# Patient Record
Sex: FEMALE | Race: BLACK OR AFRICAN AMERICAN | Employment: OTHER | URBAN - METROPOLITAN AREA
[De-identification: names, ages, dates, MRNs, and addresses within clinical notes are randomized per-mention and may not be internally consistent; named-entity substitution may affect disease eponyms.]

---

## 2017-03-01 ENCOUNTER — OFFICE VISIT (OUTPATIENT)
Dept: FAMILY MEDICINE CLINIC | Age: 77
End: 2017-03-01

## 2017-03-01 VITALS
RESPIRATION RATE: 16 BRPM | SYSTOLIC BLOOD PRESSURE: 200 MMHG | HEIGHT: 65 IN | WEIGHT: 249 LBS | HEART RATE: 64 BPM | BODY MASS INDEX: 41.48 KG/M2 | TEMPERATURE: 98 F | OXYGEN SATURATION: 98 % | DIASTOLIC BLOOD PRESSURE: 100 MMHG

## 2017-03-01 DIAGNOSIS — E11.9 DIABETES MELLITUS WITHOUT COMPLICATION (HCC): ICD-10-CM

## 2017-03-01 DIAGNOSIS — J30.2 SEASONAL ALLERGIC RHINITIS, UNSPECIFIED ALLERGIC RHINITIS TRIGGER: ICD-10-CM

## 2017-03-01 DIAGNOSIS — I10 ESSENTIAL HYPERTENSION: Primary | ICD-10-CM

## 2017-03-01 RX ORDER — METOPROLOL SUCCINATE 50 MG/1
50 TABLET, EXTENDED RELEASE ORAL DAILY
COMMUNITY
End: 2017-09-19 | Stop reason: SDUPTHER

## 2017-03-01 RX ORDER — EZETIMIBE 10 MG/1
10 TABLET ORAL DAILY
COMMUNITY
End: 2017-03-01 | Stop reason: SDUPTHER

## 2017-03-01 RX ORDER — EZETIMIBE 10 MG/1
10 TABLET ORAL DAILY
Qty: 90 TAB | Refills: 3 | Status: SHIPPED | OUTPATIENT
Start: 2017-03-01 | End: 2017-08-02

## 2017-03-01 RX ORDER — ROSUVASTATIN CALCIUM 20 MG/1
20 TABLET, COATED ORAL
COMMUNITY
End: 2017-05-25 | Stop reason: SDUPTHER

## 2017-03-01 RX ORDER — INSULIN GLARGINE 100 [IU]/ML
24 INJECTION, SOLUTION SUBCUTANEOUS
Status: ON HOLD | COMMUNITY
End: 2022-10-11 | Stop reason: SDUPTHER

## 2017-03-01 RX ORDER — FUROSEMIDE 20 MG/1
20 TABLET ORAL
COMMUNITY
End: 2022-10-11

## 2017-03-01 RX ORDER — INSULIN LISPRO 100 [IU]/ML
INJECTION, SOLUTION INTRAVENOUS; SUBCUTANEOUS
COMMUNITY
End: 2020-07-30 | Stop reason: ALTCHOICE

## 2017-03-01 RX ORDER — VALSARTAN 320 MG/1
320 TABLET ORAL DAILY
COMMUNITY
End: 2019-03-28

## 2017-03-01 NOTE — MR AVS SNAPSHOT
Visit Information Date & Time Provider Department Dept. Phone Encounter #  
 3/1/2017 10:00 AM Edson Becker, [address]  [id number] Follow-up Instructions Return in about 2 weeks (around 3/15/2017) for BP/back on lasix. Your Appointments 3/29/2017 10:30 AM  
New Patient with Shanel Anne MD  
[address] [facility], [address] and [facility] Appt Note: Knee pains/ pt aware to bring Id, ins card, list of meds [address] [facility], [address] [place] [address]  
[phone]  
  
   
 [address] [facility], [address] [place] [address] [address] [address] Upcoming Health Maintenance Date Due DTaP/Tdap/Td series (1 - Tdap) 2/6/1961 ZOSTER VACCINE AGE 60> 2/6/2000 GLAUCOMA SCREENING Q2Y 2/6/2005 OSTEOPOROSIS SCREENING (DEXA) 2/6/2005 Pneumococcal 65+ Low/Medium Risk (1 of 2 - PCV13) 2/6/2005 MEDICARE YEARLY EXAM 2/6/2005 INFLUENZA AGE 9 TO ADULT 8/1/2016 Allergies as of 3/1/2017  Review Complete On: 3/1/2017 By: Edson Becker MD  
  
 Severity Noted Reaction Type Reactions Aspirin  03/01/2017    Other (comments) Ringing in ears Keflex [Cephalexin]  03/01/2017    Rash Current Immunizations  Never Reviewed No immunizations on file. Not reviewed this visit You Were Diagnosed With   
  
 Codes Comments Essential hypertension    -  Primary ICD-10-CM: I10 
ICD-9-CM: 401.9 Diabetes mellitus without complication ([facility] 75.)     IWK-97-EU: E11.9 ICD-9-CM: 250.00 Seasonal allergic rhinitis, unspecified allergic rhinitis trigger     ICD-10-CM: J30.2 ICD-9-CM: 477.9 Vitals BP  
  
  
  
  
  
 (!) 200/100 Vitals History BMI and BSA Data Body Mass Index Body Surface Area  
 41.44 kg/m 2 2.28 m 2 Preferred Pharmacy Pharmacy Name Phone [facility] [address] [phone] Your Updated Medication List  
  
   
This list is accurate as of: 3/1/17 11:09 AM.  Always use your most recent med list.  
  
  
  
  
 CRESTOR 20 mg tablet Generic drug:  rosuvastatin Take 20 mg by mouth nightly. DIOVAN 320 mg tablet Generic drug:  valsartan Take 320 mg by mouth daily. ezetimibe 10 mg tablet Commonly known as:  Fabiana Emil Take 1 Tab by mouth daily. HumaLOG 100 unit/mL injection Generic drug:  insulin lispro  
by SubCUTAneous route. 8 units before breakfast 6 units before lunch 10 units before dinner LANTUS SOLOSTAR 100 unit/mL (3 mL) pen Generic drug:  insulin glargine  
by SubCUTAneous route. 44 units nightly LASIX 40 mg tablet Generic drug:  furosemide Take 20 mg by mouth daily. metoprolol succinate 50 mg XL tablet Commonly known as:  TOPROL-XL Take 100 mg by mouth daily. Prescriptions Sent to Pharmacy Refills  
 ezetimibe (ZETIA) 10 mg tablet 3 Sig: Take 1 Tab by mouth daily. Class: Normal  
 Pharmacy: [facility] [address] [place] [address] #: [phone] Route: Oral  
  
We Performed the Following REFERRAL TO ALLERGY [REF5 Custom] Comments:  
 Please evaluate patient for chronic allergic rhinitis; Dr. Tanja Peoples. REFERRAL TO OPHTHALMOLOGY [REF57 Custom] Comments:  
 Please evaluate patient for retinal disorder jerry diabetic. Khanh Dias REFERRAL TO PODIATRY [REF90 Custom] Comments:  
 Please evaluate patient for diabetic foot exam.  
  
Follow-up Instructions Return in about 2 weeks (around 3/15/2017) for BP/back on lasix. Referral Information Referral ID Referred By Referred To  
  
 [id number] Satish Ragsdale [facility] [address] [place] [address], [address] [place]  Phone: [phone] Fax: [phone] Visits Status Start Date End Date 1 New Request 3/1/17 3/1/18 If your referral has a status of pending review or denied, additional information will be sent to support the outcome of this decision. Referral ID Referred By Referred To  
 [id number] Jessica Gutierrez Not Available Visits Status Start Date End Date 1 New Request 3/1/17 3/1/18 If your referral has a status of pending review or denied, additional information will be sent to support the outcome of this decision. Referral ID Referred By Referred To  
 [id number] Jennifer Walton, [address] [id number] [address] Phone: [phone] Fax: [phone] Visits Status Start Date End Date 1 New Request 3/1/17 3/1/18 If your referral has a status of pending review or denied, additional information will be sent to support the outcome of this decision. Patient Instructions DASH Diet: Care Instructions Your Care Instructions The DASH diet is an eating plan that can help lower your blood pressure. DASH stands for Dietary Approaches to Stop Hypertension. Hypertension is high blood pressure. The DASH diet focuses on eating foods that are high in calcium, potassium, and magnesium. These nutrients can lower blood pressure. The foods that are highest in these nutrients are fruits, vegetables, low-fat dairy products, nuts, seeds, and legumes. But taking calcium, potassium, and magnesium supplements instead of eating foods that are high in those nutrients does not have the same effect. The DASH diet also includes whole grains, fish, and poultry. The DASH diet is one of several lifestyle changes your doctor may recommend to lower your high blood pressure. Your doctor may also want you to decrease the amount of sodium in your diet. Lowering sodium while following the DASH diet can lower blood pressure even further than just the DASH diet alone. Follow-up care is a key part of your treatment and safety. Be sure to make and go to all appointments, and call your doctor if you are having problems. It's also a good idea to know your test results and keep a list of the medicines you take. How can you care for yourself at home? Following the DASH diet · Eat 4 to 5 servings of fruit each day. A serving is 1 medium-sized piece of fruit, ½ cup chopped or canned fruit, 1/4 cup dried fruit, or 4 ounces (½ cup) of fruit juice. Choose fruit more often than fruit juice. · Eat 4 to 5 servings of vegetables each day. A serving is 1 cup of lettuce or raw leafy vegetables, ½ cup of chopped or cooked vegetables, or 4 ounces (½ cup) of vegetable juice. Choose vegetables more often than vegetable juice. · Get 2 to 3 servings of low-fat and fat-free dairy each day. A serving is 8 ounces of milk, 1 cup of yogurt, or 1 ½ ounces of cheese. · Eat 6 to 8 servings of grains each day. A serving is 1 slice of bread, 1 ounce of dry cereal, or ½ cup of cooked rice, pasta, or cooked cereal. Try to choose whole-grain products as much as possible. · Limit lean meat, poultry, and fish to 2 servings each day. A serving is 3 ounces, about the size of a deck of cards. · Eat 4 to 5 servings of nuts, seeds, and legumes (cooked dried beans, lentils, and split peas) each week. A serving is 1/3 cup of nuts, 2 tablespoons of seeds, or ½ cup of cooked beans or peas. · Limit fats and oils to 2 to 3 servings each day. A serving is 1 teaspoon of vegetable oil or 2 tablespoons of salad dressing. · Limit sweets and added sugars to 5 servings or less a week. A serving is 1 tablespoon jelly or jam, ½ cup sorbet, or 1 cup of lemonade. · Eat less than 2,300 milligrams (mg) of sodium a day. If you limit your sodium to 1,500 mg a day, you can lower your blood pressure even more. Tips for success · Start small.  Do not try to make dramatic changes to your diet all at once. You might feel that you are missing out on your favorite foods and then be more likely to not follow the plan. Make small changes, and stick with them. Once those changes become habit, add a few more changes. · Try some of the following: ¨ Make it a goal to eat a fruit or vegetable at every meal and at snacks. This will make it easy to get the recommended amount of fruits and vegetables each day. ¨ Try yogurt topped with fruit and nuts for a snack or healthy dessert. ¨ Add lettuce, tomato, cucumber, and onion to sandwiches. ¨ Combine a ready-made pizza crust with low-fat mozzarella cheese and lots of vegetable toppings. Try using tomatoes, squash, spinach, broccoli, carrots, cauliflower, and onions. ¨ Have a variety of cut-up vegetables with a low-fat dip as an appetizer instead of chips and dip. ¨ Sprinkle sunflower seeds or chopped almonds over salads. Or try adding chopped walnuts or almonds to cooked vegetables. ¨ Try some vegetarian meals using beans and peas. Add garbanzo or kidney beans to salads. Make burritos and tacos with mashed lake beans or black beans. Where can you learn more? Go to http://arlette-michel.info/. Enter E476 in the search box to learn more about \"DASH Diet: Care Instructions. \" Current as of: March 23, 2016 Content Version: 11.1 © 5038-8089 [employer]. Care instructions adapted under license by [employer] (which disclaims liability or warranty for this information). If you have questions about a medical condition or this instruction, always ask your healthcare professional. [place] [address] any warranty or liability for your use of this information. Introducing [facility]! Stephany Shaffer introduces [employer] patient portal. Now you can access parts of your medical record, email your doctor's office, and request medication refills online.    
 
1. In your internet browser, go to [email or web address] 2. Click on the First Time User? Click Here link in the Sign In box. You will see the New Member Sign Up page. 3. Enter your [employer] Access Code exactly as it appears below. You will not need to use this code after youve completed the sign-up process. If you do not sign up before the expiration date, you must request a new code. · [employer] Access Code: [email or web address] Expires: 5/30/2017 11:06 AM 
 
4. Enter the last four digits of your Social Security Number (xxxx) and Date of Birth (mm/dd/yyyy) as indicated and click Submit. You will be taken to the next sign-up page. 5. Create a Elevance Renewable Sciencest ID. This will be your [employer] login ID and cannot be changed, so think of one that is secure and easy to remember. 6. Create a [employer] password. You can change your password at any time. 7. Enter your Password Reset Question and Answer. This can be used at a later time if you forget your password. 8. Enter your e-mail address. You will receive e-mail notification when new information is available in [address] 9. Click Sign Up. You can now view and download portions of your medical record. 10. Click the Download Summary menu link to download a portable copy of your medical information. If you have questions, please visit the Frequently Asked Questions section of the [employer] website. Remember, [employer] is NOT to be used for urgent needs. For medical emergencies, dial 911. Now available from your iPhone and Android! Please provide this summary of care documentation to your next provider. If you have any questions after today's visit, please call [phone].

## 2017-03-01 NOTE — PATIENT INSTRUCTIONS

## 2017-03-01 NOTE — PROGRESS NOTES
HISTORY OF PRESENT ILLNESS  Payton Mendiola is a 68 y.o. female. HPI  Patient is here today for evaluation and treatment of: Diabetes/Hypertension    Diabetes: On lantus and humalog for diabetes. Blood sugars have been stable. Last Ac was 7.0%. Will see Dr. Aniya Daniels on 3/10 . She needs a podiatrist.     Hypertension: states that she has not taken her lasix since Saturday. She takes lasix for edema; she has has been taking diovan and metoprolol. Has had allergy sx. Recently; She needs a new allergist.           Current Outpatient Prescriptions:     metoprolol succinate (TOPROL-XL) 50 mg XL tablet, Take 100 mg by mouth daily. , Disp: , Rfl:     furosemide (LASIX) 40 mg tablet, Take 20 mg by mouth daily. , Disp: , Rfl:     ezetimibe (ZETIA) 10 mg tablet, Take 10 mg by mouth daily. , Disp: , Rfl:     valsartan (DIOVAN) 320 mg tablet, Take 320 mg by mouth daily. , Disp: , Rfl:     rosuvastatin (CRESTOR) 20 mg tablet, Take 20 mg by mouth nightly., Disp: , Rfl:     insulin lispro (HUMALOG) 100 unit/mL injection, by SubCUTAneous route. 8 units before breakfast 6 units before lunch 10 units before dinner, Disp: , Rfl:     insulin glargine (LANTUS SOLOSTAR) 100 unit/mL (3 mL) pen, by SubCUTAneous route. 44 units nightly, Disp: , Rfl:       PMH,  Meds, Allergies, Family History, Social history reviewed    Review of Systems   Constitutional: Positive for malaise/fatigue. Eyes: Negative for blurred vision and double vision. Respiratory: Negative for shortness of breath. Cardiovascular: Positive for leg swelling. Negative for chest pain. Physical Exam   Constitutional: She appears well-developed and well-nourished. No distress. Cardiovascular: Normal rate and regular rhythm. Exam reveals no gallop and no friction rub. No murmur heard. Pulmonary/Chest: Breath sounds normal. No respiratory distress. She has no wheezes. Nursing note and vitals reviewed.      Visit Vitals    BP (!) 230/110 (BP 1 Location: Right arm, BP Patient Position: Sitting)    Pulse 64    Temp 98 °F (36.7 °C) (Oral)    Resp 16    Ht 5' 5\" (1.651 m)    Wt 249 lb (112.9 kg)    SpO2 98%    BMI 41.44 kg/m2           ASSESSMENT and PLAN    ICD-10-CM ICD-9-CM    1. Essential hypertension I10 401.9    2. Diabetes mellitus without complication (HCC) A43.6 250.00 REFERRAL TO PODIATRY      REFERRAL TO OPHTHALMOLOGY   3. Seasonal allergic rhinitis, unspecified allergic rhinitis trigger J30.2 477.9 REFERRAL TO ALLERGY       As above, BP not controlled   treatment plan as listed below  Orders Placed This Encounter    REFERRAL TO PODIATRY    REFERRAL TO ALLERGY    REFERRAL TO OPHTHALMOLOGY    metoprolol succinate (TOPROL-XL) 50 mg XL tablet    furosemide (LASIX) 40 mg tablet    DISCONTD: ezetimibe (ZETIA) 10 mg tablet    valsartan (DIOVAN) 320 mg tablet    rosuvastatin (CRESTOR) 20 mg tablet    insulin lispro (HUMALOG) 100 unit/mL injection    insulin glargine (LANTUS SOLOSTAR) 100 unit/mL (3 mL) pen    ezetimibe (ZETIA) 10 mg tablet     Referrals placed  meds reconciled  Resume lasix as ordered; will see effect on BP  Follow-up Disposition:  Return in about 2 weeks (around 3/15/2017) for BP/back on lasix. An After Visit Summary was printed and given to the patient. This has been fully explained to the patient, who indicates understanding.

## 2017-03-22 ENCOUNTER — OFFICE VISIT (OUTPATIENT)
Dept: FAMILY MEDICINE CLINIC | Age: 77
End: 2017-03-22

## 2017-03-22 VITALS
BODY MASS INDEX: 44.23 KG/M2 | TEMPERATURE: 98.3 F | HEART RATE: 76 BPM | WEIGHT: 249.6 LBS | DIASTOLIC BLOOD PRESSURE: 90 MMHG | HEIGHT: 63 IN | SYSTOLIC BLOOD PRESSURE: 180 MMHG

## 2017-03-22 DIAGNOSIS — I10 HTN (HYPERTENSION), BENIGN: Primary | ICD-10-CM

## 2017-03-22 RX ORDER — AMLODIPINE BESYLATE 5 MG/1
5 TABLET ORAL DAILY
Qty: 30 TAB | Refills: 6 | Status: SHIPPED | OUTPATIENT
Start: 2017-03-22 | End: 2017-03-22 | Stop reason: SDUPTHER

## 2017-03-22 NOTE — MR AVS SNAPSHOT
Visit Information Date & Time Provider Department Dept. Phone Encounter #  
 3/22/2017  2:00 PM Rico Hutchison John [phone] [id number] Follow-up Instructions Return in about 6 weeks (around 5/3/2017) for bp. Your Appointments 3/22/2017  2:00 PM  
ROUTINE CARE with MD Rico Hutchison John [address] [facility]) Appt Note: 2 wk fu bp/ lasix; .  
 [address] [address] 2 [facility] [address] [address]  
  
    
 3/29/2017 10:30 AM  
New Patient with Rebecca Main MD  
914 [facility], [address] and Spine Specialists - [facility] [address] [facility]) Appt Note: Knee pains/ pt aware to bring Id, ins card, list of meds [address] [facility], [address] [place] [address]  
[phone]  
  
   
 [address] [facility], [address] [place] [address] Upcoming Health Maintenance Date Due HEMOGLOBIN A1C Q6M 1940 LIPID PANEL Q1 1940 FOOT EXAM Q1 2/6/1950 MICROALBUMIN Q1 2/6/1950 EYE EXAM RETINAL OR DILATED Q1 2/6/1950 DTaP/Tdap/Td series (1 - Tdap) 2/6/1961 ZOSTER VACCINE AGE 60> 2/6/2000 GLAUCOMA SCREENING Q2Y 2/6/2005 OSTEOPOROSIS SCREENING (DEXA) 2/6/2005 Pneumococcal 65+ Low/Medium Risk (1 of 2 - PCV13) 2/6/2005 MEDICARE YEARLY EXAM 2/6/2005 INFLUENZA AGE 9 TO ADULT 8/1/2016 Allergies as of 3/22/2017  Review Complete On: 3/22/2017 By: Steve Vargas MD  
  
 Severity Noted Reaction Type Reactions Aspirin  03/01/2017    Other (comments) Ringing in ears Keflex [Cephalexin]  03/01/2017    Rash Current Immunizations  Never Reviewed No immunizations on file. Not reviewed this visit You Were Diagnosed With   
  
 Codes Comments HTN (hypertension), benign    -  Primary ICD-10-CM: I10 
ICD-9-CM: 401.1 Vitals BP Pulse Temp Height(growth percentile) Weight(growth percentile) BMI 180/90 76 98.3 °F (36.8 °C) 5' 3\" (1.6 m) 249 lb 9.6 oz (113.2 kg) 44.21 kg/m2 OB Status Smoking Status Menopause Former Smoker Vitals History BMI and BSA Data Body Mass Index Body Surface Area  
 44.21 kg/m 2 2.24 m 2 Preferred Pharmacy Pharmacy Name Phone [facility] [address] [place] [address] [phone] Your Updated Medication List  
  
   
This list is accurate as of: 3/22/17 12:47 PM.  Always use your most recent med list. amLODIPine 5 mg tablet Commonly known as:  Poole Triston Take 1 Tab by mouth daily. CRESTOR 20 mg tablet Generic drug:  rosuvastatin Take 20 mg by mouth nightly. DIOVAN 320 mg tablet Generic drug:  valsartan Take 320 mg by mouth daily. ezetimibe 10 mg tablet Commonly known as:  Millie Paget Take 1 Tab by mouth daily. HumaLOG 100 unit/mL injection Generic drug:  insulin lispro  
by SubCUTAneous route. 8 units before breakfast 6 units before lunch 10 units before dinner LANTUS SOLOSTAR 100 unit/mL (3 mL) pen Generic drug:  insulin glargine  
by SubCUTAneous route. 44 units nightly LASIX 40 mg tablet Generic drug:  furosemide Take 20 mg by mouth daily. metoprolol succinate 50 mg XL tablet Commonly known as:  TOPROL-XL Take 100 mg by mouth daily. Prescriptions Sent to Pharmacy Refills  
 amLODIPine (NORVASC) 5 mg tablet 6 Sig: Take 1 Tab by mouth daily. Class: Normal  
 Pharmacy: [facility] [address] [place] [address] Ph #: [phone] Route: Oral  
  
Follow-up Instructions Return in about 6 weeks (around 5/3/2017) for bp. Introducing [facility]! Clinton Browning introduces [employer] patient portal. Now you can access parts of your medical record, email your doctor's office, and request medication refills online. 1. In your internet browser, go to [email or web address]. [email or web address] Video Intelligencet 2. Click on the First Time User? Click Here link in the Sign In box. You will see the New Member Sign Up page. 3. Enter your [employer] Access Code exactly as it appears below. You will not need to use this code after youve completed the sign-up process. If you do not sign up before the expiration date, you must request a new code. · [employer] Access Code: [email or web address] Expires: 5/30/2017 12:06 PM 
 
4. Enter the last four digits of your Social Security Number (xxxx) and Date of Birth (mm/dd/yyyy) as indicated and click Submit. You will be taken to the next sign-up page. 5. Create a Healthiest Yout ID. This will be your [employer] login ID and cannot be changed, so think of one that is secure and easy to remember. 6. Create a [employer] password. You can change your password at any time. 7. Enter your Password Reset Question and Answer. This can be used at a later time if you forget your password. 8. Enter your e-mail address. You will receive e-mail notification when new information is available in [address] 9. Click Sign Up. You can now view and download portions of your medical record. 10. Click the Download Summary menu link to download a portable copy of your medical information. If you have questions, please visit the Frequently Asked Questions section of the [employer] website. Remember, [employer] is NOT to be used for urgent needs. For medical emergencies, dial 911. Now available from your iPhone and Android! Please provide this summary of care documentation to your next provider. If you have any questions after today's visit, please call [phone].

## 2017-03-23 RX ORDER — AMLODIPINE BESYLATE 5 MG/1
TABLET ORAL
Qty: 90 TAB | Refills: 6 | Status: SHIPPED | OUTPATIENT
Start: 2017-03-23 | End: 2017-05-22 | Stop reason: ALTCHOICE

## 2017-03-26 NOTE — PROGRESS NOTES
1. Have you been to the ER, urgent care clinic since your last visit? Hospitalized since your last visit? No    2. Have you seen or consulted any other health care providers outside of the [address] [place] [address] since your last visit? Include any pap smears or colon screening.  Yes ophthalmology and podiatry

## 2017-03-28 ENCOUNTER — TELEPHONE (OUTPATIENT)
Dept: FAMILY MEDICINE CLINIC | Age: 77
End: 2017-03-28

## 2017-03-28 RX ORDER — ESOMEPRAZOLE MAGNESIUM 40 MG/1
40 CAPSULE, DELAYED RELEASE ORAL DAILY
Qty: 30 CAP | Refills: 3 | Status: SHIPPED | OUTPATIENT
Start: 2017-03-28 | End: 2017-03-28 | Stop reason: SDUPTHER

## 2017-03-28 NOTE — TELEPHONE ENCOUNTER
Spoke with patient to inform that Dr. Lafleur Senior sent medication order esomeprazole 40 mg to pharmacy. Patient verbalized understanding.

## 2017-03-28 NOTE — TELEPHONE ENCOUNTER
Pt called stating she needs a refill on esomprazole magnesium 40mg capsule please advise.  Pt stated she had that one written down for one of her medications from her list [phone]

## 2017-03-30 RX ORDER — ESOMEPRAZOLE MAGNESIUM 40 MG/1
CAPSULE, DELAYED RELEASE ORAL
Qty: 90 CAP | Refills: 1 | Status: SHIPPED | OUTPATIENT
Start: 2017-03-30 | End: 2017-08-02

## 2017-04-17 ENCOUNTER — HOSPITAL ENCOUNTER (OUTPATIENT)
Dept: GENERAL RADIOLOGY | Age: 77
Discharge: HOME | End: 2017-04-17
Payer: MEDICARE

## 2017-04-17 DIAGNOSIS — J45.30 MILD PERSISTENT ASTHMA: ICD-10-CM

## 2017-04-17 PROCEDURE — 70220 X-RAY EXAM OF SINUSES: CPT

## 2017-04-21 LAB
HBA1C MFR BLD HPLC: 6.5 %
MICROALBUMIN UR TEST STR-MCNC: 1696.3 MG/DL

## 2017-04-27 ENCOUNTER — OFFICE VISIT (OUTPATIENT)
Dept: ORTHOPEDIC SURGERY | Facility: CLINIC | Age: 77
End: 2017-04-27

## 2017-04-27 VITALS
DIASTOLIC BLOOD PRESSURE: 75 MMHG | WEIGHT: 246.6 LBS | TEMPERATURE: 98.2 F | HEART RATE: 74 BPM | HEIGHT: 63 IN | SYSTOLIC BLOOD PRESSURE: 182 MMHG | BODY MASS INDEX: 43.7 KG/M2

## 2017-04-27 DIAGNOSIS — M17.12 PRIMARY OSTEOARTHRITIS OF LEFT KNEE: Primary | ICD-10-CM

## 2017-04-27 DIAGNOSIS — G56.01 CARPAL TUNNEL SYNDROME OF RIGHT WRIST: ICD-10-CM

## 2017-04-27 RX ORDER — BETAMETHASONE SODIUM PHOSPHATE AND BETAMETHASONE ACETATE 3; 3 MG/ML; MG/ML
6 INJECTION, SUSPENSION INTRA-ARTICULAR; INTRALESIONAL; INTRAMUSCULAR; SOFT TISSUE ONCE
Qty: 1 VIAL | Refills: 0
Start: 2017-04-27 | End: 2017-04-27

## 2017-04-27 NOTE — PROGRESS NOTES
Patient: Akash Vital                MRN: [id number]       SSN: [id number]  YOB: 1940        AGE: 68 y.o. SEX: female  Body mass index is 43.68 kg/(m^2). PCP: Jigna Payne MD  04/27/17    HISTORY: Ms. Miguelito Arreola is a very nice lady who has moved down from [place] who has pretty severe arthritis involving her knees most symptomatic on the left side. She had a round of viscosupplementation. It lasted about three months. The pain is moderate, aching. She also noticed some leg swelling and she complains also of right hand pain at the base of her thumb, and also her index finger gets numb, especially at night when she wakes up in the morning. She denies fevers or chills and otherwise has been feeling well. She has had no heart attack. She is diabetic. PHYSICAL EXAMINATION:  On examination today, she is a very nice lady with a BMI of 44. She moves the head and neck adequately. There is no respiratory compromise or indrawing. There is no scleral icterus. There is no JVD. The hips rotate nicely. She does have some mild lymphedema, and she does have a couple of lipomas associated with the pre-tibial region, which are symmetric and normal in appearance. The knee itself has varus malalignment and a couple degrees fixed flexion deformity. There is joint line tenderness in all three compartments, but no evidence for infection or DVT. There is slight evidence of neuropathy with sharp testing distally and only minimal peripheral edema, about 0.5+. The calf is nontender. Jean Claude's sign is negative. Examination of the right hand reveals some mild thenar wasting. There is mild decrease of sensation and two-point discrimination involving the median nerve distribution in the right hand and mild grind with testing of the right thumb. RADIOGRAPHS:  Review of her x-rays, including AP, tunnel, lateral, and skyline, of the knees, confirms quite severe arthritis really of both knees. PROCEDURE:  Under aseptic conditions and after informed, written consent with a time out, the left knee was injected with 1 cc of the Celestone preparation, i.e. 6 mg, which was well tolerated. PLAN:  She is going to return to see us in about three weeks time. We will  the efficacy of the injection and get x-rays of the right hand and thumb as well. She may wish for an injection in the right knee and right thumb. It has been a pleasure to share in her care. She may be eventually heading towards joint replacement surgery, and I would like her to lose a little bit of weight prior this if necessary. REVIEW OF SYSTEMS:      CON: negative for weight loss, fever  EYE: negative for double vision  ENT: negative for hoarseness  RS:   negative for Tb  GI:    negative for blood in stool  :  negative for blood in urine  Other systems reviewed and noted below. Past Medical History:   Diagnosis Date    Allergic rhinitis     Diabetes ([facility] [address]     Hypertension        Family History   Problem Relation Age of Onset    Diabetes Maternal Aunt     Diabetes Maternal Uncle     Diabetes Maternal Grandfather     Diabetes Other        Current Outpatient Prescriptions   Medication Sig Dispense Refill    betamethasone (CELESTONE SOLUSPAN) 6 mg/mL injection 1 mL by Intra artICUlar route once for 1 dose. Left knee 1 Vial 0    amLODIPine (NORVASC) 5 mg tablet TAKE 1 TABLET BY MOUTH DAILY 90 Tab 6    metoprolol succinate (TOPROL-XL) 50 mg XL tablet Take 100 mg by mouth daily.  furosemide (LASIX) 40 mg tablet Take 20 mg by mouth daily.  valsartan (DIOVAN) 320 mg tablet Take 320 mg by mouth daily.  rosuvastatin (CRESTOR) 20 mg tablet Take 20 mg by mouth nightly.  insulin lispro (HUMALOG) 100 unit/mL injection by SubCUTAneous route. 8 units before breakfast  6 units before lunch  10 units before dinner      insulin glargine (LANTUS SOLOSTAR) 100 unit/mL (3 mL) pen by SubCUTAneous route. 44 units nightly      esomeprazole (NEXIUM) 40 mg capsule TAKE 1 CAPSULE BY MOUTH DAILY 90 Cap 1    ezetimibe (ZETIA) 10 mg tablet Take 1 Tab by mouth daily. 90 Tab 3       Allergies   Allergen Reactions    Aspirin Other (comments)     Ringing in ears    Keflex [Cephalexin] Rash       Past Surgical History:   Procedure Laterality Date    HX CATARACT REMOVAL  2011    HX LUMBAR FUSION  2004    HX WISDOM TEETH EXTRACTION         Social History     Social History    Marital status: UNKNOWN     Spouse name: N/A    Number of children: N/A    Years of education: N/A     Occupational History    CNA - RET      Social History Main Topics    Smoking status: Former Smoker    Smokeless tobacco: Never Used    Alcohol use No    Drug use: No    Sexual activity: Yes     Partners: Male     Birth control/ protection: None     Other Topics Concern    Not on file     Social History Narrative       Visit Vitals    /75    Pulse 74    Temp 98.2 °F (36.8 °C) (Oral)    Ht 5' 3\" (1.6 m)    Wt 246 lb 9.6 oz (111.9 kg)    BMI 43.68 kg/m2         PHYSICAL EXAMINATION:  GENERAL: Alert and oriented x3, in no acute distress, well-developed, well-nourished, afebrile. HEART: No JVD. EYES: No scleral icterus   NECK: No significant lymphadenopathy   LUNGS: No respiratory compromise or indrawing  ABDOMEN: Soft, non-tender, non-distended. Electronically signed by:  Adrian Alvarez MD

## 2017-05-22 ENCOUNTER — OFFICE VISIT (OUTPATIENT)
Dept: FAMILY MEDICINE CLINIC | Age: 77
End: 2017-05-22

## 2017-05-22 VITALS
DIASTOLIC BLOOD PRESSURE: 84 MMHG | OXYGEN SATURATION: 98 % | HEART RATE: 77 BPM | RESPIRATION RATE: 16 BRPM | HEIGHT: 63 IN | BODY MASS INDEX: 43.23 KG/M2 | SYSTOLIC BLOOD PRESSURE: 190 MMHG | WEIGHT: 244 LBS | TEMPERATURE: 98 F

## 2017-05-22 DIAGNOSIS — I10 HTN (HYPERTENSION), BENIGN: Primary | ICD-10-CM

## 2017-05-22 RX ORDER — AMLODIPINE BESYLATE 10 MG/1
10 TABLET ORAL DAILY
Qty: 30 TAB | Refills: 6 | Status: SHIPPED | OUTPATIENT
Start: 2017-05-22 | End: 2019-05-08 | Stop reason: SDUPTHER

## 2017-05-22 NOTE — MR AVS SNAPSHOT
Visit Information Date & Time Provider Department Dept. Phone Encounter #  
 5/22/2017 11:40 AM Sydney Nava, [address] [id number] Follow-up Instructions Return in about 2 months (around 7/22/2017) for HTN. Your Appointments 6/1/2017 10:00 AM  
Follow Up with Renetta Avalos MD  
[facility] [address]) Appt Note: 4 WK FU LT KNEE  
 [address] [facility], [address] [facility] [address] [phone]  
  
   
 [address] [facility], [address] [place] [id number] Upcoming Health Maintenance Date Due HEMOGLOBIN A1C Q6M 1940 LIPID PANEL Q1 1940 DTaP/Tdap/Td series (1 - Tdap) 2/6/1961 ZOSTER VACCINE AGE 60> 2/6/2000 OSTEOPOROSIS SCREENING (DEXA) 2/6/2005 Pneumococcal 65+ Low/Medium Risk (1 of 2 - PCV13) 2/6/2005 MEDICARE YEARLY EXAM 2/6/2005 INFLUENZA AGE 9 TO ADULT 8/1/2017 MICROALBUMIN Q1 3/10/2018 EYE EXAM RETINAL OR DILATED Q1 3/14/2018 FOOT EXAM Q1 3/15/2018 GLAUCOMA SCREENING Q2Y 3/14/2019 Allergies as of 5/22/2017  Review Complete On: 5/22/2017 By: Marilynn Kohli LPN Severity Noted Reaction Type Reactions Aspirin  03/01/2017    Other (comments) Ringing in ears Keflex [Cephalexin]  03/01/2017    Rash Current Immunizations  Never Reviewed No immunizations on file. Not reviewed this visit You Were Diagnosed With   
  
 Codes Comments HTN (hypertension), benign    -  Primary ICD-10-CM: I10 
ICD-9-CM: 401.1 Vitals BP Pulse Temp Resp Height(growth percentile) Weight(growth percentile) 190/84 77 98 °F (36.7 °C) (Oral) 16 5' 3\" (1.6 m) 244 lb (110.7 kg) SpO2 BMI OB Status Smoking Status 98% 43.22 kg/m2 Menopause Former Smoker Vitals History BMI and BSA Data Body Mass Index Body Surface Area  
 43.22 kg/m 2 2.22 m 2 Preferred Pharmacy Pharmacy Name Phone [facility] Karmen Jordan [address] [place] [address] [phone] Your Updated Medication List  
  
   
This list is accurate as of: 5/22/17 12:41 PM.  Always use your most recent med list. amLODIPine 10 mg tablet Commonly known as:  Claudell Hesselbach Take 1 Tab by mouth daily. CRESTOR 20 mg tablet Generic drug:  rosuvastatin Take 20 mg by mouth nightly. DIOVAN 320 mg tablet Generic drug:  valsartan Take 320 mg by mouth daily. esomeprazole 40 mg capsule Commonly known as:  NEXIUM  
TAKE 1 CAPSULE BY MOUTH DAILY  
  
 ezetimibe 10 mg tablet Commonly known as:  Yuvonne Gallop Take 1 Tab by mouth daily. HumaLOG 100 unit/mL injection Generic drug:  insulin lispro  
by SubCUTAneous route. 8 units before breakfast 6 units before lunch 10 units before dinner LANTUS SOLOSTAR 100 unit/mL (3 mL) pen Generic drug:  insulin glargine  
by SubCUTAneous route. 44 units nightly LASIX 40 mg tablet Generic drug:  furosemide Take 20 mg by mouth daily. metoprolol succinate 50 mg XL tablet Commonly known as:  TOPROL-XL Take 50 mg by mouth daily. Prescriptions Printed Refills  
 amLODIPine (NORVASC) 10 mg tablet 6 Sig: Take 1 Tab by mouth daily. Class: Print Route: Oral  
  
We Performed the Following REFERRAL TO CARDIOLOGY [NT91 Custom] Comments:  
 Please evaluate patient for uncontrolled BP. Follow-up Instructions Return in about 2 months (around 7/22/2017) for HTN. Referral Information Referral ID Referred By Referred To  
  
 [id number] Martha Phillips, [address] [place], [facility] Phone: [phone] Fax: [phone] Visits Status Start Date End Date 1 New Request 5/22/17 5/22/18  If your referral has a status of pending review or denied, additional information will be sent to support the outcome of this decision. Patient Instructions Low Sodium Diet (2,000 Milligram): Care Instructions Your Care Instructions Too much sodium causes your body to hold on to extra water. This can raise your blood pressure and force your heart and kidneys to work harder. In very serious cases, this could cause you to be put in the hospital. It might even be life-threatening. By limiting sodium, you will feel better and lower your risk of serious problems. The most common source of sodium is salt. People get most of the salt in their diet from canned, prepared, and packaged foods. Fast food and restaurant meals also are very high in sodium. Your doctor will probably limit your sodium to less than 2,000 milligrams (mg) a day. This limit counts all the sodium in prepared and packaged foods and any salt you add to your food. Follow-up care is a key part of your treatment and safety. Be sure to make and go to all appointments, and call your doctor if you are having problems. It's also a good idea to know your test results and keep a list of the medicines you take. How can you care for yourself at home? Read food labels · Read labels on cans and food packages. The labels tell you how much sodium is in each serving. Make sure that you look at the serving size. If you eat more than the serving size, you have eaten more sodium. · Food labels also tell you the Percent Daily Value for sodium. Choose products with low Percent Daily Values for sodium. · Be aware that sodium can come in forms other than salt, including monosodium glutamate (MSG), sodium citrate, and sodium bicarbonate (baking soda). MSG is often added to Asian food. When you eat out, you can sometimes ask for food without MSG or added salt. Buy low-sodium foods · Buy foods that are labeled \"unsalted\" (no salt added), \"sodium-free\" (less than 5 mg of sodium per serving), or \"low-sodium\" (less than 140 mg of sodium per serving). Foods labeled \"reduced-sodium\" and \"light sodium\" may still have too much sodium. Be sure to read the label to see how much sodium you are getting. · Buy fresh vegetables, or frozen vegetables without added sauces. Buy low-sodium versions of canned vegetables, soups, and other canned goods. Prepare low-sodium meals · Cut back on the amount of salt you use in cooking. This will help you adjust to the taste. Do not add salt after cooking. One teaspoon of salt has about 2,300 mg of sodium. · Take the salt shaker off the table. · Flavor your food with garlic, lemon juice, onion, vinegar, herbs, and spices. Do not use soy sauce, lite soy sauce, steak sauce, onion salt, garlic salt, celery salt, mustard, or ketchup on your food. · Use low-sodium salad dressings, sauces, and ketchup. Or make your own salad dressings and sauces without adding salt. · Use less salt (or none) when recipes call for it. You can often use half the salt a recipe calls for without losing flavor. Other foods such as rice, pasta, and grains do not need added salt. · Rinse canned vegetables, and cook them in fresh water. This removes somebut not allof the salt. · Avoid water that is naturally high in sodium or that has been treated with water softeners, which add sodium. Call your local water company to find out the sodium content of your water supply. If you buy bottled water, read the label and choose a sodium-free brand. Avoid high-sodium foods · Avoid eating: ¨ Smoked, cured, salted, and canned meat, fish, and poultry. ¨ Ham, sanchez, hot dogs, and luncheon meats. ¨ Regular, hard, and processed cheese and regular peanut butter. ¨ Crackers with salted tops, and other salted snack foods such as pretzels, chips, and salted popcorn. ¨ Frozen prepared meals, unless labeled low-sodium. ¨ Canned and dried soups, broths, and bouillon, unless labeled sodium-free or low-sodium. ¨ Canned vegetables, unless labeled sodium-free or low-sodium. ¨ Western [place]ita fries, pizza, tacos, and other fast foods. ¨ Pickles, olives, ketchup, and other condiments, especially soy sauce, unless labeled sodium-free or low-sodium. Where can you learn more? Go to http://arlette-michel.info/. Enter Y927 in the search box to learn more about \"Low Sodium Diet (2,000 Milligram): Care Instructions. \" Current as of: July 26, 2016 Content Version: 11.2 © 2694-6669 [employer]. Care instructions adapted under license by [employer] (which disclaims liability or warranty for this information). If you have questions about a medical condition or this instruction, always ask your healthcare professional. [address] any warranty or liability for your use of this information. Introducing [facility]! Robyn Ruth introduces Rebelle patient portal. Now you can access parts of your medical record, email your doctor's office, and request medication refills online. 1. In your internet browser, go to [email or web address] 2. Click on the First Time User? Click Here link in the Sign In box. You will see the New Member Sign Up page. 3. Enter your Rebelle Access Code exactly as it appears below. You will not need to use this code after youve completed the sign-up process. If you do not sign up before the expiration date, you must request a new code. · Rebelle Access Code: [email or web address] Expires: 5/30/2017 12:06 PM 
 
4. Enter the last four digits of your Social Security Number (xxxx) and Date of Birth (mm/dd/yyyy) as indicated and click Submit. You will be taken to the next sign-up page. 5. Create a [employer]endor Checkt ID. This will be your Rebelle login ID and cannot be changed, so think of one that is secure and easy to remember. 6. Create a [employer] Checkt password. You can change your password at any time. 7. Enter your Password Reset Question and Answer. This can be used at a later time if you forget your password. 8. Enter your e-mail address. You will receive e-mail notification when new information is available in [address] 9. Click Sign Up. You can now view and download portions of your medical record. 10. Click the Download Summary menu link to download a portable copy of your medical information. If you have questions, please visit the Frequently Asked Questions section of the [employer] website. Remember, [employer] is NOT to be used for urgent needs. For medical emergencies, dial 911. Now available from your iPhone and Android! Please provide this summary of care documentation to your next provider. Your primary care clinician is listed as [address]. If you have any questions after today's visit, please call [phone].

## 2017-05-22 NOTE — PROGRESS NOTES
HISTORY OF PRESENT ILLNESS  Raine Elam is a 68 y.o. female. HPI  Patient is here today for follow up on: Hypertension    Hypertension:  She states her BP \" stays \" up. She does have hyperparathyroidism. She has been taking her diovan and lasix daily. Pt feels sluggish at times. States she is unable to tolerate metoprolol at 100 daily as this has been prescribed before for her. She has also been on coreg and couldn't tolerate that med    BP Readings from Last 3 Encounters:   05/22/17 190/84   04/27/17 182/75   03/22/17 180/90         Current Outpatient Prescriptions:     amLODIPine (NORVASC) 10 mg tablet, Take 1 Tab by mouth daily. , Disp: 30 Tab, Rfl: 6    esomeprazole (NEXIUM) 40 mg capsule, TAKE 1 CAPSULE BY MOUTH DAILY, Disp: 90 Cap, Rfl: 1    metoprolol succinate (TOPROL-XL) 50 mg XL tablet, Take 50 mg by mouth daily. , Disp: , Rfl:     furosemide (LASIX) 40 mg tablet, Take 20 mg by mouth daily. , Disp: , Rfl:     valsartan (DIOVAN) 320 mg tablet, Take 320 mg by mouth daily. , Disp: , Rfl:     rosuvastatin (CRESTOR) 20 mg tablet, Take 20 mg by mouth nightly., Disp: , Rfl:     insulin lispro (HUMALOG) 100 unit/mL injection, by SubCUTAneous route. 8 units before breakfast 6 units before lunch 10 units before dinner, Disp: , Rfl:     insulin glargine (LANTUS SOLOSTAR) 100 unit/mL (3 mL) pen, by SubCUTAneous route. 44 units nightly, Disp: , Rfl:     ezetimibe (ZETIA) 10 mg tablet, Take 1 Tab by mouth daily. , Disp: 80 Tab, Rfl: 3      PMH,  Meds, Allergies, Family History, Social history reviewed    Review of Systems   Constitutional: Negative for chills and fever. Cardiovascular: Negative for chest pain and palpitations. Neurological: Positive for dizziness.        Physical Exam   Visit Vitals    /84    Pulse 77    Temp 98 °F (36.7 °C) (Oral)    Resp 16    Ht 5' 3\" (1.6 m)    Wt 244 lb (110.7 kg)    SpO2 98%    BMI 43.22 kg/m2     General appearance: alert, cooperative, no distress, appears stated age  Neck: supple, symmetrical, trachea midline, no adenopathy, thyroid: not enlarged, symmetric, no tenderness/mass/nodules, no carotid bruit and no JVD  Lungs: clear to auscultation bilaterally  Heart: regular rate and rhythm, S1, S2 normal, no murmur, click, rub or gallop  Extremities: extremities normal, atraumatic, no cyanosis or edema      ASSESSMENT and PLAN    ICD-10-CM ICD-9-CM    1. HTN (hypertension), benign I10 401.1 amLODIPine (NORVASC) 10 mg tablet      REFERRAL TO CARDIOLOGY       As above, not controlled  Increase norvasc to 10 mg daily  Cardiology referral as ordered  Low sodium diet advised  Follow-up Disposition:  Return in about 2 months (around 7/22/2017) for HTN. An After Visit Summary was printed and given to the patient. This has been fully explained to the patient, who indicates understanding.

## 2017-05-22 NOTE — PATIENT INSTRUCTIONS

## 2017-05-22 NOTE — PROGRESS NOTES
1. Have you been to the ER, urgent care clinic since your last visit? Hospitalized since your last visit? No    2. Have you seen or consulted any other health care providers outside of the [address] [place] [address] since your last visit? Include any pap smears or colon screening.  Yes Where: Gregor Clayton MD - allergy & asthma provider

## 2017-05-25 RX ORDER — ROSUVASTATIN CALCIUM 20 MG/1
TABLET, COATED ORAL
Qty: 90 TAB | Refills: 3 | Status: SHIPPED | OUTPATIENT
Start: 2017-05-25 | End: 2018-05-25 | Stop reason: SDUPTHER

## 2017-05-25 RX ORDER — ROSUVASTATIN CALCIUM 20 MG/1
20 TABLET, COATED ORAL
Qty: 30 TAB | Refills: 3 | Status: SHIPPED | OUTPATIENT
Start: 2017-05-25 | End: 2017-05-25 | Stop reason: SDUPTHER

## 2017-06-01 ENCOUNTER — OFFICE VISIT (OUTPATIENT)
Dept: ORTHOPEDIC SURGERY | Facility: CLINIC | Age: 77
End: 2017-06-01

## 2017-06-01 VITALS
WEIGHT: 245 LBS | DIASTOLIC BLOOD PRESSURE: 80 MMHG | SYSTOLIC BLOOD PRESSURE: 175 MMHG | BODY MASS INDEX: 43.4 KG/M2 | HEART RATE: 70 BPM | TEMPERATURE: 98 F

## 2017-06-01 DIAGNOSIS — R20.0 NUMBNESS AND TINGLING IN BOTH HANDS: ICD-10-CM

## 2017-06-01 DIAGNOSIS — M79.641 CHRONIC HAND PAIN, RIGHT: Primary | ICD-10-CM

## 2017-06-01 DIAGNOSIS — G89.29 CHRONIC HAND PAIN, RIGHT: Primary | ICD-10-CM

## 2017-06-01 DIAGNOSIS — R20.2 NUMBNESS AND TINGLING IN BOTH HANDS: ICD-10-CM

## 2017-06-01 RX ORDER — DICLOFENAC SODIUM 10 MG/G
4 GEL TOPICAL 4 TIMES DAILY
Qty: 500 G | Refills: 5 | Status: SHIPPED | OUTPATIENT
Start: 2017-06-01 | End: 2018-09-12 | Stop reason: SDUPTHER

## 2017-06-01 RX ORDER — DICLOFENAC SODIUM 10 MG/G
4 GEL TOPICAL 4 TIMES DAILY
Qty: 500 G | Refills: 5 | Status: SHIPPED | OUTPATIENT
Start: 2017-06-01 | End: 2017-06-01

## 2017-06-01 NOTE — PROGRESS NOTES
Patient: Rachelle Pennington                MRN: [id number]       SSN: [id number]  YOB: 1940        AGE: 68 y.o. SEX: female  Body mass index is 43.4 kg/(m^2). PCP: Lionel Peralta MD  06/01/17    HISTORY: Ms. Calos Soto returns in followup with regards to complaints of knee pain, hand pain, thumb pain, and also numbness and tingling involving the right hand. She has had a known pinched nerve in the neck but this feels a little different to her. She wakes up with the fingers numb in the morning. I did give her a brace at the last visit. Also, she has some pain, moderate, when she opens a jam jar or a door. We injected the knee at the last visit. It has taken part of the pain away. It is still stiff and slows her down from walking, but the acute pain is improved to moderate at this point in time. It can be aching. The pain in the hand can be somewhat radicular as well. She denies fevers, chills, night sweats, or weight loss. She is otherwise feeling well. PHYSICAL EXAMINATION:  On examination today, her previus portal of entry of the knee is clean and dry. She has a mild angular deformity. She bends to about 105°. The calf is nontender. Jean Claude's sign is negative. The hips rotate nicely. With regards to the thumb, she has a positive grind and tenderness at the base of the thumb and also decreased two-point discrimination and decreased sensation at the median aspect of the median nerve distribution. The compartments are soft. The wrist and elbow are noncontributory. RADIOGRAPHS:  Hand x-rays, three views, show arthritis at the base of the thumb. No acute fractures. IMPRESSION:  My overall impression is:    1. Severe arthritis of the knee partially responding to cortisone eventually likely to have knee replacement. 2. Carpal tunnel syndrome not relieved with bracing. PLAN:  We are going to try with EMG nerve conduction study.   Additionally, arthritis of the base of the thumb. She tolerates Aleve just fine. We will try her with some topical arthritis cream for the thumb. She is not too keen on an injection today. 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 We will get her tested. REVIEW OF SYSTEMS:      CON: negative for weight loss, fever  EYE: negative for double vision  ENT: negative for hoarseness  RS:   negative for Tb  GI:    negative for blood in stool  :  negative for blood in urine  Other systems reviewed and noted below. Past Medical History:   Diagnosis Date    Allergic rhinitis     Diabetes ([facility] [address]     Hypertension        Family History   Problem Relation Age of Onset    Diabetes Maternal Aunt     Diabetes Maternal Uncle     Diabetes Maternal Grandfather     Diabetes Other        Current Outpatient Prescriptions   Medication Sig Dispense Refill    diclofenac (VOLTAREN) 1 % gel Apply 4 g to affected area four (4) times daily. 500 g 5    rosuvastatin (CRESTOR) 20 mg tablet TAKE 1 TABLET BY MOUTH EVERY NIGHT 90 Tab 3    amLODIPine (NORVASC) 10 mg tablet Take 1 Tab by mouth daily. 30 Tab 6    esomeprazole (NEXIUM) 40 mg capsule TAKE 1 CAPSULE BY MOUTH DAILY 90 Cap 1    metoprolol succinate (TOPROL-XL) 50 mg XL tablet Take 50 mg by mouth daily.  furosemide (LASIX) 40 mg tablet Take 20 mg by mouth daily.  valsartan (DIOVAN) 320 mg tablet Take 320 mg by mouth daily.  insulin lispro (HUMALOG) 100 unit/mL injection by SubCUTAneous route. 8 units before breakfast  6 units before lunch  10 units before dinner      insulin glargine (LANTUS SOLOSTAR) 100 unit/mL (3 mL) pen by SubCUTAneous route. 44 units nightly      ezetimibe (ZETIA) 10 mg tablet Take 1 Tab by mouth daily.  90 Tab 3       Allergies   Allergen Reactions    Aspirin Other (comments)     Ringing in ears    Keflex [Cephalexin] Rash       Past Surgical History:   Procedure Laterality Date    HX CATARACT REMOVAL  2011    HX LUMBAR FUSION  2004    HX WISDOM TEETH EXTRACTION         Social History     Social History    Marital status:      Spouse name: N/A    Number of children: N/A    Years of education: N/A     Occupational History    CNA - RET      Social History Main Topics    Smoking status: Former Smoker    Smokeless tobacco: Never Used    Alcohol use No    Drug use: No    Sexual activity: Yes     Partners: Male     Birth control/ protection: None     Other Topics Concern    Not on file     Social History Narrative       Visit Vitals    /80 (BP 1 Location: Left arm, BP Patient Position: Sitting)    Pulse 70    Temp 98 °F (36.7 °C) (Oral)    Wt 245 lb (111.1 kg)    BMI 43.4 kg/m2         PHYSICAL EXAMINATION:  GENERAL: Alert and oriented x3, in no acute distress, well-developed, well-nourished, afebrile. HEART: No JVD. EYES: No scleral icterus   NECK: No significant lymphadenopathy   LUNGS: No respiratory compromise or indrawing  ABDOMEN: Soft, non-tender, non-distended. Electronically signed by:  Perez Bustillos MD

## 2017-06-01 NOTE — MR AVS SNAPSHOT
Visit Information Date & Time Provider Department Dept. Phone Encounter #  
 6/1/2017 10:00 AM Ivelisse Lawrence MD [facility] [place] [id number] [phone] [id number] Your Appointments 6/23/2017 11:00 AM  
New Patient with Juli Daniels MD  
[facility] ([facility]) Appt Note: Please evaluate patient for uncontrolled BP.  
 [address] [place] [address]  
[phone] [place] Nunez  
  
    
 7/24/2017 11:40 AM  
ROUTINE CARE with Chalo Menendez MD  
[facility] Appt Note: fu on htn  
 [address] [address] 2 Bert [place] [address] [address] Upcoming Health Maintenance Date Due HEMOGLOBIN A1C Q6M 1940 LIPID PANEL Q1 1940 DTaP/Tdap/Td series (1 - Tdap) 2/6/1961 ZOSTER VACCINE AGE 60> 2/6/2000 OSTEOPOROSIS SCREENING (DEXA) 2/6/2005 Pneumococcal 65+ Low/Medium Risk (1 of 2 - PCV13) 2/6/2005 MEDICARE YEARLY EXAM 2/6/2005 INFLUENZA AGE 9 TO ADULT 8/1/2017 MICROALBUMIN Q1 3/10/2018 EYE EXAM RETINAL OR DILATED Q1 3/14/2018 FOOT EXAM Q1 3/15/2018 GLAUCOMA SCREENING Q2Y 3/14/2019 Allergies as of 6/1/2017  Review Complete On: 6/1/2017 By: Ivelisse Lawrence MD  
  
 Severity Noted Reaction Type Reactions Aspirin  03/01/2017    Other (comments) Ringing in ears Keflex [Cephalexin]  03/01/2017    Rash Current Immunizations  Never Reviewed No immunizations on file. Not reviewed this visit You Were Diagnosed With   
  
 Codes Comments Chronic hand pain, right    -  Primary ICD-10-CM: M79.641, G89.29 ICD-9-CM: 729.5, 338.29 Numbness and tingling in both hands     ICD-10-CM: R20.0, R20.2 ICD-9-CM: 422. 0 Vitals BP Pulse Temp Weight(growth percentile) BMI OB Status 175/80 (BP 1 Location: Left arm, BP Patient Position: Sitting) 70 98 °F (36.7 °C) (Oral) 245 lb (111.1 kg) 43.4 kg/m2 Menopause Smoking Status Former Smoker Vitals History BMI and BSA Data Body Mass Index Body Surface Area  
 43.4 kg/m 2 2.22 m 2 Preferred Pharmacy Pharmacy Name Phone [facility] Karmen Jordan [address] [place] [address] [phone] Your Updated Medication List  
  
   
This list is accurate as of: 6/1/17 10:45 AM.  Always use your most recent med list. amLODIPine 10 mg tablet Commonly known as:  Naatlie Lenny Take 1 Tab by mouth daily. DIOVAN 320 mg tablet Generic drug:  valsartan Take 320 mg by mouth daily. esomeprazole 40 mg capsule Commonly known as:  NEXIUM  
TAKE 1 CAPSULE BY MOUTH DAILY  
  
 ezetimibe 10 mg tablet Commonly known as:  Cleta Pique Take 1 Tab by mouth daily. HumaLOG 100 unit/mL injection Generic drug:  insulin lispro  
by SubCUTAneous route. 8 units before breakfast 6 units before lunch 10 units before dinner LANTUS SOLOSTAR 100 unit/mL (3 mL) Inpn Generic drug:  insulin glargine  
by SubCUTAneous route. 44 units nightly LASIX 40 mg tablet Generic drug:  furosemide Take 20 mg by mouth daily. metoprolol succinate 50 mg XL tablet Commonly known as:  TOPROL-XL Take 50 mg by mouth daily. rosuvastatin 20 mg tablet Commonly known as:  CRESTOR  
TAKE 1 TABLET BY MOUTH EVERY NIGHT We Performed the Following AMB POC XRAY, HAND; 3+ VIEWS [14369 CPT(R)] Patient Instructions You should follow up after you have your EMG and Nerve Conduction Studies. If your condition worsens, contact our office. Patient's blood pressure was elevated at today's office visit. Patient instructed to contact  primary care physician for treatment. Carpal Tunnel Syndrome: Care Instructions Your Care Instructions Carpal tunnel syndrome is a nerve problem. It can cause tingling, numbness, weakness, or pain in the fingers, thumb, and hand. The median nerve and several tough tissues called tendons run through a space in the wrist called the carpal tunnel. The repeated hand motions used in work and some hobbies and sports can put pressure on the nerve. Pregnancy and several conditions, including diabetes, arthritis, and an underactive thyroid, also can cause carpal tunnel syndrome. You may be able to limit an activity or do it differently to reduce your symptoms. You also can take other steps to feel better. If your symptoms are mild, 1 to 2 weeks of home treatment are likely to ease your pain. Surgery is needed only if other treatments do not work. Follow-up care is a key part of your treatment and safety. Be sure to make and go to all appointments, and call your doctor if you are having problems. It's also a good idea to know your test results and keep a list of the medicines you take. How can you care for yourself at home? · If possible, stop or reduce the activity that causes your symptoms. If you cannot stop the activity, take frequent breaks to rest and stretch or change hand positions to do a task. Try switching hands, such as when using a computer mouse. · Try to avoid bending or twisting your wrists. · Ask your doctor if you can take an over-the-counter pain medicine, such as acetaminophen (Tylenol), ibuprofen (Advil, Motrin), or naproxen (Aleve). Be safe with medicines. Read and follow all instructions on the label. · If your doctor prescribes corticosteroid medicine to help reduce pain and swelling, take it exactly as prescribed. Call your doctor if you think you are having a problem with your medicine. · Put ice or a cold pack on your wrist for 10 to 20 minutes at a time to ease pain. Put a thin cloth between the ice and your skin. · If your doctor or your physical or occupational therapist tells you to wear a wrist splint, wear it as directed to keep your wrist in a neutral position. This also eases pressure on your median nerve. · Ask your doctor whether you should have physical or occupational therapy to learn how to do tasks differently. · Try a yoga class to stretch your muscles and build strength in your hands and wrists. Yoga has been shown to ease carpal tunnel symptoms. To prevent carpal tunnel · When working at a computer, keep your hands and wrists in line with your forearms. Hold your elbows close to your sides. Take a break every 10 to 15 minutes. · Try these exercises: ¨ Warm up: Rotate your wrist up, down, and from side to side. Repeat this 4 times. Stretch your fingers far apart, relax them, then stretch them again. Repeat 4 times. Stretch your thumb by pulling it back gently, holding it, and then releasing it. Repeat 4 times. ¨ Prayer stretch: Start with your palms together in front of your chest just below your chin. Slowly lower your hands toward your waistline while keeping your hands close to your stomach and your palms together until you feel a mild to moderate stretch under your forearms. Hold for 10 to 20 seconds. Repeat 4 times. ¨ Wrist flexor stretch: Hold your arm in front of you with your palm up. Bend your wrist, pointing your hand toward the floor. With your other hand, gently bend your wrist further until you feel a mild to moderate stretch in your forearm. Hold for 10 to 20 seconds. Repeat 4 times. ¨ Wrist extensor stretch: Repeat the steps for the wrist flexor stretch, but begin with your extended hand palm down. · Squeeze a rubber exercise ball several times a day to keep your hands and fingers strong. · Avoid holding objects (such as a book) in one position for a long time. When possible, use your whole hand to grasp an object.  Using just the thumb and index finger can put stress on the wrist. 
 · Do not smoke. It can make this condition worse by reducing blood flow to the median nerve. If you need help quitting, talk to your doctor about stop-smoking programs and medicines. These can increase your chances of quitting for good. When should you call for help? Watch closely for changes in your health, and be sure to contact your doctor if: 
· Your pain or other problems do not get better with home care. · You want more information about physical or occupational therapy. · You have side effects of your corticosteroid medicine, such as: 
¨ Weight gain. ¨ Mood changes. ¨ Trouble sleeping. ¨ Bruising easily. · You have any other problems with your medicine. Where can you learn more? Go to http://arlette-michel.info/. Enter R432 in the search box to learn more about \"Carpal Tunnel Syndrome: Care Instructions. \" Current as of: May 23, 2016 Content Version: 11.2 © 5173-3704 [employer]. Care instructions adapted under license by [employer] (which disclaims liability or warranty for this information). If you have questions about a medical condition or this instruction, always ask your healthcare professional. [address] any warranty or liability for your use of this information. Introducing [facility]! [employer] introduces Basha patient portal. Now you can access parts of your medical record, email your doctor's office, and request medication refills online. 1. In your internet browser, go to [email or web address] 2. Click on the First Time User? Click Here link in the Sign In box. You will see the New Member Sign Up page. 3. Enter your Basha Access Code exactly as it appears below. You will not need to use this code after youve completed the sign-up process. If you do not sign up before the expiration date, you must request a new code. · Basha Access Code: [email or web address] Expires: 8/30/2017 10:45 AM 
 
4. Enter the last four digits of your Social Security Number (xxxx) and Date of Birth (mm/dd/yyyy) as indicated and click Submit. You will be taken to the next sign-up page. 5. Create a Promoco ID. This will be your Promoco login ID and cannot be changed, so think of one that is secure and easy to remember. 6. Create a Promoco password. You can change your password at any time. 7. Enter your Password Reset Question and Answer. This can be used at a later time if you forget your password. 8. Enter your e-mail address. You will receive e-mail notification when new information is available in [address] 9. Click Sign Up. You can now view and download portions of your medical record. 10. Click the Download Summary menu link to download a portable copy of your medical information. If you have questions, please visit the Frequently Asked Questions section of the Promoco website. Remember, Promoco is NOT to be used for urgent needs. For medical emergencies, dial 911. Now available from your iPhone and Android! Please provide this summary of care documentation to your next provider. Your primary care clinician is listed as [address]. If you have any questions after today's visit, please call [phone].

## 2017-06-01 NOTE — PATIENT INSTRUCTIONS
You should follow up after you have your EMG and Nerve Conduction Studies. If your condition worsens, contact our office. Patient's blood pressure was elevated at today's office visit. Patient instructed to contact  primary care physician for treatment. Carpal Tunnel Syndrome: Care Instructions  Your Care Instructions    Carpal tunnel syndrome is a nerve problem. It can cause tingling, numbness, weakness, or pain in the fingers, thumb, and hand. The median nerve and several tough tissues called tendons run through a space in the wrist called the carpal tunnel. The repeated hand motions used in work and some hobbies and sports can put pressure on the nerve. Pregnancy and several conditions, including diabetes, arthritis, and an underactive thyroid, also can cause carpal tunnel syndrome. You may be able to limit an activity or do it differently to reduce your symptoms. You also can take other steps to feel better. If your symptoms are mild, 1 to 2 weeks of home treatment are likely to ease your pain. Surgery is needed only if other treatments do not work. Follow-up care is a key part of your treatment and safety. Be sure to make and go to all appointments, and call your doctor if you are having problems. It's also a good idea to know your test results and keep a list of the medicines you take. How can you care for yourself at home? · If possible, stop or reduce the activity that causes your symptoms. If you cannot stop the activity, take frequent breaks to rest and stretch or change hand positions to do a task. Try switching hands, such as when using a computer mouse. · Try to avoid bending or twisting your wrists. · Ask your doctor if you can take an over-the-counter pain medicine, such as acetaminophen (Tylenol), ibuprofen (Advil, Motrin), or naproxen (Aleve). Be safe with medicines. Read and follow all instructions on the label.   · If your doctor prescribes corticosteroid medicine to help reduce pain and swelling, take it exactly as prescribed. Call your doctor if you think you are having a problem with your medicine. · Put ice or a cold pack on your wrist for 10 to 20 minutes at a time to ease pain. Put a thin cloth between the ice and your skin. · If your doctor or your physical or occupational therapist tells you to wear a wrist splint, wear it as directed to keep your wrist in a neutral position. This also eases pressure on your median nerve. · Ask your doctor whether you should have physical or occupational therapy to learn how to do tasks differently. · Try a yoga class to stretch your muscles and build strength in your hands and wrists. Yoga has been shown to ease carpal tunnel symptoms. To prevent carpal tunnel  · When working at a computer, keep your hands and wrists in line with your forearms. Hold your elbows close to your sides. Take a break every 10 to 15 minutes. · Try these exercises:  ¨ Warm up: Rotate your wrist up, down, and from side to side. Repeat this 4 times. Stretch your fingers far apart, relax them, then stretch them again. Repeat 4 times. Stretch your thumb by pulling it back gently, holding it, and then releasing it. Repeat 4 times. ¨ Prayer stretch: Start with your palms together in front of your chest just below your chin. Slowly lower your hands toward your waistline while keeping your hands close to your stomach and your palms together until you feel a mild to moderate stretch under your forearms. Hold for 10 to 20 seconds. Repeat 4 times. ¨ Wrist flexor stretch: Hold your arm in front of you with your palm up. Bend your wrist, pointing your hand toward the floor. With your other hand, gently bend your wrist further until you feel a mild to moderate stretch in your forearm. Hold for 10 to 20 seconds. Repeat 4 times. ¨ Wrist extensor stretch: Repeat the steps for the wrist flexor stretch, but begin with your extended hand palm down.   · Squeeze a rubber exercise ball several times a day to keep your hands and fingers strong. · Avoid holding objects (such as a book) in one position for a long time. When possible, use your whole hand to grasp an object. Using just the thumb and index finger can put stress on the wrist.  · Do not smoke. It can make this condition worse by reducing blood flow to the median nerve. If you need help quitting, talk to your doctor about stop-smoking programs and medicines. These can increase your chances of quitting for good. When should you call for help? Watch closely for changes in your health, and be sure to contact your doctor if:  · Your pain or other problems do not get better with home care. · You want more information about physical or occupational therapy. · You have side effects of your corticosteroid medicine, such as:  ¨ Weight gain. ¨ Mood changes. ¨ Trouble sleeping. ¨ Bruising easily. · You have any other problems with your medicine. Where can you learn more? Go to http://arlette-michel.info/. Enter R432 in the search box to learn more about \"Carpal Tunnel Syndrome: Care Instructions. \"  Current as of: May 23, 2016  Content Version: 11.2  © 9042-5839 [employer]. Care instructions adapted under license by [employer] (which disclaims liability or warranty for this information). If you have questions about a medical condition or this instruction, always ask your healthcare professional. [address] any warranty or liability for your use of this information.

## 2017-06-29 ENCOUNTER — OFFICE VISIT (OUTPATIENT)
Dept: ORTHOPEDIC SURGERY | Facility: CLINIC | Age: 77
End: 2017-06-29

## 2017-06-29 VITALS
TEMPERATURE: 98.5 F | HEIGHT: 60 IN | SYSTOLIC BLOOD PRESSURE: 176 MMHG | DIASTOLIC BLOOD PRESSURE: 76 MMHG | HEART RATE: 68 BPM | WEIGHT: 245 LBS | BODY MASS INDEX: 48.1 KG/M2

## 2017-06-29 DIAGNOSIS — Z87.898 HISTORY OF PERIPHERAL EDEMA: ICD-10-CM

## 2017-06-29 DIAGNOSIS — M17.11 PRIMARY OSTEOARTHRITIS OF RIGHT KNEE: Primary | ICD-10-CM

## 2017-06-29 RX ORDER — ACETAMINOPHEN AND CODEINE PHOSPHATE 300; 30 MG/1; MG/1
1 TABLET ORAL
Qty: 21 TAB | Refills: 0 | Status: SHIPPED | OUTPATIENT
Start: 2017-06-29 | End: 2018-01-22

## 2017-06-29 RX ORDER — BETAMETHASONE SODIUM PHOSPHATE AND BETAMETHASONE ACETATE 3; 3 MG/ML; MG/ML
6 INJECTION, SUSPENSION INTRA-ARTICULAR; INTRALESIONAL; INTRAMUSCULAR; SOFT TISSUE ONCE
Qty: 1 ML | Refills: 0
Start: 2017-06-29 | End: 2017-06-29

## 2017-06-29 NOTE — PATIENT INSTRUCTIONS
You were given a cortisone injection today. You should follow up with us in 3 months to  the effectiveness of the injection. If you have any questions feel free to contact our office. Joint Injections: Care Instructions  Your Care Instructions  Joint injections are shots into a joint, such as the knee. They may be used to put in medicines, such as pain relievers. Or they can be used to take out fluid. Sometimes the fluid is tested in a lab. This can help find the cause of a joint problem. A corticosteroid, or steroid, shot is used to reduce inflammation in tendons or joints. It is often used to treat problems such as arthritis, tendinitis, and bursitis. Steroids can be injected directly into a painful, inflamed joint. They can also help reduce inflammation of a bursa. A bursa is a sac of fluid. It cushions and lubricates areas where tendons, ligaments, skin, muscles, or bones rub against each other. A steroid shot can sometimes help with short-term pain relief when other treatments haven't worked. If steroid shots help, pain may improve for weeks or months. Follow-up care is a key part of your treatment and safety. Be sure to make and go to all appointments, and call your doctor if you are having problems. It's also a good idea to know your test results and keep a list of the medicines you take. How can you care for yourself at home? · Put ice or a cold pack on the area for 10 to 20 minutes at a time. Put a thin cloth between the ice and your skin. · Take anti-inflammatory medicines to reduce pain, swelling, or inflammation. These include ibuprofen (Advil, Motrin) and naproxen (Aleve). Read and follow all instructions on the label. · Avoid strenuous activities for several days, especially those that put stress on the area where you got the shot. · If you have dressings over the area, keep them clean and dry. You may remove them when your doctor tells you to. When should you call for help?   Call your doctor now or seek immediate medical care if:  · You have signs of infection, such as:  ¨ Increased pain, swelling, warmth, or redness. ¨ Red streaks leading from the site. ¨ Pus draining from the site. ¨ A fever. Watch closely for changes in your health, and be sure to contact your doctor if you have any problems. Where can you learn more? Go to http://arlette-michel.info/. Enter N616 in the search box to learn more about \"Joint Injections: Care Instructions. \"  Current as of: March 21, 2017  Content Version: 11.3  © 8147-7259 [employer]. Care instructions adapted under license by [employer] (which disclaims liability or warranty for this information). If you have questions about a medical condition or this instruction, always ask your healthcare professional. [address] any warranty or liability for your use of this information.

## 2017-06-29 NOTE — PROGRESS NOTES
Patient: Keanu Veronica                MRN: [id number]       SSN: [id number]  YOB: 1940        AGE: 68 y.o. SEX: female  Body mass index is 47.85 kg/(m^2). PCP: Zofia Linda MD  06/29/17    HISTORY: Stephanie [place] is here today for right knee pain, known advanced to severe arthritis bilaterally. She also complains of some peripheral edema. The pain is moderate, aching. The injections do help in her left knee. She is requesting one for the right knee today. She is also supposed to have an EMG nerve conduction study. We will get this reordered for her. PHYSICAL EXAMINATION:  On examination today, she is a very nice lady. She is about 50 tall. Her BMI is 48. She moves the head and neck adequately. There is no respiratory compromise or indrawing. There is no scleral icterus. There is no JVD. The hips rotate adequately. Both knees are varus or bowed. She has a couple degree fixed flexion deformity as well. She does have a little bit of lymphadenopathy distally. The peripheral edema in terms of pitting edema, it is mild, but she does have some, and it apparently gets worse near the end of the day. The calf is nontender. Both feet are warm and well-perfused. The right knee itself has a few degree fixed flexion deformity and bends to about 98° limited by body habitus. RADIOGRAPHS:  Review of her x-rays, including AP, tunnel, lateral, and skyline, confirms actually quite advanced arthritis of both knees. PROCEDURE:  Under aseptic conditions and after informed, written consent with a time out, the right knee was injected with 1 cc of the Celestone preparation, i.e. 6 mg, which was well tolerated. PLAN:  We will get the EMG ordered for her. I will get a vascular consult, stockings as well, and some Tylenol No. 3 for some pain at night. It has been a pleasure to share in her care.            REVIEW OF SYSTEMS:      CON: negative for weight loss, fever  EYE: negative for double vision  ENT: negative for hoarseness  RS:   negative for Tb  GI:    negative for blood in stool  :  negative for blood in urine  Other systems reviewed and noted below. Past Medical History:   Diagnosis Date    Allergic rhinitis     Diabetes ([address]     Hypertension        Family History   Problem Relation Age of Onset    Diabetes Maternal Aunt     Diabetes Maternal Uncle     Diabetes Maternal Grandfather     Diabetes Other        Current Outpatient Prescriptions   Medication Sig Dispense Refill    betamethasone (CELESTONE SOLUSPAN) 6 mg/mL injection 1 mL by Intra artICUlar route once for 1 dose. 1 mL 0    polyethylene glycol (MIRALAX) 17 gram/dose powder Take 17 g by mouth daily. 595 g 3    diclofenac (VOLTAREN) 1 % gel Apply 4 g to affected area four (4) times daily. 500 g 5    rosuvastatin (CRESTOR) 20 mg tablet TAKE 1 TABLET BY MOUTH EVERY NIGHT 90 Tab 3    amLODIPine (NORVASC) 10 mg tablet Take 1 Tab by mouth daily. 30 Tab 6    metoprolol succinate (TOPROL-XL) 50 mg XL tablet Take 50 mg by mouth daily.  furosemide (LASIX) 40 mg tablet Take 20 mg by mouth daily.  valsartan (DIOVAN) 320 mg tablet Take 320 mg by mouth daily.  insulin lispro (HUMALOG) 100 unit/mL injection by SubCUTAneous route. 8 units before breakfast  6 units before lunch  10 units before dinner      esomeprazole (NEXIUM) 40 mg capsule TAKE 1 CAPSULE BY MOUTH DAILY 90 Cap 1    insulin glargine (LANTUS SOLOSTAR) 100 unit/mL (3 mL) pen by SubCUTAneous route. 44 units nightly      ezetimibe (ZETIA) 10 mg tablet Take 1 Tab by mouth daily.  90 Tab 3       Allergies   Allergen Reactions    Aspirin Other (comments)     Ringing in ears    Keflex [Cephalexin] Rash       Past Surgical History:   Procedure Laterality Date    HX CATARACT REMOVAL  2011    HX LUMBAR FUSION  2004    HX WISDOM TEETH EXTRACTION         Social History     Social History    Marital status:      Spouse name: N/A    Number of children: N/A    Years of education: N/A     Occupational History    CNA - RET      Social History Main Topics    Smoking status: Former Smoker    Smokeless tobacco: Never Used    Alcohol use No    Drug use: No    Sexual activity: Yes     Partners: Male     Birth control/ protection: None     Other Topics Concern    Not on file     Social History Narrative       Visit Vitals    /76    Pulse 68    Temp 98.5 °F (36.9 °C) (Oral)    Ht 5' (1.524 m)    Wt 245 lb (111.1 kg)    BMI 47.85 kg/m2         PHYSICAL EXAMINATION:  GENERAL: Alert and oriented x3, in no acute distress, well-developed, well-nourished, afebrile. HEART: No JVD. EYES: No scleral icterus   NECK: No significant lymphadenopathy   LUNGS: No respiratory compromise or indrawing  ABDOMEN: Soft, non-tender, non-distended. Electronically signed by:  Alba Jeffers MD

## 2017-07-24 ENCOUNTER — APPOINTMENT (OUTPATIENT)
Dept: GENERAL RADIOLOGY | Age: 77
End: 2017-07-24
Attending: EMERGENCY MEDICINE
Payer: MEDICARE

## 2017-07-24 ENCOUNTER — HOSPITAL ENCOUNTER (EMERGENCY)
Age: 77
Discharge: HOME | End: 2017-07-24
Attending: EMERGENCY MEDICINE
Payer: MEDICARE

## 2017-07-24 VITALS
WEIGHT: 244 LBS | DIASTOLIC BLOOD PRESSURE: 80 MMHG | RESPIRATION RATE: 20 BRPM | OXYGEN SATURATION: 100 % | SYSTOLIC BLOOD PRESSURE: 180 MMHG | HEART RATE: 79 BPM | BODY MASS INDEX: 47.65 KG/M2 | TEMPERATURE: 97.2 F

## 2017-07-24 DIAGNOSIS — M79.89 LEG SWELLING: Primary | ICD-10-CM

## 2017-07-24 LAB
ALBUMIN SERPL BCP-MCNC: 3.3 G/DL (ref 3.4–5)
ALBUMIN/GLOB SERPL: 0.9 {RATIO} (ref 0.8–1.7)
ALP SERPL-CCNC: 127 U/L (ref 45–117)
ALT SERPL-CCNC: 38 U/L (ref 13–56)
ANION GAP BLD CALC-SCNC: 6 MMOL/L (ref 3–18)
AST SERPL W P-5'-P-CCNC: 39 U/L (ref 15–37)
ATRIAL RATE: 76 BPM
BASOPHILS # BLD AUTO: 0 K/UL (ref 0–0.06)
BASOPHILS # BLD: 1 % (ref 0–2)
BILIRUB SERPL-MCNC: 0.3 MG/DL (ref 0.2–1)
BUN SERPL-MCNC: 33 MG/DL (ref 7–18)
BUN/CREAT SERPL: 17 (ref 12–20)
CALCIUM SERPL-MCNC: 9.9 MG/DL (ref 8.5–10.1)
CALCULATED P AXIS, ECG09: 40 DEGREES
CALCULATED R AXIS, ECG10: -40 DEGREES
CALCULATED T AXIS, ECG11: -7 DEGREES
CHLORIDE SERPL-SCNC: 108 MMOL/L (ref 100–108)
CK MB CFR SERPL CALC: 0.6 % (ref 0–4)
CK MB CFR SERPL CALC: 0.7 % (ref 0–4)
CK MB SERPL-MCNC: 5.4 NG/ML (ref 5–25)
CK MB SERPL-MCNC: 5.5 NG/ML (ref 5–25)
CK SERPL-CCNC: 803 U/L (ref 26–192)
CK SERPL-CCNC: 848 U/L (ref 26–192)
CO2 SERPL-SCNC: 28 MMOL/L (ref 21–32)
CREAT SERPL-MCNC: 1.99 MG/DL (ref 0.6–1.3)
DIAGNOSIS, 93000: NORMAL
DIFFERENTIAL METHOD BLD: NORMAL
EOSINOPHIL # BLD: 0.1 K/UL (ref 0–0.4)
EOSINOPHIL NFR BLD: 3 % (ref 0–5)
ERYTHROCYTE [DISTWIDTH] IN BLOOD BY AUTOMATED COUNT: 14 % (ref 11.6–14.5)
GLOBULIN SER CALC-MCNC: 3.8 G/DL (ref 2–4)
GLUCOSE BLD STRIP.AUTO-MCNC: 55 MG/DL (ref 70–110)
GLUCOSE BLD STRIP.AUTO-MCNC: 70 MG/DL (ref 70–110)
GLUCOSE BLD STRIP.AUTO-MCNC: 86 MG/DL (ref 70–110)
GLUCOSE SERPL-MCNC: 64 MG/DL (ref 74–99)
HCT VFR BLD AUTO: 37.1 % (ref 35–45)
HGB BLD-MCNC: 12.2 G/DL (ref 12–16)
LYMPHOCYTES # BLD AUTO: 38 % (ref 21–52)
LYMPHOCYTES # BLD: 1.7 K/UL (ref 0.9–3.6)
MCH RBC QN AUTO: 28.6 PG (ref 24–34)
MCHC RBC AUTO-ENTMCNC: 32.9 G/DL (ref 31–37)
MCV RBC AUTO: 87.1 FL (ref 74–97)
MONOCYTES # BLD: 0.5 K/UL (ref 0.05–1.2)
MONOCYTES NFR BLD AUTO: 10 % (ref 3–10)
NEUTS SEG # BLD: 2.3 K/UL (ref 1.8–8)
NEUTS SEG NFR BLD AUTO: 48 % (ref 40–73)
P-R INTERVAL, ECG05: 188 MS
PLATELET # BLD AUTO: 152 K/UL (ref 135–420)
PMV BLD AUTO: 11 FL (ref 9.2–11.8)
POTASSIUM SERPL-SCNC: 3.8 MMOL/L (ref 3.5–5.5)
PROT SERPL-MCNC: 7.1 G/DL (ref 6.4–8.2)
Q-T INTERVAL, ECG07: 408 MS
QRS DURATION, ECG06: 156 MS
QTC CALCULATION (BEZET), ECG08: 459 MS
RBC # BLD AUTO: 4.26 M/UL (ref 4.2–5.3)
SODIUM SERPL-SCNC: 142 MMOL/L (ref 136–145)
TROPONIN I SERPL-MCNC: <0.02 NG/ML (ref 0–0.04)
TROPONIN I SERPL-MCNC: <0.02 NG/ML (ref 0–0.04)
VENTRICULAR RATE, ECG03: 76 BPM
WBC # BLD AUTO: 4.6 K/UL (ref 4.6–13.2)

## 2017-07-24 PROCEDURE — 71010 XR CHEST PORT: CPT

## 2017-07-24 PROCEDURE — 80053 COMPREHEN METABOLIC PANEL: CPT | Performed by: EMERGENCY MEDICINE

## 2017-07-24 PROCEDURE — 93005 ELECTROCARDIOGRAM TRACING: CPT

## 2017-07-24 PROCEDURE — 85025 COMPLETE CBC W/AUTO DIFF WBC: CPT | Performed by: EMERGENCY MEDICINE

## 2017-07-24 PROCEDURE — 82550 ASSAY OF CK (CPK): CPT | Performed by: EMERGENCY MEDICINE

## 2017-07-24 PROCEDURE — 82962 GLUCOSE BLOOD TEST: CPT

## 2017-07-24 PROCEDURE — 99284 EMERGENCY DEPT VISIT MOD MDM: CPT

## 2017-07-24 NOTE — ED PROVIDER NOTES
HPI Comments: 1:27 PM  Cuca Carmichael is a 68 y.o. female with PMHx DM and HTN presenting to the ED C/O gradually worsening bilateral LE edema onset 3 days ago. Pt reports no associated sxs. States she was a passenger during a long distance car ride (5.5-6hrs). Notes she is not followed by a cardiologist. Denies hx CHF and pulmonary complications. Denies smoking and etoh use. Pt also denies SOB, CP, n/v/d, and any other symptoms or complaints. Past Medical History:   Diagnosis Date    Allergic rhinitis     Diabetes ([facility] [address]     Hypertension        Past Surgical History:   Procedure Laterality Date    HX CATARACT REMOVAL  2011    HX LUMBAR FUSION  2004    HX WISDOM TEETH EXTRACTION           Family History:   Problem Relation Age of Onset    Diabetes Maternal Aunt     Diabetes Maternal Uncle     Diabetes Maternal Grandfather     Diabetes Other        Social History     Social History    Marital status:      Spouse name: N/A    Number of children: N/A    Years of education: N/A     Occupational History    CNA - RET      Social History Main Topics    Smoking status: Former Smoker    Smokeless tobacco: Never Used    Alcohol use No    Drug use: No    Sexual activity: Yes     Partners: Male     Birth control/ protection: None     Other Topics Concern    Not on file     Social History Narrative         ALLERGIES: Aspirin and Keflex [cephalexin]    Review of Systems   HENT: Negative for congestion. Respiratory: Negative for shortness of breath. Cardiovascular: Positive for leg swelling (bilateral). Negative for chest pain. Gastrointestinal: Negative for diarrhea, nausea and vomiting. Genitourinary: Negative for dysuria. Musculoskeletal: Negative. Neurological: Negative for speech difficulty. All other systems reviewed and are negative.       Vitals:    07/24/17 1201 07/24/17 1333 07/24/17 1400 07/24/17 1415   BP: (!) 170/92 (!) 177/96 180/80    Pulse: 79      Resp: 20      Temp: 97.2 °F (36.2 °C)      SpO2: 98%  99% 100%   Weight: 110.7 kg (244 lb)               Physical Exam   Constitutional: She appears well-developed and well-nourished. Non-toxic appearance. She does not have a sickly appearance. She does not appear ill. No distress. HENT:   Head: Normocephalic and atraumatic. Mouth/Throat: Oropharynx is clear and moist. No oropharyngeal exudate. Eyes: Conjunctivae and EOM are normal. Pupils are equal, round, and reactive to light. No scleral icterus. Neck: Trachea normal and normal range of motion. Neck supple. No hepatojugular reflux and no JVD present. No tracheal deviation present. No thyromegaly present. Cardiovascular: Normal rate, regular rhythm, S1 normal, S2 normal, normal heart sounds, intact distal pulses and normal pulses. Exam reveals no gallop, no S3 and no S4. No murmur heard. Pulses:       Radial pulses are 2+ on the right side, and 2+ on the left side. Dorsalis pedis pulses are 2+ on the right side, and 2+ on the left side. Pulmonary/Chest: Effort normal and breath sounds normal. No accessory muscle usage. No respiratory distress. She has no decreased breath sounds. She has no wheezes. She has no rhonchi. She has no rales. Abdominal: Soft. Normal appearance and bowel sounds are normal. She exhibits no distension and no mass. There is no hepatosplenomegaly. There is no tenderness. There is no rigidity, no rebound, no guarding, no CVA tenderness, no tenderness at McBurney's point and negative Diggs's sign. Musculoskeletal: Normal range of motion. Strength 5/5 throughout    Lymphadenopathy:        Head (right side): No submental, no submandibular, no preauricular and no occipital adenopathy present. Head (left side): No submental, no submandibular, no preauricular and no occipital adenopathy present. She has no cervical adenopathy. Right: No supraclavicular adenopathy present.         Left: No supraclavicular adenopathy present. Neurological: She is alert. She has normal strength and normal reflexes. She is not disoriented. No cranial nerve deficit or sensory deficit. Coordination and gait normal. GCS eye subscore is 4. GCS verbal subscore is 5. GCS motor subscore is 6. Grossly intact    Skin: Skin is warm, dry and intact. No rash noted. She is not diaphoretic. Psychiatric: She has a normal mood and affect. Her speech is normal and behavior is normal. Judgment and thought content normal. Cognition and memory are normal.   Nursing note and vitals reviewed. MDM  Number of Diagnoses or Management Options  Leg swelling:   Diagnosis management comments: Subjective bilateral leg swelling   No edema  ACS  Electrolyte imbalance      ED Course       Procedures      Labs essentially normal with the exception of glucose 64, creatinine 1.99, cardiac enzyme negative. Repeat Troponin negative. Chest X-Ray IMPRESSION: No confluent consolidation. No vascular congestion, as read by radiologist. EKG showed SR with rate of 76 bpm. RBBB with no ST elevations or depression and non specific T wave changes. 1:39 PM 7/24/2017    I have reassessed the patient. Patient is feeling better. Prescribed no medications. Patient was discharged in stable condition. Patient is to return to emergency department if any new or worsening condition. Scribe Attestation  Marito Belcher for and in the presence of Aysha Kaufman DO (07/24/17)      Physician Attestation  I personally performed the services described in this documentation, reviewed and edited the documentation which was dictated to the scribe in my presence, and it accurately records my words and actions.     Aysha Kaufman DO (07/24/17)      Signed by: Marlo Halsted, Scribe, July 24, 2017 at 1:39 PM

## 2017-07-24 NOTE — ED TRIAGE NOTES
Leg swelling this weekend while out of town, had \"high blood sugars reading of 150, feeling nauseous and dizzy\"

## 2017-07-24 NOTE — ED NOTES
Pt discharged per MD order. Pt verbalized understanding of discharge instruction and follow up care. Pt was wheeled out of ED by ED staff.

## 2017-08-02 ENCOUNTER — OFFICE VISIT (OUTPATIENT)
Dept: VASCULAR SURGERY | Age: 77
End: 2017-08-02

## 2017-08-02 VITALS
HEIGHT: 60 IN | HEART RATE: 74 BPM | SYSTOLIC BLOOD PRESSURE: 158 MMHG | DIASTOLIC BLOOD PRESSURE: 82 MMHG | BODY MASS INDEX: 47.91 KG/M2 | RESPIRATION RATE: 20 BRPM | WEIGHT: 244 LBS

## 2017-08-02 DIAGNOSIS — I10 ESSENTIAL HYPERTENSION: ICD-10-CM

## 2017-08-02 DIAGNOSIS — E66.01 MORBID OBESITY DUE TO EXCESS CALORIES (HCC): ICD-10-CM

## 2017-08-02 DIAGNOSIS — R60.0 BILATERAL EDEMA OF LOWER EXTREMITY: Primary | ICD-10-CM

## 2017-08-02 DIAGNOSIS — M19.90 ARTHRITIS: ICD-10-CM

## 2017-08-02 DIAGNOSIS — E11.9 TYPE 2 DIABETES MELLITUS WITHOUT COMPLICATION, UNSPECIFIED LONG TERM INSULIN USE STATUS: ICD-10-CM

## 2017-08-02 NOTE — PROGRESS NOTES
Mykel Delgado    Chief Complaint   Patient presents with    New Patient    Swelling       HPI    Mykel Delgado is a 68 y.o. female who presents to the office today at the request of Dr. Alex Nielsen for bilateral leg edema. She states that several weeks ago she noticed that she was having increasing swelling in her feet and ankles which she states is a new issue. 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 Alex Nielsen with injections for this. She does not feel that her leg swelling seems to have improved after her knee injection. She states that her left leg was more severe than the right. She denies any history of DVT. She denies any pain. She states that even when her legs were the most swollen she did not have any pain or discomfort. She denies any fevers or chills. No ulcers. She does state that she had weight gain of about 60 pounds over the past 2 years after starting on insulin but states that she has lost about 20 pounds in this. She does have a history of hypertension which she states is well controlled with medication. She also has history of renal insufficiency and states that she is scheduled to be seen by a kidney doctor this week. She denies any history of heart failure. No symptoms of claudication or rest pain. She does state that her daughter has significant varicose veins and recently underwent a vein surgery. She does not know of any other family history of vein problems.     Past Medical History:   Diagnosis Date    Allergic rhinitis     Diabetes ([facility] [address]     Hypertension      Patient Active Problem List   Diagnosis Code    Allergic rhinitis J30.9    Hypertension I10    Diabetes ([facility] [address] E11.9     Past Surgical History:   Procedure Laterality Date    HX CATARACT REMOVAL  2011    HX LUMBAR FUSION  2004    HX WISDOM TEETH EXTRACTION       Current Outpatient Prescriptions   Medication Sig Dispense Refill    acetaminophen-codeine (TYLENOL-CODEINE #3) 300-30 mg per tablet Take 1 Tab by mouth every six (6) hours as needed for Pain. Max Daily Amount: 4 Tabs. 21 Tab 0    polyethylene glycol (MIRALAX) 17 gram/dose powder Take 17 g by mouth daily. 595 g 3    diclofenac (VOLTAREN) 1 % gel Apply 4 g to affected area four (4) times daily. 500 g 5    rosuvastatin (CRESTOR) 20 mg tablet TAKE 1 TABLET BY MOUTH EVERY NIGHT 90 Tab 3    amLODIPine (NORVASC) 10 mg tablet Take 1 Tab by mouth daily. 30 Tab 6    metoprolol succinate (TOPROL-XL) 50 mg XL tablet Take 50 mg by mouth daily.  furosemide (LASIX) 40 mg tablet Take 20 mg by mouth daily.  valsartan (DIOVAN) 320 mg tablet Take 320 mg by mouth daily.  insulin lispro (HUMALOG) 100 unit/mL injection by SubCUTAneous route. 8 units before breakfast  6 units before lunch  10 units before dinner      insulin glargine (LANTUS SOLOSTAR) 100 unit/mL (3 mL) pen by SubCUTAneous route.  44 units nightly       Allergies   Allergen Reactions    Aspirin Other (comments)     Ringing in ears    Keflex [Cephalexin] Rash     Social History     Social History    Marital status:      Spouse name: N/A    Number of children: N/A    Years of education: N/A     Occupational History    CNA - RET      Social History Main Topics    Smoking status: Former Smoker    Smokeless tobacco: Never Used    Alcohol use No    Drug use: No    Sexual activity: Yes     Partners: Male     Birth control/ protection: None     Other Topics Concern    Not on file     Social History Narrative      Family History   Problem Relation Age of Onset    Diabetes Maternal Aunt     Diabetes Maternal Uncle     Diabetes Maternal Grandfather     Diabetes Other        Review of Systems    Constitutional: negative   HEENT: negative   Respiratory: negative   Cardiovascular: negative   Gastrointestinal: negative   Genitourinary:negative   Hematologic/lymphatic: negative   Musculoskeletal: Positive for bilateral knee pain with history of arthritis and lower extremity edema  Neurological: negative   Behavioral/Psych: negative   Endocrine: negative   Allergic/Immunologic: negative      Physical Exam:    Visit Vitals    /82 (BP 1 Location: Left arm, BP Patient Position: Sitting)    Pulse 74    Resp 20    Ht 5' (1.524 m)    Wt 244 lb (110.7 kg)    BMI 47.65 kg/m2      General: Well-appearing female in no acute distress   HEENT: EOMI, no scleral icterus is noted. No carotid bruits are heard bilaterally   Cardiovascular: Regular rhythm normal S1-S2 no rubs murmurs or gallops   Pulmonary: No increased work or breathing is noted. Clear to auscultation bilaterally. No wheeze, rales or rhonchi. Abdomen: Morbidly obese, nondistended. Extremities: Warm and well perfused bilaterally. Pt has no significant bilateral lower extremity edema on exam today. No skin changes or ulcers  Neuro: Cranial nerves II through XII are grossly intact   Integument: No ulcerations are identified visibly      Impression and Plan:  Willis Rondon is a 68 y.o. female who presents today with complaint of bilateral lower extremity edema, left greater than right. I did discuss the role of compression therapy and leg elevation for her leg swelling. I also encouraged her to remain compliant with her medical treatment plan in order to maintain good control of her underlying medical problems, ie arthritis, hypertension, morbid obesity, diabetes. She is also scheduled to see a kidney doctor this week for renal insufficiency. Discussed that we will obtain venous reflux studies to assess for any underlying vascular etiology which may be contributing to her edema. Discussed that I can call her with these results and further surgical planning pending imaging. Plan was discussed. Patient expresses understanding and agrees. [address] [place] [address]        PLEASE NOTE:  This document has been produced using voice recognition software. Unrecognized errors in transcription may be present.

## 2017-08-09 ENCOUNTER — OFFICE VISIT (OUTPATIENT)
Dept: VASCULAR SURGERY | Age: 77
End: 2017-08-09

## 2017-08-09 DIAGNOSIS — R60.0 BILATERAL EDEMA OF LOWER EXTREMITY: ICD-10-CM

## 2017-08-09 NOTE — PROCEDURES
[facility]   *** FINAL REPORT ***    Name: Shamir Fletcher  MRN: [id number]       Outpatient  : 1940  HIS Order #: [id number]  [address] [facility] Visit #: [id number]  Date: 09 Aug 2017    TYPE OF TEST: Peripheral Venous Testing    REASON FOR TEST  Venous Insufficiency    Right Leg:-  Deep venous thrombosis:           No  Superficial venous thrombosis:    No  Deep venous insufficiency:        Yes  Superficial venous insufficiency: Yes    Left Leg:-  Deep venous thrombosis:           No  Superficial venous thrombosis:    No  Deep venous insufficiency:        Yes  Superficial venous insufficiency: Yes    Abnormal Valve Closure Times (seconds):    Right Common Femoral: 2.8    Right SFJ:            3.2    Left Common Femoral:  1.0    Left SFJ:             0.7    Vein Mapping:    Diam.   Depth  (mm)    Right Great Saphenous Vein:    High Thigh:      6.3    Mid Thigh:       3.7    Low Thigh:       3.6    33.5    Knee:            3.8    High Calf:       3.6    Low Calf: Ankle:    Right Small Saphenous Vein:    SPJ:    Mid Calf: Ankle:    Giacomini:  Accessory saph.:  Andrade :  Kacey Grey :    Left Great Saphenous Vein:    High Thigh:      5.9    Mid Thigh:       2.6    Low Thigh:       2.7    28.5    Knee:            3.3    High Calf:    Low Calf: Ankle:    Left Small Saphenous Vein:    SPJ:    Mid Calf: Ankle:    Giacomini:  Accessory saph.:  Andrade :  Nolasco :      INTERPRETATION/FINDINGS  Duplex images were obtained using 2-D gray scale, color flow and  spectral doppler analysis. The reflux exam was performed in the reverse   trendelenburg position. Bilateral reflux:  1. No evidence of deep vein thrombosis in the common femoral, proximal   deep femoral, femoral, popliteal, posterior tibial and peroneal veins   bilaterally. 2. Deep venous reflux in the common femoral veins bilaterally. 3. Great saphenous vein reflux bilaterally at the sapheno-femoral  junctions.   4. No evidence of small saphenous vein reflux bilaterally from the  sapheno-popliteal junction to distal calf. 5. Biphasic posterior tibial artery flow bilaterally. ADDITIONAL COMMENTS    I have personally reviewed the data relevant to the interpretation of  this  study. TECHNOLOGIST: Tereza Santoyo RVT, RDMS  Signed: 08/09/2017 02:46 PM    PHYSICIAN: Richmond Archer D.O.   Signed: 08/10/2017 07:04 PM

## 2017-08-18 ENCOUNTER — OFFICE VISIT (OUTPATIENT)
Dept: NEUROLOGY | Age: 77
End: 2017-08-18

## 2017-08-18 DIAGNOSIS — G56.03 BILATERAL CARPAL TUNNEL SYNDROME: Primary | ICD-10-CM

## 2017-08-18 NOTE — LETTER
8/18/2017 4:05 PM 
 
Patient:  Josh Cardoso YOB: 1940 Date of Visit: 8/18/2017 Dear Aneesh Chow MD 
[address] [facility] [address] [facility] [address] [address] [place] [address] VIA In Basket Kat Nowak MD 
[address] [facility] [address] [facility] [address] VIA In Basket 
 : Thank you for referring Ms. Josh Cardoso to me for evaluation/treatment. Below are the relevant portions of my assessment and plan of care. [facility] [address], [address] 
[phone]      [place] [address] Neurophysiology Report Patient: Patricia Rincon    
ID: [id number] Physician: Sven Vela. Rashad Alegre MD  
Gender: Female Ref Phys: Melisa Loyd MD  
Handedness:     
Study Date: August 18, 2017 Patient History: This 60-year-old woman has had several months of worsening right arm numbness and weakness. On brief exam she has intact strength and sensation of both upper extremities. Positive Tinel sign on the right side. Nerve Conduction Studies Anti Sensory Summary Table Stim Site NR Peak (ms) Norm Peak (ms) O-P Amp (µV) Norm O-P Amp Dist (cm) Raman (m/s) Left Median 2nd Digit Anti Sensory (2nd Digit) Wrist    5.4 <3.5 9.6 >20 13.0 33.3 Site 2    4.8  14.2 Site 3    4.6  18.4 Right Median 2nd Digit Anti Sensory (2nd Digit) Wrist    9.0 <3.5 2.6 >20 13.0 16.3 Site 2    7.8  2.0 Site 3    5.7  2.1 Left Ulnar Anti Sensory (5th Digit ) Wrist    3.2 <3.1 15.9 >17.0 11.0 47.8 Site 2    3.2  4.7 Site 3    3.2  3.6 Site 4    3.3  14.0 Right Ulnar Anti Sensory (5th Digit ) Wrist    3.2 <3.1 17.2 >17.0 11.0 50.0 Site 2    3.2  9.8 Motor Summary Table Stim Site NR Onset (ms) Norm Onset (ms) O-P Amp (mV) Norm O-P Amp Dist (cm) Raman (m/s) Norm Raman (m/s) Left Median Motor (Abd Poll Brev) Wrist    5.0 <4.4 6.9 >4.0 19.0 50.0 >49 Elbow    8.8  5.1 Right Median Motor (Abd Poll Brev) Wrist    7.0 <4.4 6.4 >4.0 19.0 45.2 >49 Elbow    11.2  6.4 Left Ulnar Motor (Abd Dig Minimi ) Wrist    3.0 <3.3 8.1 >6.0 21.0 53.8 >49 B Elbow    6.9  8.1  10.0 50.0 >50 A Elbow    8.9  7.6 Right Ulnar Motor (Abd Dig Minimi ) Wrist    2.9 <3.3 5.7 >6.0 20.0 51.3 >49 B Elbow    6.8  5.4  10.0 50.0 >50 A Elbow    8.8  5.1 EMG Side Muscle Nerve Root Ins Act Fibs Psw Fasc Amp Dur Poly Recrt Int Gerldine Dust Comment Right Deltoid Axillary C5-6 Nml Nml Nml None Nml Nml 0 Nml Nml Right Biceps Musculocut C5-6 Nml Nml Nml None Nml Nml 0 Nml Nml Right Triceps Radial C6-7-8 Nml Nml Nml None Nml Nml 0 Nml Nml Right FlexCarRad Median C6-7 Nml Nml Nml None Nml Nml 0 Nml Nml Right 1stDorInt Ulnar C8-T1 Nml Nml Nml None Nml Nml 0 Nml Nml Right Abd Poll Brev Median C8-T1 Nml Nml Nml None Nml Nml 0 Nml Nml Right Cervical Parasp Up Rami C1-3 Nml Nml Nml Right Cervical Parasp Mid Rami C4-6 Nml Nml Nml Right Cervical Parasp Low Rami C7-8 Nml Nml Nml NCS/EMG FINDINGS: 
 
? Evaluation of the Left median motor nerve showed prolonged distal onset latency (5.0 ms). ? The Right median motor nerve showed prolonged distal onset latency (7.0 ms) and decreased conduction velocity (Elbow-Wrist, 45.2 m/s). ? The Left ulnar motor, the Right ulnar motor, and the Right ulnar sensory nerves were unremarkable. ? The Left Median 2nd Digit sensory and the Right Median 2nd Digit sensory nerves showed prolonged distal peak latency (L5.4, R9.0 ms), reduced amplitude (L9.6, R2.6 µV), and decreased conduction velocity (Wrist-2nd Digit, L33.3, R16.3 m/s). ? The Left ulnar sensory nerve showed prolonged distal peak latency (3.2 ms), reduced amplitude (15.9 µV), and decreased conduction velocity (Wrist-5th Digit, 47.8 m/s). INTERPRETATION: This was an abnormal nerve conduction and EMG study showing signs of: (1) severe prolongation of the right worse than the left distal latencies of the median nerve consistent with severe right worse than left carpal tunnel syndrome. (2) some mild prolongation of the distal latencies of the ulnar nerve consistent with a mild degree of diabetic neuropathy. ___________________________ Laura Ruano MD 
 
 
 
 
Waveforms: If you have questions, please do not hesitate to call me. I look forward to following Ms. Vinson along with you.  
 
 
 
Sincerely, 
 
 
Brent Wooten MD

## 2017-08-18 NOTE — COMMUNICATION BODY
[facility]  [address] [facility] [address], [address]  [phone]      [facility] [address]    Neurophysiology Report      Patient: Carissa Ward     ID: [id number] Physician: Tamela Ramos. Angel Linares MD   Gender: Female Ref Phys: Kishore Ca MD   Handedness:      Study Date: August 18, 2017         Patient History: This 59-year-old woman has had several months of worsening right arm numbness and weakness. On brief exam she has intact strength and sensation of both upper extremities. Positive Tinel sign on the right side.       Nerve Conduction Studies  Anti Sensory Summary Table     Stim Site NR Peak (ms) Norm Peak (ms) O-P Amp (µV) Norm O-P Amp Dist (cm) Raman (m/s)   Left Median 2nd Digit Anti Sensory (2nd Digit)   Wrist    5.4 <3.5 9.6 >20 13.0 33.3   Site 2    4.8  14.2      Site 3    4.6  18.4      Right Median 2nd Digit Anti Sensory (2nd Digit)   Wrist    9.0 <3.5 2.6 >20 13.0 16.3   Site 2    7.8  2.0      Site 3    5.7  2.1      Left Ulnar Anti Sensory (5th Digit )   Wrist    3.2 <3.1 15.9 >17.0 11.0 47.8   Site 2    3.2  4.7      Site 3    3.2  3.6      Site 4    3.3  14.0      Right Ulnar Anti Sensory (5th Digit )   Wrist    3.2 <3.1 17.2 >17.0 11.0 50.0   Site 2    3.2  9.8        Motor Summary Table     Stim Site NR Onset (ms) Norm Onset (ms) O-P Amp (mV) Norm O-P Amp Dist (cm) Raman (m/s) Norm Raman (m/s)   Left Median Motor (Abd Poll Brev)   Wrist    5.0 <4.4 6.9 >4.0 19.0 50.0 >49   Elbow    8.8  5.1       Right Median Motor (Abd Poll Brev)   Wrist    7.0 <4.4 6.4 >4.0 19.0 45.2 >49   Elbow    11.2  6.4       Left Ulnar Motor (Abd Dig Minimi )   Wrist    3.0 <3.3 8.1 >6.0 21.0 53.8 >49   B Elbow    6.9  8.1  10.0 50.0 >50   A Elbow    8.9  7.6       Right Ulnar Motor (Abd Dig Minimi )   Wrist    2.9 <3.3 5.7 >6.0 20.0 51.3 >49   B Elbow    6.8  5.4  10.0 50.0 >50   A Elbow    8.8  5.1           EMG     Side Muscle Nerve Root Ins Act Fibs Psw Fasc Amp Dur Poly Recrt Int Wayna Fair Comment   Right Deltoid Axillary C5-6 Nml Nml Nml None Nml Nml 0 Nml Nml    Right Biceps Musculocut C5-6 Nml Nml Nml None Nml Nml 0 Nml Nml    Right Triceps Radial C6-7-8 Nml Nml Nml None Nml Nml 0 Nml Nml    Right FlexCarRad Median C6-7 Nml Nml Nml None Nml Nml 0 Nml Nml    Right 1stDorInt Ulnar C8-T1 Nml Nml Nml None Nml Nml 0 Nml Nml    Right Abd Poll Brev Median C8-T1 Nml Nml Nml None Nml Nml 0 Nml Nml    Right Cervical Parasp Up Rami C1-3 Nml Nml Nml          Right Cervical Parasp Mid Rami C4-6 Nml Nml Nml          Right Cervical Parasp Low Rami C7-8 Nml Nml Nml            NCS/EMG FINDINGS:     Evaluation of the Left median motor nerve showed prolonged distal onset latency (5.0 ms).  The Right median motor nerve showed prolonged distal onset latency (7.0 ms) and decreased conduction velocity (Elbow-Wrist, 45.2 m/s).  The Left ulnar motor, the Right ulnar motor, and the Right ulnar sensory nerves were unremarkable.  The Left Median 2nd Digit sensory and the Right Median 2nd Digit sensory nerves showed prolonged distal peak latency (L5.4, R9.0 ms), reduced amplitude (L9.6, R2.6 µV), and decreased conduction velocity (Wrist-2nd Digit, L33.3, R16.3 m/s).  The Left ulnar sensory nerve showed prolonged distal peak latency (3.2 ms), reduced amplitude (15.9 µV), and decreased conduction velocity (Wrist-5th Digit, 47.8 m/s). INTERPRETATION: This was an abnormal nerve conduction and EMG study showing signs of:     (1) severe prolongation of the right worse than the left distal latencies of the median nerve consistent with severe right worse than left carpal tunnel syndrome. (2) some mild prolongation of the distal latencies of the ulnar nerve consistent with a mild degree of diabetic neuropathy. ___________________________  Martina Tsai MD          Waveforms:

## 2017-08-18 NOTE — PROGRESS NOTES
[facility]science  [address] [facility] [place] [place], [address] [phone]      [place] [address]    Neurophysiology Report      Patient: Nakita Fails     ID: [id number] Physician: Blanka Ohara. Angelica Nettles MD   Gender: Female Ref Phys: Kristine Robbins MD   Handedness:      Study Date: August 18, 2017         Patient History: This 51-year-old woman has had several months of worsening right arm numbness and weakness. On brief exam she has intact strength and sensation of both upper extremities. Positive Tinel sign on the right side.       Nerve Conduction Studies  Anti Sensory Summary Table     Stim Site NR Peak (ms) Norm Peak (ms) O-P Amp (µV) Norm O-P Amp Dist (cm) Raman (m/s)   Left Median 2nd Digit Anti Sensory (2nd Digit)   Wrist    5.4 <3.5 9.6 >20 13.0 33.3   Site 2    4.8  14.2      Site 3    4.6  18.4      Right Median 2nd Digit Anti Sensory (2nd Digit)   Wrist    9.0 <3.5 2.6 >20 13.0 16.3   Site 2    7.8  2.0      Site 3    5.7  2.1      Left Ulnar Anti Sensory (5th Digit )   Wrist    3.2 <3.1 15.9 >17.0 11.0 47.8   Site 2    3.2  4.7      Site 3    3.2  3.6      Site 4    3.3  14.0      Right Ulnar Anti Sensory (5th Digit )   Wrist    3.2 <3.1 17.2 >17.0 11.0 50.0   Site 2    3.2  9.8        Motor Summary Table     Stim Site NR Onset (ms) Norm Onset (ms) O-P Amp (mV) Norm O-P Amp Dist (cm) Raman (m/s) Norm Raman (m/s)   Left Median Motor (Abd Poll Brev)   Wrist    5.0 <4.4 6.9 >4.0 19.0 50.0 >49   Elbow    8.8  5.1       Right Median Motor (Abd Poll Brev)   Wrist    7.0 <4.4 6.4 >4.0 19.0 45.2 >49   Elbow    11.2  6.4       Left Ulnar Motor (Abd Dig Minimi )   Wrist    3.0 <3.3 8.1 >6.0 21.0 53.8 >49   B Elbow    6.9  8.1  10.0 50.0 >50   A Elbow    8.9  7.6       Right Ulnar Motor (Abd Dig Minimi )   Wrist    2.9 <3.3 5.7 >6.0 20.0 51.3 >49   B Elbow    6.8  5.4  10.0 50.0 >50   A Elbow    8.8  5.1           EMG     Side Muscle Nerve Root Ins Act Fibs Psw Fasc Amp Dur Poly Recrt Int Kody Lundberg Comment   Right Deltoid Axillary C5-6 Nml Nml Nml None Nml Nml 0 Nml Nml    Right Biceps Musculocut C5-6 Nml Nml Nml None Nml Nml 0 Nml Nml    Right Triceps Radial C6-7-8 Nml Nml Nml None Nml Nml 0 Nml Nml    Right FlexCarRad Median C6-7 Nml Nml Nml None Nml Nml 0 Nml Nml    Right 1stDorInt Ulnar C8-T1 Nml Nml Nml None Nml Nml 0 Nml Nml    Right Abd Poll Brev Median C8-T1 Nml Nml Nml None Nml Nml 0 Nml Nml    Right Cervical Parasp Up Rami C1-3 Nml Nml Nml          Right Cervical Parasp Mid Rami C4-6 Nml Nml Nml          Right Cervical Parasp Low Rami C7-8 Nml Nml Nml            NCS/EMG FINDINGS:     Evaluation of the Left median motor nerve showed prolonged distal onset latency (5.0 ms).  The Right median motor nerve showed prolonged distal onset latency (7.0 ms) and decreased conduction velocity (Elbow-Wrist, 45.2 m/s).  The Left ulnar motor, the Right ulnar motor, and the Right ulnar sensory nerves were unremarkable.  The Left Median 2nd Digit sensory and the Right Median 2nd Digit sensory nerves showed prolonged distal peak latency (L5.4, R9.0 ms), reduced amplitude (L9.6, R2.6 µV), and decreased conduction velocity (Wrist-2nd Digit, L33.3, R16.3 m/s).  The Left ulnar sensory nerve showed prolonged distal peak latency (3.2 ms), reduced amplitude (15.9 µV), and decreased conduction velocity (Wrist-5th Digit, 47.8 m/s). INTERPRETATION: This was an abnormal nerve conduction and EMG study showing signs of:     (1) severe prolongation of the right worse than the left distal latencies of the median nerve consistent with severe right worse than left carpal tunnel syndrome. (2) some mild prolongation of the distal latencies of the ulnar nerve consistent with a mild degree of diabetic neuropathy. ___________________________  Andrew Oseguera MD          Waveforms:                      [facility]  OFFICE PROCEDURE PROGRESS NOTE        Chart reviewed for the following:   Esther CARDOZA Terry Narvaez MD, have reviewed the History, Physical and updated the Allergic reactions for [facility]     TIME OUT performed immediately prior to start of procedure:   Kendy Wick MD, have performed the following reviews on [facility] prior to the start of the procedure:            * Patient was identified by name and date of birth   * Agreement on procedure being performed was verified  * Risks and Benefits explained to the patient  * Procedure site verified and marked as necessary  * Patient was positioned for comfort  * Consent was signed and verified     Time: 4:05 PM    Date of procedure: 8/18/2017    Procedure performed by:  Evon Maloney MD    Provider assisted by: Tasneem Barnes MA    Patient assisted by: self    How tolerated by patient: tolerated the procedure well with no complications    Post Procedural Pain Scale: 0 - No Hurt    Comments: none

## 2017-08-29 ENCOUNTER — HOSPITAL ENCOUNTER (OUTPATIENT)
Dept: VASCULAR SURGERY | Age: 77
Discharge: HOME | End: 2017-08-29
Attending: INTERNAL MEDICINE
Payer: MEDICARE

## 2017-08-29 DIAGNOSIS — I12.9: ICD-10-CM

## 2017-08-29 DIAGNOSIS — N18.30 CHRONIC RENAL DISEASE, STAGE III (HCC): ICD-10-CM

## 2017-08-29 PROCEDURE — 93975 VASCULAR STUDY: CPT

## 2017-08-29 NOTE — PROCEDURES
[facility]  *** FINAL REPORT ***    Name: Sylvester Macdonald  MRN: [id number]    Outpatient  : 1940  HIS Order #: [id number]  [id number] [facility] Visit #: [id number]  Date: 29 Aug 2017    TYPE OF TEST: Visceral Arterial Duplex    REASON FOR TEST  Chronic Kidney Disease, Hyperlipidemia, NOS, Hypertension, unspecified    Aortic PSV:  79.0 cm/s  Diameter AP: 2.0 cm   TV: 2.0 cm                   Right          Left  Renal Artery:- -------------  -------------  Proximal  PSV:  85.0           53.0  Mid       PSV:  76.0           64.0  Distal    PSV:  48.0           67.0  Aortic ratio :   1.1            0.8    Medullary PSV:  41.0           38.0            EDV:   6.0            7.0            EDR:   0.1            0.2            SDR:   6.8            5.4    Cortical  PSV:  18.0           20.0            EDV:   4.0            5.0            EDR:   0.2            0.3            SDR:   4.5            4.0  Stenosis:      Normal         Normal  Kidney size:   11.2 cm        10.1 cm               x  4.5 cm      x  5.0 cm    Hilar:-        Right          Left  Acc. Time  AT:     secs           secs  Acc. Index AI:             RI: 0.85           0.83    INTERPRETATION/FINDINGS  Duplex images were obtained using 2-D gray scale, color flow, and  spPectral Doppler analysis. RENAL:  1. No significant renal artery stenosis identified, both renal  arteries visualized. 2. The right kidney measures 11.2 cm. 3. The left kidney measures 10.1 cm.  4. Bilateral intrinsic/medical renal disease identified. 5. Patent renal veins noted bilaterally. 6. No focal stenosis or aneurysm noted in the abdominal aorta. ADDITIONAL COMMENTS    I have personally reviewed the data relevant to the interpretation of  this  study. TECHNOLOGIST: JUSTINO Medley, RVS  Signed: 2017 11:23 AM    PHYSICIAN: Remedios Frances MD  Signed: 2017 02:20 PM

## 2017-09-19 NOTE — TELEPHONE ENCOUNTER
Requested Prescriptions     Pending Prescriptions Disp Refills    metoprolol succinate (TOPROL-XL) 50 mg XL tablet       Sig: Take 1 Tab by mouth daily.

## 2017-09-20 RX ORDER — METOPROLOL SUCCINATE 50 MG/1
TABLET, EXTENDED RELEASE ORAL
Qty: 90 TAB | Refills: 6 | Status: SHIPPED | OUTPATIENT
Start: 2017-09-20 | End: 2018-09-12

## 2017-09-20 RX ORDER — METOPROLOL SUCCINATE 50 MG/1
50 TABLET, EXTENDED RELEASE ORAL DAILY
Qty: 30 TAB | Refills: 6 | Status: SHIPPED | OUTPATIENT
Start: 2017-09-20 | End: 2017-09-20 | Stop reason: SDUPTHER

## 2017-10-24 ENCOUNTER — OFFICE VISIT (OUTPATIENT)
Dept: ORTHOPEDIC SURGERY | Age: 77
End: 2017-10-24

## 2017-10-24 DIAGNOSIS — M17.11 PRIMARY OSTEOARTHRITIS OF RIGHT KNEE: ICD-10-CM

## 2017-10-24 DIAGNOSIS — M54.42 CHRONIC BILATERAL LOW BACK PAIN WITH BILATERAL SCIATICA: ICD-10-CM

## 2017-10-24 DIAGNOSIS — G56.03 BILATERAL CARPAL TUNNEL SYNDROME: Primary | ICD-10-CM

## 2017-10-24 DIAGNOSIS — M18.11 DEGENERATIVE ARTHRITIS OF THUMB, RIGHT: ICD-10-CM

## 2017-10-24 DIAGNOSIS — M54.41 CHRONIC BILATERAL LOW BACK PAIN WITH BILATERAL SCIATICA: ICD-10-CM

## 2017-10-24 DIAGNOSIS — G89.29 CHRONIC BILATERAL LOW BACK PAIN WITH BILATERAL SCIATICA: ICD-10-CM

## 2017-10-24 RX ORDER — BETAMETHASONE SODIUM PHOSPHATE AND BETAMETHASONE ACETATE 3; 3 MG/ML; MG/ML
6 INJECTION, SUSPENSION INTRA-ARTICULAR; INTRALESIONAL; INTRAMUSCULAR; SOFT TISSUE ONCE
Qty: 1 ML | Refills: 0
Start: 2017-10-24 | End: 2017-10-24

## 2017-10-24 RX ORDER — CYCLOBENZAPRINE HCL 10 MG
10 TABLET ORAL
Qty: 21 TAB | Refills: 0 | Status: SHIPPED | OUTPATIENT
Start: 2017-10-24 | End: 2018-09-12

## 2017-10-24 NOTE — PROGRESS NOTES
Patient: Mariah Dean                MRN: [id number]       SSN: [id number]  YOB: 1940        AGE: 68 y.o. SEX: female  There is no height or weight on file to calculate BMI. PCP: Kobi Alvarado MD  10/24/17    HISTORY: Nichole Artis returns in followup. She had a Doppler ultrasound, which was negative of the lower extremities for DVT. We sent her for carpal tunnel testing with Dr. Reece Guillermo. She has significant entrapment of the median nerve, and I am going to get Dr. Queenie Warner to see her with this in mind. She has mild ulnar neuropathy as well secondary to diabetes and also basilar arthritis of the thumb. It hurts to open a jam jar or a door, and she also wakes up with the index finger being numb on the right side. The pain can be mild to moderate, aching and some night pain as well. She denies any falls. She denies any respiratory compromise or indrawing. PHYSICAL EXAMINATION:  On examination today, there is some tenderness at the base of the thumb. She has a mildly positive grind test.  She has decreased two-point discrimination involving the median nerve distribution. She has good radial pulse. PROCEDURE:  Under aseptic conditions and after informed, written consent with a time out, the base of the thumb was injected with a half and half Celestone preparation, which was well tolerated. PLAN:  I am going to get Dr. Queenie Warner to see her with carpal tunnel on the right side in mind. REVIEW OF SYSTEMS:      CON: negative for weight loss, fever  EYE: negative for double vision  ENT: negative for hoarseness  RS:   negative for Tb  GI:    negative for blood in stool  :  negative for blood in urine  Other systems reviewed and noted below.           Past Medical History:   Diagnosis Date    Allergic rhinitis     Diabetes ([address]     Hypertension        Family History   Problem Relation Age of Onset    Diabetes Maternal Aunt     Diabetes Maternal Uncle     Diabetes Maternal Grandfather     Diabetes Other        Current Outpatient Prescriptions   Medication Sig Dispense Refill    metoprolol succinate (TOPROL-XL) 50 mg XL tablet TAKE 1 TABLET BY MOUTH DAILY 90 Tab 6    acetaminophen-codeine (TYLENOL-CODEINE #3) 300-30 mg per tablet Take 1 Tab by mouth every six (6) hours as needed for Pain. Max Daily Amount: 4 Tabs. 21 Tab 0    polyethylene glycol (MIRALAX) 17 gram/dose powder Take 17 g by mouth daily. 595 g 3    diclofenac (VOLTAREN) 1 % gel Apply 4 g to affected area four (4) times daily. 500 g 5    rosuvastatin (CRESTOR) 20 mg tablet TAKE 1 TABLET BY MOUTH EVERY NIGHT 90 Tab 3    amLODIPine (NORVASC) 10 mg tablet Take 1 Tab by mouth daily. 30 Tab 6    furosemide (LASIX) 40 mg tablet Take 20 mg by mouth daily.  valsartan (DIOVAN) 320 mg tablet Take 320 mg by mouth daily.  insulin lispro (HUMALOG) 100 unit/mL injection by SubCUTAneous route. 8 units before breakfast  6 units before lunch  10 units before dinner      insulin glargine (LANTUS SOLOSTAR) 100 unit/mL (3 mL) pen by SubCUTAneous route. 44 units nightly         Allergies   Allergen Reactions    Aspirin Other (comments)     Ringing in ears    Keflex [Cephalexin] Rash       Past Surgical History:   Procedure Laterality Date    HX CATARACT REMOVAL  2011    HX LUMBAR FUSION  2004    HX WISDOM TEETH EXTRACTION         Social History     Social History    Marital status:      Spouse name: N/A    Number of children: N/A    Years of education: N/A     Occupational History    CNA - RET      Social History Main Topics    Smoking status: Former Smoker    Smokeless tobacco: Never Used    Alcohol use No    Drug use: No    Sexual activity: Yes     Partners: Male     Birth control/ protection: None     Other Topics Concern    Not on file     Social History Narrative       There were no vitals taken for this visit.       PHYSICAL EXAMINATION:  GENERAL: Alert and oriented x3, in no acute distress, well-developed, well-nourished, afebrile. HEART: No JVD. EYES: No scleral icterus   NECK: No significant lymphadenopathy   LUNGS: No respiratory compromise or indrawing  ABDOMEN: Soft, non-tender, non-distended. Electronically signed by:  Anjum Padgett MD

## 2017-11-09 ENCOUNTER — OFFICE VISIT (OUTPATIENT)
Dept: ORTHOPEDIC SURGERY | Age: 77
End: 2017-11-09

## 2017-11-09 VITALS
SYSTOLIC BLOOD PRESSURE: 175 MMHG | HEIGHT: 65 IN | WEIGHT: 232 LBS | BODY MASS INDEX: 38.65 KG/M2 | TEMPERATURE: 98.6 F | DIASTOLIC BLOOD PRESSURE: 80 MMHG | HEART RATE: 75 BPM | OXYGEN SATURATION: 96 %

## 2017-11-09 DIAGNOSIS — M25.551 RIGHT HIP PAIN: Primary | ICD-10-CM

## 2017-11-09 DIAGNOSIS — M70.61 TROCHANTERIC BURSITIS OF RIGHT HIP: ICD-10-CM

## 2017-11-09 RX ORDER — MOXIFLOXACIN HYDROCHLORIDE 400 MG/1
400 TABLET ORAL DAILY
Qty: 7 TAB | Refills: 0 | Status: SHIPPED | OUTPATIENT
Start: 2017-11-09 | End: 2018-01-22

## 2017-11-09 RX ORDER — BETAMETHASONE SODIUM PHOSPHATE AND BETAMETHASONE ACETATE 3; 3 MG/ML; MG/ML
6 INJECTION, SUSPENSION INTRA-ARTICULAR; INTRALESIONAL; INTRAMUSCULAR; SOFT TISSUE ONCE
Qty: 1 ML | Refills: 0 | Status: CANCELLED
Start: 2017-11-09 | End: 2017-11-09

## 2017-11-09 RX ORDER — CLONIDINE HYDROCHLORIDE 0.2 MG/1
TABLET ORAL
Refills: 4 | COMMUNITY
Start: 2017-10-30 | End: 2022-08-31 | Stop reason: ALTCHOICE

## 2017-11-09 NOTE — PROGRESS NOTES
Patient: Clara Flores                MRN: [id number]       SSN: [id number]  YOB: 1940        AGE: 68 y.o. SEX: female  Body mass index is 38.61 kg/(m^2). PCP: Erin Harris MD  11/09/17    HISTORY: Nu Galeano is here today in followup with regards to right hip pain. It hurts her at night when she rolls over on it. There is not too much in the way of groin pain. She does have some mild, chronic, low back pain, which is separate. She denies fevers or chills, and is otherwise feeling well. PHYSICAL EXAMINATION:  On examination today, she is alert and oriented. Affect is normal.  She has a little bit of neuropathy distally. The hips rotate well. She is tender over the greater trochanter. The examination of the skin in that area shows she has a blister that has been popped approximately 2.5-3 cm in diameter. It is red. It is a little bit weepy. There is no pus or purulence, but it is certainly an open wound right in the area of the lateral aspect of the trochanter. PLAN:  The significant other still wants an injection, and I declined because it is an open wound and it could get infected. Therefore, I am going to place her on some oral antibiotics and a sterile dressing. We will see her back next week. We will have to get the wound to heal up. It looks like a very superficial skin blister, and she is not sure how it got there. Nevertheless, there is no indication for surgery currently. We will see her next week just for wound check and an oral antibiotic for now. REVIEW OF SYSTEMS:      CON: negative for weight loss, fever  EYE: negative for double vision  ENT: negative for hoarseness  RS:   negative for Tb  GI:    negative for blood in stool  :  negative for blood in urine  Other systems reviewed and noted below.           Past Medical History:   Diagnosis Date    Allergic rhinitis     Diabetes ([address]     Hypertension        Family History   Problem Relation Age of Onset    Diabetes Maternal Aunt     Diabetes Maternal Uncle     Diabetes Maternal Grandfather     Diabetes Other        Current Outpatient Prescriptions   Medication Sig Dispense Refill    metoprolol succinate (TOPROL-XL) 50 mg XL tablet TAKE 1 TABLET BY MOUTH DAILY 90 Tab 6    polyethylene glycol (MIRALAX) 17 gram/dose powder Take 17 g by mouth daily. 595 g 3    rosuvastatin (CRESTOR) 20 mg tablet TAKE 1 TABLET BY MOUTH EVERY NIGHT 90 Tab 3    amLODIPine (NORVASC) 10 mg tablet Take 1 Tab by mouth daily. 30 Tab 6    furosemide (LASIX) 40 mg tablet Take 20 mg by mouth daily.  valsartan (DIOVAN) 320 mg tablet Take 320 mg by mouth daily.  insulin glargine (LANTUS SOLOSTAR) 100 unit/mL (3 mL) pen by SubCUTAneous route. 44 units nightly      cloNIDine HCl (CATAPRES) 0.2 mg tablet TK 1 T PO QHS FOR BLOOD PRESSURE  4    cyclobenzaprine (FLEXERIL) 10 mg tablet Take 1 Tab by mouth three (3) times daily as needed for Muscle Spasm(s). 21 Tab 0    acetaminophen-codeine (TYLENOL-CODEINE #3) 300-30 mg per tablet Take 1 Tab by mouth every six (6) hours as needed for Pain. Max Daily Amount: 4 Tabs. 21 Tab 0    diclofenac (VOLTAREN) 1 % gel Apply 4 g to affected area four (4) times daily. 500 g 5    insulin lispro (HUMALOG) 100 unit/mL injection by SubCUTAneous route.  8 units before breakfast  6 units before lunch  10 units before dinner         Allergies   Allergen Reactions    Aspirin Other (comments)     Ringing in ears    Keflex [Cephalexin] Rash       Past Surgical History:   Procedure Laterality Date    HX CATARACT REMOVAL  2011    HX LUMBAR FUSION  2004    HX WISDOM TEETH EXTRACTION         Social History     Social History    Marital status:      Spouse name: N/A    Number of children: N/A    Years of education: N/A     Occupational History    CNA - RET      Social History Main Topics    Smoking status: Former Smoker    Smokeless tobacco: Never Used    Alcohol use No  Drug use: No    Sexual activity: Yes     Partners: Male     Birth control/ protection: None     Other Topics Concern    Not on file     Social History Narrative       Visit Vitals    /80    Pulse 75    Temp 98.6 °F (37 °C) (Oral)    Ht 5' 5\" (1.651 m)    Wt 232 lb (105.2 kg)    SpO2 96%    BMI 38.61 kg/m2         PHYSICAL EXAMINATION:  GENERAL: Alert and oriented x3, in no acute distress, well-developed, well-nourished, afebrile. HEART: No JVD. EYES: No scleral icterus   NECK: No significant lymphadenopathy   LUNGS: No respiratory compromise or indrawing  ABDOMEN: Soft, non-tender, non-distended. Electronically signed by:  Jduit Castano MD

## 2017-11-16 ENCOUNTER — OFFICE VISIT (OUTPATIENT)
Dept: ORTHOPEDIC SURGERY | Age: 77
End: 2017-11-16

## 2017-11-16 VITALS
OXYGEN SATURATION: 97 % | DIASTOLIC BLOOD PRESSURE: 71 MMHG | WEIGHT: 232 LBS | RESPIRATION RATE: 16 BRPM | BODY MASS INDEX: 38.65 KG/M2 | HEIGHT: 65 IN | HEART RATE: 74 BPM | TEMPERATURE: 96.8 F | SYSTOLIC BLOOD PRESSURE: 167 MMHG

## 2017-11-16 DIAGNOSIS — S70.251A: Primary | ICD-10-CM

## 2017-11-16 NOTE — PROGRESS NOTES
Patient: Hipolito Mota                MRN: [id number]       SSN: [id number]  YOB: 1940        AGE: 68 y.o. SEX: female  Body mass index is 38.61 kg/(m^2). PCP: Aditya Avitia MD  11/16/17    HISTORY: I saw Marvie Osler in followup. When we went to give her a cortisone injection for her right hip last week, we noticed about a one-inch blister that had just popped and was draining. We had her on some Avelox for the week and some dressing changes. She is here for followup assessment. She still has, of course, trochanteric bursitis. PHYSICAL EXAMINATION:  We examined the wound with a female assistant present. It does not appear to be infected. It has declared itself, and it is at least 1 inch in diameter, partial thickness only. The calf is nontender. Jean Claude's sign is negative. Both feet are warm and well-perfused. PLAN:  At this point, I would recommend no injections. I do not think she needs an antibiotic currently. I would recommend saline wash, no Betadine for now, and I am going to get her plugged into the wound center clinic as well, as I think she will need formalized dressing changes a few times a week for this to heal, and I think it will take at least four to six weeks or so to get it to heal up. It has been a pleasure to share in her care. We will have another look at her in about 10 days or so just to check on the wound. REVIEW OF SYSTEMS:      CON: negative for weight loss, fever  EYE: negative for double vision  ENT: negative for hoarseness  RS:   negative for Tb  GI:    negative for blood in stool  :  negative for blood in urine  Other systems reviewed and noted below.           Past Medical History:   Diagnosis Date    Allergic rhinitis     [address] [address]     Hypertension        Family History   Problem Relation Age of Onset    Diabetes Maternal Aunt     Diabetes Maternal Uncle     Diabetes Maternal Grandfather     Diabetes Other Current Outpatient Prescriptions   Medication Sig Dispense Refill    cloNIDine HCl (CATAPRES) 0.2 mg tablet TK 1 T PO QHS FOR BLOOD PRESSURE  4    moxifloxacin (AVELOX) 400 mg tablet Take 1 Tab by mouth daily. 7 Tab 0    cyclobenzaprine (FLEXERIL) 10 mg tablet Take 1 Tab by mouth three (3) times daily as needed for Muscle Spasm(s). 21 Tab 0    metoprolol succinate (TOPROL-XL) 50 mg XL tablet TAKE 1 TABLET BY MOUTH DAILY 90 Tab 6    acetaminophen-codeine (TYLENOL-CODEINE #3) 300-30 mg per tablet Take 1 Tab by mouth every six (6) hours as needed for Pain. Max Daily Amount: 4 Tabs. 21 Tab 0    polyethylene glycol (MIRALAX) 17 gram/dose powder Take 17 g by mouth daily. 595 g 3    diclofenac (VOLTAREN) 1 % gel Apply 4 g to affected area four (4) times daily. 500 g 5    rosuvastatin (CRESTOR) 20 mg tablet TAKE 1 TABLET BY MOUTH EVERY NIGHT 90 Tab 3    amLODIPine (NORVASC) 10 mg tablet Take 1 Tab by mouth daily. 30 Tab 6    furosemide (LASIX) 40 mg tablet Take 20 mg by mouth daily.  valsartan (DIOVAN) 320 mg tablet Take 320 mg by mouth daily.  insulin lispro (HUMALOG) 100 unit/mL injection by SubCUTAneous route. 8 units before breakfast  6 units before lunch  10 units before dinner      insulin glargine (LANTUS SOLOSTAR) 100 unit/mL (3 mL) pen by SubCUTAneous route.  44 units nightly         Allergies   Allergen Reactions    Aspirin Other (comments)     Ringing in ears    Keflex [Cephalexin] Rash       Past Surgical History:   Procedure Laterality Date    HX CATARACT REMOVAL  2011    HX LUMBAR FUSION  2004    HX WISDOM TEETH EXTRACTION         Social History     Social History    Marital status:      Spouse name: N/A    Number of children: N/A    Years of education: N/A     Occupational History    CNA - RET      Social History Main Topics    Smoking status: Former Smoker    Smokeless tobacco: Never Used    Alcohol use No    Drug use: No    Sexual activity: Yes     Partners: Male     Birth control/ protection: None     Other Topics Concern    Not on file     Social History Narrative       Visit Vitals    /71    Pulse 74    Temp 96.8 °F (36 °C)    Resp 16    Ht 5' 5\" (1.651 m)    Wt 232 lb (105.2 kg)    SpO2 97%    BMI 38.61 kg/m2         PHYSICAL EXAMINATION:  GENERAL: Alert and oriented x3, in no acute distress, well-developed, well-nourished, afebrile. HEART: No JVD. EYES: No scleral icterus   NECK: No significant lymphadenopathy   LUNGS: No respiratory compromise or indrawing  ABDOMEN: Soft, non-tender, non-distended. Electronically signed by:  Jessica Cole MD

## 2017-12-07 ENCOUNTER — OFFICE VISIT (OUTPATIENT)
Dept: ORTHOPEDIC SURGERY | Age: 77
End: 2017-12-07

## 2017-12-07 ENCOUNTER — TELEPHONE (OUTPATIENT)
Dept: ORTHOPEDIC SURGERY | Age: 77
End: 2017-12-07

## 2017-12-07 VITALS
WEIGHT: 232 LBS | RESPIRATION RATE: 16 BRPM | BODY MASS INDEX: 38.65 KG/M2 | TEMPERATURE: 97.6 F | HEIGHT: 65 IN | HEART RATE: 77 BPM | DIASTOLIC BLOOD PRESSURE: 76 MMHG | SYSTOLIC BLOOD PRESSURE: 165 MMHG | OXYGEN SATURATION: 98 %

## 2017-12-07 DIAGNOSIS — S70.251A: Primary | ICD-10-CM

## 2017-12-07 RX ORDER — SULFAMETHOXAZOLE AND TRIMETHOPRIM 800; 160 MG/1; MG/1
1 TABLET ORAL 2 TIMES DAILY
Qty: 14 TAB | Refills: 0 | Status: SHIPPED | OUTPATIENT
Start: 2017-12-07 | End: 2018-01-22

## 2017-12-07 RX ORDER — DRESSING,ODOR ABSORBENT,H-POLY 4" X 4"
1 BANDAGE MISCELLANEOUS
Qty: 10 EACH | Refills: 1 | Status: SHIPPED | OUTPATIENT
Start: 2017-12-09 | End: 2018-09-12

## 2017-12-07 RX ORDER — LEVOFLOXACIN 500 MG/1
TABLET, FILM COATED ORAL
Qty: 7 TAB | Refills: 0 | Status: SHIPPED | OUTPATIENT
Start: 2017-12-07 | End: 2018-01-22

## 2017-12-07 NOTE — PROGRESS NOTES
[address] [facility], [address] [facility]  [phone]           Patient: Robby Jorgensen                MRN: [id number]       SSN: [id number]  YOB: 1940        AGE: 68 y.o. SEX: female  Body mass index is 38.61 kg/(m^2). PCP: Vielka Pelayo MD  12/07/17      This office note has been dictated. REVIEW OF SYSTEMS:  Constitutional: Negative for fever, chills, weight loss and malaise/fatigue. HENT: Negative. Eyes: Negative. Respiratory: Negative. Cardiovascular: Negative. Gastrointestinal: No bowel incontinence or constipation. Genitourinary: No bladder incontinence or saddle anesthesia. Skin: Negative. Neurological: Negative. Endo/Heme/Allergies: Negative. Psychiatric/Behavioral: Negative. Musculoskeletal: As per HPI above. Past Medical History:   Diagnosis Date    Allergic rhinitis     Diabetes ([facility] [address]     H/O seasonal allergies     Hypertension          Current Outpatient Prescriptions:     trimethoprim-sulfamethoxazole (BACTRIM DS) 160-800 mg per tablet, Take 1 Tab by mouth two (2) times a day., Disp: 14 Tab, Rfl: 0    levoFLOXacin (LEVAQUIN) 500 mg tablet, 1 po q day x 5 days, Disp: 7 Tab, Rfl: 0    [START ON 12/9/2017] Hydrocolloid Dressing (DUODERM CGF DRESSING) 4 X 4 \" bndg, 1 Patch by Apply Externally route Every Mon, Wed and Sat., Disp: 10 Each, Rfl: 1    cloNIDine HCl (CATAPRES) 0.2 mg tablet, TK 1 T PO QHS FOR BLOOD PRESSURE, Disp: , Rfl: 4    moxifloxacin (AVELOX) 400 mg tablet, Take 1 Tab by mouth daily. , Disp: 7 Tab, Rfl: 0    cyclobenzaprine (FLEXERIL) 10 mg tablet, Take 1 Tab by mouth three (3) times daily as needed for Muscle Spasm(s). , Disp: 21 Tab, Rfl: 0    metoprolol succinate (TOPROL-XL) 50 mg XL tablet, TAKE 1 TABLET BY MOUTH DAILY, Disp: 90 Tab, Rfl: 6    acetaminophen-codeine (TYLENOL-CODEINE #3) 300-30 mg per tablet, Take 1 Tab by mouth every six (6) hours as needed for Pain. Max Daily Amount: 4 Tabs., Disp: 21 Tab, Rfl: 0    polyethylene glycol (MIRALAX) 17 gram/dose powder, Take 17 g by mouth daily. , Disp: 595 g, Rfl: 3    diclofenac (VOLTAREN) 1 % gel, Apply 4 g to affected area four (4) times daily. , Disp: 500 g, Rfl: 5    rosuvastatin (CRESTOR) 20 mg tablet, TAKE 1 TABLET BY MOUTH EVERY NIGHT, Disp: 90 Tab, Rfl: 3    amLODIPine (NORVASC) 10 mg tablet, Take 1 Tab by mouth daily. , Disp: 30 Tab, Rfl: 6    furosemide (LASIX) 40 mg tablet, Take 20 mg by mouth daily. , Disp: , Rfl:     valsartan (DIOVAN) 320 mg tablet, Take 320 mg by mouth daily. , Disp: , Rfl:     insulin lispro (HUMALOG) 100 unit/mL injection, by SubCUTAneous route. 8 units before breakfast 6 units before lunch 10 units before dinner, Disp: , Rfl:     insulin glargine (LANTUS SOLOSTAR) 100 unit/mL (3 mL) pen, by SubCUTAneous route. 44 units nightly, Disp: , Rfl:     Allergies   Allergen Reactions    Aspirin Other (comments)     Ringing in ears    Keflex [Cephalexin] Rash       Social History     Social History    Marital status:      Spouse name: N/A    Number of children: N/A    Years of education: N/A     Occupational History    CNA - RET      Social History Main Topics    Smoking status: Former Smoker    Smokeless tobacco: Never Used    Alcohol use No    Drug use: No    Sexual activity: Yes     Partners: Male     Birth control/ protection: None     Other Topics Concern    Not on file     Social History Narrative       Past Surgical History:   Procedure Laterality Date    HX BACK SURGERY      HX CATARACT REMOVAL  2011    HX LUMBAR FUSION  2004    HX WISDOM TEETH EXTRACTION                 We did see Ms. Vinson for followup with regards to a wound on her right hip and buttocks. The patient has been on antibiotics prophylactically. A wound center consult was ordered. Unfortunately, it was never done. She has finished her antibiotics. She denies any fevers or chills.   She denies any increasing discomfort to the wound. She does have trochanteric bursitis. She is requesting a cortisone injection for hip today. PHYSICAL EXAMINATION:  In general, the patient is alert and oriented x 3 in no acute distress. The patient is well-developed, well-nourished, with a normal affect. The patient is afebrile. HEENT:  Head is normocephalic and atraumatic. Pupils are equally round and reactive to light and accommodation. Extraocular eye movements are intact. Neck is supple. Trachea is midline. No JVD is present. Breathing is nonlabored. Examination of the lower extremities reveals pain-free range of motion of the hips. She does have tenderness to palpation of the greater trochanteric bursa on the right side. There is approximately a 1 cm, circumferential, superficial wound to the right hip and buttocks region. There is no surrounding erythema and no underlying fluctuance. There are no active signs for infection. There is a small bit of serous dressing, which is on the dressing itself. ASSESSMENT:  Right hip wound. PLAN:  At this point, I think, prophylactically she should still be on antibiotics, as she does have a little bit of serous drainage and has not healed over. We will put her back on Bactrim and Levaquin. I am also going to order Duoderm patches to apply to the area three times a week. A wound center consult was ordered again. We will see her back in one weeks time for evaluation or sooner if necessary. She was instructed on the signs for infection.                JR Juan Luis LEE, BETH, ATC

## 2017-12-07 NOTE — TELEPHONE ENCOUNTER
[facility] CALLED FOR [address]. ARMEN WOULD LIKE VERIFICATION ON THE LEVAQUIN THAT WAS PRESCRIBED TO THE PATIENT TODAY. Beth Jurado TEL. [phone].

## 2017-12-21 ENCOUNTER — OFFICE VISIT (OUTPATIENT)
Dept: ORTHOPEDIC SURGERY | Age: 77
End: 2017-12-21

## 2017-12-21 VITALS
WEIGHT: 240 LBS | HEIGHT: 65 IN | BODY MASS INDEX: 39.99 KG/M2 | DIASTOLIC BLOOD PRESSURE: 74 MMHG | SYSTOLIC BLOOD PRESSURE: 188 MMHG | OXYGEN SATURATION: 98 % | HEART RATE: 82 BPM

## 2017-12-21 DIAGNOSIS — M17.11 PRIMARY OSTEOARTHRITIS OF RIGHT KNEE: Primary | ICD-10-CM

## 2017-12-21 DIAGNOSIS — L89.319 DECUBITUS ULCER OF RIGHT BUTTOCK, UNSPECIFIED ULCER STAGE: ICD-10-CM

## 2017-12-21 PROBLEM — E11.21 TYPE 2 DIABETES MELLITUS WITH NEPHROPATHY (HCC): Status: ACTIVE | Noted: 2017-12-21

## 2017-12-21 RX ORDER — BETAMETHASONE SODIUM PHOSPHATE AND BETAMETHASONE ACETATE 3; 3 MG/ML; MG/ML
6 INJECTION, SUSPENSION INTRA-ARTICULAR; INTRALESIONAL; INTRAMUSCULAR; SOFT TISSUE ONCE
Qty: 1 ML | Refills: 0
Start: 2017-12-21 | End: 2017-12-21

## 2018-01-11 ENCOUNTER — OFFICE VISIT (OUTPATIENT)
Dept: ORTHOPEDIC SURGERY | Age: 78
End: 2018-01-11

## 2018-01-11 VITALS
SYSTOLIC BLOOD PRESSURE: 138 MMHG | OXYGEN SATURATION: 98 % | WEIGHT: 238 LBS | HEART RATE: 72 BPM | RESPIRATION RATE: 16 BRPM | BODY MASS INDEX: 39.65 KG/M2 | HEIGHT: 65 IN | DIASTOLIC BLOOD PRESSURE: 64 MMHG | TEMPERATURE: 97.9 F

## 2018-01-11 DIAGNOSIS — S31.819A WOUND OF RIGHT BUTTOCK, INITIAL ENCOUNTER: Primary | ICD-10-CM

## 2018-01-11 NOTE — PROGRESS NOTES
[address] [facility], [address] [facility]  [phone]           Patient: Lissette Whyte                MRN: [id number]       SSN: [id number]  YOB: 1940        AGE: 68 y.o. SEX: female  Body mass index is 39.61 kg/(m^2). PCP: Eusebia Huynh MD  01/11/18      This office note has been dictated. REVIEW OF SYSTEMS:  Constitutional: Negative for fever, chills, weight loss and malaise/fatigue. HENT: Negative. Eyes: Negative. Respiratory: Negative. Cardiovascular: Negative. Gastrointestinal: No bowel incontinence or constipation. Genitourinary: No bladder incontinence or saddle anesthesia. Skin: Negative. Neurological: Negative. Endo/Heme/Allergies: Negative. Psychiatric/Behavioral: Negative. Musculoskeletal: As per HPI above. Past Medical History:   Diagnosis Date    Allergic rhinitis     Diabetes ([facility] [address]     H/O seasonal allergies     Hypertension          Current Outpatient Prescriptions:     trimethoprim-sulfamethoxazole (BACTRIM DS) 160-800 mg per tablet, Take 1 Tab by mouth two (2) times a day., Disp: 14 Tab, Rfl: 0    levoFLOXacin (LEVAQUIN) 500 mg tablet, 1 po q day x 5 days, Disp: 7 Tab, Rfl: 0    Hydrocolloid Dressing (DUODERM CGF DRESSING) 4 X 4 \" bndg, 1 Patch by Apply Externally route Every Mon, Wed and Sat., Disp: 10 Each, Rfl: 1    cloNIDine HCl (CATAPRES) 0.2 mg tablet, TK 1 T PO QHS FOR BLOOD PRESSURE, Disp: , Rfl: 4    moxifloxacin (AVELOX) 400 mg tablet, Take 1 Tab by mouth daily. , Disp: 7 Tab, Rfl: 0    cyclobenzaprine (FLEXERIL) 10 mg tablet, Take 1 Tab by mouth three (3) times daily as needed for Muscle Spasm(s). , Disp: 21 Tab, Rfl: 0    metoprolol succinate (TOPROL-XL) 50 mg XL tablet, TAKE 1 TABLET BY MOUTH DAILY, Disp: 90 Tab, Rfl: 6    acetaminophen-codeine (TYLENOL-CODEINE #3) 300-30 mg per tablet, Take 1 Tab by mouth every six (6) hours as needed for Pain.  Max Daily Amount: 4 Tabs., Disp: 21 Tab, Rfl: 0    polyethylene glycol (MIRALAX) 17 gram/dose powder, Take 17 g by mouth daily. , Disp: 595 g, Rfl: 3    diclofenac (VOLTAREN) 1 % gel, Apply 4 g to affected area four (4) times daily. , Disp: 500 g, Rfl: 5    rosuvastatin (CRESTOR) 20 mg tablet, TAKE 1 TABLET BY MOUTH EVERY NIGHT, Disp: 90 Tab, Rfl: 3    amLODIPine (NORVASC) 10 mg tablet, Take 1 Tab by mouth daily. , Disp: 30 Tab, Rfl: 6    furosemide (LASIX) 40 mg tablet, Take 20 mg by mouth daily. , Disp: , Rfl:     valsartan (DIOVAN) 320 mg tablet, Take 320 mg by mouth daily. , Disp: , Rfl:     insulin lispro (HUMALOG) 100 unit/mL injection, by SubCUTAneous route. 8 units before breakfast 6 units before lunch 10 units before dinner, Disp: , Rfl:     insulin glargine (LANTUS SOLOSTAR) 100 unit/mL (3 mL) pen, by SubCUTAneous route. 44 units nightly, Disp: , Rfl:     Allergies   Allergen Reactions    Aspirin Other (comments)     Ringing in ears    Keflex [Cephalexin] Rash       Social History     Social History    Marital status:      Spouse name: N/A    Number of children: N/A    Years of education: N/A     Occupational History    CNA - RET      Social History Main Topics    Smoking status: Former Smoker    Smokeless tobacco: Never Used    Alcohol use No    Drug use: No    Sexual activity: Yes     Partners: Male     Birth control/ protection: None     Other Topics Concern    Not on file     Social History Narrative       Past Surgical History:   Procedure Laterality Date    HX BACK SURGERY      HX CATARACT REMOVAL  2011    HX LUMBAR FUSION  2004    HX WISDOM TEETH EXTRACTION                 We did see Ms. De Dios Cousins for followup with regards to her right buttocks wound, as well as her right knee. The patient was seen at her last visit and given a cortisone injection of the right knee, which helped her considerably. The right knee is feeling much better. The buttocks wound is unchanged.   It does have, what she states is, a hard scab on it. She has had no fevers or chills or drainage. She has had no night sweats. PHYSICAL EXAMINATION:  In general, the patient is alert and oriented x 3 in no acute distress. The patient is well-developed, well-nourished, with a normal affect. The patient is afebrile. Examination of the right buttocks reveals the skin is intact. The wound is healed over with an eschar. There is no surrounding erythema, no underlying fluctuance, and no signs for infection or cellulitis present. There is no pain with rotation. Neurovascular status is intact to the lower extremity. There is no calf tenderness. There is a negative Jean Claude's sign. There are no signs for DVT present. Examination of the right knee reveals the skin is intact. There is no ecchymosis, no warmth, and no signs for infection or cellulitis present. Findings are consistent with advanced arthritis of the knee. ASSESSMENT:      1. Right hip wound, eschar. 2. Right knee osteoarthritis. PLAN:  At this point, we are going to refer her to Dr. Lisa Link for evaluation of this wound and possible revision of the wound. Certainly, there are no signs for infection. We will see her back in the office in about three months time for evaluation and repeat injection of the right knee. She will call with any questions or concerns that shall arise.              JR Juan Luis LEE, BETH, ATC

## 2018-01-22 ENCOUNTER — HOSPITAL ENCOUNTER (EMERGENCY)
Age: 78
Discharge: HOME | End: 2018-01-22
Attending: EMERGENCY MEDICINE
Payer: MEDICARE

## 2018-01-22 VITALS
WEIGHT: 238 LBS | DIASTOLIC BLOOD PRESSURE: 80 MMHG | RESPIRATION RATE: 16 BRPM | BODY MASS INDEX: 39.61 KG/M2 | HEART RATE: 72 BPM | TEMPERATURE: 98.1 F | OXYGEN SATURATION: 100 % | SYSTOLIC BLOOD PRESSURE: 167 MMHG

## 2018-01-22 DIAGNOSIS — E86.0 DEHYDRATION: ICD-10-CM

## 2018-01-22 DIAGNOSIS — A09 DIARRHEA OF INFECTIOUS ORIGIN: ICD-10-CM

## 2018-01-22 DIAGNOSIS — A04.72 C. DIFFICILE COLITIS: Primary | ICD-10-CM

## 2018-01-22 LAB
ALBUMIN SERPL-MCNC: 3.3 G/DL (ref 3.4–5)
ALBUMIN/GLOB SERPL: 0.8 {RATIO} (ref 0.8–1.7)
ALP SERPL-CCNC: 117 U/L (ref 45–117)
ALT SERPL-CCNC: 23 U/L (ref 13–56)
ANION GAP SERPL CALC-SCNC: 3 MMOL/L (ref 3–18)
APPEARANCE UR: CLEAR
AST SERPL-CCNC: 24 U/L (ref 15–37)
ATRIAL RATE: 64 BPM
BACTERIA SPEC CULT: ABNORMAL
BACTERIA SPEC CULT: ABNORMAL
BACTERIA URNS QL MICRO: ABNORMAL /HPF
BASOPHILS # BLD: 0 K/UL (ref 0–0.1)
BASOPHILS NFR BLD: 1 % (ref 0–2)
BILIRUB SERPL-MCNC: 0.3 MG/DL (ref 0.2–1)
BILIRUB UR QL: NEGATIVE
BUN SERPL-MCNC: 22 MG/DL (ref 7–18)
BUN/CREAT SERPL: 10 (ref 12–20)
CALCIUM SERPL-MCNC: 9.8 MG/DL (ref 8.5–10.1)
CALCULATED P AXIS, ECG09: 49 DEGREES
CALCULATED R AXIS, ECG10: -39 DEGREES
CALCULATED T AXIS, ECG11: -28 DEGREES
CHLORIDE SERPL-SCNC: 108 MMOL/L (ref 100–108)
CO2 SERPL-SCNC: 31 MMOL/L (ref 21–32)
COLOR UR: YELLOW
CREAT SERPL-MCNC: 2.18 MG/DL (ref 0.6–1.3)
DIAGNOSIS, 93000: NORMAL
DIFFERENTIAL METHOD BLD: ABNORMAL
EOSINOPHIL # BLD: 0.1 K/UL (ref 0–0.4)
EOSINOPHIL NFR BLD: 2 % (ref 0–5)
EPITH CASTS URNS QL MICRO: ABNORMAL /LPF (ref 0–5)
ERYTHROCYTE [DISTWIDTH] IN BLOOD BY AUTOMATED COUNT: 14 % (ref 11.6–14.5)
GLOBULIN SER CALC-MCNC: 3.9 G/DL (ref 2–4)
GLUCOSE SERPL-MCNC: 75 MG/DL (ref 74–99)
GLUCOSE UR STRIP.AUTO-MCNC: NEGATIVE MG/DL
HCT VFR BLD AUTO: 37.4 % (ref 35–45)
HGB BLD-MCNC: 12.3 G/DL (ref 12–16)
HGB UR QL STRIP: ABNORMAL
KETONES UR QL STRIP.AUTO: NEGATIVE MG/DL
LEUKOCYTE ESTERASE UR QL STRIP.AUTO: NEGATIVE
LIPASE SERPL-CCNC: 253 U/L (ref 73–393)
LYMPHOCYTES # BLD: 1.2 K/UL (ref 0.9–3.6)
LYMPHOCYTES NFR BLD: 32 % (ref 21–52)
MCH RBC QN AUTO: 28.7 PG (ref 24–34)
MCHC RBC AUTO-ENTMCNC: 32.9 G/DL (ref 31–37)
MCV RBC AUTO: 87.4 FL (ref 74–97)
MONOCYTES # BLD: 0.4 K/UL (ref 0.05–1.2)
MONOCYTES NFR BLD: 10 % (ref 3–10)
NEUTS SEG # BLD: 2 K/UL (ref 1.8–8)
NEUTS SEG NFR BLD: 55 % (ref 40–73)
NITRITE UR QL STRIP.AUTO: NEGATIVE
P-R INTERVAL, ECG05: 186 MS
PH UR STRIP: 8.5 [PH] (ref 5–8)
PLATELET # BLD AUTO: 143 K/UL (ref 135–420)
PMV BLD AUTO: 10.9 FL (ref 9.2–11.8)
POTASSIUM SERPL-SCNC: 3.8 MMOL/L (ref 3.5–5.5)
PROT SERPL-MCNC: 7.2 G/DL (ref 6.4–8.2)
PROT UR STRIP-MCNC: 300 MG/DL
Q-T INTERVAL, ECG07: 442 MS
QRS DURATION, ECG06: 164 MS
QTC CALCULATION (BEZET), ECG08: 455 MS
RBC # BLD AUTO: 4.28 M/UL (ref 4.2–5.3)
RBC #/AREA URNS HPF: ABNORMAL /HPF (ref 0–5)
SERVICE CMNT-IMP: ABNORMAL
SODIUM SERPL-SCNC: 142 MMOL/L (ref 136–145)
SP GR UR REFRACTOMETRY: 1.02 (ref 1–1.03)
UROBILINOGEN UR QL STRIP.AUTO: 0.2 EU/DL (ref 0.2–1)
VENTRICULAR RATE, ECG03: 64 BPM
WBC # BLD AUTO: 3.7 K/UL (ref 4.6–13.2)
WBC URNS QL MICRO: ABNORMAL /HPF (ref 0–4)

## 2018-01-22 PROCEDURE — 87493 C DIFF AMPLIFIED PROBE: CPT | Performed by: EMERGENCY MEDICINE

## 2018-01-22 PROCEDURE — 99284 EMERGENCY DEPT VISIT MOD MDM: CPT

## 2018-01-22 PROCEDURE — 96375 TX/PRO/DX INJ NEW DRUG ADDON: CPT

## 2018-01-22 PROCEDURE — 74011250636 HC RX REV CODE- 250/636: Performed by: EMERGENCY MEDICINE

## 2018-01-22 PROCEDURE — 74011000250 HC RX REV CODE- 250: Performed by: EMERGENCY MEDICINE

## 2018-01-22 PROCEDURE — 74011250637 HC RX REV CODE- 250/637: Performed by: EMERGENCY MEDICINE

## 2018-01-22 PROCEDURE — 85025 COMPLETE CBC W/AUTO DIFF WBC: CPT | Performed by: EMERGENCY MEDICINE

## 2018-01-22 PROCEDURE — 81001 URINALYSIS AUTO W/SCOPE: CPT | Performed by: EMERGENCY MEDICINE

## 2018-01-22 PROCEDURE — 96374 THER/PROPH/DIAG INJ IV PUSH: CPT

## 2018-01-22 PROCEDURE — 83690 ASSAY OF LIPASE: CPT | Performed by: EMERGENCY MEDICINE

## 2018-01-22 PROCEDURE — 93005 ELECTROCARDIOGRAM TRACING: CPT

## 2018-01-22 PROCEDURE — 96361 HYDRATE IV INFUSION ADD-ON: CPT

## 2018-01-22 PROCEDURE — 80053 COMPREHEN METABOLIC PANEL: CPT | Performed by: EMERGENCY MEDICINE

## 2018-01-22 RX ORDER — DIPHENOXYLATE HYDROCHLORIDE AND ATROPINE SULFATE 2.5; .025 MG/1; MG/1
1 TABLET ORAL
Status: COMPLETED | OUTPATIENT
Start: 2018-01-22 | End: 2018-01-22

## 2018-01-22 RX ORDER — DIPHENOXYLATE HYDROCHLORIDE AND ATROPINE SULFATE 2.5; .025 MG/1; MG/1
1 TABLET ORAL
Qty: 20 TAB | Refills: 0 | Status: SHIPPED | OUTPATIENT
Start: 2018-01-22 | End: 2018-09-12

## 2018-01-22 RX ORDER — METRONIDAZOLE 500 MG/1
500 TABLET ORAL
Status: COMPLETED | OUTPATIENT
Start: 2018-01-22 | End: 2018-01-22

## 2018-01-22 RX ORDER — ONDANSETRON 2 MG/ML
4 INJECTION INTRAMUSCULAR; INTRAVENOUS
Status: COMPLETED | OUTPATIENT
Start: 2018-01-22 | End: 2018-01-22

## 2018-01-22 RX ORDER — FAMOTIDINE 10 MG/ML
20 INJECTION INTRAVENOUS
Status: COMPLETED | OUTPATIENT
Start: 2018-01-22 | End: 2018-01-22

## 2018-01-22 RX ORDER — METRONIDAZOLE 500 MG/1
500 TABLET ORAL 3 TIMES DAILY
Qty: 30 TAB | Refills: 0 | Status: SHIPPED | OUTPATIENT
Start: 2018-01-22 | End: 2018-02-01

## 2018-01-22 RX ADMIN — ONDANSETRON 4 MG: 2 INJECTION INTRAMUSCULAR; INTRAVENOUS at 08:59

## 2018-01-22 RX ADMIN — METRONIDAZOLE 500 MG: 500 TABLET ORAL at 10:05

## 2018-01-22 RX ADMIN — FAMOTIDINE 20 MG: 10 INJECTION, SOLUTION INTRAVENOUS at 08:59

## 2018-01-22 RX ADMIN — DIPHENOXYLATE HYDROCHLORIDE AND ATROPINE SULFATE 1 TABLET: 2.5; .025 TABLET ORAL at 11:14

## 2018-01-22 RX ADMIN — SODIUM CHLORIDE 1000 ML: 900 INJECTION, SOLUTION INTRAVENOUS at 09:00

## 2018-01-22 NOTE — ED TRIAGE NOTES
\"I have reflux, diarrhea, and I'm nauseated. All this started last week but it got worst on Friday. \"

## 2018-01-22 NOTE — ED PROVIDER NOTES
EMERGENCY DEPARTMENT HISTORY AND PHYSICAL EXAM    Date: 1/22/2018  Patient Name: Yary Ozuna    History of Presenting Illness     Chief Complaint   Patient presents with    Nausea    Diarrhea    Vomiting         History Provided By: Patient    Chief Complaint: nausea, diarrhea and abdominal pain   Duration: 3 Days  Timing:  Constant  Location: abdominal  Quality: Aching  Severity: Moderate  Modifying Factors: no relief from Mylanta  Associated Symptoms: reduced appetite      Additional History (Context): Yary Ozuna is a 68 y.o. female with diabetes and hypertension who presents with 3 days of constant nausea, diarrhea and moderate aching abdominal pain with no relief from Mylanta along with a reduced appetite. Pt has history of GERD and states that her symptoms are consistent with past GERD flare ups. Pt states she is not on any daily medication for GERD and that she just comes to the ED when she has a flare up. Pt completed a course of ABX about three weeks ago. Pt denies vomiting, fevers, chest pain, sick close contacts, use of blood thinners. No other concerns or symptoms at this time. PCP: Lian Molina MD    Current Outpatient Prescriptions   Medication Sig Dispense Refill    metroNIDAZOLE (FLAGYL) 500 mg tablet Take 1 Tab by mouth three (3) times daily for 10 days. 30 Tab 0    diphenoxylate-atropine (LOMOTIL) 2.5-0.025 mg per tablet Take 1 Tab by mouth four (4) times daily as needed for Diarrhea (1 tab after each stool for max 8 per day). Max Daily Amount: 4 Tabs. Take after each stool for a maximum of 8 tablets daily 20 Tab 0    Hydrocolloid Dressing (DUODERM CGF DRESSING) 4 X 4 \" bndg 1 Patch by Apply Externally route Every Mon, Wed and Sat. 10 Each 1    cloNIDine HCl (CATAPRES) 0.2 mg tablet TK 1 T PO QHS FOR BLOOD PRESSURE  4    cyclobenzaprine (FLEXERIL) 10 mg tablet Take 1 Tab by mouth three (3) times daily as needed for Muscle Spasm(s).  21 Tab 0    metoprolol succinate (TOPROL-XL) 50 mg XL tablet TAKE 1 TABLET BY MOUTH DAILY 90 Tab 6    polyethylene glycol (MIRALAX) 17 gram/dose powder Take 17 g by mouth daily. 595 g 3    diclofenac (VOLTAREN) 1 % gel Apply 4 g to affected area four (4) times daily. 500 g 5    rosuvastatin (CRESTOR) 20 mg tablet TAKE 1 TABLET BY MOUTH EVERY NIGHT 90 Tab 3    amLODIPine (NORVASC) 10 mg tablet Take 1 Tab by mouth daily. 30 Tab 6    furosemide (LASIX) 40 mg tablet Take 20 mg by mouth daily.  valsartan (DIOVAN) 320 mg tablet Take 320 mg by mouth daily.  insulin lispro (HUMALOG) 100 unit/mL injection by SubCUTAneous route. 8 units before breakfast  6 units before lunch  10 units before dinner      insulin glargine (LANTUS SOLOSTAR) 100 unit/mL (3 mL) pen by SubCUTAneous route. 44 units nightly         Past History     Past Medical History:  Past Medical History:   Diagnosis Date    Allergic rhinitis     Diabetes ([address]     H/O seasonal allergies     Hypertension        Past Surgical History:  Past Surgical History:   Procedure Laterality Date    HX BACK SURGERY      HX CATARACT REMOVAL  2011    HX LUMBAR FUSION  2004    HX WISDOM TEETH EXTRACTION         Family History:  Family History   Problem Relation Age of Onset    Diabetes Maternal Aunt     Diabetes Maternal Uncle     Diabetes Maternal Grandfather     Diabetes Other        Social History:  Social History   Substance Use Topics    Smoking status: Former Smoker    Smokeless tobacco: Never Used    Alcohol use No       Allergies: Allergies   Allergen Reactions    Aspirin Other (comments)     Ringing in ears    Keflex [Cephalexin] Rash         Review of Systems   Review of Systems   Constitutional: Positive for appetite change (reduced). Negative for fever. Cardiovascular: Negative for chest pain. Gastrointestinal: Positive for abdominal pain, diarrhea and nausea. Negative for vomiting. All other systems reviewed and are negative.       Physical Exam     Vitals:    01/22/18 0815 01/22/18 0830 01/22/18 0845 01/22/18 1100   BP:   165/66 167/80   Pulse: 75 63  72   Resp: 16 17  16   Temp:       SpO2: 99% 98%  100%   Weight:         Physical Exam   Constitutional: She is oriented to person, place, and time. She appears well-developed and well-nourished. No distress. HENT:   Head: Normocephalic and atraumatic. Right Ear: External ear normal.   Left Ear: External ear normal.   Nose: Nose normal.   Dry oral mucosa    Eyes: Conjunctivae and EOM are normal. Pupils are equal, round, and reactive to light. No scleral icterus. Neck: Normal range of motion. Neck supple. No JVD present. No tracheal deviation present. No thyromegaly present. Cardiovascular: Normal rate, regular rhythm, normal heart sounds and intact distal pulses. Exam reveals no gallop and no friction rub. No murmur heard. Pulmonary/Chest: Effort normal and breath sounds normal. She exhibits no tenderness. Abdominal: Soft. Bowel sounds are normal. She exhibits no distension. There is tenderness. There is no rebound and no guarding. Periumbilical pain noted without guarding or mass    Musculoskeletal: Normal range of motion. She exhibits no edema or tenderness. Lymphadenopathy:     She has no cervical adenopathy. Neurological: She is alert and oriented to person, place, and time. No cranial nerve deficit. Coordination normal.   No sensory loss, Gait normal, Motor 5/5   Skin: Skin is warm and dry. Psychiatric: She has a normal mood and affect. Her behavior is normal. Judgment and thought content normal.   Nursing note and vitals reviewed. Diagnostic Study Results     Labs -   No results found for this or any previous visit (from the past 12 hour(s)). Radiologic Studies -   No orders to display     CT Results  (Last 48 hours)    None        CXR Results  (Last 48 hours)    None            Medical Decision Making   I am the first provider for this patient.     I reviewed the vital signs, available nursing notes, past medical history, past surgical history, family history and social history. Vital Signs-Reviewed the patient's vital signs. Pulse Oximetry Analysis - 99% on room air      EKG: Interpreted by the EP. Time Interpreted: 8:42 AM   Rate: 64   Rhythm: NSR   Interpretation: LVH, RBBB, T-wave inversion inferior/lateral leads, No STEMI    Comparison: unchanged from [id number]    Records Reviewed: Nursing Notes and Old Medical Records        Disposition:  DC    Follow-up Information     Follow up With Details Comments Contact Info    Manuel Garcia MD Go in 2 days For follow up [facility] [address] [place] [address] [address] [facility]      [facility] EMERGENCY DEPT Go to As needed, If symptoms worsen [address] [place] [id number]  [phone]          Discharge Medication List as of 1/22/2018 10:44 AM      START taking these medications    Details   metroNIDAZOLE (FLAGYL) 500 mg tablet Take 1 Tab by mouth three (3) times daily for 10 days. , Normal, Disp-30 Tab, R-0      diphenoxylate-atropine (LOMOTIL) 2.5-0.025 mg per tablet Take 1 Tab by mouth four (4) times daily as needed for Diarrhea (1 tab after each stool for max 8 per day). Max Daily Amount: 4 Tabs. Take after each stool for a maximum of 8 tablets daily, Print, Disp-20 Tab, R-0         CONTINUE these medications which have NOT CHANGED    Details   Hydrocolloid Dressing (DUODERM CGF DRESSING) 4 X 4 \" bndg 1 Patch by Apply Externally route Every Mon, Wed and Sat., Normal, Disp-10 Each, R-1      cloNIDine HCl (CATAPRES) 0.2 mg tablet TK 1 T PO QHS FOR BLOOD PRESSURE, Historical Med, R-4      cyclobenzaprine (FLEXERIL) 10 mg tablet Take 1 Tab by mouth three (3) times daily as needed for Muscle Spasm(s). , Normal, Disp-21 Tab, R-0      metoprolol succinate (TOPROL-XL) 50 mg XL tablet TAKE 1 TABLET BY MOUTH DAILY, Normal**Patient requests 90 days supply**Disp-90 Tab, R-6      polyethylene glycol (MIRALAX) 17 gram/dose powder Take 17 g by mouth daily. , Normal, Disp-595 g, R-3      diclofenac (VOLTAREN) 1 % gel Apply 4 g to affected area four (4) times daily. , Print, Disp-500 g, R-5      rosuvastatin (CRESTOR) 20 mg tablet TAKE 1 TABLET BY MOUTH EVERY NIGHT, Normal**Patient requests 90 days supply**Disp-90 Tab, R-3      amLODIPine (NORVASC) 10 mg tablet Take 1 Tab by mouth daily. , Print, Disp-30 Tab, R-6      furosemide (LASIX) 40 mg tablet Take 20 mg by mouth daily. , Historical Med      valsartan (DIOVAN) 320 mg tablet Take 320 mg by mouth daily. , Historical Med      insulin lispro (HUMALOG) 100 unit/mL injection by SubCUTAneous route. 8 units before breakfast  6 units before lunch  10 units before dinner, Historical Med      insulin glargine (LANTUS SOLOSTAR) 100 unit/mL (3 mL) pen by SubCUTAneous route. 44 units nightly, Historical Med         STOP taking these medications       trimethoprim-sulfamethoxazole (BACTRIM DS) 160-800 mg per tablet Comments:   Reason for Stopping:         levoFLOXacin (LEVAQUIN) 500 mg tablet Comments:   Reason for Stopping:         moxifloxacin (AVELOX) 400 mg tablet Comments:   Reason for Stopping:         acetaminophen-codeine (TYLENOL-CODEINE #3) 300-30 mg per tablet Comments:   Reason for Stopping:               Provider Notes (Medical Decision Making): Pt is a 68yo female with a hx of DM, HTN, chronic back pain, recent buttock infection with levaquin/bactrim given with completion approximately 2 weeks ago presents to the ED with complaint of diarrhea, nuasea, and abdominal pain without fever. She feels as if this is her gastritis/GERD but given her recent abx, hx of DM, will follow renal function, glucose, supportive care then reevaluate. Ramana Vallejo,  8:02 AM    10:12 AM  Pt labs positive for C.  Diff and labs suggest dehydration, will start pt on Flagil and Fomotal, pt will follow up with PCP and will be given instructions on handwashing and transmission prevention    Procedures:  Procedures      Diagnosis     Clinical Impression:   1. C. difficile colitis    2. Dehydration    3. Diarrhea of infectious origin        _______________________________    Attestations:  Scribe Attestation     Hector Kenyon acting as a scribe for and in the presence of Dionicio Lee MD      January 22, 2018 at 8:04 AM       Provider Attestation:      I personally performed the services described in the documentation, reviewed the documentation, as recorded by the scribe in my presence, and it accurately and completely records my words and actions.  January 22, 2018 at 8:04 AM - Dionicio Lee MD        _______________________________

## 2018-01-22 NOTE — DISCHARGE INSTRUCTIONS
Clostridium Difficile Colitis: Care Instructions  Your Care Instructions    Clostridium difficile (also called C. difficile) are bacteria that can cause swelling and irritation of the large intestine, or colon. This inflammation is also called colitis. It can cause diarrhea, fever, and belly cramps. You may get C. difficile colitis if you take antibiotics. The infection is most common in people who are taking antibiotics while in the hospital. It is also common in older people in hospitals and nursing homes. Severe disease could cause the colon to swell to many times its normal size (toxic megacolon). This can cause death and needs emergency treatment. You may have a swollen belly that is painful or tender, a rapid heartbeat, and a fever. Follow-up care is a key part of your treatment and safety. Be sure to make and go to all appointments, and call your doctor if you are having problems. It's also a good idea to know your test results and keep a list of the medicines you take. How can you care for yourself at home? · Your doctor may give you antibiotics to treat C. difficile colitis. If your doctor prescribes an antibiotic, he or she will give you a different antibiotic than the one that caused your infection. Take your antibiotics as directed. Do not stop taking them just because you feel better. You need to take the full course of antibiotics. · To prevent dehydration, drink plenty of fluids, enough so that your urine is light yellow or clear like water. Choose water and other caffeine-free clear liquids until you feel better. If you have kidney, heart, or liver disease and have to limit fluids, talk with your doctor before you increase the amount of fluids you drink. · Begin eating small amounts of mild foods, if you feel like it. Try yogurt that has live cultures of lactobacillus (check the label). ¨ Avoid spicy foods, fruits, alcohol, and caffeine until 48 hours after all symptoms go away.   ¨ Avoid chewing gum that contains sorbitol. ¨ Avoid dairy products (except for yogurt with lactobacillus) while you have diarrhea and for 3 days after symptoms go away. · To prevent the spread of C. difficile, practice good hygiene. Keep your hands clean by washing them well and often with soap and clean, running water. Alcohol-based hand sanitizers do not kill C. difficile. When should you call for help? Call 911 if:  ? · You passed out (lost consciousness). ?Call your doctor now or seek immediate medical care if:  ? · You have a fever over 101°F or shaking chills. ? · You feel lightheaded or have a fast heart rate. ? · You pass stools that are almost always bloody. ? · You have signs of needing more fluids. You have sunken eyes and a dry mouth, and you pass only a little dark urine. ? · You have severe belly pain with or without bloating. ? · You have severe vomiting and cannot keep down liquids. ? · You are not passing any stools or gas. ? Watch closely for changes in your health, and be sure to contact your doctor if:  ? · You do not get better as expected. Where can you learn more? Go to http://arlette-michel.info/. Enter [phone] in the search box to learn more about \"Clostridium Difficile Colitis: Care Instructions. \"  Current as of: March 3, 2017  Content Version: 11.4  © 3544-7767 [employer]. Care instructions adapted under license by [employer] (which disclaims liability or warranty for this information). If you have questions about a medical condition or this instruction, always ask your healthcare professional. [address] any warranty or liability for your use of this information.

## 2018-01-23 ENCOUNTER — PATIENT OUTREACH (OUTPATIENT)
Dept: FAMILY MEDICINE CLINIC | Age: 78
End: 2018-01-23

## 2018-01-23 NOTE — PROGRESS NOTES
Goals        Post ED     Establish PCP relationships and regularly scheduled appointments. Patient admitted to [facility] ED, 1/22/18 for nausea, diarrhea, and vomiting     Presenting symptoms:   \"I have reflux, diarrhea, and I'm nauseated. All this started last week but it got worst on Friday. \"  New medications/changes to current medications:  Flagyl for c-diff  Lomotil  ED utilization in past 6 months: 1    Contacted patient for ED follow up. Verified 2 patient identifiers. Introduced self, role and reason for call. Patient reports:  I'm feeling ok. I am a little weak because I'm just eating a bland diet as instructed until I see my doctor  Patient denies:  Abdominal pain, nausea, vomiting, or diarrhea  ADL's:  Feeds self: independently  Ambulates: independently    Self grooming: independently  Toileting: independently    DME:   None noted  Support:   Family and friends    Educated patient to monitor and report the following Red flags: severe abdominal pain or any new or concerning symptoms. Patient verbalized understanding of information discussed and is aware of  when to seek medical attention from PCP, urgent care or ED. Reviewed new medications or changes to previous medications and allergies reviewed. Instructed to bring all medications or list of medications with her to next appointment. Opportunity to ask questions was provided. Contact information was provided for future reference or further questions. Appointment(s):  Dr. Carissa Martell on 1/25/18 at [phone]  Patient aware. Friend will provide transportation.   Will continue to follow

## 2018-02-02 ENCOUNTER — OFFICE VISIT (OUTPATIENT)
Dept: FAMILY MEDICINE CLINIC | Age: 78
End: 2018-02-02

## 2018-02-02 VITALS
BODY MASS INDEX: 40.19 KG/M2 | DIASTOLIC BLOOD PRESSURE: 74 MMHG | OXYGEN SATURATION: 97 % | WEIGHT: 241.2 LBS | RESPIRATION RATE: 18 BRPM | SYSTOLIC BLOOD PRESSURE: 156 MMHG | TEMPERATURE: 98.9 F | HEART RATE: 78 BPM | HEIGHT: 65 IN

## 2018-02-02 DIAGNOSIS — R10.13 DYSPEPSIA: Primary | ICD-10-CM

## 2018-02-02 DIAGNOSIS — K59.00 CONSTIPATION, UNSPECIFIED CONSTIPATION TYPE: ICD-10-CM

## 2018-02-02 DIAGNOSIS — R11.0 NAUSEA: ICD-10-CM

## 2018-02-02 RX ORDER — ONDANSETRON 4 MG/1
4 TABLET, ORALLY DISINTEGRATING ORAL
Qty: 30 TAB | Refills: 0 | Status: SHIPPED | OUTPATIENT
Start: 2018-02-02 | End: 2018-09-12

## 2018-02-02 RX ORDER — DOCUSATE SODIUM 100 MG/1
100 CAPSULE, LIQUID FILLED ORAL 2 TIMES DAILY
Qty: 60 CAP | Refills: 2 | Status: SHIPPED | OUTPATIENT
Start: 2018-02-02 | End: 2018-05-03

## 2018-02-02 RX ORDER — FAMOTIDINE 40 MG/1
40 TABLET, FILM COATED ORAL DAILY
Qty: 30 TAB | Refills: 0 | Status: SHIPPED | OUTPATIENT
Start: 2018-02-02 | End: 2018-03-15 | Stop reason: SDUPTHER

## 2018-02-02 NOTE — PROGRESS NOTES
Chief Complaint   Patient presents with    Other     post ed visit       HPI:  Patient is a 66year old female presents today for follow up with GI related complaints. She was seen at [facility] ED on 1/22/18 with complaints of nausea, diarrhea, and abdominal pain and was diagnosed with C diff and was given Flagyl which she has finished taking. Diarrhea is resolved but she continues to have intermittent nausea, mild intermittent abdominal cramps, and heartburn, but denies vomiting, fever, or chills. She now has constipation as having BM Q 2 days. Past Medical History:   Diagnosis Date    Allergic rhinitis     Diabetes ([address]     H/O seasonal allergies     Hypertension      Allergies   Allergen Reactions    Aspirin Other (comments)     Ringing in ears    Keflex [Cephalexin] Rash     Current Outpatient Prescriptions   Medication Sig Dispense Refill    famotidine (PEPCID) 40 mg tablet Take 1 Tab by mouth daily. 30 Tab 0    ondansetron (ZOFRAN ODT) 4 mg disintegrating tablet Take 1 Tab by mouth every eight (8) hours as needed for Nausea. 30 Tab 0    docusate sodium (COLACE) 100 mg capsule Take 1 Cap by mouth two (2) times a day for 90 days. 60 Cap 2    cloNIDine HCl (CATAPRES) 0.2 mg tablet TK 1 T PO QHS FOR BLOOD PRESSURE  4    metoprolol succinate (TOPROL-XL) 50 mg XL tablet TAKE 1 TABLET BY MOUTH DAILY 90 Tab 6    rosuvastatin (CRESTOR) 20 mg tablet TAKE 1 TABLET BY MOUTH EVERY NIGHT 90 Tab 3    amLODIPine (NORVASC) 10 mg tablet Take 1 Tab by mouth daily. 30 Tab 6    furosemide (LASIX) 40 mg tablet Take 20 mg by mouth daily.  valsartan (DIOVAN) 320 mg tablet Take 320 mg by mouth daily.  insulin lispro (HUMALOG) 100 unit/mL injection by SubCUTAneous route. 8 units before breakfast  6 units before lunch  10 units before dinner      insulin glargine (LANTUS SOLOSTAR) 100 unit/mL (3 mL) pen by SubCUTAneous route.  44 units nightly      diphenoxylate-atropine (LOMOTIL) 2.5-0.025 mg per tablet Take 1 Tab by mouth four (4) times daily as needed for Diarrhea (1 tab after each stool for max 8 per day). Max Daily Amount: 4 Tabs. Take after each stool for a maximum of 8 tablets daily 20 Tab 0    Hydrocolloid Dressing (DUODERM CGF DRESSING) 4 X 4 \" bndg 1 Patch by Apply Externally route Every Mon, Wed and Sat. 10 Each 1    cyclobenzaprine (FLEXERIL) 10 mg tablet Take 1 Tab by mouth three (3) times daily as needed for Muscle Spasm(s). 21 Tab 0    polyethylene glycol (MIRALAX) 17 gram/dose powder Take 17 g by mouth daily. 595 g 3    diclofenac (VOLTAREN) 1 % gel Apply 4 g to affected area four (4) times daily. 500 g 5        ROS:  Pertinent as in HPI      Physical Exam:  Visit Vitals    /74 (BP 1 Location: Left arm, BP Patient Position: Sitting)    Pulse 78    Temp 98.9 °F (37.2 °C) (Oral)    Resp 18    Ht 5' 5\" (1.651 m)    Wt 241 lb 3.2 oz (109.4 kg)    SpO2 97%    BMI 40.14 kg/m2     General: a & o x 3, afebrile, well-nourished, interacting appropriately, in no acute distress  Respiratory: symmetrical chest expansion, lung sounds clear bilaterally  Cardiovascular: normal S1S2, regular rate and rhythm  Abdomen: non-distended, normoactive bowel sounds x 4 quadrants, soft, non-tender to palpation, no guarding or rigidity      Assessment/Plan:    ICD-10-CM ICD-9-CM    1. Dyspepsia R10.13 536.8 famotidine (PEPCID) 40 mg tablet   2. Nausea R11.0 787.02 ondansetron (ZOFRAN ODT) 4 mg disintegrating tablet   3. Constipation, unspecified constipation type K59.00 564.00 docusate sodium (COLACE) 100 mg capsule         Orders Placed This Encounter    famotidine (PEPCID) 40 mg tablet     Sig: Take 1 Tab by mouth daily. Dispense:  30 Tab     Refill:  0    ondansetron (ZOFRAN ODT) 4 mg disintegrating tablet     Sig: Take 1 Tab by mouth every eight (8) hours as needed for Nausea.      Dispense:  30 Tab     Refill:  0    docusate sodium (COLACE) 100 mg capsule     Sig: Take 1 Cap by mouth two (2) times a day for 90 days. Dispense:  60 Cap     Refill:  2         Review of records of recent ED visit was done by me      Additional Notes: Discussed today's diagnosis, treatment plans. Discussed medication indications and side effects. After Visit Summary: Discussed provided printed patient instructions. Answered questions accordingly.   Follow-up Disposition: As previously scheduled with PCP        Adeel Lopez DO, MPH  Internal Medicine

## 2018-02-02 NOTE — MR AVS SNAPSHOT
[address] [place] [address] 
 
 
 [address] [address] 
[phone] Patient: Toni Anderson MRN: [id number] Visit Information Date & Time Provider Department Dept. Phone Encounter #  
 2/2/2018  1:30  [address] [facility] [phone] [id number] Upcoming Health Maintenance Date Due  
 LIPID PANEL Q1 1940 DTaP/Tdap/Td series (1 - Tdap) 2/6/1961 ZOSTER VACCINE AGE 60> 12/6/1999 OSTEOPOROSIS SCREENING (DEXA) 2/6/2005 Pneumococcal 65+ Low/Medium Risk (1 of 2 - PCV13) 2/6/2005 MEDICARE YEARLY EXAM 2/6/2005 Influenza Age 5 to Adult 8/1/2017 HEMOGLOBIN A1C Q6M 10/21/2017 FOOT EXAM Q1 3/15/2018 MICROALBUMIN Q1 4/21/2018 EYE EXAM RETINAL OR DILATED Q1 9/12/2018 GLAUCOMA SCREENING Q2Y 9/12/2019 Allergies as of 2/2/2018  Review Complete On: 2/2/2018 By: Jerilee Gottron Severity Noted Reaction Type Reactions Aspirin  03/01/2017    Other (comments) Ringing in ears Keflex [Cephalexin]  03/01/2017    Rash Current Immunizations  Never Reviewed No immunizations on file. Not reviewed this visit You Were Diagnosed With   
  
 Codes Comments Dyspepsia    -  Primary ICD-10-CM: R10.13 ICD-9-CM: 536.8 Nausea     ICD-10-CM: R11.0 ICD-9-CM: 787.02 Constipation, unspecified constipation type     ICD-10-CM: K59.00 ICD-9-CM: 564.00 Vitals BP Pulse Temp Resp Height(growth percentile) Weight(growth percentile) 156/74 (BP 1 Location: Left arm, BP Patient Position: Sitting) 78 98.9 °F (37.2 °C) (Oral) 18 5' 5\" (1.651 m) 241 lb 3.2 oz (109.4 kg) SpO2 BMI OB Status Smoking Status 97% 40.14 kg/m2 Menopause Former Smoker BMI and BSA Data Body Mass Index Body Surface Area  
 40.14 kg/m 2 2.24 m 2 Preferred Pharmacy Pharmacy Name Phone  [phone] [facility] [address] [place] [address] [facility] [phone] Your Updated Medication List  
  
   
This list is accurate as of: 2/2/18  2:58 PM.  Always use your most recent med list. amLODIPine 10 mg tablet Commonly known as:  Arva Brazen Take 1 Tab by mouth daily. cloNIDine HCl 0.2 mg tablet Commonly known as:  CATAPRES TK 1 T PO QHS FOR BLOOD PRESSURE  
  
 cyclobenzaprine 10 mg tablet Commonly known as:  FLEXERIL Take 1 Tab by mouth three (3) times daily as needed for Muscle Spasm(s). diclofenac 1 % Gel Commonly known as:  VOLTAREN Apply 4 g to affected area four (4) times daily. DIOVAN 320 mg tablet Generic drug:  valsartan Take 320 mg by mouth daily. diphenoxylate-atropine 2.5-0.025 mg per tablet Commonly known as:  LOMOTIL Take 1 Tab by mouth four (4) times daily as needed for Diarrhea (1 tab after each stool for max 8 per day). Max Daily Amount: 4 Tabs. Take after each stool for a maximum of 8 tablets daily HumaLOG 100 unit/mL injection Generic drug:  insulin lispro  
by SubCUTAneous route. 8 units before breakfast 6 units before lunch 10 units before dinner Hydrocolloid Dressing 4 X 4 \" Bndg Commonly known as:  DUODERM CGF DRESSING  
1 Patch by Apply Externally route Every Mon, Wed and Sat. LANTUS SOLOSTAR 100 unit/mL (3 mL) Inpn Generic drug:  insulin glargine  
by SubCUTAneous route. 44 units nightly LASIX 40 mg tablet Generic drug:  furosemide Take 20 mg by mouth daily. metoprolol succinate 50 mg XL tablet Commonly known as:  TOPROL-XL  
TAKE 1 TABLET BY MOUTH DAILY polyethylene glycol 17 gram/dose powder Commonly known as:  Rohan Maynor Take 17 g by mouth daily. rosuvastatin 20 mg tablet Commonly known as:  CRESTOR  
TAKE 1 TABLET BY MOUTH EVERY NIGHT Patient Instructions Indigestion (Dyspepsia or Heartburn): Care Instructions Your Care Instructions Sometimes it can be hard to pinpoint the cause of indigestion. (It is also called dyspepsia or heartburn.) Most cases of an upset stomach with bloating, burning, burping, and nausea are minor and go away within several hours. Home treatment and over-the-counter medicine often are able to control symptoms. But if you take medicine to relieve your indigestion without making diet and lifestyle changes, your symptoms are likely to return again and again. If you get indigestion often, it may be a sign of a more serious medical problem. Be sure to follow up with your doctor, who may want to do tests to be sure of the cause of your indigestion. Follow-up care is a key part of your treatment and safety. Be sure to make and go to all appointments, and call your doctor if you are having problems. It's also a good idea to know your test results and keep a list of the medicines you take. How can you care for yourself at home? · Your doctor may recommend over-the-counter medicine. For mild or occasional indigestion, antacids such as Gaviscon, Mylanta, Maalox, or Tums, may help. Be safe with medicines. Be careful when you take over-the-counter antacid medicines. Many of these medicines have aspirin in them. Read the label to make sure that you are not taking more than the recommended dose. Too much aspirin can be harmful. · Your doctor also may recommend over-the-counter acid reducers, such as Pepcid AC, Tagamet HB, Zantac 75, or Prilosec. Read and follow all instructions on the label. If you use these medicines often, talk with your doctor. · Change your eating habits. ¨ It's best to eat several small meals instead of two or three large meals. ¨ After you eat, wait 2 to 3 hours before you lie down. ¨ Chocolate, mint, and alcohol can make GERD worse. ¨ Spicy foods, foods that have a lot of acid (like tomatoes and oranges), and coffee can make GERD symptoms worse in some people.  If your symptoms are worse after you eat a certain food, you may want to stop eating that food to see if your symptoms get better. · Do not smoke or chew tobacco. Smoking can make GERD worse. If you need help quitting, talk to your doctor about stop-smoking programs and medicines. These can increase your chances of quitting for good. · If you have GERD symptoms at night, raise the head of your bed 6 to 8 inches. You can do this by putting the frame on blocks or placing a foam wedge under the head of your mattress. (Adding extra pillows does not work.) · Do not wear tight clothing around your middle. · Lose weight if you need to. Losing just 5 to 10 pounds can help. · Do not take anti-inflammatory medicines, such as aspirin, ibuprofen (Advil, Motrin), or naproxen (Aleve). These can irritate the stomach. If you need a pain medicine, try acetaminophen (Tylenol), which does not cause stomach upset. When should you call for help? Call your doctor now or seek immediate medical care if: 
? · You have new or worse belly pain. ? · You are vomiting. ? Watch closely for changes in your health, and be sure to contact your doctor if: 
? · You have new or worse symptoms of indigestion. ? · You have trouble or pain swallowing. ? · You are losing weight. ? · You do not get better as expected. Where can you learn more? Go to http://arlette-michel.info/. Enter K796 in the search box to learn more about \"Indigestion (Dyspepsia or Heartburn): Care Instructions. \" Current as of: May 12, 2017 Content Version: 11.4 © 6626-0387 [employer]. Care instructions adapted under license by [employer] (which disclaims liability or warranty for this information). If you have questions about a medical condition or this instruction, always ask your healthcare professional. [address] any warranty or liability for your use of this information. Introducing [facility]! Ramesh Caro introduces Aud[employer] patient portal. Now you can access parts of your medical record, email your doctor's office, and request medication refills online. 1. In your internet browser, go to [email or web address] 2. Click on the First Time User? Click Here link in the Sign In box. You will see the New Member Sign Up page. 3. Enter your Aud[employer] Access Code exactly as it appears below. You will not need to use this code after youve completed the sign-up process. If you do not sign up before the expiration date, you must request a new code. · [employer] Access Code: [email or web address] Expires: 3/7/2018  3:15 PM 
 
4. Enter the last four digits of your Social Security Number (xxxx) and Date of Birth (mm/dd/yyyy) as indicated and click Submit. You will be taken to the next sign-up page. 5. Create a Audionamix ID. This will be your Aud[employer]ix login ID and cannot be changed, so think of one that is secure and easy to remember. 6. Create a [employer] password. You can change your password at any time. 7. Enter your Password Reset Question and Answer. This can be used at a later time if you forget your password. 8. Enter your e-mail address. You will receive e-mail notification when new information is available in [address] 9. Click Sign Up. You can now view and download portions of your medical record. 10. Click the Download Summary menu link to download a portable copy of your medical information. If you have questions, please visit the Frequently Asked Questions section of the [employer] website. Remember, Audion[employer]ix is NOT to be used for urgent needs. For medical emergencies, dial 911. Now available from your iPhone and Android! Please provide this summary of care documentation to your next provider. Your primary care clinician is listed as [address]. If you have any questions after today's visit, please call [phone].

## 2018-02-02 NOTE — PROGRESS NOTES
aYra Skinner is a  68 y.o. female presents today for office visit for routine follow up. 1. Have you been to the ER, urgent care clinic or hospitalized since your last visit? YES, MME 1-     2. Have you seen or consulted any other health care providers outside of the [address] [place] [address] since your last visit (Include any pap smears or colon screening)?  NO

## 2018-02-02 NOTE — PATIENT INSTRUCTIONS
Indigestion (Dyspepsia or Heartburn): Care Instructions  Your Care Instructions  Sometimes it can be hard to pinpoint the cause of indigestion. (It is also called dyspepsia or heartburn.) Most cases of an upset stomach with bloating, burning, burping, and nausea are minor and go away within several hours. Home treatment and over-the-counter medicine often are able to control symptoms. But if you take medicine to relieve your indigestion without making diet and lifestyle changes, your symptoms are likely to return again and again. If you get indigestion often, it may be a sign of a more serious medical problem. Be sure to follow up with your doctor, who may want to do tests to be sure of the cause of your indigestion. Follow-up care is a key part of your treatment and safety. Be sure to make and go to all appointments, and call your doctor if you are having problems. It's also a good idea to know your test results and keep a list of the medicines you take. How can you care for yourself at home? · Your doctor may recommend over-the-counter medicine. For mild or occasional indigestion, antacids such as Gaviscon, Mylanta, Maalox, or Tums, may help. Be safe with medicines. Be careful when you take over-the-counter antacid medicines. Many of these medicines have aspirin in them. Read the label to make sure that you are not taking more than the recommended dose. Too much aspirin can be harmful. · Your doctor also may recommend over-the-counter acid reducers, such as Pepcid AC, Tagamet HB, Zantac 75, or Prilosec. Read and follow all instructions on the label. If you use these medicines often, talk with your doctor. · Change your eating habits. ¨ It's best to eat several small meals instead of two or three large meals. ¨ After you eat, wait 2 to 3 hours before you lie down. ¨ Chocolate, mint, and alcohol can make GERD worse.   ¨ Spicy foods, foods that have a lot of acid (like tomatoes and oranges), and coffee can make GERD symptoms worse in some people. If your symptoms are worse after you eat a certain food, you may want to stop eating that food to see if your symptoms get better. · Do not smoke or chew tobacco. Smoking can make GERD worse. If you need help quitting, talk to your doctor about stop-smoking programs and medicines. These can increase your chances of quitting for good. · If you have GERD symptoms at night, raise the head of your bed 6 to 8 inches. You can do this by putting the frame on blocks or placing a foam wedge under the head of your mattress. (Adding extra pillows does not work.)  · Do not wear tight clothing around your middle. · Lose weight if you need to. Losing just 5 to 10 pounds can help. · Do not take anti-inflammatory medicines, such as aspirin, ibuprofen (Advil, Motrin), or naproxen (Aleve). These can irritate the stomach. If you need a pain medicine, try acetaminophen (Tylenol), which does not cause stomach upset. When should you call for help? Call your doctor now or seek immediate medical care if:  ? · You have new or worse belly pain. ? · You are vomiting. ? Watch closely for changes in your health, and be sure to contact your doctor if:  ? · You have new or worse symptoms of indigestion. ? · You have trouble or pain swallowing. ? · You are losing weight. ? · You do not get better as expected. Where can you learn more? Go to http://arlette-michel.info/. Enter N678 in the search box to learn more about \"Indigestion (Dyspepsia or Heartburn): Care Instructions. \"  Current as of: May 12, 2017  Content Version: 11.4  © 1390-2921 [employer]. Care instructions adapted under license by [employer] (which disclaims liability or warranty for this information).  If you have questions about a medical condition or this instruction, always ask your healthcare professional. [address] any warranty or liability for your use of this information.

## 2018-02-16 ENCOUNTER — OFFICE VISIT (OUTPATIENT)
Dept: ORTHOPEDIC SURGERY | Age: 78
End: 2018-02-16

## 2018-02-16 VITALS
WEIGHT: 237.6 LBS | TEMPERATURE: 98.4 F | HEART RATE: 71 BPM | HEIGHT: 65 IN | OXYGEN SATURATION: 97 % | DIASTOLIC BLOOD PRESSURE: 82 MMHG | BODY MASS INDEX: 39.58 KG/M2 | SYSTOLIC BLOOD PRESSURE: 181 MMHG

## 2018-02-16 DIAGNOSIS — M70.62 TROCHANTERIC BURSITIS OF LEFT HIP: ICD-10-CM

## 2018-02-16 DIAGNOSIS — M48.07 SPINAL STENOSIS OF LUMBOSACRAL REGION: Primary | ICD-10-CM

## 2018-02-16 RX ORDER — BETAMETHASONE SODIUM PHOSPHATE AND BETAMETHASONE ACETATE 3; 3 MG/ML; MG/ML
6 INJECTION, SUSPENSION INTRA-ARTICULAR; INTRALESIONAL; INTRAMUSCULAR; SOFT TISSUE ONCE
Qty: 1 ML | Refills: 0
Start: 2018-02-16 | End: 2018-02-16

## 2018-02-16 NOTE — PROGRESS NOTES
[address] [facility], [address] [facility]  [phone]           Patient: Eleazar Chandra                MRN: [id number]       SSN: [id number]  YOB: 1940        AGE: 66 y.o. SEX: female  Body mass index is 39.54 kg/(m^2). PCP: Quique Stevens MD  02/16/18      This office note has been dictated. REVIEW OF SYSTEMS:  Constitutional: Negative for fever, chills, weight loss and malaise/fatigue. HENT: Negative. Eyes: Negative. Respiratory: Negative. Cardiovascular: Negative. Gastrointestinal: No bowel incontinence or constipation. Genitourinary: No bladder incontinence or saddle anesthesia. Skin: Negative. Neurological: Negative. Endo/Heme/Allergies: Negative. Psychiatric/Behavioral: Negative. Musculoskeletal: As per HPI above. Past Medical History:   Diagnosis Date    Allergic rhinitis     Diabetes ([facility] [address]     H/O seasonal allergies     Hypertension          Current Outpatient Prescriptions:     famotidine (PEPCID) 40 mg tablet, Take 1 Tab by mouth daily. , Disp: 30 Tab, Rfl: 0    ondansetron (ZOFRAN ODT) 4 mg disintegrating tablet, Take 1 Tab by mouth every eight (8) hours as needed for Nausea., Disp: 30 Tab, Rfl: 0    docusate sodium (COLACE) 100 mg capsule, Take 1 Cap by mouth two (2) times a day for 90 days. , Disp: 60 Cap, Rfl: 2    diphenoxylate-atropine (LOMOTIL) 2.5-0.025 mg per tablet, Take 1 Tab by mouth four (4) times daily as needed for Diarrhea (1 tab after each stool for max 8 per day). Max Daily Amount: 4 Tabs.  Take after each stool for a maximum of 8 tablets daily, Disp: 20 Tab, Rfl: 0    Hydrocolloid Dressing (DUODERM CGF DRESSING) 4 X 4 \" bndg, 1 Patch by Apply Externally route Every Mon, Wed and Sat., Disp: 10 Each, Rfl: 1    cloNIDine HCl (CATAPRES) 0.2 mg tablet, TK 1 T PO QHS FOR BLOOD PRESSURE, Disp: , Rfl: 4    metoprolol succinate (TOPROL-XL) 50 mg XL tablet, TAKE 1 TABLET BY MOUTH DAILY, Disp: 90 Tab, Rfl: 6    polyethylene glycol (MIRALAX) 17 gram/dose powder, Take 17 g by mouth daily. , Disp: 595 g, Rfl: 3    rosuvastatin (CRESTOR) 20 mg tablet, TAKE 1 TABLET BY MOUTH EVERY NIGHT, Disp: 90 Tab, Rfl: 3    amLODIPine (NORVASC) 10 mg tablet, Take 1 Tab by mouth daily. , Disp: 30 Tab, Rfl: 6    furosemide (LASIX) 40 mg tablet, Take 20 mg by mouth daily. , Disp: , Rfl:     valsartan (DIOVAN) 320 mg tablet, Take 320 mg by mouth daily. , Disp: , Rfl:     insulin lispro (HUMALOG) 100 unit/mL injection, by SubCUTAneous route. 8 units before breakfast 6 units before lunch 10 units before dinner, Disp: , Rfl:     insulin glargine (LANTUS SOLOSTAR) 100 unit/mL (3 mL) pen, by SubCUTAneous route. 44 units nightly, Disp: , Rfl:     cyclobenzaprine (FLEXERIL) 10 mg tablet, Take 1 Tab by mouth three (3) times daily as needed for Muscle Spasm(s). , Disp: 21 Tab, Rfl: 0    diclofenac (VOLTAREN) 1 % gel, Apply 4 g to affected area four (4) times daily. , Disp: 500 g, Rfl: 5    Allergies   Allergen Reactions    Aspirin Other (comments)     Ringing in ears    Keflex [Cephalexin] Rash       Social History     Social History    Marital status:      Spouse name: N/A    Number of children: N/A    Years of education: N/A     Occupational History    CNA - RET      Social History Main Topics    Smoking status: Former Smoker    Smokeless tobacco: Never Used    Alcohol use No    Drug use: No    Sexual activity: Yes     Partners: Male     Birth control/ protection: None     Other Topics Concern    Not on file     Social History Narrative       Past Surgical History:   Procedure Laterality Date    HX BACK SURGERY      HX CATARACT REMOVAL  2011    HX LUMBAR FUSION  2004    HX WISDOM TEETH EXTRACTION             * Patient was identified by name and date of birth   * Agreement on procedure being performed was verified  * Risks and Benefits explained to the patient  * Procedure site verified and marked as necessary  * Patient was positioned for comfort  * Consent was signed and verified  2:15 PM    The patient was instructed on post injection care. We did see Ms. Vinson for followup with regards to complaints of left hip pain, as well as some thigh pain and low back pain. The patient does have a history of trochanteric bursitis. She has had injections in the past, which worked well for her. She is requesting an injection for the left hip today. She also reports having some low back troubles. She does have a history of low back surgery. She has some discomfort into her thigh when she is up and ambulating or standing at the kitchen sink for a long period of time. She has had no change in her bowel or bladder habits. She has no pain beyond the knees. I sent her to Dr. Yung Zaman of Plastic Surgery to evaluate the eschar on her right buttocks. She did not recommend surgical intervention, continuation with DuoDerm patches, which she does. She has had no trouble with the wound itself. PHYSICAL EXAMINATION:  In general, the patient is alert and oriented x 3 in no acute distress. The patient is well-developed, well-nourished, with a normal affect. The patient is afebrile. HEENT:  Head is normocephalic and atraumatic. Pupils are equally round and reactive to light and accommodation. Extraocular eye movements are intact. Neck is supple. Trachea is midline. No JVD is present. Breathing is nonlabored. Examination of the lower extremities reveals pain-free range of motion of the hips. On the right side, the eschar is a bit smaller. There is no surrounding erythema, no underlying fluctuance, and no There are no signs for infection or cellulitis present. There is no pain with rotation of the hip. Down the left side, she has good range of motion of the hip. There is no pain to palpation of the greater trochanteric bursa.   She does have some pain to the lower lumbar spine to the left sciatic notch. There is negative straight leg raise. There is negative calf tenderness. There is negative Jean Claudes. There is no evidence of DVT present. ASSESSMENT:      1. Lumbar spinal stenosis. 2. Left hip trochanteric bursitis. 3. Right hip wound improved. PLAN:  At this point, she will continue with her recommendations per Dr. Ng Elmo with regards to her right hip. With regards to the left hip, we are going to move forward with a cortisone injection for the left hip today. Under aseptic conditions, after informed and written consent, and appropriate time out performed, with ultrasound-guided assistance, the left hip was prepped with Betadine and 6 mg of Celestone was injected the trochanteric bursal region without complications. The patient tolerated the injection well. The patient was instructed on post injection care. We are going to get her set up for a low back program for the lumbar spine to see if we can improve her symptomatology. If not, a referral will be done to The [address] [place] [address] for further evaluation and treatment. She will call with any questions or concerns that shall arise.                         JR Juan Luis LEE, PA-C, ATC

## 2018-02-21 ENCOUNTER — APPOINTMENT (OUTPATIENT)
Dept: PHYSICAL THERAPY | Age: 78
End: 2018-02-21

## 2018-02-22 ENCOUNTER — HOSPITAL ENCOUNTER (OUTPATIENT)
Dept: PHYSICAL THERAPY | Age: 78
Discharge: HOME | End: 2018-02-22
Payer: MEDICARE

## 2018-02-22 PROCEDURE — G8978 MOBILITY CURRENT STATUS: HCPCS

## 2018-02-22 PROCEDURE — 97162 PT EVAL MOD COMPLEX 30 MIN: CPT

## 2018-02-22 PROCEDURE — G8979 MOBILITY GOAL STATUS: HCPCS

## 2018-02-22 PROCEDURE — 97110 THERAPEUTIC EXERCISES: CPT

## 2018-02-22 NOTE — PROGRESS NOTES
PT DAILY TREATMENT NOTE/LUMBAR EVAL 3-16    Patient Name: Suleman Larson  Date:2018  : 1940  [x]  Patient  Verified  Payor: Khloe Springws / Plan: [place] MEDICARE PART A & B / Product Type: Medicare /    In time:1110  Out time:1200  Total Treatment Time (min): 50  Total Timed Codes (min): 25  1:1 Treatment Time ( [address] only): 50   Visit #: 1 of 10-12    Treatment Area: Pain in left hip [M25.552]  Trochanteric bursitis, left hip [M70.62]  Spinal stenosis, lumbosacral region [M48.07]  SUBJECTIVE  Pain Level (0-10 scale): 5-6/10  []constant []intermittent []improving []worsening []no change since onset    Any medication changes, allergies to medications, adverse drug reactions, diagnosis change, or new procedure performed?: [x] No    [] Yes (see summary sheet for update)  Subjective functional status/changes:     PLOF: Active in community  Limitations to PLOF: ambulation, stairs, standing  Mechanism of Injury: dx right hip in dec now everything . Now left hip hurts increases with walking, Get tired fast.   Current symptoms/Complaints: Pain is constant but when in recliner it decreases. No falls. Left hip radiating sx up to knee. No numbness or tingling only pain. LS Fusion . Cortisone shot on the left decreases pain.     Previous Treatment/Compliance: na  PMHx/Surgical Hx: na  Work Hx: retired  Living Situation: Independent  Pt Goals: decr pain  Barriers: []pain []financial []time []transportation []other  Motivation: yes  Substance use: []Alcohol []Tobacco []other:   FABQ Score: []low []elevate  Cognition: A & O x 4    Other:    OBJECTIVE/EXAMINATION  Domestic Life: independent  Activity/Recreational Limitations: pain  Mobility: limited   Self Care: indep        Modality rationale: decrease pain to improve the patients ability to ease with ADLs   Min Type Additional Details    [] Estim:  []Unatt       []IFC  []Premod                        []Other:  []w/ice   []w/heat  Position:  Location:    [] Estim: []Att []TENS instruct  []NMES                    []Other:  []w/US   []w/ice   []w/heat  Position:  Location:    []  Traction: [] Cervical       []Lumbar                       [] Prone          []Supine                       []Intermittent   []Continuous Lbs:  [] before manual  [] after manual    []  Ultrasound: []Continuous   [] Pulsed                           []1MHz   []3MHz Location:  W/cm2:    []  Iontophoresis with dexamethasone         Location: [] Take home patch   [] In clinic   10 []  Ice     [x]  heat  []  Ice massage  []  Laser   []  Anodyne Position:supine  Location: left hip. ITband    []  Laser with stim  []  Other: Position:  Location:    []  Vasopneumatic Device Pressure:       [] lo [] med [] hi   Temperature: [] lo [] med [] hi   [] Skin assessment post-treatment:  []intact []redness- no adverse reaction    []redness - adverse reaction:     25 min []Eval                  []Re-Eval       25 min Therapeutic Exercise:  [] See flow sheet :   Rationale: increase ROM to improve the patients ability to ease with ambulation          With   [] TE   [] TA   [] neuro   [] other: Patient Education: [x] Review HEP    [] Progressed/Changed HEP based on:   [] positioning   [] body mechanics   [] transfers   [] heat/ice application    [] other:      Other Objective/Functional Measures: negative scour test, positive VIRGILIO test on left, tight hamstrings L>R    Physical Therapy Evaluation - Lumbar Spine ([facility])    SUBJECTIVE  Chief Complaint:left hip pain radiates to lrft thigh, decreased sensation at L4-L5    Mechanism of injury:none    Symptoms:  Aggravated by:   [] Bending [x] Sitting [x] Standing [x] Walking   [x] Moving [] Cough [] Sneeze [] Valsalva   [] AM  [] PM  Lying:  [] sup   [] pro   [] sidelying   [] Other:     Eased by:    [] Bending [] Sitting [] Standing [] Walking   [] Moving [] AM  [] PM  Lying: [x] sup  [] pro  [] sidelying   [x] Other:laying reclined     General Health:  Red Flags Indicated?  [] Yes    [] No  [] Yes [x] No Recent weight change (If yes, due to dieting?  [] Yes  [] No)   [x] Yes [] No Weakness in legs during walking  [] Yes [x] No Unremitting pain at night  [] Yes [x] No Abdominal pain or problems  [] Yes [x] No Rectal bleeding  [] Yes [x] No Feet more cold or painful in cold weather  [] Yes [x] No Menstrual irregularities  [] Yes [x] No Blood or pain with urination  [] Yes [x] No Dysfunction of bowel or bladder  [] Yes [x] No Recent illness within past 3 weeks (i.e, cold, flu)  [] Yes [x] No Numbness/tingling in buttock/genitalia region    Past History/Treatments:     Diagnostic Tests: [] Lab work [] X-rays    [] CT [] MRI     [] Other:  Results:    Functional Status  Prior level of function:Daily community ambulator  Present functional limitations:limi  What position do you sleep in?:    OBJECTIVE  Posture:  Lateral Shift: [] R    [] L     [] +  [] -  Kyphosis: [] Increased [] Decreased   []  WNL  Lordosis:  [] Increased [] Decreased   [] WNL  Pelvic symmetry: [] WNL    [] Other:    Gait:  [] Normal     [] Abnormal:    Active Movements: [] N/A   [] Too acute   [] Other:  ROM % AROM % PROM Comments:pain, area   Forward flexion 40-60 50%     Extension 20-30 50%     SB right 20-30      SB left 20-30      Rotation right 5-10      Rotation left 5-10        Repeated Movements   Effects on present pain: produces (NC), abolishes (A), increases (incr), decreases (decr), centralizes (C), peripheral (PH), no effect (NE)   Pre-Test Sx Flexion Repeated Flexion Extension Repeated Extension Repeated SBL Repeated SBR   Sitting          Standing          Lying  dec dec   N/A N/A   Comments:  Side Glide:  Sustained passive positioning test:    Neuro Screen [] WNL  Myotome/Dermatome/Reflexes:  Comments:    Dural Mobility:  SLR Sitting: [] R    [] L    [] +    [] -  @ (degrees):           Supine: [] R    [] L    [] +    [] -  @ (degrees):   Slump Test: [] R    [] L    [] +    [] -  @ (degrees):   Prone Knee Bend: [] R    [] L    [] +    [] -     Palpation  [] Min  [] Mod  [] Severe    Location:  [] Min  [] Mod  [] Severe    Location:  [] Min  [] Mod  [] Severe    Location:    Strength   L(0-5) R (0-5) N/T   Hip Flexion (L1,2)   []   Knee Extension (L3,4)   []   Ankle Dorsiflexion (L4)   []   Great Toe Extension (L5)   []   Ankle Plantarflexion (S1)   []   Knee Flexion (S1,2)   []   Upper Abdominals   []   Lower Abdominals   []   Paraspinals   []   Back Rotators   []   Gluteus Wolfgang   []   Other   []     Special Tests  Sacroilliac:  Gaenslen's: [] R    [] L    [] +    [] -     Compression: [] +    [] -     Gapping:  [] +    [] -     Thigh Thrust: [] R    [] L    [] +    [] -     Leg Length: [] +    [] -   Position:    Crests:    ASIS:    PSIS:    Sacral Sulcus:    Mobility: Standing flex:     Sitting flex:     Supine to sit:     Prone knee bend:         Hip: Felisa Maid:  [] R    [x] L    [x] +    [] -     Scour:  [] R    [x] L    [] +    [x] -     Piriformis: [] R    [x] L    [x] +    [] -          Deficits: Amaya's: [] R    [] L    [] +    [] -     Sulaiman: [] R    [] L    [] +    [] -     Hamstrings 90/90: dec    Gastrocsoleus (to neutral): Right: Left:       Global Muscular Weakness:  Abdominals:global weakness    Other tests/comments:       Pain Level (0-10 scale) post treatment: 4/10    ASSESSMENT/Changes in Function: see poc    Patient will continue to benefit from skilled PT services to modify and progress therapeutic interventions, address functional mobility deficits, address ROM deficits, address strength deficits, analyze and address soft tissue restrictions, analyze and modify body mechanics/ergonomics and assess and modify postural abnormalities to attain remaining goals.      [x]  See Plan of Care  []  See progress note/recertification  []  See Discharge Summary         Progress towards goals / Updated goals:  See POC    PLAN  []  Upgrade activities as tolerated     [x]  Continue plan of care  []  Update interventions per flow sheet       []  Discharge due to:_  []  Other:_      Raleigh Barriga, PT 2/22/2018  11:09 AM

## 2018-02-22 NOTE — PROGRESS NOTES
[facility]  [address] [place] [address]  [phone] fax    Plan of Care/ Statement of Necessity for Physical Therapy Services    Patient name: Bernarda Crigler Start of Care: 2018   Referral source: Shahram Thurman : 1940    Medical Diagnosis: Pain in left hip [M25.552]  Trochanteric bursitis, left hip [M70.62]  Spinal stenosis, lumbosacral region [M48.07]   Onset Date:2 months    Treatment Diagnosis: Left Hip bursitis and LS Pain   Prior Hospitalization: see medical history Provider#: [id number]   Medications: Verified on Patient summary List    Comorbidities: diabetes, HBP, Arthritis. Prior Level of Function: Independent. LS fusion 2004      The Plan of Care and following information is based on the information from the initial evaluation. Assessment/ key information: Pt is a 66 yr old female with insidious onset of Left Hip pain and LS Pain. Pt had a Cortisone injection to her left hip recently that decreased the pain tremendously. Today, she reports 5-6/10 pain and palpable tenderness along the left GT, Left ITband (distal and proximal). Left HS and quad tightness as well. She denies calf pain. The pain also radiates into her left thigh, anteriorly. Scour test is negative, SLR test negative. There is tightness to passive IR. Pt has multiple trigger points along the left distal to proximal IT band. She reports the pain decreases when she is in a reclined position. The pain increases with ambulation, sit to stand transfers, Stairs etc. She ambulates with an antalgic gait with  decreased weight bearing tolerance on the LLE mostly. Pt will benefit from skilled therapy to improve hip pain, improve strength, and improve with ambulation tolerance to return to ADL's and improve functional limitations.     Evaluation Complexity History MEDIUM  Complexity : 1-2 comorbidities / personal factors will impact the outcome/ POC ; Examination MEDIUM Complexity : 3 Standardized tests and measures addressing body structure, function, activity limitation and / or participation in recreation  ;Presentation MEDIUM Complexity : Evolving with changing characteristics  ; Clinical Decision Making MEDIUM Complexity : FOTO score of 26-74  Overall Complexity Rating: MEDIUM  Problem List: pain affecting function, decrease ROM, decrease strength, impaired gait/ balance, decrease ADL/ functional abilitiies, decrease activity tolerance, decrease flexibility/ joint mobility and decrease transfer abilities   Treatment Plan may include any combination of the following: Therapeutic exercise, Therapeutic activities, Neuromuscular re-education, Physical agent/modality, Gait/balance training, Manual therapy, Patient education, Self Care training, Functional mobility training, Home safety training and Stair training  Patient / Family readiness to learn indicated by: asking questions, trying to perform skills and interest  Persons(s) to be included in education: patient (P)  Barriers to Learning/Limitations: None  Patient Goal (s): To feel Better.   Patient Self Reported Health Status: good  Rehabilitation Potential: good    Short Term Goals: To be accomplished in 1 weeks:   1. Pt will be compliant with a HEP to improve left hip and LS function. Long Term Goals: To be accomplished in 6 weeks:   1. Pt will increase FOTO score by 18 pts to improve hip function. 2. Pt will decrease pain to <3/10 to ease with ADL's.   3. Pt will increase ambulation tolerance to 10 mins for house chores and grocery shopping. Frequency / Duration: Patient to be seen 2 times per week for 6 weeks.     Patient/ Caregiver education and instruction: Diagnosis, prognosis, activity modification and exercises   [x]  Plan of care has been reviewed with PTA    G-Codes (GP)  Mobility   Current  CL= 60-79%   Goal  CK= 40-59%    The severity rating is based on clinical judgment and the [facility] score.    Certification Period: 2/22/18-5/21/18  Miguel Rowe, PT 2/22/2018 11:53 AM    ________________________________________________________________________    I certify that the above Therapy Services are being furnished while the patient is under my care. I agree with the treatment plan and certify that this therapy is necessary.     Physician's Signature:____________________  Date:____________Time: _________    Please sign and return to [facility]  [address] [place] [address]  [phone] fax

## 2018-02-27 ENCOUNTER — HOSPITAL ENCOUNTER (OUTPATIENT)
Dept: PHYSICAL THERAPY | Age: 78
Discharge: HOME | End: 2018-02-27
Payer: MEDICARE

## 2018-02-27 PROCEDURE — 97140 MANUAL THERAPY 1/> REGIONS: CPT

## 2018-02-27 PROCEDURE — 97110 THERAPEUTIC EXERCISES: CPT

## 2018-02-27 NOTE — PROGRESS NOTES
PT DAILY TREATMENT NOTE - [facility]     Patient Name: Florencia Quest  Date:2018  : 1940  [x]  Patient  Verified  Payor: [place] MEDICARE / Plan: [place] MEDICARE PART A & B / Product Type: Medicare /    In time:1100  Out time:1141  Total Treatment Time (min): 41  Total Timed Codes (min): 31  1:1 Treatment Time ( only): 31   Visit #: 2 of 12    Treatment Area: Pain in left hip [M25.552]  Trochanteric bursitis, left hip [M70.62]  Spinal stenosis, lumbosacral region [M48.07]    SUBJECTIVE  Pain Level (0-10 scale): 8/10  Any medication changes, allergies to medications, adverse drug reactions, diagnosis change, or new procedure performed?: [x] No    [] Yes (see summary sheet for update)  Subjective functional status/changes:   [] No changes reported  Pt stated that she is having a bad day with her hip today    OBJECTIVE    Modality rationale: decrease pain and increase tissue extensibility to improve the patients ability to increase ease with aDLs   Min Type Additional Details    [] Estim:  []Unatt       []IFC  []Premod                        []Other:  []w/ice   []w/heat  Position:  Location:    [] Estim: []Att    []TENS instruct  []NMES                    []Other:  []w/US   []w/ice   []w/heat  Position:  Location:    []  Traction: [] Cervical       []Lumbar                       [] Prone          []Supine                       []Intermittent   []Continuous Lbs:  [] before manual  [] after manual    []  Ultrasound: []Continuous   [] Pulsed                           []1MHz   []3MHz W/cm2:  Location:    []  Iontophoresis with dexamethasone         Location: [] Take home patch   [] In clinic   10 []  Ice     [x]  heat  []  Ice massage  []  Laser   []  Anodyne Position:sidelying  Location:L hip    []  Laser with stim  []  Other:  Position:  Location:    []  Vasopneumatic Device Pressure:       [] lo [] med [] hi   Temperature: [] lo [] med [] hi   [x] Skin assessment post-treatment:  [x]intact []redness- no adverse reaction    []redness - adverse reaction:     23 min Therapeutic Exercise:  [x] See flow sheet :   Rationale: increase ROM and increase strength to improve the patients ability to increase ease with ADLs    8 min Manual Therapy:  Tennis ball massage to L ITB and glutes   Rationale: decrease pain, increase ROM and increase tissue extensibility to increase ease with walking and performing ADLs    With   [x] TE   [] TA   [] neuro   [] other: Patient Education: [x] Review HEP    [] Progressed/Changed HEP based on:   [] positioning   [] body mechanics   [] transfers   [] heat/ice application    [] other:      Other Objective/Functional Measures:   Pt had increased pain with LTR to the R and SKTC on the L  Bridges with ball squeeze were challenging     Pain Level (0-10 scale) post treatment: 5/10    ASSESSMENT/Changes in Function:   Initiated therex today per flow sheet. Pt reported partial compliance with HEP. Importance of performing HEP was stressed. Pt put forth good effort with all exercises    Patient will continue to benefit from skilled PT services to address functional mobility deficits, address ROM deficits and address strength deficits to attain remaining goals. [x]  See Plan of Care  []  See progress note/recertification  []  See Discharge Summary         Progress towards goals / Updated goals:  Short Term Goals: To be accomplished in 1 weeks:                        1. Pt will be compliant with a HEP to improve left hip and LS function. Progressing. Pt reported that she has been doing a little bit of them. 2/27/18  Long Term Goals: To be accomplished in 6 weeks:                        1. Pt will increase FOTO score by 18 pts to improve hip function. 2. Pt will decrease pain to <3/10 to ease with ADL's.                        3. Pt will increase ambulation tolerance to 10 mins for house chores and grocery shopping.     PLAN  []  Upgrade activities as tolerated     [x]  Continue plan of care  []  Update interventions per flow sheet       []  Discharge due to:_  []  Other:_      Aster Aspen, PTA 2/27/2018  10:58 AM    Future Appointments  Date Time Provider Slick Porter   2/27/2018 11:00 AM Asteryahaira Castroen, PTA [facility]CP[facility]   3/2/2018 10:30 AM Lyudmila Roque, PT [id number] [facility]   3/6/2018 11:30 AM Dinorah Virk, PT RSSDSBS [facility]   3/8/2018 11:00 AM Aster Washington, PTA [facility]   3/13/2018 11:00 AM Aster Washington, PTA [facility]   3/15/2018 11:00 AM Aster Washington, PTA [facility]   3/20/2018 11:00 AM Aster Washington, PTA [facility]   3/22/2018 11:00 AM Aster Washington, PTA [facility]   3/26/2018 11:30 AM Lyudmila Roque, PT [email or web address] [facility]   3/29/2018 11:00 AM Aster Mcgrath, PTA [facility]

## 2018-03-02 ENCOUNTER — HOSPITAL ENCOUNTER (OUTPATIENT)
Dept: PHYSICAL THERAPY | Age: 78
Discharge: HOME | End: 2018-03-02
Payer: MEDICARE

## 2018-03-02 PROCEDURE — 97110 THERAPEUTIC EXERCISES: CPT

## 2018-03-02 PROCEDURE — 97140 MANUAL THERAPY 1/> REGIONS: CPT

## 2018-03-02 NOTE — PROGRESS NOTES
PT DAILY TREATMENT NOTE - [facility]     Patient Name: Severa Lesser  Date:3/2/2018  : 1940  [x]  Patient  Verified  Payor: Emmett Wu / Plan: [place] MEDICARE PART A & B / Product Type: Medicare /    In time:10:30  Out time:11:16  Total Treatment Time (min): 46  Total Timed Codes (min): 36  1:1 Treatment Time ( only): 30   Visit #: 3 of 12    Treatment Area: Pain in left hip [M25.552]  Trochanteric bursitis, left hip [M70.62]  Spinal stenosis, lumbosacral region [M48.07]    SUBJECTIVE  Pain Level (0-10 scale): 5/10  Any medication changes, allergies to medications, adverse drug reactions, diagnosis change, or new procedure performed?: [x] No    [] Yes (see summary sheet for update)  Subjective functional status/changes:   [] No changes reported  Pt reports that she tried a few of her exercises last night and she felt better. She really likes the heat and the tennis ball massage. She has a tennis ball at home and is planning on trying the massage herself.      OBJECTIVE    Modality rationale: decrease pain and increase tissue extensibility to improve the patients ability to tolerate daily tasks   Min Type Additional Details    [] Estim:  []Unatt       []IFC  []Premod                        []Other:  []w/ice   []w/heat  Position:  Location:    [] Estim: []Att    []TENS instruct  []NMES                    []Other:  []w/US   []w/ice   []w/heat  Position:  Location:    []  Traction: [] Cervical       []Lumbar                       [] Prone          []Supine                       []Intermittent   []Continuous Lbs:  [] before manual  [] after manual    []  Ultrasound: []Continuous   [] Pulsed                           []1MHz   []3MHz W/cm2:  Location:    []  Iontophoresis with dexamethasone         Location: [] Take home patch   [] In clinic   10 []  Ice     [x]  heat  []  Ice massage  []  Laser   []  Anodyne Position: Left hip  Location: Right Sidelying    []  Laser with stim  []  Other:  Position:  Location: []  Vasopneumatic Device Pressure:       [] lo [] med [] hi   Temperature: [] lo [] med [] hi   [x] Skin assessment post-treatment:  [x]intact []redness- no adverse reaction    []redness - adverse reaction:     27 min Therapeutic Exercise:  [x] See flow sheet :   Rationale: increase ROM and increase strength to improve the patients ability to improve ease of ambulation and daily activities     9 min Manual Therapy:  Tennis ball massage left ITB, lateral quads, glutes   Rationale: decrease pain, increase ROM, increase tissue extensibility and decrease trigger points to improve ease of ambulation and ADLs            With   [] TE   [] TA   [] neuro   [] other: Patient Education: [x] Review HEP    [] Progressed/Changed HEP based on:   [] positioning   [] body mechanics   [] transfers   [] heat/ice application    [] other:      Other Objective/Functional Measures:     Attempted hamstring stretch with stretch out strap but pt reported pain even when instructed to perform in decreased range, ceased d/t pain    R upslip corrected with leg lengthening exercise  Minor left AI, attempted MET but pt reported pain with attempting. Pain was described as muscle working in left hamstrings, but pt declined continuing  Reduced pain with manual, greatest tenderness located over left piriformis        Pain Level (0-10 scale) post treatment: 1/10    ASSESSMENT/Changes in Function:     Pt is making slow, steady progress towards initial goals in therapy. She reports reducing pain levels, with greatest pain continuing to be present over left piriformis. Will continue to address core/hip stability deficits with emphasis on glute strength to reduce piriformis compensation to decrease pain with daily activities.       Patient will continue to benefit from skilled PT services to modify and progress therapeutic interventions, address functional mobility deficits, address ROM deficits, address strength deficits, analyze and address soft tissue restrictions, analyze and cue movement patterns, assess and modify postural abnormalities and instruct in home and community integration to attain remaining goals. Progress towards goals / Updated goals:  Short Term Goals: To be accomplished in 1 weeks:                        1. Pt will be compliant with a HEP to improve left hip and LS function. Progressing. Pt reported that she has been doing a little bit of them. 3/2/18  Long Term Goals: To be accomplished in 6 weeks:                        0. Pt will increase FOTO score by 18 pts to improve hip function.                        2. Pt will decrease pain to <3/10 to ease with ADL's.                        4. Pt will increase ambulation tolerance to 10 mins for house chores and grocery shopping.     PLAN  []  Upgrade activities as tolerated     [x]  Continue plan of care  []  Update interventions per flow sheet       []  Discharge due to:_  []  Other:_      Yan Tan, PT 3/2/2018  10:33 AM    Future Appointments  Date Time Provider Slick Porter   3/6/2018 11:30 AM Elena Donohue PT [facility]   3/8/2018 11:00 AM Lisette Hernández, PTA [facility]   3/13/2018 11:00 AM Lisette Hernández, PTA [facility]   3/15/2018 11:00 AM Lisette Hernández, PTA [facility]   3/20/2018 11:00 AM Lisette Hernández, PTA [facility]   3/22/2018 11:00 AM Lisette Hernández, PTA [facility]   3/26/2018 11:30 AM Elena Donohue, PT [id number] [facility]   3/29/2018 11:00 AM Lisette Hernández, BRUNO [facility]CPTP[facility]

## 2018-03-06 ENCOUNTER — HOSPITAL ENCOUNTER (OUTPATIENT)
Dept: PHYSICAL THERAPY | Age: 78
Discharge: HOME | End: 2018-03-06
Payer: MEDICARE

## 2018-03-06 PROCEDURE — 97110 THERAPEUTIC EXERCISES: CPT

## 2018-03-06 PROCEDURE — 97140 MANUAL THERAPY 1/> REGIONS: CPT

## 2018-03-06 NOTE — PROGRESS NOTES
PT DAILY TREATMENT NOTE - [facility]     Patient Name: Jayesh Rebollar  Date:3/6/2018  : 1940  [x]  Patient  Verified  Payor: [place] MEDICARE / Plan: [place] MEDICARE PART A & B / Product Type: Medicare /    In time:1130  Out time:1213  Total Treatment Time (min): 43  Total Timed Codes (min): 33  1:1 Treatment Time ( only): 33   Visit #: 4 of 10-12    Treatment Area: Pain in left hip [M25.552]  Trochanteric bursitis, left hip [M70.62]  Spinal stenosis, lumbosacral region [M48.07]    SUBJECTIVE  Pain Level (0-10 scale): 3/10 hip, 1/10 LS  Any medication changes, allergies to medications, adverse drug reactions, diagnosis change, or new procedure performed?: [x] No    [] Yes (see summary sheet for update)  Subjective functional status/changes:   [] No changes reported  Reports she is doing better since eval. She will pace herself during house chores.     OBJECTIVE    Modality rationale: decrease pain to improve the patients ability to ease with ADL's   Min Type Additional Details    [] Estim:  []Unatt       []IFC  []Premod                        []Other:  []w/ice   []w/heat  Position:  Location:    [] Estim: []Att    []TENS instruct  []NMES                    []Other:  []w/US   []w/ice   []w/heat  Position:  Location:    []  Traction: [] Cervical       []Lumbar                       [] Prone          []Supine                       []Intermittent   []Continuous Lbs:  [] before manual  [] after manual    []  Ultrasound: []Continuous   [] Pulsed                           []1MHz   []3MHz W/cm2:  Location:    []  Iontophoresis with dexamethasone         Location: [] Take home patch   [] In clinic   10 []  Ice     [x]  heat  []  Ice massage  []  Laser   []  Anodyne Position: SL  Location:left Lip    []  Laser with stim  []  Other:  Position:  Location:    []  Vasopneumatic Device Pressure:       [] lo [] med [] hi   Temperature: [] lo [] med [] hi   [] Skin assessment post-treatment:  []intact []redness- no adverse reaction []redness - adverse reaction:     25 min Therapeutic Exercise:  [] See flow sheet :   Rationale: increase ROM and increase strength to improve the patients ability to ease with ADL's    8 min Manual Therapy:  Stick massage to left ITBand and gluteus medius   Rationale: decrease pain, increase ROM and decrease trigger points to ease with ADL's          With   [] TE   [] TA   [] neuro   [] other: Patient Education: [x] Review HEP    [] Progressed/Changed HEP based on:   [] positioning   [] body mechanics   [] transfers   [] heat/ice application    [] other:      Other Objective/Functional Measures: decreased palpable tenderness to left hip and glutes. Pt LTR to the right increases left hip discomfort. Improved tolerance to Left Hamstring stretch. Pt    Improving core activation with swiss ball ex's  Pain Level (0-10 scale) post treatment: 2/10    ASSESSMENT/Changes in Function: Pt reports good and bad days. She is getting better and soreness is decreasing. She does what she can for house work and take breaks as needed. Patient will continue to benefit from skilled PT services to modify and progress therapeutic interventions, address functional mobility deficits, address ROM deficits, address strength deficits, analyze and address soft tissue restrictions, analyze and cue movement patterns, analyze and modify body mechanics/ergonomics and assess and modify postural abnormalities to attain remaining goals. [x]  See Plan of Care  []  See progress note/recertification  []  See Discharge Summary         Progress towards goals / Updated goals:  Short Term Goals: To be accomplished in 1 weeks:                        1. Pt will be compliant with a HEP to improve left hip and LS function. Progressing. Pt reported that she has been doing a little bit of them. 3/2/18  Long Term Goals: To be accomplished in 6 weeks:                        7.  Pt will increase FOTO score by 18 pts to improve hip function.                        3. Pt will decrease pain to <3/10 to ease with ADL's.                        9. Pt will increase ambulation tolerance to 10 mins for house chores and grocery shopping.   Progressing well.  3/6/18    PLAN  [x]  Upgrade activities as tolerated     [x]  Continue plan of care  []  Update interventions per flow sheet       []  Discharge due to:_  []  Other:_      Rafal Patel, PT 3/6/2018  11:52 AM    Future Appointments  Date Time Provider Slick Porter   3/8/2018 11:00 AM Rowan Gamez PTA [facility]   3/13/2018 11:00 AM Rowan Gamez PTA [facility]   3/15/2018 11:00 AM Rowan Gamez PTA [facility]   3/20/2018 11:00 AM [facility]   3/22/2018 11:00 AM Rowan Gamez PTA [facility]   3/26/2018 11:30 AM Rafal Patel, PT JZIERCZ [facility]   3/29/2018 11:00 AM Rowan Gamez PTA [facility]

## 2018-03-08 ENCOUNTER — HOSPITAL ENCOUNTER (OUTPATIENT)
Dept: PHYSICAL THERAPY | Age: 78
Discharge: HOME | End: 2018-03-08
Payer: MEDICARE

## 2018-03-08 PROCEDURE — 97110 THERAPEUTIC EXERCISES: CPT

## 2018-03-08 PROCEDURE — 97140 MANUAL THERAPY 1/> REGIONS: CPT

## 2018-03-08 NOTE — PROGRESS NOTES
PT DAILY TREATMENT NOTE - [facility]     Patient Name: Colette King  Date:3/8/2018  : 1940  [x]  Patient  Verified  Payor: [place] MEDICARE / Plan: [place] MEDICARE PART A & B / Product Type: Medicare /    In time:1100  Out time:1142  Total Treatment Time (min): 42  Total Timed Codes (min): 32  1:1 Treatment Time ( only): 32   Visit #: 4 of 10-12    Treatment Area: Pain in left hip [M25.552]  Trochanteric bursitis, left hip [M70.62]  Spinal stenosis, lumbosacral region [M48.07]    SUBJECTIVE  Pain Level (0-10 scale): 0/10  Any medication changes, allergies to medications, adverse drug reactions, diagnosis change, or new procedure performed?: [x] No    [] Yes (see summary sheet for update)  Subjective functional status/changes:   [] No changes reported  Pt stated that she is doing fairly well today    OBJECTIVE    Modality rationale: increase tissue extensibility to improve the patients ability to increase ease with ADLs   Min Type Additional Details    [] Estim:  []Unatt       []IFC  []Premod                        []Other:  []w/ice   []w/heat  Position:  Location:    [] Estim: []Att    []TENS instruct  []NMES                    []Other:  []w/US   []w/ice   []w/heat  Position:  Location:    []  Traction: [] Cervical       []Lumbar                       [] Prone          []Supine                       []Intermittent   []Continuous Lbs:  [] before manual  [] after manual    []  Ultrasound: []Continuous   [] Pulsed                           []1MHz   []3MHz W/cm2:  Location:    []  Iontophoresis with dexamethasone         Location: [] Take home patch   [] In clinic   10 []  Ice     [x]  heat  []  Ice massage  []  Laser   []  Anodyne Position:sidelying  Location:left hip    []  Laser with stim  []  Other:  Position:  Location:    []  Vasopneumatic Device Pressure:       [] lo [] med [] hi   Temperature: [] lo [] med [] hi   [x] Skin assessment post-treatment:  [x]intact []redness- no adverse reaction    []redness - adverse reaction:     24 min Therapeutic Exercise:  [x] See flow sheet :   Rationale: increase ROM and increase strength to improve the patients ability to increase ease with ADLs    8 min Manual Therapy:  Tennis ball roll out to left ITB   Rationale: increase ROM and increase tissue extensibility to increase ease with ADLs    With   [x] TE   [] TA   [] neuro   [] other: Patient Education: [x] Review HEP    [] Progressed/Changed HEP based on:   [] positioning   [] body mechanics   [] transfers   [] heat/ice application    [] other:      Other Objective/Functional Measures:   Pt was challenged with new exercises  Had minimal tightness in left ITB     Pain Level (0-10 scale) post treatment: 0/10    ASSESSMENT/Changes in Function:   Pt is progressing well toward goals. Pt had no pain today. Pt cont with decreased standing and walking tolerance, but reported that it is improving. Patient will continue to benefit from skilled PT services to modify and progress therapeutic interventions, address functional mobility deficits, address ROM deficits and address strength deficits to attain remaining goals. [x]  See Plan of Care  []  See progress note/recertification  []  See Discharge Summary         Progress towards goals / Updated goals:  Short Term Goals: To be accomplished in 1 weeks:                        1. Pt will be compliant with a HEP to improve left hip and LS function. Progressing. Pt reported that she has been doing a little bit of them. 3/2/18  Long Term Goals: To be accomplished in 6 weeks:                        6. Pt will increase FOTO score by 18 pts to improve hip function.                        2. Pt will decrease pain to <3/10 to ease with ADL's.                        1. Pt will increase ambulation tolerance to 10 mins for house chores and grocery shopping.   Progressing well.  3/6/18    PLAN  []  Upgrade activities as tolerated     [x]  Continue plan of care  []  Update interventions per flow sheet       []  Discharge due to:_  []  Other:_      Virginia Rondon, BRUNO 3/8/2018  11:05 AM    Future Appointments  Date Time Provider Slick Porter   3/15/2018 11:00 AM Virginia Rondon PTA [facility]   3/20/2018 11:00 AM [facility] PT [address] [facility]   3/22/2018 11:00 AM Virginia Rondon PTA [facility]   3/26/2018 11:30 AM Ted Solis, PT [id number] [facility]   3/29/2018 11:00 AM Virginia Rondon, PTA [facility]

## 2018-03-12 ENCOUNTER — APPOINTMENT (OUTPATIENT)
Dept: PHYSICAL THERAPY | Age: 78
End: 2018-03-12
Payer: MEDICARE

## 2018-03-13 ENCOUNTER — APPOINTMENT (OUTPATIENT)
Dept: PHYSICAL THERAPY | Age: 78
End: 2018-03-13
Payer: MEDICARE

## 2018-03-15 ENCOUNTER — HOSPITAL ENCOUNTER (OUTPATIENT)
Dept: PHYSICAL THERAPY | Age: 78
Discharge: HOME | End: 2018-03-15
Payer: MEDICARE

## 2018-03-15 PROCEDURE — 97110 THERAPEUTIC EXERCISES: CPT

## 2018-03-15 PROCEDURE — 97140 MANUAL THERAPY 1/> REGIONS: CPT

## 2018-03-15 NOTE — PROGRESS NOTES
PT DAILY TREATMENT NOTE - [facility]     Patient Name: Valentino Guzman  Date:3/15/2018  : 1940  [x]  Patient  Verified  Payor: [place] MEDICARE / Plan: [place] MEDICARE PART A & B / Product Type: Medicare /    In time:1100  Out time:1144  Total Treatment Time (min): 44  Total Timed Codes (min): 34  1:1 Treatment Time ( only): 34   Visit #: 6 of 10-12    Treatment Area: Pain in left hip [M25.552]  Trochanteric bursitis, left hip [M70.62]  Spinal stenosis, lumbosacral region [M48.07]    SUBJECTIVE  Pain Level (0-10 scale): 0/10  Any medication changes, allergies to medications, adverse drug reactions, diagnosis change, or new procedure performed?: [x] No    [] Yes (see summary sheet for update)  Subjective functional status/changes:   [] No changes reported  Pt stated that she has no pain, she is just tired today    OBJECTIVE    Modality rationale: increase tissue extensibility to improve the patients ability to increase ease with aDLs   Min Type Additional Details    [] Estim:  []Unatt       []IFC  []Premod                        []Other:  []w/ice   []w/heat  Position:  Location:    [] Estim: []Att    []TENS instruct  []NMES                    []Other:  []w/US   []w/ice   []w/heat  Position:  Location:    []  Traction: [] Cervical       []Lumbar                       [] Prone          []Supine                       []Intermittent   []Continuous Lbs:  [] before manual  [] after manual    []  Ultrasound: []Continuous   [] Pulsed                           []1MHz   []3MHz W/cm2:  Location:    []  Iontophoresis with dexamethasone         Location: [] Take home patch   [] In clinic   10 []  Ice     [x]  heat  []  Ice massage  []  Laser   []  Anodyne Position:sidelying  Location:left hip    []  Laser with stim  []  Other:  Position:  Location:    []  Vasopneumatic Device Pressure:       [] lo [] med [] hi   Temperature: [] lo [] med [] hi   [x] Skin assessment post-treatment:  [x]intact []redness- no adverse reaction []redness - adverse reaction:     26 min Therapeutic Exercise:  [x] See flow sheet :   Rationale: increase ROM and increase strength to improve the patients ability to increase ease with aDLs    8 min Manual Therapy:  Tennis ball massage to left ITB and glutes   Rationale: increase ROM and increase tissue extensibility to increase ease with aDLs    With   [x] TE   [] TA   [] neuro   [] other: Patient Education: [x] Review HEP    [] Progressed/Changed HEP based on:   [] positioning   [] body mechanics   [] transfers   [] heat/ice application    [] other:      Other Objective/Functional Measures:   Pt had some difficulty with performing right hip abd in standing  No complaint of increased pain with exercises  Was fatigued after standing exercises  Requested to have a towel under her right hip when in sidelying for manual and MH    Pain Level (0-10 scale) post treatment: 0/10    ASSESSMENT/Changes in Function:   Pt cont to progress well toward goals. Pain level was 0/10 again today. Cont with decreased strength in ambulation tolerance. Pt is interested in starting a gym membership when she is done with therapy    Patient will continue to benefit from skilled PT services to modify and progress therapeutic interventions, address functional mobility deficits, address ROM deficits and address strength deficits to attain remaining goals. [x]  See Plan of Care  []  See progress note/recertification  []  See Discharge Summary         Progress towards goals / Updated goals:  Short Term Goals: To be accomplished in 1 weeks:                        1. Pt will be compliant with a HEP to improve left hip and LS function. Progressing. Pt reported that she has been doing a little bit of them. 3/2/18  Long Term Goals: To be accomplished in 6 weeks:                        2.  Pt will increase FOTO score by 18 pts to improve hip function.                        2. Pt will decrease pain to <3/10 to ease with ADL's.                        6. Pt will increase ambulation tolerance to 10 mins for house chores and grocery shopping.   Progressing well.  3/6/18    PLAN  []  Upgrade activities as tolerated     [x]  Continue plan of care  []  Update interventions per flow sheet       []  Discharge due to:_  []  Other:_      Henna Sandoval PTA 3/15/2018  10:58 AM    Future Appointments  Date Time Provider Slick Porter   3/15/2018 11:00 AM Henna Sandoval PTA [facility]   3/20/2018 11:00 AM [facility] [address] [address] [facility]   3/22/2018 11:00 AM Henna Sandoval PTA [facility]   3/26/2018 11:30 AM Elena Peck, PT [id number]   3/29/2018 11:00 AM Henna Sandoval PTA [facility]

## 2018-03-20 ENCOUNTER — HOSPITAL ENCOUNTER (OUTPATIENT)
Dept: PHYSICAL THERAPY | Age: 78
Discharge: HOME | End: 2018-03-20
Payer: MEDICARE

## 2018-03-20 PROCEDURE — 97140 MANUAL THERAPY 1/> REGIONS: CPT

## 2018-03-20 PROCEDURE — 97110 THERAPEUTIC EXERCISES: CPT

## 2018-03-20 NOTE — PROGRESS NOTES
PT DAILY TREATMENT NOTE - [facility]     Patient Name: Kale Ozuna  Date:3/20/2018  : 1940  [x]  Patient  Verified  Payor: [place] MEDICARE / Plan: [place] MEDICARE PART A & B / Product Type: Medicare /    In time: 11:00  Out time: 12:02  Total Treatment Time (min): 62  Total Timed Codes (min): 47  1:1 Treatment Time ([address] only): 37  Visit #: 7 of 10-12    Treatment Area: Pain in left hip [M25.552]  Trochanteric bursitis, left hip [M70.62]  Spinal stenosis, lumbosacral region [M48.07]    SUBJECTIVE  Pain Level (0-10 scale): 5/10  Any medication changes, allergies to medications, adverse drug reactions, diagnosis change, or new procedure performed?: [x] No    [] Yes (see summary sheet for update)  Subjective functional status/changes:   [] No changes reported  Pr reports 5/10 pain, stating \"I think the weather might have something to do with it. I wasn't hurting like this before. \"      OBJECTIVE    Modality rationale: increase tissue extensibility to improve the patients ability to increase ease with aDLs   Min Type Additional Details    [] Estim:  []Unatt       []IFC  []Premod                        []Other:  []w/ice   []w/heat  Position:  Location:    [] Estim: []Att    []TENS instruct  []NMES                    []Other:  []w/US   []w/ice   []w/heat  Position:  Location:    []  Traction: [] Cervical       []Lumbar                       [] Prone          []Supine                       []Intermittent   []Continuous Lbs:  [] before manual  [] after manual    []  Ultrasound: []Continuous   [] Pulsed                           []1MHz   []3MHz W/cm2:  Location:    []  Iontophoresis with dexamethasone         Location: [] Take home patch   [] In clinic   10 []  Ice     [x]  heat  []  Ice massage  []  Laser   []  Anodyne Position:sidelying  Location:left hip    []  Laser with stim  []  Other:  Position:  Location:    []  Vasopneumatic Device Pressure:       [] lo [] med [] hi   Temperature: [] lo [] med [] hi   [x] Skin assessment post-treatment:  [x]intact []redness- no adverse reaction    []redness - adverse reaction:     40/35 min Therapeutic Exercise:  [x] See flow sheet : Bike warm up x 5' NC   Rationale: increase ROM and increase strength to improve the patients ability to increase ease with aDLs    8 min Manual Therapy:  Tennis ball massage to left ITB and glutes   Rationale: increase ROM and increase tissue extensibility to increase ease with aDLs    With   [x] TE   [] TA   [] neuro   [] other: Patient Education: [x] Review HEP    [] Progressed/Changed HEP based on:   [] positioning   [] body mechanics   [] transfers   [] heat/ice application    [] other:      Other Objective/Functional Measures:   Pt had some difficulty with proper technique/form on mini squats and with performing right hip abd in standing. No complaint of increased pain with exercises, decreased pain immediately after performing stretches. Continues to demonstrate fatigue after standing exercises. Requested to have a towel under her right hip when in sidelying for manual and MH. Pain Level (0-10 scale) post treatment: 2/10    ASSESSMENT/Changes in Function:   Pt cont to progress well toward goals. Pain level was 5/10 today, which she attributed to the weather. She required verbal cuing to facilitate TrA draw and to decrease valsalva during bridging. She continues to demonstrate decreased strength and tolerance with ambulation. Patient will continue to benefit from skilled PT services to modify and progress therapeutic interventions, address functional mobility deficits, address ROM deficits and address strength deficits to attain remaining goals. [x]  See Plan of Care  []  See progress note/recertification  []  See Discharge Summary         Progress towards goals / Updated goals:   Short Term Goals: To be accomplished in 1 weeks:                        1. Pt will be compliant with a HEP to improve left hip and LS function. Progressing.  Pt reported that she has been trying to do them once a day, sometimes twice. 3/20/18  Long Term Goals: To be accomplished in 6 weeks:                        1. Pt will increase FOTO score by 18 pts to improve hip function.                        2. Pt will decrease pain to <3/10 to ease with ADL's.                        3. Pt will increase ambulation tolerance to 10 mins for house chores and grocery shopping.   Progressing well.  3/6/18    PLAN  []  Upgrade activities as tolerated     [x]  Continue plan of care  []  Update interventions per flow sheet       []  Discharge due to:_  []  Other:_      GEOVANY Feliciano 3/20/2018  10:58 AM    Future Appointments  Date Time Provider Slick Porter   3/20/2018 11:00 AM [facility] [address] [facility]   3/22/2018 11:00 AM Guanako Hart PTA [facility]   3/26/2018 11:30 AM Dinorah Shelton, PT [facility]   3/29/2018 11:00 AM Guanako Hart PTA [facility]

## 2018-03-22 ENCOUNTER — HOSPITAL ENCOUNTER (OUTPATIENT)
Dept: PHYSICAL THERAPY | Age: 78
Discharge: HOME | End: 2018-03-22
Payer: MEDICARE

## 2018-03-22 PROCEDURE — 97110 THERAPEUTIC EXERCISES: CPT

## 2018-03-22 NOTE — PROGRESS NOTES
PT DAILY TREATMENT NOTE - [facility]     Patient Name: Eleazar Chandra  Date:3/22/2018  : 1940  [x]  Patient  Verified  Payor: [place] MEDICARE / Plan: [place] MEDICARE PART A & B / Product Type: Medicare /    In time:11:00  Out time:11:57  Total Treatment Time (min): 57  Total Timed Codes (min): 47  1:1 Treatment Time ( [address] only): 30   Visit #: 8 of 10-12    Treatment Area: Pain in left hip [M25.552]  Trochanteric bursitis, left hip [M70.62]  Spinal stenosis, lumbosacral region [M48.07]    SUBJECTIVE  Pain Level (0-10 scale): 0/10  Any medication changes, allergies to medications, adverse drug reactions, diagnosis change, or new procedure performed?: [x] No    [] Yes (see summary sheet for update)  Subjective functional status/changes:   [] No changes reported  Pt reports no current pain just a nagging pull in the center of her lower back. She states the pain gets worse with more walking and standing and sitting too long. She has been rolling the side of her leg with a tennis ball which seems to help.      OBJECTIVE    Modality rationale: decrease pain and increase tissue extensibility to improve the patients ability to improve ease of ambulation   Min Type Additional Details    [] Estim:  []Unatt       []IFC  []Premod                        []Other:  []w/ice   []w/heat  Position:  Location:    [] Estim: []Att    []TENS instruct  []NMES                    []Other:  []w/US   []w/ice   []w/heat  Position:  Location:    []  Traction: [] Cervical       []Lumbar                       [] Prone          []Supine                       []Intermittent   []Continuous Lbs:  [] before manual  [] after manual    []  Ultrasound: []Continuous   [] Pulsed                           []1MHz   []3MHz W/cm2:  Location:    []  Iontophoresis with dexamethasone         Location: [] Take home patch   [] In clinic   10 []  Ice     [x]  heat  []  Ice massage  []  Laser   []  Anodyne Position:seated  Location: l/s and L hip    []  Laser with stim  [] Other:  Position:  Location:    []  Vasopneumatic Device Pressure:       [] lo [] med [] hi   Temperature: [] lo [] med [] hi   [x] Skin assessment post-treatment:  [x]intact []redness- no adverse reaction    []redness - adverse reaction:       47 min Therapeutic Exercise:  [x] See flow sheet :   Rationale: increase ROM, increase strength, improve coordination, improve balance and increase proprioception to improve the patients ability to sit prolonged periods and walk without increased hip or LBP          With   [] TE   [] TA   [] neuro   [] other: Patient Education: [x] Review HEP    [] Progressed/Changed HEP based on:   [] positioning   [] body mechanics   [] transfers   [] heat/ice application    [] other:      Other Objective/Functional Measures:   Educated on PPT to perform to help with core strength and decreased compression on l/s  Reviewed log rolling for sit<>supine transfers  No increased pain during session  Reviewed LTR and SKTC to perform in the morning before getting out of bed     Pain Level (0-10 scale) post treatment: 0/10    ASSESSMENT/Changes in Function: Pt making steady progress towards goals with decreasing hip and back pain. She notices most stiffness after prolonged sitting or standing which she is able to relieve with movement or bending. Will continue working on core/hip strength for improved stability of the l/s and less compensations during ambulation to lessen hip pain. Patient will continue to benefit from skilled PT services to modify and progress therapeutic interventions, address functional mobility deficits, address ROM deficits, address strength deficits, analyze and address soft tissue restrictions, analyze and cue movement patterns, analyze and modify body mechanics/ergonomics, assess and modify postural abnormalities, address imbalance/dizziness and instruct in home and community integration to attain remaining goals.           Progress towards goals / Updated goals:  Short Term Goals: To be accomplished in 1 weeks:  1. Pt will be compliant with a HEP to improve left hip and LS function. Progressing. Pt reported that she has been trying to do them once a day, sometimes twice. 3/20/18  Long Term Goals: To be accomplished in 6 weeks:  1. Pt will increase FOTO score by 18 pts to improve hip function. 2. Pt will decrease pain to <3/10 to ease with ADL's. Progressing 0/10 today (3/22/18)  3. Pt will increase ambulation tolerance to 10 mins for house chores and grocery shopping.   Progressing well.  3/6/18    PLAN  [x]  Upgrade activities as tolerated     [x]  Continue plan of care  []  Update interventions per flow sheet       []  Discharge due to:_  []  Other:_      Maddy Brewer PTA 3/22/2018  12:03 PM    Future Appointments  Date Time Provider Slick Porter   3/26/2018 11:30 AM Maxx Kendrick, PT [facility]   3/29/2018 11:00 AM Shireen Sánchez PTA [facility]

## 2018-03-26 ENCOUNTER — HOSPITAL ENCOUNTER (OUTPATIENT)
Dept: PHYSICAL THERAPY | Age: 78
Discharge: HOME | End: 2018-03-26
Payer: MEDICARE

## 2018-03-26 PROCEDURE — 97110 THERAPEUTIC EXERCISES: CPT

## 2018-03-27 RX ORDER — POLYETHYLENE GLYCOL 3350 17 G/17G
POWDER, FOR SOLUTION ORAL
Qty: 527 G | Refills: 0 | Status: SHIPPED | OUTPATIENT
Start: 2018-03-27 | End: 2018-07-02 | Stop reason: SDUPTHER

## 2018-03-29 ENCOUNTER — HOSPITAL ENCOUNTER (OUTPATIENT)
Dept: PHYSICAL THERAPY | Age: 78
Discharge: HOME | End: 2018-03-29
Payer: MEDICARE

## 2018-03-29 PROCEDURE — G8979 MOBILITY GOAL STATUS: HCPCS

## 2018-03-29 PROCEDURE — 97110 THERAPEUTIC EXERCISES: CPT

## 2018-03-29 PROCEDURE — G8980 MOBILITY D/C STATUS: HCPCS

## 2018-03-29 NOTE — PROGRESS NOTES
PT DAILY TREATMENT NOTE - [facility]     Patient Name: Rubin Garcia  Date:3/29/2018  : 1940 [x]  Patient  Verified  Payor: [place] MEDICARE / Plan: [place] MEDICARE PART A & B / Product Type: Medicare /    In time:1103  Out time:1128  Total Treatment Time (min): 25  Total Timed Codes (min): 25  1:1 Treatment Time ( [address] only): 25   Visit #: 10 of 10-12    Treatment Area: Pain in left hip [M25.552]  Trochanteric bursitis, left hip [M70.62]  Spinal stenosis, lumbosacral region [M48.07]    SUBJECTIVE  Pain Level (0-10 scale): 0/10  Any medication changes, allergies to medications, adverse drug reactions, diagnosis change, or new procedure performed?: [x] No    [] Yes (see summary sheet for update)  Subjective functional status/changes:   [] No changes reported  Pt reports she has no pain and is ready to DC to HEP and start the gym. OBJECTIVE        25 min Therapeutic Exercise:  [] See flow sheet :   Rationale: increase ROM and increase strength to improve the patients ability to ease with ADL's          With   [] TE   [] TA   [] neuro   [] other: Patient Education: [x] Review HEP    [] Progressed/Changed HEP based on:   [] positioning   [] body mechanics   [] transfers   [] heat/ice application    [] other:      Other Objective/Functional Measures: Pain at worst=4/10  Right HIP IR=4- ER=4, Abd=3, flexion=4-   Left Hip IR=4- ER=4- abd=-3 flexio=4-  Pain Level (0-10 scale) post treatment: 0/10    ASSESSMENT/Changes in Function: Progressed well. Pt is ready for continuing at the gym She was reminded to call MD first to get permission. Patient will continue to benefit from skilled PT services to instruct in home and community integration to attain remaining goals. []  See Plan of Care  []  See progress note/recertification  [x]  See Discharge Summary         Progress towards goals / Updated goals:  Short Term Goals: To be accomplished in 1 weeks:  1.  Pt will be compliant with a HEP to improve left hip and LS function. Progressing. Pt reported that she has been trying to do them once a day, sometimes twice. 3/20/18  Long Term Goals: To be accomplished in 6 weeks:  1. Pt will increase FOTO score by 18 pts to improve hip function. Progressing. 3/29/18  2. Pt will decrease pain to <3/10 to ease with ADL's. Progressing 0/10 today (3/22/18)  3. Pt will increase ambulation tolerance to 10 mins for house chores and grocery shopping.  MET 3/26/18    PLAN  []  Upgrade activities as tolerated     [x]  Continue plan of care  []  Update interventions per flow sheet       [x]  Discharge due to:_  []  Other:_      Conchita White, PT 3/29/2018  11:37 AM    No future appointments.

## 2018-04-04 ENCOUNTER — HOSPITAL ENCOUNTER (OUTPATIENT)
Dept: LAB | Age: 78
Discharge: HOME | End: 2018-04-04
Payer: MEDICARE

## 2018-04-04 DIAGNOSIS — N18.30 CHRONIC KIDNEY DISEASE, STAGE III (MODERATE) (HCC): ICD-10-CM

## 2018-04-04 DIAGNOSIS — E21.0 PRIMARY HYPERPARATHYROIDISM (HCC): ICD-10-CM

## 2018-04-04 DIAGNOSIS — E11.22 TYPE 2 DIABETES MELLITUS WITH DIABETIC CHRONIC KIDNEY DISEASE (HCC): ICD-10-CM

## 2018-04-04 DIAGNOSIS — R80.1 PERSISTENT PROTEINURIA, UNSPECIFIED: ICD-10-CM

## 2018-04-04 DIAGNOSIS — I12.9 MALIGNANT HYPERTENSIVE KIDNEY DISEASE WITH CHRONIC KIDNEY DISEASE STAGE I THROUGH STAGE IV, OR UNSPECIFIED(403.00): ICD-10-CM

## 2018-04-04 DIAGNOSIS — Z79.4 ENCOUNTER FOR LONG-TERM (CURRENT) USE OF INSULIN (HCC): ICD-10-CM

## 2018-04-04 LAB
25(OH)D3 SERPL-MCNC: 19.4 NG/ML (ref 30–100)
ALBUMIN SERPL-MCNC: 3.2 G/DL (ref 3.4–5)
ANION GAP SERPL CALC-SCNC: 9 MMOL/L (ref 3–18)
BASOPHILS # BLD: 0 K/UL (ref 0–0.06)
BASOPHILS NFR BLD: 1 % (ref 0–2)
BUN SERPL-MCNC: 25 MG/DL (ref 7–18)
BUN/CREAT SERPL: 9 (ref 12–20)
CALCIUM SERPL-MCNC: 9.6 MG/DL (ref 8.5–10.1)
CALCIUM SERPL-MCNC: 9.6 MG/DL (ref 8.5–10.1)
CHLORIDE SERPL-SCNC: 106 MMOL/L (ref 100–108)
CO2 SERPL-SCNC: 26 MMOL/L (ref 21–32)
CREAT SERPL-MCNC: 2.7 MG/DL (ref 0.6–1.3)
CREAT UR-MCNC: 104 MG/DL (ref 30–125)
DIFFERENTIAL METHOD BLD: ABNORMAL
EOSINOPHIL # BLD: 0.1 K/UL (ref 0–0.4)
EOSINOPHIL NFR BLD: 2 % (ref 0–5)
ERYTHROCYTE [DISTWIDTH] IN BLOOD BY AUTOMATED COUNT: 13.5 % (ref 11.6–14.5)
GLUCOSE SERPL-MCNC: 118 MG/DL (ref 74–99)
HCT VFR BLD AUTO: 34.6 % (ref 35–45)
HGB BLD-MCNC: 11.9 G/DL (ref 12–16)
LYMPHOCYTES # BLD: 2 K/UL (ref 0.9–3.6)
LYMPHOCYTES NFR BLD: 40 % (ref 21–52)
MCH RBC QN AUTO: 29.3 PG (ref 24–34)
MCHC RBC AUTO-ENTMCNC: 34.4 G/DL (ref 31–37)
MCV RBC AUTO: 85.2 FL (ref 74–97)
MONOCYTES # BLD: 0.4 K/UL (ref 0.05–1.2)
MONOCYTES NFR BLD: 8 % (ref 3–10)
NEUTS SEG # BLD: 2.5 K/UL (ref 1.8–8)
NEUTS SEG NFR BLD: 49 % (ref 40–73)
PHOSPHATE SERPL-MCNC: 3.2 MG/DL (ref 2.5–4.9)
PLATELET # BLD AUTO: 137 K/UL (ref 135–420)
PMV BLD AUTO: 11.3 FL (ref 9.2–11.8)
POTASSIUM SERPL-SCNC: 4.1 MMOL/L (ref 3.5–5.5)
PROT UR-MCNC: 600 MG/DL
PROT/CREAT UR-RTO: 5.8
PTH-INTACT SERPL-MCNC: 310.6 PG/ML (ref 18.4–88)
RBC # BLD AUTO: 4.06 M/UL (ref 4.2–5.3)
SODIUM SERPL-SCNC: 141 MMOL/L (ref 136–145)
WBC # BLD AUTO: 5.1 K/UL (ref 4.6–13.2)

## 2018-04-04 PROCEDURE — 84156 ASSAY OF PROTEIN URINE: CPT | Performed by: INTERNAL MEDICINE

## 2018-04-04 PROCEDURE — 80069 RENAL FUNCTION PANEL: CPT | Performed by: INTERNAL MEDICINE

## 2018-04-04 PROCEDURE — 36415 COLL VENOUS BLD VENIPUNCTURE: CPT | Performed by: INTERNAL MEDICINE

## 2018-04-04 PROCEDURE — 85025 COMPLETE CBC W/AUTO DIFF WBC: CPT | Performed by: INTERNAL MEDICINE

## 2018-04-04 PROCEDURE — 82306 VITAMIN D 25 HYDROXY: CPT | Performed by: INTERNAL MEDICINE

## 2018-04-04 PROCEDURE — 83970 ASSAY OF PARATHORMONE: CPT | Performed by: INTERNAL MEDICINE

## 2018-04-19 ENCOUNTER — OFFICE VISIT (OUTPATIENT)
Dept: FAMILY MEDICINE CLINIC | Age: 78
End: 2018-04-19

## 2018-04-19 VITALS
DIASTOLIC BLOOD PRESSURE: 88 MMHG | HEART RATE: 90 BPM | HEIGHT: 65 IN | SYSTOLIC BLOOD PRESSURE: 186 MMHG | BODY MASS INDEX: 39.32 KG/M2 | TEMPERATURE: 98.1 F | RESPIRATION RATE: 18 BRPM | OXYGEN SATURATION: 96 % | WEIGHT: 236 LBS

## 2018-04-19 DIAGNOSIS — R10.13 DYSPEPSIA: Primary | ICD-10-CM

## 2018-04-19 PROBLEM — E66.01 SEVERE OBESITY (BMI 35.0-39.9) WITH COMORBIDITY (HCC): Status: ACTIVE | Noted: 2018-04-19

## 2018-04-19 RX ORDER — OMEPRAZOLE 20 MG/1
20 CAPSULE, DELAYED RELEASE ORAL DAILY
Qty: 30 CAP | Refills: 1 | Status: SHIPPED | OUTPATIENT
Start: 2018-04-19 | End: 2018-06-21 | Stop reason: SDUPTHER

## 2018-04-19 NOTE — PROGRESS NOTES
Patient is in the office today for Gerd x 2 weeks. Do you have an Advance Directive yes  Do you want more information no    1. Have you been to the ER, urgent care clinic since your last visit? Hospitalized since your last visit? No    2. Have you seen or consulted any other health care providers outside of the [address] [place] [address] since your last visit? Include any pap smears or colon screening.  No

## 2018-04-19 NOTE — MR AVS SNAPSHOT
[address] [facility] 
 
 
 [address] [address] [address] [address] 
[phone] Patient: Adonis Vallejo MRN: [id number] JMB:[id number] Visit Information Date & Time Provider Department Dept. Phone Encounter #  
 4/19/2018 10:00 AM Antonieta Caban NP [address] [phone] [id number] Follow-up Instructions Return in about 2 weeks (around 5/3/2018) for reflux. Upcoming Health Maintenance Date Due  
 LIPID PANEL Q1 1940 DTaP/Tdap/Td series (1 - Tdap) 2/6/1961 ZOSTER VACCINE AGE 60> 12/6/1999 Bone Densitometry (Dexa) Screening 2/6/2005 Pneumococcal 65+ Low/Medium Risk (1 of 2 - PCV13) 2/6/2005 Influenza Age 5 to Adult 8/1/2017 HEMOGLOBIN A1C Q6M 10/21/2017 MEDICARE YEARLY EXAM 3/15/2018 FOOT EXAM Q1 3/15/2018 MICROALBUMIN Q1 4/21/2018 EYE EXAM RETINAL OR DILATED Q1 3/12/2019 GLAUCOMA SCREENING Q2Y 3/12/2020 Allergies as of 4/19/2018  Review Complete On: 4/19/2018 By: Obey Guillermo  
  
 Severity Noted Reaction Type Reactions Aspirin  03/01/2017    Other (comments) Ringing in ears Keflex [Cephalexin]  03/01/2017    Rash Current Immunizations  Never Reviewed No immunizations on file. Not reviewed this visit You Were Diagnosed With   
  
 Codes Comments Dyspepsia    -  Primary ICD-10-CM: R10.13 ICD-9-CM: 536.8 Vitals BP Pulse Temp Resp Height(growth percentile) Weight(growth percentile) 195/81 (BP 1 Location: Left arm, BP Patient Position: Sitting) 90 98.1 °F (36.7 °C) (Oral) 18 5' 5\" (1.651 m) 236 lb (107 kg) SpO2 BMI OB Status Smoking Status 96% 39.27 kg/m2 Menopause Former Smoker BMI and BSA Data Body Mass Index Body Surface Area  
 39.27 kg/m 2 2.22 m 2 Preferred Pharmacy Pharmacy Name Phone [address], [place] [facility] [phone] Your Updated Medication List  
  
   
This list is accurate as of 4/19/18 10:33 AM.  Always use your most recent med list. amLODIPine 10 mg tablet Commonly known as:  Shakila Rutledge Take 1 Tab by mouth daily. cloNIDine HCl 0.2 mg tablet Commonly known as:  CATAPRES TK 1 T PO QHS FOR BLOOD PRESSURE  
  
 cyclobenzaprine 10 mg tablet Commonly known as:  FLEXERIL Take 1 Tab by mouth three (3) times daily as needed for Muscle Spasm(s). diclofenac 1 % Gel Commonly known as:  VOLTAREN Apply 4 g to affected area four (4) times daily. DIOVAN 320 mg tablet Generic drug:  valsartan Take 320 mg by mouth daily. diphenoxylate-atropine 2.5-0.025 mg per tablet Commonly known as:  LOMOTIL Take 1 Tab by mouth four (4) times daily as needed for Diarrhea (1 tab after each stool for max 8 per day). Max Daily Amount: 4 Tabs. Take after each stool for a maximum of 8 tablets daily  
  
 docusate sodium 100 mg capsule Commonly known as:  Dorene Felt Take 1 Cap by mouth two (2) times a day for 90 days. HumaLOG U-100 Insulin 100 unit/mL injection Generic drug:  insulin lispro  
by SubCUTAneous route. 8 units before breakfast 6 units before lunch 10 units before dinner Hydrocolloid Dressing 4 X 4 \" Bndg Commonly known as:  DUODERM CGF DRESSING  
1 Patch by Apply Externally route Every Mon, Wed and Sat. LANTUS SOLOSTAR U-100 INSULIN 100 unit/mL (3 mL) Inpn Generic drug:  insulin glargine  
by SubCUTAneous route. 44 units nightly LASIX 40 mg tablet Generic drug:  furosemide Take 20 mg by mouth daily. metoprolol succinate 50 mg XL tablet Commonly known as:  TOPROL-XL  
TAKE 1 TABLET BY MOUTH DAILY  
  
 omeprazole 20 mg capsule Commonly known as:  PRILOSEC Take 1 Cap by mouth daily. ondansetron 4 mg disintegrating tablet Commonly known as:  ZOFRAN ODT Take 1 Tab by mouth every eight (8) hours as needed for Nausea. polyethylene glycol 17 gram/dose powder Commonly known as:  Marcellina Dull MIX 1 CAPFUL WITH 8 OZ OF WATER AND DRINK BY MOUTH DAILY. rosuvastatin 20 mg tablet Commonly known as:  CRESTOR  
TAKE 1 TABLET BY MOUTH EVERY NIGHT Prescriptions Sent to Pharmacy Refills  
 omeprazole (PRILOSEC) 20 mg capsule 1 Sig: Take 1 Cap by mouth daily. Class: Normal  
 Pharmacy: [address] [place] [address] #: [phone] Route: Oral  
  
Follow-up Instructions Return in about 2 weeks (around 5/3/2018) for reflux. Patient Instructions Indigestion (Dyspepsia or Heartburn): Care Instructions Your Care Instructions Sometimes it can be hard to pinpoint the cause of indigestion. (It is also called dyspepsia or heartburn.) Most cases of an upset stomach with bloating, burning, burping, and nausea are minor and go away within several hours. Home treatment and over-the-counter medicine often are able to control symptoms. But if you take medicine to relieve your indigestion without making diet and lifestyle changes, your symptoms are likely to return again and again. If you get indigestion often, it may be a sign of a more serious medical problem. Be sure to follow up with your doctor, who may want to do tests to be sure of the cause of your indigestion. Follow-up care is a key part of your treatment and safety. Be sure to make and go to all appointments, and call your doctor if you are having problems. It's also a good idea to know your test results and keep a list of the medicines you take. How can you care for yourself at home? · Your doctor may recommend over-the-counter medicine. For mild or occasional indigestion, antacids such as Gaviscon, Mylanta, Maalox, or Tums, may help. Be safe with medicines. Be careful when you take over-the-counter antacid medicines.  Many of these medicines have aspirin in them. Read the label to make sure that you are not taking more than the recommended dose. Too much aspirin can be harmful. · Your doctor also may recommend over-the-counter acid reducers, such as Pepcid AC, Tagamet HB, Zantac 75, or Prilosec. Read and follow all instructions on the label. If you use these medicines often, talk with your doctor. · Change your eating habits. ¨ It's best to eat several small meals instead of two or three large meals. ¨ After you eat, wait 2 to 3 hours before you lie down. ¨ Chocolate, mint, and alcohol can make GERD worse. ¨ Spicy foods, foods that have a lot of acid (like tomatoes and oranges), and coffee can make GERD symptoms worse in some people. If your symptoms are worse after you eat a certain food, you may want to stop eating that food to see if your symptoms get better. · Do not smoke or chew tobacco. Smoking can make GERD worse. If you need help quitting, talk to your doctor about stop-smoking programs and medicines. These can increase your chances of quitting for good. · If you have GERD symptoms at night, raise the head of your bed 6 to 8 inches. You can do this by putting the frame on blocks or placing a foam wedge under the head of your mattress. (Adding extra pillows does not work.) · Do not wear tight clothing around your middle. · Lose weight if you need to. Losing just 5 to 10 pounds can help. · Do not take anti-inflammatory medicines, such as aspirin, ibuprofen (Advil, Motrin), or naproxen (Aleve). These can irritate the stomach. If you need a pain medicine, try acetaminophen (Tylenol), which does not cause stomach upset. When should you call for help? Call your doctor now or seek immediate medical care if: 
? · You have new or worse belly pain. ? · You are vomiting. ? Watch closely for changes in your health, and be sure to contact your doctor if: 
? · You have new or worse symptoms of indigestion. ? · You have trouble or pain swallowing. ? · You are losing weight. ? · You do not get better as expected. Where can you learn more? Go to http://arlette-michel.info/. Enter X155 in the search box to learn more about \"Indigestion (Dyspepsia or Heartburn): Care Instructions. \" Current as of: May 12, 2017 Content Version: 11.4 © 0163-3433 [employer]. Care instructions adapted under license by [employer] (which disclaims liability or warranty for this information). If you have questions about a medical condition or this instruction, always ask your healthcare professional. [address] [address] any warranty or liability for your use of this information. Introducing [facility]! [employer] introduces [employer] patient portal. Now you can access parts of your medical record, email your doctor's office, and request medication refills online. 1. In your internet browser, go to [email or web address] 2. Click on the First Time User? Click Here link in the Sign In box. You will see the New Member Sign Up page. 3. Enter your [employer] Access Code exactly as it appears below. You will not need to use this code after youve completed the sign-up process. If you do not sign up before the expiration date, you must request a new code. · [employer] Access Code: [email or web address] Expires: 6/6/2018  1:11 PM 
 
4. Enter the last four digits of your Social Security Number (xxxx) and Date of Birth (mm/dd/yyyy) as indicated and click Submit. You will be taken to the next sign-up page. 5. Create a [employer] ID. This will be your [employer] login ID and cannot be changed, so think of one that is secure and easy to remember. 6. Create a [employer] password. You can change your password at any time. 7. Enter your Password Reset Question and Answer. This can be used at a later time if you forget your password. 8. Enter your e-mail address.  You will receive e-mail notification when new information is available in [employer]. 9. Click Sign Up. You can now view and download portions of your medical record. 10. Click the Download Summary menu link to download a portable copy of your medical information. If you have questions, please visit the Frequently Asked Questions section of the [employer] website. Remember, [employer] is NOT to be used for urgent needs. For medical emergencies, dial 911. Now available from your iPhone and Android! Please provide this summary of care documentation to your next provider. Your primary care clinician is listed as [address]. If you have any questions after today's visit, please call [phone].

## 2018-04-19 NOTE — PROGRESS NOTES
HISTORY OF PRESENT ILLNESS  Nicol Merchant is a 66 y.o. female. HPI Comments: States she has been having epigastric burning for the last 2 weeks that occurs approximately 2 hours after eating. Denies nausea. Reports she has not been eating much due to reflux symptoms. Taking Pepcid without improvement. States symptoms worsened over the weekend. She took Mylanta which has helped the reflux but has caused diarrhea. Sitting up does help with symptoms. Denies water brash. Allergies   Allergen Reactions    Aspirin Other (comments)     Ringing in ears    Keflex [Cephalexin] Rash     Current Outpatient Prescriptions   Medication Sig Dispense Refill    polyethylene glycol (MIRALAX) 17 gram/dose powder MIX 1 CAPFUL WITH 8 OZ OF WATER AND DRINK BY MOUTH DAILY. 527 g 0    ondansetron (ZOFRAN ODT) 4 mg disintegrating tablet Take 1 Tab by mouth every eight (8) hours as needed for Nausea. 30 Tab 0    docusate sodium (COLACE) 100 mg capsule Take 1 Cap by mouth two (2) times a day for 90 days. 60 Cap 2    Hydrocolloid Dressing (DUODERM CGF DRESSING) 4 X 4 \" bndg 1 Patch by Apply Externally route Every Mon, Wed and Sat. 10 Each 1    cloNIDine HCl (CATAPRES) 0.2 mg tablet TK 1 T PO QHS FOR BLOOD PRESSURE  4    metoprolol succinate (TOPROL-XL) 50 mg XL tablet TAKE 1 TABLET BY MOUTH DAILY 90 Tab 6    rosuvastatin (CRESTOR) 20 mg tablet TAKE 1 TABLET BY MOUTH EVERY NIGHT 90 Tab 3    amLODIPine (NORVASC) 10 mg tablet Take 1 Tab by mouth daily. 30 Tab 6    furosemide (LASIX) 40 mg tablet Take 20 mg by mouth daily.  valsartan (DIOVAN) 320 mg tablet Take 320 mg by mouth daily.  insulin lispro (HUMALOG) 100 unit/mL injection by SubCUTAneous route. 8 units before breakfast  6 units before lunch  10 units before dinner      insulin glargine (LANTUS SOLOSTAR) 100 unit/mL (3 mL) pen by SubCUTAneous route.  44 units nightly      diphenoxylate-atropine (LOMOTIL) 2.5-0.025 mg per tablet Take 1 Tab by mouth four (4) times daily as needed for Diarrhea (1 tab after each stool for max 8 per day). Max Daily Amount: 4 Tabs. Take after each stool for a maximum of 8 tablets daily 20 Tab 0    cyclobenzaprine (FLEXERIL) 10 mg tablet Take 1 Tab by mouth three (3) times daily as needed for Muscle Spasm(s). 21 Tab 0    diclofenac (VOLTAREN) 1 % gel Apply 4 g to affected area four (4) times daily. 500 g 5     Past Medical History:   Diagnosis Date    Allergic rhinitis     Diabetes ([address]     H/O seasonal allergies     Hypertension      Review of Systems   Constitutional: Negative for chills and fever. Respiratory: Negative for shortness of breath. Cardiovascular: Negative for chest pain. Gastrointestinal: Positive for abdominal pain (epigastric) and heartburn. Negative for nausea and vomiting. Visit Vitals    /88 (BP 1 Location: Left arm, BP Patient Position: Sitting)    Pulse 90    Temp 98.1 °F (36.7 °C) (Oral)    Resp 18    Ht 5' 5\" (1.651 m)    Wt 236 lb (107 kg)    SpO2 96%    BMI 39.27 kg/m2     Physical Exam   Constitutional: She is oriented to person, place, and time. She appears well-developed and well-nourished. HENT:   Head: Normocephalic and atraumatic. Neck: Normal range of motion. Neck supple. Carotid bruit is not present. Cardiovascular: Normal rate, regular rhythm and normal heart sounds. Exam reveals no gallop and no friction rub. No murmur heard. Pulmonary/Chest: Effort normal and breath sounds normal. She has no wheezes. She has no rhonchi. She has no rales. Abdominal: Soft. Bowel sounds are normal. There is tenderness in the epigastric area. Neurological: She is alert and oriented to person, place, and time. Skin: Skin is warm and dry. ASSESSMENT and PLAN    ICD-10-CM ICD-9-CM    1.  Dyspepsia R10.13 536.8 omeprazole (PRILOSEC) 20 mg capsule   alarm signs when to seek emergent care provided and reviewed   I have discussed the diagnosis with the patient and the intended plan as seen in the above orders. The patient has received an after-visit summary and questions were answered concerning future plans. I have discussed medication side effects and warnings with the patient as well. Patient agreeable with above plan and verbalizes understanding. Follow-up Disposition:  Return in about 2 weeks (around 5/3/2018) for reflux.

## 2018-04-25 ENCOUNTER — HOSPITAL ENCOUNTER (OUTPATIENT)
Dept: LAB | Age: 78
Discharge: HOME | End: 2018-04-25
Payer: MEDICARE

## 2018-04-25 DIAGNOSIS — R80.1 PERSISTENT PROTEINURIA: ICD-10-CM

## 2018-04-25 DIAGNOSIS — N18.30 CHRONIC KIDNEY DISEASE, STAGE III (MODERATE) (HCC): ICD-10-CM

## 2018-04-25 DIAGNOSIS — I12.9 MALIGNANT HYPERTENSIVE KIDNEY DISEASE WITH CHRONIC KIDNEY DISEASE STAGE I THROUGH STAGE IV, OR UNSPECIFIED(403.00): ICD-10-CM

## 2018-04-25 DIAGNOSIS — E11.22 TYPE 2 DIABETES MELLITUS WITH ESRD (END-STAGE RENAL DISEASE) (HCC): ICD-10-CM

## 2018-04-25 DIAGNOSIS — E21.0 PRIMARY HYPERPARATHYROIDISM (HCC): ICD-10-CM

## 2018-04-25 DIAGNOSIS — Z79.4 ENCOUNTER FOR LONG-TERM (CURRENT) USE OF INSULIN (HCC): ICD-10-CM

## 2018-04-25 DIAGNOSIS — N18.6 TYPE 2 DIABETES MELLITUS WITH ESRD (END-STAGE RENAL DISEASE) (HCC): ICD-10-CM

## 2018-04-25 LAB
ALBUMIN SERPL-MCNC: 3.4 G/DL (ref 3.4–5)
ANION GAP SERPL CALC-SCNC: 7 MMOL/L (ref 3–18)
BUN SERPL-MCNC: 33 MG/DL (ref 7–18)
BUN/CREAT SERPL: 11 (ref 12–20)
CALCIUM SERPL-MCNC: 9.6 MG/DL (ref 8.5–10.1)
CHLORIDE SERPL-SCNC: 109 MMOL/L (ref 100–108)
CO2 SERPL-SCNC: 25 MMOL/L (ref 21–32)
CREAT SERPL-MCNC: 2.89 MG/DL (ref 0.6–1.3)
GLUCOSE SERPL-MCNC: 209 MG/DL (ref 74–99)
PHOSPHATE SERPL-MCNC: 3.1 MG/DL (ref 2.5–4.9)
POTASSIUM SERPL-SCNC: 4.1 MMOL/L (ref 3.5–5.5)
SODIUM SERPL-SCNC: 141 MMOL/L (ref 136–145)

## 2018-04-25 PROCEDURE — 80069 RENAL FUNCTION PANEL: CPT | Performed by: INTERNAL MEDICINE

## 2018-04-25 PROCEDURE — 36415 COLL VENOUS BLD VENIPUNCTURE: CPT | Performed by: INTERNAL MEDICINE

## 2018-05-04 ENCOUNTER — OFFICE VISIT (OUTPATIENT)
Dept: ORTHOPEDIC SURGERY | Age: 78
End: 2018-05-04

## 2018-05-04 VITALS
TEMPERATURE: 97.9 F | OXYGEN SATURATION: 97 % | DIASTOLIC BLOOD PRESSURE: 75 MMHG | WEIGHT: 233 LBS | HEIGHT: 65 IN | SYSTOLIC BLOOD PRESSURE: 166 MMHG | HEART RATE: 73 BPM | BODY MASS INDEX: 38.82 KG/M2

## 2018-05-04 DIAGNOSIS — M25.551 RIGHT HIP PAIN: Primary | ICD-10-CM

## 2018-05-04 DIAGNOSIS — M70.61 TROCHANTERIC BURSITIS OF RIGHT HIP: ICD-10-CM

## 2018-05-04 RX ORDER — BETAMETHASONE SODIUM PHOSPHATE AND BETAMETHASONE ACETATE 3; 3 MG/ML; MG/ML
6 INJECTION, SUSPENSION INTRA-ARTICULAR; INTRALESIONAL; INTRAMUSCULAR; SOFT TISSUE ONCE
Qty: 1 ML | Refills: 0
Start: 2018-05-04 | End: 2018-05-04

## 2018-05-04 NOTE — PROGRESS NOTES
[address] [facility] ([place], [address]  [phone]           Patient: Elan Germain                MRN: [id number]       SSN: [id number]  YOB: 1940        AGE: 66 y.o. SEX: female  Body mass index is 38.77 kg/(m^2). PCP: Daily Pollard MD  05/04/18      This office note has been dictated. REVIEW OF SYSTEMS:  Constitutional: Negative for fever, chills, weight loss and malaise/fatigue. HENT: Negative. Eyes: Negative. Respiratory: Negative. Cardiovascular: Negative. Gastrointestinal: No bowel incontinence or constipation. Genitourinary: No bladder incontinence or saddle anesthesia. Skin: Negative. Neurological: Negative. Endo/Heme/Allergies: Negative. Psychiatric/Behavioral: Negative. Musculoskeletal: As per HPI above. Past Medical History:   Diagnosis Date    Allergic rhinitis     Diabetes ([facility] [address]     H/O seasonal allergies     Hypertension          Current Outpatient Prescriptions:     omeprazole (PRILOSEC) 20 mg capsule, Take 1 Cap by mouth daily. , Disp: 30 Cap, Rfl: 1    polyethylene glycol (MIRALAX) 17 gram/dose powder, MIX 1 CAPFUL WITH 8 OZ OF WATER AND DRINK BY MOUTH DAILY. , Disp: 527 g, Rfl: 0    ondansetron (ZOFRAN ODT) 4 mg disintegrating tablet, Take 1 Tab by mouth every eight (8) hours as needed for Nausea., Disp: 30 Tab, Rfl: 0    diphenoxylate-atropine (LOMOTIL) 2.5-0.025 mg per tablet, Take 1 Tab by mouth four (4) times daily as needed for Diarrhea (1 tab after each stool for max 8 per day). Max Daily Amount: 4 Tabs.  Take after each stool for a maximum of 8 tablets daily, Disp: 20 Tab, Rfl: 0    Hydrocolloid Dressing (DUODERM CGF DRESSING) 4 X 4 \" bndg, 1 Patch by Apply Externally route Every Mon, Wed and Sat., Disp: 10 Each, Rfl: 1    cloNIDine HCl (CATAPRES) 0.2 mg tablet, TK 1 T PO QHS FOR BLOOD PRESSURE, Disp: , Rfl: 4    cyclobenzaprine (FLEXERIL) 10 mg tablet, Take 1 Tab by mouth three (3) times daily as needed for Muscle Spasm(s). , Disp: 21 Tab, Rfl: 0    metoprolol succinate (TOPROL-XL) 50 mg XL tablet, TAKE 1 TABLET BY MOUTH DAILY, Disp: 90 Tab, Rfl: 6    diclofenac (VOLTAREN) 1 % gel, Apply 4 g to affected area four (4) times daily. , Disp: 500 g, Rfl: 5    rosuvastatin (CRESTOR) 20 mg tablet, TAKE 1 TABLET BY MOUTH EVERY NIGHT, Disp: 90 Tab, Rfl: 3    amLODIPine (NORVASC) 10 mg tablet, Take 1 Tab by mouth daily. , Disp: 30 Tab, Rfl: 6    furosemide (LASIX) 40 mg tablet, Take 20 mg by mouth daily. , Disp: , Rfl:     valsartan (DIOVAN) 320 mg tablet, Take 320 mg by mouth daily. , Disp: , Rfl:     insulin lispro (HUMALOG) 100 unit/mL injection, by SubCUTAneous route. 8 units before breakfast 6 units before lunch 10 units before dinner, Disp: , Rfl:     insulin glargine (LANTUS SOLOSTAR) 100 unit/mL (3 mL) pen, by SubCUTAneous route.  44 units nightly, Disp: , Rfl:     Allergies   Allergen Reactions    Aspirin Other (comments)     Ringing in ears    Keflex [Cephalexin] Rash       Social History     Social History    Marital status:      Spouse name: N/A    Number of children: N/A    Years of education: N/A     Occupational History    CNA - RET      Social History Main Topics    Smoking status: Former Smoker    Smokeless tobacco: Never Used    Alcohol use No    Drug use: No    Sexual activity: Yes     Partners: Male     Birth control/ protection: None     Other Topics Concern    Not on file     Social History Narrative       Past Surgical History:   Procedure Laterality Date    HX BACK SURGERY      HX CATARACT REMOVAL  2011    HX LUMBAR FUSION  2004    HX WISDOM TEETH EXTRACTION             * Patient was identified by name and date of birth   * Agreement on procedure being performed was verified  * Risks and Benefits explained to the patient  * Procedure site verified and marked as necessary  * Patient was positioned for comfort  * Consent was signed and verified  10:11 AM    The patient was instructed on post injection care. We did see Ms. Vinson today in the office for followup with regards to complaints of laterally-based, right hip pain. The patient has had some bursitis in the past on the left side. She had an injection, which helped her. Over the past couple of weeks, the right hip has been bothering her worse when she has been ambulating, as well as lying on the right side at night. She denies any groin pain or thigh pain. She does report a little bit of a buttocks pain at times. She has had no change in her bowel or bladder habits and no fevers, chills, systemic changes, or injuries to report. PHYSICAL EXAMINATION:  In general, the patient is alert and oriented x 3 in no acute distress. The patient is well-developed, well-nourished, with a normal affect. The patient is afebrile. HEENT:  Head is normocephalic and atraumatic. Pupils are equally round and reactive to light and accommodation. Extraocular eye movements are intact. Neck is supple. Trachea is midline. No JVD is present. Breathing is nonlabored. Examination of the lower extremities reveals pain-free range of motion of the hips. She does have discomfort to palpation of the greater trochanteric bursa on the right side. There is negative straight leg raise. There is negative calf tenderness. There is negative Jean Claudes. There is no evidence of DVT present. RADIOGRAPHS:  Radiographs in the office today, including AP of the pelvis and AP and cross table of the right hip, show moderate arthritis, medial wear, without acute abnormalities noted. ASSESSMENT:      1. 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 2. Trochanteric bursitis, right hip. PLAN:  At this point, we are going to move forward with a cortisone injection for the right hip today.   Under aseptic conditions, after informed and written consent, and appropriate time out performed, the right hip was prepped with Betadine and 6 mg of Celestone was injected without complications. The patient tolerated the injection well. The patient was instructed on post injection care. We will see her back in one months time for evaluation and to  the efficacy of the cortisone injection. We will plan on doing x-rays of her lumbar spine at that point.                          JR Juan Luis LEE, BETH, ATC

## 2018-05-22 ENCOUNTER — HOSPITAL ENCOUNTER (OUTPATIENT)
Dept: LAB | Age: 78
Discharge: HOME | End: 2018-05-22
Payer: MEDICARE

## 2018-05-22 DIAGNOSIS — N25.81 HYPERPARATHYROIDISM DUE TO RENAL INSUFFICIENCY (HCC): ICD-10-CM

## 2018-05-22 DIAGNOSIS — I12.9 MALIGNANT HYPERTENSIVE KIDNEY DISEASE WITH CHRONIC KIDNEY DISEASE STAGE I THROUGH STAGE IV, OR UNSPECIFIED(403.00): ICD-10-CM

## 2018-05-22 DIAGNOSIS — R80.1 PERSISTENT PROTEINURIA, UNSPECIFIED: ICD-10-CM

## 2018-05-22 DIAGNOSIS — Z79.4 ENCOUNTER FOR LONG-TERM (CURRENT) INSULIN USE (HCC): ICD-10-CM

## 2018-05-22 DIAGNOSIS — N18.4 CHRONIC KIDNEY DISEASE, STAGE IV (SEVERE) (HCC): ICD-10-CM

## 2018-05-22 DIAGNOSIS — E11.22 TYPE 2 DIABETES MELLITUS WITH DIABETIC CHRONIC KIDNEY DISEASE (HCC): ICD-10-CM

## 2018-05-22 LAB
ALBUMIN SERPL-MCNC: 3.2 G/DL (ref 3.4–5)
ANION GAP SERPL CALC-SCNC: 8 MMOL/L (ref 3–18)
BUN SERPL-MCNC: 40 MG/DL (ref 7–18)
BUN/CREAT SERPL: 13 (ref 12–20)
CALCIUM SERPL-MCNC: 9.3 MG/DL (ref 8.5–10.1)
CHLORIDE SERPL-SCNC: 107 MMOL/L (ref 100–108)
CO2 SERPL-SCNC: 26 MMOL/L (ref 21–32)
CREAT SERPL-MCNC: 3 MG/DL (ref 0.6–1.3)
GLUCOSE SERPL-MCNC: 153 MG/DL (ref 74–99)
PHOSPHATE SERPL-MCNC: 3.2 MG/DL (ref 2.5–4.9)
POTASSIUM SERPL-SCNC: 3.8 MMOL/L (ref 3.5–5.5)
SODIUM SERPL-SCNC: 141 MMOL/L (ref 136–145)

## 2018-05-22 PROCEDURE — 80069 RENAL FUNCTION PANEL: CPT | Performed by: INTERNAL MEDICINE

## 2018-05-22 PROCEDURE — 36415 COLL VENOUS BLD VENIPUNCTURE: CPT | Performed by: INTERNAL MEDICINE

## 2018-05-25 NOTE — TELEPHONE ENCOUNTER
Requested Prescriptions     Pending Prescriptions Disp Refills    rosuvastatin (CRESTOR) 20 mg tablet 90 Tab 3

## 2018-05-26 RX ORDER — ROSUVASTATIN CALCIUM 20 MG/1
TABLET, COATED ORAL
Qty: 90 TAB | Refills: 3 | Status: SHIPPED | OUTPATIENT
Start: 2018-05-26 | End: 2019-05-14 | Stop reason: SDUPTHER

## 2018-06-01 ENCOUNTER — OFFICE VISIT (OUTPATIENT)
Dept: ORTHOPEDIC SURGERY | Age: 78
End: 2018-06-01

## 2018-06-01 VITALS
BODY MASS INDEX: 38.65 KG/M2 | SYSTOLIC BLOOD PRESSURE: 184 MMHG | WEIGHT: 232 LBS | TEMPERATURE: 97.1 F | DIASTOLIC BLOOD PRESSURE: 79 MMHG | HEIGHT: 65 IN | OXYGEN SATURATION: 96 % | HEART RATE: 63 BPM | RESPIRATION RATE: 16 BRPM

## 2018-06-01 DIAGNOSIS — M51.37 DEGENERATIVE DISC DISEASE AT L5-S1 LEVEL: ICD-10-CM

## 2018-06-01 DIAGNOSIS — M70.61 TROCHANTERIC BURSITIS OF RIGHT HIP: ICD-10-CM

## 2018-06-01 DIAGNOSIS — M54.5 BILATERAL LOW BACK PAIN, UNSPECIFIED CHRONICITY, WITH SCIATICA PRESENCE UNSPECIFIED: Primary | ICD-10-CM

## 2018-06-01 RX ORDER — BETAMETHASONE SODIUM PHOSPHATE AND BETAMETHASONE ACETATE 3; 3 MG/ML; MG/ML
6 INJECTION, SUSPENSION INTRA-ARTICULAR; INTRALESIONAL; INTRAMUSCULAR; SOFT TISSUE ONCE
Qty: 1 ML | Refills: 0
Start: 2018-06-01 | End: 2018-06-01

## 2018-06-01 NOTE — PROGRESS NOTES
[address] [facility], [address] [facility]  [phone]           Patient: Ayleen Stanton                MRN: [id number]       SSN: [id number]  YOB: 1940        AGE: 66 y.o. SEX: female  Body mass index is 38.61 kg/(m^2). PCP: Abel Luong MD  06/01/18      This office note has been dictated. REVIEW OF SYSTEMS:  Constitutional: Negative for fever, chills, weight loss and malaise/fatigue. HENT: Negative. Eyes: Negative. Respiratory: Negative. Cardiovascular: Negative. Gastrointestinal: No bowel incontinence or constipation. Genitourinary: No bladder incontinence or saddle anesthesia. Skin: Negative. Neurological: Negative. Endo/Heme/Allergies: Negative. Psychiatric/Behavioral: Negative. Musculoskeletal: As per HPI above. Past Medical History:   Diagnosis Date    Allergic rhinitis     Diabetes ([facility] [address]     H/O seasonal allergies     Hypertension          Current Outpatient Prescriptions:     betamethasone (CELESTONE SOLUSPAN) 6 mg/mL injection, 1 mL by IntraBURSal route once for 1 dose., Disp: 1 mL, Rfl: 0    rosuvastatin (CRESTOR) 20 mg tablet, TAKE 1 TABLET BY MOUTH EVERY NIGHT, Disp: 90 Tab, Rfl: 3    polyethylene glycol (MIRALAX) 17 gram/dose powder, MIX 1 CAPFUL WITH 8 OZ OF WATER AND DRINK BY MOUTH DAILY. , Disp: 527 g, Rfl: 0    cloNIDine HCl (CATAPRES) 0.2 mg tablet, TK 1 T PO QHS FOR BLOOD PRESSURE, Disp: , Rfl: 4    metoprolol succinate (TOPROL-XL) 50 mg XL tablet, TAKE 1 TABLET BY MOUTH DAILY, Disp: 90 Tab, Rfl: 6    amLODIPine (NORVASC) 10 mg tablet, Take 1 Tab by mouth daily. , Disp: 30 Tab, Rfl: 6    furosemide (LASIX) 40 mg tablet, Take 20 mg by mouth daily. , Disp: , Rfl:     valsartan (DIOVAN) 320 mg tablet, Take 320 mg by mouth daily. , Disp: , Rfl:     insulin lispro (HUMALOG) 100 unit/mL injection, by SubCUTAneous route.  8 units before breakfast 6 units before lunch 10 units before dinner, Disp: , Rfl:     insulin glargine (LANTUS SOLOSTAR) 100 unit/mL (3 mL) pen, by SubCUTAneous route. 44 units nightly, Disp: , Rfl:     omeprazole (PRILOSEC) 20 mg capsule, Take 1 Cap by mouth daily. , Disp: 30 Cap, Rfl: 1    ondansetron (ZOFRAN ODT) 4 mg disintegrating tablet, Take 1 Tab by mouth every eight (8) hours as needed for Nausea., Disp: 30 Tab, Rfl: 0    diphenoxylate-atropine (LOMOTIL) 2.5-0.025 mg per tablet, Take 1 Tab by mouth four (4) times daily as needed for Diarrhea (1 tab after each stool for max 8 per day). Max Daily Amount: 4 Tabs. Take after each stool for a maximum of 8 tablets daily, Disp: 20 Tab, Rfl: 0    Hydrocolloid Dressing (DUODERM CGF DRESSING) 4 X 4 \" bndg, 1 Patch by Apply Externally route Every Mon, Wed and Sat., Disp: 10 Each, Rfl: 1    cyclobenzaprine (FLEXERIL) 10 mg tablet, Take 1 Tab by mouth three (3) times daily as needed for Muscle Spasm(s). , Disp: 21 Tab, Rfl: 0    diclofenac (VOLTAREN) 1 % gel, Apply 4 g to affected area four (4) times daily. , Disp: 500 g, Rfl: 5    Allergies   Allergen Reactions    Aspirin Other (comments)     Ringing in ears    Keflex [Cephalexin] Rash       Social History     Social History    Marital status:      Spouse name: N/A    Number of children: N/A    Years of education: N/A     Occupational History    CNA - [employer]      Social History Main Topics    Smoking status: Former Smoker    Smokeless tobacco: Never Used    Alcohol use No    Drug use: No    Sexual activity: Yes     Partners: Male     Birth control/ protection: None     Other Topics Concern    Not on file     Social History Narrative       Past Surgical History:   Procedure Laterality Date    HX BACK SURGERY      HX CATARACT REMOVAL  2011    HX LUMBAR FUSION  2004    HX WISDOM TEETH EXTRACTION             * Patient was identified by name and date of birth   * Agreement on procedure being performed was verified  * Risks and Benefits explained to the patient  * Procedure site verified and marked as necessary  * Patient was positioned for comfort  * Consent was signed and verified  11:48 AM    The patient was instructed on post injection care. We did see Ms. Radha Vides for followup in regard to her low back and right hip. Patient has a long-standing history of back troubles. She has had multiple surgeries done of her spine done up in [place]. She reports a little bit of low back discomfort, little buttock discomfort at times. She is having right laterally based hip discomfort with a history of bursitis. She denies any radiating pain of her lower extremities. No fevers or chills, systemic changes, or injuries to report  No change in bowel or bladder. PHYSICAL EXAMINATION:  On examination, alert and oriented x3, in no acute distress, alert, afebrile. Breathing is unlabored. Examination of back, skin is intact. There is no ecchymosis, no warmth, and no signs of infection or cellulitis. There is no pain with rotation of the either hip. She does have discomfort to palpation of the greater trochanteric bursa on the right side. Negative straight leg raise. Negative calf tenderness. There is no evidence of DVT present. RADIOGRAPHS:  AP and lateral of the lumbar spine shows multilevel fusion, L1-2, L2-3, L3-4, L4-5, and L5-S1. Hardware in place with the stimulator noted. No acute abnormalities noted. She also has degenerative changes above the fusion noted. ASSESSMENT:    1. Lumbar degenerative disk disease, status post fusion. 2. Right hip trochanteric bursitis. PLAN:  At this point, we are going to refer to the spine center with regard to her back. With regard to the hip, we are going to move forward with a cortisone injection for the right hip today.   The last injection worked well however it did not last.  After informed and written consent, under aseptic conditions, and appropriate time out performed, the right hip was prepped with Betadine and 6 mg of Celestone was injected without complications. The patient tolerated the injection well. The patient was instructed on post injection care. We will see her back in three months' time for evaluation and consider repeat injection at that point. She will call with any questions or should concerns arise.                     JR Juan Luis LEE, PAJjC, ATC

## 2018-06-21 ENCOUNTER — OFFICE VISIT (OUTPATIENT)
Dept: FAMILY MEDICINE CLINIC | Age: 78
End: 2018-06-21

## 2018-06-21 ENCOUNTER — HOSPITAL ENCOUNTER (OUTPATIENT)
Dept: LAB | Age: 78
Discharge: HOME | End: 2018-06-21
Payer: MEDICARE

## 2018-06-21 VITALS
BODY MASS INDEX: 38.59 KG/M2 | TEMPERATURE: 98.4 F | OXYGEN SATURATION: 98 % | DIASTOLIC BLOOD PRESSURE: 80 MMHG | SYSTOLIC BLOOD PRESSURE: 140 MMHG | HEART RATE: 83 BPM | WEIGHT: 231.6 LBS | RESPIRATION RATE: 16 BRPM | HEIGHT: 65 IN

## 2018-06-21 DIAGNOSIS — B37.31 VAGINAL CANDIDIASIS: Primary | ICD-10-CM

## 2018-06-21 DIAGNOSIS — R35.0 INCREASED URINARY FREQUENCY: ICD-10-CM

## 2018-06-21 DIAGNOSIS — R10.13 DYSPEPSIA: ICD-10-CM

## 2018-06-21 LAB
BILIRUB UR QL STRIP: NEGATIVE
GLUCOSE UR-MCNC: NEGATIVE MG/DL
KETONES P FAST UR STRIP-MCNC: NEGATIVE MG/DL
PH UR STRIP: 5.5 [PH] (ref 4.6–8)
PROT UR QL STRIP: NORMAL
SP GR UR STRIP: 1.02 (ref 1–1.03)
UA UROBILINOGEN AMB POC: NORMAL (ref 0.2–1)
URINALYSIS CLARITY POC: NORMAL
URINALYSIS COLOR POC: NORMAL
URINE BLOOD POC: NORMAL
URINE LEUKOCYTES POC: NEGATIVE
URINE NITRITES POC: NEGATIVE

## 2018-06-21 PROCEDURE — 87086 URINE CULTURE/COLONY COUNT: CPT | Performed by: NURSE PRACTITIONER

## 2018-06-21 RX ORDER — FLUCONAZOLE 150 MG/1
150 TABLET ORAL
Qty: 2 TAB | Refills: 0 | Status: SHIPPED | OUTPATIENT
Start: 2018-06-21 | End: 2018-06-29

## 2018-06-21 RX ORDER — OMEPRAZOLE 20 MG/1
20 CAPSULE, DELAYED RELEASE ORAL DAILY
Qty: 30 CAP | Refills: 3 | Status: SHIPPED | OUTPATIENT
Start: 2018-06-21 | End: 2018-06-26 | Stop reason: SDUPTHER

## 2018-06-21 NOTE — MR AVS SNAPSHOT
[facility] 
 
 
 [address] [address] [address] [address] 
[phone] Patient: Bess Thrasher MRN: [id number] CER:[birth year] Visit Information Date & Time Provider Department Dept. Phone Encounter #  
 6/21/2018 12:00 PM Tobi Ayala, [address] [phone] [id number] Follow-up Instructions Return in about 4 weeks (around 7/19/2018), or if symptoms worsen or fail to improve, for episodes of urinary incontinence. Your Appointments 6/29/2018 11:20 AM  
Follow Up with Judith Silva PA-C  
[facility] [place] ([facility]) Appt Note: rt hip f/u  
 [address], [address] [place] [address]  
[phone] [address] [place] [address]  
  
    
 7/12/2018  1:00 PM  
New Patient with Magui Teague MD  
[facility] [address]) Appt Note: Degenerative disc disease at L5-S1 level/ ref by Judith Silva PA-C/ *Advised the patient to come 30 minutes prior to their appointment with their picture I.D, Insurance cards and a list of their medications & dosage to the [facility] location [facility] [address] [facility] [address]  
[phone]  
  
   
 . [facility] [address] [facility] [address] Upcoming Health Maintenance Date Due  
 LIPID PANEL Q1 1940 DTaP/Tdap/Td series (1 - Tdap) 2/6/1961 ZOSTER VACCINE AGE 60> 12/6/1999 Bone Densitometry (Dexa) Screening 2/6/2005 Pneumococcal 65+ High/Highest Risk (1 of 2 - PCV13) 2/6/2005 HEMOGLOBIN A1C Q6M 10/21/2017 MEDICARE YEARLY EXAM 3/15/2018 FOOT EXAM Q1 3/15/2018 MICROALBUMIN Q1 4/21/2018 Influenza Age 5 to Adult 8/1/2018 EYE EXAM RETINAL OR DILATED Q1 3/12/2019 GLAUCOMA SCREENING Q2Y 3/12/2020 Allergies as of 6/21/2018  Review Complete On: 6/21/2018 By: Kaleigh Agee LPN  
  
 Severity Noted Reaction Type Reactions Aspirin  03/01/2017    Other (comments) Ringing in ears Keflex [Cephalexin]  03/01/2017    Rash Current Immunizations  Never Reviewed No immunizations on file. Not reviewed this visit You Were Diagnosed With   
  
 Codes Comments Vaginal candidiasis    -  Primary ICD-10-CM: B37.3 ICD-9-CM: 112.1 Dyspepsia     ICD-10-CM: R10.13 ICD-9-CM: 536.8 Vitals BP Pulse Temp Resp Height(growth percentile) Weight(growth percentile) 150/90 (BP 1 Location: Left arm, BP Patient Position: Sitting) 83 98.4 °F (36.9 °C) (Oral) 16 5' 5\" (1.651 m) 231 lb 9.6 oz (105.1 kg) SpO2 BMI OB Status Smoking Status 98% 38.54 kg/m2 Menopause Former Smoker BMI and BSA Data Body Mass Index Body Surface Area 38.54 kg/m 2 2.2 m 2 Preferred Pharmacy Pharmacy Name Phone [address], [place]unier Sarabia [address] [place] [address] [phone] [facility] [phone] Your Updated Medication List  
  
   
This list is accurate as of 6/21/18 12:35 PM.  Always use your most recent med list. amLODIPine 10 mg tablet Commonly known as:  Mcknight Tanya Take 1 Tab by mouth daily. cloNIDine HCl 0.2 mg tablet Commonly known as:  CATAPRES TK 1 T PO QHS FOR BLOOD PRESSURE  
  
 cyclobenzaprine 10 mg tablet Commonly known as:  FLEXERIL Take 1 Tab by mouth three (3) times daily as needed for Muscle Spasm(s). diclofenac 1 % Gel Commonly known as:  VOLTAREN Apply 4 g to affected area four (4) times daily. DIOVAN 320 mg tablet Generic drug:  valsartan Take 320 mg by mouth daily. diphenoxylate-atropine 2.5-0.025 mg per tablet Commonly known as:  LOMOTIL Take 1 Tab by mouth four (4) times daily as needed for Diarrhea (1 tab after each stool for max 8 per day). Max Daily Amount: 4 Tabs. Take after each stool for a maximum of 8 tablets daily  
  
 fluconazole 150 mg tablet Commonly known as:  DIFLUCAN Take 1 Tab by mouth every seven (7) days for 2 doses. HumaLOG U-100 Insulin 100 unit/mL injection Generic drug:  insulin lispro  
by SubCUTAneous route. 6 units before breakfast 6 units before lunch 6 units before dinner Hydrocolloid Dressing 4 X 4 \" Bndg Commonly known as:  DUODERM CGF DRESSING  
1 Patch by Apply Externally route Every Mon, Wed and Sat. LANTUS SOLOSTAR U-100 INSULIN 100 unit/mL (3 mL) Inpn Generic drug:  insulin glargine  
by SubCUTAneous route. 35 units nightly LASIX 40 mg tablet Generic drug:  furosemide Take 20 mg by mouth daily. metoprolol succinate 50 mg XL tablet Commonly known as:  TOPROL-XL  
TAKE 1 TABLET BY MOUTH DAILY  
  
 omeprazole 20 mg capsule Commonly known as:  PRILOSEC Take 1 Cap by mouth daily. ondansetron 4 mg disintegrating tablet Commonly known as:  ZOFRAN ODT Take 1 Tab by mouth every eight (8) hours as needed for Nausea. polyethylene glycol 17 gram/dose powder Commonly known as:  Holderness Gram MIX 1 CAPFUL WITH 8 OZ OF WATER AND DRINK BY MOUTH DAILY. rosuvastatin 20 mg tablet Commonly known as:  CRESTOR  
TAKE 1 TABLET BY MOUTH EVERY NIGHT Prescriptions Sent to Pharmacy Refills  
 omeprazole (PRILOSEC) 20 mg capsule 3 Sig: Take 1 Cap by mouth daily. Class: Normal  
 Pharmacy: [address] [place] [address] Ph #: [phone] Route: Oral  
 fluconazole (DIFLUCAN) 150 mg tablet 0 Sig: Take 1 Tab by mouth every seven (7) days for 2 doses. Class: Normal  
 Pharmacy: [address] [place] [address] Ph #: [phone] Route: Oral  
  
Follow-up Instructions Return in about 4 weeks (around 7/19/2018), or if symptoms worsen or fail to improve, for episodes of urinary incontinence. Patient Instructions Candidiasis: Care Instructions Your Care Instructions Candidiasis (say \"sta-foy-OJ-uh-bruce\") is a yeast infection. Yeast normally lives in your body. But it can cause problems if your body's defenses don't work as they should. Some medicines can increase your chance of getting a yeast infection. These include antibiotics, steroids, and cancer drugs. And some diseases like AIDS and diabetes can make you more likely to get yeast infections. There are different types of yeast infections. Jose Carballocci is a yeast infection in the mouth. It usually occurs in people with weak immune systems. It causes white patches inside the mouth and throat. Yeast infections of the skin usually occur in skin folds where the skin stays moist. They cause red, oozing patches on your skin. Babies can get these infections under the diaper. People who often wear gloves can get them on their hands. Many women get vaginal yeast infections. They are most common when women take antibiotics. These infections can cause the vagina to itch and burn. They also cause white discharge that looks like cottage cheese. In rare cases, yeast infects the blood. This can cause serious disease. This kind of infection is treated with medicine given through a needle into a vein (IV). After you start treatment, a yeast infection usually goes away quickly. But if your immune system is weak, the infection may come back. Tell your doctor if you get yeast infections often. Follow-up care is a key part of your treatment and safety. Be sure to make and go to all appointments, and call your doctor if you are having problems. It's also a good idea to know your test results and keep a list of the medicines you take. How can you care for yourself at home? · Take your medicines exactly as prescribed. Call your doctor if you think you are having a problem with your medicine. · Use antibiotics only as directed by your doctor. · Eat yogurt with live cultures. It has bacteria called lactobacillus. It may help prevent some types of yeast infections. · Keep your skin clean and dry. Put powder on moist places. · If you are using a cream or suppository to treat a vaginal yeast infection, don't use condoms or a diaphragm. Use a different type of birth control. · Eat a healthy diet and get regular exercise. This will help keep your immune system strong. When should you call for help? Watch closely for changes in your health, and be sure to contact your doctor if: 
? · You do not get better as expected. Where can you learn more? Go to http://arlette-michel.info/. Enter K102 in the search box to learn more about \"Candidiasis: Care Instructions. \" Current as of: October 13, 2016 Content Version: 11.4 © 7501-7861 [employer]. Care instructions adapted under license by [employer] (which disclaims liability or warranty for this information). If you have questions about a medical condition or this instruction, always ask your healthcare professional. [address] [address] any warranty or liability for your use of this information. Kegel Exercises: Care Instructions Your Care Instructions Kegel exercises strengthen muscles around the bladder. These muscles control the flow of urine. Kegel exercises are sometime called \"pelvic floor\" exercises. They can help prevent urine leakage and keep the pelvic organs in place. A woman who just had a baby might want to try Kegel exercises. They can strengthen pelvic muscles that have been weakened by pregnancy and childbirth. A man or woman may use Kegel exercises to treat urine leakage. You do Kegel exercises by tightening the muscles you use when you urinate. You will likely need to do these exercises for several weeks to get better. Follow-up care is a key part of your treatment and safety.  Be sure to make and go to all appointments, and call your doctor if you are having problems. It's also a good idea to know your test results and keep a list of the medicines you take. How can you care for yourself at home? · Do Kegel exercises. ¨ Find the muscles you need to strengthen. To do this, tighten the muscles that stop your urine while you are going to the bathroom. These are the same muscles you squeeze during Kegel exercises. ¨ Squeeze the muscles as hard as you can. Your belly and thighs should not move. ¨ Hold the squeeze for 3 seconds. Then relax for 3 seconds. ¨ Start with 3 seconds, and then add 1 second each week until you are able to squeeze for 10 seconds. ¨ Repeat the exercise 10 to 15 times for each session. Do three or more sessions each day. · You can check to see if you are using the right muscles. Place a finger in your vagina and squeeze around it. You are doing them right when you feel pressure around your finger. Your doctor may also suggest that you put special weights in your vagina while you do the exercises. · Do not smoke. It can irritate the bladder. If you need help quitting, talk to your doctor about stop-smoking programs and medicines. These can increase your chances of quitting for good. Where can you learn more? Go to http://arlette-michel.info/. Enter K455 in the search box to learn more about \"Kegel Exercises: Care Instructions. \" Current as of: May 12, 2017 Content Version: 11.4 © 0542-9984 [employer]. Care instructions adapted under license by [employer] (which disclaims liability or warranty for this information). If you have questions about a medical condition or this instruction, always ask your healthcare professional. [address] any warranty or liability for your use of this information. Stress Incontinence in Women: Care Instructions Your Care Instructions Stress incontinence is the accidental release of urine caused by activities that put pressure on your bladder. It may happen most often when you sneeze, cough, laugh, jog, or lift something heavy. This condition does not cause major health problems, but it can be embarrassing and interfere with your life. Treatment can cure or improve your symptoms. Follow-up care is a key part of your treatment and safety. Be sure to make and go to all appointments, and call your doctor if you are having problems. It's also a good idea to know your test results and keep a list of the medicines you take. How can you care for yourself at home? · Take your medicines exactly as prescribed. Call your doctor if you think you are having a problem with your medicine. · Limit caffeine and alcohol. They make you urinate more. · Do pelvic floor (Kegel) exercises, which tighten and strengthen pelvic muscles. To do Kegel exercises: 
¨ Squeeze the same muscles you would use to stop your urine. Your belly and thighs should not move. ¨ Hold the squeeze for 3 seconds, and then relax for 3 seconds. ¨ Start with 3 seconds. Then add 1 second each week until you are able to squeeze for 10 seconds. ¨ Repeat the exercise 10 to 15 times a session. Do three or more sessions a day. · Try wearing pads that absorb leaks. Or you may want to try to prevent leaks with a product like Poise Impressa, which you insert like a tampon. · Keep skin in the genital area dry. Petroleum jelly (like Vaseline) spread on the area may help protect your skin. When should you call for help? Call your doctor now or seek immediate medical care if: 
? · You have new urinary symptoms. These may include leaking urine, having pain when urinating, or feeling like you need to urinate often. ? Watch closely for changes in your health, and be sure to contact your doctor if: 
? · You do not get better as expected. Where can you learn more? Go to http://arlette-michel.info/. Enter I763 in the search box to learn more about \"Stress Incontinence in Women: Care Instructions. \" Current as of: October 13, 2016 Content Version: 11.4 © 8243-5997 [employer]. Care instructions adapted under license by [employer] (which disclaims liability or warranty for this information). If you have questions about a medical condition or this instruction, always ask your healthcare professional. [address] any warranty or liability for your use of this information. Urge Incontinence in Women: Care Instructions Your Care Instructions Urge incontinence occurs when the need to urinate is so strong that you cannot reach the toilet in time, even when your bladder contains only a small amount of urine. This is also called overactive bladder or unstable bladder. Some women may have no warning before they leak urine. This condition does not cause major health problems. But it can be embarrassing and can affect a woman's self-esteem and confidence. Treatment can cure or improve your symptoms. Follow-up care is a key part of your treatment and safety. Be sure to make and go to all appointments, and call your doctor if you are having problems. It's also a good idea to know your test results and keep a list of the medicines you take. How can you care for yourself at home? · Be safe with medicines. Take your medicines exactly as prescribed. Call your doctor if you think you are having a problem with your medicine. You will get more details on the specific medicines your doctor prescribes. · Limit caffeine and alcohol. They stimulate urine production. · Urinate every 2 to 4 hours during waking hours, even if you feel that you do not have to go. · Do pelvic floor (Kegel) exercises, which tighten and strengthen pelvic muscles. To do Kegel exercises: ¨ Squeeze the same muscles you would use to stop your urine. Your belly and thighs should not move. ¨ Hold the squeeze for 3 seconds, then relax for 3 seconds. ¨ Start with 3 seconds. Then add 1 second each week until you are able to squeeze for 10 seconds. ¨ Repeat the exercise 10 to 15 times for each session. Do three or more sessions each day. · Try wearing pads that absorb the leaks. · Keep skin in the genital area dry. When should you call for help? Call your doctor now or seek immediate medical care if: 
? · You have new urinary symptoms. These may include leaking urine, having pain when urinating, or feeling like you need to urinate often. ? Watch closely for changes in your health, and be sure to contact your doctor if: 
? · You do not get better as expected. Where can you learn more? Go to http://arlette-michel.info/. Enter J841 in the search box to learn more about \"Urge Incontinence in Women: Care Instructions. \" Current as of: October 13, 2016 Content Version: 11.4 © 6783-7318 [employer]. Care instructions adapted under license by [employer] (which disclaims liability or warranty for this information). If you have questions about a medical condition or this instruction, always ask your healthcare professional. [address] any warranty or liability for your use of this information. Introducing [facility]! Michelle Tamayo introduces [employer] patient portal. Now you can access parts of your medical record, email your doctor's office, and request medication refills online. 1. In your internet browser, go to [email or web address] 2. Click on the First Time User? Click Here link in the Sign In box. You will see the New Member Sign Up page. 3. Enter your [employer] Access Code exactly as it appears below. You will not need to use this code after youve completed the sign-up process.  If you do not sign up before the expiration date, you must request a new code. · R[employer] Access Code: [email or web address] Expires: 9/19/2018 12:35 PM 
 
4. Enter the last four digits of your Social Security Number (xxxx) and Date of Birth (mm/dd/yyyy) as indicated and click Submit. You will be taken to the next sign-up page. 5. Create a Rallyware ID. This will be your [employer] login ID and cannot be changed, so think of one that is secure and easy to remember. 6. Create a [employer] password. You can change your password at any time. 7. Enter your Password Reset Question and Answer. This can be used at a later time if you forget your password. 8. Enter your e-mail address. You will receive e-mail notification when new information is available in [address]. 9. Click Sign Up. You can now view and download portions of your medical record. 10. Click the Download Summary menu link to download a portable copy of your medical information. If you have questions, please visit the Frequently Asked Questions section of the [employer] website. Remember, [employer] is NOT to be used for urgent needs. For medical emergencies, dial 911. Now available from your iPhone and Android! Please provide this summary of care documentation to your next provider. Your primary care clinician is listed as [address]. If you have any questions after today's visit, please call [phone].

## 2018-06-21 NOTE — PROGRESS NOTES
Chief Complaint   Patient presents with   Arch Lady Exam     Concerns     Patient is here today for GYN concerns. Pt is having some vaginal irritation x 5 days. Pt sts that she is also having urinary frequency and left side low abdominal pain off and on x 5 days    1. Have you been to the ER, urgent care clinic since your last visit? Hospitalized since your last visit? [facility] due to Bacterial infection 2/18. 2. Have you seen or consulted any other health care providers outside of the [address] [place] [address] since your last visit? Include any pap smears or colon screening.  No

## 2018-06-21 NOTE — PATIENT INSTRUCTIONS
Candidiasis: Care Instructions  Your Care Instructions  Candidiasis (say \"bse-bgn-BN-uh-bruce\") is a yeast infection. Yeast normally lives in your body. But it can cause problems if your body's defenses don't work as they should. Some medicines can increase your chance of getting a yeast infection. These include antibiotics, steroids, and cancer drugs. And some diseases like AIDS and diabetes can make you more likely to get yeast infections. There are different types of yeast infections. Presley Pearson is a yeast infection in the mouth. It usually occurs in people with weak immune systems. It causes white patches inside the mouth and throat. Yeast infections of the skin usually occur in skin folds where the skin stays moist. They cause red, oozing patches on your skin. Babies can get these infections under the diaper. People who often wear gloves can get them on their hands. Many women get vaginal yeast infections. They are most common when women take antibiotics. These infections can cause the vagina to itch and burn. They also cause white discharge that looks like cottage cheese. In rare cases, yeast infects the blood. This can cause serious disease. This kind of infection is treated with medicine given through a needle into a vein (IV). After you start treatment, a yeast infection usually goes away quickly. But if your immune system is weak, the infection may come back. Tell your doctor if you get yeast infections often. Follow-up care is a key part of your treatment and safety. Be sure to make and go to all appointments, and call your doctor if you are having problems. It's also a good idea to know your test results and keep a list of the medicines you take. How can you care for yourself at home? · Take your medicines exactly as prescribed. Call your doctor if you think you are having a problem with your medicine. · Use antibiotics only as directed by your doctor. · Eat yogurt with live cultures.  It has bacteria called lactobacillus. It may help prevent some types of yeast infections. · Keep your skin clean and dry. Put powder on moist places. · If you are using a cream or suppository to treat a vaginal yeast infection, don't use condoms or a diaphragm. Use a different type of birth control. · Eat a healthy diet and get regular exercise. This will help keep your immune system strong. When should you call for help? Watch closely for changes in your health, and be sure to contact your doctor if:  ? · You do not get better as expected. Where can you learn more? Go to http://arlette-michel.info/. Enter P993 in the search box to learn more about \"Candidiasis: Care Instructions. \"  Current as of: October 13, 2016  Content Version: 11.4  © 6692-0972 [employer]. Care instructions adapted under license by [email or web address] (which disclaims liability or warranty for this information). If you have questions about a medical condition or this instruction, always ask your healthcare professional. [address] any warranty or liability for your use of this information. Kegel Exercises: Care Instructions  Your Care Instructions    Kegel exercises strengthen muscles around the bladder. These muscles control the flow of urine. Kegel exercises are sometime called \"pelvic floor\" exercises. They can help prevent urine leakage and keep the pelvic organs in place. A woman who just had a baby might want to try Kegel exercises. They can strengthen pelvic muscles that have been weakened by pregnancy and childbirth. A man or woman may use Kegel exercises to treat urine leakage. You do Kegel exercises by tightening the muscles you use when you urinate. You will likely need to do these exercises for several weeks to get better. Follow-up care is a key part of your treatment and safety.  Be sure to make and go to all appointments, and call your doctor if you are having problems. It's also a good idea to know your test results and keep a list of the medicines you take. How can you care for yourself at home? · Do Kegel exercises. ¨ Find the muscles you need to strengthen. To do this, tighten the muscles that stop your urine while you are going to the bathroom. These are the same muscles you squeeze during Kegel exercises. ¨ Squeeze the muscles as hard as you can. Your belly and thighs should not move. ¨ Hold the squeeze for 3 seconds. Then relax for 3 seconds. ¨ Start with 3 seconds, and then add 1 second each week until you are able to squeeze for 10 seconds. ¨ Repeat the exercise 10 to 15 times for each session. Do three or more sessions each day. · You can check to see if you are using the right muscles. Place a finger in your vagina and squeeze around it. You are doing them right when you feel pressure around your finger. Your doctor may also suggest that you put special weights in your vagina while you do the exercises. · Do not smoke. It can irritate the bladder. If you need help quitting, talk to your doctor about stop-smoking programs and medicines. These can increase your chances of quitting for good. Where can you learn more? Go to http://arlette-michel.info/. Enter P095 in the search box to learn more about \"Kegel Exercises: Care Instructions. \"  Current as of: May 12, 2017  Content Version: 11.4  © 5430-2339 [employer]. Care instructions adapted under license by [employer] (which disclaims liability or warranty for this information). If you have questions about a medical condition or this instruction, always ask your healthcare professional. [place] [address] any warranty or liability for your use of this information.        Stress Incontinence in Women: Care Instructions  Your Care Instructions  Stress incontinence is the accidental release of urine caused by activities that put pressure on your bladder. It may happen most often when you sneeze, cough, laugh, jog, or lift something heavy. This condition does not cause major health problems, but it can be embarrassing and interfere with your life. Treatment can cure or improve your symptoms. Follow-up care is a key part of your treatment and safety. Be sure to make and go to all appointments, and call your doctor if you are having problems. It's also a good idea to know your test results and keep a list of the medicines you take. How can you care for yourself at home? · Take your medicines exactly as prescribed. Call your doctor if you think you are having a problem with your medicine. · Limit caffeine and alcohol. They make you urinate more. · Do pelvic floor (Kegel) exercises, which tighten and strengthen pelvic muscles. To do Kegel exercises:  ¨ Squeeze the same muscles you would use to stop your urine. Your belly and thighs should not move. ¨ Hold the squeeze for 3 seconds, and then relax for 3 seconds. ¨ Start with 3 seconds. Then add 1 second each week until you are able to squeeze for 10 seconds. ¨ Repeat the exercise 10 to 15 times a session. Do three or more sessions a day. · Try wearing pads that absorb leaks. Or you may want to try to prevent leaks with a product like [employer], which you insert like a tampon. · Keep skin in the genital area dry. Petroleum jelly (like Vaseline) spread on the area may help protect your skin. When should you call for help? Call your doctor now or seek immediate medical care if:  ? · You have new urinary symptoms. These may include leaking urine, having pain when urinating, or feeling like you need to urinate often. ? Watch closely for changes in your health, and be sure to contact your doctor if:  ? · You do not get better as expected. Where can you learn more? Go to http://arlette-michel.info/.   Enter T955 in the search box to learn more about \"Stress Incontinence in Women: Care Instructions. \"  Current as of: October 13, 2016  Content Version: 11.4  © 2806-8474 [employer]. Care instructions adapted under license by [employer] (which disclaims liability or warranty for this information). If you have questions about a medical condition or this instruction, always ask your healthcare professional. [address] any warranty or liability for your use of this information. Urge Incontinence in Women: Care Instructions  Your Care Instructions    Urge incontinence occurs when the need to urinate is so strong that you cannot reach the toilet in time, even when your bladder contains only a small amount of urine. This is also called overactive bladder or unstable bladder. Some women may have no warning before they leak urine. This condition does not cause major health problems. But it can be embarrassing and can affect a woman's self-esteem and confidence. Treatment can cure or improve your symptoms. Follow-up care is a key part of your treatment and safety. Be sure to make and go to all appointments, and call your doctor if you are having problems. It's also a good idea to know your test results and keep a list of the medicines you take. How can you care for yourself at home? · Be safe with medicines. Take your medicines exactly as prescribed. Call your doctor if you think you are having a problem with your medicine. You will get more details on the specific medicines your doctor prescribes. · Limit caffeine and alcohol. They stimulate urine production. · Urinate every 2 to 4 hours during waking hours, even if you feel that you do not have to go. · Do pelvic floor (Kegel) exercises, which tighten and strengthen pelvic muscles. To do Kegel exercises:  ¨ Squeeze the same muscles you would use to stop your urine. Your belly and thighs should not move. ¨ Hold the squeeze for 3 seconds, then relax for 3 seconds. ¨ Start with 3 seconds.  Then add 1 second each week until you are able to squeeze for 10 seconds. ¨ Repeat the exercise 10 to 15 times for each session. Do three or more sessions each day. · Try wearing pads that absorb the leaks. · Keep skin in the genital area dry. When should you call for help? Call your doctor now or seek immediate medical care if:  ? · You have new urinary symptoms. These may include leaking urine, having pain when urinating, or feeling like you need to urinate often. ? Watch closely for changes in your health, and be sure to contact your doctor if:  ? · You do not get better as expected. Where can you learn more? Go to http://arlette-michel.info/. Enter E090 in the search box to learn more about \"Urge Incontinence in Women: Care Instructions. \"  Current as of: October 13, 2016  Content Version: 11.4  © 3893-7668 [employer]. Care instructions adapted under license by [employer] (which disclaims liability or warranty for this information). If you have questions about a medical condition or this instruction, always ask your healthcare professional. [address] any warranty or liability for your use of this information.

## 2018-06-21 NOTE — PROGRESS NOTES
HISTORY OF PRESENT ILLNESS    Anneliese Lemus is a 66y.o. year old female comes in today to be evaluated and treated for: Vaginal irritation    HPI:     Patients symptoms have been present for 2 weeks. Patient states she has been having urinary incontinence for the last couple of months. States she will have the urge to urinate at times she is able to make it to the restroom. States she thought she was going to the restroom a lot because she was drinking a lot. Patient also takes lasix for lower extremity swelling prn but has been taking tabs more regularly as of late. Current Outpatient Prescriptions   Medication Sig Dispense Refill    rosuvastatin (CRESTOR) 20 mg tablet TAKE 1 TABLET BY MOUTH EVERY NIGHT 90 Tab 3    omeprazole (PRILOSEC) 20 mg capsule Take 1 Cap by mouth daily. 30 Cap 1    polyethylene glycol (MIRALAX) 17 gram/dose powder MIX 1 CAPFUL WITH 8 OZ OF WATER AND DRINK BY MOUTH DAILY. 527 g 0    ondansetron (ZOFRAN ODT) 4 mg disintegrating tablet Take 1 Tab by mouth every eight (8) hours as needed for Nausea. 30 Tab 0    cloNIDine HCl (CATAPRES) 0.2 mg tablet TK 1 T PO QHS FOR BLOOD PRESSURE  4    metoprolol succinate (TOPROL-XL) 50 mg XL tablet TAKE 1 TABLET BY MOUTH DAILY 90 Tab 6    amLODIPine (NORVASC) 10 mg tablet Take 1 Tab by mouth daily. 30 Tab 6    furosemide (LASIX) 40 mg tablet Take 20 mg by mouth daily.  valsartan (DIOVAN) 320 mg tablet Take 320 mg by mouth daily.  insulin lispro (HUMALOG) 100 unit/mL injection by SubCUTAneous route. 8 units before breakfast  6 units before lunch  10 units before dinner      insulin glargine (LANTUS SOLOSTAR) 100 unit/mL (3 mL) pen by SubCUTAneous route. 44 units nightly      diphenoxylate-atropine (LOMOTIL) 2.5-0.025 mg per tablet Take 1 Tab by mouth four (4) times daily as needed for Diarrhea (1 tab after each stool for max 8 per day). Max Daily Amount: 4 Tabs.  Take after each stool for a maximum of 8 tablets daily 20 Tab 0    Hydrocolloid Dressing (DUODERM CGF DRESSING) 4 X 4 \" bndg 1 Patch by Apply Externally route Every Mon, Wed and Sat. 10 Each 1    cyclobenzaprine (FLEXERIL) 10 mg tablet Take 1 Tab by mouth three (3) times daily as needed for Muscle Spasm(s). 21 Tab 0    diclofenac (VOLTAREN) 1 % gel Apply 4 g to affected area four (4) times daily. 500 g 5     Past Medical History:   Diagnosis Date    Allergic rhinitis     Diabetes ([address]     H/O seasonal allergies     Hypertension        ROS:  Constitutional:; Negative fever and chills  GI: no abdominal pain, change in bowel habits, or black or bloody stools  : positive for frequency, vaginal irritation and urgency    Objective:  Visit Vitals    /90 (BP 1 Location: Left arm, BP Patient Position: Sitting)    Pulse 83    Temp 98.4 °F (36.9 °C) (Oral)    Resp 16    Ht 5' 5\" (1.651 m)    Wt 231 lb 9.6 oz (105.1 kg)    SpO2 98%    BMI 38.54 kg/m2     General appearance: alert, cooperative, no distress, appears stated age  Lungs: clear to auscultation bilaterally  Heart: regular rate and rhythm, S1, S2 normal, no murmur, click, rub or gallop  Abdomen: soft, non-tender. Bowel sounds normal. No masses,  no organomegaly  Pelvic: External genitalia normal, perianal skin: no external genital warts noted, positive findings: vaginal discharge:  scant and white or KOH positive for fungal organisms    Assessment/Plan:     ICD-10-CM ICD-9-CM    1. Vaginal candidiasis B37.3 112.1 fluconazole (DIFLUCAN) 150 mg tablet   2. Dyspepsia R10.13 536.8 omeprazole (PRILOSEC) 20 mg capsule   3. Increased urinary frequency R35.0 788.41 AMB POC URINALYSIS DIP STICK AUTO W/O MICRO      CULTURE, URINE   hold lasix and decrease fluid intake and sodium intake  I have discussed the diagnosis with the patient and the intended plan as seen in the above orders. The patient has received an after-visit summary and questions were answered concerning future plans.   I have discussed medication side effects and warnings with the patient as well. Patient agreeable with above plan and verbalizes understanding. Follow-up Disposition:  Return in about 4 weeks (around 7/19/2018), or if symptoms worsen or fail to improve, for episodes of urinary incontinence.

## 2018-06-23 LAB
BACTERIA SPEC CULT: NORMAL
SERVICE CMNT-IMP: NORMAL

## 2018-06-26 DIAGNOSIS — R10.13 DYSPEPSIA: ICD-10-CM

## 2018-06-26 RX ORDER — OMEPRAZOLE 20 MG/1
20 CAPSULE, DELAYED RELEASE ORAL DAILY
Qty: 90 CAP | Refills: 0 | Status: SHIPPED | OUTPATIENT
Start: 2018-06-26 | End: 2018-09-12

## 2018-07-02 RX ORDER — POLYETHYLENE GLYCOL 3350 17 G/17G
POWDER, FOR SOLUTION ORAL
Qty: 527 G | Refills: 0 | Status: SHIPPED | OUTPATIENT
Start: 2018-07-02 | End: 2019-07-25 | Stop reason: SDUPTHER

## 2018-07-11 ENCOUNTER — OFFICE VISIT (OUTPATIENT)
Dept: FAMILY MEDICINE CLINIC | Age: 78
End: 2018-07-11

## 2018-07-11 VITALS
HEIGHT: 65 IN | TEMPERATURE: 97.9 F | SYSTOLIC BLOOD PRESSURE: 140 MMHG | BODY MASS INDEX: 38.62 KG/M2 | RESPIRATION RATE: 18 BRPM | HEART RATE: 72 BPM | DIASTOLIC BLOOD PRESSURE: 80 MMHG | OXYGEN SATURATION: 98 % | WEIGHT: 231.8 LBS

## 2018-07-11 DIAGNOSIS — E11.21 TYPE 2 DIABETES MELLITUS WITH NEPHROPATHY (HCC): ICD-10-CM

## 2018-07-11 DIAGNOSIS — Z78.0 POST-MENOPAUSAL: ICD-10-CM

## 2018-07-11 DIAGNOSIS — R32 INTERMITTENT URINARY INCONTINENCE: Primary | ICD-10-CM

## 2018-07-11 DIAGNOSIS — Z12.39 SCREENING FOR BREAST CANCER: ICD-10-CM

## 2018-07-11 LAB
GLUCOSE DOSE-GTT, POCT, GLDSPOCT: 212
HBA1C MFR BLD HPLC: 6.9 %

## 2018-07-11 NOTE — PROGRESS NOTES
Chief Complaint   Patient presents with    Hypertension    Diabetes     Patient is here today for F/U. Pt will need an Rx for zooster vaccine. 1. Have you been to the ER, urgent care clinic since your last visit? Hospitalized since your last visit? No    2. Have you seen or consulted any other health care providers outside of the [address] [place] [address] since your last visit? Include any pap smears or colon screening.  No

## 2018-07-11 NOTE — PROGRESS NOTES
HISTORY OF PRESENT ILLNESS    Lorelei Fabry is a 66y.o. year old female here today to follow up for:  Urinary incontinence    Patients symptoms have improved. Has not had any further episodes of incontience, she does have urgency. Has been taking lasix 20mg for swelling which has helped and has not causes urinary incontinence to return. Allergies   Allergen Reactions    Aspirin Other (comments)     Ringing in ears    Keflex [Cephalexin] Rash     Current Outpatient Prescriptions   Medication Sig Dispense Refill    polyethylene glycol (MIRALAX) 17 gram/dose powder MIX 1 CAPFUL WITH 8 OZ OF WATER AND DRINK BY MOUTH DAILY. 527 g 0    omeprazole (PRILOSEC) 20 mg capsule Take 1 Cap by mouth daily. 90 Cap 0    rosuvastatin (CRESTOR) 20 mg tablet TAKE 1 TABLET BY MOUTH EVERY NIGHT 90 Tab 3    cloNIDine HCl (CATAPRES) 0.2 mg tablet TK 1 T PO QHS FOR BLOOD PRESSURE  4    metoprolol succinate (TOPROL-XL) 50 mg XL tablet TAKE 1 TABLET BY MOUTH DAILY 90 Tab 6    amLODIPine (NORVASC) 10 mg tablet Take 1 Tab by mouth daily. 30 Tab 6    furosemide (LASIX) 40 mg tablet Take 20 mg by mouth daily.  valsartan (DIOVAN) 320 mg tablet Take 320 mg by mouth daily.  insulin lispro (HUMALOG) 100 unit/mL injection by SubCUTAneous route. 6 units before breakfast  6 units before lunch  6 units before dinner      insulin glargine (LANTUS SOLOSTAR) 100 unit/mL (3 mL) pen by SubCUTAneous route. 35 units nightly      ondansetron (ZOFRAN ODT) 4 mg disintegrating tablet Take 1 Tab by mouth every eight (8) hours as needed for Nausea. 30 Tab 0    diphenoxylate-atropine (LOMOTIL) 2.5-0.025 mg per tablet Take 1 Tab by mouth four (4) times daily as needed for Diarrhea (1 tab after each stool for max 8 per day). Max Daily Amount: 4 Tabs. Take after each stool for a maximum of 8 tablets daily 20 Tab 0    Hydrocolloid Dressing (DUODERM CGF DRESSING) 4 X 4 \" bndg 1 Patch by Apply Externally route Every Mon, Wed and Sat.  10 Each 1    cyclobenzaprine (FLEXERIL) 10 mg tablet Take 1 Tab by mouth three (3) times daily as needed for Muscle Spasm(s). 21 Tab 0    diclofenac (VOLTAREN) 1 % gel Apply 4 g to affected area four (4) times daily. 500 g 5     Past Medical History:   Diagnosis Date    Allergic rhinitis     Diabetes ([address]     H/O seasonal allergies     Hypertension        ROS:  Review of Systems - General ROS: negative for - chills, fatigue or fever  Respiratory ROS: no cough, shortness of breath, or wheezing  Cardiovascular ROS: no chest pain or dyspnea on exertion  Gastrointestinal ROS: no abdominal pain, change in bowel habits, or black or bloody stools  Genito-Urinary ROS: no dysuria, trouble voiding, or hematuria      Objective:  Visit Vitals    /80 (BP 1 Location: Left arm, BP Patient Position: Sitting)    Pulse 72    Temp 97.9 °F (36.6 °C) (Oral)    Resp 18    Ht 5' 5\" (1.651 m)    Wt 231 lb 12.8 oz (105.1 kg)    SpO2 98%    BMI 38.57 kg/m2     General appearance - alert, well appearing, and in no distress  Neck - supple, no significant adenopathy  Chest - clear to auscultation, no wheezes, rales or rhonchi, symmetric air entry  Heart - normal rate, regular rhythm, normal S1, S2, no murmurs, rubs, clicks or gallops  Abdomen: soft, non-tender. Bowel sounds normal. No masses,  no organomegaly  Extremities: extremities normal, atraumatic, or no cyanosis, edema trace bilateral lower extremities        Assessment/Plan:     ICD-10-CM ICD-9-CM    1. Intermittent urinary incontinence R32 788.30    2. Type 2 diabetes mellitus with nephropathy (HCC) E11.21 250.40 AMB POC HEMOGLOBIN A1C     583.81 AMB POC GLUCOSE TEST   3. Screening for breast cancer Z12.31 V76.10 GENO MAMMO BI SCREENING INCL CAD   4. Post-menopausal Z78.0 V49.81 DEXA BONE DENSITY STUDY AXIAL     I have discussed the diagnosis with the patient and the intended plan as seen in the above orders.   The patient has received an after-visit summary and questions were answered concerning future plans. I have discussed medication side effects and warnings with the patient as well. Pt verbalizes understanding. Follow-up Disposition:  Return for medicare wellness visit .

## 2018-07-11 NOTE — MR AVS SNAPSHOT
[facility] 
 
 
 [address] [id number] [address] [address] 
[phone] Patient: Coleen Sousa MRN: [id number] ROSE MARY:[birth year] Visit Information Date & Time Provider Department Dept. Phone Encounter #  
 7/11/2018  2:15 PM Jarod Donovan, [address] [id number] Follow-up Instructions Return for medicare wellness visit . Your Appointments 7/12/2018  1:00 PM  
New Patient with Nikki Scott MD  
[facility] [address]) Appt Note: Degenerative disc disease at L5-S1 level/ ref by Donavan Cormier PA-C/ *Advised the patient to come 30 minutes prior to their appointment with their picture I.D, Insurance cards and a list of their medications & dosage to the [facility] location . [facility] [address] [facility] [id number] [phone]  
  
   
 [facility] [address] [facility] [address] Upcoming Health Maintenance Date Due  
 LIPID PANEL Q1 1940 DTaP/Tdap/Td series (1 - Tdap) 2/6/1961 ZOSTER VACCINE AGE 60> 12/6/1999 Bone Densitometry (Dexa) Screening 2/6/2005 MEDICARE YEARLY EXAM 3/15/2018 FOOT EXAM Q1 3/15/2018 Pneumococcal 65+ High/Highest Risk (1 of 2 - PCV13) 10/4/2018* Influenza Age 5 to Adult 8/1/2018 HEMOGLOBIN A1C Q6M 1/11/2019 EYE EXAM RETINAL OR DILATED Q1 3/12/2019 MICROALBUMIN Q1 7/11/2019 GLAUCOMA SCREENING Q2Y 3/12/2020 *Topic was postponed. The date shown is not the original due date. Allergies as of 7/11/2018  Review Complete On: 7/11/2018 By: Jarod Donovan NP Severity Noted Reaction Type Reactions Aspirin  03/01/2017    Other (comments) Ringing in ears Keflex [Cephalexin]  03/01/2017    Rash Current Immunizations  Never Reviewed No immunizations on file. Not reviewed this visit You Were Diagnosed With   
  
 Codes Comments Intermittent urinary incontinence    -  Primary ICD-10-CM: R32 
ICD-9-CM: 788.30 Type 2 diabetes mellitus with nephropathy (HCC)     ICD-10-CM: E11.21 
ICD-9-CM: 250.40, 583.81 Vitals BP Pulse Temp Resp Height(growth percentile) Weight(growth percentile) 140/80 (BP 1 Location: Left arm, BP Patient Position: Sitting) 72 97.9 °F (36.6 °C) (Oral) 18 5' 5\" (1.651 m) 231 lb 12.8 oz (105.1 kg) SpO2 BMI OB Status Smoking Status 98% 38.57 kg/m2 Menopause Former Smoker Vitals History BMI and BSA Data Body Mass Index Body Surface Area 38.57 kg/m 2 2.2 m 2 Preferred Pharmacy Pharmacy Name Phone [address], [place] [address] [facility] 22 [address] [facility] [phone] Your Updated Medication List  
  
   
This list is accurate as of 7/11/18  2:25 PM.  Always use your most recent med list. amLODIPine 10 mg tablet Commonly known as:  [place] Take 1 Tab by mouth daily. cloNIDine HCl 0.2 mg tablet Commonly known as:  CATAPRES TK 1 T PO QHS FOR BLOOD PRESSURE  
  
 cyclobenzaprine 10 mg tablet Commonly known as:  FLEXERIL Take 1 Tab by mouth three (3) times daily as needed for Muscle Spasm(s). diclofenac 1 % Gel Commonly known as:  VOLTAREN Apply 4 g to affected area four (4) times daily. DIOVAN 320 mg tablet Generic drug:  valsartan Take 320 mg by mouth daily. diphenoxylate-atropine 2.5-0.025 mg per tablet Commonly known as:  LOMOTIL Take 1 Tab by mouth four (4) times daily as needed for Diarrhea (1 tab after each stool for max 8 per day). Max Daily Amount: 4 Tabs. Take after each stool for a maximum of 8 tablets daily HumaLOG U-100 Insulin 100 unit/mL injection Generic drug:  insulin lispro  
by SubCUTAneous route. 6 units before breakfast 6 units before lunch 6 units before dinner  Hydrocolloid Dressing 4 X 4 \" Bndg  
 Commonly known as:  DUODERM CGF DRESSING  
1 Patch by Apply Externally route Every Mon, Wed and Sat. LANTUS SOLOSTAR U-100 INSULIN 100 unit/mL (3 mL) Inpn Generic drug:  insulin glargine  
by SubCUTAneous route. 35 units nightly LASIX 40 mg tablet Generic drug:  furosemide Take 20 mg by mouth daily. metoprolol succinate 50 mg XL tablet Commonly known as:  TOPROL-XL  
TAKE 1 TABLET BY MOUTH DAILY  
  
 omeprazole 20 mg capsule Commonly known as:  PRILOSEC Take 1 Cap by mouth daily. ondansetron 4 mg disintegrating tablet Commonly known as:  ZOFRAN ODT Take 1 Tab by mouth every eight (8) hours as needed for Nausea. polyethylene glycol 17 gram/dose powder Commonly known as:  [place] Armor MIX 1 CAPFUL WITH 8 OZ OF WATER AND DRINK BY MOUTH DAILY. rosuvastatin 20 mg tablet Commonly known as:  CRESTOR  
TAKE 1 TABLET BY MOUTH EVERY NIGHT We Performed the Following AMB POC GLUCOSE TEST [30377 CPT(R)] AMB POC HEMOGLOBIN A1C [74612 CPT(R)] Follow-up Instructions Return for medicare wellness visit . Introducing [facility]! Kavya Perdomo introduces [employer] patient portal. Now you can access parts of your medical record, email your doctor's office, and request medication refills online. 1. In your internet browser, go to [email or web address] 2. Click on the First Time User? Click Here link in the Sign In box. You will see the New Member Sign Up page. 3. Enter your [employer] Access Code exactly as it appears below. You will not need to use this code after youve completed the sign-up process. If you do not sign up before the expiration date, you must request a new code. · [employer] Access Code: [email or web address] Expires: 9/19/2018 12:35 PM 
 
4. Enter the last four digits of your Social Security Number (xxxx) and Date of Birth (mm/dd/yyyy) as indicated and click Submit.  You will be taken to the next sign-up page. 5. Create a [employer] ID. This will be your [employer] login ID and cannot be changed, so think of one that is secure and easy to remember. 6. Create a [employer] password. You can change your password at any time. 7. Enter your Password Reset Question and Answer. This can be used at a later time if you forget your password. 8. Enter your e-mail address. You will receive e-mail notification when new information is available in [address]. 9. Click Sign Up. You can now view and download portions of your medical record. 10. Click the Download Summary menu link to download a portable copy of your medical information. If you have questions, please visit the Frequently Asked Questions section of the [employer] website. Remember, [employer] is NOT to be used for urgent needs. For medical emergencies, dial 911. Now available from your iPhone and Android! Please provide this summary of care documentation to your next provider. Your primary care clinician is listed as [address]. If you have any questions after today's visit, please call [phone].

## 2018-07-12 ENCOUNTER — HOSPITAL ENCOUNTER (OUTPATIENT)
Dept: LAB | Age: 78
Discharge: HOME | End: 2018-07-12
Payer: MEDICARE

## 2018-07-12 DIAGNOSIS — N18.4 CHRONIC KIDNEY DISEASE, STAGE IV (SEVERE) (HCC): ICD-10-CM

## 2018-07-12 DIAGNOSIS — R80.1 PERSISTENT PROTEINURIA: ICD-10-CM

## 2018-07-12 DIAGNOSIS — E11.22 TYPE 2 DIABETES MELLITUS WITH ESRD (END-STAGE RENAL DISEASE) (HCC): ICD-10-CM

## 2018-07-12 DIAGNOSIS — N25.81 SECONDARY HYPERPARATHYROIDISM OF RENAL ORIGIN (HCC): ICD-10-CM

## 2018-07-12 DIAGNOSIS — Z79.4 ENCOUNTER FOR LONG-TERM (CURRENT) USE OF INSULIN (HCC): ICD-10-CM

## 2018-07-12 DIAGNOSIS — N18.6 TYPE 2 DIABETES MELLITUS WITH ESRD (END-STAGE RENAL DISEASE) (HCC): ICD-10-CM

## 2018-07-12 LAB
25(OH)D3 SERPL-MCNC: 44.2 NG/ML (ref 30–100)
ALBUMIN SERPL-MCNC: 3.5 G/DL (ref 3.4–5)
ANION GAP SERPL CALC-SCNC: 6 MMOL/L (ref 3–18)
BASOPHILS # BLD: 0 K/UL (ref 0–0.1)
BASOPHILS NFR BLD: 1 % (ref 0–2)
BUN SERPL-MCNC: 34 MG/DL (ref 7–18)
BUN/CREAT SERPL: 12 (ref 12–20)
CALCIUM SERPL-MCNC: 10.1 MG/DL (ref 8.5–10.1)
CALCIUM SERPL-MCNC: 9.8 MG/DL (ref 8.5–10.1)
CHLORIDE SERPL-SCNC: 108 MMOL/L (ref 100–108)
CO2 SERPL-SCNC: 28 MMOL/L (ref 21–32)
CREAT SERPL-MCNC: 2.9 MG/DL (ref 0.6–1.3)
CREAT UR-MCNC: 83.4 MG/DL (ref 30–125)
DIFFERENTIAL METHOD BLD: ABNORMAL
EOSINOPHIL # BLD: 0.1 K/UL (ref 0–0.4)
EOSINOPHIL NFR BLD: 1 % (ref 0–5)
ERYTHROCYTE [DISTWIDTH] IN BLOOD BY AUTOMATED COUNT: 13.4 % (ref 11.6–14.5)
GLUCOSE SERPL-MCNC: 106 MG/DL (ref 74–99)
HCT VFR BLD AUTO: 35.4 % (ref 35–45)
HGB BLD-MCNC: 11.7 G/DL (ref 12–16)
LYMPHOCYTES # BLD: 1.7 K/UL (ref 0.9–3.6)
LYMPHOCYTES NFR BLD: 34 % (ref 21–52)
MCH RBC QN AUTO: 29 PG (ref 24–34)
MCHC RBC AUTO-ENTMCNC: 33.1 G/DL (ref 31–37)
MCV RBC AUTO: 87.8 FL (ref 74–97)
MICROALBUMIN UR-MCNC: 217 MG/DL (ref 0–3)
MICROALBUMIN/CREAT UR-RTO: 2602 MG/G (ref 0–30)
MONOCYTES # BLD: 0.6 K/UL (ref 0.05–1.2)
MONOCYTES NFR BLD: 11 % (ref 3–10)
NEUTS SEG # BLD: 2.7 K/UL (ref 1.8–8)
NEUTS SEG NFR BLD: 53 % (ref 40–73)
PHOSPHATE SERPL-MCNC: 3.1 MG/DL (ref 2.5–4.9)
PLATELET # BLD AUTO: 151 K/UL (ref 135–420)
PMV BLD AUTO: 11.3 FL (ref 9.2–11.8)
POTASSIUM SERPL-SCNC: 4.3 MMOL/L (ref 3.5–5.5)
PTH-INTACT SERPL-MCNC: 331.7 PG/ML (ref 18.4–88)
RBC # BLD AUTO: 4.03 M/UL (ref 4.2–5.3)
SODIUM SERPL-SCNC: 142 MMOL/L (ref 136–145)
WBC # BLD AUTO: 5.1 K/UL (ref 4.6–13.2)

## 2018-07-12 PROCEDURE — 80069 RENAL FUNCTION PANEL: CPT | Performed by: INTERNAL MEDICINE

## 2018-07-12 PROCEDURE — 83970 ASSAY OF PARATHORMONE: CPT | Performed by: INTERNAL MEDICINE

## 2018-07-12 PROCEDURE — 36415 COLL VENOUS BLD VENIPUNCTURE: CPT | Performed by: INTERNAL MEDICINE

## 2018-07-12 PROCEDURE — 82306 VITAMIN D 25 HYDROXY: CPT | Performed by: INTERNAL MEDICINE

## 2018-07-12 PROCEDURE — 82043 UR ALBUMIN QUANTITATIVE: CPT | Performed by: INTERNAL MEDICINE

## 2018-07-12 PROCEDURE — 85025 COMPLETE CBC W/AUTO DIFF WBC: CPT | Performed by: INTERNAL MEDICINE

## 2018-07-22 ENCOUNTER — HOSPITAL ENCOUNTER (EMERGENCY)
Age: 78
Discharge: HOME | End: 2018-07-22
Attending: EMERGENCY MEDICINE
Payer: MEDICARE

## 2018-07-22 ENCOUNTER — APPOINTMENT (OUTPATIENT)
Dept: CT IMAGING | Age: 78
End: 2018-07-22
Attending: EMERGENCY MEDICINE
Payer: MEDICARE

## 2018-07-22 VITALS
RESPIRATION RATE: 20 BRPM | SYSTOLIC BLOOD PRESSURE: 137 MMHG | DIASTOLIC BLOOD PRESSURE: 64 MMHG | TEMPERATURE: 97.7 F | HEART RATE: 74 BPM | OXYGEN SATURATION: 96 %

## 2018-07-22 DIAGNOSIS — R42 VERTIGO: Primary | ICD-10-CM

## 2018-07-22 DIAGNOSIS — H61.23 BILATERAL IMPACTED CERUMEN: ICD-10-CM

## 2018-07-22 LAB
ALBUMIN SERPL-MCNC: 3.6 G/DL (ref 3.4–5)
ALBUMIN/GLOB SERPL: 0.9 {RATIO} (ref 0.8–1.7)
ALP SERPL-CCNC: 128 U/L (ref 45–117)
ALT SERPL-CCNC: 24 U/L (ref 13–56)
ANION GAP SERPL CALC-SCNC: 9 MMOL/L (ref 3–18)
AST SERPL-CCNC: 20 U/L (ref 15–37)
ATRIAL RATE: 92 BPM
BASOPHILS # BLD: 0 K/UL (ref 0–0.1)
BASOPHILS NFR BLD: 0 % (ref 0–2)
BILIRUB SERPL-MCNC: 0.4 MG/DL (ref 0.2–1)
BUN SERPL-MCNC: 41 MG/DL (ref 7–18)
BUN/CREAT SERPL: 14 (ref 12–20)
CALCIUM SERPL-MCNC: 10 MG/DL (ref 8.5–10.1)
CALCULATED P AXIS, ECG09: 19 DEGREES
CALCULATED R AXIS, ECG10: -43 DEGREES
CALCULATED T AXIS, ECG11: -3 DEGREES
CHLORIDE SERPL-SCNC: 108 MMOL/L (ref 100–108)
CK MB CFR SERPL CALC: 1 % (ref 0–4)
CK MB SERPL-MCNC: 2.6 NG/ML (ref 5–25)
CK SERPL-CCNC: 266 U/L (ref 26–192)
CO2 SERPL-SCNC: 25 MMOL/L (ref 21–32)
CREAT SERPL-MCNC: 2.99 MG/DL (ref 0.6–1.3)
DIAGNOSIS, 93000: NORMAL
DIFFERENTIAL METHOD BLD: NORMAL
EOSINOPHIL # BLD: 0.1 K/UL (ref 0–0.4)
EOSINOPHIL NFR BLD: 2 % (ref 0–5)
ERYTHROCYTE [DISTWIDTH] IN BLOOD BY AUTOMATED COUNT: 13.5 % (ref 11.6–14.5)
GLOBULIN SER CALC-MCNC: 4.1 G/DL (ref 2–4)
GLUCOSE SERPL-MCNC: 135 MG/DL (ref 74–99)
HCT VFR BLD AUTO: 37.3 % (ref 35–45)
HGB BLD-MCNC: 12.5 G/DL (ref 12–16)
LYMPHOCYTES # BLD: 1.5 K/UL (ref 0.9–3.6)
LYMPHOCYTES NFR BLD: 28 % (ref 21–52)
MCH RBC QN AUTO: 28.9 PG (ref 24–34)
MCHC RBC AUTO-ENTMCNC: 33.5 G/DL (ref 31–37)
MCV RBC AUTO: 86.1 FL (ref 74–97)
MONOCYTES # BLD: 0.3 K/UL (ref 0.05–1.2)
MONOCYTES NFR BLD: 6 % (ref 3–10)
NEUTS SEG # BLD: 3.6 K/UL (ref 1.8–8)
NEUTS SEG NFR BLD: 64 % (ref 40–73)
P-R INTERVAL, ECG05: 150 MS
PLATELET # BLD AUTO: 159 K/UL (ref 135–420)
PMV BLD AUTO: 11 FL (ref 9.2–11.8)
POTASSIUM SERPL-SCNC: 4 MMOL/L (ref 3.5–5.5)
PROT SERPL-MCNC: 7.7 G/DL (ref 6.4–8.2)
Q-T INTERVAL, ECG07: 420 MS
QRS DURATION, ECG06: 158 MS
QTC CALCULATION (BEZET), ECG08: 519 MS
RBC # BLD AUTO: 4.33 M/UL (ref 4.2–5.3)
SODIUM SERPL-SCNC: 142 MMOL/L (ref 136–145)
TROPONIN I SERPL-MCNC: <0.02 NG/ML (ref 0–0.04)
VENTRICULAR RATE, ECG03: 92 BPM
WBC # BLD AUTO: 5.6 K/UL (ref 4.6–13.2)

## 2018-07-22 PROCEDURE — 93005 ELECTROCARDIOGRAM TRACING: CPT

## 2018-07-22 PROCEDURE — 70450 CT HEAD/BRAIN W/O DYE: CPT

## 2018-07-22 PROCEDURE — 76010010392 HC REMOVAL IMPACTED WAX IRRIGATION/LVG UNI

## 2018-07-22 PROCEDURE — 80053 COMPREHEN METABOLIC PANEL: CPT | Performed by: EMERGENCY MEDICINE

## 2018-07-22 PROCEDURE — 99285 EMERGENCY DEPT VISIT HI MDM: CPT

## 2018-07-22 PROCEDURE — 85025 COMPLETE CBC W/AUTO DIFF WBC: CPT | Performed by: EMERGENCY MEDICINE

## 2018-07-22 PROCEDURE — 74011250636 HC RX REV CODE- 250/636: Performed by: EMERGENCY MEDICINE

## 2018-07-22 PROCEDURE — 96374 THER/PROPH/DIAG INJ IV PUSH: CPT

## 2018-07-22 PROCEDURE — 84484 ASSAY OF TROPONIN QUANT: CPT | Performed by: EMERGENCY MEDICINE

## 2018-07-22 PROCEDURE — 74011250637 HC RX REV CODE- 250/637: Performed by: EMERGENCY MEDICINE

## 2018-07-22 PROCEDURE — 96375 TX/PRO/DX INJ NEW DRUG ADDON: CPT

## 2018-07-22 RX ORDER — DIPHENHYDRAMINE HYDROCHLORIDE 50 MG/ML
25 INJECTION, SOLUTION INTRAMUSCULAR; INTRAVENOUS
Status: COMPLETED | OUTPATIENT
Start: 2018-07-22 | End: 2018-07-22

## 2018-07-22 RX ORDER — METOCLOPRAMIDE HYDROCHLORIDE 5 MG/ML
5 INJECTION INTRAMUSCULAR; INTRAVENOUS
Status: COMPLETED | OUTPATIENT
Start: 2018-07-22 | End: 2018-07-22

## 2018-07-22 RX ORDER — AMLODIPINE BESYLATE 10 MG/1
10 TABLET ORAL
Status: COMPLETED | OUTPATIENT
Start: 2018-07-22 | End: 2018-07-22

## 2018-07-22 RX ORDER — MECLIZINE HYDROCHLORIDE 25 MG/1
TABLET ORAL
Qty: 20 TAB | Refills: 0 | Status: SHIPPED | OUTPATIENT
Start: 2018-07-22 | End: 2018-09-12

## 2018-07-22 RX ORDER — MECLIZINE HCL 12.5 MG 12.5 MG/1
25 TABLET ORAL
Status: COMPLETED | OUTPATIENT
Start: 2018-07-22 | End: 2018-07-22

## 2018-07-22 RX ORDER — METOPROLOL SUCCINATE 50 MG/1
50 TABLET, EXTENDED RELEASE ORAL
Status: COMPLETED | OUTPATIENT
Start: 2018-07-22 | End: 2018-07-22

## 2018-07-22 RX ORDER — METOCLOPRAMIDE HYDROCHLORIDE 5 MG/ML
5 INJECTION INTRAMUSCULAR; INTRAVENOUS EVERY 6 HOURS
Status: DISCONTINUED | OUTPATIENT
Start: 2018-07-22 | End: 2018-07-22

## 2018-07-22 RX ORDER — CLONIDINE HYDROCHLORIDE 0.1 MG/1
0.2 TABLET ORAL
Status: COMPLETED | OUTPATIENT
Start: 2018-07-22 | End: 2018-07-22

## 2018-07-22 RX ADMIN — AMLODIPINE BESYLATE 10 MG: 10 TABLET ORAL at 12:20

## 2018-07-22 RX ADMIN — METOPROLOL SUCCINATE 50 MG: 50 TABLET, FILM COATED, EXTENDED RELEASE ORAL at 12:20

## 2018-07-22 RX ADMIN — MECLIZINE 25 MG: 12.5 TABLET ORAL at 12:36

## 2018-07-22 RX ADMIN — CLONIDINE HYDROCHLORIDE 0.2 MG: 0.1 TABLET ORAL at 12:20

## 2018-07-22 RX ADMIN — CARBAMIDE PEROXIDE 6.5% 5 DROP: 6.5 LIQUID AURICULAR (OTIC) at 12:38

## 2018-07-22 RX ADMIN — DIPHENHYDRAMINE HYDROCHLORIDE 25 MG: 50 INJECTION, SOLUTION INTRAMUSCULAR; INTRAVENOUS at 12:36

## 2018-07-22 RX ADMIN — METOCLOPRAMIDE 5 MG: 5 INJECTION, SOLUTION INTRAMUSCULAR; INTRAVENOUS at 12:36

## 2018-07-22 NOTE — ED PROVIDER NOTES
EMERGENCY DEPARTMENT HISTORY AND PHYSICAL EXAM    1:04 PM      Date: 7/22/2018  Patient Name: Tiffanie Giles    History of Presenting Illness     Chief Complaint   Patient presents with    Dizziness    Nausea         History Provided By: Patient and Patient's     Chief Complaint: Dizziness   Duration:  7:45AM Today   Timing:  Acute  Location: reports right frontal headache   Quality: Described as a fullness   Severity: Patient did not dictate   Modifying Factors: Reports dizziness upon standing. Associated Symptoms: Associated symptoms include abdominal pain, nausea, generalized weakness, right frontal headache (described as a fullness), and chills. Denies other associated symptoms, such as numbness in extremities. Additional History (Context): Tiffanie Giles is a 66 y.o. female with PMHx of HTN, DM, and Allergic Rhinitis who presents with dizziness onset Today at 7:45AM. Patient states that she woke up with dizziness upon standing. Patient's  notes that she is unable to stand upright secondary to the dizziness. Associated symptoms include abdominal pain, nausea, generalized weakness, right frontal headache (described as a fullness), and chills. Denies other associated symptoms, such as numbness in extremities. Reports room-spinning. Denies any recent falls, or head injuries. Notes hx of Kidney Disease, but not on Dialysis. No other concerns were expressed at this time. PCP: Ronda Hart MD    Current Outpatient Prescriptions   Medication Sig Dispense Refill    meclizine (ANTIVERT) 25 mg tablet Take 1 tablet by mouth every 6 hours for the next 3 days. Then stop taking the meclizine. Restart the meclizine for 3 day intervals if vertigo/ dizziness returns. 20 Tab 0    polyethylene glycol (MIRALAX) 17 gram/dose powder MIX 1 CAPFUL WITH 8 OZ OF WATER AND DRINK BY MOUTH DAILY. 527 g 0    omeprazole (PRILOSEC) 20 mg capsule Take 1 Cap by mouth daily.  90 Cap 0    rosuvastatin (CRESTOR) 20 mg tablet TAKE 1 TABLET BY MOUTH EVERY NIGHT 90 Tab 3    ondansetron (ZOFRAN ODT) 4 mg disintegrating tablet Take 1 Tab by mouth every eight (8) hours as needed for Nausea. 30 Tab 0    diphenoxylate-atropine (LOMOTIL) 2.5-0.025 mg per tablet Take 1 Tab by mouth four (4) times daily as needed for Diarrhea (1 tab after each stool for max 8 per day). Max Daily Amount: 4 Tabs. Take after each stool for a maximum of 8 tablets daily 20 Tab 0    Hydrocolloid Dressing (DUODERM CGF DRESSING) 4 X 4 \" bndg 1 Patch by Apply Externally route Every Mon, Wed and Sat. 10 Each 1    cloNIDine HCl (CATAPRES) 0.2 mg tablet TK 1 T PO QHS FOR BLOOD PRESSURE  4    cyclobenzaprine (FLEXERIL) 10 mg tablet Take 1 Tab by mouth three (3) times daily as needed for Muscle Spasm(s). 21 Tab 0    metoprolol succinate (TOPROL-XL) 50 mg XL tablet TAKE 1 TABLET BY MOUTH DAILY 90 Tab 6    diclofenac (VOLTAREN) 1 % gel Apply 4 g to affected area four (4) times daily. 500 g 5    amLODIPine (NORVASC) 10 mg tablet Take 1 Tab by mouth daily. 30 Tab 6    furosemide (LASIX) 40 mg tablet Take 20 mg by mouth daily.  valsartan (DIOVAN) 320 mg tablet Take 320 mg by mouth daily.  insulin lispro (HUMALOG) 100 unit/mL injection by SubCUTAneous route. 6 units before breakfast  6 units before lunch  6 units before dinner      insulin glargine (LANTUS SOLOSTAR) 100 unit/mL (3 mL) pen by SubCUTAneous route.  35 units nightly         Past History     Past Medical History:  Past Medical History:   Diagnosis Date    Allergic rhinitis     Diabetes ([address]     H/O seasonal allergies     Hypertension        Past Surgical History:  Past Surgical History:   Procedure Laterality Date    HX BACK SURGERY      HX CATARACT REMOVAL  2011    HX LUMBAR FUSION  2004    HX WISDOM TEETH EXTRACTION         Family History:  Family History   Problem Relation Age of Onset    Diabetes Maternal Aunt     Diabetes Maternal Uncle     Diabetes Maternal Grandfather     Diabetes Other        Social History:  Social History   Substance Use Topics    Smoking status: Former Smoker    Smokeless tobacco: Never Used    Alcohol use No       Allergies: Allergies   Allergen Reactions    Aspirin Other (comments)     Ringing in ears    Keflex [Cephalexin] Rash         Review of Systems     Review of Systems   Constitutional: Positive for chills. Negative for fever. Respiratory: Negative for shortness of breath. Cardiovascular: Negative for chest pain. Gastrointestinal: Positive for abdominal pain and nausea. Negative for vomiting. Neurological: Positive for dizziness, weakness (Generalized) and headaches. Negative for numbness. All other systems reviewed and are negative. Physical Exam     Visit Vitals    /64    Pulse 74    Temp 97.7 °F (36.5 °C)    Resp 20    SpO2 96%     Physical Exam   Constitutional: She is oriented to person, place, and time. She appears well-developed and well-nourished. No distress. HENT:   Head: Normocephalic and atraumatic. Cerumen impaction to bilateral ears    Eyes: Conjunctivae and EOM are normal. Right eye exhibits no discharge. Left eye exhibits no discharge. No scleral icterus. Neck: Normal range of motion. Neck supple. No tracheal deviation present. Cardiovascular: Normal rate, regular rhythm and normal heart sounds. No murmur heard. Pulmonary/Chest: Effort normal and breath sounds normal. No respiratory distress. She has no wheezes. She has no rales. Abdominal: Soft. She exhibits no distension. There is no tenderness. There is no rebound and no guarding. Musculoskeletal: Normal range of motion. She exhibits no edema or deformity. Neurological: She is alert and oriented to person, place, and time. She has normal strength. No cranial nerve deficit or sensory deficit. Skin: Skin is warm and dry. She is not diaphoretic. Psychiatric: She has a normal mood and affect.  Her behavior is normal. Judgment and thought content normal.         Diagnostic Study Results     Labs -  Recent Results (from the past 12 hour(s))   EKG, 12 LEAD, INITIAL    Collection Time: 07/22/18 11:09 AM   Result Value Ref Range    Ventricular Rate 92 BPM    Atrial Rate 92 BPM    P-R Interval 150 ms    QRS Duration 158 ms    Q-T Interval 420 ms    QTC Calculation (Bezet) 519 ms    Calculated P Axis 19 degrees    Calculated R Axis -43 degrees    Calculated T Axis -3 degrees    Diagnosis       Normal sinus rhythm  Left axis deviation  Right bundle branch block  Voltage criteria for left ventricular hypertrophy  Abnormal ECG  When compared with ECG of 22-JAN-2018 08:37,  T wave inversion no longer evident in Lateral leads  QT has lengthened  Confirmed by Navya Gregg ([id number]) on 7/22/2018 12:46:38 PM     CBC WITH AUTOMATED DIFF    Collection Time: 07/22/18 11:15 AM   Result Value Ref Range    WBC 5.6 4.6 - 13.2 K/uL    RBC 4.33 4.20 - 5.30 M/uL    HGB 12.5 12.0 - 16.0 g/dL    HCT 37.3 35.0 - 45.0 %    MCV 86.1 74.0 - 97.0 FL    MCH 28.9 24.0 - 34.0 PG    MCHC 33.5 31.0 - 37.0 g/dL    RDW 13.5 11.6 - 14.5 %    PLATELET 200 086 - 351 K/uL    MPV 11.0 9.2 - 11.8 FL    NEUTROPHILS 64 40 - 73 %    LYMPHOCYTES 28 21 - 52 %    MONOCYTES 6 3 - 10 %    EOSINOPHILS 2 0 - 5 %    BASOPHILS 0 0 - 2 %    ABS. NEUTROPHILS 3.6 1.8 - 8.0 K/UL    ABS. LYMPHOCYTES 1.5 0.9 - 3.6 K/UL    ABS. MONOCYTES 0.3 0.05 - 1.2 K/UL    ABS. EOSINOPHILS 0.1 0.0 - 0.4 K/UL    ABS.  BASOPHILS 0.0 0.0 - 0.1 K/UL    DF AUTOMATED     METABOLIC PANEL, COMPREHENSIVE    Collection Time: 07/22/18 11:15 AM   Result Value Ref Range    Sodium 142 136 - 145 mmol/L    Potassium 4.0 3.5 - 5.5 mmol/L    Chloride 108 100 - 108 mmol/L    CO2 25 21 - 32 mmol/L    Anion gap 9 3.0 - 18 mmol/L    Glucose 135 (H) 74 - 99 mg/dL    BUN 41 (H) 7.0 - 18 MG/DL    Creatinine 2.99 (H) 0.6 - 1.3 MG/DL    BUN/Creatinine ratio 14 12 - 20      GFR est AA 18 (L) >60 ml/min/1.73m2    GFR est non-AA 15 (L) >60 ml/min/1.73m2    Calcium 10.0 8.5 - 10.1 MG/DL    Bilirubin, total 0.4 0.2 - 1.0 MG/DL    ALT (SGPT) 24 13 - 56 U/L    AST (SGOT) 20 15 - 37 U/L    Alk. phosphatase 128 (H) 45 - 117 U/L    Protein, total 7.7 6.4 - 8.2 g/dL    Albumin 3.6 3.4 - 5.0 g/dL    Globulin 4.1 (H) 2.0 - 4.0 g/dL    A-G Ratio 0.9 0.8 - 1.7     CARDIAC PANEL,(CK, CKMB & TROPONIN)    Collection Time: 07/22/18 11:15 AM   Result Value Ref Range     (H) 26 - 192 U/L    CK - MB 2.6 <3.6 ng/ml    CK-MB Index 1.0 0.0 - 4.0 %    Troponin-I, Qt. <0.02 0.0 - 0.045 NG/ML       Radiologic Studies -   CT HEAD WO CONT   Final Result   Impression:     No CT evidence of acute intracranial pathology. Medical Decision Making   I am the first provider for this patient. I reviewed the vital signs, available nursing notes, past medical history, past surgical history, family history and social history. Vital Signs-Reviewed the patient's vital signs. Pulse Oximetry Analysis -  96% on room air (Interpretation)    Cardiac Monitor:  Rate: 84bpm  Rhythm:  Normal Sinus Rhythm     EKG: Interpreted by the EP. Time Interpreted: 11:09AM   Rate: 92bpm   Rhythm: Normal Sinus Rhythm    Interpretation: RBBB; Wavy Baseline        Records Reviewed: Nursing Notes and Triage notes  (Time of Review: 1:04 PM)    ED Course: Progress Notes, Reevaluation, and Consults:   Pt was frequently re-evaluated by me during his ED stay. Provider Notes (Medical Decision Making): Patient with vertigo in the setting of bilateral cerumen impaction. Patient improved after irrigation and meclizine. There were no features suggesting of central pathology and the CT was negative. Patient will be discharged with ENT follow-up. Diagnosis     Clinical Impression:   1. Vertigo    2.  Bilateral impacted cerumen        Disposition: Discharged     Follow-up Information     Follow up With Details Comments Contact Info    Jimi Chowdary MD Schedule an appointment as soon as possible for a visit  [address]  [address]      [facility] EMERGENCY DEPT Go to If symptoms worsen [address] [address]  [address], [address]  [address]  [phone]             Discharge Medication List as of 7/22/2018  2:14 PM      START taking these medications    Details   meclizine (ANTIVERT) 25 mg tablet Take 1 tablet by mouth every 6 hours for the next 3 days. Then stop taking the meclizine. Restart the meclizine for 3 day intervals if vertigo/ dizziness returns. , Print, Disp-20 Tab, R-0         CONTINUE these medications which have NOT CHANGED    Details   polyethylene glycol (MIRALAX) 17 gram/dose powder MIX 1 CAPFUL WITH 8 OZ OF WATER AND DRINK BY MOUTH DAILY., Normal, Disp-527 g, R-0      omeprazole (PRILOSEC) 20 mg capsule Take 1 Cap by mouth daily. , Normal, Disp-90 Cap, R-0      rosuvastatin (CRESTOR) 20 mg tablet TAKE 1 TABLET BY MOUTH EVERY NIGHT, Normal**Patient requests 90 days supply**Disp-90 Tab, R-3      ondansetron (ZOFRAN ODT) 4 mg disintegrating tablet Take 1 Tab by mouth every eight (8) hours as needed for Nausea., Normal, Disp-30 Tab, R-0      diphenoxylate-atropine (LOMOTIL) 2.5-0.025 mg per tablet Take 1 Tab by mouth four (4) times daily as needed for Diarrhea (1 tab after each stool for max 8 per day). Max Daily Amount: 4 Tabs. Take after each stool for a maximum of 8 tablets daily, Print, Disp-20 Tab, R-0      Hydrocolloid Dressing (DUODERM CGF DRESSING) 4 X 4 \" bndg 1 Patch by Apply Externally route Every Mon, Wed and Sat., Normal, Disp-10 Each, R-1      cloNIDine HCl (CATAPRES) 0.2 mg tablet TK 1 T PO QHS FOR BLOOD PRESSURE, Historical Med, R-4      cyclobenzaprine (FLEXERIL) 10 mg tablet Take 1 Tab by mouth three (3) times daily as needed for Muscle Spasm(s). , Normal, Disp-21 Tab, R-0      metoprolol succinate (TOPROL-XL) 50 mg XL tablet TAKE 1 TABLET BY MOUTH DAILY, Normal**Patient requests 90 days supply**Disp-90 Tab, R-6      diclofenac (VOLTAREN) 1 % gel Apply 4 g to affected area four (4) times daily. , Print, Disp-500 g, R-5      amLODIPine (NORVASC) 10 mg tablet Take 1 Tab by mouth daily. , Print, Disp-30 Tab, R-6      furosemide (LASIX) 40 mg tablet Take 20 mg by mouth daily. , Historical Med      valsartan (DIOVAN) 320 mg tablet Take 320 mg by mouth daily. , Historical Med      insulin lispro (HUMALOG) 100 unit/mL injection by SubCUTAneous route. 6 units before breakfast  6 units before lunch  6 units before dinner, Historical Med      insulin glargine (LANTUS SOLOSTAR) 100 unit/mL (3 mL) pen by SubCUTAneous route. 35 units nightly, Historical Med           _______________________________    Attestations:  PFUFLB ROIOOSTTUAY     ZRYDUG S PVBETOTVW acting as a scribe for and in the presence of Pedro Silva MD      July 22, 2018 at 1:04 PM       Provider Attestation:      I personally performed the services described in the documentation, reviewed the documentation, as recorded by the scribe in my presence, and it accurately and completely records my words and actions.  July 22, 2018 at 1:04 PM - Pedro Silva MD    ___________________________  ____

## 2018-07-22 NOTE — DISCHARGE INSTRUCTIONS
Earwax Blockage: Care Instructions  Your Care Instructions    Earwax is a natural substance that protects the ear canal. Normally, earwax drains from the ears and does not cause problems. Sometimes earwax builds up and hardens. Earwax blockage (also called cerumen impaction) can cause some loss of hearing and pain. When wax is tightly packed, you will need to have your doctor remove it. Follow-up care is a key part of your treatment and safety. Be sure to make and go to all appointments, and call your doctor if you are having problems. It's also a good idea to know your test results and keep a list of the medicines you take. How can you care for yourself at home? · Do not try to remove earwax with cotton swabs, fingers, or other objects. This can make the blockage worse and damage the eardrum. · If your doctor recommends that you try to remove earwax at home:  ¨ Soften and loosen the earwax with warm mineral oil. You also can try hydrogen peroxide mixed with an equal amount of room temperature water. Place 2 drops of the fluid, warmed to body temperature, in the ear two times a day for up to 5 days. ¨ Once the wax is loose and soft, all that is usually needed to remove it from the ear canal is a gentle, warm shower. Direct the water into the ear, then tip your head to let the earwax drain out. Dry your ear thoroughly with a hair dryer set on low. Hold the dryer several inches from your ear. ¨ If the warm mineral oil and shower do not work, use an over-the-counter wax softener. Read and follow all instructions on the label. After using the wax softener, use an ear syringe to gently flush the ear. Make sure the flushing solution is body temperature. Cool or hot fluids in the ear can cause dizziness. When should you call for help? Call your doctor now or seek immediate medical care if:    · Pus or blood drains from your ear.     · Your ears are ringing or feel full.     · You have a loss of hearing.  Watch closely for changes in your health, and be sure to contact your doctor if:    · You have pain or reduced hearing after 1 week of home treatment.     · You have any new symptoms, such as nausea or balance problems. Where can you learn more? Go to http://arlette-michel.info/. Enter L397 in the search box to learn more about \"Earwax Blockage: Care Instructions. \"  Current as of: November 20, 2017  Content Version: 11.7  © 8210-1481 [employer]. Care instructions adapted under license by [employer] (which disclaims liability or warranty for this information). If you have questions about a medical condition or this instruction, always ask your healthcare professional. [address] any warranty or liability for your use of this information. Dizziness: Care Instructions  Your Care Instructions  Dizziness is the feeling of unsteadiness or fuzziness in your head. It is different than having vertigo, which is a feeling that the room is spinning or that you are moving or falling. It is also different from lightheadedness, which is the feeling that you are about to faint. It can be hard to know what causes dizziness. Some people feel dizzy when they have migraine headaches. Sometimes bouts of flu can make you feel dizzy. Some medical conditions, such as heart problems or high blood pressure, can make you feel dizzy. Many medicines can cause dizziness, including medicines for high blood pressure, pain, or anxiety. If a medicine causes your symptoms, your doctor may recommend that you stop or change the medicine. If it is a problem with your heart, you may need medicine to help your heart work better. If there is no clear reason for your symptoms, your doctor may suggest watching and waiting for a while to see if the dizziness goes away on its own. Follow-up care is a key part of your treatment and safety.  Be sure to make and go to all appointments, and call your doctor if you are having problems. It's also a good idea to know your test results and keep a list of the medicines you take. How can you care for yourself at home? · If your doctor recommends or prescribes medicine, take it exactly as directed. Call your doctor if you think you are having a problem with your medicine. · Do not drive while you feel dizzy. · Try to prevent falls. Steps you can take include:  ¨ Using nonskid mats, adding grab bars near the tub, and using night-lights. ¨ Clearing your home so that walkways are free of anything you might trip on. ¨ Letting family and friends know that you have been feeling dizzy. This will help them know how to help you. When should you call for help? Call 911 anytime you think you may need emergency care. For example, call if:    · You passed out (lost consciousness).     · You have dizziness along with symptoms of a heart attack. These may include:  ¨ Chest pain or pressure, or a strange feeling in the chest.  ¨ Sweating. ¨ Shortness of breath. ¨ Nausea or vomiting. ¨ Pain, pressure, or a strange feeling in the back, neck, jaw, or upper belly or in one or both shoulders or arms. ¨ Lightheadedness or sudden weakness. ¨ A fast or irregular heartbeat.     · You have symptoms of a stroke. These may include:  ¨ Sudden numbness, tingling, weakness, or loss of movement in your face, arm, or leg, especially on only one side of your body. ¨ Sudden vision changes. ¨ Sudden trouble speaking. ¨ Sudden confusion or trouble understanding simple statements. ¨ Sudden problems with walking or balance.   ¨ A sudden, severe headache that is different from past headaches.    Call your doctor now or seek immediate medical care if:    · You feel dizzy and have a fever, headache, or ringing in your ears.     · You have new or increased nausea and vomiting.     · Your dizziness does not go away or comes back.    Watch closely for changes in your health, and be sure to contact your doctor if:    · You do not get better as expected. Where can you learn more? Go to http://arlette-michel.info/. Enter R349 in the search box to learn more about \"Dizziness: Care Instructions. \"  Current as of: November 20, 2017  Content Version: 11.7  © 0301-6967 [employer]. Care instructions adapted under license by [employer] (which disclaims liability or warranty for this information). If you have questions about a medical condition or this instruction, always ask your healthcare professional. [address] any warranty or liability for your use of this information.

## 2018-08-01 ENCOUNTER — OFFICE VISIT (OUTPATIENT)
Dept: ORTHOPEDIC SURGERY | Facility: CLINIC | Age: 78
End: 2018-08-01

## 2018-08-01 VITALS
WEIGHT: 231.6 LBS | RESPIRATION RATE: 18 BRPM | OXYGEN SATURATION: 98 % | DIASTOLIC BLOOD PRESSURE: 66 MMHG | TEMPERATURE: 97 F | HEART RATE: 73 BPM | HEIGHT: 65 IN | BODY MASS INDEX: 38.59 KG/M2 | SYSTOLIC BLOOD PRESSURE: 170 MMHG

## 2018-08-01 DIAGNOSIS — M54.5 LOW BACK PAIN, UNSPECIFIED BACK PAIN LATERALITY, UNSPECIFIED CHRONICITY, WITH SCIATICA PRESENCE UNSPECIFIED: Primary | ICD-10-CM

## 2018-08-01 DIAGNOSIS — M70.61 TROCHANTERIC BURSITIS OF RIGHT HIP: ICD-10-CM

## 2018-08-01 RX ORDER — CYCLOBENZAPRINE HCL 10 MG
10 TABLET ORAL
Qty: 30 TAB | Refills: 1 | Status: SHIPPED | OUTPATIENT
Start: 2018-08-01 | End: 2018-09-12

## 2018-08-01 RX ORDER — METHYLPREDNISOLONE 4 MG/1
TABLET ORAL
Qty: 1 DOSE PACK | Refills: 0 | Status: SHIPPED | OUTPATIENT
Start: 2018-08-01 | End: 2018-09-12

## 2018-08-01 RX ORDER — BETAMETHASONE SODIUM PHOSPHATE AND BETAMETHASONE ACETATE 3; 3 MG/ML; MG/ML
6 INJECTION, SUSPENSION INTRA-ARTICULAR; INTRALESIONAL; INTRAMUSCULAR; SOFT TISSUE ONCE
Qty: 1 ML | Refills: 0
Start: 2018-08-01 | End: 2018-08-01

## 2018-08-01 NOTE — PROGRESS NOTES
[address] [facility], [address] [facility]  [phone]           Patient: Mckayla Ocasio                MRN: [id number]       SSN: [id number]  YOB: 1940        AGE: 66 y.o. SEX: female  Body mass index is 38.54 kg/(m^2). PCP: Daniel Lundberg MD  08/01/18      This office note has been dictated. REVIEW OF SYSTEMS:  Constitutional: Negative for fever, chills, weight loss and malaise/fatigue. HENT: Negative. Eyes: Negative. Respiratory: Negative. Cardiovascular: Negative. Gastrointestinal: No bowel incontinence or constipation. Genitourinary: No bladder incontinence or saddle anesthesia. Skin: Negative. Neurological: Negative. Endo/Heme/Allergies: Negative. Psychiatric/Behavioral: Negative. Musculoskeletal: As per HPI above. Past Medical History:   Diagnosis Date    Allergic rhinitis     Diabetes ([facility] [address]     H/O seasonal allergies     Hypertension          Current Outpatient Prescriptions:     polyethylene glycol (MIRALAX) 17 gram/dose powder, MIX 1 CAPFUL WITH 8 OZ OF WATER AND DRINK BY MOUTH DAILY. , Disp: 527 g, Rfl: 0    rosuvastatin (CRESTOR) 20 mg tablet, TAKE 1 TABLET BY MOUTH EVERY NIGHT, Disp: 90 Tab, Rfl: 3    cloNIDine HCl (CATAPRES) 0.2 mg tablet, TK 1 T PO QHS FOR BLOOD PRESSURE, Disp: , Rfl: 4    metoprolol succinate (TOPROL-XL) 50 mg XL tablet, TAKE 1 TABLET BY MOUTH DAILY, Disp: 90 Tab, Rfl: 6    amLODIPine (NORVASC) 10 mg tablet, Take 1 Tab by mouth daily. , Disp: 30 Tab, Rfl: 6    furosemide (LASIX) 40 mg tablet, Take 20 mg by mouth daily. , Disp: , Rfl:     insulin lispro (HUMALOG) 100 unit/mL injection, by SubCUTAneous route. 6 units before breakfast 6 units before lunch 6 units before dinner, Disp: , Rfl:     insulin glargine (LANTUS SOLOSTAR) 100 unit/mL (3 mL) pen, by SubCUTAneous route.  35 units nightly, Disp: , Rfl:     meclizine (ANTIVERT) 25 mg tablet, Take 1 tablet by mouth every 6 hours for the next 3 days. Then stop taking the meclizine. Restart the meclizine for 3 day intervals if vertigo/ dizziness returns. , Disp: 20 Tab, Rfl: 0    omeprazole (PRILOSEC) 20 mg capsule, Take 1 Cap by mouth daily. , Disp: 90 Cap, Rfl: 0    ondansetron (ZOFRAN ODT) 4 mg disintegrating tablet, Take 1 Tab by mouth every eight (8) hours as needed for Nausea., Disp: 30 Tab, Rfl: 0    diphenoxylate-atropine (LOMOTIL) 2.5-0.025 mg per tablet, Take 1 Tab by mouth four (4) times daily as needed for Diarrhea (1 tab after each stool for max 8 per day). Max Daily Amount: 4 Tabs. Take after each stool for a maximum of 8 tablets daily, Disp: 20 Tab, Rfl: 0    Hydrocolloid Dressing (DUODERM CGF DRESSING) 4 X 4 \" bndg, 1 Patch by Apply Externally route Every Mon, Wed and Sat., Disp: 10 Each, Rfl: 1    cyclobenzaprine (FLEXERIL) 10 mg tablet, Take 1 Tab by mouth three (3) times daily as needed for Muscle Spasm(s). , Disp: 21 Tab, Rfl: 0    diclofenac (VOLTAREN) 1 % gel, Apply 4 g to affected area four (4) times daily. , Disp: 500 g, Rfl: 5    valsartan (DIOVAN) 320 mg tablet, Take 320 mg by mouth daily. , Disp: , Rfl:     Allergies   Allergen Reactions    Aspirin Other (comments)     Ringing in ears    Keflex [Cephalexin] Rash       Social History     Social History    Marital status:      Spouse name: N/A    Number of children: N/A    Years of education: N/A     Occupational History    CNA - RET      Social History Main Topics    Smoking status: Former Smoker    Smokeless tobacco: Never Used    Alcohol use No    Drug use: No    Sexual activity: Yes     Partners: Male     Birth control/ protection: None     Other Topics Concern    Not on file     Social History Narrative       Past Surgical History:   Procedure Laterality Date    HX BACK SURGERY      HX CATARACT REMOVAL  2011    HX LUMBAR FUSION  2004    HX WISDOM TEETH EXTRACTION             * Patient was identified by name and date of birth   * Agreement on procedure being performed was verified  * Risks and Benefits explained to the patient  * Procedure site verified and marked as necessary  * Patient was positioned for comfort  * Consent was signed and verified  9:43 AM    The patient was instructed on post injection care. SUBJECTIVE:   We did see Ms. Vinson for followup in regard to complaints of right-sided low back pain and right hip pain. Patient has history of trochanteric bursitis also some back troubles. Has an appointment scheduled with Spine which is coming. Denies any radiating pain. No change in bowel or bladder habits. No fevers or chills, systemic changes, or injuries. No saddle paresthesias noted. PHYSICAL EXAMINATION: In general, the patient is alert and oriented x3 and is in no acute distress. The patient is well-developed and well-nourished. The patient is afebrile. HEENT:  Head is normocephalic and atraumatic. Extraocular eye movements are intact. No JVD is present. Breathing is nonlabored. Examination of lower extremities reveals good range of motion of the hips. She does have discomfort with palpation over the trochanteric bursa on the right side, also pain with palpation of the SI joint on the right side. Pelvis is stable. Negative straight leg raise. No calf tenderness. No Homans. No evidence of DVT present. RADIOGRAPHS:  Review of previous radiographs of the hip shows no acute bony abnormalities with mild-to-moderate arthritis. ASSESSMENT:   1. Low back pain. 2. Sacroiliac (SI) joint arthropathy, right. 3. Right hip trochanteric bursitis. PLAN:  At this point, we discussed treatment options. We are going to move forward with a repeat cortisone injection for the bursitis today.        After informed consent, under aseptic conditions, as well as ultrasound-guided assistance, the right hip was prepped with Betadine, and 6 mg of Celestone was injected into the right hip without complication. The patient tolerated the injection well. She is instructed in postinjection care. Will see her back in the office in about 3 months' time for evaluation. In the meantime, we are going to start her on a Medrol Dosepak as well as some Flexeril for her back. She will call with any questions or if concerns arise. CC:  . MD JR Juan Luis Paige, PA-C, ATC

## 2018-08-14 ENCOUNTER — HOSPITAL ENCOUNTER (OUTPATIENT)
Dept: MAMMOGRAPHY | Age: 78
Discharge: HOME | End: 2018-08-14
Attending: NURSE PRACTITIONER
Payer: MEDICARE

## 2018-08-14 ENCOUNTER — HOSPITAL ENCOUNTER (OUTPATIENT)
Dept: BONE DENSITY | Age: 78
Discharge: HOME | End: 2018-08-14
Attending: NURSE PRACTITIONER
Payer: MEDICARE

## 2018-08-14 DIAGNOSIS — Z12.39 SCREENING FOR BREAST CANCER: ICD-10-CM

## 2018-08-14 DIAGNOSIS — Z78.0 POST-MENOPAUSAL: ICD-10-CM

## 2018-08-14 PROCEDURE — 77067 SCR MAMMO BI INCL CAD: CPT

## 2018-08-14 PROCEDURE — 77080 DXA BONE DENSITY AXIAL: CPT

## 2018-08-21 ENCOUNTER — OFFICE VISIT (OUTPATIENT)
Dept: ORTHOPEDIC SURGERY | Age: 78
End: 2018-08-21

## 2018-08-21 VITALS
SYSTOLIC BLOOD PRESSURE: 155 MMHG | WEIGHT: 229 LBS | BODY MASS INDEX: 38.15 KG/M2 | HEART RATE: 64 BPM | DIASTOLIC BLOOD PRESSURE: 78 MMHG | TEMPERATURE: 97.2 F | OXYGEN SATURATION: 97 % | HEIGHT: 65 IN

## 2018-08-21 DIAGNOSIS — M19.041 PRIMARY OSTEOARTHRITIS OF RIGHT HAND: ICD-10-CM

## 2018-08-21 DIAGNOSIS — M79.641 BILATERAL HAND PAIN: ICD-10-CM

## 2018-08-21 DIAGNOSIS — M19.042 PRIMARY OSTEOARTHRITIS OF LEFT HAND: Primary | ICD-10-CM

## 2018-08-21 DIAGNOSIS — M79.642 BILATERAL HAND PAIN: ICD-10-CM

## 2018-08-21 RX ORDER — MELOXICAM 7.5 MG/1
7.5 TABLET ORAL 2 TIMES DAILY WITH MEALS
Qty: 60 TAB | Refills: 0 | Status: SHIPPED | OUTPATIENT
Start: 2018-08-21 | End: 2018-09-12

## 2018-08-21 NOTE — PROGRESS NOTES
Morgan Hall  1940   Chief Complaint   Patient presents with    Hand Pain     Bilateral        HISTORY OF PRESENT ILLNESS  Morgan Hall is a 66 y.o. female who presents today for evaluation of b/l hand pain. she rates her pain 5/10 today. Pain has been present for awhile. Patient states her hands ache after 5 pm everyday. Pain not improved with heat or massaging. Patient describes the pain as aching that is Intermittent in nature. Symptoms are worse with certain movements of the hands, Activity and is better with  nothing . Associated symptoms include stiffness. Since problem started, it: has worsened. Pain does wake patient up at night. Has taken no meds for the problem. Has tried following treatments: Injections:NO; Brace:NO;  Therapy:NO; Cane/Crutch:NO       Allergies   Allergen Reactions    Aspirin Other (comments)     Ringing in ears    Keflex [Cephalexin] Rash        Past Medical History:   Diagnosis Date    Allergic rhinitis     Diabetes ([facility] [address]     H/O seasonal allergies     Hypertension       Social History     Social History    Marital status:      Spouse name: N/A    Number of children: N/A    Years of education: N/A     Occupational History    CNA - RET      Social History Main Topics    Smoking status: Former Smoker    Smokeless tobacco: Never Used    Alcohol use No    Drug use: No    Sexual activity: Yes     Partners: Male     Birth control/ protection: None     Other Topics Concern    Not on file     Social History Narrative      Past Surgical History:   Procedure Laterality Date    HAND/FINGER SURGERY UNLISTED      HX BACK SURGERY      HX CATARACT REMOVAL  2011    HX LUMBAR FUSION  2004    HX WISDOM TEETH EXTRACTION        Family History   Problem Relation Age of Onset    Diabetes Maternal Aunt     Diabetes Maternal Uncle     Diabetes Maternal Grandfather     Diabetes Other       Current Outpatient Prescriptions   Medication Sig    methylPREDNISolone (MEDROL, OSCAR,) 4 mg tablet Per dose pack instructions    cyclobenzaprine (FLEXERIL) 10 mg tablet Take 1 Tab by mouth three (3) times daily as needed for Muscle Spasm(s).  meclizine (ANTIVERT) 25 mg tablet Take 1 tablet by mouth every 6 hours for the next 3 days. Then stop taking the meclizine. Restart the meclizine for 3 day intervals if vertigo/ dizziness returns.  polyethylene glycol (MIRALAX) 17 gram/dose powder MIX 1 CAPFUL WITH 8 OZ OF WATER AND DRINK BY MOUTH DAILY.  omeprazole (PRILOSEC) 20 mg capsule Take 1 Cap by mouth daily.  rosuvastatin (CRESTOR) 20 mg tablet TAKE 1 TABLET BY MOUTH EVERY NIGHT    ondansetron (ZOFRAN ODT) 4 mg disintegrating tablet Take 1 Tab by mouth every eight (8) hours as needed for Nausea.  diphenoxylate-atropine (LOMOTIL) 2.5-0.025 mg per tablet Take 1 Tab by mouth four (4) times daily as needed for Diarrhea (1 tab after each stool for max 8 per day). Max Daily Amount: 4 Tabs. Take after each stool for a maximum of 8 tablets daily    Hydrocolloid Dressing (DUODERM CGF DRESSING) 4 X 4 \" bndg 1 Patch by Apply Externally route Every Mon, Wed and Sat.  cloNIDine HCl (CATAPRES) 0.2 mg tablet TK 1 T PO QHS FOR BLOOD PRESSURE    cyclobenzaprine (FLEXERIL) 10 mg tablet Take 1 Tab by mouth three (3) times daily as needed for Muscle Spasm(s).  metoprolol succinate (TOPROL-XL) 50 mg XL tablet TAKE 1 TABLET BY MOUTH DAILY    diclofenac (VOLTAREN) 1 % gel Apply 4 g to affected area four (4) times daily.  amLODIPine (NORVASC) 10 mg tablet Take 1 Tab by mouth daily.  furosemide (LASIX) 40 mg tablet Take 20 mg by mouth daily.  valsartan (DIOVAN) 320 mg tablet Take 320 mg by mouth daily.  insulin lispro (HUMALOG) 100 unit/mL injection by SubCUTAneous route. 6 units before breakfast  6 units before lunch  6 units before dinner    insulin glargine (LANTUS SOLOSTAR) 100 unit/mL (3 mL) pen by SubCUTAneous route.  35 units nightly     No current facility-administered medications for this visit. REVIEW OF SYSTEM   Patient denies: Weight loss, Fever/Chills, HA, Visual changes, Fatigue, Chest pain, SOB, Abdominal pain, N/V/D/C, Blood in stool or urine, Edema. Pertinent positive as above in HPI. All others were negative    PHYSICAL EXAM:   Visit Vitals    /78    Pulse 64    Temp 97.2 °F (36.2 °C)    Ht 5' 5\" (1.651 m)    Wt 229 lb (103.9 kg)    SpO2 97%    BMI 38.11 kg/m2     The patient is a well-developed, well-nourished female   in no acute distress. The patient is alert and oriented times three. The patient is alert and oriented times three. Mood and affect are normal.  LYMPHATIC: lymph nodes are not enlarged and are within normal limits  SKIN: normal in color and non tender to palpation. There are no bruises or abrasions noted. NEUROLOGICAL: Motor sensory exam is within normal limits. Reflexes are equal bilaterally. There is normal sensation to pinprick and light touch  MUSCULOSKELETAL:  Examination Left Hand Right Hand   Skin Intact Intact   Deformity + +   Swelling + +   Tenderness - -   Tenderness A1 Pulley - -   Finger flexion Full Full   Finger extension Full Full   Thenar Eminence Atrophy - -   Sensation Normal Normal   Capillary refill - -   Heberden's nodes - -   Dupuytren's - -     Examination Left Wrist Right Wrist   Skin Intact Intact   Tenderness - -   Flexion 60 60   Extension 60 60   Deformity - -   Effusion - -   Tinnel's sign - -   Phalen's test - -   Finklestein maneuver - -   Pain with thumb abduction - -        IMAGING: XR of b/l hands dated 8/21/18 was reviewed and read: diffuse moderate degenerative changes in the IP joints of all fingers. IMPRESSION:      ICD-10-CM ICD-9-CM    1. Primary osteoarthritis of left hand M19.042 715.14 meloxicam (MOBIC) 7.5 mg tablet   2. Bilateral hand pain M79.641 729.5 AMB POC XRAY, HAND; 3+ VIEWS    M79.642  AMB POC XRAY, HAND; 3+ VIEWS   3.  Primary osteoarthritis of right hand M19.041 715.14 meloxicam (MOBIC) 7.5 mg tablet        PLAN:  1. The patient presents today with b/l hand pain due to XR documented osteoarthritis. I would like her to follow up with Dr. Destini Cano. Risk factors include: dm, htn  2. No ultrasound exam indicated today  3. No cortisone injection indicated today   4. No Physical/Occupational Therapy indicated today  5. No diagnostic test indicated today:   6. No durable medical equipment indicated today  7. Yes referral indicated today DR. Matheus Harrington  8. Yes medications indicated today: MOBIC  9. No Narcotic indicated today     RTC prn  Follow-up Disposition: Not on File    Scribed by Lavern Rothman [facility]) as dictated by Ramakrishna Mena MD    I, Dr. Ramakrishna Mena, confirm that all documentation is accurate.     Ramakrishna Mena M.D.   [facility]

## 2018-08-28 NOTE — ANCILLARY DISCHARGE INSTRUCTIONS
[facility]  [address]  [phone] fax    Physical Therapy Discharge Summary      Patient name: Jaja Castro Start of Care: 2018   Referral source: Zbigniew Tracy : 1940                         Medical Diagnosis: Pain in left hip [M25.552]  Trochanteric bursitis, left hip [M70.62]  Spinal stenosis, lumbosacral region [M48.07] Onset Date:2 months                         Treatment Diagnosis: Left Hip bursitis and LS Pain   Prior Hospitalization: see medical history Provider#: [id number]   Medications: Verified on Patient summary List    Comorbidities: diabetes, HBP, Arthritis. Prior Level of Function: Independent. LS fusion     Visits from Start of Care: 10    Missed Visits: 0    Reporting Period : 18 to 3/29/18    Summary of Care:  1. Pt will be compliant with a HEP to improve left hip and LS function. Progressing. Pt reported that she has been trying to do them once a day, sometimes twice. 3/20/18  Long Term Goals: To be accomplished in 6 weeks:  1. Pt will increase FOTO score by 18 pts to improve hip function. Progressing. 3/29/18  2. Pt will decrease pain to <3/10 to ease with ADL's. MET. 3. Pt will increase ambulation tolerance to 10 mins for house chores and grocery shopping.   MET 3/26/18    Pt Dc'd. She is ready to return to PLOF with 0/10 pain . G-Codes (GP)  Mobility   [id number] Goal  CK= 40-59%  D/C  CK= 40-59%    The severity rating is based on clinical judgment and the FOTO score.     Left Hip IR=4- ER=4- abd=-3 flexio=4-    ASSESSMENT/RECOMMENDATIONS:  [x]Discontinue therapy: []Patient has reached or is progressing toward set goals      []Patient is non-compliant or has abdicated      []Due to lack of appreciable progress towards set goals    Jammie Bell, PT 2018 3:36 PM

## 2018-09-12 ENCOUNTER — OFFICE VISIT (OUTPATIENT)
Dept: ORTHOPEDIC SURGERY | Age: 78
End: 2018-09-12

## 2018-09-12 VITALS
TEMPERATURE: 98.9 F | HEART RATE: 86 BPM | RESPIRATION RATE: 18 BRPM | WEIGHT: 228.8 LBS | BODY MASS INDEX: 38.12 KG/M2 | DIASTOLIC BLOOD PRESSURE: 83 MMHG | SYSTOLIC BLOOD PRESSURE: 174 MMHG | HEIGHT: 65 IN | OXYGEN SATURATION: 97 %

## 2018-09-12 DIAGNOSIS — M47.816 LUMBAR SPONDYLOSIS: Primary | ICD-10-CM

## 2018-09-12 DIAGNOSIS — Z98.1 HISTORY OF LUMBAR FUSION: ICD-10-CM

## 2018-09-12 DIAGNOSIS — M21.372 LEFT FOOT DROP: ICD-10-CM

## 2018-09-12 DIAGNOSIS — M53.3 SACROILIAC JOINT DYSFUNCTION OF RIGHT SIDE: ICD-10-CM

## 2018-09-12 RX ORDER — DICLOFENAC SODIUM 10 MG/G
4 GEL TOPICAL 4 TIMES DAILY
Qty: 500 G | Refills: 5 | Status: SHIPPED | OUTPATIENT
Start: 2018-09-12 | End: 2019-03-28

## 2018-09-12 RX ORDER — MONTELUKAST SODIUM 10 MG/1
TABLET ORAL
Refills: 2 | COMMUNITY
Start: 2018-07-06 | End: 2020-07-30 | Stop reason: ALTCHOICE

## 2018-09-12 NOTE — PROGRESS NOTES
Verbal order entered per Dr. Angelica Andrews as documented on blue sheet:  -L-Spine CT increasing lower back pain for 6 months. Difficulty standing and walking. Failed physical therapy. History of surgery.

## 2018-09-12 NOTE — MR AVS SNAPSHOT
[address] [facility] [address] [place] [address] 
[phone] Patient: Farzana Stanton MRN: [id number] Visit Information Date & Time Provider Department Dept. Phone Encounter #  
 9/12/2018  2:30  [address], MD 4 [facility], [address] and [facility] [phone] [id number] Follow-up Instructions Return for MRI/CT f/u. Your Appointments 10/11/2018  1:20 PM  
Office Visit with Tammy Mcgee NP [facility] (LIZBETH Stallworth) Appt Note: Medicare Wellness Visit [id number] [facility] [address]  
[phone]  
  
   
 [address],  [address] Upcoming Health Maintenance Date Due  
 LIPID PANEL Q1 1940 DTaP/Tdap/Td series (1 - Tdap) 2/6/1961 ZOSTER VACCINE AGE 60> 12/6/1999 MEDICARE YEARLY EXAM 3/15/2018 FOOT EXAM Q1 3/15/2018 Influenza Age 5 to Adult 8/1/2018 Pneumococcal 65+ High/Highest Risk (1 of 2 - PCV13) 10/4/2018* HEMOGLOBIN A1C Q6M 1/11/2019 EYE EXAM RETINAL OR DILATED Q1 3/12/2019 MICROALBUMIN Q1 7/12/2019 GLAUCOMA SCREENING Q2Y 3/12/2020 *Topic was postponed. The date shown is not the original due date. Allergies as of 9/12/2018  Review Complete On: 9/12/2018 By: Yuri Suresh MD  
  
 Severity Noted Reaction Type Reactions Aspirin  03/01/2017    Other (comments) Ringing in ears Keflex [Cephalexin]  03/01/2017    Rash Current Immunizations  Never Reviewed No immunizations on file. Not reviewed this visit You Were Diagnosed With   
  
 Codes Comments Lumbar spondylosis    -  Primary ICD-10-CM: W65.814 ICD-9-CM: 721.3 Sacroiliac joint dysfunction of right side     ICD-10-CM: M53.3 ICD-9-CM: 724.6 Left foot drop     ICD-10-CM: B93.113 ICD-9-CM: 736.79 Vitals BP Pulse Temp Resp Height(growth percentile) Weight(growth percentile) 174/83 86 98.9 °F (37.2 °C) 18 5' 5\" (1.651 m) 228 lb 12.8 oz (103.8 kg) SpO2 BMI OB Status Smoking Status 97% 38.07 kg/m2 Menopause Former Smoker BMI and BSA Data Body Mass Index Body Surface Area 38.07 kg/m 2 2.18 m 2 Preferred Pharmacy Pharmacy Name Phone [address], [address] [place] [address] [phone] [facility] [phone] Your Updated Medication List  
  
   
This list is accurate as of 9/12/18  4:09 PM.  Always use your most recent med list. amLODIPine 10 mg tablet Commonly known as:  Marycarlee Strattoner Take 1 Tab by mouth daily. cloNIDine HCl 0.2 mg tablet Commonly known as:  CATAPRES TK 1 T PO QHS FOR BLOOD PRESSURE  
  
 diclofenac 1 % Gel Commonly known as:  VOLTAREN Apply 4 g to affected area four (4) times daily. DIOVAN 320 mg tablet Generic drug:  valsartan Take 320 mg by mouth daily. HumaLOG U-100 Insulin 100 unit/mL injection Generic drug:  insulin lispro  
by SubCUTAneous route. 6 units before breakfast 6 units before lunch 6 units before dinner LANTUS SOLOSTAR U-100 INSULIN 100 unit/mL (3 mL) Inpn Generic drug:  insulin glargine  
by SubCUTAneous route. 35 units nightly LASIX 40 mg tablet Generic drug:  furosemide Take 20 mg by mouth daily. montelukast 10 mg tablet Commonly known as:  SINGULAIR TK 1 T PO Q EVENING  
  
 polyethylene glycol 17 gram/dose powder Commonly known as:  Ronne Huber MIX 1 CAPFUL WITH 8 OZ OF WATER AND DRINK BY MOUTH DAILY. rosuvastatin 20 mg tablet Commonly known as:  CRESTOR  
TAKE 1 TABLET BY MOUTH EVERY NIGHT Prescriptions Sent to Pharmacy Refills  
 diclofenac (VOLTAREN) 1 % gel 5 Sig: Apply 4 g to affected area four (4) times daily. Class: Normal  
 Pharmacy: [address] [place] [address] #: [phone]  Route: Topical  
  
 Follow-up Instructions Return for MRI/CT f/u. To-Do List   
 09/12/2018 Imaging:  CT SPINE LUMB WO CONT   
  
 09/19/2018 2:30 PM  
  Appointment with [facility] RM 2 at [facility] RAD CT ([phone]) GENERAL INSTRUCTIONS  This study does not require you to drink contrast prior to your study. Drink plenty of fluids prior to your exam.   RELATED STUDY INFORMATION  Bring any films, CD's, and reports related with you on the day of your exam.  This only includes studies done outside of [address] [place] [address], [facility], [employer], and [facility]. QUESTIONS  Notify the CT Department if you have any questions concerning your study. Leni Randall - [phone] [facility] - [phone] Patient Instructions Sacroiliac Joint Pain: Care Instructions Your Care Instructions The sacroiliac joints connect the spine and each side of the pelvis. These joints bear the weight and stress of your torso. This makes them easy to injure. Injury or overuse of these joints may cause low back pain. Stress on these joints can cause joint pain. Sacroiliac joint pain is more common in pregnant women. Certain kinds of arthritis also may cause this type of joint pain. Home treatment may help you feel better. So can avoiding activities that stress your back. Your doctor also may recommend physical therapy. This may include doing exercises and stretches to help with pain. You may also learn to use good posture. Follow-up care is a key part of your treatment and safety. Be sure to make and go to all appointments, and call your doctor if you are having problems. It's also a good idea to know your test results and keep a list of the medicines you take. How can you care for yourself at home? · Ask your doctor about light exercises that may help your back pain. Try to do light activity throughout the day.  But make sure to take rests as needed. Find a comfortable position for rest, but don't stay in one position for too long. Avoid activities that cause pain. · To apply heat, put a warm water bottle, a heating pad set on low, or a warm cloth on your back. Do not go to sleep with a heating pad on your skin. · Put ice or a cold pack on your back for 10 to 20 minutes at a time. Put a thin cloth between the ice and your skin. · If the doctor gave you a prescription medicine for pain, take it as prescribed. · If you are not taking a prescription pain medicine, ask your doctor if you can take an over-the-counter pain medicine, such as acetaminophen (Tylenol), ibuprofen (Advil, Motrin), or naproxen (Aleve). Read and follow all instructions on the label. Take pain medicines exactly as directed. · Do not take two or more pain medicines at the same time unless the doctor told you to. Many pain medicines have acetaminophen, which is Tylenol. Too much acetaminophen (Tylenol) can be harmful. · To prevent future back pain, do exercises to stretch and strengthen your back and stomach. Learn how to use good posture, safe lifting techniques, and proper body mechanics. When should you call for help? Call 911 anytime you think you may need emergency care. For example, call if: 
  · You are unable to move a leg at all.  
[facility] your doctor now or seek immediate medical care if: 
  · You have new or worse symptoms in your legs or buttocks. Symptoms may include: ¨ Numbness or tingling. ¨ Weakness. ¨ Pain.  
  · You lose bladder or bowel control.  
 Watch closely for changes in your health, and be sure to contact your doctor if: 
  · You are not getting better as expected. Where can you learn more? Go to http://arlette-michel.info/. Enter Y294 in the search box to learn more about \"Sacroiliac Joint Pain: Care Instructions. \" Current as of: November 29, 2017 Content Version: 11.7 © 2209-1461 [employer]. Care instructions adapted under license by [employer] (which disclaims liability or warranty for this information). If you have questions about a medical condition or this instruction, always ask your healthcare professional. [address] any warranty or liability for your use of this information. Introducing [facility]! Abena Montiel introduces Helpjuice.com patient portal. Now you can access parts of your medical record, email your doctor's office, and request medication refills online. 1. In your internet browser, go to [email or web address] 2. Click on the First Time User? Click Here link in the Sign In box. You will see the New Member Sign Up page. 3. Enter your Helpjuice.com Access Code exactly as it appears below. You will not need to use this code after youve completed the sign-up process. If you do not sign up before the expiration date, you must request a new code. · [email or web address].com Access Code: [email or web address] Expires: 9/19/2018 12:35 PM 
 
4. Enter the last four digits of your Social Security Number (xxxx) and Date of Birth (mm/dd/yyyy) as indicated and click Submit. You will be taken to the next sign-up page. 5. Create a Helpjuice.com ID. This will be your Helpjuice.com login ID and cannot be changed, so think of one that is secure and easy to remember. 6. Create a Helpjuice.com password. You can change your password at any time. 7. Enter your Password Reset Question and Answer. This can be used at a later time if you forget your password. 8. Enter your e-mail address. You will receive e-mail notification when new information is available in [address] 9. Click Sign Up. You can now view and download portions of your medical record. 10. Click the Download Summary menu link to download a portable copy of your medical information.  
 
If you have questions, please visit the Frequently Asked Questions section of the [email or web address]. Remember, [employer] is NOT to be used for urgent needs. For medical emergencies, dial 911. Now available from your iPhone and Android! Please provide this summary of care documentation to your next provider. Your primary care clinician is listed as [address]. If you have any questions after today's visit, please call [phone].

## 2018-09-12 NOTE — PROGRESS NOTES
[place] [address], [address]  [place], [address] [place] [address] Street  Phone: [phone]  Fax: [phone]        Marita Hammond  : 1940  PCP: Melida Larson MD      NEW PATIENT      ASSESSMENT AND PLAN     Diagnoses and all orders for this visit:    1. Lumbar spondylosis  -     diclofenac (VOLTAREN) 1 % gel; Apply 4 g to affected area four (4) times daily. -     CT SPINE LUMB WO CONT; Future    2. Sacroiliac joint dysfunction of right side  -     diclofenac (VOLTAREN) 1 % gel; Apply 4 g to affected area four (4) times daily. -     CT SPINE LUMB WO CONT; Future    3. Left foot drop  -     CT SPINE LUMB WO CONT; Future    4. History of lumbar fusion       1. Advised to stay active as tolerated. 2. Discussed injections and medications as treatment options  3. Pt may call to schedule R SI joint block  4. CT lumbar spine - back pain x 6 months, difficulty walking, failed PT  5. Continue Voltaren gel  6. Given information on SI joint pain and exercises    Follow-up Disposition:  Return for MRI/CT f/u. CHIEF COMPLAINT  Alonzo Patton is seen today in consultation at the request of Mortimer Cheese for complaints of back and hip pain. HISTORY OF PRESENT ILLNESS  Alonzo Patton is a 66 y.o. female. Today pt c/o back and hip pain of 6 month duration. Pt denies any specific incident or injury that caused their pain. Pt had repeat R trochanteric bursa injection last month. Pt reports she sat in a chair that collapsed and this initiated her low back and hip pain 1 year ago. She affirms it has been worsening the past 6 months. Pt states she has good and bad days. Pt states her pain worsens when laying on her R side at night. Pt states she feels unstable while walking. Her  states she has trouble with coordination while walking. Location of pain: low back  Does pain radiate into extremities: R hip  Does patient have weakness: L drop foot after surgery.  She notes it flops if she is tired. Pt denies saddle paresthesias. Medications pt is on: Voltaren gel. Ice and heat. Pt denies recent ED visits or hospitalizations. Denies persistent fevers, chills, weight changes, neurogenic bowel or bladder symptoms. Treatments patient has tried:  Physical therapy:Yes  Non-opioid medications: Yes  Spinal injections: Yes  Spinal surgery- Yes. Lumbar fusion 2004 [address] in [place] with benefit        reviewed. Pt moved from [place] after retiring to escape the cold. Pain Assessment  9/12/2018   Location of Pain Back; Hip   Location Modifiers Right   Severity of Pain 6   Quality of Pain Dull; Lisa Espinal; Aching; Throbbing;Burning   Quality of Pain Comment stabbing, tingling   Duration of Pain -   Frequency of Pain Constant   Frequency of Pain Comment -   Aggravating Factors Standing   Aggravating Factors Comment lifting   Limiting Behavior -   Relieving Factors (No Data)   Relieving Factors Comment laying, changing positions   Result of Injury -   Work-Related Injury -   Type of Injury -     XRAY lumbar spine 6/1/18  1) intact hardware L2- sacrum,   2) degenerative changes L1-2      PAST MEDICAL HISTORY   Past Medical History:   Diagnosis Date    Allergic rhinitis     Diabetes ([facility] [address]     H/O seasonal allergies     Hypertension        Past Surgical History:   Procedure Laterality Date    HAND/FINGER SURGERY UNLISTED      HX CATARACT REMOVAL  2011    HX LUMBAR FUSION  2004    HX WISDOM TEETH EXTRACTION         MEDICATIONS      Current Outpatient Prescriptions   Medication Sig Dispense Refill    montelukast (SINGULAIR) 10 mg tablet TK 1 T PO Q EVENING  2    diclofenac (VOLTAREN) 1 % gel Apply 4 g to affected area four (4) times daily. 500 g 5    polyethylene glycol (MIRALAX) 17 gram/dose powder MIX 1 CAPFUL WITH 8 OZ OF WATER AND DRINK BY MOUTH DAILY.  527 g 0    rosuvastatin (CRESTOR) 20 mg tablet TAKE 1 TABLET BY MOUTH EVERY NIGHT 90 Tab 3    cloNIDine HCl (CATAPRES) 0.2 mg tablet TK 1 T PO QHS FOR BLOOD PRESSURE  4    amLODIPine (NORVASC) 10 mg tablet Take 1 Tab by mouth daily. 30 Tab 6    furosemide (LASIX) 40 mg tablet Take 20 mg by mouth daily.  valsartan (DIOVAN) 320 mg tablet Take 320 mg by mouth daily.  insulin lispro (HUMALOG) 100 unit/mL injection by SubCUTAneous route. 6 units before breakfast  6 units before lunch  6 units before dinner      insulin glargine (LANTUS SOLOSTAR) 100 unit/mL (3 mL) pen by SubCUTAneous route. 35 units nightly         ALLERGIES    Allergies   Allergen Reactions    Aspirin Other (comments)     Ringing in ears    Keflex [Cephalexin] Rash          SOCIAL HISTORY    Social History     Social History    Marital status:      Spouse name: N/A    Number of children: N/A    Years of education: N/A     Occupational History    CNA - RET      Social History Main Topics    Smoking status: Former Smoker    Smokeless tobacco: Never Used    Alcohol use No    Drug use: No    Sexual activity: Yes     Partners: Male     Birth control/ protection: None     Other Topics Concern    Not on file     Social History Narrative       FAMILY HISTORY    Family History   Problem Relation Age of Onset    Diabetes Maternal Aunt     Diabetes Maternal Uncle     Diabetes Maternal Grandfather     Diabetes Other     Hypertension Cousin          REVIEW OF SYSTEMS  Review of Systems   Constitutional: Negative for chills, fever and weight loss. Respiratory: Negative for shortness of breath. Cardiovascular: Negative for chest pain. Gastrointestinal: Negative for constipation. Negative for fecal incontinence   Genitourinary: Negative for dysuria. Negative for urinary incontinence   Musculoskeletal:        Per HPI   Skin: Negative for rash. Neurological: Negative for dizziness, tingling, tremors, focal weakness and headaches. Endo/Heme/Allergies: Does not bruise/bleed easily.    Psychiatric/Behavioral: The patient does not have insomnia. PHYSICAL EXAMINATION  Visit Vitals    /83    Pulse 86    Temp 98.9 °F (37.2 °C)    Resp 18    Ht 5' 5\" (1.651 m)    Wt 228 lb 12.8 oz (103.8 kg)    SpO2 97%    BMI 38.07 kg/m2          Accompanied by spouse. Constitutional:  Well developed, well nourished, in no acute distress. Psychiatric: Affect and mood are appropriate. Integumentary: No rashes or abrasions noted on exposed areas. Cardiovascular/Peripheral Vascular: Intact l pulses. No peripheral edema is noted. Lymphatic:  No evidence of lymphedema. No cervical lymphadenopathy. SPINE/MUSCULOSKELETAL EXAM    Lumbar spine:  No rash, ecchymosis, or gross obliquity. No fasciculations. No focal atrophy is noted. Tenderness to palpation R L4-5, R SI joint. No tenderness to palpation at the sciatic notch. Trochanters non tender. Sensation grossly intact to light touch. MOTOR:       Hip Flex  Quads Hamstrings Ankle DF EHL Ankle PF   Right +4/5 +4/5 +4/5 +4/5 +4/5 +4/5   Left +4/5 +4/5 +4/5 +4/5 4/5 +4/5       Straight Leg raise negative. ANNE maneuver. Ambulation without assistive device. FWB. Steppage gait. FF. Written by Hemalatha Springer, as dictated by Hannah Vazquez MD.    I, Dr. Hannah Vazquez MD, confirm that all documentation is accurate. Ms. Rosco Homans may have a reminder for a \"due or due soon\" health maintenance. I have asked that she contact her primary care provider for follow-up on this health maintenance.

## 2018-09-12 NOTE — PATIENT INSTRUCTIONS
Sacroiliac Joint Pain: Care Instructions  Your Care Instructions    The sacroiliac joints connect the spine and each side of the pelvis. These joints bear the weight and stress of your torso. This makes them easy to injure. Injury or overuse of these joints may cause low back pain. Stress on these joints can cause joint pain. Sacroiliac joint pain is more common in pregnant women. Certain kinds of arthritis also may cause this type of joint pain. Home treatment may help you feel better. So can avoiding activities that stress your back. Your doctor also may recommend physical therapy. This may include doing exercises and stretches to help with pain. You may also learn to use good posture. Follow-up care is a key part of your treatment and safety. Be sure to make and go to all appointments, and call your doctor if you are having problems. It's also a good idea to know your test results and keep a list of the medicines you take. How can you care for yourself at home? · Ask your doctor about light exercises that may help your back pain. Try to do light activity throughout the day. But make sure to take rests as needed. Find a comfortable position for rest, but don't stay in one position for too long. Avoid activities that cause pain. · To apply heat, put a warm water bottle, a heating pad set on low, or a warm cloth on your back. Do not go to sleep with a heating pad on your skin. · Put ice or a cold pack on your back for 10 to 20 minutes at a time. Put a thin cloth between the ice and your skin. · If the doctor gave you a prescription medicine for pain, take it as prescribed. · If you are not taking a prescription pain medicine, ask your doctor if you can take an over-the-counter pain medicine, such as acetaminophen (Tylenol), ibuprofen (Advil, Motrin), or naproxen (Aleve). Read and follow all instructions on the label. Take pain medicines exactly as directed.   · Do not take two or more pain medicines at the same time unless the doctor told you to. Many pain medicines have acetaminophen, which is Tylenol. Too much acetaminophen (Tylenol) can be harmful. · To prevent future back pain, do exercises to stretch and strengthen your back and stomach. Learn how to use good posture, safe lifting techniques, and proper body mechanics. When should you call for help? Call 911 anytime you think you may need emergency care. For example, call if:    · You are unable to move a leg at all.   [facility] your doctor now or seek immediate medical care if:    · You have new or worse symptoms in your legs or buttocks. Symptoms may include:  ¨ Numbness or tingling. ¨ Weakness. ¨ Pain.     · You lose bladder or bowel control.    Watch closely for changes in your health, and be sure to contact your doctor if:    · You are not getting better as expected. Where can you learn more? Go to http://arlette-michel.info/. Enter Z050 in the search box to learn more about \"Sacroiliac Joint Pain: Care Instructions. \"  Current as of: November 29, 2017  Content Version: 11.7  © 4251-7578 [employer]. Care instructions adapted under license by [employer] (which disclaims liability or warranty for this information). If you have questions about a medical condition or this instruction, always ask your healthcare professional. [address] any warranty or liability for your use of this information.

## 2018-09-20 DIAGNOSIS — M19.041 PRIMARY OSTEOARTHRITIS OF RIGHT HAND: ICD-10-CM

## 2018-09-20 DIAGNOSIS — M19.042 PRIMARY OSTEOARTHRITIS OF LEFT HAND: ICD-10-CM

## 2018-09-25 RX ORDER — MELOXICAM 7.5 MG/1
TABLET ORAL
Qty: 60 TAB | Refills: 0 | Status: SHIPPED | OUTPATIENT
Start: 2018-09-25 | End: 2019-03-28

## 2018-09-26 ENCOUNTER — HOSPITAL ENCOUNTER (OUTPATIENT)
Dept: CT IMAGING | Age: 78
Discharge: HOME | End: 2018-09-26
Attending: PHYSICAL MEDICINE & REHABILITATION
Payer: MEDICARE

## 2018-09-26 DIAGNOSIS — M53.3 SACROILIAC JOINT DYSFUNCTION OF RIGHT SIDE: ICD-10-CM

## 2018-09-26 DIAGNOSIS — M47.816 LUMBAR SPONDYLOSIS: ICD-10-CM

## 2018-09-26 DIAGNOSIS — M21.372 LEFT FOOT DROP: ICD-10-CM

## 2018-09-26 PROCEDURE — 72131 CT LUMBAR SPINE W/O DYE: CPT

## 2018-10-10 ENCOUNTER — OFFICE VISIT (OUTPATIENT)
Dept: ORTHOPEDIC SURGERY | Facility: CLINIC | Age: 78
End: 2018-10-10

## 2018-10-10 VITALS
OXYGEN SATURATION: 95 % | HEART RATE: 75 BPM | BODY MASS INDEX: 37.99 KG/M2 | TEMPERATURE: 97.5 F | DIASTOLIC BLOOD PRESSURE: 82 MMHG | WEIGHT: 228 LBS | SYSTOLIC BLOOD PRESSURE: 160 MMHG | HEIGHT: 65 IN | RESPIRATION RATE: 16 BRPM

## 2018-10-10 DIAGNOSIS — R22.32 MASS OF HAND, LEFT: Primary | ICD-10-CM

## 2018-10-10 DIAGNOSIS — M18.0 OSTEOARTHRITIS OF CARPOMETACARPAL (CMC) JOINT OF BOTH THUMBS: ICD-10-CM

## 2018-10-10 DIAGNOSIS — M79.642 LEFT HAND PAIN: ICD-10-CM

## 2018-10-10 DIAGNOSIS — M79.641 RIGHT HAND PAIN: ICD-10-CM
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
## 2018-10-10 NOTE — PATIENT INSTRUCTIONS
Learning About Arthritis at the [facility] of the Thumb  What is it? Arthritis at the base of the thumb joint is wear and tear on the cartilage. Cartilage is a firm, thick, slippery tissue. It covers and protects the ends of bones where they meet to form a joint. When you have arthritis, there are changes in the cartilage that cause it to break down. The bones rub together and cause joint damage and pain. What causes it? Experts don't know what causes arthritis at the base of the thumb. But aging, a lot of use, an injury, or family history may play a part. What are the symptoms? Symptoms of arthritis at the base of the thumb include aching in your joint. Or the pain may feel burning or sharp. You may feel clicking, creaking, or catching in the joint. It may get stiff. You may have more pain and less strength when you pinch or  things. Symptoms may come and go, stay the same, or get worse over time. How is it diagnosed? Your doctor can often diagnose arthritis by asking you questions about your joint pain and other symptoms and examining you. You may also have X-rays and blood tests. Blood tests can help make sure another disease isn't causing your symptoms. How is it treated? Arthritis at the base of your thumb may be treated with rest, pain relievers, steroid medicines, using a brace or splint, and--in some cases--surgery. To help relieve pain in the joint, rest your sore hand. Switch hands for some activities. You can try heat and cold therapy, such as hot compresses, paraffin wax, cold packs, or ice massage. Your doctor may give you a splint to wear during some activities or when pain flares up. You can often manage mild or moderate arthritis pain with over-the-counter pain relievers. These include medicines that reduce swelling, such as ibuprofen or naproxen. You can also use acetaminophen. Sometimes these medicines are in creams that you can rub on your thumb and hand.  Your doctor may also prescribe other medicine for your pain. For some people, steroid shots may be an option. If none of the treatments work, your doctor may discuss surgery with you. Follow-up care is a key part of your treatment and safety. Be sure to make and go to all appointments, and call your doctor if you are having problems. It's also a good idea to know your test results and keep a list of the medicines you take. Where can you learn more? Go to http://arlette-michel.info/. Enter T110 in the search box to learn more about \"Learning About Arthritis at the [facility] of the Thumb. \"  Current as of: June 11, 2018  Content Version: 11.8  © 1134-9298 [employer]. Care instructions adapted under license by [employer] (which disclaims liability or warranty for this information). If you have questions about a medical condition or this instruction, always ask your healthcare professional. [address] any warranty or liability for your use of this information.

## 2018-10-11 ENCOUNTER — TELEPHONE (OUTPATIENT)
Dept: ORTHOPEDIC SURGERY | Facility: CLINIC | Age: 78
End: 2018-10-11

## 2018-10-11 ENCOUNTER — OFFICE VISIT (OUTPATIENT)
Dept: FAMILY MEDICINE CLINIC | Age: 78
End: 2018-10-11

## 2018-10-11 VITALS
HEIGHT: 65 IN | DIASTOLIC BLOOD PRESSURE: 84 MMHG | TEMPERATURE: 99 F | BODY MASS INDEX: 38.29 KG/M2 | WEIGHT: 229.8 LBS | RESPIRATION RATE: 18 BRPM | HEART RATE: 105 BPM | OXYGEN SATURATION: 98 % | SYSTOLIC BLOOD PRESSURE: 178 MMHG

## 2018-10-11 DIAGNOSIS — M18.0 OSTEOARTHRITIS OF CARPOMETACARPAL (CMC) JOINT OF BOTH THUMBS: Primary | ICD-10-CM

## 2018-10-11 DIAGNOSIS — I10 ESSENTIAL HYPERTENSION: ICD-10-CM

## 2018-10-11 DIAGNOSIS — Z23 ENCOUNTER FOR IMMUNIZATION: ICD-10-CM

## 2018-10-11 DIAGNOSIS — E11.21 TYPE 2 DIABETES MELLITUS WITH NEPHROPATHY (HCC): ICD-10-CM

## 2018-10-11 DIAGNOSIS — Z00.00 MEDICARE ANNUAL WELLNESS VISIT, SUBSEQUENT: Primary | ICD-10-CM

## 2018-10-11 DIAGNOSIS — E66.01 SEVERE OBESITY (BMI 35.0-39.9) WITH COMORBIDITY (HCC): ICD-10-CM

## 2018-10-11 NOTE — PATIENT INSTRUCTIONS
Continue prevention measures: calcium 1200 mg daily with vitamin d 800 international units daily. Also weight bearing exercises daily (walking, treadmill, small weights for strength training 2-4 pounds). Medicare Wellness Visit, Female     The best way to live healthy is to have a lifestyle where you eat a well-balanced diet, exercise regularly, limit alcohol use, and quit all forms of tobacco/nicotine, if applicable. Regular preventive services are another way to keep healthy. Preventive services (vaccines, screening tests, monitoring & exams) can help personalize your care plan, which helps you manage your own care. Screening tests can find health problems at the earliest stages, when they are easiest to treat. [facility] follows the current, evidence-based guidelines published by the [facility] States Joshua Shaylee ([facility]) when recommending preventive services for our patients. Because we follow these guidelines, sometimes recommendations change over time as research supports it. (For example, mammograms used to be recommended annually. Even though Medicare will still pay for an annual mammogram, the newer guidelines recommend a mammogram every two years for women of average risk.)  Of course, you and your doctor may decide to screen more often for some diseases, based on your risk and your health status. Preventive services for you include:  - Medicare offers their members a free annual wellness visit, which is time for you and your primary care provider to discuss and plan for your preventive service needs. Take advantage of this benefit every year!  -All adults over the age of 72 should receive the recommended pneumonia vaccines. Current USPSTF guidelines recommend a series of two vaccines for the best pneumonia protection.   -All adults should have a flu vaccine yearly and a tetanus vaccine every 10 years.  All adults age 61 and older should receive a shingles vaccine once in their lifetime.    -A bone mass density test is recommended when a woman turns 65 to screen for osteoporosis. This test is only recommended one time, as a screening. Some providers will use this same test as a disease monitoring tool if you already have osteoporosis. -All adults age 38-68 who are overweight should have a diabetes screening test once every three years.   -Other screening tests and preventive services for persons with diabetes include: an eye exam to screen for diabetic retinopathy, a kidney function test, a foot exam, and stricter control over your cholesterol.   -Cardiovascular screening for adults with routine risk involves an electrocardiogram (ECG) at intervals determined by your doctor.   -Colorectal cancer screenings should be done for adults age 54-65 with no increased risk factors for colorectal cancer. There are a number of acceptable methods of screening for this type of cancer. Each test has its own benefits and drawbacks. Discuss with your doctor what is most appropriate for you during your annual wellness visit. The different tests include: colonoscopy (considered the best screening method), a fecal occult blood test, a fecal DNA test, and sigmoidoscopy. -Breast cancer screenings are recommended every other year for women of normal risk, age 54-69.  -Cervical cancer screenings for women over age 72 are only recommended with certain risk factors.   -All adults born between [facility] should be screened once for Hepatitis C.      Here is a list of your current Health Maintenance items (your personalized list of preventive services) with a due date:  Health Maintenance Due   Topic Date Due    Cholesterol Test   1940    DTaP/Tdap/Td  (1 - Tdap) 02/06/1961    Shingles Vaccine (1 of 2) 02/06/1990    Pneumococcal Vaccine (1 of 2 - PCV13) 02/06/2005    Annual Well Visit  03/15/2018    Diabetic Foot Care  03/15/2018    Flu Vaccine  08/01/2018            Advance Directives: Care Instructions  Your Care Instructions  An advance directive is a legal way to state your wishes at the end of your life. It tells your family and your doctor what to do if you can no longer say what you want. There are two main types of advance directives. You can change them any time that your wishes change. · A living will tells your family and your doctor your wishes about life support and other treatment. · A durable power of  for health care lets you name a person to make treatment decisions for you when you can't speak for yourself. This person is called a health care agent. If you do not have an advance directive, decisions about your medical care may be made by a doctor or a  who doesn't know you. It may help to think of an advance directive as a gift to the people who care for you. If you have one, they won't have to make tough decisions by themselves. Follow-up care is a key part of your treatment and safety. Be sure to make and go to all appointments, and call your doctor if you are having problems. It's also a good idea to know your test results and keep a list of the medicines you take. How can you care for yourself at home? · Discuss your wishes with your loved ones and your doctor. This way, there are no surprises. · Many states have a unique form. Or you might use a universal form that has been approved by many states. This kind of form can sometimes be completed and stored online. Your electronic copy will then be available wherever you have a connection to the Internet. In most cases, doctors will respect your wishes even if you have a form from a different state. · You don't need a  to do an advance directive. But you may want to get legal advice. · Think about these questions when you prepare an advance directive:  ¨ Who do you want to make decisions about your medical care if you are not able to? Many people choose a family member or close friend.   ¨ Do you know enough about life support methods that might be used? If not, talk to your doctor so you understand. ¨ What are you most afraid of that might happen? You might be afraid of having pain, losing your independence, or being kept alive by machines. ¨ Where would you prefer to die? Choices include your home, a hospital, or a nursing home. ¨ Would you like to have information about hospice care to support you and your family? ¨ Do you want to donate organs when you die? ¨ Do you want certain Sabianist practices performed before you die? If so, put your wishes in the advance directive. · Read your advance directive every year, and make changes as needed. When should you call for help? Be sure to contact your doctor if you have any questions. Where can you learn more? Go to http://arlette-michel.info/. Enter R264 in the search box to learn more about \"Advance Directives: Care Instructions. \"  Current as of: April 19, 2018  Content Version: 11.8  © 9036-5211 [employer]. Care instructions adapted under license by [employer] (which disclaims liability or warranty for this information). If you have questions about a medical condition or this instruction, always ask your healthcare professional. [place] [address] any warranty or liability for your use of this information. Medicare Wellness Visit, Female     The best way to live healthy is to have a lifestyle where you eat a well-balanced diet, exercise regularly, limit alcohol use, and quit all forms of tobacco/nicotine, if applicable. Regular preventive services are another way to keep healthy. Preventive services (vaccines, screening tests, monitoring & exams) can help personalize your care plan, which helps you manage your own care. Screening tests can find health problems at the earliest stages, when they are easiest to treat.    [facility] follows the current, evidence-based guidelines published by the [employer] (USPSTF) when recommending preventive services for our patients. Because we follow these guidelines, sometimes recommendations change over time as research supports it. (For example, mammograms used to be recommended annually. Even though Medicare will still pay for an annual mammogram, the newer guidelines recommend a mammogram every two years for women of average risk.)  Of course, you and your doctor may decide to screen more often for some diseases, based on your risk and your health status. Preventive services for you include:  - Medicare offers their members a free annual wellness visit, which is time for you and your primary care provider to discuss and plan for your preventive service needs. Take advantage of this benefit every year!  -All adults over the age of 72 should receive the recommended pneumonia vaccines. Current USPSTF guidelines recommend a series of two vaccines for the best pneumonia protection.   -All adults should have a flu vaccine yearly and a tetanus vaccine every 10 years. All adults age 61 and older should receive a shingles vaccine once in their lifetime.    -A bone mass density test is recommended when a woman turns 65 to screen for osteoporosis. This test is only recommended one time, as a screening. Some providers will use this same test as a disease monitoring tool if you already have osteoporosis.   -All adults age 38-68 who are overweight should have a diabetes screening test once every three years.   -Other screening tests and preventive services for persons with diabetes include: an eye exam to screen for diabetic retinopathy, a kidney function test, a foot exam, and stricter control over your cholesterol.   -Cardiovascular screening for adults with routine risk involves an electrocardiogram (ECG) at intervals determined by your doctor.   -Colorectal cancer screenings should be done for adults age 54-65 with no increased risk factors for colorectal cancer. There are a number of acceptable methods of screening for this type of cancer. Each test has its own benefits and drawbacks. Discuss with your doctor what is most appropriate for you during your annual wellness visit. The different tests include: colonoscopy (considered the best screening method), a fecal occult blood test, a fecal DNA test, and sigmoidoscopy. -Breast cancer screenings are recommended every other year for women of normal risk, age 54-69.  -Cervical cancer screenings for women over age 72 are only recommended with certain risk factors.   -All adults born between [facility] should be screened once for Hepatitis C.      Here is a list of your current Health Maintenance items (your personalized list of preventive services) with a due date:  Health Maintenance Due   Topic Date Due    Cholesterol Test   1940    DTaP/Tdap/Td  (1 - Tdap) 02/06/1961    Shingles Vaccine (1 of 2) 02/06/1990    Pneumococcal Vaccine (1 of 2 - PCV13) 02/06/2005    Annual Well Visit  03/15/2018    Diabetic Foot Care  03/15/2018    Flu Vaccine  08/01/2018

## 2018-10-11 NOTE — PROGRESS NOTES
Pt given Flu vaccine in L deltoid per verbral read back order Melania Masters, NP  Pt tolerated procedure w/o reaction after wait time.

## 2018-10-11 NOTE — TELEPHONE ENCOUNTER
Halina River from [facility] called in requesting another diagnosis code as this patients order currently states for pain and her insurance will not cover it with just that diagnosis code. Please contact Halina River at [phone] or fax to [phone].

## 2018-10-11 NOTE — PROGRESS NOTES
This is the Subsequent Medicare Annual Wellness Exam, performed 12 months or more after the Initial AWV or the last Subsequent AWV    I have reviewed the patient's medical history in detail and updated the computerized patient record. History   Patient has been doing well. Comments she has had a tickle in the throat for the last 2-3 weeks. Comments seems to begin in the afternoon. Reports she has been taking singulair as prescribed without any adverse effects. Past Medical History:   Diagnosis Date    Allergic rhinitis     Diabetes ([address]     H/O seasonal allergies     Hypertension       Past Surgical History:   Procedure Laterality Date    HAND/FINGER SURGERY UNLISTED      HX CATARACT REMOVAL  2011    HX LUMBAR FUSION  2004-last sx    multiple sx, fused L2-S1, in [address] HX WISDOM TEETH EXTRACTION       Current Outpatient Prescriptions   Medication Sig Dispense Refill    meloxicam (MOBIC) 7.5 mg tablet TAKE 1 TABLET BY MOUTH TWICE DAILY WITH MEALS 60 Tab 0    montelukast (SINGULAIR) 10 mg tablet TK 1 T PO Q EVENING  2    diclofenac (VOLTAREN) 1 % gel Apply 4 g to affected area four (4) times daily. 500 g 5    polyethylene glycol (MIRALAX) 17 gram/dose powder MIX 1 CAPFUL WITH 8 OZ OF WATER AND DRINK BY MOUTH DAILY. 527 g 0    rosuvastatin (CRESTOR) 20 mg tablet TAKE 1 TABLET BY MOUTH EVERY NIGHT 90 Tab 3    cloNIDine HCl (CATAPRES) 0.2 mg tablet TK 1 T PO QHS FOR BLOOD PRESSURE  4    amLODIPine (NORVASC) 10 mg tablet Take 1 Tab by mouth daily. 30 Tab 6    furosemide (LASIX) 40 mg tablet Take 20 mg by mouth daily.  insulin lispro (HUMALOG) 100 unit/mL injection by SubCUTAneous route. 6 units before breakfast  6 units before lunch  6 units before dinner      insulin glargine (LANTUS SOLOSTAR) 100 unit/mL (3 mL) pen by SubCUTAneous route. 35 units nightly      valsartan (DIOVAN) 320 mg tablet Take 320 mg by mouth daily.        Allergies   Allergen Reactions    Aspirin Other (comments) Ringing in ears    Keflex [Cephalexin] Rash     Family History   Problem Relation Age of Onset    Diabetes Maternal Aunt     Diabetes Maternal Uncle     Diabetes Maternal Grandfather     Diabetes Other     Hypertension Cousin      Social History   Substance Use Topics    Smoking status: Former Smoker    Smokeless tobacco: Never Used    Alcohol use No     Patient Active Problem List   Diagnosis Code    Allergic rhinitis J30.9    Hypertension I10    Diabetes ([facility] E11.9    Type 2 diabetes mellitus with nephropathy ([facility] E11.21    Dyspepsia R10.13    Nausea R11.0    Constipation K59.00    Severe obesity (BMI 35.0-39. 9) with comorbidity ([facility]) E66.01    SI (sacroiliac) joint dysfunction M53.3    Left foot drop M21.372    Lumbar spondylosis M47.816    Sacroiliac joint dysfunction of right side M53.3     Review of Systems   Constitutional: Negative for chills and fever. HENT: Negative for congestion, ear pain, sinus pain and sore throat. Respiratory: Positive for cough. Negative for sputum production and shortness of breath. Cardiovascular: Negative for chest pain. /84 (BP 1 Location: Left arm, BP Patient Position: Sitting)  Pulse (!) 105  Temp 99 °F (37.2 °C) (Oral)   Resp 18  Ht 5' 5\" (1.651 m)  Wt 229 lb 12.8 oz (104.2 kg)  SpO2 98%  BMI 38.24 kg/m2    Physical Exam   Constitutional: She appears well-developed and well-nourished. No distress. HENT:   Head: Normocephalic and atraumatic. Neck: Normal range of motion. Neck supple. Cardiovascular: Normal rate, regular rhythm and normal heart sounds. Exam reveals no gallop and no friction rub. No murmur heard. Pulmonary/Chest: Effort normal and breath sounds normal. No respiratory distress. She has no wheezes. She has no rales. Abdominal: Soft. Bowel sounds are normal. She exhibits no distension. There is no guarding. Musculoskeletal: She exhibits no edema. Skin: Skin is warm and dry.    Vitals reviewed. Depression Risk Factor Screening:     PHQ over the last two weeks 10/11/2018   PHQ Not Done -   Little interest or pleasure in doing things Not at all   Feeling down, depressed, irritable, or hopeless Not at all   Total Score PHQ 2 0     Alcohol Risk Factor Screening: You do not drink alcohol or very rarely. Functional Ability and Level of Safety:   Hearing Loss  Hearing is good. Activities of Daily Living  The home contains: no safety equipment. Patient does total self care    Fall Risk  Fall Risk Assessment, last 12 mths 10/10/2018   Able to walk? Yes   Fall in past 12 months? No   Fall with injury? -   Number of falls in past 12 months -   Fall Risk Score -       Abuse Screen  Patient is not abused    Cognitive Screening   Evaluation of Cognitive Function:  Has your family/caregiver stated any concerns about your memory: no  Normal    Patient Care Team   Patient Care Team:  Tray Fowler MD as PCP - [facility])  Followed by Dr. Sabine Angulo for diabetes, follow up next month, previous visit April/May 2017  Dr. Maryuri Holley-ophthalmology  Assessment/Plan   Education and counseling provided:  Are appropriate based on today's review and evaluation    Diagnoses and all orders for this visit:    1. Medicare annual wellness visit, subsequent    2. Encounter for immunization  -     Influenza Admin ([facility])  -     Influenza Vaccine Inactivated (IIV)(FLUAD), Subunit, Adjuvanted, IM (67866)    3. Essential hypertension    4. Type 2 diabetes mellitus with nephropathy ([facility]    5. Severe obesity (BMI 35.0-39. 9) with comorbidity [facility])      Health Maintenance Due   Topic Date Due    LIPID PANEL Q1  1940    DTaP/Tdap/Td series (1 - Tdap) 02/06/1961    Shingrix Vaccine Age 50> (1 of 2) 02/06/1990    Pneumococcal 65+ High/Highest Risk (1 of 2 - PCV13) 02/06/2005    MEDICARE YEARLY EXAM  03/15/2018    FOOT EXAM Q1  03/15/2018    Influenza Age 5 to Adult 08/01/2018   will call nephrologist regarding uncontrolled HTN. States she was prescribed hydralazine 50 mg bid but caused her to have side effects. Will call and ask if it is okay for her to take 25 mg bid  Advised to take otc antihistamine as directed on the bottle to help with allergy symptoms. I have discussed the diagnosis with the patient and the intended plan as seen in the above orders. The patient has received an after-visit summary and questions were answered concerning future plans. I have discussed medication side effects and warnings with the patient as well. Patient agreeable with above plan and verbalizes understanding. Follow-up Disposition:  Return in about 1 year (around 10/11/2019) for [facility].

## 2018-10-11 NOTE — PROGRESS NOTES
Pt is here for Medicare Wellness Exam     1. Have you been to the ER, urgent care clinic since your last visit? Hospitalized since your last visit? No    2. Have you seen or consulted any other health care providers outside of the [address] [place] [address] since your last visit? Include any pap smears or colon screening.  No

## 2018-10-11 NOTE — MR AVS SNAPSHOT
[facility] 
 
 
 [address] [place] [address] [address] [address] 
[phone] Patient: Mainor Quiñones MRN: [id number] ABD:[id number] Visit Information Date & Time Provider Department Dept. Phone Encounter #  
 10/11/2018  1:20 PM Jenifer López, [address] [facility] [id number] Follow-up Instructions Return in about 1 year (around 10/11/2019) for [address] Your Appointments 10/18/2018 10:50 AM  
DIAG TEST F/U with Thomas Mcgraw MD  
[facility] Appt Note: CT F/U [facility] DISK  
 [address] [facility] [id number] [phone]  
  
   
 [address] [facility] [address] Upcoming Health Maintenance Date Due  
 LIPID PANEL Q1 1940 DTaP/Tdap/Td series (1 - Tdap) 2/6/1961 Shingrix Vaccine Age 50> (1 of 2) 2/6/1990 Pneumococcal 65+ High/Highest Risk (1 of 2 - PCV13) 2/6/2005 MEDICARE YEARLY EXAM 3/15/2018 FOOT EXAM Q1 3/15/2018 Influenza Age 5 to Adult 8/1/2018 HEMOGLOBIN A1C Q6M 1/11/2019 EYE EXAM RETINAL OR DILATED Q1 3/12/2019 MICROALBUMIN Q1 7/12/2019 GLAUCOMA SCREENING Q2Y 3/12/2020 Allergies as of 10/11/2018  Review Complete On: 10/11/2018 By: Jenifer López NP Severity Noted Reaction Type Reactions Aspirin  03/01/2017    Other (comments) Ringing in ears Keflex [Cephalexin]  03/01/2017    Rash Current Immunizations  Never Reviewed No immunizations on file. Not reviewed this visit You Were Diagnosed With   
  
 Codes Comments Medicare annual wellness visit, subsequent    -  Primary ICD-10-CM: Z00.00 ICD-9-CM: V70.0 Vitals BP Pulse Temp Resp Height(growth percentile) Weight(growth percentile) 178/84 (BP 1 Location: Left arm, BP Patient Position: Sitting) (!) 105 99 °F (37.2 °C) (Oral) 18 5' 5\" (1.651 m) 229 lb 12.8 oz (104.2 kg) SpO2 BMI OB Status Smoking Status 98% 38.24 kg/m2 Menopause Former Smoker Vitals History BMI and BSA Data Body Mass Index Body Surface Area  
 38.24 kg/m 2 2.19 m 2 Preferred Pharmacy Pharmacy Name Phone [address] [facility]  [facility] [phone] Your Updated Medication List  
  
   
This list is accurate as of 10/11/18  2:05 PM.  Always use your most recent med list. amLODIPine 10 mg tablet Commonly known as:  Juliette Samples Take 1 Tab by mouth daily. cloNIDine HCl 0.2 mg tablet Commonly known as:  CATAPRES TK 1 T PO QHS FOR BLOOD PRESSURE  
  
 diclofenac 1 % Gel Commonly known as:  VOLTAREN Apply 4 g to affected area four (4) times daily. DIOVAN 320 mg tablet Generic drug:  valsartan Take 320 mg by mouth daily. HumaLOG U-100 Insulin 100 unit/mL injection Generic drug:  insulin lispro  
by SubCUTAneous route. 6 units before breakfast 6 units before lunch 6 units before dinner LANTUS SOLOSTAR U-100 INSULIN 100 unit/mL (3 mL) Inpn Generic drug:  insulin glargine  
by SubCUTAneous route. 35 units nightly LASIX 40 mg tablet Generic drug:  furosemide Take 20 mg by mouth daily. meloxicam 7.5 mg tablet Commonly known as:  MOBIC  
TAKE 1 TABLET BY MOUTH TWICE DAILY WITH MEALS  
  
 montelukast 10 mg tablet Commonly known as:  SINGULAIR TK 1 T PO Q EVENING  
  
 polyethylene glycol 17 gram/dose powder Commonly known as:  Abraham Nones MIX 1 CAPFUL WITH 8 OZ OF WATER AND DRINK BY MOUTH DAILY. rosuvastatin 20 mg tablet Commonly known as:  CRESTOR  
TAKE 1 TABLET BY MOUTH EVERY NIGHT Follow-up Instructions Return in about 1 year (around 10/11/2019) for Yumi Gibson . Patient Instructions Continue prevention measures: calcium 1200 mg daily with vitamin d 800 international units daily. Also weight bearing exercises daily (walking, treadmill, small weights for strength training 2-4 pounds). Medicare Wellness Visit, Female The best way to live healthy is to have a lifestyle where you eat a well-balanced diet, exercise regularly, limit alcohol use, and quit all forms of tobacco/nicotine, if applicable. Regular preventive services are another way to keep healthy. Preventive services (vaccines, screening tests, monitoring & exams) can help personalize your care plan, which helps you manage your own care. Screening tests can find health problems at the earliest stages, when they are easiest to treat. Gregor Lisa follows the current, evidence-based guidelines published by the [facility] Joshua June ([facility]) when recommending preventive services for our patients. Because we follow these guidelines, sometimes recommendations change over time as research supports it. (For example, mammograms used to be recommended annually. Even though Medicare will still pay for an annual mammogram, the newer guidelines recommend a mammogram every two years for women of average risk.) Of course, you and your doctor may decide to screen more often for some diseases, based on your risk and your health status. Preventive services for you include: - Medicare offers their members a free annual wellness visit, which is time for you and your primary care provider to discuss and plan for your preventive service needs. Take advantage of this benefit every year! 
-All adults over the age of 72 should receive the recommended pneumonia vaccines. Current USPSTF guidelines recommend a series of two vaccines for the best pneumonia protection.  
-All adults should have a flu vaccine yearly and a tetanus vaccine every 10 years. All adults age 61 and older should receive a shingles vaccine once in their lifetime. -A bone mass density test is recommended when a woman turns 65 to screen for osteoporosis. This test is only recommended one time, as a screening. Some providers will use this same test as a disease monitoring tool if you already have osteoporosis. -All adults age 38-68 who are overweight should have a diabetes screening test once every three years.  
-Other screening tests and preventive services for persons with diabetes include: an eye exam to screen for diabetic retinopathy, a kidney function test, a foot exam, and stricter control over your cholesterol.  
-Cardiovascular screening for adults with routine risk involves an electrocardiogram (ECG) at intervals determined by your doctor.  
-Colorectal cancer screenings should be done for adults age 54-65 with no increased risk factors for colorectal cancer. There are a number of acceptable methods of screening for this type of cancer. Each test has its own benefits and drawbacks. Discuss with your doctor what is most appropriate for you during your annual wellness visit. The different tests include: colonoscopy (considered the best screening method), a fecal occult blood test, a fecal DNA test, and sigmoidoscopy. -Breast cancer screenings are recommended every other year for women of normal risk, age 54-69. 
-Cervical cancer screenings for women over age 72 are only recommended with certain risk factors.  
-All adults born between [facility] should be screened once for Hepatitis C. Here is a list of your current Health Maintenance items (your personalized list of preventive services) with a due date: 
Health Maintenance Due Topic Date Due  Cholesterol Test   1940  
 DTaP/Tdap/Td  (1 - Tdap) 02/06/1961  Shingles Vaccine (1 of 2) 02/06/1990  Pneumococcal Vaccine (1 of 2 - PCV13) 02/06/2005 [address] [place] [address] Annual Well Visit  03/15/2018 [address] [place] [address] Diabetic Foot Care  03/15/2018  Flu Vaccine  08/01/2018 Advance Directives: Care Instructions Your Care Instructions An advance directive is a legal way to state your wishes at the end of your life. It tells your family and your doctor what to do if you can no longer say what you want. There are two main types of advance directives. You can change them any time that your wishes change. · A living will tells your family and your doctor your wishes about life support and other treatment. · A durable power of  for health care lets you name a person to make treatment decisions for you when you can't speak for yourself. This person is called a health care agent. If you do not have an advance directive, decisions about your medical care may be made by a doctor or a  who doesn't know you. It may help to think of an advance directive as a gift to the people who care for you. If you have one, they won't have to make tough decisions by themselves. Follow-up care is a key part of your treatment and safety. Be sure to make and go to all appointments, and call your doctor if you are having problems. It's also a good idea to know your test results and keep a list of the medicines you take. How can you care for yourself at home? · Discuss your wishes with your loved ones and your doctor. This way, there are no surprises. · Many states have a unique form. Or you might use a universal form that has been approved by many states. This kind of form can sometimes be completed and stored online. Your electronic copy will then be available wherever you have a connection to the Internet. In most cases, doctors will respect your wishes even if you have a form from a different state. · You don't need a  to do an advance directive. But you may want to get legal advice. · Think about these questions when you prepare an advance directive: ¨ Who do you want to make decisions about your medical care if you are not able to? Many people choose a family member or close friend. ¨ Do you know enough about life support methods that might be used? If not, talk to your doctor so you understand. ¨ What are you most afraid of that might happen? You might be afraid of having pain, losing your independence, or being kept alive by machines. ¨ Where would you prefer to die? Choices include your home, a hospital, or a nursing home. ¨ Would you like to have information about hospice care to support you and your family? ¨ Do you want to donate organs when you die? ¨ Do you want certain Congregational practices performed before you die? If so, put your wishes in the advance directive. · Read your advance directive every year, and make changes as needed. When should you call for help? Be sure to contact your doctor if you have any questions. Where can you learn more? Go to http://arlette-michel.info/. Enter R264 in the search box to learn more about \"Advance Directives: Care Instructions. \" Current as of: April 19, 2018 Content Version: 11.8 © 7424-0684 [employer]. Care instructions adapted under license by [employer] (which disclaims liability or warranty for this information). If you have questions about a medical condition or this instruction, always ask your healthcare professional. [address] any warranty or liability for your use of this information. Introducing [facility]! Herrera Colbert introduces [employer] patient portal. Now you can access parts of your medical record, email your doctor's office, and request medication refills online. 1. In your internet browser, go to [email or web address] 2. Click on the First Time User? Click Here link in the Sign In box. You will see the New Member Sign Up page. 3. Enter your [employer] Access Code exactly as it appears below. You will not need to use this code after youve completed the sign-up process.  If you do not sign up before the expiration date, you must request a new code. · "[employer] Access Code: [email or web address] Expires: 12/24/2018 10:09 AM 
 
4. Enter the last four digits of your Social Security Number (xxxx) and Date of Birth (mm/dd/yyyy) as indicated and click Submit. You will be taken to the next sign-up page. 5. Create a "[employer] ID. This will be your "[employer] login ID and cannot be changed, so think of one that is secure and easy to remember. 6. Create a "[employer] password. You can change your password at any time. 7. Enter your Password Reset Question and Answer. This can be used at a later time if you forget your password. 8. Enter your e-mail address. You will receive e-mail notification when new information is available in [address] 9. Click Sign Up. You can now view and download portions of your medical record. 10. Click the Download Summary menu link to download a portable copy of your medical information. If you have questions, please visit the Frequently Asked Questions section of the "[employer] website. Remember, "[employer] is NOT to be used for urgent needs. For medical emergencies, dial 911. Now available from your iPhone and Android! Please provide this summary of care documentation to your next provider. Your primary care clinician is listed as [address]. If you have any questions after today's visit, please call [phone].

## 2018-10-13 ENCOUNTER — HOSPITAL ENCOUNTER (OUTPATIENT)
Dept: MRI IMAGING | Age: 78
Discharge: HOME | End: 2018-10-13
Attending: ORTHOPAEDIC SURGERY

## 2018-10-13 DIAGNOSIS — M79.642 LEFT HAND PAIN: ICD-10-CM

## 2018-10-17 NOTE — TELEPHONE ENCOUNTER
Mario Pearce from [facility] called again and said she needs to know the Diagnostic Code. That just saying pain on the order is not enough. Geovanna tel. [phone]. Fax [phone].

## 2018-10-18 ENCOUNTER — OFFICE VISIT (OUTPATIENT)
Dept: ORTHOPEDIC SURGERY | Age: 78
End: 2018-10-18

## 2018-10-18 VITALS
HEART RATE: 92 BPM | RESPIRATION RATE: 18 BRPM | SYSTOLIC BLOOD PRESSURE: 179 MMHG | OXYGEN SATURATION: 99 % | BODY MASS INDEX: 38.65 KG/M2 | TEMPERATURE: 98.3 F | HEIGHT: 65 IN | DIASTOLIC BLOOD PRESSURE: 76 MMHG | WEIGHT: 232 LBS

## 2018-10-18 DIAGNOSIS — M96.1 POST LAMINECTOMY SYNDROME: ICD-10-CM

## 2018-10-18 DIAGNOSIS — M53.3 SI (SACROILIAC) JOINT DYSFUNCTION: Primary | ICD-10-CM

## 2018-10-18 RX ORDER — LIDOCAINE 50 MG/G
PATCH TOPICAL
Qty: 60 EACH | Refills: 2 | Status: SHIPPED | OUTPATIENT
Start: 2018-10-18 | End: 2019-03-28

## 2018-10-18 NOTE — PATIENT INSTRUCTIONS
Sacroiliac Joint Pain: Care Instructions  Your Care Instructions    The sacroiliac joints connect the spine and each side of the pelvis. These joints bear the weight and stress of your torso. This makes them easy to injure. Injury or overuse of these joints may cause low back pain. Stress on these joints can cause joint pain. Sacroiliac joint pain is more common in pregnant women. Certain kinds of arthritis also may cause this type of joint pain. Home treatment may help you feel better. So can avoiding activities that stress your back. Your doctor also may recommend physical therapy. This may include doing exercises and stretches to help with pain. You may also learn to use good posture. Follow-up care is a key part of your treatment and safety. Be sure to make and go to all appointments, and call your doctor if you are having problems. It's also a good idea to know your test results and keep a list of the medicines you take. How can you care for yourself at home? · Ask your doctor about light exercises that may help your back pain. Try to do light activity throughout the day. But make sure to take rests as needed. Find a comfortable position for rest, but don't stay in one position for too long. Avoid activities that cause pain. · To apply heat, put a warm water bottle, a heating pad set on low, or a warm cloth on your back. Do not go to sleep with a heating pad on your skin. · Put ice or a cold pack on your back for 10 to 20 minutes at a time. Put a thin cloth between the ice and your skin. · If the doctor gave you a prescription medicine for pain, take it as prescribed. · If you are not taking a prescription pain medicine, ask your doctor if you can take an over-the-counter pain medicine, such as acetaminophen (Tylenol), ibuprofen (Advil, Motrin), or naproxen (Aleve). Read and follow all instructions on the label. Take pain medicines exactly as directed.   · Do not take two or more pain medicines at the same time unless the doctor told you to. Many pain medicines have acetaminophen, which is Tylenol. Too much acetaminophen (Tylenol) can be harmful. · To prevent future back pain, do exercises to stretch and strengthen your back and stomach. Learn how to use good posture, safe lifting techniques, and proper body mechanics. When should you call for help? Call 911 anytime you think you may need emergency care. For example, call if:    · You are unable to move a leg at all.   [facility] your doctor now or seek immediate medical care if:    · You have new or worse symptoms in your legs or buttocks. Symptoms may include:  ? Numbness or tingling. ? Weakness. ? Pain.     · You lose bladder or bowel control.    Watch closely for changes in your health, and be sure to contact your doctor if:    · You are not getting better as expected. Where can you learn more? Go to http://arlette-michel.info/. Enter G992 in the search box to learn more about \"Sacroiliac Joint Pain: Care Instructions. \"  Current as of: November 29, 2017  Content Version: 11.8  © 0061-3380 [employer]. Care instructions adapted under license by [employer] (which disclaims liability or warranty for this information). If you have questions about a medical condition or this instruction, always ask your healthcare professional. [place] [address] any warranty or liability for your use of this information. Sacroiliac Pain: Exercises  Your Care Instructions  Here are some examples of typical rehabilitation exercises for your condition. Start each exercise slowly. Ease off the exercise if you start to have pain. Your doctor or physical therapist will tell you when you can start these exercises and which ones will work best for you. How to do the exercises  Knee-to-chest stretch    1. Do not do the knee-to-chest exercise if it causes or increases back or leg pain.   2. Lie on your back with your knees bent and your feet flat on the floor. You can put a small pillow under your head and neck if it is more comfortable. 3. Grasp your hands under one knee and bring the knee to your chest, keeping the other foot flat on the floor. 4. Keep your lower back pressed to the floor. Hold for at least 15 to 30 seconds. 5. Relax and lower the knee to the starting position. Repeat with the other leg. 6. Repeat 2 to 4 times with each leg. 7. To get more stretch, keep your other leg flat on the floor while pulling your knee to your chest.    Bridging    1. Lie on your back with both knees bent. Your knees should be bent about 90 degrees. 2. Tighten your belly muscles by pulling in your belly button toward your spine. Then push your feet into the floor, squeeze your buttocks, and lift your hips off the floor until your shoulders, hips, and knees are all in a straight line. 3. Hold for about 6 seconds as you continue to breathe normally, and then slowly lower your hips back down to the floor and rest for up to 10 seconds. 4. Repeat 8 to 12 times. Hip extension    1. Get down on your hands and knees on the floor. 2. Keeping your back and neck straight, lift one leg straight out behind you. When you lift your leg, keep your hips level. Don't let your back twist, and don't let your hip drop toward the floor. 3. Hold for 6 seconds. Repeat 8 to 12 times with each leg. 4. If you feel steady and strong when you do this exercise, you can make it more difficult. To do this, when you lift your leg, also lift the opposite arm straight out in front of you. For example, lift the left leg and the right arm at the same time. (This is sometimes called the \"bird dog exercise. \") Hold for 6 seconds, and repeat 8 to 12 times on each side. Clamshell    1. Lie on your side with a pillow under your head. Keep your feet and knees together and your knees bent. 2. Raise your top knee, but keep your feet together.  Do not let your hips roll back. Your legs should open up like a clamshell. 3. Hold for 6 seconds. 4. Slowly lower your knee back down. Rest for 10 seconds. 5. Repeat 8 to 12 times. 6. Switch to your other side and repeat steps 1 through 5. Hamstring wall stretch    1. Lie on your back in a doorway, with one leg through the open door. 2. Slide your affected leg up the wall to straighten your knee. You should feel a gentle stretch down the back of your leg. 1. Do not arch your back. 2. Do not bend either knee. 3. Keep one heel touching the floor and the other heel touching the wall. Do not point your toes. 3. Hold the stretch for at least 1 minute to begin. Then try to lengthen the time you hold the stretch to as long as 6 minutes. 4. Switch legs, and repeat steps 1 through 3.  5. Repeat 2 to 4 times. 6. If you do not have a place to do this exercise in a doorway, there is another way to do it:  7. Lie on your back, and bend one knee. 8. Loop a towel under the ball and toes of that foot, and hold the ends of the towel in your hands. 9. Straighten your knee, and slowly pull back on the towel. You should feel a gentle stretch down the back of your leg. 10. Switch legs, and repeat steps 1 through 3.  11. Repeat 2 to 4 times. Lower abdominal strengthening    1. Lie on your back with your knees bent and your feet flat on the floor. 2. Tighten your belly muscles by pulling your belly button in toward your spine. 3. Lift one foot off the floor and bring your knee toward your chest, so that your knee is straight above your hip and your leg is bent like the letter \"L. \"  4. Lift the other knee up to the same position. 5. Lower one leg at a time to the starting position. 6. Keep alternating legs until you have lifted each leg 8 to 12 times. 7. Be sure to keep your belly muscles tight and your back still as you are moving your legs. Be sure to breathe normally. Piriformis stretch    1.  Lie on your back with your legs straight. 2. Lift your affected leg, and bend your knee. With your opposite hand, reach across your body, and then gently pull your knee toward your opposite shoulder. 3. Hold the stretch for 15 to 30 seconds. 4. Switch legs and repeat steps 1 through 3.  5. Repeat 2 to 4 times. Follow-up care is a key part of your treatment and safety. Be sure to make and go to all appointments, and call your doctor if you are having problems. It's also a good idea to know your test results and keep a list of the medicines you take. Where can you learn more? Go to http://arlette-michel.info/. Enter G349 in the search box to learn more about \"Sacroiliac Pain: Exercises. \"  Current as of: November 29, 2017  Content Version: 11.8  © 7873-2038 [employer]. Care instructions adapted under license by [employer] (which disclaims liability or warranty for this information). If you have questions about a medical condition or this instruction, always ask your healthcare professional. [address] any warranty or liability for your use of this information.

## 2018-10-18 NOTE — PROGRESS NOTES
[facility] [address].  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 Advised to continue HEP. Recommend aqua. 2. Trial of lidoderm patch  3. Continue Voltaren gel  4. Recommend ortho f/u for knee pain  5. Discussed injections and medications as treatment options. Sx for recurrent claudication. Currently waxing/waning symptoms. 6. May continue Tylenol Arthritis  7. Released from specialty care to PCP. May have future fills through PCP if agreeable. Given information on SI joint pain and exercises    Follow-up Disposition:  Return if symptoms worsen or fail to improve. HISTORY OF PRESENT ILLNESS  Goran Gorman is a 66 y.o. female. Last visit pt was sent to have a lumbar spine CT. Junctional stenosis, advanced. Images reviewed with the pt. Pt states this week has been a bad week. She c/o good and bad days, coinciding with weather. Pt states her L knee is tight and causes her to walk off. Pt denies numbness in knee. She states soreness extends down her L shin. She c/o some difficulty sleeping and stiffness in the morning. Location of pain: low back. L knee  Does pain radiate into extremities: LLE posterior  Does patient have weakness: L foot drop after surgery. She notes it flops if she is tired. Pt denies saddle paresthesias. Medications pt is on: Lidoderm patch PRN with relief. Tylenol Arthritis PRN. Mobic 7.5mg qD. Voltaren gel some relief. Ice and heat. Pt denies recent ED visits or hospitalizations.  Denies persistent fevers, chills, weight changes, neurogenic bowel or bladder symptoms. Treatments patient has tried:  Physical therapy:Yes 2018  Doing HEP: Unknown  Non-opioid medications: Yes  Spinal injections: Yes  Spinal surgery- Yes. Lumbar fusion 2004 [address] in [place] with benefit       reviewed. Pt moved from [place] after retiring to escape the cold. Pt denies hx of shingles. Pain Assessment  10/18/2018   Location of Pain Leg   Location Modifiers Left   Severity of Pain 6   Quality of Pain Dull; Throbbing;Aching   Quality of Pain Comment weakness   Duration of Pain -   Frequency of Pain Constant   Frequency of Pain Comment -   Aggravating Factors Standing;Walking   Aggravating Factors Comment -   Limiting Behavior -   Relieving Factors Nothing   Relieving Factors Comment -   Result of Injury -   Work-Related Injury -   Type of Injury -       CT Results (most recent):  Results from Hospital Encounter encounter on 09/26/18   CT SPINE LUMB WO CONT    Narrative EXAM: Lumbar CT without contrast    CLINICAL INDICATION: Low back pain. TECHNIQUE: CT of the lumbar spine without contrast obtained. Sagittal and  coronal reformations obtained. All CT scans at this facility are performed using dose optimization technique as  appropriate to a performed exam, to include automated exposure control,  adjustment of the mA and/or kV according to patient size (including appropriate  matching for site specific examination) or use of iterative reconstruction  technique. IV Contrast: None    COMPARISON: None    FINDINGS: There are  5 lumbar type vertebra. The alignment is normal.  No  fracture identified. No paraspinal mass or fluid collections appreciated. Posterior spinal fusion with pedicle screws are noted from L2 through S1. The  left S1 pedicle screw mildly breaches the lateral edge of the pedicle with  portion of the screw entering the left S1 neural foramen. This does not abut the  exiting left S1 nerve root.  Laminectomies of L2-L5 are noted. In the posterior  subcutaneous tissues, there is a subcutaneous device which has its tips adjacent  the transverse processes of L2. Adequate osseous fusion of the facets are noted. The abdominal aorta is without evidence of aneurysm. No paraaortic adenopathy  appreciated. The visualized kidneys are unremarkable. The lower thoracic spine demonstrates significant neural foraminal narrowing at  T10-T11, T11-T12. The T12-L1 level has mild degenerative changes. L1-L2 level: Disc osteophyte formation is noted with potential is severe canal  narrowing with severe bilateral neural foraminal narrowing. L2-L3: Postoperative changes are present. No significant canal narrowing or  neural foraminal narrowing appreciated. L3-L4: Postoperative changes are present. No significant canal stenosis. Disc  osteophyte formation and facet arthropathy causes moderate left neural foraminal  narrowing. Mild right neural foraminal narrowing is noted. L4-L5: Postoperative changes are present. Disc osteophyte formation are noted. Moderate to severe bilateral neural foraminal narrowing as result of disc  osteophyte formation and facet arthropathy. L5-S1: Facet arthropathy and disc osteophyte formation are noted. Postoperative  changes are present. Mild canal narrowing is noted. Moderate bilateral neural  foraminal narrowing is noted. Impression Impression:  1. Posterior spinal fusion of the lumbar spine. The left S1 pedicle screw  partially bridges the left S1 neural foramen without abutting the nerve root. 2.  Severe canal narrowing of L1-L2 with severe bilateral neural foraminal  narrowing. 3.  Moderate to severe bilateral neural foraminal narrowing as a result of facet  arthropathy and disc osteophyte formation. 4.  Degenerative changes of the lower thoracic spine with significant neural  foraminal narrowing.             PAST MEDICAL HISTORY   Past Medical History:   Diagnosis Date    Allergic rhinitis     Diabetes ([facility] [address]     H/O seasonal allergies     Hypertension        Past Surgical History:   Procedure Laterality Date    HAND/FINGER SURGERY UNLISTED      HX CATARACT REMOVAL  2011    HX LUMBAR FUSION  2004-last sx    multiple sx, fused L2-S1, in [place]    HX WISDOM TEETH EXTRACTION     . MEDICATIONS      Current Outpatient Medications   Medication Sig Dispense Refill    lidocaine (LIDODERM) 5 % Apply 2 patches every 24 hours 60 Each 2    meloxicam (MOBIC) 7.5 mg tablet TAKE 1 TABLET BY MOUTH TWICE DAILY WITH MEALS 60 Tab 0    montelukast (SINGULAIR) 10 mg tablet TK 1 T PO Q EVENING  2    diclofenac (VOLTAREN) 1 % gel Apply 4 g to affected area four (4) times daily. 500 g 5    polyethylene glycol (MIRALAX) 17 gram/dose powder MIX 1 CAPFUL WITH 8 OZ OF WATER AND DRINK BY MOUTH DAILY. 527 g 0    rosuvastatin (CRESTOR) 20 mg tablet TAKE 1 TABLET BY MOUTH EVERY NIGHT 90 Tab 3    cloNIDine HCl (CATAPRES) 0.2 mg tablet TK 1 T PO QHS FOR BLOOD PRESSURE  4    amLODIPine (NORVASC) 10 mg tablet Take 1 Tab by mouth daily. 30 Tab 6    furosemide (LASIX) 40 mg tablet Take 20 mg by mouth daily.  valsartan (DIOVAN) 320 mg tablet Take 320 mg by mouth daily.  insulin lispro (HUMALOG) 100 unit/mL injection by SubCUTAneous route. 6 units before breakfast  6 units before lunch  6 units before dinner      insulin glargine (LANTUS SOLOSTAR) 100 unit/mL (3 mL) pen by SubCUTAneous route.  35 units nightly          ALLERGIES    Allergies   Allergen Reactions    Aspirin Other (comments)     Ringing in ears    Keflex [Cephalexin] Rash          SOCIAL HISTORY    Social History     Socioeconomic History    Marital status:      Spouse name: Not on file    Number of children: Not on file    Years of education: Not on file    Highest education level: Not on file   Social Needs    Financial resource strain: Not on file    Food insecurity - worry: Not on file    Food insecurity - inability: Not on file    Transportation needs - medical: Not on file   [employer] needs - non-medical: Not on file   Occupational History    Occupation: CNA - RET   Tobacco Use    Smoking status: Former Smoker    Smokeless tobacco: Never Used   Substance and Sexual Activity    Alcohol use: No    Drug use: No    Sexual activity: Yes     Partners: Male     Birth control/protection: None   Other Topics Concern    Not on file   Social History Narrative    Not on file       FAMILY HISTORY    Family History   Problem Relation Age of Onset    Diabetes Maternal Aunt     Diabetes Maternal Uncle     Diabetes Maternal Grandfather     Diabetes Other     Hypertension Cousin        REVIEW OF SYSTEMS  Review of Systems   Constitutional: Negative for chills, fever and weight loss. Respiratory: Negative for shortness of breath. Cardiovascular: Negative for chest pain. Gastrointestinal: Negative for constipation. Negative for fecal incontinence   Genitourinary: Negative for dysuria. Negative for urinary incontinence   Musculoskeletal:        Per HPI   Skin: Negative for rash. Neurological: Positive for tingling, sensory change and focal weakness. Negative for dizziness, tremors and headaches. Endo/Heme/Allergies: Does not bruise/bleed easily. Psychiatric/Behavioral: The patient does not have insomnia. PHYSICAL EXAMINATION  Visit Vitals  /76   Pulse 92   Temp 98.3 °F (36.8 °C)   Resp 18   Ht 5' 5\" (1.651 m)   Wt 232 lb (105.2 kg)   SpO2 99%   BMI 38.61 kg/m²         Accompanied by spouse. Constitutional:  Well developed, well nourished, in no acute distress. Psychiatric: Affect and mood are appropriate. Integumentary: No rashes or abrasions noted on exposed areas. Cardiovascular/Peripheral Vascular: Intact l pulses. No peripheral edema is noted. Lymphatic:  No evidence of lymphedema. No cervical lymphadenopathy.      SPINE/MUSCULOSKELETAL EXAM      Lumbar spine:  No rash, ecchymosis, or gross obliquity. No fasciculations. No focal atrophy is noted. No upper lumbar TTP. Tenderness to palpation L SI joint. No tenderness to palpation at the sciatic notch. Trochanters non tender. MOTOR:       Hip Flex  Quads Hamstrings Ankle DF EHL Ankle PF   Right +4/5 +4/5 +4/5 +4/5 +4/5 +4/5   Left +4/5 +4/5 +4/5 4/5 +4/5 +4/5       Tender medial and lateral L knee. Straight Leg raise negative. Ambulation without assistive device. FWB. Written by Fran Dominguez, as dictated by Suzy Adams MD.    I, Dr. Suzy Adams MD, confirm that all documentation is accurate. Ms. Josie Brittle may have a reminder for a \"due or due soon\" health maintenance. I have asked that she contact her primary care provider for follow-up on this health maintenance.

## 2018-10-23 ENCOUNTER — TELEPHONE (OUTPATIENT)
Dept: ORTHOPEDIC SURGERY | Age: 78
End: 2018-10-23

## 2018-10-23 NOTE — TELEPHONE ENCOUNTER
-called patient and voicemail did not identify patient. A message was left for patient to call [phone] at [address] [place] [address] in regards to the patient's message.

## 2018-10-23 NOTE — TELEPHONE ENCOUNTER
Please use Key: [id number] to initiate a prior authorization for Lidoderm Patches through Cover My Meds or contact the pharmacy to change the medication.

## 2018-10-23 NOTE — TELEPHONE ENCOUNTER
Attempted to call other number and message states \"your call cannot be completed as dialed please check the number and dial again\".

## 2018-10-23 NOTE — TELEPHONE ENCOUNTER
-PA denied for lidoderm patches. Medicare only approves the patch for these diagnosis per medicare representative:    -Pain associated with postherpetic neuralgia [PHN]  (pain after shingles)   -Diabetic Neuropathic pain     Please advise.

## 2018-10-25 NOTE — TELEPHONE ENCOUNTER
Called patient and verified . Informed patient per Dr. Solorio Show can try OTC salonpas\". Patient verbalized understanding and no further questions or concerns at this time.

## 2018-10-27 ENCOUNTER — HOSPITAL ENCOUNTER (OUTPATIENT)
Dept: LAB | Age: 78
Discharge: HOME | End: 2018-10-27
Payer: MEDICARE

## 2018-10-27 DIAGNOSIS — N25.81 SECONDARY HYPERPARATHYROIDISM OF RENAL ORIGIN (HCC): ICD-10-CM

## 2018-10-27 LAB
25(OH)D3 SERPL-MCNC: 41.8 NG/ML (ref 30–100)
ALBUMIN SERPL-MCNC: 3.5 G/DL (ref 3.4–5)
ANION GAP SERPL CALC-SCNC: 8 MMOL/L (ref 3–18)
BASOPHILS # BLD: 0 K/UL (ref 0–0.1)
BASOPHILS NFR BLD: 0 % (ref 0–2)
BUN SERPL-MCNC: 35 MG/DL (ref 7–18)
BUN/CREAT SERPL: 12 (ref 12–20)
CALCIUM SERPL-MCNC: 9.2 MG/DL (ref 8.5–10.1)
CALCIUM SERPL-MCNC: 9.4 MG/DL (ref 8.5–10.1)
CHLORIDE SERPL-SCNC: 108 MMOL/L (ref 100–108)
CO2 SERPL-SCNC: 25 MMOL/L (ref 21–32)
CREAT SERPL-MCNC: 2.93 MG/DL (ref 0.6–1.3)
CREAT UR-MCNC: 91.5 MG/DL (ref 30–125)
DIFFERENTIAL METHOD BLD: ABNORMAL
EOSINOPHIL # BLD: 0.1 K/UL (ref 0–0.4)
EOSINOPHIL NFR BLD: 2 % (ref 0–5)
ERYTHROCYTE [DISTWIDTH] IN BLOOD BY AUTOMATED COUNT: 13.9 % (ref 11.6–14.5)
GLUCOSE SERPL-MCNC: 148 MG/DL (ref 74–99)
HCT VFR BLD AUTO: 34.9 % (ref 35–45)
HGB BLD-MCNC: 11.5 G/DL (ref 12–16)
LYMPHOCYTES # BLD: 1.6 K/UL (ref 0.9–3.6)
LYMPHOCYTES NFR BLD: 30 % (ref 21–52)
MCH RBC QN AUTO: 28.7 PG (ref 24–34)
MCHC RBC AUTO-ENTMCNC: 33 G/DL (ref 31–37)
MCV RBC AUTO: 87 FL (ref 74–97)
MONOCYTES # BLD: 0.3 K/UL (ref 0.05–1.2)
MONOCYTES NFR BLD: 6 % (ref 3–10)
NEUTS SEG # BLD: 3.2 K/UL (ref 1.8–8)
NEUTS SEG NFR BLD: 62 % (ref 40–73)
PHOSPHATE SERPL-MCNC: 3 MG/DL (ref 2.5–4.9)
PLATELET # BLD AUTO: 158 K/UL (ref 135–420)
PMV BLD AUTO: 10.6 FL (ref 9.2–11.8)
POTASSIUM SERPL-SCNC: 4.2 MMOL/L (ref 3.5–5.5)
PROT UR-MCNC: 294 MG/DL
PROT/CREAT UR-RTO: 3.2
PTH-INTACT SERPL-MCNC: 345.7 PG/ML (ref 18.4–88)
RBC # BLD AUTO: 4.01 M/UL (ref 4.2–5.3)
SODIUM SERPL-SCNC: 141 MMOL/L (ref 136–145)
WBC # BLD AUTO: 5.2 K/UL (ref 4.6–13.2)

## 2018-10-27 PROCEDURE — 85025 COMPLETE CBC W/AUTO DIFF WBC: CPT | Performed by: INTERNAL MEDICINE

## 2018-10-27 PROCEDURE — 80069 RENAL FUNCTION PANEL: CPT | Performed by: INTERNAL MEDICINE

## 2018-10-27 PROCEDURE — 82306 VITAMIN D 25 HYDROXY: CPT | Performed by: INTERNAL MEDICINE

## 2018-10-27 PROCEDURE — 36415 COLL VENOUS BLD VENIPUNCTURE: CPT | Performed by: INTERNAL MEDICINE

## 2018-10-27 PROCEDURE — 83970 ASSAY OF PARATHORMONE: CPT | Performed by: INTERNAL MEDICINE

## 2018-10-27 PROCEDURE — 84156 ASSAY OF PROTEIN URINE: CPT | Performed by: INTERNAL MEDICINE

## 2018-11-06 ENCOUNTER — OFFICE VISIT (OUTPATIENT)
Dept: ORTHOPEDIC SURGERY | Age: 78
End: 2018-11-06

## 2018-11-06 VITALS
HEART RATE: 79 BPM | TEMPERATURE: 98.1 F | SYSTOLIC BLOOD PRESSURE: 171 MMHG | DIASTOLIC BLOOD PRESSURE: 81 MMHG | RESPIRATION RATE: 16 BRPM

## 2018-11-06 DIAGNOSIS — M47.816 LUMBAR SPONDYLOSIS: Primary | ICD-10-CM

## 2018-11-06 DIAGNOSIS — M54.16 LUMBAR RADICULOPATHY: ICD-10-CM

## 2018-11-06 DIAGNOSIS — M48.061 SPINAL STENOSIS OF LUMBAR REGION WITHOUT NEUROGENIC CLAUDICATION: ICD-10-CM

## 2018-11-06 DIAGNOSIS — M54.50 LUMBAR PAIN: ICD-10-CM

## 2018-11-06 RX ORDER — TRAMADOL HYDROCHLORIDE 50 MG/1
50 TABLET ORAL
Qty: 21 TAB | Refills: 0 | Status: SHIPPED | OUTPATIENT
Start: 2018-11-06 | End: 2019-02-04 | Stop reason: DRUGHIGH

## 2018-11-06 NOTE — PROGRESS NOTES
[facility].  [address], [address]  [place], [address] [place] [address] Street  Phone: [phone]  Fax: [phone]    Mya Portillo  : 1940  PCP: Daily Millard MD    PROGRESS NOTE    HISTORY OF PRESENT ILLNESS:  Chief Complaint   Patient presents with    Back Pain     fu     Mata Mcfarland is a 66 y.o.  female with history of lumbar pain. She was last seen In October with Dr. Ronal Lee. She was to try OTC Salon pas patches, continue Voltaren gel, see ortho for R knee pain, discussed SI, use OTC tylenol for pain and FU PRN. Today, she states \"my back is killing me. \" She is having low back pain and bilateral buttocks pain. She admits to posterior left thigh pain. She is having generalized low back pain from L1-down. She is also having left lumbar pain around L5-S1. She is interested in a SI today. Denies bladder/bowel dysfunction, saddle paresthesia, weakness, gait disturbance, or other neurological deficit. Pt at this time desires to  continue with current care/proceed with medication evaluation/proceed with SI. Audrey Copper Opioid Assessment  Least pain over the last week has been 4/10. Worst pain over the last week has been 9/10. Opioid Risk Tool Reviewed: YES, Score 0  Aberrant behaviors: None. Urine Drug Screen: acute one week supply  Controlled substance agreement on file: NO: acute one week supply.  reviewed:yes  Pill count is consistent with her prescription: yes  Concomitant use of a benzodiazepine: no    Naloxone prescription is warranted. It has been provided or is already on file. Also,  abstinence syndrome was reviewed and discussed with her today N/A    Risks and benefits of  opiate therapy have been reviewed with the patient. No pain behaviors. Denies thoughts of harming self or others.  Pt has a good risk to benefit ratio which allows the pt to function in a home environment without side effects      This is a acute on chronic problem that is worsened. Per review of available records and patients , there are not sign of overuse, misuse, diversion, or concerning side effects. Today we reviewed: the risk of overdose, addiction, and dependency proper storage and disposal of medications the goals of treatment (improve functionality, quality of life, and pain)  The following changes were made to the patients current treatment plan: medications adjusted, see orders. Location of pain: low back. L knee  Does pain radiate into extremities: LLE posterior  Does patient have weakness: L foot drop after surgery. She notes it flops if she is tired. Pt denies saddle paresthesias. Medications pt is on: Lidoderm patch PRN with relief. Tylenol Arthritis PRN. Mobic 7.5mg qD. Voltaren gel some relief. Ice and heat. Pt denies recent ED visits or hospitalizations. Denies persistent fevers, chills, weight changes, neurogenic bowel or bladder symptoms.      Treatments patient has tried:  Physical therapy:Yes 2018  Doing HEP: Unknown  Non-opioid medications: Yes  Spinal injections: Yes  Spinal surgery- Yes. Lumbar fusion 2004 [address]  in [place] with benefit       CT Lumbar 9/26/18  Impression:  1. Posterior spinal fusion of the lumbar spine. The left S1 pedicle screw  partially bridges the left S1 neural foramen without abutting the nerve root.     2. Severe canal narrowing of L1-L2 with severe bilateral neural foraminal  narrowing.     3. Moderate to severe bilateral neural foraminal narrowing as a result of facet  arthropathy and disc osteophyte formation.     4. Degenerative changes of the lower thoracic spine with significant neural  foraminal narrowing. ASSESSMENT  66 y.o. female with lumbar pain. Diagnoses and all orders for this visit:    1. Lumbar spondylosis  -     SCHEDULE SURGERY  -     traMADol (ULTRAM) 50 mg tablet; Take 1 Tab by mouth every eight (8) hours as needed for Pain.  Max Daily Amount: 150 mg.    2. Lumbar pain  - SCHEDULE SURGERY  -     traMADol (ULTRAM) 50 mg tablet; Take 1 Tab by mouth every eight (8) hours as needed for Pain. Max Daily Amount: 150 mg.    3. Lumbar radiculopathy  -     SCHEDULE SURGERY  -     traMADol (ULTRAM) 50 mg tablet; Take 1 Tab by mouth every eight (8) hours as needed for Pain. Max Daily Amount: 150 mg.    4. Spinal stenosis of lumbar region without neurogenic claudication  -     SCHEDULE SURGERY  -     traMADol (ULTRAM) 50 mg tablet; Take 1 Tab by mouth every eight (8) hours as needed for Pain. Max Daily Amount: 150 mg.         IMPRESSION/PLAN    1) Pt was given information on Tramadol. 2) Patient would like to proceed with SI. 3) Will order a L1-2 FELICIA and L5-S1 Left SNRB  4) Ms. Vinson has a reminder for a \"due or due soon\" health maintenance. I have asked that she contact her primary care provider, Mercy Tobar MD, for follow-up on this health maintenance. 5) We have informed patient to notify us for immediate appointment if he has any worsening neurogical symptoms or if an emergency situation presents, then call 911  6) Pt will follow-up in 8 weeks for block fu.  7) acute one week supply of tramadol for severe pain. She understands this is not long term, can refer to PM if needed. Risks and benefits of ongoing therapy have been reviewed with the patient.  is appropriate. PAST MEDICAL HISTORY  Past Medical History:   Diagnosis Date    Allergic rhinitis     Diabetes ([address]     H/O seasonal allergies     Hypertension         MEDICATIONS  Current Outpatient Medications   Medication Sig Dispense Refill    traMADol (ULTRAM) 50 mg tablet Take 1 Tab by mouth every eight (8) hours as needed for Pain. Max Daily Amount: 150 mg. 21 Tab 0    lidocaine (LIDODERM) 5 % Apply 2 patches every 24 hours 60 Each 2    montelukast (SINGULAIR) 10 mg tablet TK 1 T PO Q EVENING  2    diclofenac (VOLTAREN) 1 % gel Apply 4 g to affected area four (4) times daily.  500 g 5    polyethylene glycol (MIRALAX) 17 gram/dose powder MIX 1 CAPFUL WITH 8 OZ OF WATER AND DRINK BY MOUTH DAILY. 527 g 0    rosuvastatin (CRESTOR) 20 mg tablet TAKE 1 TABLET BY MOUTH EVERY NIGHT 90 Tab 3    cloNIDine HCl (CATAPRES) 0.2 mg tablet TK 1 T PO QHS FOR BLOOD PRESSURE  4    amLODIPine (NORVASC) 10 mg tablet Take 1 Tab by mouth daily. 30 Tab 6    furosemide (LASIX) 40 mg tablet Take 20 mg by mouth daily.  valsartan (DIOVAN) 320 mg tablet Take 320 mg by mouth daily.  insulin lispro (HUMALOG) 100 unit/mL injection by SubCUTAneous route. 6 units before breakfast  6 units before lunch  6 units before dinner      insulin glargine (LANTUS SOLOSTAR) 100 unit/mL (3 mL) pen by SubCUTAneous route.  35 units nightly      meloxicam (MOBIC) 7.5 mg tablet TAKE 1 TABLET BY MOUTH TWICE DAILY WITH MEALS 60 Tab 0       ALLERGIES  Allergies   Allergen Reactions    Aspirin Other (comments)     Ringing in ears    Keflex [Cephalexin] Rash       SOCIAL HISTORY    Social History     Socioeconomic History    Marital status:      Spouse name: Not on file    Number of children: Not on file    Years of education: Not on file    Highest education level: Not on file   Social Needs    Financial resource strain: Not on file    Food insecurity - worry: Not on file    Food insecurity - inability: Not on file   [employer] needs - medical: Not on file   [employer] needs - non-medical: Not on file   Occupational History    Occupation: CNA - RET   Tobacco Use    Smoking status: Former Smoker    Smokeless tobacco: Never Used   Substance and Sexual Activity    Alcohol use: No    Drug use: No    Sexual activity: Yes     Partners: Male     Birth control/protection: None   Other Topics Concern    Not on file   Social History Narrative    Not on file       SUBJECTIVE      Pain Scale: 8/10    Pain Assessment  11/6/2018   Location of Pain Back;Buttocks   Location Modifiers -   Severity of Pain 8   Quality of Pain Aching; Throbbing   Quality of Pain Comment -   Duration of Pain -   Frequency of Pain Constant   Frequency of Pain Comment -   Aggravating Factors Standing;Walking   Aggravating Factors Comment -   Limiting Behavior -   Relieving Factors (No Data)   Relieving Factors Comment pain meds help some when she has them   Result of Injury -   Work-Related Injury -   Type of Injury -       Accompanied by self. REVIEW OF SYSTEMS  ROS    Constitutional: Negative for fever, chills, or weight change. Respiratory: Negative for cough or shortness of breath. Cardiovascular: Negative for chest pain or palpitations. Gastrointestinal: Negative for acid reflux, change in bowel habits, or constipation. Genitourinary: Negative for incontinence, dysuria and flank pain. Musculoskeletal: Positive for lumbar pain. Skin: Negative for rash. Neurological: Negative for headaches, dizziness, or numbness. Endo/Heme/Allergies: Negative . Psychiatric/Behavioral: Negative. PHYSICAL EXAMINATION  Visit Vitals  /81 (BP 1 Location: Left arm, BP Patient Position: Sitting)   Pulse 79   Temp 98.1 °F (36.7 °C) (Oral)   Resp 16       Constitutional: Well developed,  well nourished,  awake, alert, and in no acute distress. Neurological:  Sensation to light touch is intact. Psychiatric: Affect and mood are appropriate. Integumentary: No rashes or abrasions noted on exposed areas,  warm, dry and intact. Cardiovascular/Peripheral Vascular:  No peripheral edema is noted. Lymphatic:  No evidence of lymphedema. No cervical lymphadenopathy. SPINE/MUSCULOSKELETAL EXAM    Lumbar spine:  No rash, ecchymosis, or gross obliquity. No fasciculations. No focal atrophy is noted. Range of motion is intact. Tenderness to palpation to Lumbar spine. SI joints non-tender. Trochanters non tender. Musculoskeletal:  No pain with extension, axial loading, or forward flexion. No pain with internal or external rotation of her hips. MOTOR     Hip Flex  Quads Hamstrings Ankle DF EHL Ankle PF   Right +4/5 +4/5 +4/5 +4/5 +4/5 +4/5   Left +4/5 +4/5 +4/5 +4/5 +4/5 +4/5     Straight Leg raise + L, - R.     normal gait and station    Ambulation without assistive device. full weight bearing, non-antalgic gait.     Paola Howard, NP

## 2018-11-09 LAB
HBA1C MFR BLD HPLC: 6.5 %
LDL-C, EXTERNAL: 119

## 2018-11-18 NOTE — H&P (VIEW-ONLY)
[facility] 
[address], [address] Phone: [phone] Fax: [phone] Yeison Alan : 1940 PCP: Dawson Colunga MD 
 
PROGRESS NOTE HISTORY OF PRESENT ILLNESS: 
Chief Complaint Patient presents with  Back Pain  
  fu  
 
Sydney Winkler is a 66 y.o.  female with history of lumbar pain. She was last seen In October with Dr. Myesha Hastings. She was to try OTC Salon pas patches, continue Voltaren gel, see ortho for R knee pain, discussed SI, use OTC tylenol for pain and FU PRN. Today, she states \"my back is killing me. \" She is having low back pain and bilateral buttocks pain. She admits to posterior left thigh pain. She is having generalized low back pain from L1-down. She is also having left lumbar pain around L5-S1. She is interested in a SI today. Denies bladder/bowel dysfunction, saddle paresthesia, weakness, gait disturbance, or other neurological deficit. Pt at this time desires to  continue with current care/proceed with medication evaluation/proceed with SI. Pavan Renteria Opioid Assessment Least pain over the last week has been 4/10. Worst pain over the last week has been 9/10. Opioid Risk Tool Reviewed: YES, Score 0 Aberrant behaviors: None. Urine Drug Screen: acute one week supply Controlled substance agreement on file: NO: acute one week supply.  reviewed:yes Pill count is consistent with her prescription: yes Concomitant use of a benzodiazepine: no 
 
Naloxone prescription is warranted. It has been provided or is already on file. Also,  abstinence syndrome was reviewed and discussed with her today N/A Risks and benefits of  opiate therapy have been reviewed with the patient. No pain behaviors. Denies thoughts of harming self or others. Pt has a good risk to benefit ratio which allows the pt to function in a home environment without side effects This is a acute on chronic problem that is worsened. Per review of available records and patients , there are not sign of overuse, misuse, diversion, or concerning side effects. Today we reviewed: the risk of overdose, addiction, and dependency proper storage and disposal of medications the goals of treatment (improve functionality, quality of life, and pain)  The following changes were made to the patients current treatment plan: medications adjusted, see orders. Location of pain: low back. L knee Does pain radiate into extremities: LLE posterior Does patient have weakness: L foot drop after surgery. She notes it flops if she is tired. Pt denies saddle paresthesias. Medications pt is on: Lidoderm patch PRN with relief. Tylenol Arthritis PRN. Mobic 7.5mg qD. Voltaren gel some relief. Ice and heat. Pt denies recent ED visits or hospitalizations. Denies persistent fevers, chills, weight changes, neurogenic bowel or bladder symptoms.  
  
Treatments patient has tried: 
Physical therapy:Yes 2018 Doing HEP: Unknown Non-opioid medications: Yes Spinal injections: Yes Spinal surgery- Yes. Lumbar fusion 2004 [address]  in [place] with benefit 
  
 
CT Lumbar 9/26/18 Impression: 1. Posterior spinal fusion of the lumbar spine. The left S1 pedicle screw 
partially bridges the left S1 neural foramen without abutting the nerve root. 
  
2. Severe canal narrowing of L1-L2 with severe bilateral neural foraminal 
narrowing. 
  
3. Moderate to severe bilateral neural foraminal narrowing as a result of facet 
arthropathy and disc osteophyte formation. 
  
4. Degenerative changes of the lower thoracic spine with significant neural 
foraminal narrowing. ASSESSMENT 
66 y.o. female with lumbar pain. Diagnoses and all orders for this visit: 1. Lumbar spondylosis 
-     SCHEDULE SURGERY 
-     traMADol (ULTRAM) 50 mg tablet;  Take 1 Tab by mouth every eight (8) hours as needed for Pain. Max Daily Amount: 150 mg. 
 
2. Lumbar pain 
-     SCHEDULE SURGERY 
-     traMADol (ULTRAM) 50 mg tablet; Take 1 Tab by mouth every eight (8) hours as needed for Pain. Max Daily Amount: 150 mg. 
 
3. Lumbar radiculopathy 
-     SCHEDULE SURGERY 
-     traMADol (ULTRAM) 50 mg tablet; Take 1 Tab by mouth every eight (8) hours as needed for Pain. Max Daily Amount: 150 mg. 
 
4. Spinal stenosis of lumbar region without neurogenic claudication 
-     SCHEDULE SURGERY 
-     traMADol (ULTRAM) 50 mg tablet; Take 1 Tab by mouth every eight (8) hours as needed for Pain. Max Daily Amount: 150 mg. 
 
  
 
IMPRESSION/PLAN 
 
1) Pt was given information on Tramadol. 2) Patient would like to proceed with SI. 3) Will order a L1-2 FELICIA and L5-S1 Left SNRB 4) Ms. Vinson has a reminder for a \"due or due soon\" health maintenance. I have asked that she contact her primary care provider, Clotilde Carlisle MD, for follow-up on this health maintenance. 5) We have informed patient to notify us for immediate appointment if he has any worsening neurogical symptoms or if an emergency situation presents, then call 911 
6) Pt will follow-up in 8 weeks for block fu. 
7) acute one week supply of tramadol for severe pain. She understands this is not long term, can refer to PM if needed. Risks and benefits of ongoing therapy have been reviewed with the patient.  is appropriate. PAST MEDICAL HISTORY Past Medical History:  
Diagnosis Date  Allergic rhinitis  Diabetes ([facility]  H/O seasonal allergies  Hypertension MEDICATIONS Current Outpatient Medications Medication Sig Dispense Refill  traMADol (ULTRAM) 50 mg tablet Take 1 Tab by mouth every eight (8) hours as needed for Pain.  Max Daily Amount: 150 mg. 21 Tab 0  
 lidocaine (LIDODERM) 5 % Apply 2 patches every 24 hours 60 Each 2  
 montelukast (SINGULAIR) 10 mg tablet TK 1 T PO Q EVENING  2  
  diclofenac (VOLTAREN) 1 % gel Apply 4 g to affected area four (4) times daily. 500 g 5  polyethylene glycol (MIRALAX) 17 gram/dose powder MIX 1 CAPFUL WITH 8 OZ OF WATER AND DRINK BY MOUTH DAILY. 527 g 0  
 rosuvastatin (CRESTOR) 20 mg tablet TAKE 1 TABLET BY MOUTH EVERY NIGHT 90 Tab 3  cloNIDine HCl (CATAPRES) 0.2 mg tablet TK 1 T PO QHS FOR BLOOD PRESSURE  4  
 amLODIPine (NORVASC) 10 mg tablet Take 1 Tab by mouth daily. 30 Tab 6  furosemide (LASIX) 40 mg tablet Take 20 mg by mouth daily.  valsartan (DIOVAN) 320 mg tablet Take 320 mg by mouth daily.  insulin lispro (HUMALOG) 100 unit/mL injection by SubCUTAneous route. 6 units before breakfast 
6 units before lunch 
6 units before dinner  insulin glargine (LANTUS SOLOSTAR) 100 unit/mL (3 mL) pen by SubCUTAneous route. 35 units nightly  meloxicam (MOBIC) 7.5 mg tablet TAKE 1 TABLET BY MOUTH TWICE DAILY WITH MEALS 60 Tab 0 ALLERGIES Allergies Allergen Reactions  Aspirin Other (comments) Ringing in ears  Keflex [Cephalexin] Rash SOCIAL HISTORY Social History Socioeconomic History  Marital status:  Spouse name: Not on file  Number of children: Not on file  Years of education: Not on file  Highest education level: Not on file Social Needs  Financial resource strain: Not on file  Food insecurity - worry: Not on file  Food insecurity - inability: Not on file  Transportation needs - medical: Not on file  Transportation needs - non-medical: Not on file Occupational History  Occupation: CNA - RET Tobacco Use  Smoking status: Former Smoker  Smokeless tobacco: Never Used Substance and Sexual Activity  Alcohol use: No  
 Drug use: No  
 Sexual activity: Yes  
  Partners: Male Birth control/protection: None Other Topics Concern  Not on file Social History Narrative  Not on file SUBJECTIVE Pain Scale: 8/10 Pain Assessment  11/6/2018 Location of Pain Back;Buttocks Location Modifiers - Severity of Pain 8 Quality of Pain Aching; Throbbing Quality of Pain Comment - Duration of Pain - Frequency of Pain Constant Frequency of Pain Comment - Aggravating Factors Standing;Walking Aggravating Factors Comment - Limiting Behavior -  
Relieving Factors (No Data) Relieving Factors Comment pain meds help some when she has them Result of Injury - Work-Related Injury - Type of Injury - Accompanied by self. REVIEW OF SYSTEMS 
ROS Constitutional: Negative for fever, chills, or weight change. Respiratory: Negative for cough or shortness of breath. Cardiovascular: Negative for chest pain or palpitations. Gastrointestinal: Negative for acid reflux, change in bowel habits, or constipation. Genitourinary: Negative for incontinence, dysuria and flank pain. Musculoskeletal: Positive for lumbar pain. Skin: Negative for rash. Neurological: Negative for headaches, dizziness, or numbness. Endo/Heme/Allergies: Negative . Psychiatric/Behavioral: Negative. PHYSICAL EXAMINATION Visit Vitals /81 (BP 1 Location: Left arm, BP Patient Position: Sitting) Pulse 79 Temp 98.1 °F (36.7 °C) (Oral) Resp 16 Constitutional: Well developed,  well nourished,  awake, alert, and in no acute distress. Neurological:  Sensation to light touch is intact. Psychiatric: Affect and mood are appropriate. Integumentary: No rashes or abrasions noted on exposed areas,  warm, dry and intact. Cardiovascular/Peripheral Vascular:  No peripheral edema is noted. Lymphatic:  No evidence of lymphedema. No cervical lymphadenopathy. SPINE/MUSCULOSKELETAL EXAM 
 
Lumbar spine: No rash, ecchymosis, or gross obliquity. No fasciculations. No focal atrophy is noted. Range of motion is intact. Tenderness to palpation to Lumbar spine. SI joints non-tender. Trochanters non tender. Musculoskeletal:  No pain with extension, axial loading, or forward flexion. No pain with internal or external rotation of her hips. MOTOR Hip Flex  Quads Hamstrings Ankle DF EHL Ankle PF Right +4/5 +4/5 +4/5 +4/5 +4/5 +4/5 Left +4/5 +4/5 +4/5 +4/5 +4/5 +4/5 Straight Leg raise + L, - R.  
 
normal gait and station Ambulation without assistive device. full weight bearing, non-antalgic gait.  
 
Deidre Dhoerty, NP  
 
 Unknown if ever smoked

## 2018-11-20 ENCOUNTER — APPOINTMENT (OUTPATIENT)
Dept: GENERAL RADIOLOGY | Age: 78
End: 2018-11-20
Attending: PHYSICAL MEDICINE & REHABILITATION
Payer: MEDICARE

## 2018-11-20 ENCOUNTER — HOSPITAL ENCOUNTER (OUTPATIENT)
Age: 78
Setting detail: OUTPATIENT SURGERY
Discharge: HOME | End: 2018-11-20
Attending: PHYSICAL MEDICINE & REHABILITATION | Admitting: PHYSICAL MEDICINE & REHABILITATION
Payer: MEDICARE

## 2018-11-20 VITALS
SYSTOLIC BLOOD PRESSURE: 181 MMHG | OXYGEN SATURATION: 100 % | RESPIRATION RATE: 20 BRPM | HEIGHT: 65 IN | WEIGHT: 222 LBS | TEMPERATURE: 98.2 F | BODY MASS INDEX: 36.99 KG/M2 | DIASTOLIC BLOOD PRESSURE: 78 MMHG | HEART RATE: 79 BPM

## 2018-11-20 LAB — GLUCOSE BLD STRIP.AUTO-MCNC: 180 MG/DL (ref 70–110)

## 2018-11-20 PROCEDURE — 76010000009 HC PAIN MGT 0 TO 30 MIN PROC: Performed by: PHYSICAL MEDICINE & REHABILITATION

## 2018-11-20 PROCEDURE — 77030003672 HC NDL SPN HALY -A: Performed by: PHYSICAL MEDICINE & REHABILITATION

## 2018-11-20 PROCEDURE — 74011250637 HC RX REV CODE- 250/637: Performed by: PHYSICAL MEDICINE & REHABILITATION

## 2018-11-20 PROCEDURE — 74011636320 HC RX REV CODE- 636/320: Performed by: PHYSICAL MEDICINE & REHABILITATION

## 2018-11-20 PROCEDURE — 74011636320 HC RX REV CODE- 636/320

## 2018-11-20 PROCEDURE — 77030039433 HC TY MYLEOGRAM BD -B: Performed by: PHYSICAL MEDICINE & REHABILITATION

## 2018-11-20 PROCEDURE — 77030014124 HC TY EPDRL BBMI -A: Performed by: PHYSICAL MEDICINE & REHABILITATION

## 2018-11-20 PROCEDURE — 82962 GLUCOSE BLOOD TEST: CPT

## 2018-11-20 PROCEDURE — 74011250636 HC RX REV CODE- 250/636

## 2018-11-20 PROCEDURE — 74011250636 HC RX REV CODE- 250/636: Performed by: PHYSICAL MEDICINE & REHABILITATION

## 2018-11-20 PROCEDURE — 77030003669 HC NDL SPN COOK -B: Performed by: PHYSICAL MEDICINE & REHABILITATION

## 2018-11-20 PROCEDURE — 77030003543 HC NDL EPDRL BBMI -A: Performed by: PHYSICAL MEDICINE & REHABILITATION

## 2018-11-20 RX ORDER — DEXAMETHASONE SODIUM PHOSPHATE 100 MG/10ML
INJECTION INTRAMUSCULAR; INTRAVENOUS AS NEEDED
Status: DISCONTINUED | OUTPATIENT
Start: 2018-11-20 | End: 2018-11-20 | Stop reason: HOSPADM

## 2018-11-20 RX ORDER — LIDOCAINE HYDROCHLORIDE 10 MG/ML
INJECTION, SOLUTION EPIDURAL; INFILTRATION; INTRACAUDAL; PERINEURAL AS NEEDED
Status: DISCONTINUED | OUTPATIENT
Start: 2018-11-20 | End: 2018-11-20 | Stop reason: HOSPADM

## 2018-11-20 RX ORDER — DIAZEPAM 5 MG/1
2.5-1 TABLET ORAL ONCE
Status: COMPLETED | OUTPATIENT
Start: 2018-11-20 | End: 2018-11-20

## 2018-11-20 RX ADMIN — DIAZEPAM 5 MG: 5 TABLET ORAL at 12:51

## 2018-11-20 NOTE — PROCEDURES
Epidural Steroid Injection Procedure Report        Patient Name:  Luciana Ro    Date of Procedure: November 20, 2018    Preoperative Diagnosis: CHRONIC LOW BACK PAIN, STENOSIS OF LUMBAR SPINE WITHOUT NEURONGENIC CLAUDICATION, LUMBAR RADICULOPATHY    Postoperative Diagnosis: Same        Procedure:  Epidural Steroid Injection    Consent:  Informed consent was obtained prior to the procedure. The patient was given the opportunity to ask questions regarding the procedure and its associated risks. In addition to the potential risks associated with the procedure itself, the patient was informed both verbally and in writing of the potential side effects of the use of glucocorticoid. The patient appeared to comprehend the informed consent and desired to have the procedure performed. Procedure in Detail:  The patient was taken to the procedure suite and placed in the prone position on the operating table on appropriate padding. .      The posterior lumbar region was prepped and draped in the usual sterile fashion. Intraoperative fluoroscopy was used to localize the L1-L2 interspace. Hardware noted with stimulator dorsal to superior rods. The skin was infiltrated with 1% lidocaine. An 18-gauge Tuohy needle was advanced into the by paramedian intralaminar approach to the  epidural space at L1-L2 under fluoroscopic guidance using the loss of resistance technique. No cerebrospinal fluid was seen throughout the procedure. Yes  A small amount of Isovue was injected into the epidural space, confirming appropriated needle placement on fluoroscopy. No vascular uptake was noted. Next, 2ml of 1% Lidocaine and 15 mg of preservative free Dexamethasone were injected via the Tuohy needle. The needle was removed from the patient. The patient tolerated the procedure well.        Signed By: Joanie Lopez MD                        November 20, 2018       PROCEDURE NOTE      Patient Name: Luciana Ro    Date of Procedure: November 20, 2018    Preoperative Diagnosis:  CHRONIC LOW BACK PAIN, STENOSIS OF LUMBAR SPINE WITHOUT NEURONGENIC CLAUDICATION, LUMBAR RADICULOPATHY    Post Operative Diagnosis: CHRONIC LOW BACK PAIN, STENOSIS OF LUMBAR SPINE WITHOUT NEURONGENIC CLAUDICATION, LUMBAR RADICULOPATHY   Procedure :    left  L5 Selective Nerve Root Block  left  S1 Selective Nerve Root Block    Consent:  Informed consent was obtained prior to the procedure. The patient was given the opportunity to ask questions regarding the procedure and its associated risks. In addition to the potential risks associated with the procedure itself, the patient was informed both verbally and in writing of the potential side effects of the use of glucocorticoid. The patient appeared to comprehend the informed consent and desired to have the procedure performed. Procedure: The patient was placed in the prone position on the fluoroscopy table and the back was prepped and draped in the usual sterile manner. Technically difficult procedure due to body habitus, prior surgery, and osteophytes. The exact spinal level was  identified using fluoroscopy, and Lidocaine 1 % was injected locally, a # 22 gauge spinal needle was passed to the transverse process. The depth was noted and the needle redirected to pass inferior and approximately one cm anterior to the transverse process. Sacral block at  S1 foramen. The fluoroscopic beam was redirected laterally to confirm depth of the spinal needle(s). YES    1 cc of Isovue M-200 was used to verify positioning in the epidural and paravertebral space and outlined the course of the spinal nerve into the epidural space. The same procedure was repeated at each spinal level indicated above. No vascular uptake was identified. A total of 15 mg of preservative free Dexamethasone and 2 cc of Lidocaine was slowly injected. The patient tolerated the procedure well.   The injection area was cleaned and bandaids applied. Not excessive bleeding was noted. Patient dressed and discharged to home with instructions. Discussion: The patient tolerated the procedure well.                                               Gavi Carrero MD  November 20, 2018

## 2018-11-20 NOTE — DISCHARGE INSTRUCTIONS
[facility]   (VAN)  Dr. Ronal Lee, Dr. Belinda Mckeon, Dr. Jazmine Simpson not drive a car, operate heavy machinery or dangerous equipment for 24 hours. * Activity as tolerated; rest for the remainder of the day. * Resume pre-block medications including those for your family doctor. * Do not drink alcoholic beverages for 24 hours. Alcohol and the medications you have received may interact and cause an adverse reaction. * You may feel better this evening and worse tomorrow, as the numbing medications wears off and the steroid has yet to begin to work. After 48 hrs the steroid should begin to release bringing you relief. * You may shower this evening and remove any bandages. * Avoid hot tubs and heating pads for 24 hours. You may use cold packs on the procedure site as tolerated for the first 24 hours. * If a headache develops, drink plenty of fluids and rest.  Take over the counter medications for headache if needed. If the headache continues longer than 24 hours, call MD at the [address] [place] [address]. [phone]    * Continue taking pain medications as needed. * You may resume your regular diet if tolerated. Otherwise, start with sips of water and advance slowly. * If Diabetic: check your blood sugar three times a day for the next 3 days. If your sugar is greater than 300 call your family doctor. If your sugar is greater than 400, have someone transport you to the nearest Emergency Room. * If you experience any of the following problems, Please Call the [address] [place] [address] at [phone].         * Shortness of Breath    * Fever of 101 or higher    * Nausea / Vomiting    * Severe Headache    * Weakness or numbness in arms or legs that is not      resolving    * Prolonged increase in pain greater than 4 days      DISCHARGE SUMMARY from Nurse      PATIENT INSTRUCTIONS:    After oral sedation, for 24 hours or while taking prescription Narcotics:  · Limit your activities  · Do not drive and operate hazardous machinery  · Do not make important personal or business decisions  · Do  not drink alcoholic beverages  · If you have not urinated within 8 hours after discharge, please contact your surgeon on call. Report the following to your surgeon:  · Excessive pain, swelling, redness or odor of or around the surgical area  · Temperature over 101  · Nausea and vomiting lasting longer than 4 hours or if unable to take medications  · Any signs of decreased circulation or nerve impairment to extremity: change in color, persistent  numbness, tingling, coldness or increase pain  · Any questions            What to do at Home:  Recommended activity: Activity as tolerated, NO DRIVING FOR 12 Hours post injection          *  Please give a list of your current medications to your Primary Care Provider. *  Please update this list whenever your medications are discontinued, doses are      changed, or new medications (including over-the-counter products) are added. *  Please carry medication information at all times in case of emergency situations. These are general instructions for a healthy lifestyle:    No smoking/ No tobacco products/ Avoid exposure to second hand smoke    Surgeon General's Warning:  Quitting smoking now greatly reduces serious risk to your health. Obesity, smoking, and sedentary lifestyle greatly increases your risk for illness    A healthy diet, regular physical exercise & weight monitoring are important for maintaining a healthy lifestyle    You may be retaining fluid if you have a history of heart failure or if you experience any of the following symptoms:  Weight gain of 3 pounds or more overnight or 5 pounds in a week, increased swelling in our hands or feet or shortness of breath while lying flat in bed.   Please call your doctor as soon as you notice any of these symptoms; do not wait until your next office visit. Recognize signs and symptoms of STROKE:    F-face looks uneven    A-arms unable to move or move unevenly    S-speech slurred or non-existent    T-time-call 911 as soon as signs and symptoms begin-DO NOT go       Back to bed or wait to see if you get better-TIME IS BRAIN.

## 2018-11-20 NOTE — INTERVAL H&P NOTE
H&P Update:  Don Barroso was seen and briefly examined. History and physical has been reviewed.   There have been no significant clinical changes since the completion of the originally dated History and Physical.    Signed By: Jeniffer Piña MD     November 20, 2018 1:48 PM

## 2018-12-10 ENCOUNTER — OFFICE VISIT (OUTPATIENT)
Dept: UROLOGY | Age: 78
End: 2018-12-10

## 2018-12-10 VITALS
HEIGHT: 65 IN | HEART RATE: 77 BPM | BODY MASS INDEX: 38.32 KG/M2 | WEIGHT: 230 LBS | SYSTOLIC BLOOD PRESSURE: 169 MMHG | OXYGEN SATURATION: 97 % | DIASTOLIC BLOOD PRESSURE: 72 MMHG

## 2018-12-10 DIAGNOSIS — N34.2 ATROPHIC URETHRITIS: ICD-10-CM

## 2018-12-10 DIAGNOSIS — N95.2 ATROPHIC VAGINITIS: ICD-10-CM

## 2018-12-10 DIAGNOSIS — N39.45 CONTINUOUS LEAKAGE OF URINE: Primary | ICD-10-CM

## 2018-12-10 LAB
BILIRUB UR QL STRIP: NEGATIVE
GLUCOSE UR-MCNC: NEGATIVE MG/DL
KETONES P FAST UR STRIP-MCNC: NEGATIVE MG/DL
PH UR STRIP: 6 [PH] (ref 4.6–8)
PROT UR QL STRIP: NORMAL
SP GR UR STRIP: 1.02 (ref 1–1.03)
UA UROBILINOGEN AMB POC: NORMAL (ref 0.2–1)
URINALYSIS CLARITY POC: CLEAR
URINALYSIS COLOR POC: YELLOW
URINE BLOOD POC: NEGATIVE
URINE LEUKOCYTES POC: NEGATIVE
URINE NITRITES POC: NEGATIVE

## 2018-12-10 RX ORDER — METOPROLOL SUCCINATE 50 MG/1
TABLET, EXTENDED RELEASE ORAL DAILY
COMMUNITY
End: 2019-03-12 | Stop reason: SDUPTHER

## 2018-12-10 RX ORDER — ESTRADIOL 0.1 MG/G
CREAM VAGINAL
Qty: 42.5 G | Refills: 0 | Status: SHIPPED | OUTPATIENT
Start: 2018-12-10 | End: 2019-03-28

## 2018-12-10 RX ORDER — CHOLECALCIFEROL TAB 125 MCG (5000 UNIT) 125 MCG
TAB ORAL DAILY
COMMUNITY
End: 2020-07-30 | Stop reason: ALTCHOICE

## 2018-12-10 NOTE — PROGRESS NOTES
Ms. Jairon Jones has a reminder for a \"due or due soon\" health maintenance. I have asked that she contact her primary care provider for follow-up on this health maintenance.

## 2018-12-10 NOTE — PATIENT INSTRUCTIONS
Urge Incontinence in Women: Care Instructions  Your Care Instructions    Urge incontinence occurs when the need to urinate is so strong that you cannot reach the toilet in time, even when your bladder contains only a small amount of urine. This is also called overactive bladder or unstable bladder. Some women may have no warning before they leak urine. This condition does not cause major health problems. But it can be embarrassing and can affect a woman's self-esteem and confidence. Treatment can cure or improve your symptoms. Follow-up care is a key part of your treatment and safety. Be sure to make and go to all appointments, and call your doctor if you are having problems. It's also a good idea to know your test results and keep a list of the medicines you take. How can you care for yourself at home? · Be safe with medicines. Take your medicines exactly as prescribed. Call your doctor if you think you are having a problem with your medicine. You will get more details on the specific medicines your doctor prescribes. · Limit caffeine and alcohol. They stimulate urine production. · Urinate every 2 to 4 hours during waking hours, even if you feel that you do not have to go. · Do pelvic floor (Kegel) exercises, which tighten and strengthen pelvic muscles. To do Kegel exercises:  ? Squeeze the same muscles you would use to stop your urine. Your belly and thighs should not move. ? Hold the squeeze for 3 seconds, then relax for 3 seconds. ? Start with 3 seconds. Then add 1 second each week until you are able to squeeze for 10 seconds. ? Repeat the exercise 10 to 15 times for each session. Do three or more sessions each day. · Try wearing pads that absorb the leaks. · Keep skin in the genital area dry. When should you call for help? Call your doctor now or seek immediate medical care if:    · You have new urinary symptoms.  These may include leaking urine, having pain when urinating, or feeling like you need to urinate often.    Watch closely for changes in your health, and be sure to contact your doctor if:    · You do not get better as expected. Where can you learn more? Go to http://arlette-michel.info/. Enter C296 in the search box to learn more about \"Urge Incontinence in Women: Care Instructions. \"  Current as of: May 15, 2018  Content Version: 11.8  © 8056-3499 [employer]. Care instructions adapted under license by [employer] (which disclaims liability or warranty for this information). If you have questions about a medical condition or this instruction, always ask your healthcare professional. [address] any warranty or liability for your use of this information.

## 2018-12-10 NOTE — PROGRESS NOTES
Chief Complaint   Patient presents with    New Patient     Leaking    Incontinence       HISTORY OF PRESENT ILLNESS:  Jimmie Holman is a 66 y.o. female comes in complaining of episodes of leakage of urine. This is not described as either stress incontinence nor is described as urgency incontinence but just a periodic leakage of urine. She does not wear pads and she does not necessarily embarrass herself when she is out in public. She has had 5 children all delivered vaginally and all normal.  She has not had any gynecologic surgery she does not have any bleeding or recurrent urinary infections. She had this several months ago and it subsided spontaneously but now has come back for the last 3 months and has no intention of going away at this time according to her. Past Medical History:   Diagnosis Date    Allergic rhinitis     Diabetes ([facility] [address]     H/O seasonal allergies     Hypertension        Past Surgical History:   Procedure Laterality Date    HAND/FINGER SURGERY UNLISTED      HX CATARACT REMOVAL  2011    HX LUMBAR FUSION  2004-last sx    multiple sx, fused L2-S1, in [place]    HX WISDOM TEETH EXTRACTION         Social History     Tobacco Use    Smoking status: Former Smoker    Smokeless tobacco: Never Used   Substance Use Topics    Alcohol use: No    Drug use: No       Allergies   Allergen Reactions    Aspirin Other (comments)     Ringing in ears    Keflex [Cephalexin] Rash       Family History   Problem Relation Age of Onset    Diabetes Maternal Aunt     Diabetes Maternal Uncle     Diabetes Maternal Grandfather     Diabetes Other     Hypertension Cousin        Current Outpatient Medications   Medication Sig Dispense Refill    metoprolol succinate (TOPROL-XL) 50 mg XL tablet Take  by mouth daily.  cholecalciferol, VITAMIN D3, (VITAMIN D3) 5,000 unit tab tablet Take  by mouth daily.       montelukast (SINGULAIR) 10 mg tablet TK 1 T PO Q EVENING  2    polyethylene glycol (MIRALAX) 17 gram/dose powder MIX 1 CAPFUL WITH 8 OZ OF WATER AND DRINK BY MOUTH DAILY. 527 g 0    rosuvastatin (CRESTOR) 20 mg tablet TAKE 1 TABLET BY MOUTH EVERY NIGHT 90 Tab 3    cloNIDine HCl (CATAPRES) 0.2 mg tablet TK 1 T PO QHS FOR BLOOD PRESSURE  4    amLODIPine (NORVASC) 10 mg tablet Take 1 Tab by mouth daily. 30 Tab 6    furosemide (LASIX) 40 mg tablet Take 20 mg by mouth daily.  insulin lispro (HUMALOG) 100 unit/mL injection by SubCUTAneous route. 6 units before breakfast  6 units before lunch  6 units before dinner      insulin glargine (LANTUS SOLOSTAR) 100 unit/mL (3 mL) pen by SubCUTAneous route. 35 units nightly      traMADol (ULTRAM) 50 mg tablet Take 1 Tab by mouth every eight (8) hours as needed for Pain. Max Daily Amount: 150 mg. 21 Tab 0    lidocaine (LIDODERM) 5 % Apply 2 patches every 24 hours 60 Each 2    meloxicam (MOBIC) 7.5 mg tablet TAKE 1 TABLET BY MOUTH TWICE DAILY WITH MEALS 60 Tab 0    diclofenac (VOLTAREN) 1 % gel Apply 4 g to affected area four (4) times daily. 500 g 5    valsartan (DIOVAN) 320 mg tablet Take 320 mg by mouth daily. REVIEW OF SYSTEMS:   Documented on the chart      PHYSICAL EXAMINATION:     Visit Vitals  /72 (BP 1 Location: Left arm, BP Patient Position: Sitting)   Pulse 77   Ht 5' 5\" (1.651 m)   Wt 230 lb (104.3 kg)   SpO2 97%   BMI 38.27 kg/m²     Constitutional: Well developed, well-nourished female in no acute distress. CV:  No peripheral swelling noted  Respiratory: No respiratory distress or difficulties  Abdomen:  Soft and nontender. No masses. No hepatosplenomegaly.  Female: BSU and external genitalia are remarkable for advanced atrophic urethritis and vaginitis. She has a very small urethral caruncle. I do not feel any masses over the bladder although there is some thickening of the urethra. She has a mild midline cystocele but minimal to no leakage with Valsalva maneuvers. I do not feel any pelvic masses. Skin:  Normal color. No evidence of jaundice. Neuro/Psych:  Patient with appropriate affect. Alert and oriented. Lymphatic:   No enlargement of supraclavicular lymph nodes. Results for orders placed or performed in visit on 12/10/18   AMB POC URINALYSIS DIP STICK AUTO W/O MICRO   Result Value Ref Range    Color (UA POC) Yellow     Clarity (UA POC) Clear     Glucose (UA POC) Negative Negative    Bilirubin (UA POC) Negative Negative    Ketones (UA POC) Negative Negative    Specific gravity (UA POC) 1.020 1.001 - 1.035    Blood (UA POC) Negative Negative    pH (UA POC) 6.0 4.6 - 8.0    Protein (UA POC) 3+ Negative    Urobilinogen (UA POC) 0.2 mg/dL 0.2 - 1    Nitrites (UA POC) Negative Negative    Leukocyte esterase (UA POC) Negative Negative         REVIEW OF LABS AND IMAGING:      Imaging Report Reviewed? NO      Images Reviewed? NO           Other Lab Data Reviewed? YES    ASSESSMENT:     ICD-10-CM ICD-9-CM    1. Continuous leakage of urine N39.45 788.37 AMB POC URINALYSIS DIP STICK AUTO W/O MICRO   2. Atrophic urethritis N34.2 597.89    3. Atrophic vaginitis N95.2 627.3                 PLAN/DISCUSSION: She has severe atrophic urethritis and vaginitis and I think topical Estrace cream would do wonders for this. I am going to prescribe that. She also has significant renal disease and has secondary hyperparathyroidism from that but that is being followed by her nephrologist.  I would like to see her back in about 3 months to see how she is doing with the Estrace vaginal cream twice weekly. Patient voices understanding and agreement to the plan. James Perez MD on 12/10/2018           Please note: This document has been produced using voice recognition software. Unrecognized errors in transcription may be present.

## 2018-12-15 RX ORDER — METOPROLOL SUCCINATE 50 MG/1
TABLET, EXTENDED RELEASE ORAL
Qty: 90 TAB | Refills: 0 | Status: SHIPPED | OUTPATIENT
Start: 2018-12-15 | End: 2019-03-12 | Stop reason: SDUPTHER

## 2019-01-09 ENCOUNTER — OFFICE VISIT (OUTPATIENT)
Dept: ORTHOPEDIC SURGERY | Facility: CLINIC | Age: 79
End: 2019-01-09

## 2019-01-09 VITALS
WEIGHT: 232.8 LBS | BODY MASS INDEX: 38.79 KG/M2 | HEART RATE: 82 BPM | TEMPERATURE: 97.3 F | SYSTOLIC BLOOD PRESSURE: 175 MMHG | OXYGEN SATURATION: 98 % | DIASTOLIC BLOOD PRESSURE: 80 MMHG | RESPIRATION RATE: 18 BRPM | HEIGHT: 65 IN

## 2019-01-09 DIAGNOSIS — M18.11 DEGENERATIVE ARTHRITIS OF THUMB, RIGHT: ICD-10-CM

## 2019-01-09 DIAGNOSIS — M18.11 ARTHRITIS OF CARPOMETACARPAL (CMC) JOINT OF RIGHT THUMB: Primary | ICD-10-CM

## 2019-01-09 DIAGNOSIS — M19.042 ARTHRITIS OF FINGER OF LEFT HAND: ICD-10-CM

## 2019-01-09 DIAGNOSIS — M79.645 FINGER PAIN, LEFT: ICD-10-CM
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
## 2019-01-09 NOTE — PATIENT INSTRUCTIONS
Learning About Arthritis at the [facility] of the Thumb  What is it? Arthritis at the base of the thumb joint is wear and tear on the cartilage. Cartilage is a firm, thick, slippery tissue. It covers and protects the ends of bones where they meet to form a joint. When you have arthritis, there are changes in the cartilage that cause it to break down. The bones rub together and cause joint damage and pain. What causes it? Experts don't know what causes arthritis at the base of the thumb. But aging, a lot of use, an injury, or family history may play a part. What are the symptoms? Symptoms of arthritis at the base of the thumb include aching in your joint. Or the pain may feel burning or sharp. You may feel clicking, creaking, or catching in the joint. It may get stiff. You may have more pain and less strength when you pinch or  things. Symptoms may come and go, stay the same, or get worse over time. How is it diagnosed? Your doctor can often diagnose arthritis by asking you questions about your joint pain and other symptoms and examining you. You may also have X-rays and blood tests. Blood tests can help make sure another disease isn't causing your symptoms. How is it treated? Arthritis at the base of your thumb may be treated with rest, pain relievers, steroid medicines, using a brace or splint, and--in some cases--surgery. To help relieve pain in the joint, rest your sore hand. Switch hands for some activities. You can try heat and cold therapy, such as hot compresses, paraffin wax, cold packs, or ice massage. Your doctor may give you a splint to wear during some activities or when pain flares up. You can often manage mild or moderate arthritis pain with over-the-counter pain relievers. These include medicines that reduce swelling, such as ibuprofen or naproxen. You can also use acetaminophen. Sometimes these medicines are in creams that you can rub on your thumb and hand.  Your doctor may also prescribe other medicine for your pain. For some people, steroid shots may be an option. If none of the treatments work, your doctor may discuss surgery with you. Follow-up care is a key part of your treatment and safety. Be sure to make and go to all appointments, and call your doctor if you are having problems. It's also a good idea to know your test results and keep a list of the medicines you take. Where can you learn more? Go to http://arlette-michel.info/. Enter T110 in the search box to learn more about \"Learning About Arthritis at the [facility] of the Thumb. \"  Current as of: June 11, 2018  Content Version: 11.8  © 5720-1445 [employer]. Care instructions adapted under license by [employer] (which disclaims liability or warranty for this information). If you have questions about a medical condition or this instruction, always ask your healthcare professional. [address] any warranty or liability for your use of this information.

## 2019-01-11 ENCOUNTER — OFFICE VISIT (OUTPATIENT)
Dept: ORTHOPEDIC SURGERY | Age: 79
End: 2019-01-11

## 2019-01-11 VITALS
SYSTOLIC BLOOD PRESSURE: 168 MMHG | RESPIRATION RATE: 16 BRPM | WEIGHT: 231.6 LBS | DIASTOLIC BLOOD PRESSURE: 77 MMHG | TEMPERATURE: 97.7 F | HEIGHT: 65 IN | BODY MASS INDEX: 38.59 KG/M2 | HEART RATE: 73 BPM | OXYGEN SATURATION: 98 %

## 2019-01-11 DIAGNOSIS — M70.61 TROCHANTERIC BURSITIS OF RIGHT HIP: ICD-10-CM

## 2019-01-11 DIAGNOSIS — M70.62 TROCHANTERIC BURSITIS OF LEFT HIP: Primary | ICD-10-CM

## 2019-01-11 RX ORDER — BETAMETHASONE SODIUM PHOSPHATE AND BETAMETHASONE ACETATE 3; 3 MG/ML; MG/ML
6 INJECTION, SUSPENSION INTRA-ARTICULAR; INTRALESIONAL; INTRAMUSCULAR; SOFT TISSUE ONCE
Qty: 1 ML | Refills: 0
Start: 2019-01-11 | End: 2019-01-11

## 2019-01-11 NOTE — PROGRESS NOTES
[address] [facility], [address] [facility]  [phone]           Patient: Cuca Carmichael                MRN: [id number]       SSN: [id number]  YOB: 1940        AGE: 66 y.o. SEX: female  Body mass index is 38.54 kg/m². PCP: Marielle Castillo MD  01/11/19      This office note has been dictated. REVIEW OF SYSTEMS:  Constitutional: Negative for fever, chills, weight loss and malaise/fatigue. HENT: Negative. Eyes: Negative. Respiratory: Negative. Cardiovascular: Negative. Gastrointestinal: No bowel incontinence or constipation. Genitourinary: No bladder incontinence or saddle anesthesia. Skin: Negative. Neurological: Negative. Endo/Heme/Allergies: Negative. Psychiatric/Behavioral: Negative. Musculoskeletal: As per HPI above. Past Medical History:   Diagnosis Date    Allergic rhinitis     Diabetes ([facility] [address]     H/O seasonal allergies     Hypertension          Current Outpatient Medications:     betamethasone (CELESTONE SOLUSPAN) 6 mg/mL injection, 1 mL by Intra artICUlar route once for 1 dose., Disp: 1 mL, Rfl: 0    metoprolol succinate (TOPROL-XL) 50 mg XL tablet, TAKE 1 TABLET BY MOUTH DAILY, Disp: 90 Tab, Rfl: 0    metoprolol succinate (TOPROL-XL) 50 mg XL tablet, Take  by mouth daily. , Disp: , Rfl:     cholecalciferol, VITAMIN D3, (VITAMIN D3) 5,000 unit tab tablet, Take  by mouth daily. , Disp: , Rfl:     estradiol (ESTRACE) 0.01 % (0.1 mg/gram) vaginal cream, 0.5cc topically to urethra twice weekly, Disp: 42.5 g, Rfl: 0    traMADol (ULTRAM) 50 mg tablet, Take 1 Tab by mouth every eight (8) hours as needed for Pain.  Max Daily Amount: 150 mg., Disp: 21 Tab, Rfl: 0    lidocaine (LIDODERM) 5 %, Apply 2 patches every 24 hours, Disp: 60 Each, Rfl: 2    meloxicam (MOBIC) 7.5 mg tablet, TAKE 1 TABLET BY MOUTH TWICE DAILY WITH MEALS, Disp: 60 Tab, Rfl: 0    montelukast (SINGULAIR) 10 mg tablet, TK 1 T PO Q EVENING, Disp: , Rfl: 2    diclofenac (VOLTAREN) 1 % gel, Apply 4 g to affected area four (4) times daily. , Disp: 500 g, Rfl: 5    polyethylene glycol (MIRALAX) 17 gram/dose powder, MIX 1 CAPFUL WITH 8 OZ OF WATER AND DRINK BY MOUTH DAILY. , Disp: 527 g, Rfl: 0    rosuvastatin (CRESTOR) 20 mg tablet, TAKE 1 TABLET BY MOUTH EVERY NIGHT, Disp: 90 Tab, Rfl: 3    cloNIDine HCl (CATAPRES) 0.2 mg tablet, TK 1 T PO QHS FOR BLOOD PRESSURE, Disp: , Rfl: 4    amLODIPine (NORVASC) 10 mg tablet, Take 1 Tab by mouth daily. , Disp: 30 Tab, Rfl: 6    furosemide (LASIX) 40 mg tablet, Take 20 mg by mouth daily. , Disp: , Rfl:     valsartan (DIOVAN) 320 mg tablet, Take 320 mg by mouth daily. , Disp: , Rfl:     insulin lispro (HUMALOG) 100 unit/mL injection, by SubCUTAneous route. 6 units before breakfast 6 units before lunch 6 units before dinner, Disp: , Rfl:     insulin glargine (LANTUS SOLOSTAR) 100 unit/mL (3 mL) pen, by SubCUTAneous route.  35 units nightly, Disp: , Rfl:     Allergies   Allergen Reactions    Aspirin Other (comments)     Ringing in ears    Keflex [Cephalexin] Rash       Social History     Socioeconomic History    Marital status:      Spouse name: Not on file    Number of children: Not on file    Years of education: Not on file    Highest education level: Not on file   Social Needs    Financial resource strain: Not on file    Food insecurity - worry: Not on file    Food insecurity - inability: Not on file   [email or web address] needs - medical: Not on file   [email or web address] needs - non-medical: Not on file   Occupational History    Occupation: CNA - RET   Tobacco Use    Smoking status: Former Smoker    Smokeless tobacco: Never Used   Substance and Sexual Activity    Alcohol use: No    Drug use: No    Sexual activity: Yes     Partners: Male     Birth control/protection: None   Other Topics Concern    Not on file   Social History Narrative    Not on file       Past Surgical History: Procedure Laterality Date    HAND/FINGER SURGERY UNLISTED      HX CATARACT REMOVAL  2011    HX LUMBAR FUSION  2004-last sx    multiple sx, fused L2-S1, in [facility]    HX [address] EXTRACTION             * Patient was identified by name and date of birth   * Agreement on procedure being performed was verified  * Risks and Benefits explained to the patient  * Procedure site verified and marked as necessary  * Patient was positioned for comfort  * Consent was signed and verified  1:56 PM    The patient was instructed on post injection care. We did see Ms. Vinson for followup with regards to chest pain or shortness of breath of bilateral hip pain, laterally-based. She has no groin pain or thigh pain. She does have some left knee pain. She does have a history of trochanteric bursitis of her hips, as well as knee osteoarthritis. She is requesting injections today. She does remind me she does have diabetes. There have been no recent fevers, chills, systemic changes, or injuries to report. PHYSICAL EXAMINATION:  In general, the patient is alert and oriented x 3 in no acute distress. The patient is well-developed, well-nourished, with a normal affect. The patient is afebrile. HEENT:  Head is normocephalic and atraumatic. Pupils are equally round and reactive to light and accommodation. Extraocular eye movements are intact. Neck is supple. Trachea is midline. No JVD is present. Breathing is nonlabored. Examination of the lower extremities reveals pain-free range of motion of the hips. She does have discomfort to palpation of the greater trochanteric bursae bilaterally worse on the left side. There is negative straight leg raise. There is negative calf tenderness. There is negative Jean Claudes. There is no evidence of DVT present. The left knee reveals the skin is intact. There is no ecchymosis and no warmth. There are no signs for infection or cellulitis present.   She does have pain to palpation to the medial joint line, as well as patellofemoral grind and crepitus anteriorly with range of motion activities noted. ASSESSMENT:      1. Bilateral hip trochanteric bursitis, left greater than right. 2. Left knee advancing osteoarthritis. PLAN:  At this point, we discussed treatment options. The patient does have diabetes. We are going to move forward with one injection today. After informed and written consent, and appropriate time out performed, under sterile conditions, with ultrasound-guided assistance, the left hip was prepped with Betadine and 6 mg of Celestone was injected to the trochanteric bursal region without complications. The patient tolerated the injection well. The patient was instructed on post injection care. We will see her back in the office in a couple of weeks time for evaluation, at which point we can inject either the right hip or left knee depending on her symptomatology.                    JR Juan Luis LEE, PAMUKESH, ATC

## 2019-01-11 NOTE — PROGRESS NOTES
1. Have you been to the ER, urgent care clinic since your last visit? Hospitalized since your last visit? No    2. Have you seen or consulted any other health care providers outside of the [address] [place] [address] since your last visit? Include any pap smears or colon screening.  No

## 2019-01-13 RX ORDER — POLYETHYLENE GLYCOL 3350 17 G/17G
POWDER, FOR SOLUTION ORAL
Qty: 527 G | Refills: 0 | Status: SHIPPED | OUTPATIENT
Start: 2019-01-13 | End: 2019-03-28

## 2019-01-17 ENCOUNTER — TELEPHONE (OUTPATIENT)
Dept: ORTHOPEDIC SURGERY | Age: 79
End: 2019-01-17

## 2019-01-17 NOTE — TELEPHONE ENCOUNTER
She can try ES rapid release OTC tylenol. As far as starting a new medication (such as an anti-neuritic), we would need to see her. It has been 2 months since her last visit and these medications should be discussed thoroughly.

## 2019-01-17 NOTE — TELEPHONE ENCOUNTER
Per my last note, 7) acute one week supply of tramadol for severe pain. She understands this is not long term, can refer to PM if needed. Due to state regulations, I can not refill this at this time. As discussed at the visit, it was a one time/ one week supply for severe pain.

## 2019-01-17 NOTE — TELEPHONE ENCOUNTER
Spoke with patient, informed of NP Belle message below. Patient stated can she have anything that is not against regulations that can help with pain until she is seen? Please advise.

## 2019-02-04 ENCOUNTER — OFFICE VISIT (OUTPATIENT)
Dept: ORTHOPEDIC SURGERY | Age: 79
End: 2019-02-04

## 2019-02-04 VITALS
RESPIRATION RATE: 17 BRPM | OXYGEN SATURATION: 98 % | DIASTOLIC BLOOD PRESSURE: 88 MMHG | HEIGHT: 65 IN | SYSTOLIC BLOOD PRESSURE: 173 MMHG | TEMPERATURE: 98 F | HEART RATE: 81 BPM | BODY MASS INDEX: 38.54 KG/M2

## 2019-02-04 DIAGNOSIS — M53.3 SACROILIAC JOINT DYSFUNCTION OF LEFT SIDE: Primary | ICD-10-CM

## 2019-02-04 DIAGNOSIS — M96.1 POST LAMINECTOMY SYNDROME: ICD-10-CM

## 2019-02-04 RX ORDER — TRAMADOL HYDROCHLORIDE 50 MG/1
TABLET ORAL
Qty: 21 TAB | Refills: 0 | Status: SHIPPED | OUTPATIENT
Start: 2019-02-04 | End: 2019-03-28

## 2019-02-04 RX ORDER — LIDOCAINE HYDROCHLORIDE 20 MG/ML
0.5 INJECTION, SOLUTION EPIDURAL; INFILTRATION; INTRACAUDAL; PERINEURAL ONCE
Qty: 0.5 ML | Refills: 0
Start: 2019-02-04 | End: 2019-02-04

## 2019-02-04 RX ORDER — BETAMETHASONE SODIUM PHOSPHATE AND BETAMETHASONE ACETATE 3; 3 MG/ML; MG/ML
12 INJECTION, SUSPENSION INTRA-ARTICULAR; INTRALESIONAL; INTRAMUSCULAR; SOFT TISSUE ONCE
Qty: 2 ML | Refills: 0
Start: 2019-02-04 | End: 2019-02-04

## 2019-02-04 RX ORDER — BUPIVACAINE HYDROCHLORIDE 2.5 MG/ML
0.5 INJECTION, SOLUTION INFILTRATION; PERINEURAL ONCE
Qty: 0.5 ML | Refills: 0
Start: 2019-02-04 | End: 2019-02-04

## 2019-02-04 NOTE — PROGRESS NOTES
[address]  [address], [address]  [place], [address] [place] [address] Street  Phone: [phone]  Fax: [phone]        Kahlil Gómez  : 1940  PCP: Rock Rae MD    PROGRESS NOTE      ASSESSMENT AND PLAN    Diagnoses and all orders for this visit:    1. Sacroiliac joint dysfunction of left side  -     traMADol (ULTRAM) 50 mg tablet; Take one-half tab po every day to TID as needed for pain  -     bupivacaine (MARCAINE) 0.25 % (2.5 mg/mL) soln injection; 0.5 mL by IntraMUSCular route once for 1 dose. -     INJECTION, BUPIVICAINE HYDRO  -     LIDOCAINE INJECTION  -     lidocaine, PF, (XYLOCAINE) 20 mg/mL (2 %) injection; 0.5 mL by Other route once for 1 dose. -     betamethasone (CELESTONE SOLUSPAN) 6 mg/mL injection; 2 mL by IntraMUSCular route once for 1 dose.  -     BETAMETHASONE ACETATE & SODIUM PHOSPHATE INJECTION 3 MG EA.  -     WI INJECT TRIGGER POINT, 1 OR 2    2. Adjacent segment disease with spinal stenosis, severe at L1/2 w/ DDD by CT   -     traMADol (ULTRAM) 50 mg tablet; Take one-half tab po every day to TID as needed for pain    3. Post laminectomy syndrome, w/ residual L DF weakness post-op   -     traMADol (ULTRAM) 50 mg tablet; Take one-half tab po every day to TID as needed for pain       1. Advised to continue HEP. 2. Acute pain rx of Tramadol  3. L iliolumbar TPI done today  4. Will try Mobic at home. May call for refill. 5. Given information on TPI    Follow-up Disposition:  Return if symptoms worsen or fail to improve. HISTORY OF PRESENT ILLNESS  Tiffanie Olmedo is a 66 y.o. female. Pt presents to the office for a f/u visit for low back pain and stenosis. Pt given Tramadol. Pt underwent L L5-S1 SNRB 18. Known L hip bursits, L knee OA. Pt notes she does not have pain with sitting, but she does with rising to a stand. She c/o pain with laying on L hip.  Pt reports she had a stye on her eye for which she has a local antibiotic cream. Denies radicular pain, this has not return since nerve blocks. Pain primarily in buttocks. Requesting injection. Last has bursa injection by Christal Lilly about 3 weeks ago. Reports her sugars are under control. A1C 11/2018 was 6.5. Location of pain: low back  Does pain radiate into extremities: L buttock to knee pulling pain  Does patient have weakness: L foot since surgery. She notes it flops if she is tired. Pt denies saddle paresthesias. Medications pt is on: Tramadol 50mg TID PRN with benefit, usually QHS. Pt has run out. Lidoderm patch PRN with relief. Tylenol ES PRN. Pt was previously on Celebrex in 2016 in [place] with benefit, she has been on Mobic 7.5mg qD recently. Voltaren gel some relief. Ice and heat. Pt denies recent ED visits or hospitalizations. Denies persistent fevers, chills, weight changes, neurogenic bowel or bladder symptoms. Pt denies any recent fevers, chills, antibiotics (just topical for stye), or infections. Treatments patient has tried:  Physical therapy:Yes 2018  Doing HEP: Unknown  Non-opioid medications: Yes  Spinal injections: Yes L L5-S1 SNRB 11/2018 w/benefit. Bursa injection L hip 1/2019 Christal Lilly  Spinal surgery- Yes. Lumbar fusion L2-3-4-5, 2004 [address] in [place] with benefit  Last L CT 2012: severe stenosis L1-2     reviewed. Last fill of Tramadol 11/2018 #21 through this office. Pt moved from [place] after retiring to escape the cold. Pt denies hx of shingles. PMHx of DM.     Pain Assessment  2/4/2019   Location of Pain Back   Location Modifiers Left   Severity of Pain 5   Quality of Pain Throbbing   Quality of Pain Comment -   Duration of Pain -   Frequency of Pain Constant   Frequency of Pain Comment -   Aggravating Factors Walking;Standing   Aggravating Factors Comment laying on side   Limiting Behavior -   Relieving Factors (No Data)   Relieving Factors Comment tylenol extra strength   Result of Injury -   Work-Related Injury -   Type of Injury - PAST MEDICAL HISTORY   Past Medical History:   Diagnosis Date    Allergic rhinitis     Diabetes ([address]     H/O seasonal allergies     Hypertension     Left foot drop, chronic since 2004 fusion sx        Past Surgical History:   Procedure Laterality Date    HAND/FINGER SURGERY UNLISTED      HX CATARACT REMOVAL  2011    HX LUMBAR FUSION  2004-last sx    multiple sx, fused L2-S1, in [address] HX WISDOM TEETH EXTRACTION     . MEDICATIONS      Current Outpatient Medications   Medication Sig Dispense Refill    traMADol (ULTRAM) 50 mg tablet Take one-half tab po every day to TID as needed for pain 21 Tab 0    metoprolol succinate (TOPROL-XL) 50 mg XL tablet TAKE 1 TABLET BY MOUTH DAILY 90 Tab 0    metoprolol succinate (TOPROL-XL) 50 mg XL tablet Take  by mouth daily.  cholecalciferol, VITAMIN D3, (VITAMIN D3) 5,000 unit tab tablet Take  by mouth daily.  estradiol (ESTRACE) 0.01 % (0.1 mg/gram) vaginal cream 0.5cc topically to urethra twice weekly 42.5 g 0    meloxicam (MOBIC) 7.5 mg tablet TAKE 1 TABLET BY MOUTH TWICE DAILY WITH MEALS 60 Tab 0    montelukast (SINGULAIR) 10 mg tablet TK 1 T PO Q EVENING  2    diclofenac (VOLTAREN) 1 % gel Apply 4 g to affected area four (4) times daily. 500 g 5    polyethylene glycol (MIRALAX) 17 gram/dose powder MIX 1 CAPFUL WITH 8 OZ OF WATER AND DRINK BY MOUTH DAILY. 527 g 0    rosuvastatin (CRESTOR) 20 mg tablet TAKE 1 TABLET BY MOUTH EVERY NIGHT 90 Tab 3    cloNIDine HCl (CATAPRES) 0.2 mg tablet TK 1 T PO QHS FOR BLOOD PRESSURE  4    amLODIPine (NORVASC) 10 mg tablet Take 1 Tab by mouth daily. 30 Tab 6    furosemide (LASIX) 40 mg tablet Take 20 mg by mouth daily.  valsartan (DIOVAN) 320 mg tablet Take 320 mg by mouth daily.  insulin lispro (HUMALOG) 100 unit/mL injection by SubCUTAneous route.  6 units before breakfast  6 units before lunch  6 units before dinner      insulin glargine (LANTUS SOLOSTAR) 100 unit/mL (3 mL) pen by SubCUTAneous route. 35 units nightly      polyethylene glycol (MIRALAX) 17 gram/dose powder MIX 1 CAPFUL WITH 8 OZ OF WATER AND DRINK BY MOUTH DAILY. 527 g 0    lidocaine (LIDODERM) 5 % Apply 2 patches every 24 hours 60 Each 2        ALLERGIES    Allergies   Allergen Reactions    Aspirin Other (comments)     Ringing in ears    Keflex [Cephalexin] Rash          SOCIAL HISTORY    Social History     Socioeconomic History    Marital status:      Spouse name: Not on file    Number of children: Not on file    Years of education: Not on file    Highest education level: Not on file   Social Needs    Financial resource strain: Not on file    Food insecurity - worry: Not on file    Food insecurity - inability: Not on file   [employer] needs - medical: Not on file   [employer] needs - non-medical: Not on file   Occupational History    Occupation: CNA - RET   Tobacco Use    Smoking status: Former Smoker    Smokeless tobacco: Never Used   Substance and Sexual Activity    Alcohol use: No    Drug use: No    Sexual activity: Yes     Partners: Male     Birth control/protection: None   Other Topics Concern    Not on file   Social History Narrative    Not on file       FAMILY HISTORY    Family History   Problem Relation Age of Onset    Diabetes Maternal Aunt     Diabetes Maternal Uncle     Diabetes Maternal Grandfather     Diabetes Other     Hypertension Cousin        REVIEW OF SYSTEMS  Review of Systems   Constitutional: Negative for chills, fever and weight loss. Respiratory: Negative for shortness of breath. Cardiovascular: Negative for chest pain. Gastrointestinal: Negative for constipation. Negative for fecal incontinence   Genitourinary: Negative for dysuria. Negative for urinary incontinence   Musculoskeletal:        Per HPI   Skin: Negative for rash. Neurological: Positive for focal weakness. Negative for dizziness, tingling, tremors and headaches.    Endo/Heme/Allergies: Does not bruise/bleed easily. Psychiatric/Behavioral: The patient does not have insomnia. PHYSICAL EXAMINATION  Visit Vitals  /88   Pulse 81   Temp 98 °F (36.7 °C)   Resp 17   Ht 5' 5\" (1.651 m)   SpO2 98%   BMI 38.54 kg/m²         Accompanied by self. Constitutional:  Well developed, well nourished, in no acute distress. Psychiatric: Affect and mood are appropriate. Integumentary: No rashes or abrasions noted on exposed areas. Cardiovascular/Peripheral Vascular: Intact l pulses. 1+ nonpitting peripheral edema is noted BLE. Lymphatic:  No evidence of lymphedema. No cervical lymphadenopathy. SPINE/MUSCULOSKELETAL EXAM    Lumbar spine:  No rash, ecchymosis, or gross obliquity. No fasciculations. No focal atrophy is noted. Tenderness to palpation L SI joint, L sciatic notch. Trochanters non tender. Sensation grossly intact to light touch. MOTOR:       Hip Flex  Quads Hamstrings Ankle DF EHL Ankle PF   Right +4/5 +4/5 +4/5 +4/5 +4/5 +4/5   Left +4/5 +4/5 +4/5 -4/5 +4/5 +4/5       Straight Leg raise negative. + pain w/ANNE and FADIR on L  Pt presents in wheelchair. [facility]  OFFICE PROCEDURE PROGRESS NOTE      PROCEDURE: In the office today after informed consent using aseptic technique, the patient was injected with a total of 2 cc of Celestone, 0.5 cc each of Lidocaine and Marcaine into her left iliolumbar trigger point.      Chart reviewed for the following:   IDr. Olga, have reviewed the History, Physical and updated the Allergic reactions for   [address] performed immediately prior to start of procedure:   IDr. Olga, have performed the following reviews on Tiffanie Olmedo prior to the start of the procedure:            * Patient was identified by name and date of birth   * Agreement on procedure being performed was verified  * Risks and Benefits explained to the patient  * Procedure site verified and marked as necessary  * Patient was positioned for comfort  * Consent was signed and verified     Time: 4:18 PM     Date of procedure: 2/5/2019    Procedure performed by:  Yesenia Wolfe MD    Provider assisted by: None    Patient assisted by: Self    How tolerated by patient: Pt tolerated the procedure well with no complications. Written by Balbir Silva, as dictated by Radha Mckee MD.    I, Dr. Radha Mckee MD, confirm that all documentation is accurate. Ms. Reba Yin may have a reminder for a \"due or due soon\" health maintenance. I have asked that she contact her primary care provider for follow-up on this health maintenance.

## 2019-02-04 NOTE — PATIENT INSTRUCTIONS
Learning About Trigger Point Injections  What are trigger point injections? A trigger point is a painful knot in a tight band of muscle. A trigger point often causes pain to be felt in other areas, too. For example, a trigger point in the neck or upper back can cause pain in the head. Trigger point injections are shots of medicine into these knots to help relieve the pain. The medicines are usually local anesthetics like lidocaine. Trigger point injections are often part of plan that includes other treatments, such as muscle stretching and strengthening. How is a trigger point injection done? Your doctor first locates a trigger point by pressing around the painful area. This may cause your muscle to hurt or twitch. This tells the doctor that he or she has found the spot to do the injection. The area is cleaned. Your doctor then injects the medicine into the trigger point. He or she may inject the medicine in more than one direction within the trigger point. The doctor may change direction without removing the needle. If you have more than one trigger point in the muscle, your doctor may repeat the process. Your doctor may stretch the area to help the muscle relax. He or she may also show you how to move and stretch the muscle yourself. The procedure takes about 10 to 30 minutes. How long it takes depends on how many trigger points are treated. But an injection itself takes only a few moments. What can you expect after a trigger point injection? The area may feel a bit numb for a few hours. It may also feel sore. Other problems from trigger point injections are rare. There is a chance of skin infection at the injection site. And if injections are done in the chest area, there is a small risk of puncturing the outer lining of the lung (pneumothorax). Trigger point injections may reduce some or all of your pain. But the pain can come back after the medicine wears off.  If your pain comes back, your doctor may suggest more shots or other treatment for longer-lasting relief. Follow your doctor's instructions carefully. And tell your doctor about any new or unusual symptoms, such as chest pain or shortness of breath. Follow-up care is a key part of your treatment and safety. Be sure to make and go to all appointments, and call your doctor if you are having problems. It's also a good idea to know your test results and keep a list of the medicines you take. Where can you learn more? Go to http://arlette-michel.info/. Enter [id number] [phone] in the search box to learn more about \"Learning About Trigger Point Injections. \"  Current as of: September 20, 2018  Content Version: 11.9  © 0102-4589 [employer]. Care instructions adapted under license by [employer] (which disclaims liability or warranty for this information). If you have questions about a medical condition or this instruction, always ask your healthcare professional. [address] any warranty or liability for your use of this information.

## 2019-02-04 NOTE — PROGRESS NOTES
Verbal order entered per Dr. Lelia Navarro as documented on blue sheet:Left iliolumbar-Trigger point injection, 2 ml celestone, 0.5 ml lidocaine, 0.5 ml marcaine. Tramadol 50mg take one-half to 1 tab po every day to TID.  Disp 21 no refill

## 2019-02-09 ENCOUNTER — HOSPITAL ENCOUNTER (OUTPATIENT)
Dept: LAB | Age: 79
Discharge: HOME | End: 2019-02-09
Payer: MEDICARE

## 2019-02-09 LAB
25(OH)D3 SERPL-MCNC: 43.6 NG/ML (ref 30–100)
ALBUMIN SERPL-MCNC: 3.6 G/DL (ref 3.4–5)
ANION GAP SERPL CALC-SCNC: 9 MMOL/L (ref 3–18)
BASOPHILS # BLD: 0 K/UL (ref 0–0.1)
BASOPHILS NFR BLD: 0 % (ref 0–2)
BUN SERPL-MCNC: 47 MG/DL (ref 7–18)
BUN/CREAT SERPL: 14 (ref 12–20)
CALCIUM SERPL-MCNC: 9.3 MG/DL (ref 8.5–10.1)
CALCIUM SERPL-MCNC: 9.7 MG/DL (ref 8.5–10.1)
CHLORIDE SERPL-SCNC: 107 MMOL/L (ref 100–108)
CO2 SERPL-SCNC: 22 MMOL/L (ref 21–32)
CREAT SERPL-MCNC: 3.34 MG/DL (ref 0.6–1.3)
CREAT UR-MCNC: 77.4 MG/DL (ref 30–125)
DIFFERENTIAL METHOD BLD: ABNORMAL
EOSINOPHIL # BLD: 0 K/UL (ref 0–0.4)
EOSINOPHIL NFR BLD: 1 % (ref 0–5)
ERYTHROCYTE [DISTWIDTH] IN BLOOD BY AUTOMATED COUNT: 13.7 % (ref 11.6–14.5)
GLUCOSE SERPL-MCNC: 156 MG/DL (ref 74–99)
HCT VFR BLD AUTO: 36.6 % (ref 35–45)
HGB BLD-MCNC: 11.9 G/DL (ref 12–16)
LYMPHOCYTES # BLD: 1.9 K/UL (ref 0.9–3.6)
LYMPHOCYTES NFR BLD: 31 % (ref 21–52)
MCH RBC QN AUTO: 28.1 PG (ref 24–34)
MCHC RBC AUTO-ENTMCNC: 32.5 G/DL (ref 31–37)
MCV RBC AUTO: 86.5 FL (ref 74–97)
MONOCYTES # BLD: 0.5 K/UL (ref 0.05–1.2)
MONOCYTES NFR BLD: 9 % (ref 3–10)
NEUTS SEG # BLD: 3.7 K/UL (ref 1.8–8)
NEUTS SEG NFR BLD: 59 % (ref 40–73)
PHOSPHATE SERPL-MCNC: 3.4 MG/DL (ref 2.5–4.9)
PLATELET # BLD AUTO: 201 K/UL (ref 135–420)
PMV BLD AUTO: 10.7 FL (ref 9.2–11.8)
POTASSIUM SERPL-SCNC: 4.2 MMOL/L (ref 3.5–5.5)
PROT UR-MCNC: 298 MG/DL
PROT/CREAT UR-RTO: 3.9
PTH-INTACT SERPL-MCNC: 385.7 PG/ML (ref 18.4–88)
RBC # BLD AUTO: 4.23 M/UL (ref 4.2–5.3)
SODIUM SERPL-SCNC: 138 MMOL/L (ref 136–145)
WBC # BLD AUTO: 6.1 K/UL (ref 4.6–13.2)

## 2019-02-09 PROCEDURE — 85025 COMPLETE CBC W/AUTO DIFF WBC: CPT

## 2019-02-09 PROCEDURE — 84156 ASSAY OF PROTEIN URINE: CPT

## 2019-02-09 PROCEDURE — 82306 VITAMIN D 25 HYDROXY: CPT

## 2019-02-09 PROCEDURE — 83970 ASSAY OF PARATHORMONE: CPT

## 2019-02-09 PROCEDURE — 80069 RENAL FUNCTION PANEL: CPT

## 2019-02-09 PROCEDURE — 36415 COLL VENOUS BLD VENIPUNCTURE: CPT

## 2019-02-12 ENCOUNTER — TELEPHONE (OUTPATIENT)
Dept: FAMILY MEDICINE CLINIC | Age: 79
End: 2019-02-12

## 2019-02-12 NOTE — TELEPHONE ENCOUNTER
Attempted to call [facility] side of [address] [place] [address] to inquire about request. Phone number [phone]. The mailbox was full and unable to leave a message.

## 2019-02-12 NOTE — TELEPHONE ENCOUNTER
Spoke with patient to inquire about medication request for compound cream. Patient stated that [facility] Representative put informed patient to receive this medication from this pharmacy. Then letter was mailed to the patient and patient had letter dropped off for provider to sign. Patient stated that medication cream was for back pain. Will inform provider. Patient verbalized understanding.

## 2019-02-12 NOTE — TELEPHONE ENCOUNTER
Attempt to contact [facility] about [address] and did not receive any information.

## 2019-02-12 NOTE — TELEPHONE ENCOUNTER
Spoke with patient to inform that [address] [place] [address] was unable to be reached by phone and representative at the local number for this pharmacy stated that the phone number was not recognized. Informed that if patient was having back pain and is requesting a compound cream from Dr. Anson Couch, that an appointment was necessary. Patient verbalized understanding, refused appointment at this time, and does not want compound cream at this time.

## 2019-02-12 NOTE — TELEPHONE ENCOUNTER
Spoke with Marycruz Bedolla at [address] [place] [address] stated that patient was not in the system and that request was not from [address] [place] [address]. Will inform provider. Marycruz Bedolla stated that an outside company does the compound creams and the creams are delivered to the independent pharmacy [address] [place] [address]. Marycruz Bedolla provided their phone number for assistance if needed, [phone].

## 2019-03-05 ENCOUNTER — HOSPITAL ENCOUNTER (EMERGENCY)
Age: 79
Discharge: HOME | End: 2019-03-05
Attending: EMERGENCY MEDICINE
Payer: MEDICARE

## 2019-03-05 ENCOUNTER — APPOINTMENT (OUTPATIENT)
Dept: GENERAL RADIOLOGY | Age: 79
End: 2019-03-05
Attending: EMERGENCY MEDICINE
Payer: MEDICARE

## 2019-03-05 VITALS
RESPIRATION RATE: 17 BRPM | DIASTOLIC BLOOD PRESSURE: 75 MMHG | TEMPERATURE: 101.2 F | OXYGEN SATURATION: 95 % | SYSTOLIC BLOOD PRESSURE: 181 MMHG | HEART RATE: 97 BPM | WEIGHT: 226 LBS | BODY MASS INDEX: 37.61 KG/M2

## 2019-03-05 DIAGNOSIS — N18.4 STAGE 4 CHRONIC KIDNEY DISEASE (HCC): ICD-10-CM

## 2019-03-05 DIAGNOSIS — J10.1 INFLUENZA A: Primary | ICD-10-CM

## 2019-03-05 DIAGNOSIS — R79.89 ELEVATED SERUM CREATININE: ICD-10-CM

## 2019-03-05 LAB
ALBUMIN SERPL-MCNC: 3.5 G/DL (ref 3.4–5)
ALBUMIN/GLOB SERPL: 1 {RATIO} (ref 0.8–1.7)
ALP SERPL-CCNC: 106 U/L (ref 45–117)
ALT SERPL-CCNC: 26 U/L (ref 13–56)
ANION GAP SERPL CALC-SCNC: 5 MMOL/L (ref 3–18)
AST SERPL-CCNC: 29 U/L (ref 15–37)
BASOPHILS # BLD: 0 K/UL (ref 0–0.1)
BASOPHILS NFR BLD: 0 % (ref 0–2)
BILIRUB SERPL-MCNC: 0.3 MG/DL (ref 0.2–1)
BUN SERPL-MCNC: 36 MG/DL (ref 7–18)
BUN/CREAT SERPL: 10 (ref 12–20)
CALCIUM SERPL-MCNC: 9.8 MG/DL (ref 8.5–10.1)
CHLORIDE SERPL-SCNC: 109 MMOL/L (ref 100–108)
CO2 SERPL-SCNC: 25 MMOL/L (ref 21–32)
CREAT SERPL-MCNC: 3.65 MG/DL (ref 0.6–1.3)
DIFFERENTIAL METHOD BLD: ABNORMAL
EOSINOPHIL # BLD: 0 K/UL (ref 0–0.4)
EOSINOPHIL NFR BLD: 0 % (ref 0–5)
ERYTHROCYTE [DISTWIDTH] IN BLOOD BY AUTOMATED COUNT: 14.2 % (ref 11.6–14.5)
FLUAV AG NPH QL IA: POSITIVE
FLUBV AG NOSE QL IA: NEGATIVE
GLOBULIN SER CALC-MCNC: 3.6 G/DL (ref 2–4)
GLUCOSE SERPL-MCNC: 73 MG/DL (ref 74–99)
HCT VFR BLD AUTO: 34.2 % (ref 35–45)
HGB BLD-MCNC: 11.2 G/DL (ref 12–16)
LACTATE BLD-SCNC: 0.65 MMOL/L (ref 0.4–2)
LYMPHOCYTES # BLD: 0.7 K/UL (ref 0.9–3.6)
LYMPHOCYTES NFR BLD: 12 % (ref 21–52)
MCH RBC QN AUTO: 28.9 PG (ref 24–34)
MCHC RBC AUTO-ENTMCNC: 32.7 G/DL (ref 31–37)
MCV RBC AUTO: 88.4 FL (ref 74–97)
MONOCYTES # BLD: 0.9 K/UL (ref 0.05–1.2)
MONOCYTES NFR BLD: 16 % (ref 3–10)
NEUTS SEG # BLD: 3.9 K/UL (ref 1.8–8)
NEUTS SEG NFR BLD: 72 % (ref 40–73)
PLATELET # BLD AUTO: 133 K/UL (ref 135–420)
PMV BLD AUTO: 10.8 FL (ref 9.2–11.8)
POTASSIUM SERPL-SCNC: 4.1 MMOL/L (ref 3.5–5.5)
PROT SERPL-MCNC: 7.1 G/DL (ref 6.4–8.2)
RBC # BLD AUTO: 3.87 M/UL (ref 4.2–5.3)
SODIUM SERPL-SCNC: 139 MMOL/L (ref 136–145)
WBC # BLD AUTO: 5.5 K/UL (ref 4.6–13.2)

## 2019-03-05 PROCEDURE — 83605 ASSAY OF LACTIC ACID: CPT

## 2019-03-05 PROCEDURE — 87040 BLOOD CULTURE FOR BACTERIA: CPT

## 2019-03-05 PROCEDURE — 71046 X-RAY EXAM CHEST 2 VIEWS: CPT

## 2019-03-05 PROCEDURE — 87804 INFLUENZA ASSAY W/OPTIC: CPT

## 2019-03-05 PROCEDURE — 74011250637 HC RX REV CODE- 250/637: Performed by: EMERGENCY MEDICINE

## 2019-03-05 PROCEDURE — 96360 HYDRATION IV INFUSION INIT: CPT

## 2019-03-05 PROCEDURE — 99283 EMERGENCY DEPT VISIT LOW MDM: CPT

## 2019-03-05 PROCEDURE — 85025 COMPLETE CBC W/AUTO DIFF WBC: CPT

## 2019-03-05 PROCEDURE — 80053 COMPREHEN METABOLIC PANEL: CPT

## 2019-03-05 PROCEDURE — 74011250636 HC RX REV CODE- 250/636: Performed by: EMERGENCY MEDICINE

## 2019-03-05 RX ORDER — OSELTAMIVIR PHOSPHATE 75 MG/1
75 CAPSULE ORAL 2 TIMES DAILY
Qty: 10 CAP | Refills: 0 | Status: SHIPPED | OUTPATIENT
Start: 2019-03-05 | End: 2019-03-10

## 2019-03-05 RX ORDER — SODIUM CHLORIDE 0.9 % (FLUSH) 0.9 %
5-10 SYRINGE (ML) INJECTION AS NEEDED
Status: DISCONTINUED | OUTPATIENT
Start: 2019-03-05 | End: 2019-03-05 | Stop reason: HOSPADM

## 2019-03-05 RX ORDER — HYDROCODONE POLISTIREX AND CHLORPHENIRAMINE POLISTIREX 10; 8 MG/5ML; MG/5ML
5 SUSPENSION, EXTENDED RELEASE ORAL
Qty: 115 ML | Refills: 0 | Status: SHIPPED | OUTPATIENT
Start: 2019-03-05 | End: 2019-03-08

## 2019-03-05 RX ORDER — ACETAMINOPHEN 500 MG
1000 TABLET ORAL
Status: COMPLETED | OUTPATIENT
Start: 2019-03-05 | End: 2019-03-05

## 2019-03-05 RX ADMIN — ACETAMINOPHEN 1000 MG: 500 TABLET ORAL at 13:38

## 2019-03-05 RX ADMIN — SODIUM CHLORIDE 1000 ML: 900 INJECTION, SOLUTION INTRAVENOUS at 12:49

## 2019-03-05 NOTE — ED TRIAGE NOTES
Patient arrived from home c/o generalized body aches with fever and nasal congestion. Patient states symptoms have been going on since Sunday.  Patient received medications from [facility] on sunday

## 2019-03-05 NOTE — ED PROVIDER NOTES
EMERGENCY DEPARTMENT HISTORY AND PHYSICAL EXAM    Date: 3/5/2019  Patient Name: Shirlie Lefort    History of Presenting Illness     Chief Complaint   Patient presents with    Nasal Congestion    Sore Throat    Fatigue    Generalized Body Aches         History Provided By: Patient and Patient's     Chief Complaint: fever, body aches, nasal congestion, cough, sore throat  Duration: 3 days  Timing:    Location:   Quality:  achy  Severity: moderate  Modifying Factors: n/a  Associated Symptoms: none      Additional History (Context): Shirlie Lefort is a 78 y.o. female with diabetes, stroke and renal insufficiency  who presents with body aches, cough, sore throat, nasal congestion and fever for past 3 days. States she was seen at [facility], diagnosed with bronchitis and given Rx for cough medication, azithromycin and inhaler. States flu was negative. Symptoms not improved with using Rx medications. Denies cp, sob, dizziness, headache, confusion, abdominal pain, NVD, back pain, urinary sx, productive cough or hemoptysis. Dry cough which causes her to feel sore in muscles in chest and abdomen. No other sx or complaints. Pt has ckd, not on dialysis, also diabetic, takes insulin.  with patient. PCP: Marquis Hill MD    Current Facility-Administered Medications   Medication Dose Route Frequency Provider Last Rate Last Dose    sodium chloride (NS) flush 5-10 mL  5-10 mL IntraVENous PRN Deidre Reyes PA-C         Current Outpatient Medications   Medication Sig Dispense Refill    traMADol (ULTRAM) 50 mg tablet Take one-half tab po every day to TID as needed for pain 21 Tab 0    polyethylene glycol (MIRALAX) 17 gram/dose powder MIX 1 CAPFUL WITH 8 OZ OF WATER AND DRINK BY MOUTH DAILY. 527 g 0    metoprolol succinate (TOPROL-XL) 50 mg XL tablet TAKE 1 TABLET BY MOUTH DAILY 90 Tab 0    metoprolol succinate (TOPROL-XL) 50 mg XL tablet Take  by mouth daily.       cholecalciferol, VITAMIN D3, (VITAMIN D3) 5,000 unit tab tablet Take  by mouth daily.  estradiol (ESTRACE) 0.01 % (0.1 mg/gram) vaginal cream 0.5cc topically to urethra twice weekly 42.5 g 0    lidocaine (LIDODERM) 5 % Apply 2 patches every 24 hours 60 Each 2    meloxicam (MOBIC) 7.5 mg tablet TAKE 1 TABLET BY MOUTH TWICE DAILY WITH MEALS 60 Tab 0    montelukast (SINGULAIR) 10 mg tablet TK 1 T PO Q EVENING  2    diclofenac (VOLTAREN) 1 % gel Apply 4 g to affected area four (4) times daily. 500 g 5    polyethylene glycol (MIRALAX) 17 gram/dose powder MIX 1 CAPFUL WITH 8 OZ OF WATER AND DRINK BY MOUTH DAILY. 527 g 0    rosuvastatin (CRESTOR) 20 mg tablet TAKE 1 TABLET BY MOUTH EVERY NIGHT 90 Tab 3    cloNIDine HCl (CATAPRES) 0.2 mg tablet TK 1 T PO QHS FOR BLOOD PRESSURE  4    amLODIPine (NORVASC) 10 mg tablet Take 1 Tab by mouth daily. 30 Tab 6    furosemide (LASIX) 40 mg tablet Take 20 mg by mouth daily.  valsartan (DIOVAN) 320 mg tablet Take 320 mg by mouth daily.  insulin lispro (HUMALOG) 100 unit/mL injection by SubCUTAneous route. 6 units before breakfast  6 units before lunch  6 units before dinner      insulin glargine (LANTUS SOLOSTAR) 100 unit/mL (3 mL) pen by SubCUTAneous route.  35 units nightly         Past History     Past Medical History:  Past Medical History:   Diagnosis Date    Allergic rhinitis     Diabetes ([address]     H/O seasonal allergies     Hypertension     Left foot drop, chronic since 2004 fusion sx        Past Surgical History:  Past Surgical History:   Procedure Laterality Date    HAND/FINGER SURGERY UNLISTED      HX CATARACT REMOVAL  2011    HX LUMBAR FUSION  2004-last sx    multiple sx, fused L2-S1, [facility]    HX WISDOM TEETH EXTRACTION         Family History:  Family History   Problem Relation Age of Onset    Diabetes Maternal Aunt     Diabetes Maternal Uncle     Diabetes Maternal Grandfather     Diabetes Other     Hypertension Cousin        Social History:  Social History Tobacco Use    Smoking status: Former Smoker    Smokeless tobacco: Never Used   Substance Use Topics    Alcohol use: No    Drug use: No       Allergies: Allergies   Allergen Reactions    Aspirin Other (comments)     Ringing in ears    Keflex [Cephalexin] Rash         Review of Systems       Review of Systems   Constitutional: pos for chills and fever. HENT: pos for nasal congestion, sore throat, rhinorrhea  Eyes: Negative. Respiratory: pos for cough and negative for shortness of breath. Cardiovascular: Negative for chest pain and palpitations. Gastrointestinal: Negative for abdominal pain, constipation, diarrhea, nausea and vomiting. Genitourinary: Negative. Negative for difficulty urinating and flank pain. Musculoskeletal: Negative for back pain. Negative for gait problem and neck pain. Skin: Negative. Allergic/Immunologic: Negative. Neurological: Negative for dizziness, weakness, numbness and headaches. Psychiatric/Behavioral: Negative. All other systems reviewed and are negative. All Other Systems Negative  Physical Exam     Vitals:    03/05/19 1245   BP: 181/75   Pulse: 97   Resp: 17   Temp: (!) 102.4 °F (39.1 °C)   SpO2: 95%   Weight: 102.5 kg (226 lb)     Physical Exam   Constitutional: She is oriented to person, place, and time. She appears well-developed and well-nourished. No distress. HENT:   Head: Normocephalic and atraumatic. Right Ear: Tympanic membrane, external ear and ear canal normal.   Left Ear: Tympanic membrane, external ear and ear canal normal.   Nose: Mucosal edema and rhinorrhea present. Mouth/Throat: Uvula is midline, oropharynx is clear and moist and mucous membranes are normal. Normal dentition. No dental abscesses, uvula swelling or dental caries. No oropharyngeal exudate, posterior oropharyngeal edema, posterior oropharyngeal erythema or tonsillar abscesses.    Eyes: Conjunctivae and EOM are normal.   Cardiovascular: Normal rate, regular rhythm and normal heart sounds. Pulmonary/Chest: Effort normal and breath sounds normal. No respiratory distress. She has no wheezes. She has no rales. No respiratory distress,    Abdominal: Soft. She exhibits no distension. There is no tenderness. Musculoskeletal: Normal range of motion. She exhibits no tenderness or deformity. Neurological: She is alert and oriented to person, place, and time. Skin: Skin is warm and dry. No rash noted. She is not diaphoretic. No erythema. Psychiatric: She has a normal mood and affect. Nursing note and vitals reviewed. Diagnostic Study Results     Labs -     Recent Results (from the past 12 hour(s))   CBC WITH AUTOMATED DIFF    Collection Time: 03/05/19  1:00 PM   Result Value Ref Range    WBC 5.5 4.6 - 13.2 K/uL    RBC 3.87 (L) 4.20 - 5.30 M/uL    HGB 11.2 (L) 12.0 - 16.0 g/dL    HCT 34.2 (L) 35.0 - 45.0 %    MCV 88.4 74.0 - 97.0 FL    MCH 28.9 24.0 - 34.0 PG    MCHC 32.7 31.0 - 37.0 g/dL    RDW 14.2 11.6 - 14.5 %    PLATELET 183 (L) 089 - 420 K/uL    MPV 10.8 9.2 - 11.8 FL    NEUTROPHILS 72 40 - 73 %    LYMPHOCYTES 12 (L) 21 - 52 %    MONOCYTES 16 (H) 3 - 10 %    EOSINOPHILS 0 0 - 5 %    BASOPHILS 0 0 - 2 %    ABS. NEUTROPHILS 3.9 1.8 - 8.0 K/UL    ABS. LYMPHOCYTES 0.7 (L) 0.9 - 3.6 K/UL    ABS. MONOCYTES 0.9 0.05 - 1.2 K/UL    ABS. EOSINOPHILS 0.0 0.0 - 0.4 K/UL    ABS.  BASOPHILS 0.0 0.0 - 0.1 K/UL    DF AUTOMATED     METABOLIC PANEL, COMPREHENSIVE    Collection Time: 03/05/19  1:00 PM   Result Value Ref Range    Sodium 139 136 - 145 mmol/L    Potassium 4.1 3.5 - 5.5 mmol/L    Chloride 109 (H) 100 - 108 mmol/L    CO2 25 21 - 32 mmol/L    Anion gap 5 3.0 - 18 mmol/L    Glucose 73 (L) 74 - 99 mg/dL    BUN 36 (H) 7.0 - 18 MG/DL    Creatinine 3.65 (H) 0.6 - 1.3 MG/DL    BUN/Creatinine ratio 10 (L) 12 - 20      GFR est AA 15 (L) >60 ml/min/1.73m2    GFR est non-AA 12 (L) >60 ml/min/1.73m2    Calcium 9.8 8.5 - 10.1 MG/DL    Bilirubin, total 0.3 0.2 - 1.0 MG/DL ALT (SGPT) 26 13 - 56 U/L    AST (SGOT) 29 15 - 37 U/L    Alk. phosphatase 106 45 - 117 U/L    Protein, total 7.1 6.4 - 8.2 g/dL    Albumin 3.5 3.4 - 5.0 g/dL    Globulin 3.6 2.0 - 4.0 g/dL    A-G Ratio 1.0 0.8 - 1.7     POC LACTIC ACID    Collection Time: 03/05/19  1:10 PM   Result Value Ref Range    Lactic Acid (POC) 0.65 0.40 - 2.00 mmol/L   INFLUENZA A & B AG (RAPID TEST)    Collection Time: 03/05/19  1:15 PM   Result Value Ref Range    Influenza A Antigen POSITIVE (A) NEG      Influenza B Antigen NEGATIVE  NEG         Radiologic Studies -   XR CHEST PA LAT   Final Result   IMPRESSION:    Bibasilar streaky opacities, favor atelectasis. .              CT Results  (Last 48 hours)    None        CXR Results  (Last 48 hours)               03/05/19 1310  XR CHEST PA LAT Final result    Impression:  IMPRESSION:    Bibasilar streaky opacities, favor atelectasis. .               Narrative:  Chest  AP and lateral views       INDICATION:  Shortness of breath, fever, generalized body aches       COMPARISON:  Chest x-ray 7/24/2017       FINDINGS:  The cardiac silhouette is normal in size. Atherosclerosis noted. The   pulmonary vasculature is unremarkable. Bibasilar streaky opacities noted. No   focal consolidation, pleural effusion, or pneumothorax. No acute osseous   abnormalities are identified. Medical Decision Making   I am the first provider for this patient. I reviewed the vital signs, available nursing notes, past medical history, past surgical history, family history and social history. Vital Signs-Reviewed the patient's vital signs. Records Reviewed: Nursing Notes, Old Medical Records, Previous Radiology Studies and Previous Laboratory Studies    Procedures:  Procedures    Provider Notes (Medical Decision Making):     15PS F here with flu like symptoms for 3 days. Seen at  start of sx, states flu was negative. Today, febrile with otherwise normal vitals.  Septic work up initiated due to temp and HR 97, however lactic acid normal therefore work up d/c. PE shows nasal congestion, dry cough otherwise normal. Flu pos, which is likely cause of fever and sx. Labs otherwise unchanged from baseline- normal wbc, CMP is at baseline with Cr elevated and GFR c/w previous. CXR negative for PNA, c/w atelectasis. Small amount of fluids given for hydration, temp treated. Pt understands supportive tx, will start tamiflu. She will call PCP in the morning to schedule f/u apt and will return immediately with sx discussed. Appropriate for d/c home, tolerating po and not septic. Asking for different cough medication, will give short course tussonex and pt will d/c use of previous Rx,     MED RECONCILIATION:  Current Facility-Administered Medications   Medication Dose Route Frequency    sodium chloride (NS) flush 5-10 mL  5-10 mL IntraVENous PRN     Current Outpatient Medications   Medication Sig    traMADol (ULTRAM) 50 mg tablet Take one-half tab po every day to TID as needed for pain    polyethylene glycol (MIRALAX) 17 gram/dose powder MIX 1 CAPFUL WITH 8 OZ OF WATER AND DRINK BY MOUTH DAILY.  metoprolol succinate (TOPROL-XL) 50 mg XL tablet TAKE 1 TABLET BY MOUTH DAILY    metoprolol succinate (TOPROL-XL) 50 mg XL tablet Take  by mouth daily.  cholecalciferol, VITAMIN D3, (VITAMIN D3) 5,000 unit tab tablet Take  by mouth daily.  estradiol (ESTRACE) 0.01 % (0.1 mg/gram) vaginal cream 0.5cc topically to urethra twice weekly    lidocaine (LIDODERM) 5 % Apply 2 patches every 24 hours    meloxicam (MOBIC) 7.5 mg tablet TAKE 1 TABLET BY MOUTH TWICE DAILY WITH MEALS    montelukast (SINGULAIR) 10 mg tablet TK 1 T PO Q EVENING    diclofenac (VOLTAREN) 1 % gel Apply 4 g to affected area four (4) times daily.  polyethylene glycol (MIRALAX) 17 gram/dose powder MIX 1 CAPFUL WITH 8 OZ OF WATER AND DRINK BY MOUTH DAILY.     rosuvastatin (CRESTOR) 20 mg tablet TAKE 1 TABLET BY MOUTH EVERY NIGHT    cloNIDine HCl (CATAPRES) 0.2 mg tablet TK 1 T PO QHS FOR BLOOD PRESSURE    amLODIPine (NORVASC) 10 mg tablet Take 1 Tab by mouth daily.  furosemide (LASIX) 40 mg tablet Take 20 mg by mouth daily.  valsartan (DIOVAN) 320 mg tablet Take 320 mg by mouth daily.  insulin lispro (HUMALOG) 100 unit/mL injection by SubCUTAneous route. 6 units before breakfast  6 units before lunch  6 units before dinner    insulin glargine (LANTUS SOLOSTAR) 100 unit/mL (3 mL) pen by SubCUTAneous route. 35 units nightly       Disposition:  home    DISCHARGE NOTE:     Pt has been reexamined. Patient has no new complaints, changes, or physical findings. Care plan outlined and precautions discussed. Discussed proper way to take medications. Discussed treatment plan, return precautions, symptomatic relief, and expected time to improvement. All questions answered. Patient is stable for discharge and outpatient management. Patient is ready to go home. Follow-up Information     Follow up With Specialties Details Why Contact Info    Samson Ernst MD [address] [address]  [phone]      [facility] EMERGENCY DEPT Emergency Medicine   [address] [place] [id number]  [phone]          Current Discharge Medication List                Diagnosis     Clinical Impression:   1. Influenza A    2.  Stage 4 chronic kidney disease (HCC)    3. Elevated serum creatinine

## 2019-03-05 NOTE — ED NOTES
Pt discharged to home, discharge instructions, follow up instructions, and prescriptions given to pt and all questions answered.

## 2019-03-05 NOTE — ED NOTES
I performed a brief evaluation, including history and physical, of the patient here in triage and I have determined that pt will need further treatment and evaluation from the main side ER physician. I have placed initial orders to help in expediting patients care.      March 05, 2019 at 12:47 PM - MITZI Jaquez        Visit Vitals  /75 (BP 1 Location: Left arm, BP Patient Position: At rest)   Pulse 97   Temp (!) 102.4 °F (39.1 °C)   Resp 17   Wt 102.5 kg (226 lb)   SpO2 95%   BMI 37.61 kg/m²

## 2019-03-06 ENCOUNTER — APPOINTMENT (OUTPATIENT)
Dept: GENERAL RADIOLOGY | Age: 79
End: 2019-03-06
Attending: EMERGENCY MEDICINE
Payer: MEDICARE

## 2019-03-06 ENCOUNTER — HOSPITAL ENCOUNTER (EMERGENCY)
Age: 79
Discharge: HOME | End: 2019-03-06
Attending: EMERGENCY MEDICINE
Payer: MEDICARE

## 2019-03-06 VITALS
SYSTOLIC BLOOD PRESSURE: 155 MMHG | BODY MASS INDEX: 40.04 KG/M2 | RESPIRATION RATE: 16 BRPM | HEIGHT: 63 IN | WEIGHT: 226 LBS | TEMPERATURE: 98.4 F | OXYGEN SATURATION: 98 % | HEART RATE: 85 BPM | DIASTOLIC BLOOD PRESSURE: 67 MMHG

## 2019-03-06 DIAGNOSIS — E16.2 HYPOGLYCEMIA: Primary | ICD-10-CM

## 2019-03-06 DIAGNOSIS — J11.1 FLU: ICD-10-CM

## 2019-03-06 LAB
ALBUMIN SERPL-MCNC: 3.3 G/DL (ref 3.4–5)
ALBUMIN/GLOB SERPL: 0.9 {RATIO} (ref 0.8–1.7)
ALP SERPL-CCNC: 102 U/L (ref 45–117)
ALT SERPL-CCNC: 30 U/L (ref 13–56)
ANION GAP SERPL CALC-SCNC: 9 MMOL/L (ref 3–18)
APPEARANCE UR: CLEAR
AST SERPL-CCNC: 35 U/L (ref 15–37)
BACTERIA URNS QL MICRO: NEGATIVE /HPF
BASOPHILS # BLD: 0 K/UL (ref 0–0.1)
BASOPHILS NFR BLD: 0 % (ref 0–2)
BILIRUB SERPL-MCNC: 0.4 MG/DL (ref 0.2–1)
BILIRUB UR QL: NEGATIVE
BUN SERPL-MCNC: 34 MG/DL (ref 7–18)
BUN/CREAT SERPL: 10 (ref 12–20)
CALCIUM SERPL-MCNC: 8.9 MG/DL (ref 8.5–10.1)
CHLORIDE SERPL-SCNC: 110 MMOL/L (ref 100–108)
CO2 SERPL-SCNC: 22 MMOL/L (ref 21–32)
COLOR UR: YELLOW
CREAT SERPL-MCNC: 3.49 MG/DL (ref 0.6–1.3)
DIFFERENTIAL METHOD BLD: ABNORMAL
EOSINOPHIL # BLD: 0 K/UL (ref 0–0.4)
EOSINOPHIL NFR BLD: 0 % (ref 0–5)
EPITH CASTS URNS QL MICRO: NORMAL /LPF (ref 0–5)
ERYTHROCYTE [DISTWIDTH] IN BLOOD BY AUTOMATED COUNT: 14.1 % (ref 11.6–14.5)
GLOBULIN SER CALC-MCNC: 3.8 G/DL (ref 2–4)
GLUCOSE BLD STRIP.AUTO-MCNC: 190 MG/DL (ref 70–110)
GLUCOSE SERPL-MCNC: 135 MG/DL (ref 74–99)
GLUCOSE UR STRIP.AUTO-MCNC: NEGATIVE MG/DL
HCT VFR BLD AUTO: 36.5 % (ref 35–45)
HGB BLD-MCNC: 11.6 G/DL (ref 12–16)
HGB UR QL STRIP: ABNORMAL
KETONES UR QL STRIP.AUTO: NEGATIVE MG/DL
LEUKOCYTE ESTERASE UR QL STRIP.AUTO: NEGATIVE
LYMPHOCYTES # BLD: 0.9 K/UL (ref 0.9–3.6)
LYMPHOCYTES NFR BLD: 20 % (ref 21–52)
MCH RBC QN AUTO: 28.4 PG (ref 24–34)
MCHC RBC AUTO-ENTMCNC: 31.8 G/DL (ref 31–37)
MCV RBC AUTO: 89.5 FL (ref 74–97)
MONOCYTES # BLD: 0.3 K/UL (ref 0.05–1.2)
MONOCYTES NFR BLD: 7 % (ref 3–10)
NEUTS SEG # BLD: 3.4 K/UL (ref 1.8–8)
NEUTS SEG NFR BLD: 73 % (ref 40–73)
NITRITE UR QL STRIP.AUTO: NEGATIVE
PH UR STRIP: 6.5 [PH] (ref 5–8)
PLATELET # BLD AUTO: 129 K/UL (ref 135–420)
PMV BLD AUTO: 10.9 FL (ref 9.2–11.8)
POTASSIUM SERPL-SCNC: 4.3 MMOL/L (ref 3.5–5.5)
PROT SERPL-MCNC: 7.1 G/DL (ref 6.4–8.2)
PROT UR STRIP-MCNC: 300 MG/DL
RBC # BLD AUTO: 4.08 M/UL (ref 4.2–5.3)
RBC #/AREA URNS HPF: NORMAL /HPF (ref 0–5)
SODIUM SERPL-SCNC: 141 MMOL/L (ref 136–145)
SP GR UR REFRACTOMETRY: 1.01 (ref 1–1.03)
UROBILINOGEN UR QL STRIP.AUTO: 0.2 EU/DL (ref 0.2–1)
WBC # BLD AUTO: 4.6 K/UL (ref 4.6–13.2)
WBC URNS QL MICRO: NORMAL /HPF (ref 0–4)

## 2019-03-06 PROCEDURE — 96375 TX/PRO/DX INJ NEW DRUG ADDON: CPT

## 2019-03-06 PROCEDURE — 82962 GLUCOSE BLOOD TEST: CPT

## 2019-03-06 PROCEDURE — 81001 URINALYSIS AUTO W/SCOPE: CPT

## 2019-03-06 PROCEDURE — 85025 COMPLETE CBC W/AUTO DIFF WBC: CPT

## 2019-03-06 PROCEDURE — 71045 X-RAY EXAM CHEST 1 VIEW: CPT

## 2019-03-06 PROCEDURE — 99285 EMERGENCY DEPT VISIT HI MDM: CPT

## 2019-03-06 PROCEDURE — 74011250636 HC RX REV CODE- 250/636: Performed by: EMERGENCY MEDICINE

## 2019-03-06 PROCEDURE — 80053 COMPREHEN METABOLIC PANEL: CPT

## 2019-03-06 PROCEDURE — 96374 THER/PROPH/DIAG INJ IV PUSH: CPT

## 2019-03-06 PROCEDURE — 96376 TX/PRO/DX INJ SAME DRUG ADON: CPT

## 2019-03-06 RX ORDER — ONDANSETRON 2 MG/ML
4 INJECTION INTRAMUSCULAR; INTRAVENOUS
Status: COMPLETED | OUTPATIENT
Start: 2019-03-06 | End: 2019-03-06

## 2019-03-06 RX ORDER — MORPHINE SULFATE 4 MG/ML
4 INJECTION INTRAVENOUS
Status: COMPLETED | OUTPATIENT
Start: 2019-03-06 | End: 2019-03-06

## 2019-03-06 RX ADMIN — ONDANSETRON 4 MG: 2 INJECTION INTRAMUSCULAR; INTRAVENOUS at 15:12

## 2019-03-06 RX ADMIN — ONDANSETRON 4 MG: 2 INJECTION INTRAMUSCULAR; INTRAVENOUS at 09:13

## 2019-03-06 RX ADMIN — MORPHINE SULFATE 4 MG: 4 INJECTION INTRAVENOUS at 09:13

## 2019-03-06 RX ADMIN — MORPHINE SULFATE 4 MG: 4 INJECTION INTRAVENOUS at 15:12

## 2019-03-06 NOTE — ED PROVIDER NOTES
EMERGENCY DEPARTMENT HISTORY AND PHYSICAL EXAM    7:52 AM  Date: 3/6/2019  Patient Name: Paul Gregory    History of Presenting Illness     Chief Complaint:    History Provided By:     HPI: Paul Gregory is a 78 y.o. female   With a past medical history of diabetes hypertension and recently diagnosed with the flu. She woke up this morning and some confusion per  called 911 and her blood sugar was found to be 35. EMS gave an amp of D50 and her repeat blood sugar was 192. Her confusion resolved she presents awake and alert stating that she is cold. No other aggravating or alleviating factors. No other associated symptoms. This document was created with voice recognition technology and unrecognized errors may be present. .now    PCP: Que Macias MD    This was created with voice recognition software and transcription errors may be present. Past History     Past Medical History:  Past Medical History:   Diagnosis Date    Allergic rhinitis     Diabetes ([address]     H/O seasonal allergies     Hypertension     Left foot drop, chronic since 2004 fusion sx        Past Surgical History:  Past Surgical History:   Procedure Laterality Date    HAND/FINGER SURGERY UNLISTED      HX CATARACT REMOVAL  2011    HX LUMBAR FUSION  2004-last sx    multiple sx, fused L2-S1, in [place]    HX WISDOM TEETH EXTRACTION         Family History:  Family History   Problem Relation Age of Onset    Diabetes Maternal Aunt     Diabetes Maternal Uncle     Diabetes Maternal Grandfather     Diabetes Other     Hypertension Cousin        Social History:  Social History     Tobacco Use    Smoking status: Former Smoker    Smokeless tobacco: Never Used   Substance Use Topics    Alcohol use: No    Drug use: No       Allergies: Allergies   Allergen Reactions    Aspirin Other (comments)     Ringing in ears    Keflex [Cephalexin] Rash       Review of Systems     Review of Systems   Constitutional: Negative for chills. Respiratory: Negative for chest tightness. Cardiovascular: Negative for chest pain. All other systems reviewed and are negative. Physical Exam       Physical Exam   Constitutional: She is oriented to person, place, and time. She appears well-developed. HENT:   Head: Normocephalic and atraumatic. Eyes: EOM are normal. Pupils are equal, round, and reactive to light. Neck: Normal range of motion. Neck supple. Cardiovascular: Normal rate, regular rhythm and normal heart sounds. Exam reveals no friction rub. No murmur heard. Pulmonary/Chest: Effort normal and breath sounds normal. No respiratory distress. She has no wheezes. Abdominal: Soft. She exhibits no distension. There is no tenderness. There is no rebound and no guarding. Musculoskeletal: Normal range of motion. Neurological: She is alert and oriented to person, place, and time. Skin: Skin is warm and dry. Psychiatric: She has a normal mood and affect. Her behavior is normal. Thought content normal.       Diagnostic Study Results     Vital Signs  Visit Vitals  BP (!) 186/116 (BP 1 Location: Left arm, BP Patient Position: At rest)   Pulse 78   Temp 98.4 °F (36.9 °C)   Resp 18   Ht 5' 3\" (1.6 m)   Wt 102.5 kg (226 lb)   SpO2 99%   BMI 40.03 kg/m²         EKG:    Labs:     Imaging:   Medical Decision Making     ED Course: Progress Notes, Reevaluation, and Consults:      Provider Notes (Medical Decision Making): This is a 59-year-old insulin-dependent diabetic presents with hypoglycemia likely secondary to viral infection- FLU + . Will obs. Here. patient went to bed last night without needing to take her insulin. She is complaining of some low back pain which may be related to the flu also some shortness of breath repeat labs and check an x-ray to make sure she is not developing pneumonia. Diagnosis     Clinical Impression: No diagnosis found.     Disposition:       Medication List      ASK your doctor about these medications    amLODIPine 10 mg tablet  Commonly known as:  NORVASC  Take 1 Tab by mouth daily. chlorpheniramine-HYDROcodone 10-8 mg/5 mL suspension  Commonly known as:  TUSSIONEX PENNKINETIC ER  Take 5 mL by mouth nightly as needed for Cough for up to 3 days. Max Daily Amount: 5 mL. cloNIDine HCl 0.2 mg tablet  Commonly known as:  CATAPRES     diclofenac 1 % Gel  Commonly known as:  VOLTAREN  Apply 4 g to affected area four (4) times daily. DIOVAN 320 mg tablet  Generic drug:  valsartan     estradiol 0.01 % (0.1 mg/gram) vaginal cream  Commonly known as:  ESTRACE  0.5cc topically to urethra twice weekly     HumaLOG U-100 Insulin 100 unit/mL injection  Generic drug:  insulin lispro     LANTUS SOLOSTAR U-100 INSULIN 100 unit/mL (3 mL) Inpn  Generic drug:  insulin glargine     LASIX 40 mg tablet  Generic drug:  furosemide     lidocaine 5 %  Commonly known as:  LIDODERM  Apply 2 patches every 24 hours     meloxicam 7.5 mg tablet  Commonly known as:  MOBIC  TAKE 1 TABLET BY MOUTH TWICE DAILY WITH MEALS     * metoprolol succinate 50 mg XL tablet  Commonly known as:  TOPROL-XL     * metoprolol succinate 50 mg XL tablet  Commonly known as:  TOPROL-XL  TAKE 1 TABLET BY MOUTH DAILY     montelukast 10 mg tablet  Commonly known as:  SINGULAIR     oseltamivir 75 mg capsule  Commonly known as:  TAMIFLU  Take 1 Cap by mouth two (2) times a day for 5 days. * polyethylene glycol 17 gram/dose powder  Commonly known as:  MIRALAX  MIX 1 CAPFUL WITH 8 OZ OF WATER AND DRINK BY MOUTH DAILY. * polyethylene glycol 17 gram/dose powder  Commonly known as:  MIRALAX  MIX 1 CAPFUL WITH 8 OZ OF WATER AND DRINK BY MOUTH DAILY.      rosuvastatin 20 mg tablet  Commonly known as:  CRESTOR  TAKE 1 TABLET BY MOUTH EVERY NIGHT     traMADol 50 mg tablet  Commonly known as:  ULTRAM  Take one-half tab po every day to TID as needed for pain     VITAMIN D3 5,000 unit Tab tablet  Generic drug:  cholecalciferol (VITAMIN D3)         * This list has 4 medication(s) that are the same as other medications prescribed for you. Read the directions carefully, and ask your doctor or other care provider to review them with you.              _______________________________    Attestations:  Scribe Attestation      Jackson Louise MD acting as a scribe for and in the presence of Chadd Trujillo MD      March 06, 2019 at 7:52 AM       Provider Attestation:      I personally performed the services described in the documentation, reviewed the documentation, as recorded by the scribe in my presence, and it accurately and completely records my words and actions.  March 06, 2019 at 7:52 AM - Chadd Trujillo MD    _______________________________

## 2019-03-06 NOTE — DISCHARGE INSTRUCTIONS
Patient Education        Hypoglycemia: Care Instructions  Your Care Instructions    Hypoglycemia means that your blood sugar is low and your body is not getting enough fuel. Some people get low blood sugar from not eating often enough. Some medicines to treat diabetes can cause low blood sugar. People who have had surgery on their stomachs or intestines may get hypoglycemia. Problems with the pancreas, kidneys, or liver also can cause low blood sugar. A snack or drink with sugar in it will raise your blood sugar and should ease your symptoms right away. Your doctor may recommend that you change or stop your medicines until you can get your blood sugar levels under control. In the long run, you may need to change your diet and eating habits so that you get enough fuel for your body throughout the day. Follow-up care is a key part of your treatment and safety. Be sure to make and go to all appointments, and call your doctor if you are having problems. It's also a good idea to know your test results and keep a list of the medicines you take. How can you care for yourself at home? · Learn to recognize the early signs of low blood sugar. Signs include:  ? Nausea. ? Hunger. ? Feeling nervous, irritable, or shaky. ? Cold, clammy, wet skin. ? Sweating (when you are not exercising). ? A fast heartbeat.  ? Numbness or tingling of the fingertips or lips. · If you feel an episode of low blood sugar coming on, drink fruit juice or sugared (not diet) soda, or eat sugar in the form of candy, cubes, or tablets. [employer] are another [employer]. · Eat small, frequent meals so that you do not get too hungry between meals. · Balance extra exercise with eating more. · Keep a written record of your low blood sugar episodes, including when you last ate and what you ate, so that you can learn what causes your blood sugar to drop.   · Make sure your family, friends, and coworkers know the symptoms of low blood sugar and know what to do to get your sugar level up. · Wear medical alert jewelry that lists your condition. You can buy this at most drugstores. When should you call for help? Call 911 anytime you think you may need emergency care. For example, call if:    · You passed out (lost consciousness).     · You are confused or cannot think clearly.     · Your blood sugar is very high or very low.    Watch closely for changes in your health, and be sure to contact your doctor if:    · Your blood sugar stays outside the level your doctor set for you.     · You have any problems. Where can you learn more? Go to http://arlette-michel.info/. Enter S845 in the search box to learn more about \"Hypoglycemia: Care Instructions. \"  Current as of: July 25, 2018  Content Version: 11.9  © 8465-6375 [employer]. Care instructions adapted under license by [employer] (which disclaims liability or warranty for this information). If you have questions about a medical condition or this instruction, always ask your healthcare professional. [address] any warranty or liability for your use of this information.

## 2019-03-06 NOTE — ED TRIAGE NOTES
Pt states remembers waking up at 5:30 in pain and then EMS being at bedside, pt complains of back pain now that was not there yesterday, states she only ate jello before bed last night but took all of her meds

## 2019-03-08 ENCOUNTER — TELEPHONE (OUTPATIENT)
Dept: FAMILY MEDICINE CLINIC | Age: 79
End: 2019-03-08

## 2019-03-08 NOTE — TELEPHONE ENCOUNTER
Pt is calling to talk to the nurse about her medications. She had questions about the miralax and the metoprolol. Please Advise.       [phone]

## 2019-03-11 LAB
BACTERIA SPEC CULT: NORMAL
BACTERIA SPEC CULT: NORMAL
SERVICE CMNT-IMP: NORMAL
SERVICE CMNT-IMP: NORMAL

## 2019-03-11 NOTE — TELEPHONE ENCOUNTER
Spoke with patient to inform that pharmacy did receive prescription refills on metoprolol and Miralax. Informed that an available refill is on file for Miralax. Informed that patient may have been requesting refill too soon for insurance to pay or that insurance does not cover this medication due to this medication is over the counter as per the pharmacy. Informed that patient should not be out of metoprolol till the 15th of this month. Patient did agree. Inquired who patient's primary care provider is. Patient stated that patient switched to Glenda Herron NP due to more availability with this provider. Will have provider send refill on metoprolol. Patient verbalized understanding and request pharmacy to refill Miralax.

## 2019-03-11 NOTE — TELEPHONE ENCOUNTER
Spoke with patient to gather information. Patient stated 3 weeks ago a request was sent to the pharmacy for refill of Miralax and metoprolol. Will check with pharmacy. Patient verbalized understanding.

## 2019-03-11 NOTE — TELEPHONE ENCOUNTER
Spoke with Andrea at [facility] to inquire about last refill of medication Miralax and metoprolol. Gely Anand stated that the metoprolol was received on 12/15/2018 and patient picked up 90 day supply. Gely Anand stated that patient should not be out of medication until the 15th of this month. Andrea stated that  Miralax order was received and an available refill is on file. Gely Anand stated that insurance may not cover due to this medication is over the counter or patient may have been requesting refill too soon for insurance to pay. Will inform patient. Andrea verbalized understanding.

## 2019-03-11 NOTE — TELEPHONE ENCOUNTER
Spoke with Julianne at [facility] to inform that patient does want refill on Miralax. Andrea verbalized understanding.

## 2019-03-12 RX ORDER — METOPROLOL SUCCINATE 50 MG/1
TABLET, EXTENDED RELEASE ORAL
Qty: 90 TAB | Refills: 0 | Status: SHIPPED | OUTPATIENT
Start: 2019-03-12 | End: 2019-05-14 | Stop reason: SDUPTHER

## 2019-03-12 RX ORDER — METOPROLOL SUCCINATE 50 MG/1
50 TABLET, EXTENDED RELEASE ORAL DAILY
Qty: 30 TAB | Refills: 3 | Status: CANCELLED | OUTPATIENT
Start: 2019-03-12

## 2019-03-28 ENCOUNTER — OFFICE VISIT (OUTPATIENT)
Dept: FAMILY MEDICINE CLINIC | Age: 79
End: 2019-03-28

## 2019-03-28 VITALS
RESPIRATION RATE: 20 BRPM | HEIGHT: 63 IN | HEART RATE: 79 BPM | TEMPERATURE: 98 F | WEIGHT: 230.8 LBS | BODY MASS INDEX: 40.89 KG/M2 | DIASTOLIC BLOOD PRESSURE: 80 MMHG | SYSTOLIC BLOOD PRESSURE: 160 MMHG | OXYGEN SATURATION: 95 %

## 2019-03-28 DIAGNOSIS — E16.2 HYPOGLYCEMIA: ICD-10-CM

## 2019-03-28 DIAGNOSIS — I10 ESSENTIAL HYPERTENSION: ICD-10-CM

## 2019-03-28 DIAGNOSIS — J11.1 INFLUENZA: Primary | ICD-10-CM

## 2019-03-28 RX ORDER — LOSARTAN POTASSIUM 50 MG/1
50 TABLET ORAL DAILY
Qty: 30 TAB | Refills: 6 | Status: SHIPPED | OUTPATIENT
Start: 2019-03-28 | End: 2019-05-08

## 2019-03-28 RX ORDER — PHENOL/SODIUM PHENOLATE
20 AEROSOL, SPRAY (ML) MUCOUS MEMBRANE DAILY
Qty: 30 TAB | Refills: 6 | Status: SHIPPED | OUTPATIENT
Start: 2019-03-28 | End: 2019-09-20

## 2019-03-28 NOTE — PATIENT INSTRUCTIONS
Hypoglycemia: Care Instructions  Your Care Instructions    Hypoglycemia means that your blood sugar is low and your body is not getting enough fuel. Some people get low blood sugar from not eating often enough. Some medicines to treat diabetes can cause low blood sugar. People who have had surgery on their stomachs or intestines may get hypoglycemia. Problems with the pancreas, kidneys, or liver also can cause low blood sugar. A snack or drink with sugar in it will raise your blood sugar and should ease your symptoms right away. Your doctor may recommend that you change or stop your medicines until you can get your blood sugar levels under control. In the long run, you may need to change your diet and eating habits so that you get enough fuel for your body throughout the day. Follow-up care is a key part of your treatment and safety. Be sure to make and go to all appointments, and call your doctor if you are having problems. It's also a good idea to know your test results and keep a list of the medicines you take. How can you care for yourself at home? · Learn to recognize the early signs of low blood sugar. Signs include:  ? Nausea. ? Hunger. ? Feeling nervous, irritable, or shaky. ? Cold, clammy, wet skin. ? Sweating (when you are not exercising). ? A fast heartbeat.  ? Numbness or tingling of the fingertips or lips. · If you feel an episode of low blood sugar coming on, drink fruit juice or sugared (not diet) soda, or eat sugar in the form of candy, cubes, or tablets. [employer] are another [employer]. · Eat small, frequent meals so that you do not get too hungry between meals. · Balance extra exercise with eating more. · Keep a written record of your low blood sugar episodes, including when you last ate and what you ate, so that you can learn what causes your blood sugar to drop.   · Make sure your family, friends, and coworkers know the symptoms of low blood sugar and know what to do to get your sugar level up. · Wear medical alert jewelry that lists your condition. You can buy this at most drugstores. When should you call for help? Call 911 anytime you think you may need emergency care. For example, call if:    · You passed out (lost consciousness).     · You are confused or cannot think clearly.     · Your blood sugar is very high or very low.    Watch closely for changes in your health, and be sure to contact your doctor if:    · Your blood sugar stays outside the level your doctor set for you.     · You have any problems. Where can you learn more? Go to http://arlette-michel.info/. Enter H801 in the search box to learn more about \"Hypoglycemia: Care Instructions. \"  Current as of: July 25, 2018  Content Version: 11.9  © 1882-3559 [employer]. Care instructions adapted under license by [employer] (which disclaims liability or warranty for this information). If you have questions about a medical condition or this instruction, always ask your healthcare professional. [address] any warranty or liability for your use of this information.

## 2019-03-28 NOTE — PROGRESS NOTES
Patient here for a ER visit Follow up. 1. Have you been to the ER, urgent care clinic since your last visit? Hospitalized since your last visit? [facility] ER  for flu like symptoms March 5th and returned on the 6th after  found her unresponsive. 2. Have you seen or consulted any other health care providers outside of the [address] [place] [address] since your last visit? Include any pap smears or colon screening.  No

## 2019-03-28 NOTE — PROGRESS NOTES
HISTORY OF PRESENT ILLNESS  Omid Gupta is a 78 y.o. female. HPI  Patient is here today for evaluation and treatment of: ER Visits Follow Up:    ER Visits Follow Up:  Pt was seen in ED for flu recently. Was treated with tamiflu. The next day  she had not eaten and her blood sugar dropped so she returned to the ED. EMS was called to transport her to the ED. Paramedics had  Given pt  an amp of D50) as pt was unresponsive. She still has decreased appetite. She is still taking her insulin. She still feel tired. Pt is due for labs for HTN      Current Outpatient Medications:     metoprolol succinate (TOPROL-XL) 50 mg XL tablet, TAKE 1 TABLET BY MOUTH DAILY, Disp: 90 Tab, Rfl: 0    cholecalciferol, VITAMIN D3, (VITAMIN D3) 5,000 unit tab tablet, Take  by mouth daily. , Disp: , Rfl:     montelukast (SINGULAIR) 10 mg tablet, TK 1 T PO Q EVENING, Disp: , Rfl: 2    polyethylene glycol (MIRALAX) 17 gram/dose powder, MIX 1 CAPFUL WITH 8 OZ OF WATER AND DRINK BY MOUTH DAILY. , Disp: 527 g, Rfl: 0    rosuvastatin (CRESTOR) 20 mg tablet, TAKE 1 TABLET BY MOUTH EVERY NIGHT, Disp: 90 Tab, Rfl: 3    cloNIDine HCl (CATAPRES) 0.2 mg tablet, TK 1 T PO QHS FOR BLOOD PRESSURE, Disp: , Rfl: 4    amLODIPine (NORVASC) 10 mg tablet, Take 1 Tab by mouth daily. , Disp: 30 Tab, Rfl: 6    furosemide (LASIX) 20 mg tablet, Take 20 mg by mouth daily. , Disp: , Rfl:     insulin lispro (HUMALOG) 100 unit/mL injection, by SubCUTAneous route. 6 units before breakfast 6 units before lunch 6 units before dinner, Disp: , Rfl:     insulin glargine (LANTUS SOLOSTAR) 100 unit/mL (3 mL) pen, by SubCUTAneous route. 35 units nightly, Disp: , Rfl:      PMH,  Meds, Allergies, Family History, Social history reviewed            Review of Systems   Constitutional: Negative for chills and fever. Respiratory: Positive for shortness of breath (on ocassion). Cardiovascular: Negative for chest pain.        Physical Exam   Visit Vitals  /80 Pulse 79   Temp 98 °F (36.7 °C) (Oral)   Resp 20   Ht 5' 3\" (1.6 m)   Wt 230 lb 12.8 oz (104.7 kg)   SpO2 95%   BMI 40.88 kg/m²     General appearance: alert, cooperative, no distress, appears stated age  Neck: supple, symmetrical, trachea midline, no adenopathy, thyroid: not enlarged, symmetric, no tenderness/mass/nodules, no carotid bruit and no JVD  Lungs: clear to auscultation bilaterally  Heart: regular rate and rhythm, S1, S2 normal, no murmur, click, rub or gallop  Extremities: extremities normal, atraumatic, no cyanosis or edema      ASSESSMENT and PLAN    ICD-10-CM ICD-9-CM    1. Influenza J11.1 487.1    2. Essential hypertension I10 401.9 CBC WITH AUTOMATED DIFF      METABOLIC PANEL, BASIC   3. Hypoglycemia E16.2 251.2        As above,   above all stable unless otherwise noted   treatment plan as listed below  I have already ordered this as a future order in the computer. Pt likely with some residual effects from the flu. Monitor; if worsens, notify MD  Follow-up and Dispositions    · Return in about 6 weeks (around 5/9/2019) for flu/ BP. An After Visit Summary was printed and given to the patient. This has been fully explained to the patient, who indicates understanding.

## 2019-03-29 LAB
BASOPHILS # BLD AUTO: 0 X10E3/UL (ref 0–0.2)
BASOPHILS NFR BLD AUTO: 0 %
BUN SERPL-MCNC: 39 MG/DL (ref 8–27)
BUN/CREAT SERPL: 11 (ref 12–28)
CALCIUM SERPL-MCNC: 10.3 MG/DL (ref 8.7–10.3)
CHLORIDE SERPL-SCNC: 103 MMOL/L (ref 96–106)
CO2 SERPL-SCNC: 20 MMOL/L (ref 20–29)
CREAT SERPL-MCNC: 3.44 MG/DL (ref 0.57–1)
EOSINOPHIL # BLD AUTO: 0.1 X10E3/UL (ref 0–0.4)
EOSINOPHIL NFR BLD AUTO: 1 %
ERYTHROCYTE [DISTWIDTH] IN BLOOD BY AUTOMATED COUNT: 14.6 % (ref 12.3–15.4)
GLUCOSE SERPL-MCNC: 153 MG/DL (ref 65–99)
HCT VFR BLD AUTO: 34 % (ref 34–46.6)
HGB BLD-MCNC: 10.4 G/DL (ref 11.1–15.9)
IMM GRANULOCYTES # BLD AUTO: 0 X10E3/UL (ref 0–0.1)
IMM GRANULOCYTES NFR BLD AUTO: 0 %
LYMPHOCYTES # BLD AUTO: 1.3 X10E3/UL (ref 0.7–3.1)
LYMPHOCYTES NFR BLD AUTO: 26 %
MCH RBC QN AUTO: 27.7 PG (ref 26.6–33)
MCHC RBC AUTO-ENTMCNC: 30.6 G/DL (ref 31.5–35.7)
MCV RBC AUTO: 90 FL (ref 79–97)
MONOCYTES # BLD AUTO: 0.3 X10E3/UL (ref 0.1–0.9)
MONOCYTES NFR BLD AUTO: 7 %
NEUTROPHILS # BLD AUTO: 3.3 X10E3/UL (ref 1.4–7)
NEUTROPHILS NFR BLD AUTO: 66 %
PLATELET # BLD AUTO: 196 X10E3/UL (ref 150–379)
POTASSIUM SERPL-SCNC: 4.4 MMOL/L (ref 3.5–5.2)
RBC # BLD AUTO: 3.76 X10E6/UL (ref 3.77–5.28)
SODIUM SERPL-SCNC: 140 MMOL/L (ref 134–144)
WBC # BLD AUTO: 5.1 X10E3/UL (ref 3.4–10.8)

## 2019-04-01 RX ORDER — LOSARTAN POTASSIUM 50 MG/1
TABLET ORAL
Qty: 90 TAB | Refills: 6 | OUTPATIENT
Start: 2019-04-01

## 2019-04-20 ENCOUNTER — HOSPITAL ENCOUNTER (OUTPATIENT)
Dept: LAB | Age: 79
Discharge: HOME | End: 2019-04-20
Payer: MEDICARE

## 2019-04-20 LAB
ALBUMIN SERPL-MCNC: 3.7 G/DL (ref 3.4–5)
ANION GAP SERPL CALC-SCNC: 6 MMOL/L (ref 3–18)
BUN SERPL-MCNC: 44 MG/DL (ref 7–18)
BUN/CREAT SERPL: 10 (ref 12–20)
CALCIUM SERPL-MCNC: 10.3 MG/DL (ref 8.5–10.1)
CHLORIDE SERPL-SCNC: 112 MMOL/L (ref 100–108)
CO2 SERPL-SCNC: 24 MMOL/L (ref 21–32)
CREAT SERPL-MCNC: 4.27 MG/DL (ref 0.6–1.3)
GLUCOSE SERPL-MCNC: 161 MG/DL (ref 74–99)
PHOSPHATE SERPL-MCNC: 4 MG/DL (ref 2.5–4.9)
POTASSIUM SERPL-SCNC: 4.8 MMOL/L (ref 3.5–5.5)
SODIUM SERPL-SCNC: 142 MMOL/L (ref 136–145)

## 2019-04-20 PROCEDURE — 36415 COLL VENOUS BLD VENIPUNCTURE: CPT

## 2019-04-20 PROCEDURE — 80069 RENAL FUNCTION PANEL: CPT

## 2019-04-22 ENCOUNTER — OFFICE VISIT (OUTPATIENT)
Dept: ORTHOPEDIC SURGERY | Age: 79
End: 2019-04-22

## 2019-04-22 VITALS
HEIGHT: 63 IN | BODY MASS INDEX: 37.07 KG/M2 | RESPIRATION RATE: 16 BRPM | OXYGEN SATURATION: 98 % | WEIGHT: 209.2 LBS | HEART RATE: 63 BPM | DIASTOLIC BLOOD PRESSURE: 62 MMHG | SYSTOLIC BLOOD PRESSURE: 152 MMHG | TEMPERATURE: 97.7 F

## 2019-04-22 DIAGNOSIS — M53.3 SACROILIAC JOINT DYSFUNCTION OF LEFT SIDE: ICD-10-CM

## 2019-04-22 DIAGNOSIS — M76.32 IT BAND SYNDROME, LEFT: ICD-10-CM

## 2019-04-22 DIAGNOSIS — M96.1 POST LAMINECTOMY SYNDROME: Primary | ICD-10-CM

## 2019-04-22 RX ORDER — LIDOCAINE HYDROCHLORIDE 20 MG/ML
0.5 INJECTION, SOLUTION EPIDURAL; INFILTRATION; INTRACAUDAL; PERINEURAL ONCE
Qty: 0.5 ML | Refills: 0
Start: 2019-04-22 | End: 2019-04-22

## 2019-04-22 RX ORDER — BUPIVACAINE HYDROCHLORIDE 2.5 MG/ML
0.5 INJECTION, SOLUTION INFILTRATION; PERINEURAL ONCE
Qty: 0.5 ML | Refills: 0
Start: 2019-04-22 | End: 2019-04-22

## 2019-04-22 RX ORDER — BETAMETHASONE SODIUM PHOSPHATE AND BETAMETHASONE ACETATE 3; 3 MG/ML; MG/ML
12 INJECTION, SUSPENSION INTRA-ARTICULAR; INTRALESIONAL; INTRAMUSCULAR; SOFT TISSUE ONCE
Qty: 2 ML | Refills: 0
Start: 2019-04-22 | End: 2019-04-22

## 2019-04-22 RX ORDER — DICLOFENAC SODIUM 10 MG/G
4 GEL TOPICAL 4 TIMES DAILY
Qty: 500 G | Refills: 0 | Status: SHIPPED | OUTPATIENT
Start: 2019-04-22 | End: 2019-07-25

## 2019-04-22 NOTE — PROGRESS NOTES
[address]  [address], [address]  [place] [address]  Phone: [phone]  Fax: [phone]        Myriam Sheldon  : 1940  PCP: Leanne Guillermo NP    PROGRESS NOTE      ASSESSMENT AND PLAN    Diagnoses and all orders for this visit:    1. Post laminectomy syndrome, w/ residual L DF weakness post-op   -     diclofenac (VOLTAREN) 1 % gel; Apply 4 g to affected area four (4) times daily. 2. 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 Sacroiliac joint dysfunction of left side  -     bupivacaine (MARCAINE) 0.25 % (2.5 mg/mL) soln injection; 0.5 mL by IntraMUSCular route once for 1 dose. -     INJECTION, BUPIVICAINE HYDRO  -     LIDOCAINE INJECTION  -     lidocaine, PF, (XYLOCAINE) 20 mg/mL (2 %) injection; 0.5 mL by Other route once for 1 dose. -     betamethasone (CELESTONE SOLUSPAN) 6 mg/mL injection; 2 mL by IntraMUSCular route once for 1 dose.  -     BETAMETHASONE ACETATE & SODIUM PHOSPHATE INJECTION 3 MG EA.  -     ME INJECT TRIGGER POINT, 1 OR 2  -     REFERRAL TO PHYSICAL THERAPY  -     diclofenac (VOLTAREN) 1 % gel; Apply 4 g to affected area four (4) times daily. 4. It band syndrome, left  -     REFERRAL TO PHYSICAL THERAPY  -     diclofenac (VOLTAREN) 1 % gel; Apply 4 g to affected area four (4) times daily. 1. Advised to continue HEP. 2. Referral to PT  3. F/u with JR for knee  4. L iliolumbar TPI  5. Use Voltaren gel  6. Given information on knee pain and TPI    F/U 6 week      HISTORY OF PRESENT ILLNESS  Erin Irby is a 78 y.o. female. Pt presents to the office for a f/u visit for stenosis. Last OV given TPI, instructed to restart Mobic. Pt reports no benefit with Mobic. Pt affirms some benefit with TPI. She had trochanteric bursa injection with some benefit. She notes more relief with TPI. She reports her LB pain worsened with the flu last month. Pt notes her knee swelled.  She c/o pain with movement and weight bearing, relief with sitting still. She states none of her medications currently are beneficial.     Location of pain: low back  Does pain radiate into extremities: LLE to knee  Does patient have weakness: L foot   Pt denies saddle paresthesias. Medications pt is on: Tramadol 50mg TID PRN with some benefit, usually QHS. Lidoderm patch PRN with relief. Tylenol ES PRN. Voltaren gel not using currently. Ice and heat. Denies persistent fevers, chills, weight changes, neurogenic bowel or bladder symptoms. Pt denies any recent fevers, chills, antibiotics, recent cortisone injections, or infections. Pt denies any recent GI ulcers, bleeds. Treatments patient has tried:  Physical therapy:Yes 2018  Doing HEP: Unknown  Non-opioid medications: Yes  Spinal injections: Yes L L5-S1 SNRB 11/2018 w/benefit. Bursa injection L hip 1/2019 Dusty Peralta  Spinal surgery- Yes. Lumbar fusion L2-3-4-5, 2004 [address] in [place] with benefit  Last L CT 2012: severe stenosis L1-2     reviewed. Pt moved from [place] after retiring to escape the cold. Pt denies hx of shingles. PMHx of DM. A1C 11/2018 was 6.5. ED 3/5/19 for influenza A, CKD stage 4, elevated serum creatinine. ED 3/6/19 for hypoglycemia, flu. Pain Assessment  4/22/2019   Location of Pain Back;Leg   Location Modifiers Left   Severity of Pain 6   Quality of Pain Sharp; Other (Comment)   Quality of Pain Comment stabbing   Duration of Pain A few minutes   Frequency of Pain Intermittent   Frequency of Pain Comment -   Aggravating Factors Other (Comment)   Aggravating Factors Comment un-expectant   Limiting Behavior Yes   Relieving Factors Rest   Relieving Factors Comment -   Result of Injury -   Work-Related Injury -   Type of Injury -       PAST MEDICAL HISTORY   Past Medical History:   Diagnosis Date    Allergic rhinitis     Diabetes ([facility] [address]     H/O seasonal allergies     Hypertension     Left foot drop, chronic since 2004 fusion sx        Past Surgical History:   Procedure Laterality Date    HAND/FINGER SURGERY UNLISTED      HX CATARACT REMOVAL  2011    HX LUMBAR FUSION  2004-last sx    multiple sx, fused L2-S1, in [address] [place] [address]    HX WISDOM TEETH EXTRACTION     . MEDICATIONS      Current Outpatient Medications   Medication Sig Dispense Refill    diclofenac (VOLTAREN) 1 % gel Apply 4 g to affected area four (4) times daily. 500 g 0    losartan (COZAAR) 50 mg tablet Take 1 Tab by mouth daily. 30 Tab 6    metoprolol succinate (TOPROL-XL) 50 mg XL tablet TAKE 1 TABLET BY MOUTH DAILY 90 Tab 0    montelukast (SINGULAIR) 10 mg tablet TK 1 T PO Q EVENING  2    polyethylene glycol (MIRALAX) 17 gram/dose powder MIX 1 CAPFUL WITH 8 OZ OF WATER AND DRINK BY MOUTH DAILY. (Patient taking differently: as needed. MIX 1 CAPFUL WITH 8 OZ OF WATER AND DRINK BY MOUTH DAILY. ) 527 g 0    rosuvastatin (CRESTOR) 20 mg tablet TAKE 1 TABLET BY MOUTH EVERY NIGHT 90 Tab 3    cloNIDine HCl (CATAPRES) 0.2 mg tablet TK 1 T PO QHS FOR BLOOD PRESSURE  4    amLODIPine (NORVASC) 10 mg tablet Take 1 Tab by mouth daily. 30 Tab 6    furosemide (LASIX) 20 mg tablet Take 20 mg by mouth daily.  insulin lispro (HUMALOG) 100 unit/mL injection by SubCUTAneous route. 6 units before breakfast  6 units before lunch  6 units before dinner      insulin glargine (LANTUS SOLOSTAR) 100 unit/mL (3 mL) pen by SubCUTAneous route. 35 units nightly      Omeprazole delayed release (PRILOSEC D/R) 20 mg tablet Take 1 Tab by mouth daily. 30 Tab 6    cholecalciferol, VITAMIN D3, (VITAMIN D3) 5,000 unit tab tablet Take  by mouth daily.           ALLERGIES    Allergies   Allergen Reactions    Aspirin Other (comments)     Ringing in ears    Keflex [Cephalexin] Rash          SOCIAL HISTORY    Social History     Socioeconomic History    Marital status:      Spouse name: Not on file    Number of children: Not on file    Years of education: Not on file    Highest education level: Not on file   Occupational History    Occupation: CNA - RET   Social Needs    Financial resource strain: Not on file    Food insecurity:     Worry: Not on file     Inability: Not on file   [employer] needs:     Medical: Not on file     Non-medical: Not on file   Tobacco Use    Smoking status: Former Smoker    Smokeless tobacco: Never Used   Substance and Sexual Activity    Alcohol use: No    Drug use: No    Sexual activity: Yes     Partners: Male     Birth control/protection: None   Lifestyle    Physical activity:     Days per week: Not on file     Minutes per session: Not on file    Stress: Not on file   Relationships    Social connections:     Talks on phone: Not on file     Gets together: Not on file     Attends [employer]ism service: Not on file     Active member of club or organization: Not on file     Attends meetings of clubs or organizations: Not on file     Relationship status: Not on file    Intimate partner violence:     Fear of current or ex partner: Not on file     Emotionally abused: Not on file     Physically abused: Not on file     Forced sexual activity: Not on file   Other Topics Concern    Not on file   Social History Narrative    Not on file       FAMILY HISTORY    Family History   Problem Relation Age of Onset    Diabetes Maternal Aunt     Diabetes Maternal Uncle     Diabetes Maternal Grandfather     Diabetes Other     Hypertension Cousin        REVIEW OF SYSTEMS  Review of Systems   Constitutional: Negative for chills, fever and weight loss. Respiratory: Negative for shortness of breath. Cardiovascular: Negative for chest pain. Gastrointestinal: Negative for constipation. Negative for fecal incontinence   Genitourinary: Negative for dysuria. Negative for urinary incontinence   Musculoskeletal:        Per HPI   Skin: Negative for rash. Neurological: Positive for focal weakness. Negative for dizziness, tingling, tremors and headaches. Endo/Heme/Allergies: Does not bruise/bleed easily. Psychiatric/Behavioral: The patient does not have insomnia. PHYSICAL EXAMINATION  Visit Vitals  /62 (BP 1 Location: Left arm, BP Patient Position: Sitting)   Pulse 63   Temp 97.7 °F (36.5 °C) (Oral)   Resp 16   Ht 5' 3\" (1.6 m)   Wt 209 lb 3.2 oz (94.9 kg)   SpO2 98%   BMI 37.06 kg/m²         Accompanied by self. Constitutional:  Well developed, well nourished, in no acute distress. Psychiatric: Affect and mood are appropriate. Integumentary: No rashes or abrasions noted on exposed areas. Cardiovascular/Peripheral Vascular: Intact l pulses. 1+ nonpitting peripheral edema is noted BLE. Lymphatic:  No evidence of lymphedema. No cervical lymphadenopathy. SPINE/MUSCULOSKELETAL EXAM    Lumbar spine:  No rash, ecchymosis, or gross obliquity. No fasciculations. No focal atrophy is noted. Pain with L knee ROM. Tenderness to palpation L IT band, L medial and lateral knee. No tenderness to palpation at the sciatic notch. SI joints non-tender. Trochanters non tender. MOTOR:       Hip Flex  Quads Hamstrings Ankle DF EHL Ankle PF   Right +4/5 +4/5 +4/5 +4/5 +4/5 +4/5   Left +4/5 +4/5 +4/5 -4/5 +4/5 +4/5       Straight Leg raise negative. Pt presents in wheelchair. [facility]  OFFICE PROCEDURE PROGRESS NOTE      PROCEDURE: In the office today after informed consent using aseptic technique, the patient was injected with a total of 2 cc of Celestone, 0.5 cc each of Lidocaine and Marcaine into her left iliolumbar trigger point.      Chart reviewed for the following:   I, Dr. Lily Frye, have reviewed the History, Physical and updated the Allergic reactions for Fay Tata Vivas performed immediately prior to start of procedure:   I, Dr. Lily Frye, have performed the following reviews on Jack Mercury prior to the start of the procedure:            * Patient was identified by name and date of birth   * Agreement on procedure being performed was verified  * Risks and Benefits explained to the patient  * Procedure site verified and marked as necessary  * Patient was positioned for comfort  * Consent was signed and verified     Time: 11:58 AM     Date of procedure: 4/23/2019    Procedure performed by:  Moni Carr MD    Provider assisted by: None    Patient assisted by: Self    How tolerated by patient: Pt tolerated the procedure well with no complications. Written by Becky Smyth, as dictated by Reba Trujillo MD.    I, Dr. Reba Trujillo MD, confirm that all documentation is accurate. Ms. Deya Crawford may have a reminder for a \"due or due soon\" health maintenance. I have asked that she contact her primary care provider for follow-up on this health maintenance.

## 2019-04-22 NOTE — PATIENT INSTRUCTIONS
Knee Pain or Injury: Care Instructions  Your Care Instructions    Injuries are a common cause of knee problems. Sudden (acute) injuries may be caused by a direct blow to the knee. They can also be caused by abnormal twisting, bending, or falling on the knee. Pain, bruising, or swelling may be severe, and may start within minutes of the injury. Overuse is another cause of knee pain. Other causes are climbing stairs, kneeling, and other activities that use the knee. Everyday wear and tear, especially as you get older, also can cause knee pain. Rest, along with home treatment, often relieves pain and allows your knee to heal. If you have a serious knee injury, you may need tests and treatment. Follow-up care is a key part of your treatment and safety. Be sure to make and go to all appointments, and call your doctor if you are having problems. It's also a good idea to know your test results and keep a list of the medicines you take. How can you care for yourself at home? · Be safe with medicines. Read and follow all instructions on the label. ? If the doctor gave you a prescription medicine for pain, take it as prescribed. ? If you are not taking a prescription pain medicine, ask your doctor if you can take an over-the-counter medicine. · Rest and protect your knee. Take a break from any activity that may cause pain. · Put ice or a cold pack on your knee for 10 to 20 minutes at a time. Put a thin cloth between the ice and your skin. · Prop up a sore knee on a pillow when you ice it or anytime you sit or lie down for the next 3 days. Try to keep it above the level of your heart. This will help reduce swelling. · If your knee is not swollen, you can put moist heat, a heating pad, or a warm cloth on your knee. · If your doctor recommends an elastic bandage, sleeve, or other type of support for your knee, wear it as directed.   · Follow your doctor's instructions about how much weight you can put on your leg. Use a cane, crutches, or a walker as instructed. · Follow your doctor's instructions about activity during your healing process. If you can do mild exercise, slowly increase your activity. · Reach and stay at a healthy weight. Extra weight can strain the joints, especially the knees and hips, and make the pain worse. Losing even a few pounds may help. When should you call for help? Call 911 anytime you think you may need emergency care. For example, call if:    · You have symptoms of a blood clot in your lung (called a pulmonary embolism). These may include:  ? Sudden chest pain. ? Trouble breathing. ? Coughing up blood.    Call your doctor now or seek immediate medical care if:    · You have severe or increasing pain.     · Your leg or foot turns cold or changes color.     · You cannot stand or put weight on your knee.     · Your knee looks twisted or bent out of shape.     · You cannot move your knee.     · You have signs of infection, such as:  ? Increased pain, swelling, warmth, or redness. ? Red streaks leading from the knee. ? Pus draining from a place on your knee. ? A fever.     · You have signs of a blood clot in your leg (called a deep vein thrombosis), such as:  ? Pain in your calf, back of the knee, thigh, or groin. ? Redness and swelling in your leg or groin.    Watch closely for changes in your health, and be sure to contact your doctor if:    · You have tingling, weakness, or numbness in your knee.     · You have any new symptoms, such as swelling.     · You have bruises from a knee injury that last longer than 2 weeks.     · You do not get better as expected. Where can you learn more? Go to http://arlette-michel.info/. Enter K195 in the search box to learn more about \"Knee Pain or Injury: Care Instructions. \"  Current as of: September 23, 2018  Content Version: 11.9  © 4486-4086 [employer].  Care instructions adapted under license by [facility] (which disclaims liability or warranty for this information). If you have questions about a medical condition or this instruction, always ask your healthcare professional. [address] any warranty or liability for your use of this information. Learning About Trigger Point Injections  What are trigger point injections? A trigger point is a painful knot in a tight band of muscle. A trigger point often causes pain to be felt in other areas, too. For example, a trigger point in the neck or upper back can cause pain in the head. Trigger point injections are shots of medicine into these knots to help relieve the pain. The medicines are usually local anesthetics like lidocaine. Trigger point injections are often part of plan that includes other treatments, such as muscle stretching and strengthening. How is a trigger point injection done? Your doctor first locates a trigger point by pressing around the painful area. This may cause your muscle to hurt or twitch. This tells the doctor that he or she has found the spot to do the injection. The area is cleaned. Your doctor then injects the medicine into the trigger point. He or she may inject the medicine in more than one direction within the trigger point. The doctor may change direction without removing the needle. If you have more than one trigger point in the muscle, your doctor may repeat the process. Your doctor may stretch the area to help the muscle relax. He or she may also show you how to move and stretch the muscle yourself. The procedure takes about 10 to 30 minutes. How long it takes depends on how many trigger points are treated. But an injection itself takes only a few moments. What can you expect after a trigger point injection? The area may feel a bit numb for a few hours. It may also feel sore. Other problems from trigger point injections are rare.  There is a chance of skin infection at the injection site. And if injections are done in the chest area, there is a small risk of puncturing the outer lining of the lung (pneumothorax). Trigger point injections may reduce some or all of your pain. But the pain can come back after the medicine wears off. If your pain comes back, your doctor may suggest more shots or other treatment for longer-lasting relief. Follow your doctor's instructions carefully. And tell your doctor about any new or unusual symptoms, such as chest pain or shortness of breath. Follow-up care is a key part of your treatment and safety. Be sure to make and go to all appointments, and call your doctor if you are having problems. It's also a good idea to know your test results and keep a list of the medicines you take. Where can you learn more? Go to http://arlette-michel.info/. Enter 85926 [id number] in the search box to learn more about \"Learning About Trigger Point Injections. \"  Current as of: September 20, 2018  Content Version: 11.9  © 2032-9926 [employer]. Care instructions adapted under license by [employer] (which disclaims liability or warranty for this information). If you have questions about a medical condition or this instruction, always ask your healthcare professional. [address] any warranty or liability for your use of this information.

## 2019-04-25 ENCOUNTER — OFFICE VISIT (OUTPATIENT)
Dept: ORTHOPEDIC SURGERY | Facility: CLINIC | Age: 79
End: 2019-04-25

## 2019-04-25 VITALS
WEIGHT: 228.2 LBS | HEIGHT: 63 IN | OXYGEN SATURATION: 97 % | TEMPERATURE: 97.2 F | RESPIRATION RATE: 18 BRPM | SYSTOLIC BLOOD PRESSURE: 178 MMHG | HEART RATE: 73 BPM | BODY MASS INDEX: 40.43 KG/M2 | DIASTOLIC BLOOD PRESSURE: 72 MMHG

## 2019-04-25 DIAGNOSIS — M17.12 PRIMARY OSTEOARTHRITIS OF LEFT KNEE: Primary | ICD-10-CM

## 2019-04-25 RX ORDER — BETAMETHASONE SODIUM PHOSPHATE AND BETAMETHASONE ACETATE 3; 3 MG/ML; MG/ML
6 INJECTION, SUSPENSION INTRA-ARTICULAR; INTRALESIONAL; INTRAMUSCULAR; SOFT TISSUE ONCE
Qty: 1 ML | Refills: 0
Start: 2019-04-25 | End: 2019-04-25

## 2019-04-25 NOTE — PROGRESS NOTES
[address] [facility], [address] [facility]  [phone]           Patient: Soni Vizcaino                MRN: [id number]       SSN: [id number]  YOB: 1940        AGE: 78 y.o. SEX: female  Body mass index is 40.42 kg/m². PCP: Lady Lyndsey NP  04/25/19      This office note has been dictated. REVIEW OF SYSTEMS:  Constitutional: Negative for fever, chills, weight loss and malaise/fatigue. HENT: Negative. Eyes: Negative. Respiratory: Negative. Cardiovascular: Negative. Gastrointestinal: No bowel incontinence or constipation. Genitourinary: No bladder incontinence or saddle anesthesia. Skin: Negative. Neurological: Negative. Endo/Heme/Allergies: Negative. Psychiatric/Behavioral: Negative. Musculoskeletal: As per HPI above. Past Medical History:   Diagnosis Date    Allergic rhinitis     Diabetes ([facility] [address]     H/O seasonal allergies     Hypertension     Left foot drop, chronic since 2004 fusion sx          Current Outpatient Medications:     betamethasone (CELESTONE SOLUSPAN) 6 mg/mL injection, 1 mL by Intra artICUlar route once for 1 dose., Disp: 1 mL, Rfl: 0    losartan (COZAAR) 50 mg tablet, Take 1 Tab by mouth daily. , Disp: 30 Tab, Rfl: 6    Omeprazole delayed release (PRILOSEC D/R) 20 mg tablet, Take 1 Tab by mouth daily. , Disp: 30 Tab, Rfl: 6    metoprolol succinate (TOPROL-XL) 50 mg XL tablet, TAKE 1 TABLET BY MOUTH DAILY, Disp: 90 Tab, Rfl: 0    cholecalciferol, VITAMIN D3, (VITAMIN D3) 5,000 unit tab tablet, Take  by mouth daily. , Disp: , Rfl:     montelukast (SINGULAIR) 10 mg tablet, TK 1 T PO Q EVENING, Disp: , Rfl: 2    polyethylene glycol (MIRALAX) 17 gram/dose powder, MIX 1 CAPFUL WITH 8 OZ OF WATER AND DRINK BY MOUTH DAILY. (Patient taking differently: as needed.  MIX 1 CAPFUL WITH 8 OZ OF WATER AND DRINK BY MOUTH DAILY.), Disp: 527 g, Rfl: 0    rosuvastatin (CRESTOR) 20 mg tablet, TAKE 1 TABLET BY MOUTH EVERY NIGHT, Disp: 90 Tab, Rfl: 3    cloNIDine HCl (CATAPRES) 0.2 mg tablet, TK 1 T PO QHS FOR BLOOD PRESSURE, Disp: , Rfl: 4    amLODIPine (NORVASC) 10 mg tablet, Take 1 Tab by mouth daily. , Disp: 30 Tab, Rfl: 6    furosemide (LASIX) 20 mg tablet, Take 20 mg by mouth daily. , Disp: , Rfl:     insulin lispro (HUMALOG) 100 unit/mL injection, by SubCUTAneous route. 6 units before breakfast 6 units before lunch 6 units before dinner, Disp: , Rfl:     insulin glargine (LANTUS SOLOSTAR) 100 unit/mL (3 mL) pen, by SubCUTAneous route. 35 units nightly, Disp: , Rfl:     diclofenac (VOLTAREN) 1 % gel, Apply 4 g to affected area four (4) times daily. , Disp: 500 g, Rfl: 0    Allergies   Allergen Reactions    Aspirin Other (comments)     Ringing in ears    Keflex [Cephalexin] Rash       Social History     Socioeconomic History    Marital status:      Spouse name: Not on file    Number of children: Not on file    Years of education: Not on file    Highest education level: Not on file   Occupational History    Occupation: CNA - RET   Social Needs    Financial resource strain: Not on file    Food insecurity:     Worry: Not on file     Inability: Not on file    Transportation needs:     Medical: Not on file     Non-medical: Not on file   Tobacco Use    Smoking status: Former Smoker    Smokeless tobacco: Never Used   Substance and Sexual Activity    Alcohol use: No    Drug use: No    Sexual activity: Yes     Partners: Male     Birth control/protection: None   Lifestyle    Physical activity:     Days per week: Not on file     Minutes per session: Not on file    Stress: Not on file   Relationships    Social connections:     Talks on phone: Not on file     Gets together: Not on file     Attends Taoist service: Not on file     Active member of club or organization: Not on file     Attends meetings of clubs or organizations: Not on file     Relationship status: Not on file    Intimate partner violence:     Fear of current or ex partner: Not on file     Emotionally abused: Not on file     Physically abused: Not on file     Forced sexual activity: Not on file   Other Topics Concern    Not on file   Social History Narrative    Not on file       Past Surgical History:   Procedure Laterality Date    HAND/FINGER SURGERY UNLISTED      HX CATARACT REMOVAL  2011    HX LUMBAR FUSION  2004-last sx    multiple sx, fused L2-S1, in [place] [address]             * Patient was identified by name and date of birth   * Agreement on procedure being performed was verified  * Risks and Benefits explained to the patient  * Procedure site verified and marked as necessary  * Patient was positioned for comfort  * Consent was signed and verified  11:42 AM    The patient was instructed on post injection care. We did see Ms. Vinson for followup with regards to her left knee. The patient does have known advanced arthritis of the left knee. She presented today requesting an injection. She has had no recent fevers, chills, systemic changes, or injuries to report. She does have decreased walking tolerance of less than 10 minutes, trouble getting up from a chair and going up and down stairs, and she has night discomfort. She denies any radiating pain down the lower extremity. She denies any change in her bowel or bladder habits. PHYSICAL EXAMINATION:  In general, the patient is alert and oriented x 3 in no acute distress. The patient is well-developed, well-nourished, with a normal affect. The patient is afebrile. HEENT:  Head is normocephalic and atraumatic. Pupils are equally round and reactive to light and accommodation. Extraocular eye movements are intact. Neck is supple. Trachea is midline. No JVD is present. Breathing is nonlabored. Examination of the lower extremities reveals pain-free range of motion of the hips.   There is no pain to palpation of the greater trochanteric bursae. There is negative straight leg raise. There is negative calf tenderness. There is negative Jean Claude's. There is no evidence of DVT present. The left knee reveals the skin is intact. There is no ecchymosis, no warmth, and no signs of infection or cellulitis present. She does have findings consistent with advanced arthritis of the left knee with pain to palpation tricompartmentally and crepitus arising from the anterior compartment. ASSESSMENT:  Left knee osteoarthritis. PLAN:  At this point, we will forward with the cortisone injection for the left knee today. After informed and written consent, and appropriate time out performed, under sterile conditions, with ultrasound-guided assistance, the left knee was prepped with Betadine and 6 mg of Celestone was injected to the left knee without complications. The patient tolerated the injection well. The patient was instructed on post injection care. We will see her back in the office in about three months' time for evaluation. She will call with any questions or concerns that shall arise.                       JR Juan Luis LEE, BETH, ATC

## 2019-04-30 ENCOUNTER — OFFICE VISIT (OUTPATIENT)
Dept: VASCULAR SURGERY | Age: 79
End: 2019-04-30

## 2019-04-30 VITALS
DIASTOLIC BLOOD PRESSURE: 70 MMHG | BODY MASS INDEX: 40.4 KG/M2 | HEART RATE: 80 BPM | WEIGHT: 228 LBS | HEIGHT: 63 IN | SYSTOLIC BLOOD PRESSURE: 130 MMHG | RESPIRATION RATE: 17 BRPM

## 2019-04-30 DIAGNOSIS — N18.5 CKD (CHRONIC KIDNEY DISEASE) STAGE 5, GFR LESS THAN 15 ML/MIN (HCC): Primary | ICD-10-CM

## 2019-04-30 NOTE — PROGRESS NOTES
Ms. Wagner Durham is referred by nephrology for establishing peritoneal dialysis access    She has stage V kidney disease with anticipation that she will need to start dialysis in the near future    Nephrology had discussed with her and she has been to the class for dialysis and has chosen peritoneal dialysis for when she has to start treatments    Other than laparoscopic tubal ligation she has had no abdominal surgeries    She has a good support system at home and has had a home visit    She tells me though that she sees nephrology again next month, and wanted to wait until that follow-up before committing to any surgery to see how her labs are doing.   I did make her office aware of this request.  It would be my understanding that she would need to go ahead and have the catheter placed in anticipation to start soon, but that seemed to be a misperception in discussion with the patient today    Nonetheless I did discuss the surgical details for peritoneal dialysis catheter placement as well as the follow-ups that she will have both with us in the [facility] clinic for flushing and then to transition to trainings and then home treatments    Once we have commitment from the patient at the request of her nephrologist we can confirm a surgical date by phone and schedule accordingly

## 2019-04-30 NOTE — PROGRESS NOTES
1. Have you been to an emergency room or urgent care clinic since your last visit? No    Hospitalized since your last visit? If yes, where, when, and reason for visit? NO  2. Have you seen or consulted any other health care providers outside of the [facility] since your last visit including any procedures, health maintenance items. If yes, where, when and reason for visit?  NO

## 2019-05-08 ENCOUNTER — OFFICE VISIT (OUTPATIENT)
Dept: FAMILY MEDICINE CLINIC | Age: 79
End: 2019-05-08

## 2019-05-08 VITALS
RESPIRATION RATE: 20 BRPM | BODY MASS INDEX: 40.01 KG/M2 | HEART RATE: 87 BPM | DIASTOLIC BLOOD PRESSURE: 74 MMHG | WEIGHT: 225.8 LBS | OXYGEN SATURATION: 97 % | SYSTOLIC BLOOD PRESSURE: 160 MMHG | TEMPERATURE: 98.2 F | HEIGHT: 63 IN

## 2019-05-08 DIAGNOSIS — I10 HTN (HYPERTENSION), BENIGN: Primary | ICD-10-CM

## 2019-05-08 DIAGNOSIS — I10 HTN (HYPERTENSION), BENIGN: ICD-10-CM

## 2019-05-08 DIAGNOSIS — J30.89 SEASONAL ALLERGIC RHINITIS DUE TO OTHER ALLERGIC TRIGGER: ICD-10-CM

## 2019-05-08 RX ORDER — LOSARTAN POTASSIUM 100 MG/1
100 TABLET ORAL DAILY
Qty: 30 TAB | Refills: 6 | Status: SHIPPED | OUTPATIENT
Start: 2019-05-08 | End: 2019-05-08 | Stop reason: SDUPTHER

## 2019-05-08 RX ORDER — AMLODIPINE BESYLATE 10 MG/1
10 TABLET ORAL DAILY
Qty: 30 TAB | Refills: 6 | Status: SHIPPED | OUTPATIENT
Start: 2019-05-08 | End: 2019-05-08 | Stop reason: SDUPTHER

## 2019-05-08 NOTE — PROGRESS NOTES
Patient here for hypertension Follow up. 1. Have you been to the ER, urgent care clinic since your last visit? Hospitalized since your last visit? No    2. Have you seen or consulted any other health care providers outside of the [address] [place] [address] since your last visit? Include any pap smears or colon screening.  No

## 2019-05-08 NOTE — PATIENT INSTRUCTIONS
DASH Diet: Care Instructions  Your Care Instructions    The DASH diet is an eating plan that can help lower your blood pressure. DASH stands for Dietary Approaches to Stop Hypertension. Hypertension is high blood pressure. The DASH diet focuses on eating foods that are high in calcium, potassium, and magnesium. These nutrients can lower blood pressure. The foods that are highest in these nutrients are fruits, vegetables, low-fat dairy products, nuts, seeds, and legumes. But taking calcium, potassium, and magnesium supplements instead of eating foods that are high in those nutrients does not have the same effect. The DASH diet also includes whole grains, fish, and poultry. The DASH diet is one of several lifestyle changes your doctor may recommend to lower your high blood pressure. Your doctor may also want you to decrease the amount of sodium in your diet. Lowering sodium while following the DASH diet can lower blood pressure even further than just the DASH diet alone. Follow-up care is a key part of your treatment and safety. Be sure to make and go to all appointments, and call your doctor if you are having problems. It's also a good idea to know your test results and keep a list of the medicines you take. How can you care for yourself at home? Following the DASH diet  · Eat 4 to 5 servings of fruit each day. A serving is 1 medium-sized piece of fruit, ½ cup chopped or canned fruit, 1/4 cup dried fruit, or 4 ounces (½ cup) of fruit juice. Choose fruit more often than fruit juice. · Eat 4 to 5 servings of vegetables each day. A serving is 1 cup of lettuce or raw leafy vegetables, ½ cup of chopped or cooked vegetables, or 4 ounces (½ cup) of vegetable juice. Choose vegetables more often than vegetable juice. · Get 2 to 3 servings of low-fat and fat-free dairy each day. A serving is 8 ounces of milk, 1 cup of yogurt, or 1 ½ ounces of cheese. · Eat 6 to 8 servings of grains each day.  A serving is 1 slice of bread, 1 ounce of dry cereal, or ½ cup of cooked rice, pasta, or cooked cereal. Try to choose whole-grain products as much as possible. · Limit lean meat, poultry, and fish to 2 servings each day. A serving is 3 ounces, about the size of a deck of cards. · Eat 4 to 5 servings of nuts, seeds, and legumes (cooked dried beans, lentils, and split peas) each week. A serving is 1/3 cup of nuts, 2 tablespoons of seeds, or ½ cup of cooked beans or peas. · Limit fats and oils to 2 to 3 servings each day. A serving is 1 teaspoon of vegetable oil or 2 tablespoons of salad dressing. · Limit sweets and added sugars to 5 servings or less a week. A serving is 1 tablespoon jelly or jam, ½ cup sorbet, or 1 cup of lemonade. · Eat less than 2,300 milligrams (mg) of sodium a day. If you limit your sodium to 1,500 mg a day, you can lower your blood pressure even more. Tips for success  · Start small. Do not try to make dramatic changes to your diet all at once. You might feel that you are missing out on your favorite foods and then be more likely to not follow the plan. Make small changes, and stick with them. Once those changes become habit, add a few more changes. · Try some of the following:  ? Make it a goal to eat a fruit or vegetable at every meal and at snacks. This will make it easy to get the recommended amount of fruits and vegetables each day. ? Try yogurt topped with fruit and nuts for a snack or healthy dessert. ? Add lettuce, tomato, cucumber, and onion to sandwiches. ? Combine a ready-made pizza crust with low-fat mozzarella cheese and lots of vegetable toppings. Try using tomatoes, squash, spinach, broccoli, carrots, cauliflower, and onions. ? Have a variety of cut-up vegetables with a low-fat dip as an appetizer instead of chips and dip. ? Sprinkle sunflower seeds or chopped almonds over salads. Or try adding chopped walnuts or almonds to cooked vegetables.   ? Try some vegetarian meals using beans and peas. Add garbanzo or kidney beans to salads. Make burritos and tacos with mashed [place] beans or black beans. Where can you learn more? Go to http://arlette-michel.info/. Enter V946 in the search box to learn more about \"DASH Diet: Care Instructions. \"  Current as of: July 22, 2018  Content Version: 11.9  © 8035-6267 [employer]. Care instructions adapted under license by [employer] (which disclaims liability or warranty for this information). If you have questions about a medical condition or this instruction, always ask your healthcare professional. [address] any warranty or liability for your use of this information. How to Read a Food Label to Limit Sodium: Care Instructions  Your Care Instructions  Sodium causes your body to hold on to extra water. This can raise your blood pressure and force your heart and kidneys to work harder. In very serious cases, this could cause you to be put in the hospital. It might even be life-threatening. By limiting sodium, you will feel better and lower your risk of serious problems. Processed foods, fast food, and restaurant foods are the major sources of dietary sodium. The most common name for sodium is salt. Try to limit how much sodium you eat to less than 2,300 milligrams (mg) a day. If you limit your sodium to 1,500 mg a day, you can lower your blood pressure even more. This limit counts all the salt that you eat in foods you cook or in packaged foods. Keep a list of everything you eat and drink. Follow-up care is a key part of your treatment and safety. Be sure to make and go to all appointments, and call your doctor if you are having problems. It's also a good idea to know your test results and keep a list of the medicines you take. How can you care for yourself at home?   Read ingredient lists on food labels  · Read the list of ingredients on food labels to help you find how much sodium is in a food. The label lists the ingredients in a food in descending order (from the most to the least). If salt or sodium is high on the list, there may be a lot of sodium in the food. · Know that sodium has different names. Sodium is also called monosodium glutamate (MSG, common in [place] food), sodium citrate, sodium alginate, sodium hydroxide, and sodium phosphate. Read Nutrition Facts labels  · On most foods, there is a Nutrition Facts label. This will tell you how much sodium is in one serving of food. Look at both the serving size and the sodium amount. The serving size is located at the top of the label, usually right under the \"Nutrition Facts\" title. The amount of sodium is given in the list under the title. It is given in milligrams (mg). ? Check the serving size carefully. A single serving is often very small, and you may eat more than one serving. If this is the case, you will eat more sodium than listed on the label. For example, if the serving size for a canned soup is 1 cup and the sodium amount is 470 mg, if you have 2 cups you will eat 940 mg of sodium. · The nutrition facts for fresh fruits and vegetables are not listed on the food. They may be listed somewhere in the store. These foods usually have no sodium or low sodium. · The Nutrition Facts label also gives you the Percent Daily Value for sodium. This is how much of the recommended amount of sodium a serving contains. The daily value for sodium is less than 2,300 mg. So if the Percent Daily Value says 50%, this means one serving is giving you half of this, or 1,150 mg. Buy low-sodium foods  · Look for foods that are made with less sodium. Watch for the following words on the label. ? \"Unsalted\" means there is no sodium added to the food. But there may be sodium already in the food naturally. ? \"Sodium-free\" means a serving has less than 5 mg of sodium. ? \"Very low sodium\" means a serving has 35 mg or less of sodium. ?  \"Low-sodium\" means a serving has 140 mg or less of sodium. · \"Reduced-sodium\" means that there is 25% less sodium than what the food normally has. This is still usually too much sodium. Try not to buy foods with this on the label. · Buy fresh vegetables, or frozen vegetables without added sauces. Buy low-sodium versions of canned vegetables, soups, and other canned goods. Where can you learn more? Go to http://arlette-michel.info/. Enter 26 [id number] in the search box to learn more about \"How to Read a Food Label to Limit Sodium: Care Instructions. \"  Current as of: March 28, 2018  Content Version: 11.9  © 6423-2294 [employer]. Care instructions adapted under license by [employer] (which disclaims liability or warranty for this information). If you have questions about a medical condition or this instruction, always ask your healthcare professional. [place] [address] any warranty or liability for your use of this information. Low Sodium Diet (2,000 Milligram): Care Instructions  Your Care Instructions    Too much sodium causes your body to hold on to extra water. This can raise your blood pressure and force your heart and kidneys to work harder. In very serious cases, this could cause you to be put in the hospital. It might even be life-threatening. By limiting sodium, you will feel better and lower your risk of serious problems. The most common source of sodium is salt. People get most of the salt in their diet from canned, prepared, and packaged foods. Fast food and restaurant meals also are very high in sodium. Your doctor will probably limit your sodium to less than 2,000 milligrams (mg) a day. This limit counts all the sodium in prepared and packaged foods and any salt you add to your food. Follow-up care is a key part of your treatment and safety. Be sure to make and go to all appointments, and call your doctor if you are having problems.  It's also a good idea to know your test results and keep a list of the medicines you take. How can you care for yourself at home? Read food labels  · Read labels on cans and food packages. The labels tell you how much sodium is in each serving. Make sure that you look at the serving size. If you eat more than the serving size, you have eaten more sodium. · Food labels also tell you the Percent Daily Value for sodium. Choose products with low Percent Daily Values for sodium. · Be aware that sodium can come in forms other than salt, including monosodium glutamate (MSG), sodium citrate, and sodium bicarbonate (baking soda). MSG is often added to Asian food. When you eat out, you can sometimes ask for food without MSG or added salt. Buy low-sodium foods  · Buy foods that are labeled \"unsalted\" (no salt added), \"sodium-free\" (less than 5 mg of sodium per serving), or \"low-sodium\" (less than 140 mg of sodium per serving). Foods labeled \"reduced-sodium\" and \"light sodium\" may still have too much sodium. Be sure to read the label to see how much sodium you are getting. · Buy fresh vegetables, or frozen vegetables without added sauces. Buy low-sodium versions of canned vegetables, soups, and other canned goods. Prepare low-sodium meals  · Cut back on the amount of salt you use in cooking. This will help you adjust to the taste. Do not add salt after cooking. One teaspoon of salt has about 2,300 mg of sodium. · Take the salt shaker off the table. · Flavor your food with garlic, lemon juice, onion, vinegar, herbs, and spices. Do not use soy sauce, lite soy sauce, steak sauce, onion salt, garlic salt, celery salt, mustard, or ketchup on your food. · Use low-sodium salad dressings, sauces, and ketchup. Or make your own salad dressings and sauces without adding salt. · Use less salt (or none) when recipes call for it. You can often use half the salt a recipe calls for without losing flavor.  Other foods such as rice, pasta, and grains do not need added salt. · Rinse canned vegetables, and cook them in fresh water. This removes some--but not all--of the salt. · Avoid water that is naturally high in sodium or that has been treated with water softeners, which add sodium. Call your local water company to find out the sodium content of your water supply. If you buy bottled water, read the label and choose a sodium-free brand. Avoid high-sodium foods  · Avoid eating:  ? Smoked, cured, salted, and canned meat, fish, and poultry. ? Ham, sanchez, hot dogs, and luncheon meats. ? Regular, hard, and processed cheese and regular peanut butter. ? Crackers with salted tops, and other salted snack foods such as pretzels, chips, and salted popcorn. ? Frozen prepared meals, unless labeled low-sodium. ? Canned and dried soups, broths, and bouillon, unless labeled sodium-free or low-sodium. ? Canned vegetables, unless labeled sodium-free or low-sodium. ? [place] fries, pizza, tacos, and other fast foods. ? Pickles, olives, ketchup, and other condiments, especially soy sauce, unless labeled sodium-free or low-sodium. Where can you learn more? Go to http://arlette-michel.info/. Enter A290 in the search box to learn more about \"Low Sodium Diet (2,000 Milligram): Care Instructions. \"  Current as of: March 28, 2018  Content Version: 11.9  © 6438-8670 [employer]. Care instructions adapted under license by [employer] (which disclaims liability or warranty for this information). If you have questions about a medical condition or this instruction, always ask your healthcare professional. [place] [address] any warranty or liability for your use of this information.

## 2019-05-08 NOTE — PROGRESS NOTES
HISTORY OF PRESENT ILLNESS  Mariah Dean is a 78 y.o. female. HPI  Patient is here today for evaluation and treatment of: Hypertension/    Hypertension:  BP is elevated ; She is on cozaar and norvasc, toprol, and clonidine for BP; ;  Has not missed any meds. She feels overall well; she has no sx relative to her BP at this time. Has had sneezing,  Chills and runny eyes; She has no cough; she feels ; right ear hurts;       Current Outpatient Medications:     amLODIPine (NORVASC) 10 mg tablet, Take 1 Tab by mouth daily. , Disp: 30 Tab, Rfl: 6    losartan (COZAAR) 100 mg tablet, Take 1 Tab by mouth daily. , Disp: 30 Tab, Rfl: 6    Omeprazole delayed release (PRILOSEC D/R) 20 mg tablet, Take 1 Tab by mouth daily. , Disp: 30 Tab, Rfl: 6    metoprolol succinate (TOPROL-XL) 50 mg XL tablet, TAKE 1 TABLET BY MOUTH DAILY, Disp: 90 Tab, Rfl: 0    cholecalciferol, VITAMIN D3, (VITAMIN D3) 5,000 unit tab tablet, Take  by mouth daily. , Disp: , Rfl:     montelukast (SINGULAIR) 10 mg tablet, TK 1 T PO Q EVENING, Disp: , Rfl: 2    polyethylene glycol (MIRALAX) 17 gram/dose powder, MIX 1 CAPFUL WITH 8 OZ OF WATER AND DRINK BY MOUTH DAILY. (Patient taking differently: as needed. MIX 1 CAPFUL WITH 8 OZ OF WATER AND DRINK BY MOUTH DAILY.), Disp: 527 g, Rfl: 0    rosuvastatin (CRESTOR) 20 mg tablet, TAKE 1 TABLET BY MOUTH EVERY NIGHT, Disp: 90 Tab, Rfl: 3    cloNIDine HCl (CATAPRES) 0.2 mg tablet, TK 1 T PO QHS FOR BLOOD PRESSURE, Disp: , Rfl: 4    furosemide (LASIX) 20 mg tablet, Take 20 mg by mouth daily. , Disp: , Rfl:     insulin lispro (HUMALOG) 100 unit/mL injection, by SubCUTAneous route. 6 units before breakfast 6 units before lunch 6 units before dinner, Disp: , Rfl:     insulin glargine (LANTUS SOLOSTAR) 100 unit/mL (3 mL) pen, by SubCUTAneous route. 35 units nightly, Disp: , Rfl:     diclofenac (VOLTAREN) 1 % gel, Apply 4 g to affected area four (4) times daily. , Disp: 500 g, Rfl: 0    PMH,  Meds, Allergies, Family History, Social history reviewed    Review of Systems   Constitutional: Negative for chills and fever. Respiratory: Negative for shortness of breath and wheezing. Cardiovascular: Negative for chest pain and palpitations. Physical Exam   Constitutional: She appears well-developed and well-nourished. No distress. Cardiovascular: Normal rate and regular rhythm. Exam reveals no gallop and no friction rub. No murmur heard. Pulmonary/Chest: Breath sounds normal. No respiratory distress. She has no wheezes. She has no rales. Musculoskeletal: She exhibits no edema. Nursing note and vitals reviewed. Visit Vitals  /74   Pulse 87   Temp 98.2 °F (36.8 °C) (Oral)   Resp 20   Ht 5' 3\" (1.6 m)   Wt 225 lb 12.8 oz (102.4 kg)   SpO2 97%   BMI 40.00 kg/m²         ASSESSMENT and PLAN    ICD-10-CM ICD-9-CM    1. HTN (hypertension), benign I10 401.1 amLODIPine (NORVASC) 10 mg tablet   2. Seasonal allergic rhinitis due to other allergic trigger J30.89 477.8        As above  Increase cozaar to 100 mg daily  Refilled norvasc  Advised claritin and/or flonase for probable allergic rhinitis  Follow-up and Dispositions    · Return in about 8 weeks (around 7/3/2019) for htn/cozaar. An After Visit Summary was printed and given to the patient. This has been fully explained to the patient, who indicates understanding.

## 2019-05-13 RX ORDER — AMLODIPINE BESYLATE 10 MG/1
TABLET ORAL
Qty: 90 TAB | Refills: 6 | Status: SHIPPED | OUTPATIENT
Start: 2019-05-13 | End: 2022-08-31

## 2019-05-13 RX ORDER — LOSARTAN POTASSIUM 100 MG/1
TABLET ORAL
Qty: 90 TAB | Refills: 6 | Status: SHIPPED | OUTPATIENT
Start: 2019-05-13 | End: 2019-05-21 | Stop reason: SDUPTHER

## 2019-05-15 RX ORDER — FUROSEMIDE 20 MG/1
20 TABLET ORAL DAILY
Qty: 90 TAB | Refills: 0 | Status: CANCELLED | OUTPATIENT
Start: 2019-05-15

## 2019-05-16 RX ORDER — ROSUVASTATIN CALCIUM 20 MG/1
TABLET, COATED ORAL
Qty: 90 TAB | Refills: 0 | Status: SHIPPED | OUTPATIENT
Start: 2019-05-16 | End: 2019-09-04 | Stop reason: SDUPTHER

## 2019-05-16 RX ORDER — METOPROLOL SUCCINATE 50 MG/1
TABLET, EXTENDED RELEASE ORAL
Qty: 90 TAB | Refills: 0 | Status: SHIPPED | OUTPATIENT
Start: 2019-05-16 | End: 2019-09-04 | Stop reason: SDUPTHER

## 2019-05-17 NOTE — TELEPHONE ENCOUNTER
Requested Prescriptions     Pending Prescriptions Disp Refills    furosemide (LASIX) 20 mg tablet       Sig: Take 1 Tab by mouth daily.

## 2019-05-20 RX ORDER — FUROSEMIDE 20 MG/1
20 TABLET ORAL DAILY
OUTPATIENT
Start: 2019-05-20

## 2019-05-20 NOTE — TELEPHONE ENCOUNTER
Spoke with patient to inquire which provider at this facility is the patient's primary care. Patient stated Dr. Jodee Gustafson is the primary care provider. Informed that all messages and appointments need to be made with Dr. Jodee Gustafson. Informed if patient has an acute visit and no availability with Dr. Jodee Gustafson, that patient may see another provider here or at another [facility]. Informed that refill for medication Lasix will be re-routed to Dr. Jodee Gustafson. Patient verbalized understanding and stated that patient has plenty of medication, therefore, patient stated no refill for Lasix is needed.

## 2019-05-21 RX ORDER — LOSARTAN POTASSIUM 100 MG/1
TABLET ORAL
Qty: 90 TAB | Refills: 1 | Status: SHIPPED | OUTPATIENT
Start: 2019-05-21 | End: 2020-01-06 | Stop reason: SDUPTHER

## 2019-05-21 RX ORDER — METOPROLOL SUCCINATE 50 MG/1
TABLET, EXTENDED RELEASE ORAL
Qty: 90 TAB | Refills: 0 | OUTPATIENT
Start: 2019-05-21

## 2019-05-21 RX ORDER — ROSUVASTATIN CALCIUM 20 MG/1
TABLET, COATED ORAL
Qty: 90 TAB | Refills: 0 | OUTPATIENT
Start: 2019-05-21

## 2019-05-25 ENCOUNTER — HOSPITAL ENCOUNTER (OUTPATIENT)
Dept: LAB | Age: 79
Discharge: HOME | End: 2019-05-25
Payer: MEDICARE

## 2019-05-25 LAB
25(OH)D3 SERPL-MCNC: 39.5 NG/ML (ref 30–100)
ALBUMIN SERPL-MCNC: 3.5 G/DL (ref 3.4–5)
ANION GAP SERPL CALC-SCNC: 8 MMOL/L (ref 3–18)
BUN SERPL-MCNC: 49 MG/DL (ref 7–18)
BUN/CREAT SERPL: 12 (ref 12–20)
CALCIUM SERPL-MCNC: 10.1 MG/DL (ref 8.5–10.1)
CALCIUM SERPL-MCNC: 10.5 MG/DL (ref 8.5–10.1)
CHLORIDE SERPL-SCNC: 111 MMOL/L (ref 100–108)
CO2 SERPL-SCNC: 24 MMOL/L (ref 21–32)
CREAT SERPL-MCNC: 4.14 MG/DL (ref 0.6–1.3)
CREAT UR-MCNC: 79.7 MG/DL (ref 30–125)
ERYTHROCYTE [DISTWIDTH] IN BLOOD BY AUTOMATED COUNT: 13.6 % (ref 11.6–14.5)
GLUCOSE SERPL-MCNC: 200 MG/DL (ref 74–99)
HCT VFR BLD AUTO: 33.8 % (ref 35–45)
HGB BLD-MCNC: 11.1 G/DL (ref 12–16)
MCH RBC QN AUTO: 29 PG (ref 24–34)
MCHC RBC AUTO-ENTMCNC: 32.8 G/DL (ref 31–37)
MCV RBC AUTO: 88.3 FL (ref 74–97)
PHOSPHATE SERPL-MCNC: 3.2 MG/DL (ref 2.5–4.9)
PLATELET # BLD AUTO: 172 K/UL (ref 135–420)
PMV BLD AUTO: 10.3 FL (ref 9.2–11.8)
POTASSIUM SERPL-SCNC: 4.9 MMOL/L (ref 3.5–5.5)
PROT UR-MCNC: 253 MG/DL
PROT/CREAT UR-RTO: 3.2
PTH-INTACT SERPL-MCNC: 377.7 PG/ML (ref 18.4–88)
RBC # BLD AUTO: 3.83 M/UL (ref 4.2–5.3)
SODIUM SERPL-SCNC: 143 MMOL/L (ref 136–145)
WBC # BLD AUTO: 4.5 K/UL (ref 4.6–13.2)

## 2019-05-25 PROCEDURE — 82306 VITAMIN D 25 HYDROXY: CPT

## 2019-05-25 PROCEDURE — 84156 ASSAY OF PROTEIN URINE: CPT

## 2019-05-25 PROCEDURE — 83970 ASSAY OF PARATHORMONE: CPT

## 2019-05-25 PROCEDURE — 85027 COMPLETE CBC AUTOMATED: CPT

## 2019-05-25 PROCEDURE — 36415 COLL VENOUS BLD VENIPUNCTURE: CPT

## 2019-05-25 PROCEDURE — 80069 RENAL FUNCTION PANEL: CPT

## 2019-05-29 ENCOUNTER — TELEPHONE (OUTPATIENT)
Dept: ORTHOPEDIC SURGERY | Age: 79
End: 2019-05-29

## 2019-05-29 NOTE — TELEPHONE ENCOUNTER
Patient left message requesting a refill on Mobic 7.5mg. I am showing this was discontinued by Dr. Christianne Diamond on 3/28/19. Please advise and pend new Rx if appropriate.     Last visit:  8/21/18 with MD Nancy Johnson  Next appt:  RTC prn  Previous refill encounter:  9/25/18 Mobic #[id number]    Patient's phone # [phone]

## 2019-05-29 NOTE — TELEPHONE ENCOUNTER
Dr Kevin Burnett would like her to be off of all nsaids per my chart review.  Recommend tylenol for pain as directed

## 2019-06-20 ENCOUNTER — OFFICE VISIT (OUTPATIENT)
Dept: ORTHOPEDIC SURGERY | Age: 79
End: 2019-06-20

## 2019-06-20 VITALS
HEART RATE: 70 BPM | RESPIRATION RATE: 19 BRPM | WEIGHT: 229.2 LBS | TEMPERATURE: 98 F | SYSTOLIC BLOOD PRESSURE: 160 MMHG | OXYGEN SATURATION: 98 % | BODY MASS INDEX: 40.61 KG/M2 | HEIGHT: 63 IN | DIASTOLIC BLOOD PRESSURE: 67 MMHG

## 2019-06-20 DIAGNOSIS — M96.1 POST LAMINECTOMY SYNDROME: ICD-10-CM

## 2019-06-20 DIAGNOSIS — E11.21 TYPE 2 DIABETES MELLITUS WITH NEPHROPATHY (HCC): ICD-10-CM

## 2019-06-20 DIAGNOSIS — M76.32 ILIOTIBIAL BAND SYNDROME OF LEFT SIDE: Primary | ICD-10-CM

## 2019-06-20 RX ORDER — MELOXICAM 7.5 MG/1
TABLET ORAL
Qty: 60 TAB | Refills: 0 | Status: SHIPPED | OUTPATIENT
Start: 2019-06-20 | End: 2019-09-20

## 2019-06-20 NOTE — PROGRESS NOTES
[address]  [address], [address]  [place]  Phone: [phone]  Fax: [phone]        Magdaleno Stephens  : 1940  PCP: Letty Orlando MD    PROGRESS NOTE      ASSESSMENT AND PLAN    Diagnoses and all orders for this visit:    1. Iliotibial band syndrome of left side, w/iliopsoas tightness  -     meloxicam (MOBIC) 7.5 mg tablet; PO Bid, prn pain, take with food. 2. Post laminectomy syndrome, w/ residual L DF weakness post-op     3. 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 Type 2 diabetes mellitus with nephropathy ([facility] [address]       1. Advised to start HEP. 2. Rx of meloxicam x 1week. Not for chronic use w/nephropathy hx. Ice  3. Given information on Iliotibial band syndrome and exercises. 4. No indications for cortisone injections at this time. 5. Discussed starting HEP after starting meloxicam.    F/U PRN      HISTORY OF PRESENT ILLNESS  Robby Jorgensen is a 78 y.o. female. Pt presents to the office for a f/u visit for back pain. Last OV given TPI and referred to PT. Pt did not attend physical therapy d/t finiancial burden. She states the TPI helped temporarily. She c/o pain in LLE laterally for past 2.5 weeks which is aggravating her back. She admits to repotting plants sitting at the table, nothing that should have aggravated her LLE. She notes her L knee became swollen. She affirms RLE does not hurt. Pt reports increased pain with most recent cortisone injection in her knees. Denies N/T in thigh    Location of pain: low back  Does pain radiate into extremities: LLE to knee,  Does patient have weakness: no   Pt denies saddle paresthesias. Medications pt is on: She found benefit with 7.5 mg meloxicam. Voltaren gel PRN with intermittent benefit. Tylenol PRN temporary benefit. Pt denies recent ED visits or hospitalizations.  Denies persistent fevers, chills, weight changes, neurogenic bowel or bladder symptoms. Treatments patient has tried:  Physical therapy:Yes 2018  Doing HEP: Unknown  Non-opioid medications: Yes  Spinal injections: Yes L L5-S1 SNRB 11/2018 w/benefit. Bursa injection L hip 1/2019 Kasia Alberts  Spinal surgery- Yes. Lumbar fusion L2-3-4-5, 2004 [address] in [facility] benefit  Last L CT 2012: severe stenosis L1-2     reviewed. Pt moved from [place] after retiring to escape the cold. Pt denies hx of shingles. PMHx of DM. A1C 11/2018 was 6.5. ED 3/5/19 for influenza A, CKD stage 4, elevated serum creatinine. Pain Assessment  6/20/2019   Location of Pain Back; Hip   Location Modifiers -   Severity of Pain 5   Quality of Pain Throbbing;Aching   Quality of Pain Comment -   Duration of Pain -   Frequency of Pain Constant   Frequency of Pain Comment -   Aggravating Factors Standing;Walking;Bending   Aggravating Factors Comment -   Limiting Behavior -   Relieving Factors Rest   Relieving Factors Comment sitting   Result of Injury -   Work-Related Injury -   Type of Injury -       PAST MEDICAL HISTORY   Past Medical History:   Diagnosis Date    Allergic rhinitis     Diabetes ([facility] [address]     H/O seasonal allergies     Hypertension     Left foot drop, chronic since 2004 fusion sx        Past Surgical History:   Procedure Laterality Date    HAND/FINGER SURGERY UNLISTED      HX CATARACT REMOVAL  2011    HX LUMBAR FUSION  2004-last sx    multiple sx, fused L2-S1, in [place]    HX WISDOM TEETH EXTRACTION     . MEDICATIONS      Current Outpatient Medications   Medication Sig Dispense Refill    meloxicam (MOBIC) 7.5 mg tablet PO Bid, prn pain, take with food.  60 Tab 0    losartan (COZAAR) 100 mg tablet TAKE 1 TABLET BY MOUTH DAILY 90 Tab 1    rosuvastatin (CRESTOR) 20 mg tablet TAKE 1 TABLET BY MOUTH EVERY NIGHT 90 Tab 0    metoprolol succinate (TOPROL-XL) 50 mg XL tablet TAKE 1 TABLET BY MOUTH DAILY 90 Tab 0    amLODIPine (NORVASC) 10 mg tablet TAKE 1 TABLET BY MOUTH DAILY 90 Tab 6    diclofenac (VOLTAREN) 1 % gel Apply 4 g to affected area four (4) times daily. 500 g 0    Omeprazole delayed release (PRILOSEC D/R) 20 mg tablet Take 1 Tab by mouth daily. 30 Tab 6    cholecalciferol, VITAMIN D3, (VITAMIN D3) 5,000 unit tab tablet Take  by mouth daily.  montelukast (SINGULAIR) 10 mg tablet TK 1 T PO Q EVENING  2    polyethylene glycol (MIRALAX) 17 gram/dose powder MIX 1 CAPFUL WITH 8 OZ OF WATER AND DRINK BY MOUTH DAILY. (Patient taking differently: as needed. MIX 1 CAPFUL WITH 8 OZ OF WATER AND DRINK BY MOUTH DAILY. ) 527 g 0    cloNIDine HCl (CATAPRES) 0.2 mg tablet TK 1 T PO QHS FOR BLOOD PRESSURE  4    furosemide (LASIX) 20 mg tablet Take 20 mg by mouth daily.  insulin lispro (HUMALOG) 100 unit/mL injection by SubCUTAneous route. 6 units before breakfast  6 units before lunch  6 units before dinner      insulin glargine (LANTUS SOLOSTAR) 100 unit/mL (3 mL) pen by SubCUTAneous route.  35 units nightly          ALLERGIES    Allergies   Allergen Reactions    Aspirin Other (comments)     Ringing in ears    Keflex [Cephalexin] Rash          SOCIAL HISTORY    Social History     Socioeconomic History    Marital status:      Spouse name: Not on file    Number of children: Not on file    Years of education: Not on file    Highest education level: Not on file   Occupational History    Occupation: CNA - RET   Social Needs    Financial resource strain: Not on file    Food insecurity:     Worry: Not on file     Inability: Not on file    Transportation needs:     Medical: Not on file     Non-medical: Not on file   Tobacco Use    Smoking status: Former Smoker    Smokeless tobacco: Never Used   Substance and Sexual Activity    Alcohol use: No    Drug use: No    Sexual activity: Yes     Partners: Male     Birth control/protection: None   Lifestyle    Physical activity:     Days per week: Not on file     Minutes per session: Not on file    Stress: Not on file Relationships    Social connections:     Talks on phone: Not on file     Gets together: Not on file     Attends Anglican service: Not on file     Active member of club or organization: Not on file     Attends meetings of clubs or organizations: Not on file     Relationship status: Not on file    Intimate partner violence:     Fear of current or ex partner: Not on file     Emotionally abused: Not on file     Physically abused: Not on file     Forced sexual activity: Not on file   Other Topics Concern    Not on file   Social History Narrative    Not on file     Socioeconomic History    Marital status:      Spouse name: Not on file    Number of children: Not on file    Years of education: Not on file    Highest education level: Not on file   Occupational History    Occupation: CNA - RET   Social Needs    Financial resource strain: Not on file    Food insecurity:     Worry: Not on file     Inability: Not on file    Transportation needs:     Medical: Not on file     Non-medical: Not on file   Tobacco Use    Smoking status: Former Smoker    Smokeless tobacco: Never Used   Substance and Sexual Activity    Alcohol use: No    Drug use: No    Sexual activity: Yes     Partners: Male     Birth control/protection: None   Lifestyle    Physical activity:     Days per week: Not on file     Minutes per session: Not on file    Stress: Not on file   Relationships    Social connections:     Talks on phone: Not on file     Gets together: Not on file     Attends Anglican service: Not on file     Active member of club or organization: Not on file     Attends meetings of clubs or organizations: Not on file     Relationship status: Not on file    Intimate partner violence:     Fear of current or ex partner: Not on file     Emotionally abused: Not on file     Physically abused: Not on file     Forced sexual activity: Not on file   Other Topics Concern    Not on file   Social History Narrative    Not on file Problem Relation Age of Onset    Diabetes Maternal Aunt     Diabetes Maternal Uncle     Diabetes Maternal Grandfather     Diabetes Other     Hypertension Cousin        REVIEW OF SYSTEMS  Review of Systems   Constitutional: Negative for chills, fever and weight loss. Respiratory: Negative for shortness of breath. Cardiovascular: Negative for chest pain. Gastrointestinal: Negative for constipation. Negative for fecal incontinence   Genitourinary: Negative for dysuria. Negative for urinary incontinence   Musculoskeletal: Positive for joint pain and myalgias. Per HPI   Skin: Negative for rash. Neurological: Negative for dizziness, tingling, tremors, focal weakness and headaches. Endo/Heme/Allergies: Does not bruise/bleed easily. Psychiatric/Behavioral: The patient does not have insomnia. PHYSICAL EXAMINATION  Visit Vitals  /67   Pulse 70   Temp 98 °F (36.7 °C) (Oral)   Resp 19   Ht 5' 3\" (1.6 m)   Wt 229 lb 3.2 oz (104 kg)   SpO2 98%   BMI 40.60 kg/m²         Accompanied by spouse. Constitutional:  Well developed, well nourished, in no acute distress. Psychiatric: Affect and mood are appropriate. Integumentary: No rashes or abrasions noted on exposed areas. Cardiovascular/Peripheral Vascular: Intact l pulses. No peripheral edema is noted BLE. Lymphatic:  No evidence of lymphedema. No cervical lymphadenopathy. SPINE/MUSCULOSKELETAL EXAM    Lumbar spine:  No rash, ecchymosis, or gross obliquity. No fasciculations. No focal atrophy is noted. Mild tenderness at left L5-S1. Mild Tenderness to palpation of L Trochanteric bursa. Tender along left IT band, nontender on the right. Non tender L SI joint or sciatic notch. MOTOR:     Hip Flex  Quads Hamstrings Ankle DF EHL Ankle PF   Right +4/5 +4/5 +4/5 +4/5 +4/5 +4/5   Left +4/5 +4/5 +4/5 +4/5 +4/5 +4/5       Straight Leg raise negative. Pt using a wheel chair in the office today.       Written by [address] [place] [address] Rochelle Swanson, as dictated by Michelle Stone MD.    I, Dr. Michelle Stone MD, confirm that all documentation is accurate. Ms. Ramya Vides may have a reminder for a \"due or due soon\" health maintenance. I have asked that she contact her primary care provider for follow-up on this health maintenance.

## 2019-06-20 NOTE — PATIENT INSTRUCTIONS
Iliotibial Band Syndrome: Exercises  Your Care Instructions  Here are some examples of typical rehabilitation exercises for your condition. Start each exercise slowly. Ease off the exercise if you start to have pain. Your doctor or physical therapist will tell you when you can start these exercises and which ones will work best for you. How to do the exercises  Iliotibial band stretch    1. Lean sideways against a wall. If you are not steady on your feet, hold on to a chair or counter. 2. Stand on the leg with the affected hip, with that leg close to the wall. Then cross your other leg in front of it. 3. Let your affected hip drop out to the side of your body and against the wall. Then lean away from your affected hip until you feel a stretch. 4. Hold the stretch for 15 to 30 seconds. 5. Repeat 2 to 4 times. Piriformis stretch    1. Lie on your back with your legs straight. 2. Lift your affected leg and bend your knee. With your opposite hand, reach across your body, and then gently pull your knee toward your opposite shoulder. 3. Hold the stretch for 15 to 30 seconds. 4. Repeat 2 to 4 times. Hamstring wall stretch    1. Lie on your back in a doorway, with your good leg through the open door. 2. Slide your affected leg up the wall to straighten your knee. You should feel a gentle stretch down the back of your leg. 1. Do not arch your back. 2. Do not bend either knee. 3. Keep one heel touching the floor and the other heel touching the wall. Do not point your toes. 3. Hold the stretch for at least 1 minute to begin. Then try to lengthen the time you hold the stretch to as long as 6 minutes. 4. Repeat 2 to 4 times. 5. If you do not have a place to do this exercise in a doorway, there is another way to do it:  6. Lie on your back, and bend the knee of your affected leg. 7. Loop a towel under the ball and toes of that foot, and hold the ends of the towel in your hands.   8. Straighten your knee, and slowly pull back on the towel. You should feel a gentle stretch down the back of your leg. 9. Hold the stretch for 15 to 30 seconds. Or even better, hold the stretch for 1 minute if you can. 10. Repeat 2 to 4 times. Follow-up care is a key part of your treatment and safety. Be sure to make and go to all appointments, and call your doctor if you are having problems. It's also a good idea to know your test results and keep a list of the medicines you take. Where can you learn more? Go to http://arlette-michel.info/. Enter P252 in the search box to learn more about \"Iliotibial Band Syndrome: Exercises. \"  Current as of: September 20, 2018  Content Version: 11.9  © 8649-5737 [employer]. Care instructions adapted under license by [employer] (which disclaims liability or warranty for this information). If you have questions about a medical condition or this instruction, always ask your healthcare professional. [place] [address] any warranty or liability for your use of this information. Iliotibial Band Syndrome: Care Instructions  Your Care Instructions  Iliotibial band syndrome is pain and swelling of the iliotibial band (also called the IT band). This is a band of tissue that runs down the outside of your thigh. It connects the side of your hip to the side of your knee. It helps keep your knee and hip stable and in their normal position. When you have IT band syndrome, you may feel pain on the outside of your hip. It happens as your IT band snaps back and forth over the bony point of your hip. Sometimes you may only feel pain on the outside of your knee. You can get this syndrome if the IT band is too tight or if you do certain activities over and over that put pressure on your hip or knee. This is a common problem in runners, cyclists, and people who do other aerobic activities. IT band syndrome is treated with rest and medicines. These relieve swelling and pain. Physical therapy is also used. It may include stretching or doing certain exercises that can help strengthen your IT band and hip muscles. Sometimes a steroid shot is given to help relieve pain at the spot that is most sore. Follow-up care is a key part of your treatment and safety. Be sure to make and go to all appointments, and call your doctor if you are having problems. It's also a good idea to know your test results and keep a list of the medicines you take. How can you care for yourself at home? · Stay at a healthy weight. Being overweight puts extra strain on your hip and knee joints. · Take pain medicines exactly as directed. ? If the doctor gave you a prescription medicine for pain, take it as prescribed. ? If you are not taking a prescription pain medicine, ask your doctor if you can take an over-the-counter medicine. · Talk to your doctor or physical therapist about exercises that will help ease hip and knee pain. ? Stretch before you exercise. This can help prevent stiffness and injury. You can try gentle forms of yoga to help keep your joints and muscles flexible. ? Use exercises that are less stressful on the joints. Walk instead of jog. Ride a stationary bike with little resistance. Or you can swim or try water exercise. ? Do exercises that can help strengthen your IT band and hip muscles. Your doctor or physical therapist can tell you what kind of exercises are best for you. He or she can help you learn the right way to do the exercises. When should you call for help? Watch closely for changes in your health, and be sure to contact your doctor if:    · You have pain in your hip or knee that doesn't go away.     · You do not get better as expected. Where can you learn more? Go to http://arlette-michel.info/. Enter L449 in the search box to learn more about \"Iliotibial Band Syndrome: Care Instructions. \"  Current as of: September 20, 2018  Content Version: 11.9  © 1124-2005 [employer]. Care instructions adapted under license by [employer] (which disclaims liability or warranty for this information). If you have questions about a medical condition or this instruction, always ask your healthcare professional. [address] any warranty or liability for your use of this information.

## 2019-06-21 LAB
HBA1C MFR BLD HPLC: 5.8 %
LDL-C, EXTERNAL: 118

## 2019-07-16 ENCOUNTER — HOSPITAL ENCOUNTER (OUTPATIENT)
Dept: LAB | Age: 79
Discharge: HOME | End: 2019-07-16
Payer: MEDICARE

## 2019-07-16 DIAGNOSIS — N18.6 TYPE 2 DIABETES MELLITUS WITH ESRD (END-STAGE RENAL DISEASE) (HCC): ICD-10-CM

## 2019-07-16 DIAGNOSIS — R80.1 PERSISTENT PROTEINURIA: ICD-10-CM

## 2019-07-16 DIAGNOSIS — N25.81 SECONDARY HYPERPARATHYROIDISM OF RENAL ORIGIN (HCC): ICD-10-CM

## 2019-07-16 DIAGNOSIS — Z79.4 ENCOUNTER FOR LONG-TERM (CURRENT) USE OF INSULIN (HCC): ICD-10-CM

## 2019-07-16 DIAGNOSIS — I12.0 MALIGNANT HYPERTENSIVE KIDNEY DISEASE WITH CHRONIC KIDNEY DISEASE STAGE V OR END STAGE RENAL DISEASE (HCC): ICD-10-CM

## 2019-07-16 DIAGNOSIS — N18.5 CHRONIC KIDNEY DISEASE, STAGE V (HCC): ICD-10-CM

## 2019-07-16 DIAGNOSIS — E11.22 TYPE 2 DIABETES MELLITUS WITH ESRD (END-STAGE RENAL DISEASE) (HCC): ICD-10-CM

## 2019-07-16 LAB
ALBUMIN SERPL-MCNC: 3.7 G/DL (ref 3.4–5)
ANION GAP SERPL CALC-SCNC: 8 MMOL/L (ref 3–18)
BUN SERPL-MCNC: 56 MG/DL (ref 7–18)
BUN/CREAT SERPL: 12 (ref 12–20)
CALCIUM SERPL-MCNC: 9.5 MG/DL (ref 8.5–10.1)
CHLORIDE SERPL-SCNC: 113 MMOL/L (ref 100–108)
CO2 SERPL-SCNC: 21 MMOL/L (ref 21–32)
CREAT SERPL-MCNC: 4.85 MG/DL (ref 0.6–1.3)
ERYTHROCYTE [DISTWIDTH] IN BLOOD BY AUTOMATED COUNT: 13.9 % (ref 11.6–14.5)
GLUCOSE SERPL-MCNC: 133 MG/DL (ref 74–99)
HCT VFR BLD AUTO: 30.9 % (ref 35–45)
HGB BLD-MCNC: 10.2 G/DL (ref 12–16)
MCH RBC QN AUTO: 28.8 PG (ref 24–34)
MCHC RBC AUTO-ENTMCNC: 33 G/DL (ref 31–37)
MCV RBC AUTO: 87.3 FL (ref 74–97)
PHOSPHATE SERPL-MCNC: 4 MG/DL (ref 2.5–4.9)
PLATELET # BLD AUTO: 155 K/UL (ref 135–420)
PMV BLD AUTO: 10.8 FL (ref 9.2–11.8)
POTASSIUM SERPL-SCNC: 4.6 MMOL/L (ref 3.5–5.5)
RBC # BLD AUTO: 3.54 M/UL (ref 4.2–5.3)
SODIUM SERPL-SCNC: 142 MMOL/L (ref 136–145)
WBC # BLD AUTO: 6.4 K/UL (ref 4.6–13.2)

## 2019-07-16 PROCEDURE — 85027 COMPLETE CBC AUTOMATED: CPT

## 2019-07-16 PROCEDURE — 80069 RENAL FUNCTION PANEL: CPT

## 2019-07-16 PROCEDURE — 36415 COLL VENOUS BLD VENIPUNCTURE: CPT

## 2019-07-25 ENCOUNTER — OFFICE VISIT (OUTPATIENT)
Dept: FAMILY MEDICINE CLINIC | Age: 79
End: 2019-07-25

## 2019-07-25 VITALS
DIASTOLIC BLOOD PRESSURE: 68 MMHG | OXYGEN SATURATION: 98 % | WEIGHT: 228.6 LBS | HEART RATE: 80 BPM | RESPIRATION RATE: 20 BRPM | HEIGHT: 63 IN | BODY MASS INDEX: 40.5 KG/M2 | TEMPERATURE: 98.2 F | SYSTOLIC BLOOD PRESSURE: 156 MMHG

## 2019-07-25 DIAGNOSIS — N39.41 URGE INCONTINENCE: ICD-10-CM

## 2019-07-25 DIAGNOSIS — I10 HTN (HYPERTENSION), BENIGN: Primary | ICD-10-CM

## 2019-07-25 RX ORDER — POLYETHYLENE GLYCOL 3350 17 G/17G
POWDER, FOR SOLUTION ORAL
Qty: 527 G | Refills: 3 | Status: SHIPPED | OUTPATIENT
Start: 2019-07-25 | End: 2019-09-20

## 2019-07-25 RX ORDER — POLYETHYLENE GLYCOL 3350 17 G/17G
POWDER, FOR SOLUTION ORAL
Qty: 527 G | Refills: 0 | Status: CANCELLED | OUTPATIENT
Start: 2019-07-25

## 2019-07-25 NOTE — PROGRESS NOTES
Patient here for a Hypertension Follow up. 1. Have you been to the ER, urgent care clinic since your last visit? Hospitalized since your last visit? No    2. Have you seen or consulted any other health care providers outside of the [address] [place] [address] since your last visit? Include any pap smears or colon screening.  No

## 2019-07-25 NOTE — PROGRESS NOTES
HISTORY OF PRESENT ILLNESS  Philip Maxwell is a 78 y.o. female. HPI  Patient is here today for evaluation and treatment of: /Hypertension/     Hypertension:  Her BP is still elevated; She thinks cozaar was too strong for her because she was tired when she took med. Advised pt to take med at night. She agrees to try this. reports urinary incontinence X 1 month;  May not always make it to the restroom when she has to urinate; May not always void when she gets the sensation to go the bathroom;     Current Outpatient Medications:     meloxicam (MOBIC) 7.5 mg tablet, PO Bid, prn pain, take with food. , Disp: 60 Tab, Rfl: 0    rosuvastatin (CRESTOR) 20 mg tablet, TAKE 1 TABLET BY MOUTH EVERY NIGHT, Disp: 90 Tab, Rfl: 0    metoprolol succinate (TOPROL-XL) 50 mg XL tablet, TAKE 1 TABLET BY MOUTH DAILY, Disp: 90 Tab, Rfl: 0    amLODIPine (NORVASC) 10 mg tablet, TAKE 1 TABLET BY MOUTH DAILY, Disp: 90 Tab, Rfl: 6    Omeprazole delayed release (PRILOSEC D/R) 20 mg tablet, Take 1 Tab by mouth daily. , Disp: 30 Tab, Rfl: 6    cholecalciferol, VITAMIN D3, (VITAMIN D3) 5,000 unit tab tablet, Take  by mouth daily. , Disp: , Rfl:     montelukast (SINGULAIR) 10 mg tablet, TK 1 T PO Q EVENING, Disp: , Rfl: 2    polyethylene glycol (MIRALAX) 17 gram/dose powder, MIX 1 CAPFUL WITH 8 OZ OF WATER AND DRINK BY MOUTH DAILY. (Patient taking differently: as needed. MIX 1 CAPFUL WITH 8 OZ OF WATER AND DRINK BY MOUTH DAILY.), Disp: 527 g, Rfl: 0    cloNIDine HCl (CATAPRES) 0.2 mg tablet, TK 1 T PO QHS FOR BLOOD PRESSURE, Disp: , Rfl: 4    furosemide (LASIX) 20 mg tablet, Take 20 mg by mouth daily. , Disp: , Rfl:     insulin lispro (HUMALOG) 100 unit/mL injection, by SubCUTAneous route. 6 units before breakfast 6 units before lunch 6 units before dinner, Disp: , Rfl:     insulin glargine (LANTUS SOLOSTAR) 100 unit/mL (3 mL) pen, by SubCUTAneous route.  35 units nightly, Disp: , Rfl:     losartan (COZAAR) 100 mg tablet, TAKE 1 TABLET BY MOUTH DAILY, Disp: 90 Tab, Rfl: 1    diclofenac (VOLTAREN) 1 % gel, Apply 4 g to affected area four (4) times daily. , Disp: 500 g, Rfl: 0    PMH,  Meds, Allergies, Family History, Social history reviewed    Review of Systems   Constitutional: Negative for chills and fever. Respiratory: Negative for wheezing. Cardiovascular: Negative for chest pain and palpitations. Physical Exam   Constitutional: She appears well-developed and well-nourished. No distress. Cardiovascular: Normal rate and regular rhythm. Exam reveals no gallop and no friction rub. No murmur heard. Pulmonary/Chest: Breath sounds normal. No respiratory distress. She has no wheezes. She has no rales. Musculoskeletal: She exhibits no edema. Nursing note and vitals reviewed. Visit Vitals  /68   Pulse 80   Temp 98.2 °F (36.8 °C) (Oral)   Resp 20   Ht 5' 3\" (1.6 m)   Wt 228 lb 9.6 oz (103.7 kg)   SpO2 98%   BMI 40.49 kg/m²         ASSESSMENT and PLAN    ICD-10-CM ICD-9-CM    1. HTN (hypertension), benign I10 401.1    2. Urge incontinence N39.41 788.31        As above, .fair    Take cozaar at night  Orders Placed This Encounter    polyethylene glycol (MIRALAX) 17 gram/dose powder     Pt advised to void every 2- 3 hours to avoid urge incontinence  Follow-up and Dispositions    · Return in about 3 months (around 10/25/2019) for medicare well. An After Visit Summary was printed and given to the patient. This has been fully explained to the patient, who indicates understanding.

## 2019-07-25 NOTE — PATIENT INSTRUCTIONS
Constipation: Care Instructions  Your Care Instructions    Constipation means that you have a hard time passing stools (bowel movements). People pass stools from 3 times a day to once every 3 days. What is normal for you may be different. Constipation may occur with pain in the rectum and cramping. The pain may get worse when you try to pass stools. Sometimes there are small amounts of bright red blood on toilet paper or the surface of stools. This is because of enlarged veins near the rectum (hemorrhoids). A few changes in your diet and lifestyle may help you avoid ongoing constipation. Your doctor may also prescribe medicine to help loosen your stool. Some medicines can cause constipation. These include pain medicines and antidepressants. Tell your doctor about all the medicines you take. Your doctor may want to make a medicine change to ease your symptoms. Follow-up care is a key part of your treatment and safety. Be sure to make and go to all appointments, and call your doctor if you are having problems. It's also a good idea to know your test results and keep a list of the medicines you take. How can you care for yourself at home? · Drink plenty of fluids, enough so that your urine is light yellow or clear like water. If you have kidney, heart, or liver disease and have to limit fluids, talk with your doctor before you increase the amount of fluids you drink. · Include high-fiber foods in your diet each day. These include fruits, vegetables, beans, and whole grains. · Get at least 30 minutes of exercise on most days of the week. Walking is a good choice. You also may want to do other activities, such as running, swimming, cycling, or playing tennis or team sports. · Take a fiber supplement, such as Citrucel or Metamucil, every day. Read and follow all instructions on the label. · Schedule time each day for a bowel movement. A daily routine may help.  Take your time having your bowel movement. · Support your feet with a small step stool when you sit on the toilet. This helps flex your hips and places your pelvis in a squatting position. · Your doctor may recommend an over-the-counter laxative to relieve your constipation. Examples are Milk of Magnesia and MiraLax. Read and follow all instructions on the label. Do not use laxatives on a long-term basis. When should you call for help? Call your doctor now or seek immediate medical care if:    · You have new or worse belly pain.     · You have new or worse nausea or vomiting.     · You have blood in your stools.    Watch closely for changes in your health, and be sure to contact your doctor if:    · Your constipation is getting worse.     · You do not get better as expected. Where can you learn more? Go to http://arlette-michel.info/. Enter [phone] in the search box to learn more about \"Constipation: Care Instructions. \"  Current as of: September 23, 2018  Content Version: 12.1  © 1808-5730 [employer]. Care instructions adapted under license by [employer] (which disclaims liability or warranty for this information). If you have questions about a medical condition or this instruction, always ask your healthcare professional. [address] any warranty or liability for your use of this information.

## 2019-07-31 ENCOUNTER — HOSPITAL ENCOUNTER (EMERGENCY)
Age: 79
Discharge: HOME | End: 2019-07-31
Attending: EMERGENCY MEDICINE | Admitting: EMERGENCY MEDICINE
Payer: MEDICARE

## 2019-07-31 ENCOUNTER — APPOINTMENT (OUTPATIENT)
Dept: GENERAL RADIOLOGY | Age: 79
End: 2019-07-31
Attending: PHYSICIAN ASSISTANT
Payer: MEDICARE

## 2019-07-31 VITALS
SYSTOLIC BLOOD PRESSURE: 151 MMHG | DIASTOLIC BLOOD PRESSURE: 69 MMHG | HEART RATE: 72 BPM | OXYGEN SATURATION: 98 % | TEMPERATURE: 98.2 F | RESPIRATION RATE: 18 BRPM

## 2019-07-31 DIAGNOSIS — J04.0 LARYNGITIS: ICD-10-CM

## 2019-07-31 DIAGNOSIS — J06.9 VIRAL UPPER RESPIRATORY TRACT INFECTION: Primary | ICD-10-CM

## 2019-07-31 PROCEDURE — 87081 CULTURE SCREEN ONLY: CPT

## 2019-07-31 PROCEDURE — 71046 X-RAY EXAM CHEST 2 VIEWS: CPT

## 2019-07-31 PROCEDURE — 99282 EMERGENCY DEPT VISIT SF MDM: CPT

## 2019-08-01 NOTE — ROUTINE PROCESS
I have reviewed discharge instructions with the patient. The patient verbalized understanding. Patient armband removed and shredded. Pt transferred to car via wheelchair by nursing staff.

## 2019-08-01 NOTE — ED PROVIDER NOTES
EMERGENCY DEPARTMENT HISTORY AND PHYSICAL EXAM    9:33 PM      Date: 7/31/2019  Patient Name: Gracie Mims    History of Presenting Illness     Chief Complaint   Patient presents with    Nasal Congestion    Cough         History Provided By: Patient    Chief Complaint: cough       Additional History (Context): Gracie Mims is a 78 y.o. female with history of diabetes and hypertension who presents with cough, sore throat, change in voice over the past few days. Cough is productive. Sore throat is mild. She has lost her voice. She denies fevers, vomiting, diarrhea, abdominal pain, dysuria, headache. She is not a smoker. She denies chest pain or shortness of breath. Denies dizziness or lightheadedness. No other complaints. PCP: Millicent Stahl MD    Current Outpatient Medications   Medication Sig Dispense Refill    polyethylene glycol (MIRALAX) 17 gram/dose powder MIX 1 CAPFUL WITH 8 OZ OF WATER AND DRINK BY MOUTH DAILY. 527 g 3    meloxicam (MOBIC) 7.5 mg tablet PO Bid, prn pain, take with food. 60 Tab 0    losartan (COZAAR) 100 mg tablet TAKE 1 TABLET BY MOUTH DAILY 90 Tab 1    rosuvastatin (CRESTOR) 20 mg tablet TAKE 1 TABLET BY MOUTH EVERY NIGHT 90 Tab 0    metoprolol succinate (TOPROL-XL) 50 mg XL tablet TAKE 1 TABLET BY MOUTH DAILY 90 Tab 0    amLODIPine (NORVASC) 10 mg tablet TAKE 1 TABLET BY MOUTH DAILY 90 Tab 6    Omeprazole delayed release (PRILOSEC D/R) 20 mg tablet Take 1 Tab by mouth daily. 30 Tab 6    cholecalciferol, VITAMIN D3, (VITAMIN D3) 5,000 unit tab tablet Take  by mouth daily.  montelukast (SINGULAIR) 10 mg tablet TK 1 T PO Q EVENING  2    cloNIDine HCl (CATAPRES) 0.2 mg tablet TK 1 T PO QHS FOR BLOOD PRESSURE  4    furosemide (LASIX) 20 mg tablet Take 20 mg by mouth daily.  insulin lispro (HUMALOG) 100 unit/mL injection by SubCUTAneous route. Sliding scale      insulin glargine (LANTUS SOLOSTAR) 100 unit/mL (3 mL) pen by SubCUTAneous route.  35 units nightly Past History     Past Medical History:  Past Medical History:   Diagnosis Date    Allergic rhinitis     Diabetes ([address]     H/O seasonal allergies     Hypertension     Left foot drop, chronic since 2004 fusion sx        Past Surgical History:  Past Surgical History:   Procedure Laterality Date    HAND/FINGER SURGERY UNLISTED      HX CATARACT REMOVAL  2011    HX LUMBAR FUSION  2004-last sx    multiple sx, fused L2-S1, in [place]    HX WISDOM TEETH EXTRACTION         Family History:  Family History   Problem Relation Age of Onset    Diabetes Maternal Aunt     Diabetes Maternal Uncle     Diabetes Maternal Grandfather     Diabetes Other     Hypertension Cousin        Social History:  Social History     Tobacco Use    Smoking status: Former Smoker    Smokeless tobacco: Never Used   Substance Use Topics    Alcohol use: No    Drug use: No       Allergies: Allergies   Allergen Reactions    Aspirin Other (comments)     Ringing in ears    Keflex [Cephalexin] Rash         Review of Systems       Review of Systems   Constitutional: Negative for fever. HENT: Positive for sore throat and voice change. Negative for facial swelling. Eyes: Negative for visual disturbance. Respiratory: Positive for cough. Negative for shortness of breath. Cardiovascular: Negative for chest pain. Gastrointestinal: Negative for abdominal pain. Genitourinary: Negative for dysuria. Musculoskeletal: Negative for neck pain. Skin: Negative for rash. Neurological: Negative for dizziness. Psychiatric/Behavioral: Negative for confusion. All other systems reviewed and are negative. Physical Exam     Visit Vitals  /69 (BP 1 Location: Right arm, BP Patient Position: At rest)   Pulse 72   Temp 98.2 °F (36.8 °C)   Resp 18   SpO2 98%         Physical Exam   Constitutional: She is oriented to person, place, and time. She appears well-developed and well-nourished. No distress.    HENT:   Head: Normocephalic and atraumatic. Right Ear: Tympanic membrane, external ear and ear canal normal.   Left Ear: Tympanic membrane, external ear and ear canal normal.   Nose: Nose normal. Right sinus exhibits no maxillary sinus tenderness and no frontal sinus tenderness. Left sinus exhibits no maxillary sinus tenderness and no frontal sinus tenderness. Mouth/Throat: Uvula is midline, oropharynx is clear and moist and mucous membranes are normal. No oropharyngeal exudate, posterior oropharyngeal edema, posterior oropharyngeal erythema or tonsillar abscesses. Eyes: Conjunctivae are normal.   Neck: Normal range of motion. Neck supple. Cardiovascular: Normal rate, regular rhythm and normal heart sounds. Pulmonary/Chest: Effort normal and breath sounds normal. No respiratory distress. She has no wheezes. She has no rales. Abdominal: She exhibits no distension. Musculoskeletal: Normal range of motion. Lymphadenopathy:     She has no cervical adenopathy. Neurological: She is alert and oriented to person, place, and time. Skin: Skin is warm and dry. She is not diaphoretic. Psychiatric: She has a normal mood and affect. Nursing note and vitals reviewed. Diagnostic Study Results     Labs -  Recent Results (from the past 12 hour(s))   STREP THROAT SCREEN    Collection Time: 07/31/19  7:53 PM   Result Value Ref Range    Special Requests: NO SPECIAL REQUESTS      Strep Screen NEGATIVE       Culture result: PENDING        Radiologic Studies -   XR CHEST PA LAT    (Results Pending)         Medical Decision Making   I am the first provider for this patient. I reviewed the vital signs, available nursing notes, past medical history, past surgical history, family history and social history. Vital Signs-Reviewed the patient's vital signs.       Records Reviewed: Nursing Notes (Time of Review: 9:33 PM)    ED Course: Progress Notes, Reevaluation, and Consults:      Provider Notes (Medical Decision Making): MDM  Number of Diagnoses or Management Options  Laryngitis:   Viral upper respiratory tract infection:   Diagnosis management comments: 77-year-old female complaining of cough, sore throat and loss of voice. ENT exam is normal.  Voice is hoarse. Lungs are clear. Afebrile, vitals are stable. No signs of pneumonia on chest x-ray. Strep is negative. I do not see any need for further work-up as her vitals are stable and she has no other complaints. Is  Likely viral.   Discussed treatment plan, return precautions, symptomatic relief, and expected time to improvement. All questions answered. Patient is stable for discharge and outpatient management. Diagnosis     Clinical Impression:   1. Viral upper respiratory tract infection    2. Laryngitis        Disposition: Discharged      Follow-up Information     Follow up With Specialties Details Why Contact Info    Teri Comes, MD Family Practice Schedule an appointment as soon as possible for a visit  [facility]  [address]      [facility] DEPT Emergency Medicine  Immediately if symptoms worsen [address] [facility]  [phone]           Patient's Medications   Start Taking    No medications on file   Continue Taking    AMLODIPINE (NORVASC) 10 MG TABLET    TAKE 1 TABLET BY MOUTH DAILY    CHOLECALCIFEROL, VITAMIN D3, (VITAMIN D3) 5,000 UNIT TAB TABLET    Take  by mouth daily. CLONIDINE HCL (CATAPRES) 0.2 MG TABLET    TK 1 T PO QHS FOR BLOOD PRESSURE    FUROSEMIDE (LASIX) 20 MG TABLET    Take 20 mg by mouth daily. INSULIN GLARGINE (LANTUS SOLOSTAR) 100 UNIT/ML (3 ML) PEN    by SubCUTAneous route. 35 units nightly    INSULIN LISPRO (HUMALOG) 100 UNIT/ML INJECTION    by SubCUTAneous route. Sliding scale    LOSARTAN (COZAAR) 100 MG TABLET    TAKE 1 TABLET BY MOUTH DAILY    MELOXICAM (MOBIC) 7.5 MG TABLET    PO Bid, prn pain, take with food.     METOPROLOL SUCCINATE (TOPROL-XL) 50 MG XL TABLET    TAKE 1 TABLET BY MOUTH DAILY    MONTELUKAST (SINGULAIR) 10 MG TABLET    TK 1 T PO Q EVENING    OMEPRAZOLE DELAYED RELEASE (PRILOSEC D/R) 20 MG TABLET    Take 1 Tab by mouth daily. POLYETHYLENE GLYCOL (MIRALAX) 17 GRAM/DOSE POWDER    MIX 1 CAPFUL WITH 8 OZ OF WATER AND DRINK BY MOUTH DAILY. ROSUVASTATIN (CRESTOR) 20 MG TABLET    TAKE 1 TABLET BY MOUTH EVERY NIGHT   These Medications have changed    No medications on file   Stop Taking    No medications on file     _______________________________    Attestations:  Scribe Attestation     Elías MARVA Paulino PA-C acting as a scribe for and in the presence of Arminda Lujan PA-C      July 31, 2019 at 9:37 PM       Provider Attestation:      I personally performed the services described in the documentation, reviewed the documentation, as recorded by the scribe in my presence, and it accurately and completely records my words and actions.  July 31, 2019 at 9:37 PM - Arminda Lujan PA-C  _______________________________

## 2019-08-01 NOTE — DISCHARGE INSTRUCTIONS
Patient Education        Laryngitis: Care Instructions  Your Care Instructions    Laryngitis is an inflammation of the voice box (larynx) that causes your voice to become raspy or hoarse. It can be short-lived or long-lasting. Most of the time, laryngitis comes on quickly and lasts as long as 2 weeks. It is caused by overuse, irritation, or infection of the vocal cords inside the larynx. Some of the most common causes are a cold, the flu, or allergies. Loud talking, shouting, cheering, or singing also can cause laryngitis. Stomach acid that backs up into the throat also can make you lose your voice. Resting your voice and taking other steps at home can help you get your voice back. Follow-up care is a key part of your treatment and safety. Be sure to make and go to all appointments, and call your doctor if you are having problems. It's also a good idea to know your test results and keep a list of the medicines you take. How can you care for yourself at home? · Follow your doctor's directions for treating the condition that caused you to lose your voice. If your doctor prescribed antibiotics, take them as directed. Do not stop taking them just because you feel better. You need to take the full course of antibiotics. · Before you use cough and cold medicines, check the label. They may not be safe for young children or for people with certain health problems. · Try to keep stomach acid from backing up into your throat. Do not eat just before bedtime. Reduce the amount of coffee and alcohol you drink, and eat healthy foods. Taking over-the-counter acid reducers can help when these steps are not enough. In some cases, you may need prescription medicine. · Rest your voice. You do not have to stop speaking, but use your voice as little as possible. Speak softly but do not whisper; whispering can bother your larynx more than speaking softly. Avoid talking on the telephone or trying to speak loudly.   · Try not to clear your throat. This can cause more irritation of your larynx. Take an over-the-counter cough suppressant (if your doctor recommends it) if you have a dry cough that does not produce mucus. · Do not smoke or allow others to smoke around you. If you need help quitting, talk to your doctor about stop-smoking programs and medicines. These can increase your chances of quitting for good. · Use a humidifier or vaporizer to add moisture to your bedroom. Humidity helps to thin the mucus in the nasal membranes that causes stuffiness or postnasal drip. Follow the directions for cleaning the machine. · Drink plenty of fluids, enough so that your urine is light yellow or clear like water. If you have kidney, heart, or liver disease and have to limit fluids, talk with your doctor before you increase the amount of fluids you drink. · Use saline (saltwater) nasal washes to help keep your nasal passages open and wash out mucus and bacteria. You can buy saline nose drops at a grocery store or drugstore. Or, you can make your own at home by mixing ½ teaspoon salt, 1 cup water (at room temperature), and ½ teaspoon baking soda. If you make your own, fill a bulb syringe with the solution, insert the tip into your nostril, and squeeze gently. Terri Brome your nose. When should you call for help? Call 911 anytime you think you may need emergency care. For example, call if:    · You have trouble breathing.    Call your doctor now or seek immediate medical care if:    · You have new or worse pain.     · You have trouble swallowing.    Watch closely for changes in your health, and be sure to contact your doctor if:    · You do not get better as expected. Where can you learn more? Go to http://arlette-michel.info/. Enter M000 in the search box to learn more about \"Laryngitis: Care Instructions. \"  Current as of: October 21, 2018  Content Version: 12.1  © 1081-3728 Healthwise, Incorporated.  Care instructions adapted under license by [employer] (which disclaims liability or warranty for this information). If you have questions about a medical condition or this instruction, always ask your healthcare professional. [address] any warranty or liability for your use of this information. Patient Education        Upper Respiratory Infection (Cold): Care Instructions  Your Care Instructions    An upper respiratory infection, or URI, is an infection of the nose, sinuses, or throat. URIs are spread by coughs, sneezes, and direct contact. The common cold is the most frequent kind of URI. The flu and sinus infections are other kinds of URIs. Almost all URIs are caused by viruses. Antibiotics won't cure them. But you can treat most infections with home care. This may include drinking lots of fluids and taking over-the-counter pain medicine. You will probably feel better in 4 to 10 days. The doctor has checked you carefully, but problems can develop later. If you notice any problems or new symptoms, get medical treatment right away. Follow-up care is a key part of your treatment and safety. Be sure to make and go to all appointments, and call your doctor if you are having problems. It's also a good idea to know your test results and keep a list of the medicines you take. How can you care for yourself at home? · To prevent dehydration, drink plenty of fluids, enough so that your urine is light yellow or clear like water. Choose water and other caffeine-free clear liquids until you feel better. If you have kidney, heart, or liver disease and have to limit fluids, talk with your doctor before you increase the amount of fluids you drink. · Take an over-the-counter pain medicine, such as acetaminophen (Tylenol), ibuprofen (Advil, Motrin), or naproxen (Aleve). Read and follow all instructions on the label. · Before you use cough and cold medicines, check the label.  These medicines may not be safe for young children or for people with certain health problems. · Be careful when taking over-the-counter cold or flu medicines and Tylenol at the same time. Many of these medicines have acetaminophen, which is Tylenol. Read the labels to make sure that you are not taking more than the recommended dose. Too much acetaminophen (Tylenol) can be harmful. · Get plenty of rest.  · Do not smoke or allow others to smoke around you. If you need help quitting, talk to your doctor about stop-smoking programs and medicines. These can increase your chances of quitting for good. When should you call for help? Call 911 anytime you think you may need emergency care. For example, call if:    · You have severe trouble breathing.    Call your doctor now or seek immediate medical care if:    · You seem to be getting much sicker.     · You have new or worse trouble breathing.     · You have a new or higher fever.     · You have a new rash.    Watch closely for changes in your health, and be sure to contact your doctor if:    · You have a new symptom, such as a sore throat, an earache, or sinus pain.     · You cough more deeply or more often, especially if you notice more mucus or a change in the color of your mucus.     · You do not get better as expected. Where can you learn more? Go to http://arlette-michel.info/. Enter F727 in the search box to learn more about \"Upper Respiratory Infection (Cold): Care Instructions. \"  Current as of: September 5, 2018  Content Version: 12.1  © 9600-0480 Healthwise, Incorporated. Care instructions adapted under license by [employer] (which disclaims liability or warranty for this information). If you have questions about a medical condition or this instruction, always ask your healthcare professional. [address] any warranty or liability for your use of this information.

## 2019-08-02 LAB
B-HEM STREP THROAT QL CULT: NEGATIVE
BACTERIA SPEC CULT: NORMAL
SERVICE CMNT-IMP: NORMAL

## 2019-08-03 ENCOUNTER — HOSPITAL ENCOUNTER (EMERGENCY)
Age: 79
Discharge: HOME | End: 2019-08-03
Attending: EMERGENCY MEDICINE
Payer: MEDICARE

## 2019-08-03 VITALS
HEART RATE: 83 BPM | HEIGHT: 66 IN | DIASTOLIC BLOOD PRESSURE: 78 MMHG | TEMPERATURE: 98.6 F | OXYGEN SATURATION: 99 % | WEIGHT: 228 LBS | RESPIRATION RATE: 16 BRPM | BODY MASS INDEX: 36.64 KG/M2 | SYSTOLIC BLOOD PRESSURE: 181 MMHG

## 2019-08-03 DIAGNOSIS — H72.93 PERFORATION OF BOTH TYMPANIC MEMBRANES: Primary | ICD-10-CM

## 2019-08-03 PROCEDURE — 74011250637 HC RX REV CODE- 250/637: Performed by: NURSE PRACTITIONER

## 2019-08-03 PROCEDURE — 99282 EMERGENCY DEPT VISIT SF MDM: CPT

## 2019-08-03 RX ORDER — CLINDAMYCIN HYDROCHLORIDE 300 MG/1
300 CAPSULE ORAL 4 TIMES DAILY
Qty: 40 CAP | Refills: 0 | Status: SHIPPED | OUTPATIENT
Start: 2019-08-03 | End: 2019-08-13

## 2019-08-03 RX ORDER — CLINDAMYCIN HYDROCHLORIDE 150 MG/1
300 CAPSULE ORAL EVERY 6 HOURS
Status: DISCONTINUED | OUTPATIENT
Start: 2019-08-03 | End: 2019-08-03 | Stop reason: HOSPADM

## 2019-08-03 RX ORDER — OXYCODONE AND ACETAMINOPHEN 5; 325 MG/1; MG/1
1 TABLET ORAL
Qty: 15 TAB | Refills: 0 | Status: SHIPPED | OUTPATIENT
Start: 2019-08-03 | End: 2019-08-08

## 2019-08-03 RX ORDER — OXYCODONE AND ACETAMINOPHEN 5; 325 MG/1; MG/1
1 TABLET ORAL
Status: COMPLETED | OUTPATIENT
Start: 2019-08-03 | End: 2019-08-03

## 2019-08-03 RX ADMIN — OXYCODONE AND ACETAMINOPHEN 1 TABLET: 5; 325 TABLET ORAL at 18:57

## 2019-08-03 RX ADMIN — CLINDAMYCIN HYDROCHLORIDE 300 MG: 150 CAPSULE ORAL at 18:57

## 2019-08-03 NOTE — DISCHARGE INSTRUCTIONS
Patient Education        Perforated Eardrum: Care Instructions  Your Care Instructions    A tear or hole in the membrane of the middle ear is called a perforated or ruptured eardrum. This can happen if an infection builds up inside the ear or if the eardrum gets injured. You may find it hard to hear out of that ear or may hear a buzzing sound. You may have an earache or have fluids that drain from the ear. Your eardrum should heal on its own in a few weeks, and you should hear normally then. If you have an infection, your doctor may prescribe antibiotics. You may need pain relief medicine for your earache. Your doctor will check to see if your eardrum has healed. If not, you may need surgery to repair the eardrum. Follow-up care is a key part of your treatment and safety. Be sure to make and go to all appointments, and call your doctor if you are having problems. It's also a good idea to know your test results and keep a list of the medicines you take. How can you care for yourself at home? · If your doctor prescribed antibiotics, take them as directed. Do not stop taking them just because you feel better. You need to take the full course of antibiotics. · Take an over-the-counter pain medicine, such as acetaminophen (Tylenol), ibuprofen (Advil, Motrin), or naproxen (Aleve), as needed. Read and follow all instructions on the label. · Do not take two or more pain medicines at the same time unless the doctor told you to. Many pain medicines have acetaminophen, which is Tylenol. Too much acetaminophen (Tylenol) can be harmful. · To ease pain, put a warm washcloth or a heating pad set on low on your ear. You may have some drainage from the ear. · Be careful when taking over-the-counter cold or flu medicines and Tylenol at the same time. Many of these medicines have acetaminophen, which is Tylenol. Read the labels to make sure that you are not taking more than the recommended dose.  Too much Tylenol can be harmful. · Keep your ears dry. ? Take baths until your doctor says you can take showers again. ? When you wash your hair, use cotton lightly coated with petroleum jelly as an earplug. Do not use plastic earplugs. ? Do not swim until your doctor says you can.  ? If you get water in your ears, turn your head to each side and pull the earlobe in different directions. This will help the water run out. If your ears are still wet, use a hair dryer set on the lowest heat. Hold the dryer several inches from your ear. · Do not put anything into your ear canal. For example, do not use a cotton swab to clean the inside of your ear. It can damage your ear. If you think you have something inside your ear, ask your doctor to check it. When should you call for help? Call your doctor now or seek immediate medical care if:    · You have signs of infection, such as:  ? Increased pain, swelling, warmth, or redness. ? Pus draining from the ear. ? A fever.    Watch closely for changes in your health, and be sure to contact your doctor if:    · You have changes in hearing.     · You do not get better as expected. Where can you learn more? Go to http://arlette-michel.info/. Enter G252 in the search box to learn more about \"Perforated Eardrum: Care Instructions. \"  Current as of: October 21, 2018  Content Version: 12.1  © 2047-1874 [employer]. Care instructions adapted under license by [employer] (which disclaims liability or warranty for this information). If you have questions about a medical condition or this instruction, always ask your healthcare professional. [place] [address] any warranty or liability for your use of this information.

## 2019-08-03 NOTE — ED PROVIDER NOTES
72-year-old female presents emergency department with drainage from her bilateral ears [facility] and [facility] before cerumen impaction and several hours later she states drainage from her ear she was placed on antibiotics for bilateral ear infection. Reported no dizziness reported    The history is provided by the patient. No  was used. Ear Drainage    This is a new problem. The current episode started 1 to 2 hours ago. The problem occurs constantly. There has been no fever. The pain is mild. Associated symptoms include ear discharge. Pertinent negatives include no headaches and no cough. The risk factors include recent URI.          Past Medical History:   Diagnosis Date    Allergic rhinitis     Diabetes ([address]     H/O seasonal allergies     Hypertension     Left foot drop, chronic since 2004 fusion sx        Past Surgical History:   Procedure Laterality Date    HAND/FINGER SURGERY UNLISTED      HX CATARACT REMOVAL  2011    HX LUMBAR FUSION  2004-last sx    multiple sx, fused L2-S1, in [address] [place] [address]    HX WISDOM TEETH EXTRACTION           Family History:   Problem Relation Age of Onset    Diabetes Maternal Aunt     Diabetes Maternal Uncle     Diabetes Maternal Grandfather     Diabetes Other     Hypertension Cousin        Social History     Socioeconomic History    Marital status:      Spouse name: Not on file    Number of children: Not on file    Years of education: Not on file    Highest education level: Not on file   Occupational History    Occupation: CNA - RET   Social Needs    Financial resource strain: Not on file    Food insecurity:     Worry: Not on file     Inability: Not on file    Transportation needs:     Medical: Not on file     Non-medical: Not on file   Tobacco Use    Smoking status: Former Smoker    Smokeless tobacco: Never Used   Substance and Sexual Activity    Alcohol use: No    Drug use: No    Sexual activity: Yes     Partners: Male     Birth control/protection: None   Lifestyle    Physical activity:     Days per week: Not on file     Minutes per session: Not on file    Stress: Not on file   Relationships    Social connections:     Talks on phone: Not on file     Gets together: Not on file     Attends Faith service: Not on file     Active member of club or organization: Not on file     Attends meetings of clubs or organizations: Not on file     Relationship status: Not on file    Intimate partner violence:     Fear of current or ex partner: Not on file     Emotionally abused: Not on file     Physically abused: Not on file     Forced sexual activity: Not on file   Other Topics Concern    Not on file   Social History Narrative    Not on file         ALLERGIES: Aspirin and Keflex [cephalexin]    Review of Systems   Constitutional: Negative for fever. HENT: Positive for ear discharge and ear pain. Negative for facial swelling. Respiratory: Negative for cough. Neurological: Negative for dizziness and headaches. All other systems reviewed and are negative. Vitals:    08/03/19 1827   BP: 181/78   Pulse: 83   Resp: 16   Temp: 98.6 °F (37 °C)   SpO2: 99%   Weight: 103.4 kg (228 lb)   Height: 5' 6\" (1.676 m)            Physical Exam   Constitutional: She is oriented to person, place, and time. She appears well-developed and well-nourished. HENT:   Head: Normocephalic and atraumatic. Right Ear: External ear normal. Tympanic membrane is perforated. Left Ear: External ear normal. Tympanic membrane is perforated. Nose: Nose normal.   Mouth/Throat: Uvula is midline, oropharynx is clear and moist and mucous membranes are normal.   Bilateral perforated TM purulent and bloody drainage in canal.   Eyes: Pupils are equal, round, and reactive to light. Conjunctivae and EOM are normal.   Neck: Normal range of motion. Neck supple. Cardiovascular: Normal rate and regular rhythm.    Pulmonary/Chest: Effort normal and breath sounds normal.   Abdominal: Soft. Bowel sounds are normal.   Musculoskeletal: Normal range of motion. Neurological: She is alert and oriented to person, place, and time. She has normal reflexes. Skin: Skin is warm and dry. Psychiatric: She has a normal mood and affect. Her behavior is normal. Judgment and thought content normal.   Nursing note and vitals reviewed. MDM  Number of Diagnoses or Management Options  Perforation of both tympanic membranes: minor  Diagnosis management comments: Patient will be discharged and referred to ear nose and throat doctor to be called on Monday morning. Patient will be placed on Percocet for pain and I will add clindamycin to her antibiotic regimen. Patient was given eardrops at the time of her appointment this morning at the urgent care. Patient advised not to use the eardrops and allow nothing to get into the ears until after she is seen by the ear nose and throat doctor on Monday patient is informed if worse to return to the emergency department. Amount and/or Complexity of Data Reviewed  Review and summarize past medical records: yes  Independent visualization of images, tracings, or specimens: yes    Risk of Complications, Morbidity, and/or Mortality  Presenting problems: low  Diagnostic procedures: low  Management options: low    Patient Progress  Patient progress: stable         Procedures            Vitals:  Patient Vitals for the past 12 hrs:   Temp Pulse Resp BP SpO2   08/03/19 1827 98.6 °F (37 °C) 83 16 181/78 99 %            Disposition:    Diagnosis:   1.  Perforation of both tympanic membranes        Disposition: to Home      Follow-up Information     Follow up With Specialties Details Why Contact Info    Jakob Jax, [address] [id number] [address]  [address]  [phone]      Ever Alcocer MD Otolaryngology, Surgery In 2 days  [place] [address]  [address]  [address] [address] [address]  [phone]             Patient's Medications   Start Taking    CLINDAMYCIN (CLEOCIN) 300 MG CAPSULE    Take 1 Cap by mouth four (4) times daily for 10 days. OXYCODONE-ACETAMINOPHEN (PERCOCET) 5-325 MG PER TABLET    Take 1 Tab by mouth every four (4) hours as needed for Pain for up to 5 days. Max Daily Amount: 6 Tabs. Continue Taking    AMLODIPINE (NORVASC) 10 MG TABLET    TAKE 1 TABLET BY MOUTH DAILY    CHOLECALCIFEROL, VITAMIN D3, (VITAMIN D3) 5,000 UNIT TAB TABLET    Take  by mouth daily. CLONIDINE HCL (CATAPRES) 0.2 MG TABLET    TK 1 T PO QHS FOR BLOOD PRESSURE    FUROSEMIDE (LASIX) 20 MG TABLET    Take 20 mg by mouth daily. INSULIN GLARGINE (LANTUS SOLOSTAR) 100 UNIT/ML (3 ML) PEN    by SubCUTAneous route. 35 units nightly    INSULIN LISPRO (HUMALOG) 100 UNIT/ML INJECTION    by SubCUTAneous route. Sliding scale    LOSARTAN (COZAAR) 100 MG TABLET    TAKE 1 TABLET BY MOUTH DAILY    MELOXICAM (MOBIC) 7.5 MG TABLET    PO Bid, prn pain, take with food. METOPROLOL SUCCINATE (TOPROL-XL) 50 MG XL TABLET    TAKE 1 TABLET BY MOUTH DAILY    MONTELUKAST (SINGULAIR) 10 MG TABLET    TK 1 T PO Q EVENING    OMEPRAZOLE DELAYED RELEASE (PRILOSEC D/R) 20 MG TABLET    Take 1 Tab by mouth daily. POLYETHYLENE GLYCOL (MIRALAX) 17 GRAM/DOSE POWDER    MIX 1 CAPFUL WITH 8 OZ OF WATER AND DRINK BY MOUTH DAILY. ROSUVASTATIN (CRESTOR) 20 MG TABLET    TAKE 1 TABLET BY MOUTH EVERY NIGHT   These Medications have changed    No medications on file   Stop Taking    No medications on file       Return to the ER if you are unable to obtain referral as directed. Aarti Churchill  results have been reviewed with her. She has been counseled regarding her diagnosis, treatment, and plan. She verbally conveys understanding and agreement of the signs, symptoms, diagnosis, treatment and prognosis and additionally agrees to follow up as discussed.   She also agrees with the care-plan and conveys that all of her questions have been answered. I have also provided discharge instructions for her that include: educational information regarding their diagnosis and treatment, and list of reasons why they would want to return to the ED prior to their follow-up appointment, should her condition change. Umang Reyes ENP-C,FNP-C      Dragon voice recognition was used to generate this report, which may have resulted in some phonetic based errors in grammar and contents.  Even though attempts were made to correct all the mistakes, some may have been missed, and remained in the body of the document

## 2019-08-03 NOTE — ED NOTES
Discharge instructions given to patient and her  by provider. Discharged home via wheelchair, accompanied by , in stable condition.

## 2019-08-03 NOTE — ED TRIAGE NOTES
The patient presents for evaluation of bilateral ear pain and discharge that began today. She was seen at [facility] today and was prescribed antibiotic and ear drops.

## 2019-08-27 ENCOUNTER — OFFICE VISIT (OUTPATIENT)
Dept: VASCULAR SURGERY | Age: 79
End: 2019-08-27

## 2019-08-27 VITALS
WEIGHT: 228 LBS | BODY MASS INDEX: 36.64 KG/M2 | RESPIRATION RATE: 16 BRPM | SYSTOLIC BLOOD PRESSURE: 146 MMHG | HEIGHT: 66 IN | DIASTOLIC BLOOD PRESSURE: 60 MMHG | HEART RATE: 80 BPM

## 2019-08-27 DIAGNOSIS — N18.5 CKD (CHRONIC KIDNEY DISEASE) STAGE 5, GFR LESS THAN 15 ML/MIN (HCC): Primary | ICD-10-CM

## 2019-08-27 NOTE — PROGRESS NOTES
Mamta Kandisasant    No chief complaint on file. History and Physical    Most pleasant 70-year-old female here today for evaluation for dialysis access. She been recommended to have peritoneal dialysis. She is treated for hypertension diabetes and progressive renal failure. She has a history of spine disease. She is moved down here from [place]. She is had spine surgery and a tubal ligation both without complications of infection or pneumonia or DVT. Past Medical History:   Diagnosis Date    Allergic rhinitis     Diabetes ([facility]     H/O seasonal allergies     Hypertension     Left foot drop, chronic since 2004 fusion sx      Patient Active Problem List   Diagnosis Code    Allergic rhinitis J30.9    Hypertension I10    Diabetes ([facility] [address] E11.9    Type 2 diabetes mellitus with nephropathy ([facility] [address] E11.21    Dyspepsia R10.13    Nausea R11.0    Constipation K59.00    Severe obesity (BMI 35.0-39. 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 527 g 3    meloxicam (MOBIC) 7.5 mg tablet PO Bid, prn pain, take with food.  60 Tab 0    losartan (COZAAR) 100 mg tablet TAKE 1 TABLET BY MOUTH DAILY 90 Tab 1    rosuvastatin (CRESTOR) 20 mg tablet TAKE 1 TABLET BY MOUTH EVERY NIGHT 90 Tab 0    metoprolol succinate (TOPROL-XL) 50 mg XL tablet TAKE 1 TABLET BY MOUTH DAILY 90 Tab 0    amLODIPine (NORVASC) 10 mg tablet TAKE 1 TABLET BY MOUTH DAILY 90 Tab 6    Omeprazole delayed release (PRILOSEC D/R) 20 mg tablet Take 1 Tab by mouth daily. 30 Tab 6    cholecalciferol, VITAMIN D3, (VITAMIN D3) 5,000 unit tab tablet Take  by mouth daily.  montelukast (SINGULAIR) 10 mg tablet TK 1 T PO Q EVENING  2    cloNIDine HCl (CATAPRES) 0.2 mg tablet TK 1 T PO QHS FOR BLOOD PRESSURE  4    furosemide (LASIX) 20 mg tablet Take 20 mg by mouth daily.  insulin lispro (HUMALOG) 100 unit/mL injection by SubCUTAneous route. Sliding scale      insulin glargine (LANTUS SOLOSTAR) 100 unit/mL (3 mL) pen by SubCUTAneous route.  35 units nightly       Allergies   Allergen Reactions    Aspirin Other (comments)     Ringing in ears    Keflex [Cephalexin] Rash     Social History     Socioeconomic History    Marital status:      Spouse name: Not on file    Number of children: Not on file    Years of education: Not on file    Highest education level: Not on file   Occupational History    Occupation: CNA - RET   Social Needs    Financial resource strain: Not on file    Food insecurity:     Worry: Not on file     Inability: Not on file    Transportation needs:     Medical: Not on file     Non-medical: Not on file   Tobacco Use    Smoking status: Former Smoker    Smokeless tobacco: Never Used   Substance and Sexual Activity    Alcohol use: No    Drug use: No    Sexual activity: Yes     Partners: Male     Birth control/protection: None   Lifestyle    Physical activity:     Days per week: Not on file     Minutes per session: Not on file    Stress: Not on file   Relationships    Social connections:     Talks on phone: Not on file     Gets together: Not on file     Attends Taoist service: Not on file     Active member of club or organization: Not on file     Attends meetings of clubs or organizations: Not on file     Relationship status: Not on file    Intimate partner violence:     Fear of current or ex partner: Not on file     Emotionally abused: Not on file     Physically abused: Not on file     Forced sexual activity: Not on file   Other Topics Concern    Not on file   Social History Narrative    Not on file      Family History   Problem Relation Age of Onset    Diabetes Maternal Aunt     Diabetes Maternal Uncle     Diabetes Maternal Grandfather     Diabetes Other     Hypertension Cousin        Review of Systems    A full review of systems was completed times ten organ systems and was deemed negative unless otherwise mentioned in the HPI. Physical Exam:    Visit Vitals  /60 (BP 1 Location: Left arm, BP Patient Position: Sitting)   Pulse 80   Resp 16   Ht 5' 6\" (1.676 m)   Wt 228 lb (103.4 kg)   BMI 36.80 kg/m²      Pleasant spirited female in no distress  Head is normocephalic  She is here with wheelchair for ambulation she has foot drop in chronic arthritis limits her ambulation  Neck no JVD  Chest clear  Cardiac regular  Abdomen soft obese nontender she has a midline incision between the umbilicus and the pubic bone  Lower extremities also with obesity no signs of acute arterial insufficiency    Impression and Plan:    ICD-10-CM ICD-9-CM    1. CKD (chronic kidney disease) stage 5, GFR less than 15 ml/min ([facility]) N18.5 585.5      Had a lengthy discussion with her today about peritoneal versus hemodialysis she is. She is also in favor of avoiding hospital stays for acute dialysis. We will plan for insertion of peritoneal dialysis catheter in anticipation of chronic dialysis. No orders of the defined types were placed in this encounter. Luis Miller MD    PLEASE NOTE:  This document has been produced using voice recognition software. Unrecognized errors in transcription may be present.

## 2019-08-27 NOTE — PROGRESS NOTES
1. Have you been to an emergency room or urgent care clinic since your last visit? Aug 6th    Hospitalized since your last visit? If yes, where, when, and reason for visit? No  2. Have you seen or consulted any other health care providers outside of the [facility] since your last visit including any procedures, health maintenance items. If yes, where, when and reason for visit?  NO

## 2019-09-04 NOTE — TELEPHONE ENCOUNTER
Requested Prescriptions     Pending Prescriptions Disp Refills    metoprolol succinate (TOPROL-XL) 50 mg XL tablet 90 Tab 0     Sig: TAKE 1 TABLET BY MOUTH DAILY    rosuvastatin (CRESTOR) 20 mg tablet 90 Tab 0     Sig: TAKE 1 TABLET BY MOUTH EVERY NIGHT       pt also request please put refills on the 90 day supply   Send to [facility]

## 2019-09-05 RX ORDER — ROSUVASTATIN CALCIUM 20 MG/1
TABLET, COATED ORAL
Qty: 90 TAB | Refills: 0 | Status: SHIPPED | OUTPATIENT
Start: 2019-09-05 | End: 2019-10-29 | Stop reason: SDUPTHER

## 2019-09-05 RX ORDER — METOPROLOL SUCCINATE 50 MG/1
TABLET, EXTENDED RELEASE ORAL
Qty: 90 TAB | Refills: 0 | Status: SHIPPED | OUTPATIENT
Start: 2019-09-05 | End: 2019-10-29 | Stop reason: SDUPTHER

## 2019-09-18 ENCOUNTER — HOSPITAL ENCOUNTER (OUTPATIENT)
Dept: PREADMISSION TESTING | Age: 79
Discharge: HOME | End: 2019-09-18
Payer: MEDICARE

## 2019-09-18 ENCOUNTER — HOSPITAL ENCOUNTER (OUTPATIENT)
Dept: GENERAL RADIOLOGY | Age: 79
Discharge: HOME | End: 2019-09-18
Payer: MEDICARE

## 2019-09-18 DIAGNOSIS — N18.5 CKD (CHRONIC KIDNEY DISEASE) STAGE 5, GFR LESS THAN 15 ML/MIN (HCC): ICD-10-CM

## 2019-09-18 LAB
ANION GAP SERPL CALC-SCNC: 8 MMOL/L (ref 3–18)
BUN SERPL-MCNC: 65 MG/DL (ref 7–18)
BUN/CREAT SERPL: 13 (ref 12–20)
CALCIUM SERPL-MCNC: 10.1 MG/DL (ref 8.5–10.1)
CHLORIDE SERPL-SCNC: 112 MMOL/L (ref 100–111)
CO2 SERPL-SCNC: 22 MMOL/L (ref 21–32)
CREAT SERPL-MCNC: 4.98 MG/DL (ref 0.6–1.3)
ERYTHROCYTE [DISTWIDTH] IN BLOOD BY AUTOMATED COUNT: 14.1 % (ref 11.6–14.5)
GLUCOSE SERPL-MCNC: 61 MG/DL (ref 74–99)
HCT VFR BLD AUTO: 31.6 % (ref 35–45)
HGB BLD-MCNC: 10.2 G/DL (ref 12–16)
INR PPP: 1.1 (ref 0.8–1.2)
MCH RBC QN AUTO: 28.7 PG (ref 24–34)
MCHC RBC AUTO-ENTMCNC: 32.3 G/DL (ref 31–37)
MCV RBC AUTO: 88.8 FL (ref 74–97)
PLATELET # BLD AUTO: 162 K/UL (ref 135–420)
PMV BLD AUTO: 10.9 FL (ref 9.2–11.8)
POTASSIUM SERPL-SCNC: 4.6 MMOL/L (ref 3.5–5.5)
PROTHROMBIN TIME: 13.5 SEC (ref 11.5–15.2)
RBC # BLD AUTO: 3.56 M/UL (ref 4.2–5.3)
SODIUM SERPL-SCNC: 142 MMOL/L (ref 136–145)
WBC # BLD AUTO: 4.9 K/UL (ref 4.6–13.2)

## 2019-09-18 PROCEDURE — 93005 ELECTROCARDIOGRAM TRACING: CPT

## 2019-09-18 PROCEDURE — 85610 PROTHROMBIN TIME: CPT

## 2019-09-18 PROCEDURE — 80048 BASIC METABOLIC PNL TOTAL CA: CPT

## 2019-09-18 PROCEDURE — 71046 X-RAY EXAM CHEST 2 VIEWS: CPT

## 2019-09-18 PROCEDURE — 36415 COLL VENOUS BLD VENIPUNCTURE: CPT

## 2019-09-18 PROCEDURE — 85027 COMPLETE CBC AUTOMATED: CPT

## 2019-09-19 LAB
ATRIAL RATE: 67 BPM
CALCULATED P AXIS, ECG09: 67 DEGREES
CALCULATED R AXIS, ECG10: -41 DEGREES
CALCULATED T AXIS, ECG11: -20 DEGREES
DIAGNOSIS, 93000: NORMAL
P-R INTERVAL, ECG05: 176 MS
Q-T INTERVAL, ECG07: 444 MS
QRS DURATION, ECG06: 156 MS
QTC CALCULATION (BEZET), ECG08: 469 MS
VENTRICULAR RATE, ECG03: 67 BPM

## 2019-09-25 ENCOUNTER — ANESTHESIA EVENT (OUTPATIENT)
Dept: SURGERY | Age: 79
End: 2019-09-25
Payer: MEDICARE

## 2019-09-25 NOTE — H&P
History and Physical    Most pleasant 49-year-old female here today for evaluation for dialysis access. She been recommended to have peritoneal dialysis. She is treated for hypertension diabetes and progressive renal failure. She has a history of spine disease. She is moved down here from [place]. She is had spine surgery and a tubal ligation both without complications of infection or pneumonia or DVT.          Past Medical History:   Diagnosis Date    Allergic rhinitis      Diabetes ([facility] [address]      H/O seasonal allergies      Hypertension      Left foot drop, chronic since 2004 fusion sx             Patient Active Problem List   Diagnosis Code    Allergic rhinitis J30.9    Hypertension I10    Diabetes ([facility] [address] E11.9    Type 2 diabetes mellitus with nephropathy ([facility] [address] [facility] E11.21    Dyspepsia R10.13    Nausea R11.0    Constipation K59.00    Severe obesity (BMI 35.0-39. 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 527 g 3    meloxicam (MOBIC) 7.5 mg tablet PO Bid, prn pain, take with food.  60 Tab 0    losartan (COZAAR) 100 mg tablet TAKE 1 TABLET BY MOUTH DAILY 90 Tab 1    rosuvastatin (CRESTOR) 20 mg tablet TAKE 1 TABLET BY MOUTH EVERY NIGHT 90 Tab 0    metoprolol succinate (TOPROL-XL) 50 mg XL tablet TAKE 1 TABLET BY MOUTH DAILY 90 Tab 0    amLODIPine (NORVASC) 10 mg tablet TAKE 1 TABLET BY MOUTH DAILY 90 Tab 6    Omeprazole delayed release (PRILOSEC D/R) 20 mg tablet Take 1 Tab by mouth daily. 30 Tab 6    cholecalciferol, VITAMIN D3, (VITAMIN D3) 5,000 unit tab tablet Take  by mouth daily.        montelukast (SINGULAIR) 10 mg tablet TK 1 T PO Q EVENING   2    cloNIDine HCl (CATAPRES) 0.2 mg tablet TK 1 T PO QHS FOR BLOOD PRESSURE   4    furosemide (LASIX) 20 mg tablet Take 20 mg by mouth daily.        insulin lispro (HUMALOG) 100 unit/mL injection by SubCUTAneous route. Sliding scale        insulin glargine (LANTUS SOLOSTAR) 100 unit/mL (3 mL) pen by SubCUTAneous route.  35 units nightly                Allergies   Allergen Reactions    Aspirin Other (comments)       Ringing in ears    Keflex [Cephalexin] Rash      Social History            Socioeconomic History    Marital status:        Spouse name: Not on file    Number of children: Not on file    Years of education: Not on file    Highest education level: Not on file   Occupational History    Occupation: CNA - RET   Social Needs    Financial resource strain: Not on file    Food insecurity:       Worry: Not on file       Inability: Not on file    Transportation needs:       Medical: Not on file       Non-medical: Not on file   Tobacco Use    Smoking status: Former Smoker    Smokeless tobacco: Never Used   Substance and Sexual Activity    Alcohol use: No    Drug use: No    Sexual activity: Yes       Partners: Male       Birth control/protection: None   Lifestyle    Physical activity:       Days per week: Not on file       Minutes per session: Not on file    Stress: Not on file   Relationships    Social connections:       Talks on phone: Not on file       Gets together: Not on file       Attends Scientology service: Not on file       Active member of club or organization: Not on file       Attends meetings of clubs or organizations: Not on file       Relationship status: Not on file  Intimate partner violence:       Fear of current or ex partner: Not on file       Emotionally abused: Not on file       Physically abused: Not on file       Forced sexual activity: Not on file   Other Topics Concern    Not on file   Social History Narrative    Not on file            Family History   Problem Relation Age of Onset    Diabetes Maternal Aunt      Diabetes Maternal Uncle      Diabetes Maternal Grandfather      Diabetes Other      Hypertension Cousin           Review of Systems    A full review of systems was completed times ten organ systems and was deemed negative unless otherwise mentioned in the HPI. Physical Exam:    Visit Vitals  /60 (BP 1 Location: Left arm, BP Patient Position: Sitting)   Pulse 80   Resp 16   Ht 5' 6\" (1.676 m)   Wt 228 lb (103.4 kg)   BMI 36.80 kg/m²      Pleasant spirited female in no distress  Head is normocephalic  She is here with wheelchair for ambulation she has foot drop in chronic arthritis limits her ambulation  Neck no JVD  Chest clear  Cardiac regular  Abdomen soft obese nontender she has a midline incision between the umbilicus and the pubic bone  Lower extremities also with obesity no signs of acute arterial insufficiency     Impression and Plan:      ICD-10-CM ICD-9-CM     1. CKD (chronic kidney disease) stage 5, GFR less than 15 ml/min ([facility]) N18.5 585. 5        Had a lengthy discussion with her today about peritoneal versus hemodialysis she is. She is also in favor of avoiding hospital stays for acute dialysis.   We will plan for insertion of peritoneal dialysis catheter in anticipation of chronic dialysis

## 2019-09-26 ENCOUNTER — HOSPITAL ENCOUNTER (OUTPATIENT)
Age: 79
Discharge: HOME | End: 2019-09-26
Attending: SURGERY | Admitting: SURGERY
Payer: MEDICARE

## 2019-09-26 ENCOUNTER — ANESTHESIA (OUTPATIENT)
Dept: SURGERY | Age: 79
End: 2019-09-26
Payer: MEDICARE

## 2019-09-26 ENCOUNTER — APPOINTMENT (OUTPATIENT)
Dept: GENERAL RADIOLOGY | Age: 79
End: 2019-09-26
Attending: SURGERY
Payer: MEDICARE

## 2019-09-26 VITALS
HEART RATE: 56 BPM | DIASTOLIC BLOOD PRESSURE: 67 MMHG | OXYGEN SATURATION: 99 % | RESPIRATION RATE: 20 BRPM | HEIGHT: 65 IN | TEMPERATURE: 97.3 F | WEIGHT: 219 LBS | BODY MASS INDEX: 36.49 KG/M2 | SYSTOLIC BLOOD PRESSURE: 145 MMHG

## 2019-09-26 DIAGNOSIS — N18.5 CKD (CHRONIC KIDNEY DISEASE) STAGE 5, GFR LESS THAN 15 ML/MIN (HCC): Primary | ICD-10-CM

## 2019-09-26 LAB
BUN BLD-MCNC: 58 MG/DL (ref 7–18)
CHLORIDE BLD-SCNC: 114 MMOL/L (ref 100–108)
GLUCOSE BLD STRIP.AUTO-MCNC: 114 MG/DL (ref 70–110)
GLUCOSE BLD STRIP.AUTO-MCNC: 125 MG/DL (ref 70–110)
GLUCOSE BLD STRIP.AUTO-MCNC: 61 MG/DL (ref 74–106)
HCT VFR BLD CALC: 29 % (ref 36–49)
HGB BLD-MCNC: 9.9 G/DL (ref 12–16)
POTASSIUM BLD-SCNC: 4.8 MMOL/L (ref 3.5–5.5)
SODIUM BLD-SCNC: 142 MMOL/L (ref 136–145)

## 2019-09-26 PROCEDURE — 76210000006 HC OR PH I REC 0.5 TO 1 HR: Performed by: SURGERY

## 2019-09-26 PROCEDURE — 77030026438 HC STYL ET INTUB CARD -A: Performed by: ANESTHESIOLOGY

## 2019-09-26 PROCEDURE — 82947 ASSAY GLUCOSE BLOOD QUANT: CPT

## 2019-09-26 PROCEDURE — 76210000022 HC REC RM PH II 1.5 TO 2 HR: Performed by: SURGERY

## 2019-09-26 PROCEDURE — 77030031139 HC SUT VCRL2 J&J -A: Performed by: SURGERY

## 2019-09-26 PROCEDURE — C1750 CATH, HEMODIALYSIS,LONG-TERM: HCPCS | Performed by: SURGERY

## 2019-09-26 PROCEDURE — 77030039266 HC ADH SKN EXOFIN S2SG -A: Performed by: SURGERY

## 2019-09-26 PROCEDURE — 74011000250 HC RX REV CODE- 250

## 2019-09-26 PROCEDURE — 77030020782 HC GWN BAIR PAWS FLX 3M -B: Performed by: SURGERY

## 2019-09-26 PROCEDURE — 74011250637 HC RX REV CODE- 250/637

## 2019-09-26 PROCEDURE — 74011000250 HC RX REV CODE- 250: Performed by: PHYSICIAN ASSISTANT

## 2019-09-26 PROCEDURE — 77030002986 HC SUT PROL J&J -A: Performed by: SURGERY

## 2019-09-26 PROCEDURE — 76010000149 HC OR TIME 1 TO 1.5 HR: Performed by: SURGERY

## 2019-09-26 PROCEDURE — 74011000258 HC RX REV CODE- 258: Performed by: ANESTHESIOLOGY

## 2019-09-26 PROCEDURE — 77030004495 HC ADPT PERI DLYS BAXT -C: Performed by: SURGERY

## 2019-09-26 PROCEDURE — 77030012968 HC TBNG DLYS BAXT -A: Performed by: SURGERY

## 2019-09-26 PROCEDURE — 82962 GLUCOSE BLOOD TEST: CPT

## 2019-09-26 PROCEDURE — 74011000258 HC RX REV CODE- 258: Performed by: PHYSICIAN ASSISTANT

## 2019-09-26 PROCEDURE — 74011000250 HC RX REV CODE- 250: Performed by: SURGERY

## 2019-09-26 PROCEDURE — 76060000033 HC ANESTHESIA 1 TO 1.5 HR: Performed by: SURGERY

## 2019-09-26 PROCEDURE — 74011250636 HC RX REV CODE- 250/636

## 2019-09-26 PROCEDURE — 77030008683 HC TU ET CUF COVD -A: Performed by: ANESTHESIOLOGY

## 2019-09-26 PROCEDURE — 77030020255 HC SOL INJ LR 1000ML BG: Performed by: SURGERY

## 2019-09-26 PROCEDURE — 77030018836 HC SOL IRR NACL ICUM -A: Performed by: SURGERY

## 2019-09-26 PROCEDURE — 77030002933 HC SUT MCRYL J&J -A: Performed by: SURGERY

## 2019-09-26 PROCEDURE — 74018 RADEX ABDOMEN 1 VIEW: CPT

## 2019-09-26 RX ORDER — HYDROCODONE BITARTRATE AND ACETAMINOPHEN 5; 325 MG/1; MG/1
1 TABLET ORAL
Status: DISCONTINUED | OUTPATIENT
Start: 2019-09-26 | End: 2019-09-26 | Stop reason: HOSPADM

## 2019-09-26 RX ORDER — NALBUPHINE HYDROCHLORIDE 10 MG/ML
5 INJECTION, SOLUTION INTRAMUSCULAR; INTRAVENOUS; SUBCUTANEOUS
Status: DISCONTINUED | OUTPATIENT
Start: 2019-09-26 | End: 2019-09-26 | Stop reason: HOSPADM

## 2019-09-26 RX ORDER — MAGNESIUM SULFATE 100 %
4 CRYSTALS MISCELLANEOUS AS NEEDED
Status: DISCONTINUED | OUTPATIENT
Start: 2019-09-26 | End: 2019-09-26 | Stop reason: HOSPADM

## 2019-09-26 RX ORDER — ONDANSETRON 2 MG/ML
INJECTION INTRAMUSCULAR; INTRAVENOUS AS NEEDED
Status: DISCONTINUED | OUTPATIENT
Start: 2019-09-26 | End: 2019-09-26 | Stop reason: HOSPADM

## 2019-09-26 RX ORDER — LIDOCAINE HYDROCHLORIDE 20 MG/ML
INJECTION, SOLUTION EPIDURAL; INFILTRATION; INTRACAUDAL; PERINEURAL AS NEEDED
Status: DISCONTINUED | OUTPATIENT
Start: 2019-09-26 | End: 2019-09-26 | Stop reason: HOSPADM

## 2019-09-26 RX ORDER — FAMOTIDINE 20 MG/1
20 TABLET, FILM COATED ORAL ONCE
Status: COMPLETED | OUTPATIENT
Start: 2019-09-26 | End: 2019-09-26

## 2019-09-26 RX ORDER — SUCCINYLCHOLINE CHLORIDE 20 MG/ML
INJECTION INTRAMUSCULAR; INTRAVENOUS AS NEEDED
Status: DISCONTINUED | OUTPATIENT
Start: 2019-09-26 | End: 2019-09-26 | Stop reason: HOSPADM

## 2019-09-26 RX ORDER — FENTANYL CITRATE 50 UG/ML
25 INJECTION, SOLUTION INTRAMUSCULAR; INTRAVENOUS AS NEEDED
Status: DISCONTINUED | OUTPATIENT
Start: 2019-09-26 | End: 2019-09-26 | Stop reason: HOSPADM

## 2019-09-26 RX ORDER — INSULIN LISPRO 100 [IU]/ML
INJECTION, SOLUTION INTRAVENOUS; SUBCUTANEOUS ONCE
Status: DISCONTINUED | OUTPATIENT
Start: 2019-09-26 | End: 2019-09-26 | Stop reason: HOSPADM

## 2019-09-26 RX ORDER — LIDOCAINE HYDROCHLORIDE 10 MG/ML
0.1 INJECTION, SOLUTION EPIDURAL; INFILTRATION; INTRACAUDAL; PERINEURAL AS NEEDED
Status: DISCONTINUED | OUTPATIENT
Start: 2019-09-26 | End: 2019-09-26 | Stop reason: HOSPADM

## 2019-09-26 RX ORDER — HYDROCODONE BITARTRATE AND ACETAMINOPHEN 5; 325 MG/1; MG/1
1-2 TABLET ORAL
Qty: 40 TAB | Refills: 0 | Status: SHIPPED | OUTPATIENT
Start: 2019-09-26 | End: 2019-10-01

## 2019-09-26 RX ORDER — DEXTROSE 50 % IN WATER (D50W) INTRAVENOUS SYRINGE
25-50 AS NEEDED
Status: DISCONTINUED | OUTPATIENT
Start: 2019-09-26 | End: 2019-09-26 | Stop reason: HOSPADM

## 2019-09-26 RX ORDER — SODIUM CHLORIDE 9 MG/ML
25 INJECTION, SOLUTION INTRAVENOUS CONTINUOUS
Status: DISCONTINUED | OUTPATIENT
Start: 2019-09-26 | End: 2019-09-26 | Stop reason: HOSPADM

## 2019-09-26 RX ORDER — ROCURONIUM BROMIDE 10 MG/ML
INJECTION, SOLUTION INTRAVENOUS AS NEEDED
Status: DISCONTINUED | OUTPATIENT
Start: 2019-09-26 | End: 2019-09-26 | Stop reason: HOSPADM

## 2019-09-26 RX ORDER — SODIUM CHLORIDE 0.9 % (FLUSH) 0.9 %
5-40 SYRINGE (ML) INJECTION EVERY 8 HOURS
Status: DISCONTINUED | OUTPATIENT
Start: 2019-09-26 | End: 2019-09-26 | Stop reason: HOSPADM

## 2019-09-26 RX ORDER — LIDOCAINE HYDROCHLORIDE 10 MG/ML
INJECTION, SOLUTION EPIDURAL; INFILTRATION; INTRACAUDAL; PERINEURAL AS NEEDED
Status: DISCONTINUED | OUTPATIENT
Start: 2019-09-26 | End: 2019-09-26 | Stop reason: HOSPADM

## 2019-09-26 RX ORDER — FAMOTIDINE 20 MG/1
TABLET, FILM COATED ORAL
Status: COMPLETED
Start: 2019-09-26 | End: 2019-09-26

## 2019-09-26 RX ORDER — DEXTROSE 50 % IN WATER (D50W) INTRAVENOUS SYRINGE
Status: COMPLETED
Start: 2019-09-26 | End: 2019-09-26

## 2019-09-26 RX ORDER — DEXTROSE MONOHYDRATE AND SODIUM CHLORIDE 5; .225 G/100ML; G/100ML
25 INJECTION, SOLUTION INTRAVENOUS CONTINUOUS
Status: DISCONTINUED | OUTPATIENT
Start: 2019-09-26 | End: 2019-09-26 | Stop reason: HOSPADM

## 2019-09-26 RX ORDER — DIPHENHYDRAMINE HYDROCHLORIDE 50 MG/ML
12.5 INJECTION, SOLUTION INTRAMUSCULAR; INTRAVENOUS
Status: DISCONTINUED | OUTPATIENT
Start: 2019-09-26 | End: 2019-09-26 | Stop reason: HOSPADM

## 2019-09-26 RX ORDER — SODIUM CHLORIDE 0.9 % (FLUSH) 0.9 %
5-40 SYRINGE (ML) INJECTION AS NEEDED
Status: DISCONTINUED | OUTPATIENT
Start: 2019-09-26 | End: 2019-09-26 | Stop reason: HOSPADM

## 2019-09-26 RX ORDER — SODIUM CHLORIDE, SODIUM LACTATE, POTASSIUM CHLORIDE, CALCIUM CHLORIDE 600; 310; 30; 20 MG/100ML; MG/100ML; MG/100ML; MG/100ML
50 INJECTION, SOLUTION INTRAVENOUS CONTINUOUS
Status: DISCONTINUED | OUTPATIENT
Start: 2019-09-26 | End: 2019-09-26 | Stop reason: ALTCHOICE

## 2019-09-26 RX ORDER — PROPOFOL 10 MG/ML
INJECTION, EMULSION INTRAVENOUS AS NEEDED
Status: DISCONTINUED | OUTPATIENT
Start: 2019-09-26 | End: 2019-09-26 | Stop reason: HOSPADM

## 2019-09-26 RX ORDER — FENTANYL CITRATE 50 UG/ML
INJECTION, SOLUTION INTRAMUSCULAR; INTRAVENOUS AS NEEDED
Status: DISCONTINUED | OUTPATIENT
Start: 2019-09-26 | End: 2019-09-26 | Stop reason: HOSPADM

## 2019-09-26 RX ADMIN — FAMOTIDINE 20 MG: 20 TABLET, FILM COATED ORAL at 11:19

## 2019-09-26 RX ADMIN — DEXTROSE MONOHYDRATE AND SODIUM CHLORIDE 25 ML/HR: 5; .225 INJECTION, SOLUTION INTRAVENOUS at 11:18

## 2019-09-26 RX ADMIN — SUCCINYLCHOLINE CHLORIDE 100 MG: 20 INJECTION INTRAMUSCULAR; INTRAVENOUS at 12:07

## 2019-09-26 RX ADMIN — CLINDAMYCIN 600 MG: 150 INJECTION, SOLUTION INTRAMUSCULAR; INTRAVENOUS at 12:02

## 2019-09-26 RX ADMIN — DEXTROSE 50 % IN WATER (D50W) INTRAVENOUS SYRINGE 25 ML: at 11:24

## 2019-09-26 RX ADMIN — ONDANSETRON 4 MG: 2 INJECTION INTRAMUSCULAR; INTRAVENOUS at 12:07

## 2019-09-26 RX ADMIN — ROCURONIUM BROMIDE 10 MG: 10 INJECTION, SOLUTION INTRAVENOUS at 12:07

## 2019-09-26 RX ADMIN — FAMOTIDINE 20 MG: 20 TABLET ORAL at 11:19

## 2019-09-26 RX ADMIN — PROPOFOL 100 MG: 10 INJECTION, EMULSION INTRAVENOUS at 12:07

## 2019-09-26 RX ADMIN — LIDOCAINE HYDROCHLORIDE 20 MG: 20 INJECTION, SOLUTION EPIDURAL; INFILTRATION; INTRACAUDAL; PERINEURAL at 12:07

## 2019-09-26 RX ADMIN — FENTANYL CITRATE 100 MCG: 50 INJECTION, SOLUTION INTRAMUSCULAR; INTRAVENOUS at 12:02

## 2019-09-26 NOTE — ANESTHESIA POSTPROCEDURE EVALUATION
Procedure(s):  PERITONEAL DIALYSIS CATHETER PLACEMENT. general    Anesthesia Post Evaluation      Multimodal analgesia: multimodal analgesia used between 6 hours prior to anesthesia start to PACU discharge  Patient location during evaluation: bedside  Patient participation: complete - patient participated  Level of consciousness: awake  Pain management: adequate  Airway patency: patent  Anesthetic complications: no  Cardiovascular status: stable  Respiratory status: acceptable  Hydration status: acceptable  Post anesthesia nausea and vomiting:  controlled      Vitals Value Taken Time   /73 9/26/2019  1:46 PM   Temp 36.5 °C (97.7 °F) 9/26/2019  1:10 PM   Pulse 93 9/26/2019  1:47 PM   Resp 15 9/26/2019  1:47 PM   SpO2 99 % 9/26/2019  1:48 PM   Vitals shown include unvalidated device data.

## 2019-09-26 NOTE — ANESTHESIA PREPROCEDURE EVALUATION
Relevant Problems   No relevant active problems       Anesthetic History   No history of anesthetic complications            Review of Systems / Medical History  Patient summary reviewed and pertinent labs reviewed    Pulmonary                   Neuro/Psych              Cardiovascular    Hypertension                   GI/Hepatic/Renal         Renal disease: ESRD       Endo/Other    Diabetes: type 2, using insulin    Morbid obesity and arthritis     Other Findings              Physical Exam    Airway  Mallampati: II  TM Distance: 4 - 6 cm  Neck ROM: normal range of motion   Mouth opening: Normal     Cardiovascular    Rhythm: regular  Rate: normal         Dental    Dentition: Poor dentition     Pulmonary  Breath sounds clear to auscultation               Abdominal  GI exam deferred       Other Findings            Anesthetic Plan    ASA: 3  Anesthesia type: general          Induction: Intravenous  Anesthetic plan and risks discussed with: Patient

## 2019-09-26 NOTE — DISCHARGE INSTRUCTIONS
Peritoneal Dialysis Catheter Care: Care Instructions  Your Care Instructions    Dialysis does the work of your kidneys when you have kidney failure. It filters wastes and removes extra fluid. It also keeps the right balance of chemicals in your blood. Peritoneal dialysis uses the lining of your belly to filter your blood. Before you start dialysis, your doctor creates a dialysis access. This is the place where the dialysis solution can flow into and out of your body. To make the access, the doctor places a soft tube in your belly. This tube is called a catheter. When you do dialysis, the solution flows into your belly and stays there for several hours. Then you remove it through the catheter. It is important to take care of the catheter and the access area to prevent infection. Follow-up care is a key part of your treatment and safety. Be sure to make and go to all appointments, and call your doctor if you are having problems. It's also a good idea to know your test results and keep a list of the medicines you take. How can you care for yourself at home? Care of the catheter and access  · After the doctor creates your access, keep the bandage dry and clean. Change a dirty or bloody bandage. · Keep your access area clean and dry. Check it every day for signs of infection. · Always clean and dry your catheter and access area right away after you get wet. · Always wash your hands before you touch the catheter. · Fasten or tape the catheter to your body to keep it from catching on your clothes. · Never use scissors or other sharp objects around your catheter. · Do not use unapproved clamps on your catheter. · Store your dialysis supplies in a cool, dry place. Activity when you have an access  · Do not lift heavy objects. · Do not swim or take a bath unless your doctor tells you it is okay. When should you call for help?   Call your doctor now or seek immediate medical care if:    · You have signs of infection, such as:  ? Increased pain, swelling, warmth, or redness. ? Red streaks leading from the access area. ? Pus draining from the access area. ? A fever.     · You have belly pain.     · You have nausea or vomiting.     · The dialysis fluid looks cloudy or is a different color.     · Fluid does not flow through the catheter.    Watch closely for changes in your health, and be sure to contact your doctor if you have any problems. Where can you learn more? Go to http://arlette-michel.info/. Enter R310 in the search box to learn more about \"Peritoneal Dialysis Catheter Care: Care Instructions. \"  Current as of: October 31, 2018  Content Version: 12.2  © 7263-4378 about.me. Care instructions adapted under license by [employer] (which disclaims liability or warranty for this information). If you have questions about a medical condition or this instruction, always ask your healthcare professional. [place] [address] any warranty or liability for your use of this information. DISCHARGE SUMMARY from Nurse    PATIENT INSTRUCTIONS:    After general anesthesia or intravenous sedation, for 24 hours or while taking prescription Narcotics:  · Limit your activities  · Do not drive and operate hazardous machinery  · Do not make important personal or business decisions  · Do  not drink alcoholic beverages  · If you have not urinated within 8 hours after discharge, please contact your surgeon on call.     Report the following to your surgeon:  · Excessive pain, swelling, redness or odor of or around the surgical area  · Temperature over 100.5  · Nausea and vomiting lasting longer than 4 hours or if unable to take medications  · Any signs of decreased circulation or nerve impairment to extremity: change in color, persistent  numbness, tingling, coldness or increase pain  · Any questions    What to do at Home:  Recommended activity: Activity as tolerated and no driving for today. *  Please give a list of your current medications to your Primary Care Provider. *  Please update this list whenever your medications are discontinued, doses are      changed, or new medications (including over-the-counter products) are added. *  Please carry medication information at all times in case of emergency situations. These are general instructions for a healthy lifestyle:    No smoking/ No tobacco products/ Avoid exposure to second hand smoke  Surgeon General's Warning:  Quitting smoking now greatly reduces serious risk to your health. Obesity, smoking, and sedentary lifestyle greatly increases your risk for illness    A healthy diet, regular physical exercise & weight monitoring are important for maintaining a healthy lifestyle    You may be retaining fluid if you have a history of heart failure or if you experience any of the following symptoms:  Weight gain of 3 pounds or more overnight or 5 pounds in a week, increased swelling in our hands or feet or shortness of breath while lying flat in bed. Please call your doctor as soon as you notice any of these symptoms; do not wait until your next office visit. The discharge information has been reviewed with the patient. The patient verbalized understanding. Discharge medications reviewed with the patient and appropriate educational materials and side effects teaching were provided.   ___________________________________________________________________________________________________________________________________

## 2019-09-27 ENCOUNTER — TELEPHONE (OUTPATIENT)
Dept: VASCULAR SURGERY | Age: 79
End: 2019-09-27

## 2019-09-27 ENCOUNTER — OFFICE VISIT (OUTPATIENT)
Dept: VASCULAR SURGERY | Age: 79
End: 2019-09-27

## 2019-09-27 DIAGNOSIS — N18.6 ESRD (END STAGE RENAL DISEASE) (HCC): Primary | ICD-10-CM

## 2019-09-27 NOTE — OP NOTES
[address] [place] [address]   OPERATIVE REPORT    Name:  Tess Carpenter  MR#:   [id number]  :  1940  ACCOUNT #:  [de-identified]  DATE OF SERVICE:  2019    PREOPERATIVE DIAGNOSIS:  End-stage renal disease. POSTOPERATIVE DIAGNOSIS:  End-stage renal disease. PROCEDURE PERFORMED:  Insertion of peritoneal dialysis catheter with the use of fluoroscopy. SURGEON:  DO Eli    ASSISTANT:  None. ANESTHESIA:  General.    COMPLICATIONS:  Zero. SPECIMENS REMOVED:  0.    IMPLANTS:  Two-cuff Tenckhoff catheter. ESTIMATED BLOOD LOSS:  Zero. CONDITION OF THE PATIENT:  Stable. DETAILS OF PROCEDURE:  The patient is a 29-year-old anticipating dialysis, she does not want a dialysis catheter or hemodialysis, wants to do her own peritoneal at home. She understands the risks and benefits of the procedure. She had preop antibiotic. She was brought to the room where she had general anesthesia. Her abdomen was shaved, prepped, and draped in the usual standard fashion. There was a midline incision. I made the incision below the umbilicus at the midline into the left. I carried this down through the soft tissues and fascia. I encountered no difficulty with scar tissue throughout the procedure. I incised the fascia and swept the rectus muscle laterally. I made a pursestring suture on the posterior fascia and incised between this. I had a clear visualization of the peritoneum. I brought this up, identified, and placed the Tenckhoff catheter down to the lower pelvis. I placed the first cuff here at this posterior fascia and snugged up the pursestring suture around it. I then closed the overlying anterior fascia with figure-of-eight #1 Prolene interrupted. Then, I tunneled the catheter laterally to the right, a point higher in position, placing the second cuff in the subcutaneous. I connected this and ran 500 mL of fluid in then drained 500 out, it was clear.   There was no difficulty with transit. I put the sterile caps and rings on it. Irrigated and closed the wounds with 2-0 Monocryl for the fascia, 3-0 Monocryl for the subcutaneous, 4-0 Monocryl and Dermabond glue for the skin. She was extubated and transferred to Recovery in stable condition.       Mor Pena DO      [id number]  D:  09/26/2019 13:12  T:  09/26/2019 21:57  JOB #:  [id number]

## 2019-09-27 NOTE — TELEPHONE ENCOUNTER
Patient called stating that she had a procedure yesterday, 09.26.19 and wanted to know if it is normal that the bandage that was used is turning pink in color. Requesting for a return call.

## 2019-10-03 ENCOUNTER — TELEPHONE (OUTPATIENT)
Dept: VASCULAR SURGERY | Age: 79
End: 2019-10-03

## 2019-10-07 NOTE — PROGRESS NOTES
Subjective:      Claude Fernando is a 78 y.o. female who presents today for post op visit, status post placement of PD catheter for anticipated dialysis. She has a surgical incision wound which is located on the lower abdomen. Current symptoms: She was already seen by her nurse and then referred to the PD clinic because she has had some drainage from the exit site of her catheter. She feels well overall. No pain or tenderness, no fevers or chills. Objective: There were no vitals taken for this visit. Wound:   wound margins intact and healing well. No signs of infection. This is in regards to the lower abdominal incision site       As per the PD exit site currently has no expressible drainage and the site looks good and clean with no erythema and no purulence. Her dressing did have some old dried bloody drainage. Assessment:     Wound check/discharge visit. Plan:       ICD-10-CM ICD-9-CM    1. CKD (chronic kidney disease) stage 5, GFR less than 15 ml/min ([facility]) N18.5 585.5    2. PD catheter dysfunction, initial encounter ([facility] T85.611A 996.56      No orders of the defined types were placed in this encounter. I did explain that with her  abdominal girth, sometimes that tunnel into the abdominal cavity can create an environment that patients can experience exit site drainage that can be bloody to serous to serosanguineous in variety. So it is pretty normal quality drainage that seems to probably be slowing down and without any other significant symptoms should probably get better and will just recommend continued dressing changes. It will be okay to plan on transition to PD clinic for dressing changes/flushes/PD training. We can see her back if any further issues    MITZI Hays    Portions of this note have been entered using voice recognition software.

## 2019-10-08 ENCOUNTER — OFFICE VISIT (OUTPATIENT)
Dept: VASCULAR SURGERY | Age: 79
End: 2019-10-08

## 2019-10-08 DIAGNOSIS — N18.5 CKD (CHRONIC KIDNEY DISEASE) STAGE 5, GFR LESS THAN 15 ML/MIN (HCC): Primary | ICD-10-CM

## 2019-10-08 DIAGNOSIS — T85.611A PD CATHETER DYSFUNCTION, INITIAL ENCOUNTER (HCC): ICD-10-CM

## 2019-10-23 ENCOUNTER — HOSPITAL ENCOUNTER (OUTPATIENT)
Dept: LAB | Age: 79
Discharge: HOME | End: 2019-10-23
Payer: MEDICARE

## 2019-10-23 ENCOUNTER — HOSPITAL ENCOUNTER (OUTPATIENT)
Dept: GENERAL RADIOLOGY | Age: 79
Discharge: HOME | End: 2019-10-23
Payer: MEDICARE

## 2019-10-23 DIAGNOSIS — N18.5 CHRONIC KIDNEY DISEASE, STAGE V (HCC): ICD-10-CM

## 2019-10-23 PROCEDURE — 86706 HEP B SURFACE ANTIBODY: CPT

## 2019-10-23 PROCEDURE — 71046 X-RAY EXAM CHEST 2 VIEWS: CPT

## 2019-10-23 PROCEDURE — 36415 COLL VENOUS BLD VENIPUNCTURE: CPT

## 2019-10-23 PROCEDURE — 86704 HEP B CORE ANTIBODY TOTAL: CPT

## 2019-10-23 PROCEDURE — 87340 HEPATITIS B SURFACE AG IA: CPT

## 2019-10-24 LAB
HBV CORE AB SERPL QL IA: POSITIVE
HBV SURFACE AB SER QL IA: POSITIVE
HBV SURFACE AB SERPL IA-ACNC: >1000 MIU/ML
HBV SURFACE AG SER QL: 0.17 INDEX
HBV SURFACE AG SER QL: NEGATIVE
HEP BS AB COMMENT,HBSAC: NORMAL

## 2019-10-29 RX ORDER — ROSUVASTATIN CALCIUM 20 MG/1
TABLET, COATED ORAL
Qty: 90 TAB | Refills: 0 | Status: SHIPPED | OUTPATIENT
Start: 2019-10-29 | End: 2020-01-15

## 2019-10-29 RX ORDER — METOPROLOL SUCCINATE 50 MG/1
TABLET, EXTENDED RELEASE ORAL
Qty: 90 TAB | Refills: 0 | Status: SHIPPED | OUTPATIENT
Start: 2019-10-29 | End: 2020-01-15

## 2019-10-29 NOTE — TELEPHONE ENCOUNTER
Spoke with Esequiel See, permission to release on file, to inquire which pharmacy for refill request for metoprolol, rosuvastatin, and furosemide. Mr. Risa Machado [facility] . Need to confirm if [facility]'s report [facility] was actually the patient's report due to no patient name or date of birth on report. Mr. Risa Machado did confirm that report was given in envelope with patient's name on the outside. Informed that patient needs to contact the office to schedule an appointment. Mr. Risa Machado verbalized understanding and will have patient contact the office to schedule the appointment.

## 2019-11-05 RX ORDER — FUROSEMIDE 20 MG/1
20 TABLET ORAL DAILY
Qty: 90 TAB | Refills: 0 | OUTPATIENT
Start: 2019-11-05

## 2019-11-06 NOTE — TELEPHONE ENCOUNTER
Spoke with Sonya Bolton, permission to release on file, to inform that Dr. Pedro Gardner is not the prescribing provider for the medication furosemide (Lasix). Informed that if request was sent to Dr. Pedro Gardner in error, to please send request to the prescribing provider. If patient is requesting medication from Dr. Pedro Gardner, please provide the prescribing provider and the reason that provider had patient taking this medication. Mr. Alonzo Jones verbalized understanding.

## 2019-12-12 ENCOUNTER — OFFICE VISIT (OUTPATIENT)
Dept: ORTHOPEDIC SURGERY | Age: 79
End: 2019-12-12

## 2019-12-12 VITALS
BODY MASS INDEX: 36.44 KG/M2 | SYSTOLIC BLOOD PRESSURE: 133 MMHG | DIASTOLIC BLOOD PRESSURE: 50 MMHG | HEIGHT: 65 IN | HEART RATE: 74 BPM

## 2019-12-12 DIAGNOSIS — M25.551 RIGHT HIP PAIN: ICD-10-CM

## 2019-12-12 DIAGNOSIS — M70.61 TROCHANTERIC BURSITIS OF RIGHT HIP: Primary | ICD-10-CM

## 2019-12-12 RX ORDER — BETAMETHASONE SODIUM PHOSPHATE AND BETAMETHASONE ACETATE 3; 3 MG/ML; MG/ML
6 INJECTION, SUSPENSION INTRA-ARTICULAR; INTRALESIONAL; INTRAMUSCULAR; SOFT TISSUE ONCE
Qty: 1 ML | Refills: 0
Start: 2019-12-12 | End: 2019-12-12

## 2019-12-12 NOTE — PROGRESS NOTES
[address] [facility], [address] [facility]  [phone]           Patient: Claude Fernando                MRN: [id number]       SSN: [id number]  YOB: 1940        AGE: 78 y.o. SEX: female  Body mass index is 36.44 kg/m². PCP: Fitz Tracey MD  12/12/19      This office note has been dictated. REVIEW OF SYSTEMS:  Constitutional: Negative for fever, chills, weight loss and malaise/fatigue. HENT: Negative. Eyes: Negative. Respiratory: Negative. Cardiovascular: Negative. Gastrointestinal: No bowel incontinence or constipation. Genitourinary: No bladder incontinence or saddle anesthesia. Skin: Negative. Neurological: Negative. Endo/Heme/Allergies: Negative. Psychiatric/Behavioral: Negative. Musculoskeletal: As per HPI above. Past Medical History:   Diagnosis Date    Allergic rhinitis     Chronic kidney disease     stage 5    Diabetes ([facility]     H/O seasonal allergies     Hypertension     Left foot drop, chronic since 2004 fusion sx          Current Outpatient Medications:     metoprolol succinate (TOPROL-XL) 50 mg XL tablet, TAKE 1 TABLET BY MOUTH DAILY. APPOINTMENT REQUIRED BEFORE NEXT REFILL. , Disp: 90 Tab, Rfl: 0    rosuvastatin (CRESTOR) 20 mg tablet, TAKE 1 TABLET BY MOUTH EVERY NIGHT. APPOINTMENT REQUIRED BEFORE NEXT REFILL. , Disp: 90 Tab, Rfl: 0    losartan (COZAAR) 100 mg tablet, TAKE 1 TABLET BY MOUTH DAILY (Patient taking differently: 50 mg. TAKE 1 TABLET BY MOUTH DAILY), Disp: 90 Tab, Rfl: 1    amLODIPine (NORVASC) 10 mg tablet, TAKE 1 TABLET BY MOUTH DAILY, Disp: 90 Tab, Rfl: 6    cholecalciferol, VITAMIN D3, (VITAMIN D3) 5,000 unit tab tablet, Take  by mouth daily. , Disp: , Rfl:     montelukast (SINGULAIR) 10 mg tablet, TK 1 T PO Q EVENING, Disp: , Rfl: 2    cloNIDine HCl (CATAPRES) 0.2 mg tablet, TK 1 T PO QHS FOR BLOOD PRESSURE, Disp: , Rfl: 4    furosemide (LASIX) 20 mg tablet, Take 20 mg by mouth daily. , Disp: , Rfl:     insulin lispro (HUMALOG) 100 unit/mL injection, by SubCUTAneous route. Sliding scale, Disp: , Rfl:     insulin glargine (LANTUS SOLOSTAR) 100 unit/mL (3 mL) pen, by SubCUTAneous route.  35 units nightly, Disp: , Rfl:     Allergies   Allergen Reactions    Aspirin Other (comments)     Ringing in ears    Keflex [Cephalexin] Rash       Social History     Socioeconomic History    Marital status:      Spouse name: Not on file    Number of children: Not on file    Years of education: Not on file    Highest education level: Not on file   Occupational History    Occupation: CNA - RET   Social Needs    Financial resource strain: Not on file    Food insecurity:     Worry: Not on file     Inability: Not on file    Transportation needs:     Medical: Not on file     Non-medical: Not on file   Tobacco Use    Smoking status: Former Smoker    Smokeless tobacco: Never Used   Substance and Sexual Activity    Alcohol use: No    Drug use: No    Sexual activity: Yes     Partners: Male     Birth control/protection: None   Lifestyle    Physical activity:     Days per week: Not on file     Minutes per session: Not on file    Stress: Not on file   Relationships    Social connections:     Talks on phone: Not on file     Gets together: Not on file     Attends [employer] service: Not on file     Active member of club or organization: Not on file     Attends meetings of clubs or organizations: Not on file     Relationship status: Not on file    Intimate partner violence:     Fear of current or ex partner: Not on file     Emotionally abused: Not on file     Physically abused: Not on file     Forced sexual activity: Not on file   Other Topics Concern    Not on file   Social History Narrative    Not on file       Past Surgical History:   Procedure Laterality Date    HAND/FINGER SURGERY UNLISTED      HX CATARACT REMOVAL  2011    HX LUMBAR FUSION  2004-last sx    fused L2-S1, in [place]    HX TUBAL LIGATION      HX WISDOM TEETH EXTRACTION             * Patient was identified by name and date of birth   * Agreement on procedure being performed was verified  * Risks and Benefits explained to the patient  * Procedure site verified and marked as necessary  * Patient was positioned for comfort  * Consent was signed and verified  3:46 PM    The patient was instructed on post injection care. We did see Ms. Vinson for followup with regards to her right hip. She has laterally-based discomfort of the right hip. She does have a history of trochanteric bursitis. It has been acting up over the past couple of weeks without injury. She denies any groin pain or thigh pain. She has had no radiating pain or numbness down the lower extremities and no fevers, chills, systemic changes, or injuries to report. PHYSICAL EXAMINATION:  In general, the patient is alert and oriented x 3 in no acute distress. The patient is well-developed, well-nourished, with a normal affect. The patient is afebrile. HEENT:  Head is normocephalic and atraumatic. Pupils are equally round and reactive to light and accommodation. Extraocular eye movements are intact. Neck is supple. Trachea is midline. No JVD is present. Breathing is nonlabored. Examination of the lower extremities reveals pain-free range of motion of the hips. She does have discomfort to palpation of the greater trochanteric bursae to the right side, none on the left. There is negative straight leg raise. There is negative calf tenderness. There is negative Jean Claude's. There is no evidence of DVT present. There is no pain to palpation to the lower lumbar spine, as well as SI joint. The pelvis is stable. ASSESSMENT:  Right hip trochanteric bursitis. PLAN:  At this point, we are going to move forward with a cortisone injection for the right hip today.   After informed and written consent with an appropriate time out performed, and under sterile conditions, with ultrasound-guided assistance, the right hip was prepped with Betadine and 6 mg of Celestone was injected without complications. The patient tolerated the injection well. The patient was instructed on post injection care. We will see her back in the office in about three months' time for evaluation.                           JR Juan Luis LEE, BETH, ATC

## 2019-12-17 ENCOUNTER — HOSPITAL ENCOUNTER (EMERGENCY)
Age: 79
Discharge: HOME | End: 2019-12-17
Attending: EMERGENCY MEDICINE
Payer: MEDICARE

## 2019-12-17 ENCOUNTER — APPOINTMENT (OUTPATIENT)
Dept: GENERAL RADIOLOGY | Age: 79
End: 2019-12-17
Attending: STUDENT IN AN ORGANIZED HEALTH CARE EDUCATION/TRAINING PROGRAM
Payer: MEDICARE

## 2019-12-17 VITALS
DIASTOLIC BLOOD PRESSURE: 81 MMHG | WEIGHT: 177.47 LBS | BODY MASS INDEX: 29.53 KG/M2 | SYSTOLIC BLOOD PRESSURE: 146 MMHG | RESPIRATION RATE: 17 BRPM | TEMPERATURE: 97.9 F | OXYGEN SATURATION: 98 % | HEART RATE: 95 BPM

## 2019-12-17 VITALS
SYSTOLIC BLOOD PRESSURE: 162 MMHG | RESPIRATION RATE: 16 BRPM | DIASTOLIC BLOOD PRESSURE: 76 MMHG | TEMPERATURE: 98 F | OXYGEN SATURATION: 99 % | HEART RATE: 94 BPM

## 2019-12-17 DIAGNOSIS — M62.838 MUSCLE SPASM: Primary | ICD-10-CM

## 2019-12-17 DIAGNOSIS — R11.2 NON-INTRACTABLE VOMITING WITH NAUSEA, UNSPECIFIED VOMITING TYPE: ICD-10-CM

## 2019-12-17 DIAGNOSIS — K21.9 GASTROESOPHAGEAL REFLUX DISEASE, ESOPHAGITIS PRESENCE NOT SPECIFIED: Primary | ICD-10-CM

## 2019-12-17 LAB
ALBUMIN SERPL-MCNC: 3.8 G/DL (ref 3.4–5)
ALBUMIN/GLOB SERPL: 1 {RATIO} (ref 0.8–1.7)
ALP SERPL-CCNC: 95 U/L (ref 45–117)
ALT SERPL-CCNC: 32 U/L (ref 13–56)
ANION GAP BLD CALC-SCNC: 15 MMOL/L (ref 10–20)
ANION GAP SERPL CALC-SCNC: 10 MMOL/L (ref 3–18)
APTT PPP: 31.6 SEC (ref 23–36.4)
AST SERPL-CCNC: 30 U/L (ref 10–38)
ATRIAL RATE: 102 BPM
BASOPHILS # BLD: 0 K/UL (ref 0–0.1)
BASOPHILS NFR BLD: 0 % (ref 0–2)
BILIRUB SERPL-MCNC: 0.4 MG/DL (ref 0.2–1)
BUN BLD-MCNC: 33 MG/DL (ref 7–18)
BUN SERPL-MCNC: 40 MG/DL (ref 7–18)
BUN/CREAT SERPL: 7 (ref 12–20)
CA-I BLD-MCNC: 1.18 MMOL/L (ref 1.12–1.32)
CALCIUM SERPL-MCNC: 10.6 MG/DL (ref 8.5–10.1)
CALCULATED R AXIS, ECG10: -42 DEGREES
CALCULATED T AXIS, ECG11: -10 DEGREES
CHLORIDE BLD-SCNC: 105 MMOL/L (ref 100–108)
CHLORIDE SERPL-SCNC: 105 MMOL/L (ref 100–111)
CO2 BLD-SCNC: 24 MMOL/L (ref 19–24)
CO2 SERPL-SCNC: 25 MMOL/L (ref 21–32)
CREAT SERPL-MCNC: 5.52 MG/DL (ref 0.6–1.3)
CREAT UR-MCNC: 5.4 MG/DL (ref 0.6–1.3)
DIAGNOSIS, 93000: NORMAL
DIFFERENTIAL METHOD BLD: ABNORMAL
EOSINOPHIL # BLD: 0 K/UL (ref 0–0.4)
EOSINOPHIL NFR BLD: 0 % (ref 0–5)
ERYTHROCYTE [DISTWIDTH] IN BLOOD BY AUTOMATED COUNT: 13.4 % (ref 11.6–14.5)
GLOBULIN SER CALC-MCNC: 3.9 G/DL (ref 2–4)
GLUCOSE BLD STRIP.AUTO-MCNC: 223 MG/DL (ref 74–106)
GLUCOSE SERPL-MCNC: 264 MG/DL (ref 74–99)
HCT VFR BLD AUTO: 31.2 % (ref 35–45)
HCT VFR BLD CALC: 32 % (ref 36–49)
HGB BLD-MCNC: 10.3 G/DL (ref 12–16)
HGB BLD-MCNC: 10.9 G/DL (ref 12–16)
INR PPP: 1.1 (ref 0.8–1.2)
LYMPHOCYTES # BLD: 1.1 K/UL (ref 0.9–3.6)
LYMPHOCYTES NFR BLD: 10 % (ref 21–52)
MCH RBC QN AUTO: 28.9 PG (ref 24–34)
MCHC RBC AUTO-ENTMCNC: 33 G/DL (ref 31–37)
MCV RBC AUTO: 87.4 FL (ref 74–97)
MONOCYTES # BLD: 0.4 K/UL (ref 0.05–1.2)
MONOCYTES NFR BLD: 4 % (ref 3–10)
NEUTS SEG # BLD: 9.7 K/UL (ref 1.8–8)
NEUTS SEG NFR BLD: 86 % (ref 40–73)
P-R INTERVAL, ECG05: 162 MS
PLATELET # BLD AUTO: 191 K/UL (ref 135–420)
PMV BLD AUTO: 10.7 FL (ref 9.2–11.8)
POTASSIUM BLD-SCNC: 3.8 MMOL/L (ref 3.5–5.5)
POTASSIUM SERPL-SCNC: 3.9 MMOL/L (ref 3.5–5.5)
PROT SERPL-MCNC: 7.7 G/DL (ref 6.4–8.2)
PROTHROMBIN TIME: 14.2 SEC (ref 11.5–15.2)
Q-T INTERVAL, ECG07: 400 MS
QRS DURATION, ECG06: 142 MS
QTC CALCULATION (BEZET), ECG08: 521 MS
RBC # BLD AUTO: 3.57 M/UL (ref 4.2–5.3)
SODIUM BLD-SCNC: 140 MMOL/L (ref 136–145)
SODIUM SERPL-SCNC: 140 MMOL/L (ref 136–145)
TROPONIN I SERPL-MCNC: <0.02 NG/ML (ref 0–0.04)
VENTRICULAR RATE, ECG03: 102 BPM
WBC # BLD AUTO: 11.2 K/UL (ref 4.6–13.2)

## 2019-12-17 PROCEDURE — 85025 COMPLETE CBC W/AUTO DIFF WBC: CPT

## 2019-12-17 PROCEDURE — 74011250637 HC RX REV CODE- 250/637: Performed by: STUDENT IN AN ORGANIZED HEALTH CARE EDUCATION/TRAINING PROGRAM

## 2019-12-17 PROCEDURE — 99284 EMERGENCY DEPT VISIT MOD MDM: CPT

## 2019-12-17 PROCEDURE — 84484 ASSAY OF TROPONIN QUANT: CPT

## 2019-12-17 PROCEDURE — 74011250637 HC RX REV CODE- 250/637: Performed by: EMERGENCY MEDICINE

## 2019-12-17 PROCEDURE — 71045 X-RAY EXAM CHEST 1 VIEW: CPT

## 2019-12-17 PROCEDURE — 85610 PROTHROMBIN TIME: CPT

## 2019-12-17 PROCEDURE — 80047 BASIC METABLC PNL IONIZED CA: CPT

## 2019-12-17 PROCEDURE — 93005 ELECTROCARDIOGRAM TRACING: CPT

## 2019-12-17 PROCEDURE — 80053 COMPREHEN METABOLIC PANEL: CPT

## 2019-12-17 PROCEDURE — 85730 THROMBOPLASTIN TIME PARTIAL: CPT

## 2019-12-17 RX ORDER — CYCLOBENZAPRINE HCL 10 MG
10 TABLET ORAL
Qty: 12 TAB | Refills: 0 | Status: SHIPPED | OUTPATIENT
Start: 2019-12-17 | End: 2020-07-30 | Stop reason: ALTCHOICE

## 2019-12-17 RX ORDER — ONDANSETRON 8 MG/1
8 TABLET, ORALLY DISINTEGRATING ORAL
Status: COMPLETED | OUTPATIENT
Start: 2019-12-17 | End: 2019-12-17

## 2019-12-17 RX ORDER — PANTOPRAZOLE SODIUM 40 MG/1
40 TABLET, DELAYED RELEASE ORAL
Status: COMPLETED | OUTPATIENT
Start: 2019-12-17 | End: 2019-12-17

## 2019-12-17 RX ORDER — ONDANSETRON 8 MG/1
8 TABLET, ORALLY DISINTEGRATING ORAL
Status: DISCONTINUED | OUTPATIENT
Start: 2019-12-17 | End: 2019-12-17

## 2019-12-17 RX ORDER — CYCLOBENZAPRINE HCL 10 MG
5 TABLET ORAL
Status: COMPLETED | OUTPATIENT
Start: 2019-12-17 | End: 2019-12-17

## 2019-12-17 RX ORDER — OMEPRAZOLE 40 MG/1
40 CAPSULE, DELAYED RELEASE ORAL DAILY
Qty: 7 CAP | Refills: 0 | Status: SHIPPED | OUTPATIENT
Start: 2019-12-17 | End: 2019-12-24

## 2019-12-17 RX ORDER — CYCLOBENZAPRINE HCL 5 MG
5 TABLET ORAL
Qty: 12 TAB | Refills: 0 | Status: SHIPPED | OUTPATIENT
Start: 2019-12-17 | End: 2019-12-17 | Stop reason: DRUGHIGH

## 2019-12-17 RX ORDER — ONDANSETRON 4 MG/1
TABLET, ORALLY DISINTEGRATING ORAL
Qty: 10 TAB | Refills: 0 | Status: SHIPPED | OUTPATIENT
Start: 2019-12-17 | End: 2020-07-30 | Stop reason: ALTCHOICE

## 2019-12-17 RX ORDER — ACETAMINOPHEN 500 MG
500 TABLET ORAL
Status: COMPLETED | OUTPATIENT
Start: 2019-12-17 | End: 2019-12-17

## 2019-12-17 RX ORDER — ACETAMINOPHEN 325 MG/1
650 TABLET ORAL
Status: COMPLETED | OUTPATIENT
Start: 2019-12-17 | End: 2019-12-17

## 2019-12-17 RX ADMIN — ACETAMINOPHEN 650 MG: 325 TABLET ORAL at 07:24

## 2019-12-17 RX ADMIN — CYCLOBENZAPRINE 5 MG: 10 TABLET, FILM COATED ORAL at 07:24

## 2019-12-17 RX ADMIN — ONDANSETRON 8 MG: 8 TABLET, ORALLY DISINTEGRATING ORAL at 15:56

## 2019-12-17 RX ADMIN — ONDANSETRON 8 MG: 8 TABLET, ORALLY DISINTEGRATING ORAL at 08:59

## 2019-12-17 RX ADMIN — PANTOPRAZOLE 40 MG: 40 TABLET, DELAYED RELEASE ORAL at 16:21

## 2019-12-17 RX ADMIN — ACETAMINOPHEN 500 MG: 500 TABLET ORAL at 14:28

## 2019-12-17 RX ADMIN — CYCLOBENZAPRINE 5 MG: 10 TABLET, FILM COATED ORAL at 08:34

## 2019-12-17 RX ADMIN — ALUMINUM HYDROXIDE AND MAGNESIUM HYDROXIDE 15 ML: 200; 200 SUSPENSION ORAL at 14:28

## 2019-12-17 NOTE — ED PROVIDER NOTES
Liz Shultz is a 78year old female with ESRD on peritoneal dialysis, insulin-dependent Type II diabetes mellitus, and hypertension who presents to the ED complaining of epigastric pain, nausea, and vomiting 4 hours after being discharged. Earlier today she was treated for right sided lumbar muscle spasm with Flexeril and Tylenol, improved, and discharged without incident. She reports that when she got home she began having epigastric pain with nausea, and vomited \"dark brown stuff. \"  She denies any history of dark vomit. She has had no recurrence of her lumbar muscle spasms. Ms. Boneta Harada denies any history of peptic ulcers, but does report GERD for many years treated with Tums. Ms. Boneta Harada thinks she had an endoscopy done around 10 years ago, but can't recall whether or not there was any pathology found. She denies fever/chills, chest pain, shortness of breath, dyspnea, hematemesis, melena/hematochezia and dysuria. Abdominal Pain    Associated symptoms include nausea and vomiting. Pertinent negatives include no fever, no diarrhea, no constipation, no dysuria, no headaches and no chest pain.         Past Medical History:   Diagnosis Date    Allergic rhinitis     Chronic kidney disease     stage 5    Diabetes ([facility] [address]     H/O seasonal allergies     Hypertension     Left foot drop, chronic since 2004 fusion sx     Peritoneal [facility])        Past Surgical History:   Procedure Laterality Date    HAND/FINGER SURGERY UNLISTED      HX CATARACT REMOVAL  2011    HX LUMBAR FUSION  2004-last sx    fused L2-S1, in [address] [place] [address]    HX TUBAL LIGATION      HX WISDOM TEETH EXTRACTION           Family History:   Problem Relation Age of Onset    Diabetes Maternal Aunt     Diabetes Maternal Uncle     Diabetes Maternal Grandfather     Diabetes Other     Hypertension Cousin        Social History     Socioeconomic History    Marital status:      Spouse name: Not on file    Number of children: Not on file    Years of education: Not on file    Highest education level: Not on file   Occupational History    Occupation: CNA - RET   Social Needs    Financial resource strain: Not on file    Food insecurity:     Worry: Not on file     Inability: Not on file    Transportation needs:     Medical: Not on file     Non-medical: Not on file   Tobacco Use    Smoking status: Former Smoker    Smokeless tobacco: Never Used   Substance and Sexual Activity    Alcohol use: No    Drug use: No    Sexual activity: Yes     Partners: Male     Birth control/protection: None   Lifestyle    Physical activity:     Days per week: Not on file     Minutes per session: Not on file    Stress: Not on file   Relationships    Social connections:     Talks on phone: Not on file     Gets together: Not on file     Attends Oriental orthodox service: Not on file     Active member of club or organization: Not on file     Attends meetings of clubs or organizations: Not on file     Relationship status: Not on file    Intimate partner violence:     Fear of current or ex partner: Not on file     Emotionally abused: Not on file     Physically abused: Not on file     Forced sexual activity: Not on file   Other Topics Concern    Not on file   Social History Narrative    Not on file         ALLERGIES: Aspirin and Keflex [cephalexin]    Review of Systems   Constitutional: Negative for chills and fever. HENT: Negative for rhinorrhea, sinus pain and sore throat. Eyes: Negative for visual disturbance. Respiratory: Negative for cough, shortness of breath and wheezing. Cardiovascular: Negative for chest pain and palpitations. Gastrointestinal: Positive for abdominal pain, nausea and vomiting. Negative for blood in stool, constipation, diarrhea and rectal pain. Endocrine: Negative for polydipsia and polyuria. Genitourinary: Negative for dysuria and flank pain. Skin: Negative for color change and rash.    Neurological: Negative for dizziness, light-headedness and headaches. Psychiatric/Behavioral: Negative. Vitals:    12/17/19 1356   BP: 162/76   Pulse: 94   Resp: 16   Temp: 98 °F (36.7 °C)   SpO2: 99%            Physical Exam  Constitutional:       General: She is in acute distress. Appearance: She is well-developed. She is not diaphoretic. HENT:      Head: Normocephalic and atraumatic. Mouth/Throat:      Mouth: Mucous membranes are moist.      Pharynx: No pharyngeal swelling or oropharyngeal exudate. Eyes:      General: No scleral icterus. Extraocular Movements: Extraocular movements intact. Pupils: Pupils are equal, round, and reactive to light. Cardiovascular:      Rate and Rhythm: Normal rate and regular rhythm. Heart sounds: Normal heart sounds. No murmur. No friction rub. No gallop. Pulmonary:      Effort: Pulmonary effort is normal. No respiratory distress. Breath sounds: No wheezing, rhonchi or rales. Abdominal:      General: Bowel sounds are normal. There is no distension. Palpations: There is no pulsatile mass. Tenderness: There is tenderness in the epigastric area. There is no guarding or rebound. Skin:     General: Skin is warm. Capillary Refill: Capillary refill takes less than 2 seconds. Coloration: Skin is not cyanotic. Findings: No erythema. Neurological:      General: No focal deficit present. Mental Status: She is alert and oriented to person, place, and time. Cranial Nerves: No cranial nerve deficit. Motor: No weakness.    Psychiatric:         Mood and Affect: Mood normal.         Behavior: Behavior normal.          MDM  Number of Diagnoses or Management Options  Gastroesophageal reflux disease, esophagitis presence not specified:      Amount and/or Complexity of Data Reviewed  Clinical lab tests: ordered and reviewed  Tests in the radiology section of CPT®: ordered  Tests in the medicine section of CPT®: ordered and reviewed  Review and summarize past medical records: yes  Independent visualization of images, tracings, or specimens: yes    Risk of Complications, Morbidity, and/or Mortality  Presenting problems: low  Diagnostic procedures: minimal  Management options: minimal    Critical Care  Total time providing critical care: < 30 minutes          Procedures    12-Lead EKG:  Sinus tachycardia with rate 102 bpm, left-axis deviation, LVH, RBBB, no acute ischemic changes    ED Course as of Dec 17 1638   Tue Dec 17, 2019   1630 15:42  Dec 17, 2019:  Patient still nausea/vomiting (light brown, mucousy) with slightly improved epigastric pain after taking Maalox. Remains with epigastric tenderness to palpation, without guarding/rebound tenderness. 16:22 Dec 17, 2019: Patient sleeping upon entry to room, however awakens and reports continued intense epigastric pain. Nausea improved with no further vomiting after Zofran ODT. Unchanged physical exam, vital signs stable        [MR]   1630 Suspect GERD with possible esophagitis, no peritoneal signs or evidence of perforation on x-ray. Will give dose of Protonix, and expect patient to discharge home later today with Protonix, Maalox prn and Zofran, with outpatient GI follow up. Handoff to Dr. Zi Odell    [MR]      ED Course User Index  [MR] Min Valente MD       Encounter Diagnoses     ICD-10-CM ICD-9-CM   1. Gastroesophageal reflux disease, esophagitis presence not specified K21.9 530.81   2. Non-intractable vomiting with nausea, unspecified vomiting type R11.2 787.01     I personally saw and examined the patient. I have reviewed and agree with the residents findings, including all diagnostic interpretations, and plans as written. I was present during the key portions of separately billed procedures. Agustina Kemp MD    6:13 PM Pt reevaluated at this time. Discussed results and findings, as well as, diagnosis and plan for discharge. Follow up with doctors/services listed. Return to the emergency department for alarming symptoms. Pt verbalizes understanding and agreement with plan. All questions addressed. Vomiting is controlled her symptoms are improved. Will discharge with omeprazole and Zofran to help with any residual gastritis from her vomiting episodes. I reviewed her chest x-ray no acute process and her labs are benign. They are happy with plan for discharge at this point.

## 2019-12-17 NOTE — ED PROVIDER NOTES
EMERGENCY DEPARTMENT HISTORY AND PHYSICAL EXAM    8:09 AM      Date: 12/17/2019  Patient Name: Sun Ulrich    History of Presenting Illness     No chief complaint on file. History Provided By:Location/Duration/Severity/Modifying factors   Sun Ulrich is a 79year old female with history of ESRD who recently started peritoneal dialysis roughly a month ago who presents to the ED with two day history of right sided, episodic low back and groin pain. She denies any trauma/falls and states that she was just laying in bed two nights ago when she had the sudden onset of sharp, right-sided low back pain that radiates to the right groin. She denies any pain around her peritoneal catheter site or problems with dialysis, which she performs nightly. She reports that she was recently started on daily potassium supplementation by her PCM, but is not sure of the dose. Ms. Alberto Guillermo had a lumbar fusion in 2004, however denies chronic back pain and denies any previous episodes of muscle spasm. She took Tylenol at home with no relief. Ms. Alberto Guillermo does make urine and denies any dysuria or hematuria. She also denies fever/chills, abdominal pain, chest pain, and shortness of breath. PCP: Brenda Stahl MD    Current Outpatient Medications   Medication Sig Dispense Refill    cyclobenzaprine (FLEXERIL) 10 mg tablet Take 1 Tab by mouth three (3) times daily as needed for Muscle Spasm(s). 12 Tab 0    metoprolol succinate (TOPROL-XL) 50 mg XL tablet TAKE 1 TABLET BY MOUTH DAILY. APPOINTMENT REQUIRED BEFORE NEXT REFILL. 90 Tab 0    rosuvastatin (CRESTOR) 20 mg tablet TAKE 1 TABLET BY MOUTH EVERY NIGHT. APPOINTMENT REQUIRED BEFORE NEXT REFILL. 90 Tab 0    losartan (COZAAR) 100 mg tablet TAKE 1 TABLET BY MOUTH DAILY (Patient taking differently: 50 mg.  TAKE 1 TABLET BY MOUTH DAILY) 90 Tab 1    amLODIPine (NORVASC) 10 mg tablet TAKE 1 TABLET BY MOUTH DAILY 90 Tab 6    cholecalciferol, VITAMIN D3, (VITAMIN D3) 5,000 unit tab tablet Take  by mouth daily.  montelukast (SINGULAIR) 10 mg tablet TK 1 T PO Q EVENING  2    cloNIDine HCl (CATAPRES) 0.2 mg tablet TK 1 T PO QHS FOR BLOOD PRESSURE  4    furosemide (LASIX) 20 mg tablet Take 20 mg by mouth daily.  insulin lispro (HUMALOG) 100 unit/mL injection by SubCUTAneous route. Sliding scale      insulin glargine (LANTUS SOLOSTAR) 100 unit/mL (3 mL) pen by SubCUTAneous route. 35 units nightly         Past History     Past Medical History:  Past Medical History:   Diagnosis Date    Allergic rhinitis     Chronic kidney disease     stage 5    Diabetes ([facility] [address]     H/O seasonal allergies     Hypertension     Left foot drop, chronic since 2004 fusion sx     Peritoneal dialysis catheter [facility])        Past Surgical History:  Past Surgical History:   Procedure Laterality Date    HAND/FINGER SURGERY UNLISTED      HX CATARACT REMOVAL  2011    HX LUMBAR FUSION  2004-last sx    fused L2-S1, in [address] [place] [address]    HX TUBAL LIGATION      HX WISDOM TEETH EXTRACTION         Family History:  Family History   Problem Relation Age of Onset    Diabetes Maternal Aunt     Diabetes Maternal Uncle     Diabetes Maternal Grandfather     Diabetes Other     Hypertension Cousin        Social History:  Social History     Tobacco Use    Smoking status: Former Smoker    Smokeless tobacco: Never Used   Substance Use Topics    Alcohol use: No    Drug use: No       Allergies: Allergies   Allergen Reactions    Aspirin Other (comments)     Ringing in ears    Keflex [Cephalexin] Rash         Review of Systems       Review of Systems   Constitutional: Negative for chills, diaphoresis and fever. HENT: Negative for rhinorrhea, sinus pain and sore throat. Respiratory: Negative for cough, chest tightness, shortness of breath and wheezing. Cardiovascular: Negative for chest pain and palpitations. Gastrointestinal: Positive for nausea and vomiting.  Negative for abdominal pain, constipation and diarrhea. Genitourinary: Negative for dysuria, hematuria and pelvic pain. Musculoskeletal: Positive for back pain. Negative for neck pain. Skin: Negative for pallor and rash. Neurological: Negative for dizziness, syncope and light-headedness. Psychiatric/Behavioral: Negative for confusion and dysphoric mood. Physical Exam     Visit Vitals  /81 (BP 1 Location: Left arm)   Pulse 95   Temp 97.9 °F (36.6 °C)   Resp 17   Wt 80.5 kg (177 lb 7.5 oz)   SpO2 98%   BMI 29.53 kg/m²         Physical Exam  Constitutional:       General: She is not in acute distress. Appearance: She is obese. She is not ill-appearing. HENT:      Head: Normocephalic and atraumatic. Nose: No congestion or rhinorrhea. Mouth/Throat:      Mouth: Mucous membranes are moist.   Eyes:      Extraocular Movements: Extraocular movements intact. Conjunctiva/sclera: Conjunctivae normal.      Pupils: Pupils are equal, round, and reactive to light. Cardiovascular:      Rate and Rhythm: Normal rate and regular rhythm. Heart sounds: Normal heart sounds. No murmur. No friction rub. No gallop. Pulmonary:      Effort: Pulmonary effort is normal.      Breath sounds: Normal breath sounds. Abdominal:      General: Bowel sounds are normal. There is no distension. Palpations: Abdomen is soft. Tenderness: There is no tenderness. There is no right CVA tenderness, guarding or rebound. Musculoskeletal:         General: Tenderness present. No deformity. Right lower leg: Edema present. Left lower leg: Edema present. Skin:     Capillary Refill: Capillary refill takes less than 2 seconds. Findings: No bruising, lesion or rash. Neurological:      General: No focal deficit present. Mental Status: She is alert and oriented to person, place, and time. Psychiatric:         Mood and Affect: Mood normal.         Behavior: Behavior normal.         Thought Content:  Thought content normal. Judgment: Judgment normal.           Diagnostic Study Results     Labs -  Recent Results (from the past 12 hour(s))   POC CHEM8    Collection Time: 12/17/19  8:13 AM   Result Value Ref Range    CO2, POC 24 19 - 24 MMOL/L    Glucose,  (H) 74 - 106 MG/DL    BUN, POC 33 (H) 7 - 18 MG/DL    Creatinine, POC 5.4 (H) 0.6 - 1.3 MG/DL    GFRAA, POC 9 (L) >60 ml/min/1.73m2    GFRNA, POC 8 (L) >60 ml/min/1.73m2    Sodium,  136 - 145 MMOL/L    Potassium, POC 3.8 3.5 - 5.5 MMOL/L    Calcium, ionized (POC) 1.18 1.12 - 1.32 mmol/L    Chloride,  100 - 108 MMOL/L    Anion gap, POC 15 10 - 20      Hematocrit, POC 32 (L) 36 - 49 %    Hemoglobin, POC 10.9 (L) 12 - 16 G/DL       Radiologic Studies -   No orders to display         Medical Decision Making   I am the first provider for this patient. I reviewed the vital signs, available nursing notes, past medical history, past surgical history, family history and social history. Vital Signs-Reviewed the patient's vital signs. EKG:     Records Reviewed: Nursing Notes (Time of Review: 8:09 AM)    ED Course: Progress Notes, Reevaluation, and Consults:    ED Course as of Dec 17 0938   Tue Dec 17, 2019   0929 Vomiting has resolved    [MR]      ED Course User Index  [MR] Devan Cote MD       Provider Notes (Medical Decision Making):   MDM  Number of Diagnoses or Management Options  Muscle spasm:      Amount and/or Complexity of Data Reviewed  Clinical lab tests: ordered  Review and summarize past medical records: yes    Risk of Complications, Morbidity, and/or Mortality  Presenting problems: low  Diagnostic procedures: low  Management options: low    Patient Progress  Patient progress: improved      Procedures    Critical Care Time:       Diagnosis     Clinical Impression:   1.  Muscle spasm        Disposition: Home    Follow-up Information     Follow up With Specialties Details Why Contact Info    Vasu Mckeon MD Family Practice Call in 1 day [address] [facility] [address]  [phone]             Patient's Medications   Start Taking    CYCLOBENZAPRINE (FLEXERIL) 10 MG TABLET    Take 1 Tab by mouth three (3) times daily as needed for Muscle Spasm(s). Continue Taking    AMLODIPINE (NORVASC) 10 MG TABLET    TAKE 1 TABLET BY MOUTH DAILY    CHOLECALCIFEROL, VITAMIN D3, (VITAMIN D3) 5,000 UNIT TAB TABLET    Take  by mouth daily. CLONIDINE HCL (CATAPRES) 0.2 MG TABLET    TK 1 T PO QHS FOR BLOOD PRESSURE    FUROSEMIDE (LASIX) 20 MG TABLET    Take 20 mg by mouth daily. INSULIN GLARGINE (LANTUS SOLOSTAR) 100 UNIT/ML (3 ML) PEN    by SubCUTAneous route. 35 units nightly    INSULIN LISPRO (HUMALOG) 100 UNIT/ML INJECTION    by SubCUTAneous route. Sliding scale    LOSARTAN (COZAAR) 100 MG TABLET    TAKE 1 TABLET BY MOUTH DAILY    METOPROLOL SUCCINATE (TOPROL-XL) 50 MG XL TABLET    TAKE 1 TABLET BY MOUTH DAILY. APPOINTMENT REQUIRED BEFORE NEXT REFILL. MONTELUKAST (SINGULAIR) 10 MG TABLET    TK 1 T PO Q EVENING    ROSUVASTATIN (CRESTOR) 20 MG TABLET    TAKE 1 TABLET BY MOUTH EVERY NIGHT. APPOINTMENT REQUIRED BEFORE NEXT REFILL. These Medications have changed    No medications on file   Stop Taking    No medications on file     Disclaimer: Sections of this note are dictated using utilizing voice recognition software. Minor typographical errors may be present. If questions arise, please do not hesitate to contact me or call our department.

## 2019-12-17 NOTE — DISCHARGE INSTRUCTIONS
Patient Education        Muscle Cramps: Care Instructions  Your Care Instructions    A muscle cramp occurs when a muscle tightens up suddenly. A cramp often happens in the legs. A muscle cramp is also called a muscle spasm or a charley horse. Muscle cramps usually last less than a minute. However, the pain may last for several minutes. Leg cramps that occur at night may wake you up. Heavy exercise, dehydration, and being overweight can increase your risk of getting cramps. An imbalance of certain chemicals in your blood, called electrolytes, can also lead to muscle cramps. Pregnant women sometimes get muscle cramps during sleep. Muscle cramps can be treated by stretching and massaging the muscle. If cramps keep coming back, your doctor may prescribe medicine that relaxes your muscles. Follow-up care is a key part of your treatment and safety. Be sure to make and go to all appointments, and call your doctor if you are having problems. It's also a good idea to know your test results and keep a list of the medicines you take. How can you care for yourself at home? · Drink plenty of fluids to prevent dehydration. Choose water and other caffeine-free clear liquids until you feel better. If you have kidney, heart, or liver disease and have to limit fluids, talk with your doctor before you increase the amount of fluids you drink. · Stretch your muscles every day, especially before and after exercise and at bedtime. Regular stretching can relax your muscles and may prevent cramps. · Do not suddenly increase the amount of exercise you get. Increase your exercise a little each week. · When you get a cramp, stretch and massage the muscle. You can also take a warm shower or bath to relax the muscle. A heating pad placed on the muscle can also help. · Take a daily multivitamin supplement.   · Ask your doctor if you can take an over-the-counter pain medicine, such as acetaminophen (Tylenol), ibuprofen (Advil, Motrin), or naproxen (Aleve). Be safe with medicines. Read and follow all instructions on the label. When should you call for help? Watch closely for changes in your health, and be sure to contact your doctor if:    · You get muscle cramps often that do not go away after home treatment.     · Your muscle cramps often wake you up at night.     · You do not get better as expected. Where can you learn more? Go to http://arlette-michel.info/. Enter S042 in the search box to learn more about \"Muscle Cramps: Care Instructions. \"  Current as of: June 26, 2019  Content Version: 12.2  © 2429-2843 [email or web address]. Care instructions adapted under license by [employer] (which disclaims liability or warranty for this information). If you have questions about a medical condition or this instruction, always ask your healthcare professional. [address] any warranty or liability for your use of this information. Take Flexeril 10mg three times daily, and Tylenol as needed and make follow up appointment with your primary care doctor. If you develop a fever, chills, persistent nausea and vomiting, or other significant new symptoms, come back to the ER. Do not drive while taking Flexeril.

## 2019-12-17 NOTE — DISCHARGE INSTRUCTIONS
Patient Education        Nausea and Vomiting: Care Instructions  Your Care Instructions    When you are nauseated, you may feel weak and sweaty and notice a lot of saliva in your mouth. Nausea often leads to vomiting. Most of the time you do not need to worry about nausea and vomiting, but they can be signs of other illnesses. Two common causes of nausea and vomiting are stomach flu and food poisoning. Nausea and vomiting from viral stomach flu will usually start to improve within 24 hours. Nausea and vomiting from food poisoning may last from 12 to 48 hours. The doctor has checked you carefully, but problems can develop later. If you notice any problems or new symptoms, get medical treatment right away. Follow-up care is a key part of your treatment and safety. Be sure to make and go to all appointments, and call your doctor if you are having problems. It's also a good idea to know your test results and keep a list of the medicines you take. How can you care for yourself at home? · To prevent dehydration, drink plenty of fluids, enough so that your urine is light yellow or clear like water. Choose water and other caffeine-free clear liquids until you feel better. If you have kidney, heart, or liver disease and have to limit fluids, talk with your doctor before you increase the amount of fluids you drink. · Rest in bed until you feel better. · When you are able to eat, try clear soups, mild foods, and liquids until all symptoms are gone for 12 to 48 hours. Other good choices include dry toast, crackers, cooked cereal, and gelatin dessert, such as Jell-O. When should you call for help? Call 911 anytime you think you may need emergency care. For example, call if:    · You passed out (lost consciousness).    Call your doctor now or seek immediate medical care if:    · You have symptoms of dehydration, such as:  ? Dry eyes and a dry mouth. ? Passing only a little dark urine. ?  Feeling thirstier than usual.   · You have new or worsening belly pain.     · You have a new or higher fever.     · You vomit blood or what looks like coffee grounds.    Watch closely for changes in your health, and be sure to contact your doctor if:    · You have ongoing nausea and vomiting.     · Your vomiting is getting worse.     · Your vomiting lasts longer than 2 days.     · You are not getting better as expected. Where can you learn more? Go to http://arlette-michel.info/. Enter 25 [id number] in the search box to learn more about \"Nausea and Vomiting: Care Instructions. \"  Current as of: June 26, 2019  Content Version: 12.2  © 5705-7130 [employer]. Care instructions adapted under license by [employer] (which disclaims liability or warranty for this information). If you have questions about a medical condition or this instruction, always ask your healthcare professional. [address] any warranty or liability for your use of this information.

## 2019-12-23 ENCOUNTER — OFFICE VISIT (OUTPATIENT)
Dept: FAMILY MEDICINE CLINIC | Age: 79
End: 2019-12-23

## 2019-12-23 VITALS
BODY MASS INDEX: 29.53 KG/M2 | RESPIRATION RATE: 16 BRPM | SYSTOLIC BLOOD PRESSURE: 128 MMHG | HEART RATE: 103 BPM | TEMPERATURE: 98 F | OXYGEN SATURATION: 100 % | HEIGHT: 65 IN | DIASTOLIC BLOOD PRESSURE: 72 MMHG

## 2019-12-23 DIAGNOSIS — K52.9 GASTROENTERITIS: Primary | ICD-10-CM

## 2019-12-23 NOTE — PATIENT INSTRUCTIONS
Gastroenteritis: Care Instructions  Your Care Instructions    Gastroenteritis is an illness that may cause nausea, vomiting, and diarrhea. It is sometimes called \"stomach flu. \" It can be caused by bacteria or a virus. You will probably begin to feel better in 1 to 2 days. In the meantime, get plenty of rest and make sure you do not become dehydrated. Dehydration occurs when your body loses too much fluid. Follow-up care is a key part of your treatment and safety. Be sure to make and go to all appointments, and call your doctor if you are having problems. It's also a good idea to know your test results and keep a list of the medicines you take. How can you care for yourself at home? · If your doctor prescribed antibiotics, take them as directed. Do not stop taking them just because you feel better. You need to take the full course of antibiotics. · Drink plenty of fluids to prevent dehydration, enough so that your urine is light yellow or clear like water. Choose water and other caffeine-free clear liquids until you feel better. If you have kidney, heart, or liver disease and have to limit fluids, talk with your doctor before you increase your fluid intake. · Drink fluids slowly, in frequent, small amounts, because drinking too much too fast can cause vomiting. · Begin eating mild foods, such as dry toast, yogurt, applesauce, bananas, and rice. Avoid spicy, hot, or high-fat foods, and do not drink alcohol or caffeine for a day or two. Do not drink milk or eat ice cream until you are feeling better. How to prevent gastroenteritis  · Keep hot foods hot and cold foods cold. · Do not eat meats, dressings, salads, or other foods that have been kept at room temperature for more than 2 hours. · Use a thermometer to check your refrigerator. It should be between 34°F and 40°F.  · Defrost meats in the refrigerator or microwave, not on the kitchen counter. · Keep your hands and your kitchen clean.  Wash your hands, cutting boards, and countertops with hot soapy water frequently. · Cook meat until it is well done. · Do not eat raw eggs or uncooked sauces made with raw eggs. · Do not take chances. If food looks or tastes spoiled, throw it out. When should you call for help? Call 911 anytime you think you may need emergency care. For example, call if:    · You vomit blood or what looks like coffee grounds.     · You passed out (lost consciousness).     · You pass maroon or very bloody stools.    Call your doctor now or seek immediate medical care if:    · You have severe belly pain.     · You have signs of needing more fluids. You have sunken eyes, a dry mouth, and pass only a little dark urine.     · You feel like you are going to faint.     · You have increased belly pain that does not go away in 1 to 2 days.     · You have new or increased nausea, or you are vomiting.     · You have a new or higher fever.     · Your stools are black and tarlike or have streaks of blood.    Watch closely for changes in your health, and be sure to contact your doctor if:    · You are dizzy or lightheaded.     · You urinate less than usual, or your urine is dark yellow or brown.     · You do not feel better with each day that goes by. Where can you learn more? Go to http://arlette-michel.info/. Enter N142 in the search box to learn more about \"Gastroenteritis: Care Instructions. \"  Current as of: June 9, 2019  Content Version: 12.2  © 4729-8701 [employer]. Care instructions adapted under license by [employer] (which disclaims liability or warranty for this information). If you have questions about a medical condition or this instruction, always ask your healthcare professional. [address] any warranty or liability for your use of this information.

## 2019-12-23 NOTE — PROGRESS NOTES
HISTORY OF PRESENT ILLNESS  Lacey Royal is a 78 y.o. female. HPI  Patient is here today for a emergency room follow up on: Back Pain    Back Pain: went to the ED for spasms in her back; she was given Flexeril which she thinks caused her to start vomiting. She went back to the ED a second time for the vomiting and the abdominal pain. She was prescribed prilosec for supposed GERD and zofran for the nausea. She developed diarrhea as well. NO fever. Thinks the prilosec helps some. She has had difficulty keeping food down; She is able to keep water down. She is on peritoneal dialysis. She has been managing this well. She is no longer having nausea. Her blood sugars have been elevated. Current Outpatient Medications:     cyclobenzaprine (FLEXERIL) 10 mg tablet, Take 1 Tab by mouth three (3) times daily as needed for Muscle Spasm(s). , Disp: 12 Tab, Rfl: 0    omeprazole (PRILOSEC) 40 mg capsule, Take 1 Cap by mouth daily for 7 days. , Disp: 7 Cap, Rfl: 0    ondansetron (ZOFRAN ODT) 4 mg disintegrating tablet, Take 1-2 tablets every 6-8 hours as needed for nausea and vomiting., Disp: 10 Tab, Rfl: 0    metoprolol succinate (TOPROL-XL) 50 mg XL tablet, TAKE 1 TABLET BY MOUTH DAILY. APPOINTMENT REQUIRED BEFORE NEXT REFILL. , Disp: 90 Tab, Rfl: 0    rosuvastatin (CRESTOR) 20 mg tablet, TAKE 1 TABLET BY MOUTH EVERY NIGHT. APPOINTMENT REQUIRED BEFORE NEXT REFILL. , Disp: 90 Tab, Rfl: 0    losartan (COZAAR) 100 mg tablet, TAKE 1 TABLET BY MOUTH DAILY (Patient taking differently: 50 mg. TAKE 1 TABLET BY MOUTH DAILY), Disp: 90 Tab, Rfl: 1    amLODIPine (NORVASC) 10 mg tablet, TAKE 1 TABLET BY MOUTH DAILY, Disp: 90 Tab, Rfl: 6    cholecalciferol, VITAMIN D3, (VITAMIN D3) 5,000 unit tab tablet, Take  by mouth daily. , Disp: , Rfl:     montelukast (SINGULAIR) 10 mg tablet, TK 1 T PO Q EVENING, Disp: , Rfl: 2    cloNIDine HCl (CATAPRES) 0.2 mg tablet, TK 1 T PO QHS FOR BLOOD PRESSURE, Disp: , Rfl: 4   furosemide (LASIX) 20 mg tablet, Take 20 mg by mouth daily. , Disp: , Rfl:     insulin lispro (HUMALOG) 100 unit/mL injection, by SubCUTAneous route. Sliding scale, Disp: , Rfl:     insulin glargine (LANTUS SOLOSTAR) 100 unit/mL (3 mL) pen, 24 Units by SubCUTAneous route daily. , Disp: , Rfl:     PMH,  Meds, Allergies, Family History, Social history reviewed    Review of Systems   Constitutional: Negative for chills and fever. Respiratory: Negative for shortness of breath and wheezing. Cardiovascular: Negative for chest pain and palpitations. Physical Exam  Constitutional:       Appearance: Normal appearance. She is not ill-appearing. Cardiovascular:      Rate and Rhythm: Normal rate and regular rhythm. Heart sounds: No murmur. No friction rub. No gallop. Pulmonary:      Breath sounds: Normal breath sounds. No wheezing. Neurological:      Mental Status: She is alert. Visit Vitals  /72 (BP 1 Location: Left arm, BP Patient Position: Sitting)   Pulse (!) 103   Temp 98 °F (36.7 °C) (Oral)   Resp 16   Ht 5' 5\" (1.651 m)   SpO2 100%   BMI 29.53 kg/m²     She appears tired    ASSESSMENT and PLAN    ICD-10-CM ICD-9-CM    1. Gastroenteritis K52.9 558.9        As above,  New  Plenty of liquids  D Lo diet  immodium prn, prudently  Follow-up and Dispositions    · Return in about 3 weeks (around 1/13/2020), or gastroeneteritis. An After Visit Summary was printed and given to the patient. This has been fully explained to the patient, who indicates understanding.

## 2019-12-23 NOTE — PROGRESS NOTES
1. Have you been to the ER, urgent care clinic since your last visit? Hospitalized since your last visit? Yes Where: [facility]    2. Have you seen or consulted any other health care providers outside of the [address] [place] [address] since your last visit? Include any pap smears or colon screening.  No

## 2020-01-06 RX ORDER — LOSARTAN POTASSIUM 100 MG/1
TABLET ORAL
Qty: 90 TAB | Refills: 0 | Status: SHIPPED | OUTPATIENT
Start: 2020-01-06 | End: 2020-01-13 | Stop reason: ALTCHOICE

## 2020-01-10 ENCOUNTER — TELEPHONE (OUTPATIENT)
Dept: FAMILY MEDICINE CLINIC | Age: 80
End: 2020-01-10

## 2020-01-10 NOTE — TELEPHONE ENCOUNTER
Army Olivares called from [facility] regarding patients losartan. the patient called Army Olivares this afternoon and said that Dr. Ciarra Bradley sent in the wrong mg for rx. At her last appointment they spoke about the 100mg being to strong and med was suppose to be sent in for 50mg instead.      Army Olivares can be reached at [phone] extension [phone]

## 2020-01-13 RX ORDER — LOSARTAN POTASSIUM 50 MG/1
50 TABLET ORAL DAILY
Qty: 90 TAB | Refills: 0 | Status: SHIPPED | OUTPATIENT
Start: 2020-01-13 | End: 2020-02-25

## 2020-01-13 NOTE — TELEPHONE ENCOUNTER
Informed Dr. Damon Lindsey of patient request. Dr. Damon Lindsey was agreeable to prescribe losartan 50 mg - take 1 tablet by mouth daily and gave verbal order.

## 2020-01-27 DIAGNOSIS — R10.13 DYSPEPSIA: Primary | ICD-10-CM

## 2020-01-27 DIAGNOSIS — R11.0 NAUSEA: ICD-10-CM

## 2020-01-27 DIAGNOSIS — R10.84 GENERALIZED ABDOMINAL PAIN: ICD-10-CM

## 2020-02-25 RX ORDER — LOSARTAN POTASSIUM 50 MG/1
TABLET ORAL
Qty: 90 TAB | Refills: 0 | Status: SHIPPED | OUTPATIENT
Start: 2020-02-25 | End: 2020-07-09 | Stop reason: SDUPTHER

## 2020-07-09 NOTE — TELEPHONE ENCOUNTER
----- Message from Jaylene Rasmussen sent at 7/9/2020 10:56 AM EDT -----  Regarding: med refills and prescription for walker  Pt needs prescription for upright walker and med refill for losartan. Appt has been scheduled. Please contact pt at .

## 2020-07-10 NOTE — TELEPHONE ENCOUNTER
Last Visit: 12/23/19 with MD Alber Nathan  Next Appointment: 7/30/20 with MD Alber Nathan  Previous Refill Encounter(s): 2/25/20 #90    Requested Prescriptions     Pending Prescriptions Disp Refills    losartan (COZAAR) 50 mg tablet 90 Tab 0     Sig: Take 1 Tab by mouth daily.

## 2020-07-12 RX ORDER — LOSARTAN POTASSIUM 50 MG/1
50 TABLET ORAL DAILY
Qty: 90 TAB | Refills: 0 | Status: SHIPPED | OUTPATIENT
Start: 2020-07-12 | End: 2020-07-30 | Stop reason: SDUPTHER

## 2020-07-30 ENCOUNTER — VIRTUAL VISIT (OUTPATIENT)
Dept: FAMILY MEDICINE CLINIC | Age: 80
End: 2020-07-30

## 2020-07-30 DIAGNOSIS — Z00.00 MEDICARE ANNUAL WELLNESS VISIT, SUBSEQUENT: Primary | ICD-10-CM

## 2020-07-30 DIAGNOSIS — R29.898 WEAKNESS OF EXTREMITY: ICD-10-CM

## 2020-07-30 RX ORDER — INSULIN ASPART 100 [IU]/ML
INJECTION, SOLUTION INTRAVENOUS; SUBCUTANEOUS DAILY
COMMUNITY

## 2020-07-30 RX ORDER — LOSARTAN POTASSIUM 50 MG/1
50 TABLET ORAL DAILY
Qty: 90 TAB | Refills: 3 | Status: SHIPPED | OUTPATIENT
Start: 2020-07-30 | End: 2021-09-01

## 2020-07-30 NOTE — PROGRESS NOTES
Juana Ortiz is a [de-identified] y.o. female, evaluated via audio-only technology on 7/30/2020 for Extremity Weakness and Annual Wellness Visit  . Assessment & Plan:   Diagnoses and all orders for this visit:    1. Medicare annual wellness visit, subsequent    2. Weakness of extremity- new; In need of a walker  -     AMB SUPPLY ORDER    Other orders  -     losartan (COZAAR) 50 mg tablet; Take 1 Tab by mouth daily. As above,   above all stable unless otherwise noted    Orders Placed This Encounter    AMB SUPPLY ORDER    sucroferric oxyhydroxide (VELPHORO PO)    insulin aspart U-100 (NovoLOG Flexpen U-100 Insulin) 100 unit/mL (3 mL) inpn    losartan (COZAAR) 50 mg tablet     Refilled losartan   meds reconciled  Follow-up and Dispositions    · Return in about 4 months (around 11/30/2020) for htn, virtual phone. An After Visit Summary was printed and given to the patient. This has been fully explained to the patient, who indicates understanding. DME order for a walker. 12  Subjective:   She has noted some decreased strength in her legs. She wants a walker. Her legs get tired when she walks around. Pt wants an upright walker. Had an allergic reaction to a recent medication. Her medication was stopped and her reaction was better. Pt became itchy and sweaty. She has been changed to velphoro and tolerates this better. She is on dialysis    She needs a refill on losartan. She has HTN    She agrees to her medicare well exam today. Prior to Admission medications    Medication Sig Start Date End Date Taking? Authorizing Provider   sucroferric oxyhydroxide (VELPHORO PO) Take  by mouth. Yes Provider, Historical   insulin aspart U-100 (NovoLOG Flexpen U-100 Insulin) 100 unit/mL (3 mL) inpn by SubCUTAneous route. Yes Provider, Historical   losartan (COZAAR) 50 mg tablet Take 1 Tab by mouth daily.  7/30/20  Yes Chaparrita Gipson MD   metoprolol succinate (TOPROL-XL) 50 mg XL tablet Take 1 Tab by mouth daily. 1/15/20  Yes Jazzmine Salas MD   amLODIPine (NORVASC) 10 mg tablet TAKE 1 TABLET BY MOUTH DAILY 5/13/19  Yes Jazzmine Salas MD   cloNIDine HCl (CATAPRES) 0.2 mg tablet TK 1 T PO QHS FOR BLOOD PRESSURE 10/30/17  Yes Provider, Historical   furosemide (LASIX) 20 mg tablet Take 20 mg by mouth BID Mon Wed & Fri. Yes Provider, Historical   insulin glargine (LANTUS SOLOSTAR) 100 unit/mL (3 mL) pen 24 Units by SubCUTAneous route daily. Yes Provider, Historical   rosuvastatin (CRESTOR) 20 mg tablet Take 1 Tab by mouth daily. 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 9) with comorbidity ([address] 04/19/2018    Dyspepsia 02/02/2018    Nausea 02/02/2018    Constipation 02/02/2018    Type 2 diabetes mellitus with nephropathy ([address] 12/21/2017    Allergic rhinitis     Hypertension     Diabetes ([address]      Allergies   Allergen Reactions    Aspirin Other (comments)     Ringing in ears    Keflex [Cephalexin] Rash    Sevelamer Carbonate Itching     With rash and sweatiness     Past Medical History:   Diagnosis Date    Allergic rhinitis     Chronic kidney disease     stage 5    Diabetes ([address]     H/O seasonal allergies     Hypertension     Left foot drop, chronic since 2004 fusion sx     Peritoneal dialysis catheter in place [facility])      Past Surgical History:   Procedure Laterality Date    HAND/FINGER SURGERY UNLISTED      HX CATARACT REMOVAL  2011    HX LUMBAR FUSION  2004-last sx    fused L2-S1, in [place]    HX TUBAL LIGATION      HX WISDOM TEETH EXTRACTION       Family History Problem Relation Age of Onset    Diabetes Maternal Aunt     Diabetes Maternal Uncle     Diabetes Maternal Grandfather     Diabetes Other     Hypertension Cousin      Social History     Tobacco Use    Smoking status: Former Smoker    Smokeless tobacco: Never Used   Substance Use Topics    Alcohol use: No       Review of Systems   Constitutional: Negative for chills and fever. Respiratory: Negative for shortness of breath and wheezing. Cardiovascular: Negative for chest pain, palpitations and leg swelling. All other systems reviewed and are negative. Janis Cantu, who was evaluated through a patient-initiated, synchronous (real-time) audio only encounter, and/or her healthcare decision maker, is aware that it is a billable service, with coverage as determined by her insurance carrier. She provided verbal consent to proceed: Yes. She has not had a related appointment within my department in the past 7 days or scheduled within the next 24 hours. Total Time: minutes: 11-20 minutes    Roro Jaimes MD   This is the Subsequent Medicare Annual Wellness Exam, performed 12 months or more after the Initial AWV or the last Subsequent AWV    I have reviewed the patient's medical history in detail and updated the computerized patient record. History     Patient Active Problem List   Diagnosis Code    Allergic rhinitis J30.9    Hypertension I10    Diabetes ([facility] E11.9    Type 2 diabetes mellitus with nephropathy ([facility] E11.21    Dyspepsia R10.13    Nausea R11.0    Constipation K59.00    Severe obesity (BMI 35.0-39. 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 by CT '18 M48.00    CKD (chronic kidney disease) stage 5, GFR less than 15 ml/min ([facility]) N18.5     Past Medical History:   Diagnosis Date    Allergic rhinitis     Chronic kidney disease     stage 5    Diabetes ([address]     H/O seasonal allergies     Hypertension     Left foot drop, chronic since 2004 fusion sx     Peritoneal dialysis catheter in place [facility])       Past Surgical History:   Procedure Laterality Date    HAND/FINGER SURGERY UNLISTED      HX CATARACT REMOVAL  2011    HX LUMBAR FUSION  2004-last sx    fused L2-S1, in [address] HX TUBAL LIGATION      HX WISDOM TEETH EXTRACTION       Current Outpatient Medications   Medication Sig Dispense Refill    sucroferric oxyhydroxide (VELPHORO PO) Take  by mouth.  insulin aspart U-100 (NovoLOG Flexpen U-100 Insulin) 100 unit/mL (3 mL) inpn by SubCUTAneous route.  losartan (COZAAR) 50 mg tablet Take 1 Tab by mouth daily. 90 Tab 3    metoprolol succinate (TOPROL-XL) 50 mg XL tablet Take 1 Tab by mouth daily. 90 Tab 3    amLODIPine (NORVASC) 10 mg tablet TAKE 1 TABLET BY MOUTH DAILY 90 Tab 6    cloNIDine HCl (CATAPRES) 0.2 mg tablet TK 1 T PO QHS FOR BLOOD PRESSURE  4    furosemide (LASIX) 20 mg tablet Take 20 mg by mouth BID Mon Wed & Fri.      insulin glargine (LANTUS SOLOSTAR) 100 unit/mL (3 mL) pen 24 Units by SubCUTAneous route daily.  rosuvastatin (CRESTOR) 20 mg tablet Take 1 Tab by mouth daily.  90 Tab 3     Allergies   Allergen Reactions    Aspirin Other (comments)     Ringing in ears    Keflex [Cephalexin] Rash    Sevelamer Carbonate Itching     With rash and sweatiness       Family History   Problem Relation Age of Onset    Diabetes Maternal Aunt     Diabetes Maternal Uncle     Diabetes Maternal Grandfather     Diabetes Other     Hypertension Cousin      Social History     Tobacco Use    Smoking status: Former Smoker    Smokeless tobacco: Never Used   Substance Use Topics    Alcohol use: No       Depression Risk Factor Screening:     3 most recent PHQ Screens 10/8/2019   PHQ Not Done -   Little interest or pleasure in doing things Not at all   Feeling down, depressed, irritable, or hopeless Not at all   Total Score PHQ 2 0       Alcohol Risk Factor Screening:   Do you average 1 drink per night or more than 7 drinks a week:  No    On any one occasion in the past three months have you have had more than 3 drinks containing alcohol:  No      Functional Ability and Level of Safety:   Hearing: Hearing is good. Activities of Daily Living: The home contains: no safety equipment. Patient does total self care     Ambulation: with difficulty, wants a cane     Fall Risk:  Fall Risk Assessment, last 12 mths 10/8/2019   Able to walk? Yes   Fall in past 12 months? yes   Fall with injury? Hurt her hand   Number of falls in past 12 months -   Fall Risk Score -     Abuse Screen:  Patient is not abused       Cognitive Screening   Has your family/caregiver stated any concerns about your memory: no    Cognitive Screening: cognition is intact    Patient Care Team   Patient Care Team:  Lupe Larson MD as PCP - General (Family Medicine)  Lupe Larson MD as PCP - [facility] Empaneled Provider    Assessment/Plan   Education and counseling provided:  Are appropriate based on today's review and evaluation    Diagnoses and all orders for this visit:    1. Medicare annual wellness visit, subsequent    2. Weakness of extremity  -     AMB SUPPLY ORDER    Other orders  -     losartan (COZAAR) 50 mg tablet; Take 1 Tab by mouth daily.         Health Maintenance Due   Topic Date Due    DTaP/Tdap/Td series (1 - Tdap) 02/06/1961    Shingrix Vaccine Age 50> (1 of 2) 02/06/1990    Pneumococcal 65+ years (2 of 2 - PPSV23) 02/06/2005    Pneumococcal 65+ yrs at Risk Vaccine (2 of 2 - PPSV23) 01/16/2016    MICROALBUMIN Q1  07/12/2019    Lipid Screen  06/21/2020    Influenza Age 9 to Adult  08/01/2020       Gavino Cheung, who was evaluated through a synchronous (real-time) audio only encounter, and/or her healthcare decision maker, is aware that it is a billable service, with coverage as determined by her insurance carrier. She provided verbal consent to proceed: Yes, and patient identification was verified. It was conducted pursuant to the emergency declaration under the [address] [place] [address] and the [employer] and [employer] General Act. A caregiver was present when appropriate. Ability to conduct physical exam was limited. I was in the office. The patient was at home.     Maggie Alba MD

## 2020-07-30 NOTE — PATIENT INSTRUCTIONS
Medicare Wellness Visit, Female The best way to live healthy is to have a lifestyle where you eat a well-balanced diet, exercise regularly, limit alcohol use, and quit all forms of tobacco/nicotine, if applicable. Regular preventive services are another way to keep healthy. Preventive services (vaccines, screening tests, monitoring & exams) can help personalize your care plan, which helps you manage your own care. Screening tests can find health problems at the earliest stages, when they are easiest to treat. [facility] follows the current, evidence-based guidelines published by the [facility] ([facility]) when recommending preventive services for our patients. Because we follow these guidelines, sometimes recommendations change over time as research supports it. (For example, mammograms used to be recommended annually. Even though Medicare will still pay for an annual mammogram, the newer guidelines recommend a mammogram every two years for women of average risk). Of course, you and your doctor may decide to screen more often for some diseases, based on your risk and your co-morbidities (chronic disease you are already diagnosed with). Preventive services for you include: - Medicare offers their members a free annual wellness visit, which is time for you and your primary care provider to discuss and plan for your preventive service needs. Take advantage of this benefit every year! 
-All adults over the age of 72 should receive the recommended pneumonia vaccines. Current USPSTF guidelines recommend a series of two vaccines for the best pneumonia protection.  
-All adults should have a flu vaccine yearly and a tetanus vaccine every 10 years.  
-All adults age 48 and older should receive the shingles vaccines (series of two vaccines). -All adults age 38-68 who are overweight should have a diabetes screening test once every three years. -All adults born between [birth year] and 1965 should be screened once for Hepatitis C. 
-Other screening tests and preventive services for persons with diabetes include: an eye exam to screen for diabetic retinopathy, a kidney function test, a foot exam, and stricter control over your cholesterol.  
-Cardiovascular screening for adults with routine risk involves an electrocardiogram (ECG) at intervals determined by your doctor.  
-Colorectal cancer screenings should be done for adults age 54-65 with no increased risk factors for colorectal cancer. There are a number of acceptable methods of screening for this type of cancer. Each test has its own benefits and drawbacks. Discuss with your doctor what is most appropriate for you during your annual wellness visit. The different tests include: colonoscopy (considered the best screening method), a fecal occult blood test, a fecal DNA test, and sigmoidoscopy. 
 
-A bone mass density test is recommended when a woman turns 65 to screen for osteoporosis. This test is only recommended one time, as a screening. Some providers will use this same test as a disease monitoring tool if you already have osteoporosis. -Breast cancer screenings are recommended every other year for women of normal risk, age 54-69. 
-Cervical cancer screenings for women over age 72 are only recommended with certain risk factors. Here is a list of your current Health Maintenance items (your personalized list of preventive services) with a due date: 
Health Maintenance Due Topic Date Due  
 DTaP/Tdap/Td  (1 - Tdap) 02/06/1961  Shingles Vaccine (1 of 2) 02/06/1990  Pneumococcal Vaccine (2 of 2 - PPSV23) 02/06/2005  Pneumococcal Vaccine 65+ years at Risk (2 of 2 - PPSV23) 01/16/2016  Albumin Urine Test  07/12/2019  Cholesterol Test   06/21/2020  Flu Vaccine  08/01/2020

## 2020-08-28 ENCOUNTER — HOSPITAL ENCOUNTER (EMERGENCY)
Age: 80
Discharge: HOME | End: 2020-08-28
Attending: EMERGENCY MEDICINE | Admitting: EMERGENCY MEDICINE
Payer: MEDICARE

## 2020-08-28 ENCOUNTER — APPOINTMENT (OUTPATIENT)
Dept: CT IMAGING | Age: 80
End: 2020-08-28
Attending: EMERGENCY MEDICINE
Payer: MEDICARE

## 2020-08-28 VITALS
HEART RATE: 84 BPM | RESPIRATION RATE: 17 BRPM | DIASTOLIC BLOOD PRESSURE: 68 MMHG | SYSTOLIC BLOOD PRESSURE: 134 MMHG | OXYGEN SATURATION: 99 % | TEMPERATURE: 99 F

## 2020-08-28 DIAGNOSIS — R10.84 ABDOMINAL PAIN, GENERALIZED: Primary | ICD-10-CM

## 2020-08-28 DIAGNOSIS — K59.00 CONSTIPATION, UNSPECIFIED CONSTIPATION TYPE: ICD-10-CM

## 2020-08-28 LAB
ALBUMIN SERPL-MCNC: 3.9 G/DL (ref 3.4–5)
ALBUMIN/GLOB SERPL: 0.9 {RATIO} (ref 0.8–1.7)
ALP SERPL-CCNC: 131 U/L (ref 45–117)
ALT SERPL-CCNC: 43 U/L (ref 13–56)
ANION GAP SERPL CALC-SCNC: 7 MMOL/L (ref 3–18)
APPEARANCE UR: ABNORMAL
AST SERPL-CCNC: 43 U/L (ref 10–38)
ATRIAL RATE: 79 BPM
BACTERIA URNS QL MICRO: ABNORMAL /HPF
BASOPHILS # BLD: 0 K/UL (ref 0–0.1)
BASOPHILS NFR BLD: 0 % (ref 0–2)
BILIRUB SERPL-MCNC: 0.4 MG/DL (ref 0.2–1)
BILIRUB UR QL: NEGATIVE
BUN SERPL-MCNC: 34 MG/DL (ref 7–18)
BUN/CREAT SERPL: 4 (ref 12–20)
CALCIUM SERPL-MCNC: 11.3 MG/DL (ref 8.5–10.1)
CALCULATED P AXIS, ECG09: 31 DEGREES
CALCULATED R AXIS, ECG10: -34 DEGREES
CALCULATED T AXIS, ECG11: -25 DEGREES
CHLORIDE SERPL-SCNC: 102 MMOL/L (ref 100–111)
CO2 SERPL-SCNC: 27 MMOL/L (ref 21–32)
COLOR UR: YELLOW
CREAT SERPL-MCNC: 7.75 MG/DL (ref 0.6–1.3)
DIAGNOSIS, 93000: NORMAL
DIFFERENTIAL METHOD BLD: ABNORMAL
EOSINOPHIL # BLD: 0 K/UL (ref 0–0.4)
EOSINOPHIL NFR BLD: 0 % (ref 0–5)
EPITH CASTS URNS QL MICRO: ABNORMAL /LPF (ref 0–5)
ERYTHROCYTE [DISTWIDTH] IN BLOOD BY AUTOMATED COUNT: 14 % (ref 11.6–14.5)
GLOBULIN SER CALC-MCNC: 4.2 G/DL (ref 2–4)
GLUCOSE SERPL-MCNC: 286 MG/DL (ref 74–99)
GLUCOSE UR STRIP.AUTO-MCNC: 500 MG/DL
HCT VFR BLD AUTO: 35.8 % (ref 35–45)
HGB BLD-MCNC: 11.7 G/DL (ref 12–16)
HGB UR QL STRIP: ABNORMAL
KETONES UR QL STRIP.AUTO: NEGATIVE MG/DL
LACTATE BLD-SCNC: 1.9 MMOL/L (ref 0.4–2)
LEUKOCYTE ESTERASE UR QL STRIP.AUTO: ABNORMAL
LIPASE SERPL-CCNC: 194 U/L (ref 73–393)
LYMPHOCYTES # BLD: 1.4 K/UL (ref 0.9–3.6)
LYMPHOCYTES NFR BLD: 16 % (ref 21–52)
MAGNESIUM SERPL-MCNC: 2.6 MG/DL (ref 1.6–2.6)
MCH RBC QN AUTO: 29.9 PG (ref 24–34)
MCHC RBC AUTO-ENTMCNC: 32.7 G/DL (ref 31–37)
MCV RBC AUTO: 91.6 FL (ref 74–97)
MONOCYTES # BLD: 0.4 K/UL (ref 0.05–1.2)
MONOCYTES NFR BLD: 5 % (ref 3–10)
NEUTS SEG # BLD: 7.1 K/UL (ref 1.8–8)
NEUTS SEG NFR BLD: 79 % (ref 40–73)
NITRITE UR QL STRIP.AUTO: NEGATIVE
P-R INTERVAL, ECG05: 178 MS
PH UR STRIP: 8.5 [PH] (ref 5–8)
PLATELET # BLD AUTO: 172 K/UL (ref 135–420)
PMV BLD AUTO: 11.3 FL (ref 9.2–11.8)
POTASSIUM SERPL-SCNC: 3.6 MMOL/L (ref 3.5–5.5)
PROT SERPL-MCNC: 8.1 G/DL (ref 6.4–8.2)
PROT UR STRIP-MCNC: 100 MG/DL
Q-T INTERVAL, ECG07: 440 MS
QRS DURATION, ECG06: 168 MS
QTC CALCULATION (BEZET), ECG08: 504 MS
RBC # BLD AUTO: 3.91 M/UL (ref 4.2–5.3)
RBC #/AREA URNS HPF: ABNORMAL /HPF (ref 0–5)
SODIUM SERPL-SCNC: 136 MMOL/L (ref 136–145)
SP GR UR REFRACTOMETRY: 1.02 (ref 1–1.03)
UROBILINOGEN UR QL STRIP.AUTO: 0.2 EU/DL (ref 0.2–1)
VENTRICULAR RATE, ECG03: 79 BPM
WBC # BLD AUTO: 9 K/UL (ref 4.6–13.2)
WBC URNS QL MICRO: ABNORMAL /HPF (ref 0–4)

## 2020-08-28 PROCEDURE — 99285 EMERGENCY DEPT VISIT HI MDM: CPT

## 2020-08-28 PROCEDURE — 74011250636 HC RX REV CODE- 250/636: Performed by: EMERGENCY MEDICINE

## 2020-08-28 PROCEDURE — 96374 THER/PROPH/DIAG INJ IV PUSH: CPT

## 2020-08-28 PROCEDURE — 74177 CT ABD & PELVIS W/CONTRAST: CPT

## 2020-08-28 PROCEDURE — 85025 COMPLETE CBC W/AUTO DIFF WBC: CPT

## 2020-08-28 PROCEDURE — 96375 TX/PRO/DX INJ NEW DRUG ADDON: CPT

## 2020-08-28 PROCEDURE — 81001 URINALYSIS AUTO W/SCOPE: CPT

## 2020-08-28 PROCEDURE — 93005 ELECTROCARDIOGRAM TRACING: CPT

## 2020-08-28 PROCEDURE — 83735 ASSAY OF MAGNESIUM: CPT

## 2020-08-28 PROCEDURE — 83690 ASSAY OF LIPASE: CPT

## 2020-08-28 PROCEDURE — 80053 COMPREHEN METABOLIC PANEL: CPT

## 2020-08-28 PROCEDURE — 83605 ASSAY OF LACTIC ACID: CPT

## 2020-08-28 PROCEDURE — 74011000636 HC RX REV CODE- 636: Performed by: EMERGENCY MEDICINE

## 2020-08-28 RX ORDER — ONDANSETRON 4 MG/1
4 TABLET, ORALLY DISINTEGRATING ORAL EVERY 12 HOURS
Qty: 5 TAB | Refills: 0 | Status: SHIPPED | OUTPATIENT
Start: 2020-08-28 | End: 2021-01-04 | Stop reason: ALTCHOICE

## 2020-08-28 RX ORDER — POLYETHYLENE GLYCOL 3350 17 G/17G
POWDER, FOR SOLUTION ORAL
Qty: 510 G | Refills: 3 | Status: SHIPPED | OUTPATIENT
Start: 2020-08-28 | End: 2022-05-31 | Stop reason: ALTCHOICE

## 2020-08-28 RX ORDER — ONDANSETRON 2 MG/ML
4 INJECTION INTRAMUSCULAR; INTRAVENOUS
Status: COMPLETED | OUTPATIENT
Start: 2020-08-28 | End: 2020-08-28

## 2020-08-28 RX ORDER — HYDROMORPHONE HYDROCHLORIDE 1 MG/ML
0.5 INJECTION, SOLUTION INTRAMUSCULAR; INTRAVENOUS; SUBCUTANEOUS ONCE
Status: COMPLETED | OUTPATIENT
Start: 2020-08-28 | End: 2020-08-28

## 2020-08-28 RX ADMIN — HYDROMORPHONE HYDROCHLORIDE 0.5 MG: 1 INJECTION, SOLUTION INTRAMUSCULAR; INTRAVENOUS; SUBCUTANEOUS at 05:29

## 2020-08-28 RX ADMIN — IOPAMIDOL 100 ML: 612 INJECTION, SOLUTION INTRAVENOUS at 06:42

## 2020-08-28 RX ADMIN — ONDANSETRON 4 MG: 2 INJECTION INTRAMUSCULAR; INTRAVENOUS at 05:29

## 2020-08-28 NOTE — ED PROVIDER NOTES
EMERGENCY DEPARTMENT HISTORY AND PHYSICAL EXAM    5:18 AM  Date: 8/28/2020  Patient Name: Carlos Chacon    History of Presenting Illness     Chief Complaint   Patient presents with    Abdominal Pain    Vomiting        History Provided By: Patient    HPI: Carlos Chacon is a [de-identified] y.o. female with history multiple medical problems as below including renal failure on peritoneal dialysis. Patient is presenting with diffuse abdominal pain and recurrent vomiting since midnight. She normally gets 8 hours of peritoneal dialysis but she stopped it after 5 hours because she was not feeling well and she came to the hospital.  Denies fever, diarrhea. No history of chest pain or shortness of breath. No urinary symptoms. Denies cough or sick contacts. Denies melena or bloody bowel movements or bloody vomitus. Location:  Severity:  Timing/course: Onset/Duration:     PCP: Jazzmine Salas MD    Past History     Past Medical History:  Past Medical History:   Diagnosis Date    Allergic rhinitis     Chronic kidney disease     stage 5    Diabetes ([facility] [address]     H/O seasonal allergies     Hypertension     Left foot drop, chronic since 2004 fusion sx     Peritoneal dialysis catheter in place [facility])        Past Surgical History:  Past Surgical History:   Procedure Laterality Date    HAND/FINGER SURGERY UNLISTED      HX CATARACT REMOVAL  2011    HX LUMBAR FUSION  2004-last sx    fused L2-S1, in [place]    HX TUBAL LIGATION      HX WISDOM TEETH EXTRACTION         Family History:  Family History   Problem Relation Age of Onset    Diabetes Maternal Aunt     Diabetes Maternal Uncle     Diabetes Maternal Grandfather     Diabetes Other     Hypertension Cousin        Social History:  Social History     Tobacco Use    Smoking status: Former Smoker    Smokeless tobacco: Never Used   Substance Use Topics    Alcohol use: No    Drug use: No       Allergies:   Allergies   Allergen Reactions    Aspirin Other (comments)     Ringing in ears    Keflex [Cephalexin] Rash    Sevelamer Carbonate Itching     With rash and sweatiness       Review of Systems   Review of Systems   Gastrointestinal: Positive for abdominal pain, nausea and vomiting. All other systems reviewed and are negative. Physical Exam     Patient Vitals for the past 12 hrs:   Temp Pulse Resp BP SpO2   08/28/20 0500 98.4 °F (36.9 °C) 80 21 155/58 98 %       Physical Exam  Vitals signs and nursing note reviewed. Constitutional:       Appearance: She is well-developed. HENT:      Head: Normocephalic and atraumatic. Eyes:      Extraocular Movements: Extraocular movements intact. Cardiovascular:      Rate and Rhythm: Normal rate. Pulmonary:      Effort: Pulmonary effort is normal. No respiratory distress. Abdominal:      General: There is no distension. Palpations: Abdomen is soft. Tenderness: There is no abdominal tenderness. Skin:     General: Skin is warm and dry. Neurological:      General: No focal deficit present. Mental Status: She is alert and oriented to person, place, and time. Psychiatric:         Mood and Affect: Mood normal.         Behavior: Behavior normal.         Diagnostic Study Results     Labs -  Recent Results (from the past 12 hour(s))   POC LACTIC ACID    Collection Time: 08/28/20  5:07 AM   Result Value Ref Range    Lactic Acid (POC) 1.90 0.40 - 2.00 mmol/L       Radiologic Studies -   No results found. Medical Decision Making     ED Course: Progress Notes, Reevaluation, and Consults:    5:18 AM Initial assessment performed. The patients presenting problems have been discussed, and they/their family are in agreement with the care plan formulated and outlined with them. I have encouraged them to ask questions as they arise throughout their visit.         Provider Notes (Medical Decision Making): 51-year-old female with multiple medical problems including renal failure on peritoneal dialysis presenting with diffuse abdominal pain and vomiting. Patient is well-appearing on exam with stable vitals. Her abdomen is soft and nontender. I am less concerned about peritonitis from her PD catheter. We will get some screening labs and obtain an abdominal CT scan given her age. If the patient still symptomatic with no obvious cause on her labs or imaging then will consider PD peritonitis    Procedures:     Critical Care Time:     Vital Signs-Reviewed the patient's vital signs. Reviewed pt's pulse ox reading. EKG: Interpreted by the EP. Time Interpreted:    Rate:    Rhythm:    Interpretation:   Comparison:     Records Reviewed: Nursing Notes (Time of Review: 5:18 AM)  -I am the first provider for this patient.  -I reviewed the vital signs, available nursing notes, past medical history, past surgical history, family history and social history. Current Facility-Administered Medications   Medication Dose Route Frequency Provider Last Rate Last Dose    HYDROmorphone (DILAUDID) syringe 0.5 mg  0.5 mg IntraVENous ONCE Jere Lieberman MD        ondansetron (ZOFRAN) injection 4 mg  4 mg IntraVENous NOW Jere Lieberman MD         Current Outpatient Medications   Medication Sig Dispense Refill    sucroferric oxyhydroxide (VELPHORO PO) Take  by mouth.  insulin aspart U-100 (NovoLOG Flexpen U-100 Insulin) 100 unit/mL (3 mL) inpn by SubCUTAneous route.  losartan (COZAAR) 50 mg tablet Take 1 Tab by mouth daily. 90 Tab 3    metoprolol succinate (TOPROL-XL) 50 mg XL tablet Take 1 Tab by mouth daily. 90 Tab 3    rosuvastatin (CRESTOR) 20 mg tablet Take 1 Tab by mouth daily. 90 Tab 3    amLODIPine (NORVASC) 10 mg tablet TAKE 1 TABLET BY MOUTH DAILY 90 Tab 6    cloNIDine HCl (CATAPRES) 0.2 mg tablet TK 1 T PO QHS FOR BLOOD PRESSURE  4    furosemide (LASIX) 20 mg tablet Take 20 mg by mouth BID Mon Wed & Fri.      insulin glargine (LANTUS SOLOSTAR) 100 unit/mL (3 mL) pen 24 Units by SubCUTAneous route daily. Clinical Impression     Clinical Impression: No diagnosis found. Disposition: This note was dictated utilizing voice recognition software which may lead to typographical errors. I apologize in advance if the situation occurs. If questions arise please do not hesitate to contact me or call our department.     Jere Zambrano MD  5:18 AM

## 2020-08-28 NOTE — TELEPHONE ENCOUNTER
Last Visit: 7/30/20 with MD Isreal Ortiz  Next Appointment: Advised to follow-up in 4 months  Previous Refill Encounter(s): 7/25/19 #527 with 3 refills    Requested Prescriptions     Pending Prescriptions Disp Refills    polyethylene glycol (MIRALAX) 17 gram/dose powder [Pharmacy Med Name: Polyethylene Glycol 3350 Oral Powder] 510 g 3     Sig: DISSOLVE THE CONTENTS OF ONE CAPFUL IN 8 OUNCES OF WATER AND DRINK BY MOUTH DAILY

## 2020-08-28 NOTE — ED NOTES
0700 -patient turned over to me at shift change, pending results of CT abdomen and pelvis. ED Course as of Aug 28 0926   Fri Aug 28, 2020   0803 Patient CT scan indicating significant constipation with large volume stool. Currently awaiting urinalysis to result. I evaluated the patient found her to be complaining of predominantly left-sided abdominal discomfort. She states she had a bowel movement 2 days ago. She states her symptoms started after being started on iron tablets. Suspect this is likely etiology for symptoms she does have some fullness more prominently on the left side. She has no peritoneal signs to suggest peritonitis. [TEJAL]   6995 Patient given water for oral fluid challenge. [TEJAL]      ED Course User Index  [TEJAL] Nano Rodriguez DO     0920 -CT scan showing some bubbles within the abdominal cavity since suspect this is from her peritoneal dialysis that she is a very benign abdominal exam and I do not suspect perforation at this point. Suspect etiology is constipation. I advised patient increase MiraLAX to use it daily as she is only using it sparingly at this point. We will give her a short course of Zofran as needed. She is tolerated this medication well in the past.  She is able to tolerate oral fluid challenge here. I advised patient on strict return precautions if develops a fever, has worsening abdominal pain, inability tolerate oral intake or any other symptoms that concern her. Advised her to resume her peritoneal dialysis when she gets home. She will return as needed and follow-up with her primary care physician.     Luis Trammell DO

## 2020-08-28 NOTE — ED TRIAGE NOTES
Pt presents with nausea vomiting and abdominal pain, pt states her vomit looked like it may have been dark with coffee ground appearance, no bloody BM, no blood thinners, hx of anemia, pt states the vomiting started around midnight

## 2020-08-28 NOTE — DISCHARGE INSTRUCTIONS
1.  Increase your MiraLAX dosing to use it daily. 2.  The iron pills will make you constipated discussed with your primary doctor or prescribing physician whether or not you can decrease this or change medications to help with constipation. Use the Zofran as needed for nausea or vomiting. 3.  Return if you develop chest pain, shortness of breath, fever, worsening symptoms including abdominal pain, intractable vomiting or any other symptoms. 4.  Make sure you do your peritoneal dialysis today when you get home. If you have any other issues or concerns please return. Patient Education        Constipation: Care Instructions  Your Care Instructions     Constipation means that you have a hard time passing stools (bowel movements). People pass stools from 3 times a day to once every 3 days. What is normal for you may be different. Constipation may occur with pain in the rectum and cramping. The pain may get worse when you try to pass stools. Sometimes there are small amounts of bright red blood on toilet paper or the surface of stools. This is because of enlarged veins near the rectum (hemorrhoids). A few changes in your diet and lifestyle may help you avoid ongoing constipation. Your doctor may also prescribe medicine to help loosen your stool. Some medicines can cause constipation. These include pain medicines and antidepressants. Tell your doctor about all the medicines you take. Your doctor may want to make a medicine change to ease your symptoms. Follow-up care is a key part of your treatment and safety. Be sure to make and go to all appointments, and call your doctor if you are having problems. It's also a good idea to know your test results and keep a list of the medicines you take. How can you care for yourself at home? · Drink plenty of fluids, enough so that your urine is light yellow or clear like water.  If you have kidney, heart, or liver disease and have to limit fluids, talk with your doctor before you increase the amount of fluids you drink. · Include high-fiber foods in your diet each day. These include fruits, vegetables, beans, and whole grains. · Get at least 30 minutes of exercise on most days of the week. Walking is a good choice. You also may want to do other activities, such as running, swimming, cycling, or playing tennis or team sports. · Take a fiber supplement, such as Citrucel or Metamucil, every day. Read and follow all instructions on the label. · Schedule time each day for a bowel movement. A daily routine may help. Take your time having your bowel movement. · Support your feet with a small step stool when you sit on the toilet. This helps flex your hips and places your pelvis in a squatting position. · Your doctor may recommend an over-the-counter laxative to relieve your constipation. Examples are Milk of Magnesia and MiraLax. Read and follow all instructions on the label. Do not use laxatives on a long-term basis. When should you call for help? Call your doctor now or seek immediate medical care if:  · You have new or worse belly pain. · You have new or worse nausea or vomiting. · You have blood in your stools. Watch closely for changes in your health, and be sure to contact your doctor if:  · Your constipation is getting worse. · You do not get better as expected. Where can you learn more? Go to http://www.grant.com/  Enter P343 in the search box to learn more about \"Constipation: Care Instructions. \"  Current as of: June 26, 2019               Content Version: 12.5  © 8460-5586 Healthwise, Incorporated. Care instructions adapted under license by [employer] (which disclaims liability or warranty for this information). If you have questions about a medical condition or this instruction, always ask your healthcare professional. [address] any warranty or liability for your use of this information. Patient Education        Abdominal Pain: Care Instructions  Your Care Instructions     Abdominal pain has many possible causes. Some aren't serious and get better on their own in a few days. Others need more testing and treatment. If your pain continues or gets worse, you need to be rechecked and may need more tests to find out what is wrong. You may need surgery to correct the problem. Don't ignore new symptoms, such as fever, nausea and vomiting, urination problems, pain that gets worse, and dizziness. These may be signs of a more serious problem. Your doctor may have recommended a follow-up visit in the next 8 to 12 hours. If you are not getting better, you may need more tests or treatment. The doctor has checked you carefully, but problems can develop later. If you notice any problems or new symptoms, get medical treatment right away. Follow-up care is a key part of your treatment and safety. Be sure to make and go to all appointments, and call your doctor if you are having problems. It's also a good idea to know your test results and keep a list of the medicines you take. How can you care for yourself at home? · Rest until you feel better. · To prevent dehydration, drink plenty of fluids, enough so that your urine is light yellow or clear like water. Choose water and other caffeine-free clear liquids until you feel better. If you have kidney, heart, or liver disease and have to limit fluids, talk with your doctor before you increase the amount of fluids you drink. · If your stomach is upset, eat mild foods, such as rice, dry toast or crackers, bananas, and applesauce. Try eating several small meals instead of two or three large ones. · Wait until 48 hours after all symptoms have gone away before you have spicy foods, alcohol, and drinks that contain caffeine. · Do not eat foods that are high in fat.   · Avoid anti-inflammatory medicines such as aspirin, ibuprofen (Advil, Motrin), and naproxen (Aleve). These can cause stomach upset. Talk to your doctor if you take daily aspirin for another health problem. When should you call for help? [id number] anytime you think you may need emergency care. For example, call if:  · You passed out (lost consciousness). · You pass maroon or very bloody stools. · You vomit blood or what looks like coffee grounds. · You have new, severe belly pain. Call your doctor now or seek immediate medical care if:  · Your pain gets worse, especially if it becomes focused in one area of your belly. · You have a new or higher fever. · Your stools are black and look like tar, or they have streaks of blood. · You have unexpected vaginal bleeding. · You have symptoms of a urinary tract infection. These may include:  ? Pain when you urinate. ? Urinating more often than usual.  ? Blood in your urine. · You are dizzy or lightheaded, or you feel like you may faint. Watch closely for changes in your health, and be sure to contact your doctor if:  · You are not getting better after 1 day (24 hours). Where can you learn more? Go to http://arlette-michel.info/  Enter C529 in the search box to learn more about \"Abdominal Pain: Care Instructions. \"  Current as of: June 26, 2019               Content Version: 12.5  © 2103-1614 Healthwise, Incorporated. Care instructions adapted under license by [employer] (which disclaims liability or warranty for this information). If you have questions about a medical condition or this instruction, always ask your healthcare professional. [place] [address] any warranty or liability for your use of this information.

## 2020-08-29 ENCOUNTER — PATIENT OUTREACH (OUTPATIENT)
Dept: CASE MANAGEMENT | Age: 80
End: 2020-08-29

## 2020-08-29 NOTE — PROGRESS NOTES
.Patient contacted regarding recent discharge and COVID-19 risk. Discussed COVID-19 related testing which was not done at this time. Test results were not done. Patient informed of results, if available? no    Care Transition Nurse/ Ambulatory Care Manager/ LPN Care Coordinator contacted the patient by telephone to perform post discharge assessment. Verified name and  with patient as identifiers. Patient has following risk factors of: immunocompromised, diabetes and chronic kidney disease. CTN/ACM/LPN reviewed discharge instructions, medical action plan and red flags related to discharge diagnosis. Reviewed and educated them on any new and changed medications related to discharge diagnosis. Advised obtaining a 90-day supply of all daily and as-needed medications. Advance Care Planning:   Does patient have an Advance Directive: yes, reviewed and current     Education provided regarding infection prevention, and signs and symptoms of COVID-19 and when to seek medical attention with patient who verbalized understanding. Discussed exposure protocols and quarantine from [address] you at higher risk for severe illness  and given an opportunity for questions and concerns. The patient agrees to contact the COVID-19 hotline [phone] or PCP office for questions related to their healthcare. CTN/ACM/LPN provided contact information for future reference. From CDC: Are you at higher risk for severe illness?  Wash your hands often.  Avoid close contact (6 feet, which is about two arm lengths) with people who are sick.  Put distance between yourself and other people if COVID-19 is spreading in your community.  Clean and disinfect frequently touched surfaces.  Avoid all cruise travel and non-essential air travel.  Call your healthcare professional if you have concerns about COVID-19 and your underlying condition or if you are sick.     For more information on steps you can take to protect yourself, see CDC's How to Protect Yourself      Patient/family/caregiver given information for [facility] Loop and agrees to enroll no  Patient's preferred e-mail:  n/a  Patient's preferred phone number: n/a  Based on Loop alert triggers, patient will be contacted by nurse care manager for worsening symptoms. Plan for follow-up call in 7-14 days based on severity of symptoms and risk factors.

## 2020-09-01 ENCOUNTER — VIRTUAL VISIT (OUTPATIENT)
Dept: FAMILY MEDICINE CLINIC | Age: 80
End: 2020-09-01

## 2020-09-01 DIAGNOSIS — Z20.822 SUSPECTED 2019 NOVEL CORONAVIRUS INFECTION: Primary | ICD-10-CM

## 2020-09-01 NOTE — PROGRESS NOTES
Paola Rose is a [de-identified] y.o. female, evaluated via audio-only technology on 9/1/2020 for Cough; Fever; and Chills      Pt is being evaluated for COVID-like symptoms. Pt states last week her symptoms began with a PND with cough bringing up green sputum. She states that she is unable to lay flat because she feels like she is choking. Pt states he smell and taste are deminished, and she felt feverish and chilled last night soaking her bed. Today she states her temp was 98.4 at 8am. Pt denies SOB, CP, or being around sick contacts. Pt also had a hospital visit on 8/28/2020 for abd pain and constipation. Assessment & Plan:     Diagnoses and all orders for this visit:    1. Suspected 2019 novel coronavirus infection  -     NOVEL CORONAVIRUS (COVID-19); Future      Follow-up and Dispositions    · Return if symptoms worsen or fail to improve with the [facility].       2  Subjective:       Prior to Admission medications    Medication Sig Start Date End Date Taking? Authorizing Provider   polyethylene glycol (MIRALAX) 17 gram/dose powder DISSOLVE THE CONTENTS OF ONE CAPFUL IN 8 OUNCES OF WATER AND DRINK BY MOUTH DAILY 8/28/20  Yes Brigida Silveira MD   sucroferric oxyhydroxide (VELPHORO PO) Take  by mouth. Yes Provider, Historical   insulin aspart U-100 (NovoLOG Flexpen U-100 Insulin) 100 unit/mL (3 mL) inpn by SubCUTAneous route. Yes Provider, Historical   losartan (COZAAR) 50 mg tablet Take 1 Tab by mouth daily. 7/30/20  Yes Brigida Silveira MD   metoprolol succinate (TOPROL-XL) 50 mg XL tablet Take 1 Tab by mouth daily. 1/15/20  Yes Brigida Silveira MD   rosuvastatin (CRESTOR) 20 mg tablet Take 1 Tab by mouth daily.  1/15/20  Yes Brigida Silveira MD   amLODIPine (NORVASC) 10 mg tablet TAKE 1 TABLET BY MOUTH DAILY 5/13/19  Yes Brigida Silveira MD   cloNIDine HCl (CATAPRES) 0.2 mg tablet TK 1 T PO QHS FOR BLOOD PRESSURE 10/30/17  Yes Provider, Historical   furosemide (LASIX) 20 mg tablet Take 20 mg by mouth BID Mon Wed & Fri. Yes Provider, Historical   insulin glargine (LANTUS SOLOSTAR) 100 unit/mL (3 mL) pen 24 Units by SubCUTAneous route daily. Yes Provider, Historical   ondansetron (ZOFRAN ODT) 4 mg disintegrating tablet Take 1 Tab by mouth every twelve (12) hours. 8/28/20   Eliz Silva DO     Patient Active Problem List   Diagnosis Code    Allergic rhinitis J30.9    Hypertension I10    Diabetes ([facility] [address] E11.9    Type 2 diabetes mellitus with nephropathy ([facility] [address] E11.21    Dyspepsia R10.13    Nausea R11.0    Constipation K59.00    Severe obesity (BMI 35.0-39. 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  insulin aspart U-100 (NovoLOG Flexpen U-100 Insulin) 100 unit/mL (3 mL) inpn by SubCUTAneous route.  losartan (COZAAR) 50 mg tablet Take 1 Tab by mouth daily. 90 Tab 3    metoprolol succinate (TOPROL-XL) 50 mg XL tablet Take 1 Tab by mouth daily. 90 Tab 3    rosuvastatin (CRESTOR) 20 mg tablet Take 1 Tab by mouth daily. 90 Tab 3    amLODIPine (NORVASC) 10 mg tablet TAKE 1 TABLET BY MOUTH DAILY 90 Tab 6    cloNIDine HCl (CATAPRES) 0.2 mg tablet TK 1 T PO QHS FOR BLOOD PRESSURE  4    furosemide (LASIX) 20 mg tablet Take 20 mg by mouth BID Mon Wed & Fri.      insulin glargine (LANTUS SOLOSTAR) 100 unit/mL (3 mL) pen 24 Units by SubCUTAneous route daily.       ondansetron (ZOFRAN ODT) 4 mg disintegrating tablet Take 1 Tab by mouth every twelve (12) hours. 5 Tab 0     Allergies   Allergen Reactions    Aspirin Other (comments)     Ringing in ears    Keflex [Cephalexin] Rash    Sevelamer Carbonate Itching     With rash and sweatiness     Past Medical History:   Diagnosis Date    Allergic rhinitis     Chronic kidney disease     stage 5    Diabetes ([facility] [address]     H/O seasonal allergies     Hypertension     Left foot drop, chronic since 2004 fusion sx     Peritoneal dialysis catheter in place [facility])        Review of Systems   Constitutional: Positive for chills and fever. Negative for malaise/fatigue. HENT: Positive for congestion. Respiratory: Positive for cough and sputum production. Negative for shortness of breath. Musculoskeletal: Negative for myalgias. Neurological: Negative for headaches. No flowsheet data found. Paola Rose, who was evaluated through a patient-initiated, synchronous (real-time) audio only encounter, and/or her healthcare decision maker, is aware that it is a billable service, with coverage as determined by her insurance carrier. She provided verbal consent to proceed: Yes. She has not had a related appointment within my department in the past 7 days or scheduled within the next 24 hours. Total Time: minutes: 11-20 minutes    Pt has not had a related appointment with this department in the last 7 days, or scheduled within the next 24 hours.      Jerry Lantigua NP

## 2020-09-02 ENCOUNTER — HOSPITAL ENCOUNTER (OUTPATIENT)
Dept: LAB | Age: 80
Discharge: HOME | End: 2020-09-02
Payer: MEDICARE

## 2020-09-02 ENCOUNTER — CLINICAL SUPPORT (OUTPATIENT)
Dept: FAMILY MEDICINE CLINIC | Facility: CLINIC | Age: 80
End: 2020-09-02

## 2020-09-02 DIAGNOSIS — Z20.822 SUSPECTED 2019 NOVEL CORONAVIRUS INFECTION: Primary | ICD-10-CM

## 2020-09-02 DIAGNOSIS — Z20.822 SUSPECTED 2019 NOVEL CORONAVIRUS INFECTION: ICD-10-CM

## 2020-09-02 PROCEDURE — 87635 SARS-COV-2 COVID-19 AMP PRB: CPT

## 2020-09-05 LAB — SARS-COV-2, COV2NT: NOT DETECTED

## 2020-09-14 ENCOUNTER — PATIENT OUTREACH (OUTPATIENT)
Dept: CASE MANAGEMENT | Age: 80
End: 2020-09-14

## 2020-09-14 NOTE — PROGRESS NOTES
.Patient resolved from Transition of Care episode on 9/14/2020  Discussed COVID-19 related testing which was available at this time. Test results were positive. Patient informed of results, if available? yes     Patient/family has been provided the following resources and education related to COVID-19:                         Signs, symptoms and red flags related to COVID-19            CDC exposure and quarantine guidelines            Conduit exposure contact - [phone]            Contact for their local Department of Health                 Patient currently reports that the following symptoms have improved: No New symptoms      No further outreach scheduled with this CTN/ACM/LPN/HC/ MA. Episode of Care resolved. Patient has this CTN/ACM/LPN/HC/MA contact information if future needs arise.

## 2020-09-28 ENCOUNTER — TELEPHONE (OUTPATIENT)
Dept: FAMILY MEDICINE CLINIC | Age: 80
End: 2020-09-28

## 2020-09-28 DIAGNOSIS — M48.061 SPINAL STENOSIS OF LUMBAR REGION, UNSPECIFIED WHETHER NEUROGENIC CLAUDICATION PRESENT: ICD-10-CM

## 2020-09-28 DIAGNOSIS — R29.898 WEAKNESS OF EXTREMITY: Primary | ICD-10-CM

## 2020-09-28 NOTE — TELEPHONE ENCOUNTER
----- Message from Amy Wilils sent at 9/28/2020  8:31 AM EDT -----  Regarding: phone call from Dr De Los Santos would like Dr. Carmen Willis to give her a call about getting pt a walker.

## 2020-10-23 NOTE — TELEPHONE ENCOUNTER
Order placed; [facility] will [facility] to [facility] pool.

## 2021-01-04 ENCOUNTER — OFFICE VISIT (OUTPATIENT)
Dept: FAMILY MEDICINE CLINIC | Age: 81
End: 2021-01-04
Payer: MEDICARE

## 2021-01-04 VITALS
RESPIRATION RATE: 18 BRPM | BODY MASS INDEX: 35.32 KG/M2 | OXYGEN SATURATION: 95 % | SYSTOLIC BLOOD PRESSURE: 150 MMHG | HEIGHT: 65 IN | TEMPERATURE: 98.9 F | WEIGHT: 212 LBS | HEART RATE: 64 BPM | DIASTOLIC BLOOD PRESSURE: 82 MMHG

## 2021-01-04 DIAGNOSIS — E11.21 TYPE 2 DIABETES MELLITUS WITH NEPHROPATHY (HCC): ICD-10-CM

## 2021-01-04 DIAGNOSIS — K59.04 CHRONIC IDIOPATHIC CONSTIPATION: ICD-10-CM

## 2021-01-04 DIAGNOSIS — N25.81 SECONDARY HYPERPARATHYROIDISM OF RENAL ORIGIN (HCC): ICD-10-CM

## 2021-01-04 DIAGNOSIS — I10 HTN (HYPERTENSION), BENIGN: Primary | ICD-10-CM

## 2021-01-04 PROCEDURE — 99214 OFFICE O/P EST MOD 30 MIN: CPT | Performed by: FAMILY MEDICINE

## 2021-01-04 PROCEDURE — G8417 CALC BMI ABV UP PARAM F/U: HCPCS | Performed by: FAMILY MEDICINE

## 2021-01-04 PROCEDURE — 1090F PRES/ABSN URINE INCON ASSESS: CPT | Performed by: FAMILY MEDICINE

## 2021-01-04 PROCEDURE — G8399 PT W/DXA RESULTS DOCUMENT: HCPCS | Performed by: FAMILY MEDICINE

## 2021-01-04 PROCEDURE — 3051F HG A1C>EQUAL 7.0%<8.0%: CPT | Performed by: FAMILY MEDICINE

## 2021-01-04 PROCEDURE — G9231 DOC ESRD DIA TRANS PREG: HCPCS | Performed by: FAMILY MEDICINE

## 2021-01-04 PROCEDURE — G8510 SCR DEP NEG, NO PLAN REQD: HCPCS | Performed by: FAMILY MEDICINE

## 2021-01-04 PROCEDURE — G8427 DOCREV CUR MEDS BY ELIG CLIN: HCPCS | Performed by: FAMILY MEDICINE

## 2021-01-04 PROCEDURE — 1101F PT FALLS ASSESS-DOCD LE1/YR: CPT | Performed by: FAMILY MEDICINE

## 2021-01-04 PROCEDURE — G8536 NO DOC ELDER MAL SCRN: HCPCS | Performed by: FAMILY MEDICINE

## 2021-01-04 NOTE — PROGRESS NOTES
Sánchez Early presents today for No chief complaint on file. Is someone accompanying this pt? no    Is the patient using any DME equipment during [address]? Yes, walker    Depression Screening:  3 most recent PHQ Screens 1/4/2021   PHQ Not Done -   Little interest or pleasure in doing things Not at all   Feeling down, depressed, irritable, or hopeless Not at all   Total Score PHQ 2 0       Learning Assessment:  Learning Assessment 4/30/2019   PRIMARY LEARNER Patient   HIGHEST LEVEL OF EDUCATION - PRIMARY LEARNER  -   BARRIERS PRIMARY LEARNER -   CO-LEARNER CAREGIVER -   PRIMARY LANGUAGE ENGLISH   LEARNER PREFERENCE PRIMARY LISTENING   ANSWERED BY patient   RELATIONSHIP SELF       Abuse Screening:  Abuse Screening Questionnaire 10/11/2018   Do you ever feel afraid of your partner? N   Are you in a relationship with someone who physically or mentally threatens you? N   Is it safe for you to go home? Y       Fall Risk  Fall Risk Assessment, last 12 mths 10/8/2019   Able to walk? Yes   Fall in past 12 months? No   Number of falls in past 12 months -   Fall with injury? -       ADL  ADL Assessment 10/11/2018   Feeding yourself No Help Needed   Getting from bed to chair No Help Needed   Getting dressed No Help Needed   Bathing or showering No Help Needed   Walk across the room (includes cane/walker) No Help Needed   Using the telphone No Help Needed   Taking your medications No Help Needed   Preparing meals No Help Needed   Managing money (expenses/bills) No Help Needed   Moderately strenuous housework (laundry) No Help Needed   Shopping for personal items (toiletries/medicines) No Help Needed   Shopping for groceries No Help Needed   Driving No Help Needed   Climbing a flight of stairs No Help Needed   Getting to places beyond walking distances No Help Needed       Health Maintenance reviewed and discussed and ordered per Provider.     Health Maintenance Due   Topic Date Due    DTaP/Tdap/Td series (1 - Tdap) 02/06/1961  Shingrix Vaccine Age 50> (1 of 2) 02/06/1990    Pneumococcal 65+ years (1 of 1 - PPSV23) 02/06/2005    Pneumococcal 65+ yrs at Risk Vaccine (1 of 2 - PCV13) 02/06/2005    MICROALBUMIN Q1  07/12/2019    Foot Exam Q1  06/21/2020    Lipid Screen  06/21/2020    Flu Vaccine (1) 09/01/2020   . Coordination of Care:  1. Have you been to the ER, urgent care clinic since your last visit? noHospitalized since your last visit? no    2. Have you seen or consulted any other health care providers outside of the [address] [place] [address] since your last visit? Include any pap smears or colon screening.  no

## 2021-01-04 NOTE — PATIENT INSTRUCTIONS

## 2021-01-04 NOTE — PROGRESS NOTES
HPI:  Matthew Sánchez is a [de-identified] y.o. female who presents today with   Chief Complaint   Patient presents with    Hypertension     3 month f/u        BP is up; on meds as listed below  Pt is on dialyis  Takes BP med daily  Needs blood work; May consider adjusting BP meds based on Blood tests. Lab Results   Component Value Date/Time    Hemoglobin A1c 7.0 (H) 01/04/2021 12:00 AM    Hemoglobin A1c (POC) 6.9 07/11/2018 01:49 AM    Hemoglobin A1c, External 5.8 06/21/2019       Needs DME orders faxed to [facility]    She has continued constipation; no alleviating factors    3 most recent PHQ Screens 1/4/2021   PHQ Not Done -   Little interest or pleasure in doing things Not at all   Feeling down, depressed, irritable, or hopeless Not at all   Total Score PHQ 2 0               PMH,  Meds, Allergies, Family History, Social history reviewed      Current Outpatient Medications   Medication Sig Dispense Refill    polyethylene glycol (MIRALAX) 17 gram/dose powder DISSOLVE THE CONTENTS OF ONE CAPFUL IN 8 OUNCES OF WATER AND DRINK BY MOUTH DAILY 510 g 3    sucroferric oxyhydroxide (VELPHORO PO) Take  by mouth.  insulin aspart U-100 (NovoLOG Flexpen U-100 Insulin) 100 unit/mL (3 mL) inpn by SubCUTAneous route.  losartan (COZAAR) 50 mg tablet Take 1 Tab by mouth daily. 90 Tab 3    metoprolol succinate (TOPROL-XL) 50 mg XL tablet Take 1 Tab by mouth daily. 90 Tab 3    rosuvastatin (CRESTOR) 20 mg tablet Take 1 Tab by mouth daily. 90 Tab 3    amLODIPine (NORVASC) 10 mg tablet TAKE 1 TABLET BY MOUTH DAILY 90 Tab 6    cloNIDine HCl (CATAPRES) 0.2 mg tablet TK 1 T PO QHS FOR BLOOD PRESSURE  4    furosemide (LASIX) 20 mg tablet Take 20 mg by mouth BID Mon Wed & Fri.      insulin glargine (LANTUS SOLOSTAR) 100 unit/mL (3 mL) pen 24 Units by SubCUTAneous route daily.           Allergies   Allergen Reactions    Aspirin Other (comments)     Ringing in ears    Keflex [Cephalexin] Rash    Sevelamer Carbonate Itching     With rash and sweatiness                  Review of Systems   Constitutional: Negative for chills, fever and weight loss. Respiratory: Negative for shortness of breath and wheezing. Cardiovascular: Negative for chest pain and palpitations. Gastrointestinal: Positive for constipation (on miralax; inquires about colonoscopy). All other systems reviewed and are negative. Visit Vitals  BP (!) 150/82   Pulse 64   Temp 98.9 °F (37.2 °C) (Temporal)   Resp 18   Ht 5' 5\" (1.651 m)   Wt 212 lb (96.2 kg)   SpO2 95%   BMI 35.28 kg/m²     Physical Exam   Visit Vitals  BP (!) 150/82   Pulse 64   Temp 98.9 °F (37.2 °C) (Temporal)   Resp 18   Ht 5' 5\" (1.651 m)   Wt 212 lb (96.2 kg)   SpO2 95%   BMI 35.28 kg/m²     General appearance: alert, cooperative, no distress, appears stated age  Neck: supple, symmetrical, trachea midline, no adenopathy, thyroid: not enlarged, symmetric, no tenderness/mass/nodules, no carotid bruit and no JVD  Lungs: clear to auscultation bilaterally  Heart: regular rate and rhythm, S1, S2 normal, no murmur, click, rub or gallop  Extremities: extremities normal, atraumatic, no cyanosis or edema          Assessment/Plan:  Diagnoses and all orders for this visit:    1. HTN (hypertension), benign  -     LIPID PANEL; Future  -     METABOLIC PANEL, COMPREHENSIVE; Future    2. Type 2 diabetes mellitus with nephropathy (HCC)  -     HEMOGLOBIN A1C WITH EAG; Future    3. Chronic idiopathic constipation  -     REFERRAL TO GASTROENTEROLOGY    4. Secondary hyperparathyroidism of renal origin [facility])  Assessment & Plan:        Other orders  -     METABOLIC PANEL, COMPREHENSIVE  -     LIPID PANEL  -     HEMOGLOBIN A1C WITH EAG  -     rosuvastatin (CRESTOR) 40 mg tablet; Take 1 Tab by mouth nightly. Increase fiber in diet  GI MD  Alarm signs reviewed  Follow-up and Dispositions    · Return in about 4 months (around 5/4/2021) for diabetes, htn, Chol.        An After Visit Summary was printed and given to the patient. This has been fully explained to the patient, who indicates understanding. Follow-up and Dispositions    · Return in about 4 months (around 5/4/2021) for diabetes, htn, Chol.             Gail Garvin MD

## 2021-01-05 LAB
ALBUMIN SERPL-MCNC: 3.3 G/DL (ref 3.7–4.7)
ALBUMIN/GLOB SERPL: 1.6 {RATIO} (ref 1.2–2.2)
ALP SERPL-CCNC: 107 IU/L (ref 39–117)
ALT SERPL-CCNC: 12 IU/L (ref 0–32)
AST SERPL-CCNC: 18 IU/L (ref 0–40)
BILIRUB SERPL-MCNC: <0.2 MG/DL (ref 0–1.2)
BUN SERPL-MCNC: 42 MG/DL (ref 8–27)
BUN/CREAT SERPL: 8 (ref 12–28)
CALCIUM SERPL-MCNC: 9.9 MG/DL (ref 8.7–10.3)
CHLORIDE SERPL-SCNC: 106 MMOL/L (ref 96–106)
CHOLEST SERPL-MCNC: 287 MG/DL (ref 100–199)
CO2 SERPL-SCNC: 22 MMOL/L (ref 20–29)
CREAT SERPL-MCNC: 5.29 MG/DL (ref 0.57–1)
EST. AVERAGE GLUCOSE BLD GHB EST-MCNC: 154 MG/DL
GLOBULIN SER CALC-MCNC: 2.1 G/DL (ref 1.5–4.5)
GLUCOSE SERPL-MCNC: 142 MG/DL (ref 65–99)
HBA1C MFR BLD: 7 % (ref 4.8–5.6)
HDLC SERPL-MCNC: 48 MG/DL
LDLC SERPL CALC-MCNC: 199 MG/DL (ref 0–99)
POTASSIUM SERPL-SCNC: 4.6 MMOL/L (ref 3.5–5.2)
PROT SERPL-MCNC: 5.4 G/DL (ref 6–8.5)
SODIUM SERPL-SCNC: 139 MMOL/L (ref 134–144)
TRIGL SERPL-MCNC: 210 MG/DL (ref 0–149)
VLDLC SERPL CALC-MCNC: 40 MG/DL (ref 5–40)

## 2021-01-09 PROBLEM — N25.81 SECONDARY HYPERPARATHYROIDISM OF RENAL ORIGIN (HCC): Status: ACTIVE | Noted: 2021-01-09

## 2021-01-09 RX ORDER — ROSUVASTATIN CALCIUM 40 MG/1
40 TABLET, COATED ORAL
Qty: 30 TAB | Refills: 6 | Status: SHIPPED | OUTPATIENT
Start: 2021-01-09 | End: 2021-01-14 | Stop reason: SDUPTHER

## 2021-01-14 ENCOUNTER — TELEPHONE (OUTPATIENT)
Dept: FAMILY MEDICINE CLINIC | Age: 81
End: 2021-01-14

## 2021-01-14 RX ORDER — ROSUVASTATIN CALCIUM 40 MG/1
40 TABLET, COATED ORAL
Qty: 90 TAB | Refills: 1 | Status: SHIPPED | OUTPATIENT
Start: 2021-01-14 | End: 2021-10-05

## 2021-01-14 NOTE — TELEPHONE ENCOUNTER
Spoke with patient. Advised patient of medication change. She verbalized understanding. Requesting medication can be sent to mail order.    Medication refill per verbal order Dr. Adia Freitas

## 2021-01-27 NOTE — PATIENT INSTRUCTIONS
HD#2    Feeling better.  Abdominal cramps continuing to improve.  Some flatus.    MAXIMUM TEMPERATURE 98.5°  Pulse 89, blood pressure 163/99    P.o. 240, IV 2714  Urine output 1450, NG 1100 (300 last shift)    Lungs are clear  Heart was regular rate and rhythm  Abdomen soft, nontender, no masses.  No guarding.  No rebound.  NG output is less bilious.    Impression:  Partial obstruction, clinically stable to improving.  No recommendations from our standpoint.  I would continue IV fluids.  Recommend changing to D5 1/2 normal saline with 20 mEq KCL per L. patient may shower   Indigestion (Dyspepsia or Heartburn): Care Instructions  Your Care Instructions  Sometimes it can be hard to pinpoint the cause of indigestion. (It is also called dyspepsia or heartburn.) Most cases of an upset stomach with bloating, burning, burping, and nausea are minor and go away within several hours. Home treatment and over-the-counter medicine often are able to control symptoms. But if you take medicine to relieve your indigestion without making diet and lifestyle changes, your symptoms are likely to return again and again. If you get indigestion often, it may be a sign of a more serious medical problem. Be sure to follow up with your doctor, who may want to do tests to be sure of the cause of your indigestion. Follow-up care is a key part of your treatment and safety. Be sure to make and go to all appointments, and call your doctor if you are having problems. It's also a good idea to know your test results and keep a list of the medicines you take. How can you care for yourself at home? · Your doctor may recommend over-the-counter medicine. For mild or occasional indigestion, antacids such as Gaviscon, Mylanta, Maalox, or Tums, may help. Be safe with medicines. Be careful when you take over-the-counter antacid medicines. Many of these medicines have aspirin in them. Read the label to make sure that you are not taking more than the recommended dose. Too much aspirin can be harmful. · Your doctor also may recommend over-the-counter acid reducers, such as Pepcid AC, Tagamet HB, Zantac 75, or Prilosec. Read and follow all instructions on the label. If you use these medicines often, talk with your doctor. · Change your eating habits. ¨ It's best to eat several small meals instead of two or three large meals. ¨ After you eat, wait 2 to 3 hours before you lie down. ¨ Chocolate, mint, and alcohol can make GERD worse.   ¨ Spicy foods, foods that have a lot of acid (like tomatoes and oranges), and coffee can make GERD symptoms worse in some people. If your symptoms are worse after you eat a certain food, you may want to stop eating that food to see if your symptoms get better. · Do not smoke or chew tobacco. Smoking can make GERD worse. If you need help quitting, talk to your doctor about stop-smoking programs and medicines. These can increase your chances of quitting for good. · If you have GERD symptoms at night, raise the head of your bed 6 to 8 inches. You can do this by putting the frame on blocks or placing a foam wedge under the head of your mattress. (Adding extra pillows does not work.)  · Do not wear tight clothing around your middle. · Lose weight if you need to. Losing just 5 to 10 pounds can help. · Do not take anti-inflammatory medicines, such as aspirin, ibuprofen (Advil, Motrin), or naproxen (Aleve). These can irritate the stomach. If you need a pain medicine, try acetaminophen (Tylenol), which does not cause stomach upset. When should you call for help? Call your doctor now or seek immediate medical care if:  ? · You have new or worse belly pain. ? · You are vomiting. ? Watch closely for changes in your health, and be sure to contact your doctor if:  ? · You have new or worse symptoms of indigestion. ? · You have trouble or pain swallowing. ? · You are losing weight. ? · You do not get better as expected. Where can you learn more? Go to http://arlette-michel.info/. Enter B289 in the search box to learn more about \"Indigestion (Dyspepsia or Heartburn): Care Instructions. \"  Current as of: May 12, 2017  Content Version: 11.4  © 8066-3324 [employer]. Care instructions adapted under license by [employer] (which disclaims liability or warranty for this information).  If you have questions about a medical condition or this instruction, always ask your healthcare professional. [address] any warranty or liability for your use of this information.

## 2021-04-16 RX ORDER — METOPROLOL SUCCINATE 50 MG/1
TABLET, EXTENDED RELEASE ORAL
Qty: 90 TAB | Refills: 3 | Status: SHIPPED | OUTPATIENT
Start: 2021-04-16 | End: 2022-04-29

## 2021-05-14 ENCOUNTER — VIRTUAL VISIT (OUTPATIENT)
Dept: FAMILY MEDICINE CLINIC | Age: 81
End: 2021-05-14
Payer: MEDICARE

## 2021-05-14 DIAGNOSIS — E78.00 HYPERCHOLESTEREMIA: ICD-10-CM

## 2021-05-14 DIAGNOSIS — E66.01 SEVERE OBESITY (BMI 35.0-39.9) WITH COMORBIDITY (HCC): ICD-10-CM

## 2021-05-14 DIAGNOSIS — I10 ESSENTIAL HYPERTENSION: Primary | ICD-10-CM

## 2021-05-14 DIAGNOSIS — T85.611A PD CATHETER DYSFUNCTION, INITIAL ENCOUNTER (HCC): ICD-10-CM

## 2021-05-14 PROCEDURE — G8399 PT W/DXA RESULTS DOCUMENT: HCPCS | Performed by: FAMILY MEDICINE

## 2021-05-14 PROCEDURE — 99214 OFFICE O/P EST MOD 30 MIN: CPT | Performed by: FAMILY MEDICINE

## 2021-05-14 PROCEDURE — G8417 CALC BMI ABV UP PARAM F/U: HCPCS | Performed by: FAMILY MEDICINE

## 2021-05-14 PROCEDURE — G8427 DOCREV CUR MEDS BY ELIG CLIN: HCPCS | Performed by: FAMILY MEDICINE

## 2021-05-14 PROCEDURE — G8510 SCR DEP NEG, NO PLAN REQD: HCPCS | Performed by: FAMILY MEDICINE

## 2021-05-14 PROCEDURE — 1101F PT FALLS ASSESS-DOCD LE1/YR: CPT | Performed by: FAMILY MEDICINE

## 2021-05-14 PROCEDURE — G9231 DOC ESRD DIA TRANS PREG: HCPCS | Performed by: FAMILY MEDICINE

## 2021-05-14 PROCEDURE — 1090F PRES/ABSN URINE INCON ASSESS: CPT | Performed by: FAMILY MEDICINE

## 2021-05-14 PROCEDURE — G8536 NO DOC ELDER MAL SCRN: HCPCS | Performed by: FAMILY MEDICINE

## 2021-05-14 NOTE — PROGRESS NOTES
Lynne Schaefer presents today for   Chief Complaint   Patient presents with    Other     test result f/u       Virtual/telephone visit    Depression Screening:  3 most recent PHQ Screens 5/14/2021   PHQ Not Done -   Little interest or pleasure in doing things Not at all   Feeling down, depressed, irritable, or hopeless Not at all   Total Score PHQ 2 0       Learning Assessment:  Learning Assessment 4/30/2019   PRIMARY LEARNER Patient   HIGHEST LEVEL OF EDUCATION - PRIMARY LEARNER  -   BARRIERS PRIMARY LEARNER -   CO-LEARNER CAREGIVER -   PRIMARY LANGUAGE ENGLISH   LEARNER PREFERENCE PRIMARY LISTENING   ANSWERED BY patient   RELATIONSHIP SELF       Fall Risk  Fall Risk Assessment, last 12 mths 1/4/2021   Able to walk? Yes   Fall in past 12 months? 1   Do you feel unsteady? 0   Are you worried about falling 0   Is TUG test greater than 12 seconds? 0   Is the gait abnormal? 0   Number of falls in past 12 months -   Fall with injury? 0       ADL  ADL Assessment 10/11/2018   Feeding yourself No Help Needed   Getting from bed to chair No Help Needed   Getting dressed No Help Needed   Bathing or showering No Help Needed   Walk across the room (includes cane/walker) No Help Needed   Using the telphone No Help Needed   Taking your medications No Help Needed   Preparing meals No Help Needed   Managing money (expenses/bills) No Help Needed   Moderately strenuous housework (laundry) No Help Needed   Shopping for personal items (toiletries/medicines) No Help Needed   Shopping for groceries No Help Needed   Driving No Help Needed   Climbing a flight of stairs No Help Needed   Getting to places beyond walking distances No Help Needed       Travel Screening:    Travel Screening      No screening recorded since 05/13/21 0000      Travel History   Travel since 04/14/21     No documented travel since 04/14/21          Health Maintenance reviewed and discussed and ordered per Provider.     Health Maintenance Due   Topic Date Due    DTaP/Tdap/Td series (1 - Tdap) Never done    Shingrix Vaccine Age 50> (1 of 2) Never done    Pneumococcal 65+ years (2 of 2 - PPSV23) 02/06/2005    MICROALBUMIN Q1  07/12/2019    Foot Exam Q1  06/21/2020    COVID-19 Vaccine (2 - Pfizer 2-dose series) 04/28/2021   . Coordination of Care:  1. Have you been to the ER, urgent care clinic since your last visit? Hospitalized since your last visit? No    2. Have you seen or consulted any other health care providers outside of the [address] [place] [address] since your last visit? Include any pap smears or colon screening.  No

## 2021-05-14 NOTE — PROGRESS NOTES
Abundio Medina is a 80 y.o. female, evaluated via audio-only technology on 5/14/2021 for Gas (test result f/u) and Cholesterol Problem  . Assessment & Plan:   Diagnoses and all orders for this visit:    1. Essential hypertension    2. Hypercholesteremia    3. PD catheter dysfunction, initial encounter ([facility]    4. Severe obesity (BMI 35.0-39. 9) with comorbidity ([facility]      LABS  Gas X  prilosec  Continue increased crestor  12lLABS    Subjective:     Here to follow up on chol and HTN  Patient is on Crestor and Toprol for cholesterol and high blood pressure respectively. He tolerates the medicine well    Pt has had a lot of \" gas\"  Patient's BMI is noted. Patient does peritoneal dialysis at home    Prior to Admission medications    Medication Sig Start Date End Date Taking? Authorizing Provider   metoprolol succinate (TOPROL-XL) 50 mg XL tablet TAKE 1 TABLET EVERY DAY 4/16/21  Yes Sandra Prabhakar MD   rosuvastatin (CRESTOR) 40 mg tablet Take 1 Tab by mouth nightly. 1/14/21  Yes Sandra Prabhakar MD   polyethylene glycol (MIRALAX) 17 gram/dose powder DISSOLVE THE CONTENTS OF ONE CAPFUL IN 8 OUNCES OF WATER AND DRINK BY MOUTH DAILY 8/28/20  Yes Sandra Prabhakar MD   sucroferric oxyhydroxide (VELPHORO PO) Take  by mouth. Yes Provider, Historical   insulin aspart U-100 (NovoLOG Flexpen U-100 Insulin) 100 unit/mL (3 mL) inpn by SubCUTAneous route. Yes Provider, Historical   losartan (COZAAR) 50 mg tablet Take 1 Tab by mouth daily. 7/30/20  Yes Sandra Prabhakar MD   amLODIPine (NORVASC) 10 mg tablet TAKE 1 TABLET BY MOUTH DAILY 5/13/19  Yes Sandra Prabhakar MD   cloNIDine HCl (CATAPRES) 0.2 mg tablet TK 1 T PO QHS FOR BLOOD PRESSURE 10/30/17  Yes Provider, Historical   furosemide (LASIX) 20 mg tablet Take 20 mg by mouth BID Mon Wed & Fri. Yes Provider, Historical   insulin glargine (LANTUS SOLOSTAR) 100 unit/mL (3 mL) pen 24 Units by SubCUTAneous route daily.    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 9) with comorbidity ([facility] 04/19/2018    Dyspepsia 02/02/2018    Nausea 02/02/2018    Constipation 02/02/2018    Type 2 diabetes mellitus with nephropathy ([facility]) 12/21/2017    Allergic rhinitis     Hypertension     Diabetes ([facility])      Current Outpatient Medications   Medication Sig Dispense Refill    metoprolol succinate (TOPROL-XL) 50 mg XL tablet TAKE 1 TABLET EVERY DAY 90 Tab 3    rosuvastatin (CRESTOR) 40 mg tablet Take 1 Tab by mouth nightly. 90 Tab 1    polyethylene glycol (MIRALAX) 17 gram/dose powder DISSOLVE THE CONTENTS OF ONE CAPFUL IN 8 OUNCES OF WATER AND DRINK BY MOUTH DAILY 510 g 3    sucroferric oxyhydroxide (VELPHORO PO) Take  by mouth.  insulin aspart U-100 (NovoLOG Flexpen U-100 Insulin) 100 unit/mL (3 mL) inpn by SubCUTAneous route.  losartan (COZAAR) 50 mg tablet Take 1 Tab by mouth daily. 90 Tab 3    amLODIPine (NORVASC) 10 mg tablet TAKE 1 TABLET BY MOUTH DAILY 90 Tab 6    cloNIDine HCl (CATAPRES) 0.2 mg tablet TK 1 T PO QHS FOR BLOOD PRESSURE  4    furosemide (LASIX) 20 mg tablet Take 20 mg by mouth BID Mon Wed & Fri.      insulin glargine (LANTUS SOLOSTAR) 100 unit/mL (3 mL) pen 24 Units by SubCUTAneous route daily.        Allergies   Allergen Reactions    Aspirin Other (comments)     Ringing in ears    Keflex [Cephalexin] Rash    Sevelamer Carbonate Itching     With rash and sweatiness     Past Medical History:   Diagnosis Date    Allergic rhinitis     Chronic kidney disease     stage 5  Diabetes ([address]     H/O seasonal allergies     Hypertension     Left foot drop, chronic since 2004 fusion sx     Peritoneal dialysis catheter in place [facility])      Past Surgical History:   Procedure Laterality Date    HAND/FINGER SURGERY UNLISTED      HX CATARACT REMOVAL  2011    HX LUMBAR FUSION  2004-last sx    fused L2-S1, in [facility]    HX TUBAL LIGATION      HX WISDOM TEETH EXTRACTION       Family History   Problem Relation Age of Onset    Diabetes Maternal Aunt     Diabetes Maternal Uncle     Diabetes Maternal Grandfather     Diabetes Other     Hypertension Cousin      Social History     Tobacco Use    Smoking status: Former Smoker    Smokeless tobacco: Never Used   Substance Use Topics    Alcohol use: No       ROS as per HPI    No flowsheet data found. Ry Smith, who was evaluated through a patient-initiated, synchronous (real-time) audio only encounter, and/or her healthcare decision maker, is aware that it is a billable service, with coverage as determined by her insurance carrier. She provided verbal consent to proceed: Yes. She has not had a related appointment within my department in the past 7 days or scheduled within the next 24 hours.       Total Time: minutes: 11-20 minutes    Aimee Gomez MD

## 2021-05-22 NOTE — PATIENT INSTRUCTIONS
Body Mass Index: Care Instructions Your Care Instructions Body mass index (BMI) can help you see if your weight is raising your risk for health problems. It uses a formula to compare how much you weigh with how tall you are. · A BMI lower than 18.5 is considered underweight. · A BMI between 18.5 and 24.9 is considered healthy. · A BMI between 25 and 29.9 is considered overweight. A BMI of 30 or higher is considered obese. If your BMI is in the normal range, it means that you have a lower risk for weight-related health problems. If your BMI is in the overweight or obese range, you may be at increased risk for weight-related health problems, such as high blood pressure, heart disease, stroke, arthritis or joint pain, and diabetes. If your BMI is in the underweight range, you may be at increased risk for health problems such as fatigue, lower protection (immunity) against illness, muscle loss, bone loss, hair loss, and hormone problems. BMI is just one measure of your risk for weight-related health problems. You may be at higher risk for health problems if you are not active, you eat an unhealthy diet, or you drink too much alcohol or use tobacco products. Follow-up care is a key part of your treatment and safety. Be sure to make and go to all appointments, and call your doctor if you are having problems. It's also a good idea to know your test results and keep a list of the medicines you take. How can you care for yourself at home? · Practice healthy eating habits. This includes eating plenty of fruits, vegetables, whole grains, lean protein, and low-fat dairy. · If your doctor recommends it, get more exercise. Walking is a good choice. Bit by bit, increase the amount you walk every day. Try for at least 30 minutes on most days of the week. · Do not smoke. Smoking can increase your risk for health problems. If you need help quitting, talk to your doctor about stop-smoking programs and medicines. These can increase your chances of quitting for good. · Limit alcohol to 2 drinks a day for men and 1 drink a day for women. Too much alcohol can cause health problems. If you have a BMI higher than 25 · Your doctor may do other tests to check your risk for weight-related health problems. This may include measuring the distance around your waist. A waist measurement of more than 40 inches in men or 35 inches in women can increase the risk of weight-related health problems. · Talk with your doctor about steps you can take to stay healthy or improve your health. You may need to make lifestyle changes to lose weight and stay healthy, such as changing your diet and getting regular exercise. If you have a BMI lower than 18.5 · Your doctor may do other tests to check your risk for health problems. · Talk with your doctor about steps you can take to stay healthy or improve your health. You may need to make lifestyle changes to gain or maintain weight and stay healthy, such as getting more healthy foods in your diet and doing exercises to build muscle. Where can you learn more? Go to http://www.grant.com/ Enter S176 in the search box to learn more about \"Body Mass Index: Care Instructions. \" Current as of: September 23, 2020               Content Version: 12.8 © 2006-2021 [employer]. Care instructions adapted under license by [employer] (which disclaims liability or warranty for this information). If you have questions about a medical condition or this instruction, always ask your healthcare professional. [place] [address] any warranty or liability for your use of this information.

## 2021-06-03 ENCOUNTER — HOSPITAL ENCOUNTER (EMERGENCY)
Dept: ULTRASOUND IMAGING | Age: 81
Discharge: HOME | End: 2021-06-03
Attending: NURSE PRACTITIONER
Payer: MEDICARE

## 2021-06-03 ENCOUNTER — HOSPITAL ENCOUNTER (EMERGENCY)
Age: 81
Discharge: HOME | End: 2021-06-03
Attending: EMERGENCY MEDICINE | Admitting: EMERGENCY MEDICINE
Payer: MEDICARE

## 2021-06-03 VITALS
HEART RATE: 81 BPM | WEIGHT: 195 LBS | RESPIRATION RATE: 18 BRPM | HEIGHT: 65 IN | OXYGEN SATURATION: 97 % | BODY MASS INDEX: 32.49 KG/M2 | TEMPERATURE: 98.4 F | SYSTOLIC BLOOD PRESSURE: 149 MMHG | DIASTOLIC BLOOD PRESSURE: 73 MMHG

## 2021-06-03 DIAGNOSIS — T85.71XA PERITONITIS ASSOCIATED WITH PERITONEAL DIALYSIS, INITIAL ENCOUNTER (HCC): Primary | ICD-10-CM

## 2021-06-03 DIAGNOSIS — R10.13 ABDOMINAL PAIN, EPIGASTRIC: ICD-10-CM

## 2021-06-03 DIAGNOSIS — N39.0 URINARY TRACT INFECTION WITH HEMATURIA, SITE UNSPECIFIED: ICD-10-CM

## 2021-06-03 DIAGNOSIS — R31.9 URINARY TRACT INFECTION WITH HEMATURIA, SITE UNSPECIFIED: ICD-10-CM

## 2021-06-03 LAB
ALBUMIN SERPL-MCNC: 3.4 G/DL (ref 3.4–5)
ALBUMIN/GLOB SERPL: 0.9 {RATIO} (ref 0.8–1.7)
ALP SERPL-CCNC: 138 U/L (ref 45–117)
ALT SERPL-CCNC: 36 U/L (ref 13–56)
ANION GAP SERPL CALC-SCNC: 8 MMOL/L (ref 3–18)
APPEARANCE FLD: CLEAR
APPEARANCE UR: CLEAR
AST SERPL-CCNC: 20 U/L (ref 10–38)
ATRIAL RATE: 78 BPM
BACTERIA URNS QL MICRO: ABNORMAL /HPF
BASOPHILS # BLD: 0 K/UL (ref 0–0.1)
BASOPHILS NFR BLD: 0 % (ref 0–2)
BILIRUB SERPL-MCNC: 0.4 MG/DL (ref 0.2–1)
BILIRUB UR QL: NEGATIVE
BUN SERPL-MCNC: 37 MG/DL (ref 7–18)
BUN/CREAT SERPL: 6 (ref 12–20)
CALCIUM SERPL-MCNC: 9.2 MG/DL (ref 8.5–10.1)
CALCULATED P AXIS, ECG09: 23 DEGREES
CALCULATED R AXIS, ECG10: -43 DEGREES
CALCULATED T AXIS, ECG11: -19 DEGREES
CHLORIDE SERPL-SCNC: 103 MMOL/L (ref 100–111)
CO2 SERPL-SCNC: 28 MMOL/L (ref 21–32)
COLOR FLD: YELLOW
COLOR UR: YELLOW
CREAT SERPL-MCNC: 5.8 MG/DL (ref 0.6–1.3)
DIAGNOSIS, 93000: NORMAL
DIFFERENTIAL METHOD BLD: ABNORMAL
EOSINOPHIL # BLD: 0.2 K/UL (ref 0–0.4)
EOSINOPHIL NFR BLD: 2 % (ref 0–5)
EOSINOPHIL NFR FLD MANUAL: 0 %
EPITH CASTS URNS QL MICRO: ABNORMAL /LPF (ref 0–5)
ERYTHROCYTE [DISTWIDTH] IN BLOOD BY AUTOMATED COUNT: 14.2 % (ref 11.6–14.5)
GLOBULIN SER CALC-MCNC: 3.6 G/DL (ref 2–4)
GLUCOSE FLD-MCNC: 236 MG/DL
GLUCOSE SERPL-MCNC: 326 MG/DL (ref 74–99)
GLUCOSE UR STRIP.AUTO-MCNC: >1000 MG/DL
HBV SURFACE AG SER QL: <0.1 INDEX
HBV SURFACE AG SER QL: NEGATIVE
HCT VFR BLD AUTO: 34.9 % (ref 35–45)
HGB BLD-MCNC: 11.5 G/DL (ref 12–16)
HGB UR QL STRIP: ABNORMAL
KETONES UR QL STRIP.AUTO: NEGATIVE MG/DL
LEUKOCYTE ESTERASE UR QL STRIP.AUTO: ABNORMAL
LIPASE SERPL-CCNC: 272 U/L (ref 73–393)
LYMPHOCYTES # BLD: 1.4 K/UL (ref 0.9–3.6)
LYMPHOCYTES NFR BLD: 18 % (ref 21–52)
LYMPHOCYTES NFR FLD: 87 %
MCH RBC QN AUTO: 30.1 PG (ref 24–34)
MCHC RBC AUTO-ENTMCNC: 33 G/DL (ref 31–37)
MCV RBC AUTO: 91.4 FL (ref 74–97)
MONOCYTES # BLD: 0.5 K/UL (ref 0.05–1.2)
MONOCYTES NFR BLD: 7 % (ref 3–10)
MONOCYTES NFR FLD: 2 %
NEUTROPHILS NFR FLD: 11 %
NEUTS BAND # FLD: 0 %
NEUTS SEG # BLD: 5.5 K/UL (ref 1.8–8)
NEUTS SEG NFR BLD: 72 % (ref 40–73)
NITRITE UR QL STRIP.AUTO: NEGATIVE
NUC CELL # FLD: 184 /CU MM
P-R INTERVAL, ECG05: 148 MS
PH UR STRIP: 7 [PH] (ref 5–8)
PLATELET # BLD AUTO: 182 K/UL (ref 135–420)
PMV BLD AUTO: 9.9 FL (ref 9.2–11.8)
POTASSIUM SERPL-SCNC: 3.6 MMOL/L (ref 3.5–5.5)
PROT FLD-MCNC: 3.4 G/DL
PROT SERPL-MCNC: 7 G/DL (ref 6.4–8.2)
PROT UR STRIP-MCNC: 100 MG/DL
Q-T INTERVAL, ECG07: 448 MS
QRS DURATION, ECG06: 170 MS
QTC CALCULATION (BEZET), ECG08: 510 MS
RBC # BLD AUTO: 3.82 M/UL (ref 4.2–5.3)
RBC # FLD: 51 /CU MM
RBC #/AREA URNS HPF: ABNORMAL /HPF (ref 0–5)
SODIUM SERPL-SCNC: 139 MMOL/L (ref 136–145)
SP GR UR REFRACTOMETRY: 1.02 (ref 1–1.03)
SPECIMEN SOURCE FLD: ABNORMAL
SPECIMEN SOURCE FLD: NORMAL
SPECIMEN SOURCE FLD: NORMAL
TROPONIN I SERPL-MCNC: <0.02 NG/ML (ref 0–0.04)
UROBILINOGEN UR QL STRIP.AUTO: 0.2 EU/DL (ref 0.2–1)
VENTRICULAR RATE, ECG03: 78 BPM
WBC # BLD AUTO: 7.6 K/UL (ref 4.6–13.2)
WBC URNS QL MICRO: ABNORMAL /HPF (ref 0–5)

## 2021-06-03 PROCEDURE — 74011250637 HC RX REV CODE- 250/637: Performed by: STUDENT IN AN ORGANIZED HEALTH CARE EDUCATION/TRAINING PROGRAM

## 2021-06-03 PROCEDURE — 76705 ECHO EXAM OF ABDOMEN: CPT

## 2021-06-03 PROCEDURE — 87205 SMEAR GRAM STAIN: CPT

## 2021-06-03 PROCEDURE — 74011000250 HC RX REV CODE- 250: Performed by: STUDENT IN AN ORGANIZED HEALTH CARE EDUCATION/TRAINING PROGRAM

## 2021-06-03 PROCEDURE — A4722 DIALYS SOL FLD VOL > 1999CC: HCPCS | Performed by: STUDENT IN AN ORGANIZED HEALTH CARE EDUCATION/TRAINING PROGRAM

## 2021-06-03 PROCEDURE — 74011250636 HC RX REV CODE- 250/636: Performed by: STUDENT IN AN ORGANIZED HEALTH CARE EDUCATION/TRAINING PROGRAM

## 2021-06-03 PROCEDURE — 84484 ASSAY OF TROPONIN QUANT: CPT

## 2021-06-03 PROCEDURE — 87340 HEPATITIS B SURFACE AG IA: CPT

## 2021-06-03 PROCEDURE — 89051 BODY FLUID CELL COUNT: CPT

## 2021-06-03 PROCEDURE — 85025 COMPLETE CBC W/AUTO DIFF WBC: CPT

## 2021-06-03 PROCEDURE — 86706 HEP B SURFACE ANTIBODY: CPT

## 2021-06-03 PROCEDURE — 81001 URINALYSIS AUTO W/SCOPE: CPT

## 2021-06-03 PROCEDURE — 80053 COMPREHEN METABOLIC PANEL: CPT

## 2021-06-03 PROCEDURE — 87015 SPECIMEN INFECT AGNT CONCNTJ: CPT

## 2021-06-03 PROCEDURE — 82945 GLUCOSE OTHER FLUID: CPT

## 2021-06-03 PROCEDURE — 99284 EMERGENCY DEPT VISIT MOD MDM: CPT

## 2021-06-03 PROCEDURE — 93005 ELECTROCARDIOGRAM TRACING: CPT

## 2021-06-03 PROCEDURE — 84157 ASSAY OF PROTEIN OTHER: CPT

## 2021-06-03 PROCEDURE — 83690 ASSAY OF LIPASE: CPT

## 2021-06-03 RX ORDER — LEVOFLOXACIN 5 MG/ML
250 INJECTION, SOLUTION INTRAVENOUS
Status: DISCONTINUED | OUTPATIENT
Start: 2021-06-03 | End: 2021-06-03

## 2021-06-03 RX ORDER — LEVOFLOXACIN 250 MG/1
250 TABLET ORAL
Status: COMPLETED | OUTPATIENT
Start: 2021-06-03 | End: 2021-06-03

## 2021-06-03 RX ORDER — SUCRALFATE 1 G/1
1 TABLET ORAL
Status: DISCONTINUED | OUTPATIENT
Start: 2021-06-03 | End: 2021-06-04 | Stop reason: HOSPADM

## 2021-06-03 RX ORDER — LIDOCAINE HYDROCHLORIDE 20 MG/ML
15 SOLUTION OROPHARYNGEAL
Status: DISCONTINUED | OUTPATIENT
Start: 2021-06-03 | End: 2021-06-04 | Stop reason: HOSPADM

## 2021-06-03 RX ORDER — DICYCLOMINE HYDROCHLORIDE 10 MG/1
10 CAPSULE ORAL
Status: COMPLETED | OUTPATIENT
Start: 2021-06-03 | End: 2021-06-03

## 2021-06-03 RX ADMIN — SUCRALFATE 1 G: 1 TABLET ORAL at 21:33

## 2021-06-03 RX ADMIN — VANCOMYCIN HYDROCHLORIDE: 1 INJECTION, POWDER, LYOPHILIZED, FOR SOLUTION INTRAVENOUS at 21:53

## 2021-06-03 RX ADMIN — LIDOCAINE HYDROCHLORIDE 15 ML: 20 SOLUTION ORAL; TOPICAL at 21:33

## 2021-06-03 RX ADMIN — DICYCLOMINE HYDROCHLORIDE 10 MG: 10 CAPSULE ORAL at 21:33

## 2021-06-03 RX ADMIN — LEVOFLOXACIN 250 MG: 250 TABLET, FILM COATED ORAL at 21:52

## 2021-06-03 NOTE — ED PROVIDER NOTES
EMERGENCY DEPARTMENT HISTORY AND PHYSICAL EXAM    2:00 PM      Date: 6/3/2021  Patient Name: Audie Mccabe    History of Presenting Illness     Chief Complaint   Patient presents with    Abdominal Pain         History Provided By: Patient  Location/Duration/Severity/Modifying factors   HPI     80yoF, CKD and peritoneal dialysis patient, presenting with 5 days of worsening upper abdominal pain. Worsens with meals. Has not had vomiting or diarrhea. Normal BMs. Just completed home peritoneal dialysis last night without complication. Did come in today when pain T to worsen in the epigastric and left upper quadrant region. PCP: Emil Ken MD    Current Facility-Administered Medications   Medication Dose Route Frequency Provider Last Rate Last Admin    lidocaine (XYLOCAINE) 2 % viscous solution 15 mL  15 mL Mouth/Throat Q3H PRN Qi Collins MD   15 mL at 06/03/21 2133    sucralfate (CARAFATE) tablet 1 g  1 g Oral AC&HS Qi Collins MD   1 g at 06/03/21 2133     Current Outpatient Medications   Medication Sig Dispense Refill    metoprolol succinate (TOPROL-XL) 50 mg XL tablet TAKE 1 TABLET EVERY DAY 90 Tab 3    rosuvastatin (CRESTOR) 40 mg tablet Take 1 Tab by mouth nightly. 90 Tab 1    polyethylene glycol (MIRALAX) 17 gram/dose powder DISSOLVE THE CONTENTS OF ONE CAPFUL IN 8 OUNCES OF WATER AND DRINK BY MOUTH DAILY 510 g 3    sucroferric oxyhydroxide (VELPHORO PO) Take  by mouth.  insulin aspart U-100 (NovoLOG Flexpen U-100 Insulin) 100 unit/mL (3 mL) inpn by SubCUTAneous route.  losartan (COZAAR) 50 mg tablet Take 1 Tab by mouth daily. 90 Tab 3    amLODIPine (NORVASC) 10 mg tablet TAKE 1 TABLET BY MOUTH DAILY 90 Tab 6    cloNIDine HCl (CATAPRES) 0.2 mg tablet TK 1 T PO QHS FOR BLOOD PRESSURE  4    furosemide (LASIX) 20 mg tablet Take 20 mg by mouth BID Mon Wed & Fri.      insulin glargine (LANTUS SOLOSTAR) 100 unit/mL (3 mL) pen 24 Units by SubCUTAneous route daily. Past History     Past Medical History:  Past Medical History:   Diagnosis Date    Allergic rhinitis     Chronic kidney disease     stage 5    Diabetes ([facility] [address]     H/O seasonal allergies     Hypertension     Left foot drop, chronic since 2004 fusion sx     Peritoneal dialysis catheter in place [facility])        Past Surgical History:  Past Surgical History:   Procedure Laterality Date    HAND/FINGER SURGERY UNLISTED      HX CATARACT REMOVAL  2011    HX LUMBAR FUSION  2004-last sx    fused L2-S1, in [facility]    HX TUBAL LIGATION      HX WISDOM TEETH EXTRACTION         Family History:  Family History   Problem Relation Age of Onset    Diabetes Maternal Aunt     Diabetes Maternal Uncle     Diabetes Maternal Grandfather     Diabetes Other     Hypertension Cousin        Social History:  Social History     Tobacco Use    Smoking status: Former Smoker    Smokeless tobacco: Never Used   Substance Use Topics    Alcohol use: No    Drug use: No       Allergies: Allergies   Allergen Reactions    Aspirin Other (comments)     Ringing in ears    Keflex [Cephalexin] Rash    Sevelamer Carbonate Itching     With rash and sweatiness         Review of Systems       Review of Systems   Constitutional: Negative for chills and fever. HENT: Negative for hearing loss, sinus pain and trouble swallowing. Eyes: Negative for visual disturbance. Respiratory: Negative for cough, shortness of breath and wheezing. Cardiovascular: Negative for chest pain, palpitations and leg swelling. Gastrointestinal: Positive for abdominal pain, nausea and vomiting. Negative for anal bleeding, blood in stool and diarrhea. Genitourinary: Positive for frequency. Negative for dysuria and flank pain. Musculoskeletal: Negative for neck stiffness. Skin: Negative for rash. Neurological: Negative for dizziness, syncope, light-headedness and headaches.          Physical Exam     Visit Vitals  BP (!) 149/73   Pulse 81   Temp 98.4 °F (36.9 °C)   Resp 18   Ht 5' 5\" (1.651 m)   Wt 88.5 kg (195 lb)   SpO2 97%   BMI 32.45 kg/m²         Physical Exam  Vitals and nursing note reviewed. Constitutional:       General: She is not in acute distress. Appearance: She is obese. She is not ill-appearing, toxic-appearing or diaphoretic. HENT:      Head: Normocephalic and atraumatic. Mouth/Throat:      Mouth: Mucous membranes are moist.   Eyes:      Extraocular Movements: Extraocular movements intact. Pupils: Pupils are equal, round, and reactive to light. Cardiovascular:      Rate and Rhythm: Normal rate and regular rhythm. Heart sounds: Normal heart sounds. Pulmonary:      Effort: Pulmonary effort is normal.      Breath sounds: Normal breath sounds. Abdominal:      General: Abdomen is flat. Bowel sounds are normal.      Palpations: Abdomen is soft. There is no mass. Tenderness: There is abdominal tenderness in the epigastric area and left upper quadrant. There is no right CVA tenderness or left CVA tenderness. Comments: Peritoneal catheter in right lower quadrant. Surrounding site clean without evidence of infection. Skin:     General: Skin is warm and dry. Capillary Refill: Capillary refill takes less than 2 seconds. Neurological:      General: No focal deficit present. Mental Status: She is alert. Diagnostic Study Results     Labs -  Recent Results (from the past 12 hour(s))   CBC WITH AUTOMATED DIFF    Collection Time: 06/03/21  2:01 PM   Result Value Ref Range    WBC 7.6 4.6 - 13.2 K/uL    RBC 3.82 (L) 4.20 - 5.30 M/uL    HGB 11.5 (L) 12.0 - 16.0 g/dL    HCT 34.9 (L) 35.0 - 45.0 %    MCV 91.4 74.0 - 97.0 FL    MCH 30.1 24.0 - 34.0 PG    MCHC 33.0 31.0 - 37.0 g/dL    RDW 14.2 11.6 - 14.5 %    PLATELET 315 260 - 071 K/uL    MPV 9.9 9.2 - 11.8 FL    NEUTROPHILS 72 40 - 73 %    LYMPHOCYTES 18 (L) 21 - 52 %    MONOCYTES 7 3 - 10 %    EOSINOPHILS 2 0 - 5 %    BASOPHILS 0 0 - 2 %    ABS.  NEUTROPHILS 5. 5 1.8 - 8.0 K/UL    ABS. LYMPHOCYTES 1.4 0.9 - 3.6 K/UL    ABS. MONOCYTES 0.5 0.05 - 1.2 K/UL    ABS. EOSINOPHILS 0.2 0.0 - 0.4 K/UL    ABS. BASOPHILS 0.0 0.0 - 0.1 K/UL    DF AUTOMATED     METABOLIC PANEL, COMPREHENSIVE    Collection Time: 06/03/21  2:01 PM   Result Value Ref Range    Sodium 139 136 - 145 mmol/L    Potassium 3.6 3.5 - 5.5 mmol/L    Chloride 103 100 - 111 mmol/L    CO2 28 21 - 32 mmol/L    Anion gap 8 3.0 - 18 mmol/L    Glucose 326 (H) 74 - 99 mg/dL    BUN 37 (H) 7.0 - 18 MG/DL    Creatinine 5.80 (H) 0.6 - 1.3 MG/DL    BUN/Creatinine ratio 6 (L) 12 - 20      GFR est AA 8 (L) >60 ml/min/1.73m2    GFR est non-AA 7 (L) >60 ml/min/1.73m2    Calcium 9.2 8.5 - 10.1 MG/DL    Bilirubin, total 0.4 0.2 - 1.0 MG/DL    ALT (SGPT) 36 13 - 56 U/L    AST (SGOT) 20 10 - 38 U/L    Alk. phosphatase 138 (H) 45 - 117 U/L    Protein, total 7.0 6.4 - 8.2 g/dL    Albumin 3.4 3.4 - 5.0 g/dL    Globulin 3.6 2.0 - 4.0 g/dL    A-G Ratio 0.9 0.8 - 1.7     LIPASE    Collection Time: 06/03/21  2:01 PM   Result Value Ref Range    Lipase 272 73 - 393 U/L   TROPONIN I    Collection Time: 06/03/21  2:01 PM   Result Value Ref Range    Troponin-I, QT <0.02 0.0 - 0.045 NG/ML   HEP B SURFACE AG    Collection Time: 06/03/21  2:01 PM   Result Value Ref Range    Hepatitis B surface Ag <0.10 <1.00 Index    Hep B surface Ag Interp.  Negative NEG     EKG, 12 LEAD, INITIAL    Collection Time: 06/03/21  2:06 PM   Result Value Ref Range    Ventricular Rate 78 BPM    Atrial Rate 78 BPM    P-R Interval 148 ms    QRS Duration 170 ms    Q-T Interval 448 ms    QTC Calculation (Bezet) 510 ms    Calculated P Axis 23 degrees    Calculated R Axis -43 degrees    Calculated T Axis -19 degrees    Diagnosis       Normal sinus rhythm  Left axis deviation  Right bundle branch block  Moderate voltage criteria for LVH, may be normal variant  T wave abnormality, consider lateral ischemia  Abnormal ECG  When compared with ECG of 28-AUG-2020 04:59,  No significant change was found  Confirmed by Uzma Worthy ([id number]) on 6/3/2021 3:13:32 PM     URINALYSIS W/ RFLX MICROSCOPIC    Collection Time: 06/03/21  2:30 PM   Result Value Ref Range    Color YELLOW      Appearance CLEAR      Specific gravity 1.017 1.005 - 1.030      pH (UA) 7.0 5.0 - 8.0      Protein 100 (A) NEG mg/dL    Glucose >1,000 (A) NEG mg/dL    Ketone Negative NEG mg/dL    Bilirubin Negative NEG      Blood TRACE (A) NEG      Urobilinogen 0.2 0.2 - 1.0 EU/dL    Nitrites Negative NEG      Leukocyte Esterase MODERATE (A) NEG     URINE MICROSCOPIC ONLY    Collection Time: 06/03/21  2:30 PM   Result Value Ref Range    WBC 4 to 10 0 - 5 /hpf    RBC 0 to 1 0 - 5 /hpf    Epithelial cells 2+ 0 - 5 /lpf    Bacteria FEW (A) NEG /hpf   CELL COUNT AND DIFF, BODY FLUID    Collection Time: 06/03/21  5:58 PM   Result Value Ref Range    BODY FLUID TYPE Peritoneal Dialysis Fld      FLUID COLOR YELLOW     FLUID APPEARANCE CLEAR      FLUID RBC CT. 51 (A) NRRE /cu mm    FLUID NUCLEATED CELLS 184 (A) NRRE /cu mm    FLD NEUTROPHILS 11 %    FLD BANDS 0 %    FLD LYMPHS 87 %    FLD MONOCYTES 2 %    FLD EOSINS 0 %   GLUCOSE, FLUID    Collection Time: 06/03/21  5:58 PM   Result Value Ref Range    Fluid Type: Peritoneal Dialysis Fld      Glucose, body fld. 236 MG/DL   PROTEIN TOTAL, FLUID    Collection Time: 06/03/21  5:58 PM   Result Value Ref Range    Fluid Type: Peritoneal Dialysis Fld      Protein total, body fld. 3.4 g/dL       Radiologic Studies -   US ABD LTD   Final Result       1. No cholelithiasis or evidence of acute cholecystitis. 2. Upper normal common bile duct could be within normal for patient age and   similar to prior CT. No visible choledocholithiasis or intrahepatic ductal   dilatation. 3. Mildly increased hepatic echogenicity likely due to hepatic steatosis. Medical Decision Making   I am the first provider for this patient.     I reviewed the vital signs, available nursing notes, past medical history, past surgical history, family history and social history. Vital Signs-Reviewed the patient's vital signs. EKG: RBBB.  LAD.  NSR.  79 bpm.  No significant changes compared to prior EKGs. No evidence of acute ischemia. Records Reviewed: Nursing Notes, Old Medical Records and Previous electrocardiograms (Time of Review: 2:00 PM)    ED Course: Progress Notes, Reevaluation, and Consults:         Provider Notes (Medical Decision Making):   MDM    59-year-old female past medical history of CKD and on peritoneal dialysis presenting with 1 day of acute epigastric and left upper quadrant pain with associated nausea and vomiting that started today. Vitals within normal limits. Physical exam relatively unremarkable with exception of mild tenderness over the epigastric left upper quadrant abdomen. CBC unremarkable. BMP unremarkable. Glucose noted to be elevated 326 but no evidence of ketones in urine, anion gap, acidosis. Patient notes that she did not take her morning insulin today which explains her elevated glucose. Creatinine at baseline. Hepatic panel unremarkable. Mild elevation in lipase. Right upper quadrant ultrasound which was initially ordered from triage was unremarkable. UA demonstrating some evidence of UTI. Spoke with Dr. Dave Alpers, colleague to Dr. Elle Perez [facility], concerning case. Recommendation to check peritoneal fluid first before moving forward with a CT abd/pelv with contrast to evaluate for peritonitis as patient is on peritoneal dialysis. Peritoneal fluid demonstrates elevated white blood cells. Discussed case with Dr. Dave Alpers with decision to treat for peritonitis at this time. Per discussion, patient has been provided dianeal solution from pharmacy with 2 g of vancomycin. Per discussion with nephrology, patient to infuse this overnight for 6 hours and report to dialysis clinic tomorrow morning.     CT abdomen pelvis deferred, per nephrology discussion, given evidence of peritonitis which would explain patient's presentation. Discussed findings with patient and . Patient reports significantly improved abdominal pain while waiting in the emergency department without any intervention. Administered Bentyl and Carafate with viscous lidocaine. Administered initial Levaquin dose for patient's UTI in the emergency department with instructions to take 2 of the tablets 48 hours apart in the outpatient setting. Patient instructed on appropriate infusion of vancomycin dialysate. Patient was understanding of instructions and of close return precautions. She was stable with improved symptoms by the end of her ED encounter was appropriate for discharge. She was stable at time of discharge. Procedures    Critical Care Time: none      Diagnosis     Clinical Impression:   1. Peritonitis associated with peritoneal dialysis, initial encounter ([facility] [address]    2. Abdominal pain, epigastric    3. Urinary tract infection with hematuria, site unspecified        Disposition: Discharged    Follow-up Information    None          Discharge Medication List as of 6/3/2021  9:37 PM      CONTINUE these medications which have NOT CHANGED    Details   metoprolol succinate (TOPROL-XL) 50 mg XL tablet TAKE 1 TABLET EVERY DAY, Normal, Disp-90 Tab, R-3      rosuvastatin (CRESTOR) 40 mg tablet Take 1 Tab by mouth nightly., Normal, Disp-90 Tab, R-1      polyethylene glycol (MIRALAX) 17 gram/dose powder DISSOLVE THE CONTENTS OF ONE CAPFUL IN 8 OUNCES OF WATER AND DRINK BY MOUTH DAILY, Normal, Disp-510 g,R-3      sucroferric oxyhydroxide (VELPHORO PO) Take  by mouth., Historical Med      insulin aspart U-100 (NovoLOG Flexpen U-100 Insulin) 100 unit/mL (3 mL) inpn by SubCUTAneous route., Historical Med      losartan (COZAAR) 50 mg tablet Take 1 Tab by mouth daily. , Normal, Disp-90 Tab,R-3      amLODIPine (NORVASC) 10 mg tablet TAKE 1 TABLET BY MOUTH DAILY, Normal**Patient requests 90 days supply**Disp-90 Tab, R-6      cloNIDine HCl (CATAPRES) 0.2 mg tablet TK 1 T PO QHS FOR BLOOD PRESSURE, Historical Med, R-4      furosemide (LASIX) 20 mg tablet Take 20 mg by mouth BID Mon Wed & Fri., Historical Med      insulin glargine (LANTUS SOLOSTAR) 100 unit/mL (3 mL) pen 24 Units by SubCUTAneous route daily. , Historical Med           Disclaimer: Sections of this note are dictated using utilizing voice recognition software. Minor typographical errors may be present. If questions arise, please do not hesitate to contact me or call our department.

## 2021-06-03 NOTE — ED NOTES
I performed a brief evaluation, including history and physical, of the patient here in triage and I have determined that the patient will need further treatment and evaluation from the main side ER provider. I have placed initial orders to help in expediting patients care.      June 03, 2021 at 1:59 PM - FREDY Dent        Visit Vitals  BP (!) 155/59 (BP 1 Location: Left upper arm, BP Patient Position: At rest)   Pulse 79   Temp 98.4 °F (36.9 °C)   Resp 17   Ht 5' 5\" (1.651 m)   Wt 88.5 kg (195 lb)   SpO2 98%   BMI 32.45 kg/m²

## 2021-06-03 NOTE — ED TRIAGE NOTES
Patient presents to ED with c/o abdominal pain that is specific to the upper quadrants and mid line. Patient denies N/V/D. The pain has been occurring since Saturday but got worse today. Ptient does PD at home daily and denies any complications or abnormalities with that. VSS.

## 2021-06-03 NOTE — DIALYSIS
PD effluent sample collected and sent to lab per order. Sample was clear/yellow, with a couple of fibers noted.

## 2021-06-04 LAB
HBV SURFACE AB SER QL IA: POSITIVE
HBV SURFACE AB SERPL IA-ACNC: >1000 MIU/ML
HEP BS AB COMMENT,HBSAC: NORMAL

## 2021-06-04 RX ORDER — LEVOFLOXACIN 250 MG/1
250 TABLET ORAL
Status: ACTIVE | OUTPATIENT
Start: 2021-06-04 | End: 2021-06-08

## 2021-06-04 NOTE — DISCHARGE INSTRUCTIONS
You were evaluated today for abdominal pain. Today's examination suggests the presence of peritonitis. This is treated with antibiotics as discussed. Per discussion with Dr. Brent Lew, infuse dialysate given to you tonight over 6 hours. Ensure that you go to the dialysis clinic tomorrow for follow-up. You were also diagnosed with a urinary tract infection today. This is treated with antibiotics. Your first pill has been given in the emergency department. You have 2 more tablets to take. Your second tablet should be 48 hours from today. Your third tablet should be 48 hours after that. Return to the ED if you develop worsening abdominal pain, fevers, nausea/vomiting inability keep down food and drink, dizziness, fainting, confusion.

## 2021-06-05 RX ORDER — LEVOFLOXACIN 250 MG/1
250 TABLET ORAL
Qty: 3 TABLET | Refills: 0 | Status: SHIPPED | OUTPATIENT
Start: 2021-06-05 | End: 2021-06-10

## 2021-06-08 LAB
BACTERIA SPEC CULT: NORMAL
GRAM STN SPEC: NORMAL
GRAM STN SPEC: NORMAL
SERVICE CMNT-IMP: NORMAL

## 2021-06-20 ENCOUNTER — HOSPITAL ENCOUNTER (EMERGENCY)
Age: 81
Discharge: HOME | End: 2021-06-21
Attending: STUDENT IN AN ORGANIZED HEALTH CARE EDUCATION/TRAINING PROGRAM
Payer: MEDICARE

## 2021-06-20 VITALS
OXYGEN SATURATION: 94 % | DIASTOLIC BLOOD PRESSURE: 66 MMHG | RESPIRATION RATE: 17 BRPM | TEMPERATURE: 99.1 F | HEART RATE: 100 BPM | SYSTOLIC BLOOD PRESSURE: 160 MMHG

## 2021-06-20 DIAGNOSIS — R11.2 NON-INTRACTABLE VOMITING WITH NAUSEA, UNSPECIFIED VOMITING TYPE: ICD-10-CM

## 2021-06-20 DIAGNOSIS — N30.01 ACUTE CYSTITIS WITH HEMATURIA: ICD-10-CM

## 2021-06-20 DIAGNOSIS — R10.13 ABDOMINAL PAIN, EPIGASTRIC: Primary | ICD-10-CM

## 2021-06-20 LAB
ABO + RH BLD: NORMAL
ALBUMIN SERPL-MCNC: 4 G/DL (ref 3.4–5)
ALBUMIN/GLOB SERPL: 1 {RATIO} (ref 0.8–1.7)
ALP SERPL-CCNC: 111 U/L (ref 45–117)
ALT SERPL-CCNC: 34 U/L (ref 13–56)
ANION GAP SERPL CALC-SCNC: 8 MMOL/L (ref 3–18)
APPEARANCE FLD: CLEAR
APPEARANCE UR: CLEAR
AST SERPL-CCNC: 24 U/L (ref 10–38)
BACTERIA URNS QL MICRO: ABNORMAL /HPF
BASOPHILS # BLD: 0.1 K/UL (ref 0–0.1)
BASOPHILS NFR BLD: 1 % (ref 0–2)
BILIRUB SERPL-MCNC: 0.6 MG/DL (ref 0.2–1)
BILIRUB UR QL: NEGATIVE
BLOOD GROUP ANTIBODIES SERPL: NORMAL
BUN SERPL-MCNC: 52 MG/DL (ref 7–18)
BUN/CREAT SERPL: 8 (ref 12–20)
CALCIUM SERPL-MCNC: 10.2 MG/DL (ref 8.5–10.1)
CHLORIDE SERPL-SCNC: 105 MMOL/L (ref 100–111)
CK MB CFR SERPL CALC: 0.7 % (ref 0–4)
CK MB SERPL-MCNC: 2.1 NG/ML (ref 5–25)
CK SERPL-CCNC: 317 U/L (ref 26–192)
CO2 SERPL-SCNC: 27 MMOL/L (ref 21–32)
COLOR FLD: ABNORMAL
COLOR UR: YELLOW
CREAT SERPL-MCNC: 6.54 MG/DL (ref 0.6–1.3)
DIFFERENTIAL METHOD BLD: ABNORMAL
EOSINOPHIL # BLD: 0.1 K/UL (ref 0–0.4)
EOSINOPHIL NFR BLD: 1 % (ref 0–5)
EOSINOPHIL NFR FLD MANUAL: 0 %
EPITH CASTS URNS QL MICRO: ABNORMAL /LPF (ref 0–5)
ERYTHROCYTE [DISTWIDTH] IN BLOOD BY AUTOMATED COUNT: 13.3 % (ref 11.6–14.5)
GLOBULIN SER CALC-MCNC: 4.1 G/DL (ref 2–4)
GLUCOSE SERPL-MCNC: 244 MG/DL (ref 74–99)
GLUCOSE UR STRIP.AUTO-MCNC: 500 MG/DL
HCT VFR BLD AUTO: 36.7 % (ref 35–45)
HEMOCCULT STL QL: NEGATIVE
HGB BLD-MCNC: 12 G/DL (ref 12–16)
HGB UR QL STRIP: ABNORMAL
KETONES UR QL STRIP.AUTO: NEGATIVE MG/DL
LEUKOCYTE ESTERASE UR QL STRIP.AUTO: ABNORMAL
LIPASE SERPL-CCNC: 229 U/L (ref 73–393)
LYMPHOCYTES # BLD: 0.9 K/UL (ref 0.9–3.6)
LYMPHOCYTES NFR BLD: 17 % (ref 21–52)
LYMPHOCYTES NFR FLD: 31 %
MCH RBC QN AUTO: 30.2 PG (ref 24–34)
MCHC RBC AUTO-ENTMCNC: 32.7 G/DL (ref 31–37)
MCV RBC AUTO: 92.2 FL (ref 74–97)
MONOCYTES # BLD: 0.4 K/UL (ref 0.05–1.2)
MONOCYTES NFR BLD: 8 % (ref 3–10)
MONOCYTES NFR FLD: 68 %
NEUTROPHILS NFR FLD: 1 %
NEUTS BAND # FLD: 0 %
NEUTS SEG # BLD: 4.1 K/UL (ref 1.8–8)
NEUTS SEG NFR BLD: 73 % (ref 40–73)
NITRITE UR QL STRIP.AUTO: NEGATIVE
NUC CELL # FLD: 19 /CU MM
PH UR STRIP: 7.5 [PH] (ref 5–8)
PLATELET # BLD AUTO: 150 K/UL (ref 135–420)
PMV BLD AUTO: 10.5 FL (ref 9.2–11.8)
POTASSIUM SERPL-SCNC: 4.6 MMOL/L (ref 3.5–5.5)
PROT SERPL-MCNC: 8.1 G/DL (ref 6.4–8.2)
PROT UR STRIP-MCNC: 300 MG/DL
RBC # BLD AUTO: 3.98 M/UL (ref 4.2–5.3)
RBC # FLD: 13 /CU MM
RBC #/AREA URNS HPF: ABNORMAL /HPF (ref 0–5)
SODIUM SERPL-SCNC: 140 MMOL/L (ref 136–145)
SP GR UR REFRACTOMETRY: 1.02 (ref 1–1.03)
SPECIMEN EXP DATE BLD: NORMAL
SPECIMEN SOURCE FLD: ABNORMAL
TROPONIN I SERPL-MCNC: <0.02 NG/ML (ref 0–0.04)
UROBILINOGEN UR QL STRIP.AUTO: 1 EU/DL (ref 0.2–1)
WBC # BLD AUTO: 5.6 K/UL (ref 4.6–13.2)
WBC MORPH BLD: ABNORMAL
WBC URNS QL MICRO: ABNORMAL /HPF (ref 0–5)

## 2021-06-20 PROCEDURE — 49082 ABD PARACENTESIS: CPT

## 2021-06-20 PROCEDURE — 93005 ELECTROCARDIOGRAM TRACING: CPT

## 2021-06-20 PROCEDURE — 96375 TX/PRO/DX INJ NEW DRUG ADDON: CPT

## 2021-06-20 PROCEDURE — 81001 URINALYSIS AUTO W/SCOPE: CPT

## 2021-06-20 PROCEDURE — 86901 BLOOD TYPING SEROLOGIC RH(D): CPT

## 2021-06-20 PROCEDURE — 83690 ASSAY OF LIPASE: CPT

## 2021-06-20 PROCEDURE — 82272 OCCULT BLD FECES 1-3 TESTS: CPT

## 2021-06-20 PROCEDURE — 74011250636 HC RX REV CODE- 250/636: Performed by: PHYSICIAN ASSISTANT

## 2021-06-20 PROCEDURE — 99285 EMERGENCY DEPT VISIT HI MDM: CPT

## 2021-06-20 PROCEDURE — 82553 CREATINE MB FRACTION: CPT

## 2021-06-20 PROCEDURE — 89051 BODY FLUID CELL COUNT: CPT

## 2021-06-20 PROCEDURE — 87205 SMEAR GRAM STAIN: CPT

## 2021-06-20 PROCEDURE — 96372 THER/PROPH/DIAG INJ SC/IM: CPT

## 2021-06-20 PROCEDURE — 85025 COMPLETE CBC W/AUTO DIFF WBC: CPT

## 2021-06-20 PROCEDURE — 87015 SPECIMEN INFECT AGNT CONCNTJ: CPT

## 2021-06-20 PROCEDURE — 80053 COMPREHEN METABOLIC PANEL: CPT

## 2021-06-20 PROCEDURE — 96376 TX/PRO/DX INJ SAME DRUG ADON: CPT

## 2021-06-20 PROCEDURE — 96374 THER/PROPH/DIAG INJ IV PUSH: CPT

## 2021-06-20 PROCEDURE — 74011000250 HC RX REV CODE- 250: Performed by: PHYSICIAN ASSISTANT

## 2021-06-20 RX ORDER — ONDANSETRON 2 MG/ML
4 INJECTION INTRAMUSCULAR; INTRAVENOUS
Status: COMPLETED | OUTPATIENT
Start: 2021-06-20 | End: 2021-06-20

## 2021-06-20 RX ORDER — CIPROFLOXACIN 2 MG/ML
400 INJECTION, SOLUTION INTRAVENOUS
Status: DISCONTINUED | OUTPATIENT
Start: 2021-06-20 | End: 2021-06-20

## 2021-06-20 RX ORDER — CIPROFLOXACIN 500 MG/1
500 TABLET ORAL 2 TIMES DAILY
Qty: 14 TABLET | Refills: 0 | Status: SHIPPED | OUTPATIENT
Start: 2021-06-20 | End: 2021-06-27

## 2021-06-20 RX ORDER — ONDANSETRON 4 MG/1
4 TABLET, ORALLY DISINTEGRATING ORAL
Qty: 20 TABLET | Refills: 0 | Status: SHIPPED | OUTPATIENT
Start: 2021-06-20 | End: 2021-11-30 | Stop reason: ALTCHOICE

## 2021-06-20 RX ORDER — MORPHINE SULFATE 4 MG/ML
4 INJECTION INTRAVENOUS
Status: COMPLETED | OUTPATIENT
Start: 2021-06-20 | End: 2021-06-20

## 2021-06-20 RX ORDER — OXYCODONE AND ACETAMINOPHEN 5; 325 MG/1; MG/1
1 TABLET ORAL
Qty: 12 TABLET | Refills: 0 | Status: SHIPPED | OUTPATIENT
Start: 2021-06-20 | End: 2021-06-25

## 2021-06-20 RX ADMIN — LIDOCAINE HYDROCHLORIDE 200 MG: 10; .005 INJECTION, SOLUTION EPIDURAL; INFILTRATION; INTRACAUDAL; PERINEURAL at 19:03

## 2021-06-20 RX ADMIN — ONDANSETRON 4 MG: 2 INJECTION INTRAMUSCULAR; INTRAVENOUS at 22:21

## 2021-06-20 RX ADMIN — MORPHINE SULFATE 4 MG: 4 INJECTION, SOLUTION INTRAMUSCULAR; INTRAVENOUS at 17:23

## 2021-06-20 RX ADMIN — MORPHINE SULFATE 4 MG: 4 INJECTION, SOLUTION INTRAMUSCULAR; INTRAVENOUS at 22:21

## 2021-06-20 RX ADMIN — ONDANSETRON 4 MG: 2 INJECTION INTRAMUSCULAR; INTRAVENOUS at 17:23

## 2021-06-20 NOTE — ED PROVIDER NOTES
EMERGENCY DEPARTMENT HISTORY AND PHYSICAL EXAM    Date: 6/20/2021  Patient Name: Brian Mo    History of Presenting Illness     Chief Complaint   Patient presents with    Vomiting         History Provided By:patient     Chief Complaint: abd pain, N/V   Duration: 3 days   Timing: acute   Location: epigastric   Quality: sharp pain   Severity:moderate   Modifying Factors: none  Associated Symptoms: abd pain, N/V, chills       Additional History (Context): Brian Mo is a 80 y.o. female with PMH hypertension, diabetes, and chronic kidney disease currently on peritoneal dialysis who presents with complaints of 3 days of epigastric pain, nausea and vomiting, and chills. Patient was in the ED on the third of this month with similar complaints. She was diagnosed with peritonitis associated with her peritoneal dialysis as well as a UTI. Patient completed Levaquin and states that her symptoms have significantly resolved at home. She states that her pain at this time is similar to this prior episode. Patient's nephrologist is Dr. Solis Camilo. She was able to complete her dialysis treatment at home earlier today. Patient denies fever, cough, shortness of breath, chest pain, and any known sick exposures. Denies urinary complaints. No other complaints reported at this time. PCP: Rodrick Cordova MD    Current Facility-Administered Medications   Medication Dose Route Frequency Provider Last Rate Last Admin    morphine injection 4 mg  4 mg IntraVENous NOW Angelica Francis PA-C        ondansetron [facility]) injection 4 mg  4 mg IntraVENous NOW Angelica Francis PA-C        . Please enter patient's Height  and Weight for drug/monitoring purposes. Thank you. 1 Each Other ONCE Delmy Bland, DO         Current Outpatient Medications   Medication Sig Dispense Refill    ondansetron (Zofran ODT) 4 mg disintegrating tablet 1 Tablet by SubLINGual route every eight (8) hours as needed for Nausea or Vomiting.  20 Tablet 0 oxyCODONE-acetaminophen (Percocet) 5-325 mg per tablet Take 1 Tablet by mouth every six (6) hours as needed for Pain for up to 5 days. Max Daily Amount: 4 Tablets. 12 Tablet 0    ciprofloxacin HCl (Cipro) 500 mg tablet Take 1 Tablet by mouth two (2) times a day for 7 days. 14 Tablet 0    metoprolol succinate (TOPROL-XL) 50 mg XL tablet TAKE 1 TABLET EVERY DAY 90 Tab 3    rosuvastatin (CRESTOR) 40 mg tablet Take 1 Tab by mouth nightly. 90 Tab 1    polyethylene glycol (MIRALAX) 17 gram/dose powder DISSOLVE THE CONTENTS OF ONE CAPFUL IN 8 OUNCES OF WATER AND DRINK BY MOUTH DAILY 510 g 3    sucroferric oxyhydroxide (VELPHORO PO) Take  by mouth. insulin aspart U-100 (NovoLOG Flexpen U-100 Insulin) 100 unit/mL (3 mL) inpn by SubCUTAneous route. losartan (COZAAR) 50 mg tablet Take 1 Tab by mouth daily. 90 Tab 3    amLODIPine (NORVASC) 10 mg tablet TAKE 1 TABLET BY MOUTH DAILY 90 Tab 6    cloNIDine HCl (CATAPRES) 0.2 mg tablet TK 1 T PO QHS FOR BLOOD PRESSURE  4    furosemide (LASIX) 20 mg tablet Take 20 mg by mouth BID Mon Wed & Fri.      insulin glargine (LANTUS SOLOSTAR) 100 unit/mL (3 mL) pen 24 Units by SubCUTAneous route daily.          Past History     Past Medical History:  Past Medical History:   Diagnosis Date    Allergic rhinitis     Chronic kidney disease     stage 5    Diabetes ([address]     H/O seasonal allergies     Hypertension     Left foot drop, chronic since 2004 fusion sx     Peritoneal dialysis catheter in place [facility])        Past Surgical History:  Past Surgical History:   Procedure Laterality Date    HAND/FINGER SURGERY UNLISTED      HX CATARACT REMOVAL  2011    HX LUMBAR FUSION  2004-last sx    fused L2-S1, in [address] [place] [address]    HX TUBAL LIGATION      HX WISDOM TEETH EXTRACTION         Family History:  Family History   Problem Relation Age of Onset    Diabetes Maternal Aunt     Diabetes Maternal Uncle     Diabetes Maternal Grandfather     Diabetes Other     Hypertension Cousin        Social History:  Social History     Tobacco Use    Smoking status: Former Smoker    Smokeless tobacco: Never Used   Substance Use Topics    Alcohol use: No    Drug use: No       Allergies: Allergies   Allergen Reactions    Aspirin Other (comments)     Ringing in ears    Keflex [Cephalexin] Rash    Sevelamer Carbonate Itching     With rash and sweatiness         Review of Systems   Review of Systems   Constitutional: Positive for chills. Negative for fever. HENT: Negative. Negative for congestion, ear pain and rhinorrhea. Eyes: Negative. Negative for pain and redness. Respiratory: Negative. Negative for cough, shortness of breath, wheezing and stridor. Cardiovascular: Negative. Negative for chest pain and leg swelling. Gastrointestinal: Positive for abdominal pain, nausea and vomiting. Negative for constipation and diarrhea. Genitourinary: Negative. Negative for dysuria and frequency. Musculoskeletal: Negative. Negative for back pain and neck pain. Skin: Negative. Negative for rash and wound. Neurological: Negative. Negative for dizziness, seizures, syncope and headaches. All other systems reviewed and are negative. All Other Systems Negative  Physical Exam     Vitals:    06/20/21 1845 06/20/21 1900 06/20/21 1915 06/20/21 1930   BP: (!) 170/77 (!) 175/87 (!) 179/63 (!) 168/92   Pulse: 100 97 (!) 111 (!) 106   Resp: 13 18 20 17   Temp:    99 °F (37.2 °C)   SpO2: 97% 97% 97% 97%     Physical Exam  Vitals and nursing note reviewed. Constitutional:       General: She is in acute distress. Appearance: She is well-developed. She is obese. She is not diaphoretic. Comments: Appears moderately distressed in the exam room    HENT:      Head: Normocephalic and atraumatic. Eyes:      General: No scleral icterus. Right eye: No discharge. Left eye: No discharge.       Conjunctiva/sclera: Conjunctivae normal.   Cardiovascular:      Rate and Rhythm: Normal rate and regular rhythm. Heart sounds: Normal heart sounds. No murmur heard. No friction rub. No gallop. Pulmonary:      Effort: Pulmonary effort is normal. No respiratory distress. Breath sounds: Normal breath sounds. No stridor. No wheezing, rhonchi or rales. Abdominal:      General: Bowel sounds are normal. There is no distension. Palpations: Abdomen is soft. Tenderness: There is abdominal tenderness. There is no guarding. Comments: Epigastric TTP noted on exam, no guarding noted over this area. No lower abdominal tenderness noted on exam.    Musculoskeletal:         General: Normal range of motion. Cervical back: Normal range of motion and neck supple. Skin:     General: Skin is warm and dry. Findings: No erythema or rash. Neurological:      General: No focal deficit present. Mental Status: She is alert and oriented to person, place, and time. Mental status is at baseline. Comments: No focal neurological deficit noted. No facial droop, slurred speech, or evidence of altered mentation noted on exam.     Psychiatric:         Behavior: Behavior normal.         Thought Content: Thought content normal.                Diagnostic Study Results     Labs -     Recent Results (from the past 12 hour(s))   CBC WITH AUTOMATED DIFF    Collection Time: 06/20/21  3:45 PM   Result Value Ref Range    WBC 5.6 4.6 - 13.2 K/uL    RBC 3.98 (L) 4.20 - 5.30 M/uL    HGB 12.0 12.0 - 16.0 g/dL    HCT 36.7 35.0 - 45.0 %    MCV 92.2 74.0 - 97.0 FL    MCH 30.2 24.0 - 34.0 PG    MCHC 32.7 31.0 - 37.0 g/dL    RDW 13.3 11.6 - 14.5 %    PLATELET 980 418 - 178 K/uL    MPV 10.5 9.2 - 11.8 FL    NEUTROPHILS 73 40 - 73 %    LYMPHOCYTES 17 (L) 21 - 52 %    MONOCYTES 8 3 - 10 %    EOSINOPHILS 1 0 - 5 %    BASOPHILS 1 0 - 2 %    ABS. NEUTROPHILS 4.1 1.8 - 8.0 K/UL    ABS. LYMPHOCYTES 0.9 0.9 - 3.6 K/UL    ABS. MONOCYTES 0.4 0.05 - 1.2 K/UL    ABS. EOSINOPHILS 0.1 0.0 - 0.4 K/UL    ABS.  BASOPHILS 0.1 0.0 - 0.1 K/UL DF AUTOMATED     METABOLIC PANEL, COMPREHENSIVE    Collection Time: 06/20/21  3:45 PM   Result Value Ref Range    Sodium 140 136 - 145 mmol/L    Potassium 4.6 3.5 - 5.5 mmol/L    Chloride 105 100 - 111 mmol/L    CO2 27 21 - 32 mmol/L    Anion gap 8 3.0 - 18 mmol/L    Glucose 244 (H) 74 - 99 mg/dL    BUN 52 (H) 7.0 - 18 MG/DL    Creatinine 6.54 (H) 0.6 - 1.3 MG/DL    BUN/Creatinine ratio 8 (L) 12 - 20      GFR est AA 7 (L) >60 ml/min/1.73m2    GFR est non-AA 6 (L) >60 ml/min/1.73m2    Calcium 10.2 (H) 8.5 - 10.1 MG/DL    Bilirubin, total 0.6 0.2 - 1.0 MG/DL    ALT (SGPT) 34 13 - 56 U/L    AST (SGOT) 24 10 - 38 U/L    Alk.  phosphatase 111 45 - 117 U/L    Protein, total 8.1 6.4 - 8.2 g/dL    Albumin 4.0 3.4 - 5.0 g/dL    Globulin 4.1 (H) 2.0 - 4.0 g/dL    A-G Ratio 1.0 0.8 - 1.7     LIPASE    Collection Time: 06/20/21  3:45 PM   Result Value Ref Range    Lipase 229 73 - 393 U/L   TYPE & SCREEN    Collection Time: 06/20/21  3:45 PM   Result Value Ref Range    Crossmatch Expiration 06/23/2021,2359     ABO/Rh(D) B POSITIVE     Antibody screen NEG    CARDIAC PANEL,(CK, CKMB & TROPONIN)    Collection Time: 06/20/21  3:45 PM   Result Value Ref Range    CK - MB 2.1 <3.6 ng/ml    CK-MB Index 0.7 0.0 - 4.0 %     (H) 26 - 192 U/L    Troponin-I, QT <0.02 0.0 - 0.045 NG/ML   URINALYSIS W/ RFLX MICROSCOPIC    Collection Time: 06/20/21  4:30 PM   Result Value Ref Range    Color YELLOW      Appearance CLEAR      Specific gravity 1.016 1.005 - 1.030      pH (UA) 7.5 5.0 - 8.0      Protein 300 (A) NEG mg/dL    Glucose 500 (A) NEG mg/dL    Ketone Negative NEG mg/dL    Bilirubin Negative NEG      Blood SMALL (A) NEG      Urobilinogen 1.0 0.2 - 1.0 EU/dL    Nitrites Negative NEG      Leukocyte Esterase SMALL (A) NEG     URINE MICROSCOPIC ONLY    Collection Time: 06/20/21  4:30 PM   Result Value Ref Range    WBC 4 to 10 0 - 5 /hpf    RBC 0 to 3 0 - 5 /hpf    Epithelial cells 4+ 0 - 5 /lpf    Bacteria 1+ (A) NEG /hpf   EKG, 12 LEAD, INITIAL    Collection Time: 06/20/21  4:53 PM   Result Value Ref Range    Ventricular Rate 104 BPM    Atrial Rate 104 BPM    P-R Interval 152 ms    QRS Duration 144 ms    Q-T Interval 392 ms    QTC Calculation (Bezet) 515 ms    Calculated P Axis 44 degrees    Calculated R Axis -45 degrees    Calculated T Axis 14 degrees    Diagnosis       Sinus tachycardia  Possible Left atrial enlargement  Right bundle branch block  Left anterior fascicular block  Bifascicular block  Left ventricular hypertrophy  Possible Lateral infarct , age undetermined  Abnormal ECG  When compared with ECG of 03-JUN-2021 14:06,  QRS duration has decreased  Borderline criteria for Lateral infarct are now present     CELL COUNT AND DIFF, BODY FLUID    Collection Time: 06/20/21  7:23 PM   Result Value Ref Range    BODY FLUID TYPE PARACENTESIS FLUID      FLUID COLOR STRAW      FLUID APPEARANCE CLEAR      FLUID RBC CT. 13 (A) NRRE /cu mm    FLUID NUCLEATED CELLS 19 (A) NRRE /cu mm    FLD NEUTROPHILS 1 %    FLD BANDS 0 %    FLD LYMPHS 31 %    FLD MONOCYTES 68 %    FLD EOSINS 0 %    WBC COMMENTS Few Mesothelial cells    OCCULT BLOOD, STOOL    Collection Time: 06/20/21  8:46 PM   Result Value Ref Range    Occult blood, stool Negative NEG         Radiologic Studies -   No orders to display     CT Results  (Last 48 hours)      None          CXR Results  (Last 48 hours)      None              Medical Decision Making   I am the first provider for this patient. I reviewed the vital signs, available nursing notes, past medical history, past surgical history, family history and social history. Vital Signs-Reviewed the patient's vital signs.         Records Reviewed April Ronen Gamez PA-C     Procedures:  Critical Care  Performed by: Sd Sportsman  Authorized by: Price Singh     Critical care provider statement:     Critical care time (minutes):  35    Critical care was necessary to treat or prevent imminent or life-threatening deterioration of the following conditions:  Sepsis    Critical care was time spent personally by me on the following activities:  Blood draw for specimens, ordering and performing treatments and interventions, ordering and review of laboratory studies, ordering and review of radiographic studies, re-evaluation of patient's condition, review of old charts, obtaining history from patient or surrogate, evaluation of patient's response to treatment and discussions with consultants    I assumed direction of critical care for this patient from another provider in my specialty: no        Paracentesis Procedure Note:    Large ascites pocket found with POCUS in right side of abdomen with patient in partial right lateral recumbent position. Anesthetized skin and anticipated catheter tract with 9 cc of lidocaine 1% with epinephrine as deep as possible with 25 ga needle. After skin puncture with 11 blade scalpel, inserted safe-T-centesis pigtail catheter with obturator along tract until sensation of peritoneal fascia perforation and aspiration of clear straw-colored fluid. Threaded catheter over obturator before removing. I then aspirated approximately 60 cc total fluid. I then reinserted obturator prior to removing pigtail catheter. Band-Aid was placed. Fluid samples were sent to lab for analysis. Procedure was well-tolerated by the patient. Angelica Francis PA-C and Hilarioma lacy were in the room, available for assistance. Stevie Hernández DO  Emergency Physician  HOSP [facility]      Provider Notes (Medical Decision Making): Impression:  abd pain, N/V     IV inserted, morphine and zofran ordered  Labs: Normal white blood cell count, glucose 244, BUN 52, creatinine 6.54, normal LFTs, normal lipase,  UA: 500 glucose, 300 protein, small blood, small leukocyte esterase, 4-10 WBCs, 1+ bacteria, questionable UTI versus contamination. 4+ epithelial cells noted. Will send for culture.     EKG: sinus tachycardia rate 104, RBBB, L anterior fascicular block, no STEMI or acute ST changes, reviewed by myself and the ED attending. Jody Pearce PA-C     Have spoken with the nephrologist on-call Dr. Ke Gutierrez, recommendation for sending the patient's peritoneal fluid for culture, Gram stain, and cell count. Bedside paracentesis performed by Dr. Erica Silverio, ED attending. Paracentesis cell count resulted today, not indicative of SBP. Gram stain and culture sent out. Guaiac occult negative. I discussed these results with the patient at bedside. Patient is feeling much better. Vitals are stable. She is able to tolerate oral fluid and food in the ED. We will plan to discharge patient at this time with ciprofloxacin which would cover UTI as well as SBP should her paracentesis culture come back positive. I have discussed this plan with ED attending Dr. Erica Silverio and he agrees. Return precautions given. Angelica Francis PA-C       MED RECONCILIATION:  Current Facility-Administered Medications   Medication Dose Route Frequency    morphine injection 4 mg  4 mg IntraVENous NOW    ondansetron (ZOFRAN) injection 4 mg  4 mg IntraVENous NOW    . Please enter patient's Height  and Weight for drug/monitoring purposes. Thank you. 1 Each Other ONCE     Current Outpatient Medications   Medication Sig    ondansetron (Zofran ODT) 4 mg disintegrating tablet 1 Tablet by SubLINGual route every eight (8) hours as needed for Nausea or Vomiting. oxyCODONE-acetaminophen (Percocet) 5-325 mg per tablet Take 1 Tablet by mouth every six (6) hours as needed for Pain for up to 5 days. Max Daily Amount: 4 Tablets. ciprofloxacin HCl (Cipro) 500 mg tablet Take 1 Tablet by mouth two (2) times a day for 7 days. metoprolol succinate (TOPROL-XL) 50 mg XL tablet TAKE 1 TABLET EVERY DAY    rosuvastatin (CRESTOR) 40 mg tablet Take 1 Tab by mouth nightly.     polyethylene glycol (MIRALAX) 17 gram/dose powder DISSOLVE THE CONTENTS OF ONE CAPFUL IN 8 OUNCES OF WATER AND DRINK BY MOUTH DAILY    sucroferric oxyhydroxide (VELPHORO PO) Take  by mouth. insulin aspart U-100 (NovoLOG Flexpen U-100 Insulin) 100 unit/mL (3 mL) inpn by SubCUTAneous route. losartan (COZAAR) 50 mg tablet Take 1 Tab by mouth daily. amLODIPine (NORVASC) 10 mg tablet TAKE 1 TABLET BY MOUTH DAILY    cloNIDine HCl (CATAPRES) 0.2 mg tablet TK 1 T PO QHS FOR BLOOD PRESSURE    furosemide (LASIX) 20 mg tablet Take 20 mg by mouth BID Mon Wed & Fri.    insulin glargine (LANTUS SOLOSTAR) 100 unit/mL (3 mL) pen 24 Units by SubCUTAneous route daily. Disposition:  D/c     DISCHARGE NOTE: Patient is stable for discharge at this time. I have discussed all the findings from today's work up with the patient, including lab results and imaging. I have answered all questions. Rx for zofran percocet and cipro given. Rest and close follow-up with the PCP recommended this week. Return to the ED immediately for any new or worsening symptoms. April Clif Oliveira PA-C       Follow-up Information       Follow up With Specialties Details Why Contact Info    Cordelia Salas MD Family Medicine In 2 days  [facility] [address] [address]  [phone]      [facility] EMERGENCY DEPT Emergency Medicine  As needed, If symptoms worsen [address] [place] [address]  [phone]            Current Discharge Medication List        START taking these medications    Details   ondansetron (Zofran ODT) 4 mg disintegrating tablet 1 Tablet by SubLINGual route every eight (8) hours as needed for Nausea or Vomiting. Qty: 20 Tablet, Refills: 0  Start date: 6/20/2021      oxyCODONE-acetaminophen (Percocet) 5-325 mg per tablet Take 1 Tablet by mouth every six (6) hours as needed for Pain for up to 5 days. Max Daily Amount: 4 Tablets.   Qty: 12 Tablet, Refills: 0  Start date: 6/20/2021, End date: 6/25/2021    Associated Diagnoses: Abdominal pain, epigastric; Acute cystitis with hematuria      ciprofloxacin HCl (Cipro) 500 mg tablet Take 1 Tablet by mouth two (2) times a day for 7 days. Qty: 14 Tablet, Refills: 0  Start date: 6/20/2021, End date: 6/27/2021               Core Measures:    Critical Care Time:   Critical Care Time:   I have spent 35 minutes of critical care time involved in lab review, consultations with specialist, family decision-making, and documentation. During this entire length of time I was immediately available to the patient. Critical Care: The reason for providing this level of medical care for this critically ill patient was due a critical illness that impaired one or more vital organ systems such that there was a high probability of imminent or life threatening deterioration in the patients condition. This care involved high complexity decision making to assess, manipulate, and support vital system functions, to treat this degreee vital organ system failure and to prevent further life threatening deterioration of the patients condition. Diagnosis     Clinical Impression:   1. Abdominal pain, epigastric    2. Non-intractable vomiting with nausea, unspecified vomiting type    3. Acute cystitis with hematuria      Attending attestation of a face to face patient encounter:    The patient presents with the following symptoms: Epigastric pain, N/V. My exam shows: non-peritoneal abdomen, non-encephalopathic pain, ascitic fluid noted on POCUS  Impression/plan: Epigastric Pain, Nausea with vomiting (low suspicion for Spontaneous Bacterial Peritonitis, Cholecystitis, Pancreatitis), Bacteriuria (UTI vs contamination). Agree with plan to treat empirically for UTI with Cipro pending urine culture. Pt appears appropriately for discharge home with family member. I have reviewed the chart, personally evaluated the patient, and discussed with the INNA our assessment and plan. I agree with the documentation recorded by the INNA, including the findings, treatment plan, and disposition.     Monico Stubbs DO

## 2021-06-20 NOTE — ED NOTES
Left message for callback.    Bedside and Verbal shift change report given to Mayela Mojica RN (oncoming nurse) by Ramses Zafar RN (offgoing nurse). Report included the following information SBAR, Kardex, Intake/Output, MAR, Recent Results and Med Rec Status.

## 2021-06-20 NOTE — ED TRIAGE NOTES
Pt states she has been vomiting \"coffee grounds and black\" since Friday. Pt also reports off and on abdominal pain. Pt states she she has not taken any of her medications because she did not feel like it and was unable to complete her  PD treatment yesterday so she attempted to complete this morning however was only able to do 1 bag instead of 2.

## 2021-06-21 LAB
ATRIAL RATE: 104 BPM
CALCULATED P AXIS, ECG09: 44 DEGREES
CALCULATED R AXIS, ECG10: -45 DEGREES
CALCULATED T AXIS, ECG11: 14 DEGREES
DIAGNOSIS, 93000: NORMAL
P-R INTERVAL, ECG05: 152 MS
Q-T INTERVAL, ECG07: 392 MS
QRS DURATION, ECG06: 144 MS
QTC CALCULATION (BEZET), ECG08: 515 MS
VENTRICULAR RATE, ECG03: 104 BPM

## 2021-06-21 NOTE — ED NOTES
I have reviewed discharge instructions with the patient and her adult daughter. Both the patient and her daughter verbalized understanding of the instructions and the importance of scheduling and attending follow up appointments as well as the importance of adhering to the prescribed medication regimen. The patient was transported to the [facility] via a wheelchair in stable condition.

## 2021-06-21 NOTE — ED NOTES
Patient is awake and alert talking to her daughter who is at bedside. Patient has no complaints of pain or signs of distress. Will continue to monitor patient.

## 2021-06-21 NOTE — DISCHARGE INSTRUCTIONS
MedSocket Activation    Thank you for requesting access to MedSocket. Please follow the instructions below to securely access and download your online medical record. MedSocket allows you to send messages to your doctor, view your test results, renew your prescriptions, schedule appointments, and more. How Do I Sign Up? In your internet browser, go to [email or web address]  Click on the First Time User? Click Here link in the Sign In box. You will be redirect to the New Member Sign Up page. Enter your MedSocket Access Code exactly as it appears below. You will not need to use this code after youve completed the sign-up process. If you do not sign up before the expiration date, you must request a new code. MedSocket Access Code: [unfilled] (This is the date your MedSocket access code will )    Enter the last four digits of your Social Security Number (xxxx) and Date of Birth (mm/dd/yyyy) as indicated and click Submit. You will be taken to the next sign-up page. Create a MedSocket ID. This will be your MedSocket login ID and cannot be changed, so think of one that is secure and easy to remember. Create a MedSocket password. You can change your password at any time. Enter your Password Reset Question and Answer. This can be used at a later time if you forget your password. Enter your e-mail address. You will receive e-mail notification when new information is available in [address] Click Sign Up. You can now view and download portions of your medical record. Click the [place] link to download a portable copy of your medical information. Additional Information    If you have questions, please visit the Frequently Asked Questions section of the MedSocket website at [email or web address] Remember, MedSocket is NOT to be used for urgent needs. For medical emergencies, dial 911.

## 2021-06-22 ENCOUNTER — PATIENT OUTREACH (OUTPATIENT)
Dept: CASE MANAGEMENT | Age: 81
End: 2021-06-22

## 2021-06-25 LAB
BACTERIA SPEC CULT: NORMAL
GRAM STN SPEC: NORMAL
GRAM STN SPEC: NORMAL
SERVICE CMNT-IMP: NORMAL

## 2021-06-30 ENCOUNTER — VIRTUAL VISIT (OUTPATIENT)
Dept: FAMILY MEDICINE CLINIC | Age: 81
End: 2021-06-30

## 2021-06-30 NOTE — PROGRESS NOTES
Attempted Doxy Call and phone call to pt. No answer. This encounter was created in error - please disregard.

## 2021-07-30 LAB — HEMOGLOBIN A1C: 6.6 %

## 2021-09-01 RX ORDER — LOSARTAN POTASSIUM 50 MG/1
TABLET ORAL
Qty: 90 TABLET | Refills: 3 | Status: SHIPPED | OUTPATIENT
Start: 2021-09-01 | End: 2022-09-15 | Stop reason: SDUPTHER

## 2021-10-05 RX ORDER — ROSUVASTATIN CALCIUM 40 MG/1
TABLET, COATED ORAL
Qty: 90 TABLET | Refills: 1 | Status: SHIPPED | OUTPATIENT
Start: 2021-10-05 | End: 2022-04-29

## 2021-11-16 ENCOUNTER — APPOINTMENT (OUTPATIENT)
Dept: CT IMAGING | Age: 81
End: 2021-11-16
Attending: EMERGENCY MEDICINE
Payer: MEDICARE

## 2021-11-16 ENCOUNTER — APPOINTMENT (OUTPATIENT)
Dept: GENERAL RADIOLOGY | Age: 81
End: 2021-11-16
Attending: PHYSICIAN ASSISTANT
Payer: MEDICARE

## 2021-11-16 ENCOUNTER — HOSPITAL ENCOUNTER (EMERGENCY)
Age: 81
Discharge: HOME | End: 2021-11-16
Attending: EMERGENCY MEDICINE
Payer: MEDICARE

## 2021-11-16 VITALS
TEMPERATURE: 98.3 F | SYSTOLIC BLOOD PRESSURE: 138 MMHG | BODY MASS INDEX: 33.32 KG/M2 | RESPIRATION RATE: 15 BRPM | HEIGHT: 65 IN | WEIGHT: 200 LBS | HEART RATE: 74 BPM | DIASTOLIC BLOOD PRESSURE: 66 MMHG | OXYGEN SATURATION: 98 %

## 2021-11-16 DIAGNOSIS — R42 VERTIGO: Primary | ICD-10-CM

## 2021-11-16 LAB
ALBUMIN SERPL-MCNC: 3.6 G/DL (ref 3.4–5)
ALBUMIN/GLOB SERPL: 0.9 {RATIO} (ref 0.8–1.7)
ALP SERPL-CCNC: 110 U/L (ref 45–117)
ALT SERPL-CCNC: 39 U/L (ref 13–56)
ANION GAP SERPL CALC-SCNC: 8 MMOL/L (ref 3–18)
APPEARANCE UR: CLEAR
AST SERPL-CCNC: 34 U/L (ref 10–38)
ATRIAL RATE: 70 BPM
BACTERIA URNS QL MICRO: ABNORMAL /HPF
BASOPHILS # BLD: 0.1 K/UL (ref 0–0.1)
BASOPHILS NFR BLD: 1 % (ref 0–2)
BILIRUB SERPL-MCNC: 0.4 MG/DL (ref 0.2–1)
BILIRUB UR QL: NEGATIVE
BUN SERPL-MCNC: 48 MG/DL (ref 7–18)
BUN/CREAT SERPL: 7 (ref 12–20)
CALCIUM SERPL-MCNC: 10.3 MG/DL (ref 8.5–10.1)
CALCULATED P AXIS, ECG09: 18 DEGREES
CALCULATED R AXIS, ECG10: -46 DEGREES
CALCULATED T AXIS, ECG11: -19 DEGREES
CHLORIDE SERPL-SCNC: 105 MMOL/L (ref 100–111)
CK MB CFR SERPL CALC: 0.7 % (ref 0–4)
CK MB SERPL-MCNC: 3 NG/ML (ref 5–25)
CK SERPL-CCNC: 414 U/L (ref 26–192)
CO2 SERPL-SCNC: 26 MMOL/L (ref 21–32)
COLOR UR: YELLOW
CREAT SERPL-MCNC: 6.9 MG/DL (ref 0.6–1.3)
DIAGNOSIS, 93000: NORMAL
DIFFERENTIAL METHOD BLD: ABNORMAL
EOSINOPHIL # BLD: 0.2 K/UL (ref 0–0.4)
EOSINOPHIL NFR BLD: 3 % (ref 0–5)
EPITH CASTS URNS QL MICRO: ABNORMAL /LPF (ref 0–5)
ERYTHROCYTE [DISTWIDTH] IN BLOOD BY AUTOMATED COUNT: 13.4 % (ref 11.6–14.5)
GLOBULIN SER CALC-MCNC: 4.2 G/DL (ref 2–4)
GLUCOSE SERPL-MCNC: 80 MG/DL (ref 74–99)
GLUCOSE UR STRIP.AUTO-MCNC: NEGATIVE MG/DL
HCT VFR BLD AUTO: 36 % (ref 35–45)
HGB BLD-MCNC: 11.5 G/DL (ref 12–16)
HGB UR QL STRIP: ABNORMAL
IMM GRANULOCYTES # BLD AUTO: 0 K/UL (ref 0–0.04)
IMM GRANULOCYTES NFR BLD AUTO: 0 % (ref 0–0.5)
KETONES UR QL STRIP.AUTO: NEGATIVE MG/DL
LEUKOCYTE ESTERASE UR QL STRIP.AUTO: ABNORMAL
LYMPHOCYTES # BLD: 2.4 K/UL (ref 0.9–3.6)
LYMPHOCYTES NFR BLD: 34 % (ref 21–52)
MAGNESIUM SERPL-MCNC: 2.4 MG/DL (ref 1.6–2.6)
MCH RBC QN AUTO: 29.7 PG (ref 24–34)
MCHC RBC AUTO-ENTMCNC: 31.9 G/DL (ref 31–37)
MCV RBC AUTO: 93 FL (ref 78–100)
MONOCYTES # BLD: 0.7 K/UL (ref 0.05–1.2)
MONOCYTES NFR BLD: 10 % (ref 3–10)
NEUTS SEG # BLD: 3.7 K/UL (ref 1.8–8)
NEUTS SEG NFR BLD: 52 % (ref 40–73)
NITRITE UR QL STRIP.AUTO: NEGATIVE
NRBC # BLD: 0 K/UL (ref 0–0.01)
NRBC BLD-RTO: 0 PER 100 WBC
P-R INTERVAL, ECG05: 170 MS
PH UR STRIP: 6 [PH] (ref 5–8)
PLATELET # BLD AUTO: 136 K/UL (ref 135–420)
PMV BLD AUTO: 10.4 FL (ref 9.2–11.8)
POTASSIUM SERPL-SCNC: 4.4 MMOL/L (ref 3.5–5.5)
PROT SERPL-MCNC: 7.8 G/DL (ref 6.4–8.2)
PROT UR STRIP-MCNC: 30 MG/DL
Q-T INTERVAL, ECG07: 454 MS
QRS DURATION, ECG06: 154 MS
QTC CALCULATION (BEZET), ECG08: 490 MS
RBC # BLD AUTO: 3.87 M/UL (ref 4.2–5.3)
RBC #/AREA URNS HPF: NEGATIVE /HPF (ref 0–5)
SODIUM SERPL-SCNC: 139 MMOL/L (ref 136–145)
SP GR UR REFRACTOMETRY: 1.01 (ref 1–1.03)
TROPONIN I SERPL-MCNC: <0.02 NG/ML (ref 0–0.04)
UROBILINOGEN UR QL STRIP.AUTO: 0.2 EU/DL (ref 0.2–1)
VENTRICULAR RATE, ECG03: 70 BPM
WBC # BLD AUTO: 7.1 K/UL (ref 4.6–13.2)
WBC URNS QL MICRO: ABNORMAL /HPF (ref 0–4)

## 2021-11-16 PROCEDURE — 74011250636 HC RX REV CODE- 250/636: Performed by: EMERGENCY MEDICINE

## 2021-11-16 PROCEDURE — 99284 EMERGENCY DEPT VISIT MOD MDM: CPT

## 2021-11-16 PROCEDURE — 70450 CT HEAD/BRAIN W/O DYE: CPT

## 2021-11-16 PROCEDURE — 85025 COMPLETE CBC W/AUTO DIFF WBC: CPT

## 2021-11-16 PROCEDURE — 80053 COMPREHEN METABOLIC PANEL: CPT

## 2021-11-16 PROCEDURE — 74011000258 HC RX REV CODE- 258: Performed by: EMERGENCY MEDICINE

## 2021-11-16 PROCEDURE — 93005 ELECTROCARDIOGRAM TRACING: CPT

## 2021-11-16 PROCEDURE — 71046 X-RAY EXAM CHEST 2 VIEWS: CPT

## 2021-11-16 PROCEDURE — 96365 THER/PROPH/DIAG IV INF INIT: CPT

## 2021-11-16 PROCEDURE — 83735 ASSAY OF MAGNESIUM: CPT

## 2021-11-16 PROCEDURE — 81001 URINALYSIS AUTO W/SCOPE: CPT

## 2021-11-16 PROCEDURE — 82553 CREATINE MB FRACTION: CPT

## 2021-11-16 RX ORDER — MECLIZINE HYDROCHLORIDE 25 MG/1
25 TABLET ORAL
Qty: 15 TABLET | Refills: 0 | Status: SHIPPED | OUTPATIENT
Start: 2021-11-16 | End: 2021-11-21

## 2021-11-16 RX ORDER — ONDANSETRON 2 MG/ML
4 INJECTION INTRAMUSCULAR; INTRAVENOUS ONCE
Status: DISCONTINUED | OUTPATIENT
Start: 2021-11-16 | End: 2021-11-16

## 2021-11-16 RX ORDER — MECLIZINE HCL 12.5 MG 12.5 MG/1
25 TABLET ORAL
Status: COMPLETED | OUTPATIENT
Start: 2021-11-16 | End: 2021-11-16

## 2021-11-16 RX ADMIN — SODIUM CHLORIDE 12.5 MG: 900 INJECTION, SOLUTION INTRAVENOUS at 18:05

## 2021-11-16 RX ADMIN — MECLIZINE 25 MG: 12.5 TABLET ORAL at 18:05

## 2021-11-16 NOTE — ED PROVIDER NOTES
EMERGENCY DEPARTMENT HISTORY AND PHYSICAL EXAM    4:10 PM      Date: 11/16/2021  Patient Name: Jurgen Brown    History of Presenting Illness     Chief Complaint   Patient presents with    Dizziness    Nausea         History Provided By: Patient  Location/Duration/Severity/Modifying factors   Pt is a 81yoF who presents with dizziness and nausea for 5 days. The symptoms have been getting worse since onset. She describes feeling like she is spinning and the room is still. The symptoms vary and sometimes occur with movement and sometimes with rest.  She states feeling nausea more when she bends over. She denies and fever weakness, fatigue, vision or hearing change, or headaches. She has hx of HTN, ESRD on peritoneal dialysis 5 days a week, and DM. Her medical conditions have been well controlled. Of note the patient was seen at urgent care today and told to come to the ER for lab abnormalities and EKG abnormalities. PCP: Isaac House MD    Current Outpatient Medications   Medication Sig Dispense Refill    meclizine (ANTIVERT) 25 mg tablet Take 1 Tablet by mouth three (3) times daily as needed for Dizziness for up to 5 days. 15 Tablet 0    rosuvastatin (CRESTOR) 40 mg tablet TAKE 1 TABLET EVERY NIGHT 90 Tablet 1    losartan (COZAAR) 50 mg tablet TAKE 1 TABLET EVERY DAY 90 Tablet 3    ondansetron (Zofran ODT) 4 mg disintegrating tablet 1 Tablet by SubLINGual route every eight (8) hours as needed for Nausea or Vomiting. 20 Tablet 0    metoprolol succinate (TOPROL-XL) 50 mg XL tablet TAKE 1 TABLET EVERY DAY 90 Tab 3    polyethylene glycol (MIRALAX) 17 gram/dose powder DISSOLVE THE CONTENTS OF ONE CAPFUL IN 8 OUNCES OF WATER AND DRINK BY MOUTH DAILY 510 g 3    sucroferric oxyhydroxide (VELPHORO PO) Take  by mouth. insulin aspart U-100 (NovoLOG Flexpen U-100 Insulin) 100 unit/mL (3 mL) inpn by SubCUTAneous route.       amLODIPine (NORVASC) 10 mg tablet TAKE 1 TABLET BY MOUTH DAILY 90 Tab 6 cloNIDine HCl (CATAPRES) 0.2 mg tablet TK 1 T PO QHS FOR BLOOD PRESSURE  4    furosemide (LASIX) 20 mg tablet Take 20 mg by mouth BID Mon Wed & Fri.      insulin glargine (LANTUS SOLOSTAR) 100 unit/mL (3 mL) pen 24 Units by SubCUTAneous route daily. Past History     Past Medical History:  Past Medical History:   Diagnosis Date    Allergic rhinitis     Chronic kidney disease     stage 5    Diabetes ([address]     H/O seasonal allergies     Hypertension     Left foot drop, chronic since 2004 fusion sx     Peritoneal dialysis catheter in place [facility])        Past Surgical History:  Past Surgical History:   Procedure Laterality Date    HAND/FINGER SURGERY UNLISTED      HX CATARACT REMOVAL  2011    HX LUMBAR FUSION  2004-last sx    fused L2-S1, in [place]    HX TUBAL LIGATION      HX WISDOM TEETH EXTRACTION         Family History:  Family History   Problem Relation Age of Onset    Diabetes Maternal Aunt     Diabetes Maternal Uncle     Diabetes Maternal Grandfather     Diabetes Other     Hypertension Cousin        Social History:  Social History     Tobacco Use    Smoking status: Former Smoker    Smokeless tobacco: Never Used   Substance Use Topics    Alcohol use: No    Drug use: No       Allergies: Allergies   Allergen Reactions    Aspirin Other (comments)     Ringing in ears    Keflex [Cephalexin] Rash    Sevelamer Carbonate Itching     With rash and sweatiness         Review of Systems       Review of Systems   Constitutional: Negative for chills, fatigue and fever. HENT: Negative for congestion, ear discharge, ear pain, hearing loss, postnasal drip, rhinorrhea, sinus pressure, sinus pain and tinnitus. Eyes: Negative for photophobia and visual disturbance. Respiratory: Negative for cough, chest tightness, shortness of breath, wheezing and stridor. Cardiovascular: Negative for chest pain, palpitations and leg swelling. Gastrointestinal: Positive for nausea.  Negative for abdominal distention, abdominal pain, anal bleeding, blood in stool, constipation, diarrhea and vomiting. Genitourinary: Negative for difficulty urinating, dysuria, flank pain, hematuria and urgency. Musculoskeletal: Negative for arthralgias. Skin: Negative for color change and rash. Neurological: Positive for dizziness. Negative for seizures, syncope, weakness, light-headedness, numbness and headaches. Psychiatric/Behavioral: Negative for agitation and confusion. Physical Exam     Visit Vitals  /66   Pulse 74   Temp 98.3 °F (36.8 °C)   Resp 15   Ht 5' 5\" (1.651 m)   Wt 90.7 kg (200 lb)   SpO2 98%   BMI 33.28 kg/m²         Physical Exam  Constitutional:       Appearance: Normal appearance. HENT:      Head: Normocephalic and atraumatic. Right Ear: Tympanic membrane, ear canal and external ear normal.      Left Ear: Tympanic membrane, ear canal and external ear normal.      Nose: Nose normal.      Mouth/Throat:      Mouth: Mucous membranes are moist.      Pharynx: Oropharynx is clear. Eyes:      Extraocular Movements: Extraocular movements intact. Conjunctiva/sclera: Conjunctivae normal.      Pupils: Pupils are equal, round, and reactive to light. Cardiovascular:      Rate and Rhythm: Normal rate and regular rhythm. Pulses: Normal pulses. Heart sounds: Normal heart sounds. No murmur heard. No friction rub. No gallop. Pulmonary:      Effort: Pulmonary effort is normal. No respiratory distress. Breath sounds: Normal breath sounds. No stridor. No wheezing, rhonchi or rales. Abdominal:      General: Abdomen is flat. There is no distension. Palpations: Abdomen is soft. There is no mass. Tenderness: There is no abdominal tenderness. There is no guarding or rebound. Hernia: No hernia is present. Musculoskeletal:         General: No tenderness. Cervical back: Normal range of motion and neck supple. Right lower leg: Edema present. Left lower leg: Edema present. Comments: Edema is at baseline 2+ non pitting   Skin:     General: Skin is warm and dry. Neurological:      General: No focal deficit present. Mental Status: She is alert and oriented to person, place, and time. Cranial Nerves: No cranial nerve deficit. Sensory: No sensory deficit. Motor: No weakness. Coordination: Coordination normal.      Comments: Aj halpike resulted in dizziness but no nystagmus           Diagnostic Study Results     Labs -  No results found for this or any previous visit (from the past 12 hour(s)). Radiologic Studies -   CT HEAD WO CONT   Final Result      No evidence of acute intracranial abnormality. Chronic right maxillary sinus mucosal disease. XR CHEST PA LAT   Final Result   No radiographic evidence of acute cardiopulmonary process. Medical Decision Making   I am the first provider for this patient. I reviewed the vital signs, available nursing notes, past medical history, past surgical history, family history and social history. Vital Signs-Reviewed the patient's vital signs. EKG: Unchanged from previous, Signs of possible RBB    Records Reviewed: Nursing Notes, Old Medical Records, Previous electrocardiograms and Previous Laboratory Studies (Time of Review: 4:10 PM)    ED Course: Progress Notes, Reevaluation, and Consults:    ED Course as of 11/17/21 0949   Tue Nov 16, 2021   1920 Reassessed, she reports she is feeling better. Discussed central versus peripheral vertigo. She does not wish to stay for additional testing including MRI. She wants to go home reports she feels much better. Discussed that strokes are not well seen on CT particularly in the posterior circulation. She acknowledges this and agrees to return if she is feeling worse.  [TEJAL]      ED Course User Index  [TEJAL] Reji Alvarez DO       Provider Notes (Medical Decision Making):   MDM  Number of Diagnoses or Management Options  Diagnosis management comments: PT is a 81yoF who presents with dizziness and nausea for 5 days. Concerns for CVA, vertebrobassilar artery insufficiency, BPV, Tumor, vertebral artery dissection, migraine, and other concerning intracranial pathology. CMP: unremarkable with elevated CR with known ESRD  CBC: unremarkable  Mag: unremarkable  CK: elevated at 414  Urine:    EKG: shows possible RBB, unchanged from previous EKGS on record review    Meclizine and zofran ordered    Previous record reviewed, showed her ESRD has been well controlled with peritoneal dialysis    Patient reevaluated and is resting comfortably    Patients symptoms, age, and comorbidities have increase concerns for Central vertigo. Will start with dry CT head, and assess patients response to medications    Will turn patient over to Dr. Tori Kam. Patient turned over to me, Dr. Tori Kam, at the change of the resident shift. The patient is here with dizziness. The patient was assessed by me. She has normal finger-to-nose testing, heel-to-shin testing symmetric strength, symmetric sensation, normal extraocular movements and normal cranial nerve exam.    I advised the patient that her CT was normal, she was able to ambulate in front of me and she reports her gait is at her baseline. She will use the walker at home which is not here today, I did assist her with ambulation. I advised her that I could not exclude a central process or stroke based on the CT scan. I offered her MRI today as well as MRA to look at the vasculature, she declines. She wants to try meclizine at home and see if that helps, and if worsening she wants to return. I advised her certainly that if she is worsening or she fails to improve in the expected timeframe of a day or 2 and she would need to return for additional work-up. Of note, while the patient was attempting to leave, she did have a fall in our emergency department.   It appears the patient was being assisted by her daughter and RN at the time, although I was not present did not witness the fall. .  Patient tells me she has had falls in the past.  She denies any injury and does not want to have any work-up for it and she denies wanting to stay in the ER any longer and just wants to go home. I will prescribe her meclizine and escort her with a wheelchair to avoid further falls that she does not have a walker here today. Patient and her daughter both want to be discharged expressed understanding of plan and all questions answered. Will discharge patient home. Diagnosis     Clinical Impression:   1. Vertigo        Disposition: Discharge    Follow-up Information       Follow up With Specialties Details Why Contact Info    Adina Yip MD Family Medicine In 2 days  [address] [address]  [phone]      [facility] EMERGENCY DEPT Emergency Medicine  As needed, If symptoms worsen; or [place]  [id number]  [phone]             Discharge Medication List as of 11/16/2021  7:31 PM        START taking these medications    Details   meclizine (ANTIVERT) 25 mg tablet Take 1 Tablet by mouth three (3) times daily as needed for Dizziness for up to 5 days. , Normal, Disp-15 Tablet, R-0           CONTINUE these medications which have NOT CHANGED    Details   rosuvastatin (CRESTOR) 40 mg tablet TAKE 1 TABLET EVERY NIGHT, Normal, Disp-90 Tablet, R-1      losartan (COZAAR) 50 mg tablet TAKE 1 TABLET EVERY DAY, Normal, Disp-90 Tablet, R-3      ondansetron (Zofran ODT) 4 mg disintegrating tablet 1 Tablet by SubLINGual route every eight (8) hours as needed for Nausea or Vomiting., Print, Disp-20 Tablet, R-0      metoprolol succinate (TOPROL-XL) 50 mg XL tablet TAKE 1 TABLET EVERY DAY, Normal, Disp-90 Tab, R-3      polyethylene glycol (MIRALAX) 17 gram/dose powder DISSOLVE THE CONTENTS OF ONE CAPFUL IN 8 OUNCES OF WATER AND DRINK BY MOUTH DAILY, Normal, Disp-510 g,R-3      sucroferric oxyhydroxide (VELPHORO PO) Take  by mouth., Historical Med      insulin aspart U-100 (NovoLOG Flexpen U-100 Insulin) 100 unit/mL (3 mL) inpn by SubCUTAneous route., Historical Med      amLODIPine (NORVASC) 10 mg tablet TAKE 1 TABLET BY MOUTH DAILY, Normal**Patient requests 90 days supply**Disp-90 Tab, R-6      cloNIDine HCl (CATAPRES) 0.2 mg tablet TK 1 T PO QHS FOR BLOOD PRESSURE, Historical Med, R-4      furosemide (LASIX) 20 mg tablet Take 20 mg by mouth BID Mon Wed & Fri., Historical Med      insulin glargine (LANTUS SOLOSTAR) 100 unit/mL (3 mL) pen 24 Units by SubCUTAneous route daily. , Historical Med           Disclaimer: Sections of this note are dictated using utilizing voice recognition software. Minor typographical errors may be present. If questions arise, please do not hesitate to contact me or call our department.

## 2021-11-16 NOTE — ED NOTES
I performed a brief evaluation, including history and physical, of the patient here in triage and I have determined that pt will need further treatment and evaluation from the main side ER physician. I have placed initial orders to help in expediting patients care.      November 16, 2021 at 3:15 PM - MITZI Morgan        Visit Vitals  BP (!) 153/76 (BP 1 Location: Left upper arm, BP Patient Position: At rest)   Pulse 74   Temp 97.9 °F (36.6 °C)   Resp 16   Ht 5' 5\" (1.651 m)   Wt 90.7 kg (200 lb)   SpO2 99%   BMI 33.28 kg/m²

## 2021-11-17 NOTE — DISCHARGE INSTRUCTIONS
If you are worsening over the next 12 to 24 hours including inability to walk, progressive dizziness, frequent vomiting, chest pain, passing out or any worsening you need to return at once.

## 2021-11-18 LAB — HBA1C MFR BLD HPLC: 6.1 %

## 2021-11-30 ENCOUNTER — VIRTUAL VISIT (OUTPATIENT)
Dept: FAMILY MEDICINE CLINIC | Age: 81
End: 2021-11-30
Payer: MEDICARE

## 2021-11-30 DIAGNOSIS — H81.10 BENIGN PAROXYSMAL POSITIONAL VERTIGO, UNSPECIFIED LATERALITY: ICD-10-CM

## 2021-11-30 DIAGNOSIS — K59.04 CHRONIC IDIOPATHIC CONSTIPATION: Primary | ICD-10-CM

## 2021-11-30 PROCEDURE — G9231 DOC ESRD DIA TRANS PREG: HCPCS | Performed by: FAMILY MEDICINE

## 2021-11-30 PROCEDURE — 1101F PT FALLS ASSESS-DOCD LE1/YR: CPT | Performed by: FAMILY MEDICINE

## 2021-11-30 PROCEDURE — 1090F PRES/ABSN URINE INCON ASSESS: CPT | Performed by: FAMILY MEDICINE

## 2021-11-30 PROCEDURE — G8427 DOCREV CUR MEDS BY ELIG CLIN: HCPCS | Performed by: FAMILY MEDICINE

## 2021-11-30 PROCEDURE — G8536 NO DOC ELDER MAL SCRN: HCPCS | Performed by: FAMILY MEDICINE

## 2021-11-30 PROCEDURE — G8432 DEP SCR NOT DOC, RNG: HCPCS | Performed by: FAMILY MEDICINE

## 2021-11-30 PROCEDURE — G8399 PT W/DXA RESULTS DOCUMENT: HCPCS | Performed by: FAMILY MEDICINE

## 2021-11-30 PROCEDURE — 99214 OFFICE O/P EST MOD 30 MIN: CPT | Performed by: FAMILY MEDICINE

## 2021-11-30 PROCEDURE — G8417 CALC BMI ABV UP PARAM F/U: HCPCS | Performed by: FAMILY MEDICINE

## 2021-11-30 RX ORDER — DOCUSATE SODIUM 100 MG/1
CAPSULE, LIQUID FILLED ORAL
COMMUNITY
Start: 2021-08-19 | End: 2022-05-31 | Stop reason: ALTCHOICE

## 2021-11-30 NOTE — PROGRESS NOTES
Clementina Hong is a 80 y.o. female who was seen by synchronous (real-time) audio-video technology on 11/30/2021 for Dizziness        Assessment & Plan:   Diagnoses and all orders for this visit:    1. Chronic idiopathic constipation  -     REFERRAL TO GASTROENTEROLOGY    2. Benign paroxysmal positional vertigo, unspecified laterality  -     REFERRAL TO GASTROENTEROLOGY      As above  Vertigo has improved. Patient is to monitor symptoms  GI referral for change in bowel habits, i.e. constipation. As these are new symptoms, the benefit may outweigh the risks given patient's age. Will defer to GI. Follow-up and Dispositions    · Return in about 3 months (around 2/28/2022) for well exam.       This has been fully explained to the patient, who indicates understanding. As per HPI  712  Subjective:   Pt went to the ED after becoming dizzy and lightheaded. She states she was noted to have an \"irregular heartbeat\" per pt report. She has felt no palpitations. She had an EKG and CT and labs and there was no evidence of any acute abnormality. Her EKG in the emergency department actually revealed no acute changes from her previous EKGs. She had apparently went to the urgent care center prior to her that the ED visit and was told that her EKG at the urgent care center was irregular. Emergency department notes have been reviewed. Patient also has had constipation for the last 2 years. She states that the constipation started after she began dialysis 2 years ago. She has been using stool softeners to help with the constipation. This is intermittently helpful. She has tried MiraLAX before as well. This does not seem to work as well as it once had. Patient would like to have a colonoscopy. It has been over 10 years that she has had a colonoscopy. Prior to Admission medications    Medication Sig Start Date End Date Taking? Authorizing Provider   docusate sodium (Colace) 100 mg capsule Take  by mouth.  8/19/21  Yes Provider, Historical   rosuvastatin (CRESTOR) 40 mg tablet TAKE 1 TABLET EVERY NIGHT 10/5/21  Yes Errol Preston MD   losartan (COZAAR) 50 mg tablet TAKE 1 TABLET EVERY DAY 9/1/21  Yes Errol Preston MD   metoprolol succinate (TOPROL-XL) 50 mg XL tablet TAKE 1 TABLET EVERY DAY 4/16/21  Yes Errol Preston MD   polyethylene glycol (MIRALAX) 17 gram/dose powder DISSOLVE THE CONTENTS OF ONE CAPFUL IN 8 OUNCES OF WATER AND DRINK BY MOUTH DAILY 8/28/20  Yes Errol Preston MD   sucroferric oxyhydroxide (VELPHORO PO) Take  by mouth. Yes Provider, Historical   insulin aspart U-100 (NovoLOG Flexpen U-100 Insulin) 100 unit/mL (3 mL) inpn by SubCUTAneous route. Yes Provider, Historical   amLODIPine (NORVASC) 10 mg tablet TAKE 1 TABLET BY MOUTH DAILY 5/13/19  Yes Errol Preston MD   cloNIDine HCl (CATAPRES) 0.2 mg tablet TK 1 T PO QHS FOR BLOOD PRESSURE 10/30/17  Yes Provider, Historical   furosemide (LASIX) 20 mg tablet Take 20 mg by mouth BID Mon Wed & Fri. Yes Provider, Historical   insulin glargine (LANTUS SOLOSTAR) 100 unit/mL (3 mL) pen 24 Units by SubCUTAneous route daily. 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 9) with comorbidity ([facility] 04/19/2018    Dyspepsia 02/02/2018    Nausea 02/02/2018    Constipation 02/02/2018    Type 2 diabetes mellitus with nephropathy ([facility] 12/21/2017    Allergic rhinitis     Hypertension     Diabetes ([facility]      Current Outpatient Medications   Medication Sig Dispense Refill    docusate sodium (Colace) 100 mg capsule Take  by mouth.  rosuvastatin (CRESTOR) 40 mg tablet TAKE 1 TABLET EVERY NIGHT 90 Tablet 1    losartan (COZAAR) 50 mg tablet TAKE 1 TABLET EVERY DAY 90 Tablet 3    metoprolol succinate (TOPROL-XL) 50 mg XL tablet TAKE 1 TABLET EVERY DAY 90 Tab 3    polyethylene glycol (MIRALAX) 17 gram/dose powder DISSOLVE THE CONTENTS OF ONE CAPFUL IN 8 OUNCES OF WATER AND DRINK BY MOUTH DAILY 510 g 3    sucroferric oxyhydroxide (VELPHORO PO) Take  by mouth.  insulin aspart U-100 (NovoLOG Flexpen U-100 Insulin) 100 unit/mL (3 mL) inpn by SubCUTAneous route.  amLODIPine (NORVASC) 10 mg tablet TAKE 1 TABLET BY MOUTH DAILY 90 Tab 6    cloNIDine HCl (CATAPRES) 0.2 mg tablet TK 1 T PO QHS FOR BLOOD PRESSURE  4    furosemide (LASIX) 20 mg tablet Take 20 mg by mouth BID Mon Wed & Fri.      insulin glargine (LANTUS SOLOSTAR) 100 unit/mL (3 mL) pen 24 Units by SubCUTAneous route daily. Allergies   Allergen Reactions    Aspirin Other (comments)     Ringing in ears    Keflex [Cephalexin] Rash    Sevelamer Carbonate Itching     With rash and sweatiness     Past Medical History:   Diagnosis Date    Allergic rhinitis     Chronic kidney disease     stage 5    Diabetes ([address]     H/O seasonal allergies     Hypertension     Left foot drop, chronic since 2004 fusion sx     Peritoneal dialysis catheter in place [facility])      Past Surgical History:   Procedure Laterality Date    HAND/FINGER SURGERY UNLISTED      HX CATARACT REMOVAL  2011    HX LUMBAR FUSION  2004-last sx    fused L2-S1, in [address] [place] [address]    HX TUBAL LIGATION      HX WISDOM TEETH EXTRACTION       Family History   Problem Relation Age of Onset    Diabetes Maternal Aunt     Diabetes Maternal Uncle     Diabetes Maternal Grandfather     Diabetes Other     Hypertension Cousin      Social History     Tobacco Use    Smoking status: Former Smoker    Smokeless tobacco: Never Used   Substance Use Topics    Alcohol use:  No ROS    As per HPI  Objective:   No flowsheet data found. General: alert, cooperative, no distress   Mental  status: normal mood, behavior, speech, dress, motor activity, and thought processes, able to follow commands   HENT: NCAT   Neck: no visualized mass   Resp: no respiratory distress   Neuro: no gross deficits   Skin: no discoloration or lesions of concern on visible areas   Psychiatric: normal affect, consistent with stated mood, no evidence of hallucinations     Additional exam findings: None      We discussed the expected course, resolution and complications of the diagnosis(es) in detail. Medication risks, benefits, costs, interactions, and alternatives were discussed as indicated. I advised her to contact the office if her condition worsens, changes or fails to improve as anticipated. She expressed understanding with the diagnosis(es) and plan. Yared Jordan was evaluated through a synchronous (real-time) audio-video encounter. The patient (or guardian if applicable) is aware that this is a billable service. Verbal consent to proceed has been obtained within the past 12 months. The visit was conducted pursuant to the emergency declaration under the [facility],  waiver authority and the [employer] General Act. Patient identification was verified, and a caregiver was present when appropriate. The patient was located in a state where the provider was credentialed to provide care.     Gatito Kat MD

## 2021-11-30 NOTE — Clinical Note
Please reach out to patient to schedule a Medicare wellness examination at about 3 months.   Thank you

## 2021-11-30 NOTE — PATIENT INSTRUCTIONS
Constipation: Care Instructions  Your Care Instructions     Constipation means that you have a hard time passing stools (bowel movements). People pass stools from 3 times a day to once every 3 days. What is normal for you may be different. Constipation may occur with pain in the rectum and cramping. The pain may get worse when you try to pass stools. Sometimes there are small amounts of bright red blood on toilet paper or the surface of stools. This is because of enlarged veins near the rectum (hemorrhoids). A few changes in your diet and lifestyle may help you avoid ongoing constipation. Your doctor may also prescribe medicine to help loosen your stool. Some medicines can cause constipation. These include pain medicines and antidepressants. Tell your doctor about all the medicines you take. Your doctor may want to make a medicine change to ease your symptoms. Follow-up care is a key part of your treatment and safety. Be sure to make and go to all appointments, and call your doctor if you are having problems. It's also a good idea to know your test results and keep a list of the medicines you take. How can you care for yourself at home? · Drink plenty of fluids. If you have kidney, heart, or liver disease and have to limit fluids, talk with your doctor before you increase the amount of fluids you drink. · Include high-fiber foods in your diet each day. These include fruits, vegetables, beans, and whole grains. · Get at least 30 minutes of exercise on most days of the week. Walking is a good choice. You also may want to do other activities, such as running, swimming, cycling, or playing tennis or team sports. · Take a fiber supplement, such as Citrucel or Metamucil, every day. Read and follow all instructions on the label. · Schedule time each day for a bowel movement. A daily routine may help. Take your time having your bowel movement.   · Support your feet with a small step stool when you sit on the toilet. This helps flex your hips and places your pelvis in a squatting position. · Your doctor may recommend an over-the-counter laxative to relieve your constipation. Examples are Milk of Magnesia and MiraLax. Read and follow all instructions on the label. Do not use laxatives on a long-term basis. When should you call for help? Call your doctor now or seek immediate medical care if:    · You have new or worse belly pain.     · You have new or worse nausea or vomiting.     · You have blood in your stools. Watch closely for changes in your health, and be sure to contact your doctor if:    · Your constipation is getting worse.     · You do not get better as expected. Where can you learn more? Go to http://www.grant.com/  Enter P343 in the search box to learn more about \"Constipation: Care Instructions. \"  Current as of: July 1, 2021               Content Version: 13.0  © 9119-9867 [employer]. Care instructions adapted under license by [employer] (which disclaims liability or warranty for this information). If you have questions about a medical condition or this instruction, always ask your healthcare professional. [place] [address] any warranty or liability for your use of this information.

## 2021-12-17 ENCOUNTER — HOSPITAL ENCOUNTER (EMERGENCY)
Age: 81
Discharge: HOME | End: 2021-12-17
Attending: STUDENT IN AN ORGANIZED HEALTH CARE EDUCATION/TRAINING PROGRAM
Payer: MEDICARE

## 2021-12-17 VITALS
HEART RATE: 71 BPM | DIASTOLIC BLOOD PRESSURE: 84 MMHG | WEIGHT: 200 LBS | SYSTOLIC BLOOD PRESSURE: 108 MMHG | OXYGEN SATURATION: 100 % | TEMPERATURE: 98.5 F | HEIGHT: 65 IN | BODY MASS INDEX: 33.32 KG/M2 | RESPIRATION RATE: 18 BRPM

## 2021-12-17 DIAGNOSIS — S00.451A EMBEDDED EARRING OF RIGHT EAR, INITIAL ENCOUNTER: ICD-10-CM

## 2021-12-17 DIAGNOSIS — S00.452A EMBEDDED EARRING OF LEFT EAR, INITIAL ENCOUNTER: Primary | ICD-10-CM

## 2021-12-17 PROCEDURE — 99281 EMR DPT VST MAYX REQ PHY/QHP: CPT

## 2021-12-17 NOTE — ED PROVIDER NOTES
[facility] EMERGENCY DEPT    Date: 12/17/2021  Patient Name: Melissa Meade    History of Presenting Illness     Chief Complaint   Patient presents with    Other     80 y.o. female with noted past medical history presents the ED complaining of earring stuck in her ears. Patient states they are the twist off earring backs and she cannot get them off. She does not have any redness, swelling, discharge to her bilateral earlobes. They are not actually embedded in her earlobes. She denies any fever or other symptoms. Patient denies any other associated signs or symptoms. Patient denies any other complaints. Nursing notes regarding the HPI and triage nursing notes were reviewed. Prior medical records were reviewed. Current Outpatient Medications   Medication Sig Dispense Refill    docusate sodium (Colace) 100 mg capsule Take  by mouth.  rosuvastatin (CRESTOR) 40 mg tablet TAKE 1 TABLET EVERY NIGHT 90 Tablet 1    losartan (COZAAR) 50 mg tablet TAKE 1 TABLET EVERY DAY 90 Tablet 3    metoprolol succinate (TOPROL-XL) 50 mg XL tablet TAKE 1 TABLET EVERY DAY 90 Tab 3    polyethylene glycol (MIRALAX) 17 gram/dose powder DISSOLVE THE CONTENTS OF ONE CAPFUL IN 8 OUNCES OF WATER AND DRINK BY MOUTH DAILY 510 g 3    sucroferric oxyhydroxide (VELPHORO PO) Take  by mouth.  insulin aspart U-100 (NovoLOG Flexpen U-100 Insulin) 100 unit/mL (3 mL) inpn by SubCUTAneous route.  amLODIPine (NORVASC) 10 mg tablet TAKE 1 TABLET BY MOUTH DAILY 90 Tab 6    cloNIDine HCl (CATAPRES) 0.2 mg tablet TK 1 T PO QHS FOR BLOOD PRESSURE  4    furosemide (LASIX) 20 mg tablet Take 20 mg by mouth BID Mon Wed & Fri.      insulin glargine (LANTUS SOLOSTAR) 100 unit/mL (3 mL) pen 24 Units by SubCUTAneous route daily.          Past History     Past Medical History:  Past Medical History:   Diagnosis Date    Allergic rhinitis     Chronic kidney disease     stage 5    Diabetes ([address]     H/O seasonal allergies     Hypertension     Left foot drop, chronic since 2004 fusion sx     Peritoneal dialysis catheter in place [facility])        Past Surgical History:  Past Surgical History:   Procedure Laterality Date    HAND/FINGER SURGERY UNLISTED      HX CATARACT REMOVAL  2011    HX LUMBAR FUSION  2004-last sx    fused L2-S1, in [place]    HX TUBAL LIGATION      HX WISDOM TEETH EXTRACTION         Family History:  Family History   Problem Relation Age of Onset    Diabetes Maternal Aunt     Diabetes Maternal Uncle     Diabetes Maternal Grandfather     Diabetes Other     Hypertension Cousin        Social History:  Social History     Tobacco Use    Smoking status: Former Smoker    Smokeless tobacco: Never Used   Substance Use Topics    Alcohol use: No    Drug use: No       Allergies: Allergies   Allergen Reactions    Aspirin Other (comments)     Ringing in ears    Keflex [Cephalexin] Rash    Sevelamer Carbonate Itching     With rash and sweatiness       Patient's primary care provider (as noted in EPIC):  Lilli Woodard MD    Review of Systems   Constitutional:  Denies malaise, fever, chills. Head:  Denies injury. Face:  Denies injury or pain. ENMT:  + Earrings second bilateral earlobes. Neck:  Denies injury or pain. Skin: See ENMT  All other systems negative as reviewed. Visit Vitals  /84   Pulse 71   Temp 98.5 °F (36.9 °C)   Resp 18   Ht 5' 5\" (1.651 m)   Wt 90.7 kg (200 lb)   SpO2 100%   BMI 33.28 kg/m²       PHYSICAL EXAM:    CONSTITUTIONAL:  Alert, in no apparent distress;  well developed;  well nourished. HEAD:  Normocephalic, atraumatic. EYES:  EOMI. Non-icteric sclera. Normal conjunctiva. ENTM:  Nose:  no rhinorrhea. Throat:  no erythema or exudate, mucous membranes moist.  Ears:  Normal bilateral external ear canals. Left ear: Earring noted to ear, cannot remove back. No surrounding erythema, edema, drainage. Right ear:  Earring noted to ear, cannot remove back.   No surrounding erythema, edema, drainage. NECK:  Supple  RESPIRATORY:  Chest clear, equal breath sounds, good air movement. CARDIOVASCULAR:  Regular rate and rhythm. No murmurs, rubs, or gallops. NEURO:  Moves all four extremities, and grossly normal motor exam.  SKIN:  No rashes;  Normal for age. PSYCH:  Alert and normal affect. IMPRESSION AND MEDICAL DECISION MAKING:  I was able to use rafters to cut off the iqra of the earring bilaterally, there is no sign of infection bilaterally. Patient will follow up as needed with her primary.     Diagnosis:   1. Embedded earring of left ear, initial encounter    2. Embedded earring of right ear, initial encounter      Disposition: Discharge    Follow-up Information     Follow up With Specialties Details Why Contact Info    Alberto Bonilla MD Family Medicine In 3 days  [address] [address]  [phone]      [facility] Emergency Medicine  If symptoms worsen [address]  [phone]          Discharge Medication List as of 12/17/2021  3:19 PM      CONTINUE these medications which have NOT CHANGED    Details   docusate sodium (Colace) 100 mg capsule Take  by mouth., Historical Med      rosuvastatin (CRESTOR) 40 mg tablet TAKE 1 TABLET EVERY NIGHT, Normal, Disp-90 Tablet, R-1      losartan (COZAAR) 50 mg tablet TAKE 1 TABLET EVERY DAY, Normal, Disp-90 Tablet, R-3      metoprolol succinate (TOPROL-XL) 50 mg XL tablet TAKE 1 TABLET EVERY DAY, Normal, Disp-90 Tab, R-3      polyethylene glycol (MIRALAX) 17 gram/dose powder DISSOLVE THE CONTENTS OF ONE CAPFUL IN 8 OUNCES OF WATER AND DRINK BY MOUTH DAILY, Normal, Disp-510 g,R-3      sucroferric oxyhydroxide (VELPHORO PO) Take  by mouth., Historical Med      insulin aspart U-100 (NovoLOG Flexpen U-100 Insulin) 100 unit/mL (3 mL) inpn by SubCUTAneous route., Historical Med      amLODIPine (NORVASC) 10 mg tablet TAKE 1 TABLET BY MOUTH DAILY, Normal**Patient requests 90 days supply**Disp-90 Tab, R-6      cloNIDine HCl (CATAPRES) 0.2 mg tablet TK 1 T PO QHS FOR BLOOD PRESSURE, Historical Med, R-4      furosemide (LASIX) 20 mg tablet Take 20 mg by mouth BID Mon Wed & Fri., Historical Med      insulin glargine (LANTUS SOLOSTAR) 100 unit/mL (3 mL) pen 24 Units by SubCUTAneous route daily. , Historical Med           Hilario Rodasma

## 2022-01-18 ENCOUNTER — HOSPITAL ENCOUNTER (EMERGENCY)
Age: 82
Discharge: HOME | End: 2022-01-18
Attending: STUDENT IN AN ORGANIZED HEALTH CARE EDUCATION/TRAINING PROGRAM
Payer: MEDICARE

## 2022-01-18 ENCOUNTER — APPOINTMENT (OUTPATIENT)
Dept: CT IMAGING | Age: 82
End: 2022-01-18
Attending: STUDENT IN AN ORGANIZED HEALTH CARE EDUCATION/TRAINING PROGRAM
Payer: MEDICARE

## 2022-01-18 VITALS
TEMPERATURE: 98.4 F | WEIGHT: 185 LBS | DIASTOLIC BLOOD PRESSURE: 114 MMHG | HEART RATE: 90 BPM | RESPIRATION RATE: 20 BRPM | HEIGHT: 65 IN | BODY MASS INDEX: 30.82 KG/M2 | OXYGEN SATURATION: 97 % | SYSTOLIC BLOOD PRESSURE: 163 MMHG

## 2022-01-18 DIAGNOSIS — N12 PYELONEPHRITIS: Primary | ICD-10-CM

## 2022-01-18 LAB
ALBUMIN SERPL-MCNC: 3.6 G/DL (ref 3.4–5)
ALBUMIN/GLOB SERPL: 1.1 {RATIO} (ref 0.8–1.7)
ALP SERPL-CCNC: 105 U/L (ref 45–117)
ALT SERPL-CCNC: 45 U/L (ref 13–56)
ANION GAP SERPL CALC-SCNC: 8 MMOL/L (ref 3–18)
APPEARANCE UR: CLEAR
AST SERPL-CCNC: 35 U/L (ref 10–38)
BACTERIA URNS QL MICRO: ABNORMAL /HPF
BASOPHILS # BLD: 0.1 K/UL (ref 0–0.1)
BASOPHILS NFR BLD: 1 % (ref 0–2)
BILIRUB SERPL-MCNC: 0.4 MG/DL (ref 0.2–1)
BILIRUB UR QL: NEGATIVE
BUN SERPL-MCNC: 50 MG/DL (ref 7–18)
BUN/CREAT SERPL: 7 (ref 12–20)
CALCIUM SERPL-MCNC: 10 MG/DL (ref 8.5–10.1)
CHLORIDE SERPL-SCNC: 106 MMOL/L (ref 100–111)
CO2 SERPL-SCNC: 26 MMOL/L (ref 21–32)
COLOR UR: YELLOW
CREAT SERPL-MCNC: 7.07 MG/DL (ref 0.6–1.3)
DIFFERENTIAL METHOD BLD: ABNORMAL
EOSINOPHIL # BLD: 0.2 K/UL (ref 0–0.4)
EOSINOPHIL NFR BLD: 3 % (ref 0–5)
EPITH CASTS URNS QL MICRO: ABNORMAL /LPF (ref 0–5)
ERYTHROCYTE [DISTWIDTH] IN BLOOD BY AUTOMATED COUNT: 13.1 % (ref 11.6–14.5)
GLOBULIN SER CALC-MCNC: 3.2 G/DL (ref 2–4)
GLUCOSE SERPL-MCNC: 134 MG/DL (ref 74–99)
GLUCOSE UR STRIP.AUTO-MCNC: NEGATIVE MG/DL
HCT VFR BLD AUTO: 35.3 % (ref 35–45)
HGB BLD-MCNC: 11.4 G/DL (ref 12–16)
HGB UR QL STRIP: ABNORMAL
IMM GRANULOCYTES # BLD AUTO: 0 K/UL (ref 0–0.04)
IMM GRANULOCYTES NFR BLD AUTO: 0 % (ref 0–0.5)
KETONES UR QL STRIP.AUTO: NEGATIVE MG/DL
LEUKOCYTE ESTERASE UR QL STRIP.AUTO: ABNORMAL
LIPASE SERPL-CCNC: 457 U/L (ref 73–393)
LYMPHOCYTES # BLD: 1.9 K/UL (ref 0.9–3.6)
LYMPHOCYTES NFR BLD: 32 % (ref 21–52)
MAGNESIUM SERPL-MCNC: 2.2 MG/DL (ref 1.6–2.6)
MCH RBC QN AUTO: 30 PG (ref 24–34)
MCHC RBC AUTO-ENTMCNC: 32.3 G/DL (ref 31–37)
MCV RBC AUTO: 92.9 FL (ref 78–100)
MONOCYTES # BLD: 0.5 K/UL (ref 0.05–1.2)
MONOCYTES NFR BLD: 8 % (ref 3–10)
NEUTS SEG # BLD: 3.3 K/UL (ref 1.8–8)
NEUTS SEG NFR BLD: 56 % (ref 40–73)
NITRITE UR QL STRIP.AUTO: NEGATIVE
NRBC # BLD: 0 K/UL (ref 0–0.01)
NRBC BLD-RTO: 0 PER 100 WBC
PH UR STRIP: 7 [PH] (ref 5–8)
PLATELET # BLD AUTO: 128 K/UL (ref 135–420)
PMV BLD AUTO: 10.7 FL (ref 9.2–11.8)
POTASSIUM SERPL-SCNC: 4.1 MMOL/L (ref 3.5–5.5)
PROT SERPL-MCNC: 6.8 G/DL (ref 6.4–8.2)
PROT UR STRIP-MCNC: 100 MG/DL
RBC # BLD AUTO: 3.8 M/UL (ref 4.2–5.3)
RBC #/AREA URNS HPF: ABNORMAL /HPF (ref 0–5)
SODIUM SERPL-SCNC: 140 MMOL/L (ref 136–145)
SP GR UR REFRACTOMETRY: 1.01 (ref 1–1.03)
UROBILINOGEN UR QL STRIP.AUTO: 0.2 EU/DL (ref 0.2–1)
WBC # BLD AUTO: 5.9 K/UL (ref 4.6–13.2)
WBC URNS QL MICRO: ABNORMAL /HPF (ref 0–4)

## 2022-01-18 PROCEDURE — 87077 CULTURE AEROBIC IDENTIFY: CPT

## 2022-01-18 PROCEDURE — 74176 CT ABD & PELVIS W/O CONTRAST: CPT

## 2022-01-18 PROCEDURE — 83690 ASSAY OF LIPASE: CPT

## 2022-01-18 PROCEDURE — 87086 URINE CULTURE/COLONY COUNT: CPT

## 2022-01-18 PROCEDURE — 81001 URINALYSIS AUTO W/SCOPE: CPT

## 2022-01-18 PROCEDURE — 80053 COMPREHEN METABOLIC PANEL: CPT

## 2022-01-18 PROCEDURE — 87186 SC STD MICRODIL/AGAR DIL: CPT

## 2022-01-18 PROCEDURE — 85025 COMPLETE CBC W/AUTO DIFF WBC: CPT

## 2022-01-18 PROCEDURE — 99282 EMERGENCY DEPT VISIT SF MDM: CPT

## 2022-01-18 PROCEDURE — 83735 ASSAY OF MAGNESIUM: CPT

## 2022-01-18 RX ORDER — CIPROFLOXACIN 500 MG/1
500 TABLET ORAL 2 TIMES DAILY
Qty: 20 TABLET | Refills: 0 | Status: SHIPPED | OUTPATIENT
Start: 2022-01-18 | End: 2022-01-28

## 2022-01-18 NOTE — ED TRIAGE NOTES
Left side abd pain to the back. Hurts upon movement. Kidney MD states he think patient needs a MRI. She is on dialysis.

## 2022-01-18 NOTE — ED PROVIDER NOTES
Patient is an 70-year-old female with a history of CKD stage V (currently on for peritoneal dialysis), diabetes, hypertension, and previous nephrolithiasis (10 years prior). Patient presents with primary complaint of 10 to 14 days of left flank pain that is worse in the morning and improves throughout the day. Patient denies any associated fever/chills, other abdominal pain, nausea/vomiting, diarrhea, dysuria, hematuria, hematochezia, or melena.            Past Medical History:   Diagnosis Date    Allergic rhinitis     Chronic kidney disease     stage 5    Diabetes ([address]     H/O seasonal allergies     Hypertension     Left foot drop, chronic since 2004 fusion sx     Peritoneal dialysis catheter in place [facility])        Past Surgical History:   Procedure Laterality Date    HAND/FINGER SURGERY UNLISTED      HX CATARACT REMOVAL  2011    HX LUMBAR FUSION  2004-last sx    fused L2-S1, in [address] [place] [address]    HX TUBAL LIGATION      HX WISDOM TEETH EXTRACTION           Family History:   Problem Relation Age of Onset    Diabetes Maternal Aunt     Diabetes Maternal Uncle     Diabetes Maternal Grandfather     Diabetes Other     Hypertension Cousin        Social History     Socioeconomic History    Marital status:      Spouse name: Not on file    Number of children: Not on file    Years of education: Not on file    Highest education level: Not on file   Occupational History    Occupation: CNA - RET   Tobacco Use    Smoking status: Former Smoker    Smokeless tobacco: Never Used   Substance and Sexual Activity    Alcohol use: No    Drug use: No    Sexual activity: Yes     Partners: Male     Birth control/protection: None   Other Topics Concern    Not on file   Social History Narrative    Not on file     Social Determinants of Health     Financial Resource Strain:     Difficulty of Paying Living Expenses: Not on file   Food Insecurity:     Worried About [address] in the Last Year: Not on file   Caitlin Ran Out of Food in the Last Year: Not on file   Transportation Needs:     Lack of Transportation (Medical): Not on file    Lack of Transportation (Non-Medical): Not on file   Physical Activity:     Days of Exercise per Week: Not on file    Minutes of Exercise per Session: Not on file   Stress:     Feeling of Stress : Not on file   Social Connections:     Frequency of Communication with Friends and Family: Not on file    Frequency of Social Gatherings with Friends and Family: Not on file    Attends Confucianism Services: Not on file    Active Member of [address] [place] [address] [employer] or Organizations: Not on file    Attends Club or Organization Meetings: Not on file    Marital Status: Not on file   Intimate Partner Violence:     Fear of Current or Ex-Partner: Not on file    Emotionally Abused: Not on file    Physically Abused: Not on file    Sexually Abused: Not on file   Housing Stability:     Unable to Pay for Housing in the Last Year: Not on file    Number of [place] in the Last Year: Not on file    Unstable Housing in the Last Year: Not on file         ALLERGIES: Aspirin, Keflex [cephalexin], and Sevelamer carbonate    Review of Systems   Constitutional: Negative for chills and fever. HENT: Negative for rhinorrhea and sore throat. Eyes: Negative for discharge and redness. Respiratory: Negative for cough and shortness of breath. Cardiovascular: Negative for chest pain and leg swelling. Gastrointestinal: Negative for abdominal pain, diarrhea, nausea and vomiting. Genitourinary: Positive for flank pain. Negative for difficulty urinating and dysuria. Musculoskeletal: Positive for back pain. Negative for neck pain. Skin: Negative for rash and wound. Neurological: Negative for syncope, light-headedness and headaches.        Vitals:    01/18/22 1457 01/18/22 1459   BP: (!) 163/114    Pulse: 90    Resp: 20    Temp: 98.4 °F (36.9 °C)    SpO2: 97%    Weight:  83.9 kg (185 lb)   Height:  5' 5\" (1.651 m) Physical Exam  Constitutional:       General: She is not in acute distress. Appearance: She is not ill-appearing, toxic-appearing or diaphoretic. HENT:      Head: Normocephalic and atraumatic. Right Ear: External ear normal.      Left Ear: External ear normal.      Nose: No congestion or rhinorrhea. Mouth/Throat:      Mouth: Mucous membranes are moist.      Pharynx: No oropharyngeal exudate or posterior oropharyngeal erythema. Eyes:      General:         Right eye: No discharge. Left eye: No discharge. Pupils: Pupils are equal, round, and reactive to light. Neck:      Vascular: No carotid bruit. Cardiovascular:      Rate and Rhythm: Normal rate and regular rhythm. Heart sounds: No murmur heard. No friction rub. No gallop. Pulmonary:      Effort: Pulmonary effort is normal. No respiratory distress. Breath sounds: No stridor. No wheezing, rhonchi or rales. Abdominal:      General: Abdomen is flat. There is no distension. Tenderness: There is no abdominal tenderness. There is left CVA tenderness. There is no right CVA tenderness, guarding or rebound. Comments: Abdomen is soft, nontender with no rebound or distention. Peritoneal dialysis site is also nontender with no surrounding erythema, fluctuance, or induration. Musculoskeletal:         General: No swelling, tenderness, deformity or signs of injury. Cervical back: No rigidity or tenderness. Right lower leg: No edema. Left lower leg: No edema. Lymphadenopathy:      Cervical: No cervical adenopathy. Skin:     General: Skin is warm. Capillary Refill: Capillary refill takes less than 2 seconds. Coloration: Skin is not cyanotic, jaundiced or pale. Findings: No bruising, erythema, lesion or rash. Neurological:      General: No focal deficit present. Mental Status: She is alert and oriented to person, place, and time. Sensory: No sensory deficit.       Motor: No weakness. Psychiatric:         Mood and Affect: Mood normal.          MDM  Number of Diagnoses or Management Options  Diagnosis management comments: Patient has a primary complaint of left flank pain patient does report pain is worse in the morning improves throughout the day with no colicky symptoms suggestive of uropathy. Patient's abdomen is reassuring with no distention rebound or tenderness noted around her peritoneal dialysis site. While complication of peritoneal dialysis is a consideration it is lower on my differential given her reassuring exam and lack of systemic signs of illness. We will proceed with evaluation for neuropathy to include a CT scan as well as laboratory studies to look for a secondary cause. Disposition pending imaging results.        Amount and/or Complexity of Data Reviewed  Clinical lab tests: reviewed  Tests in the radiology section of CPT®: reviewed  Decide to obtain previous medical records or to obtain history from someone other than the patient: yes           Procedures

## 2022-01-21 LAB
BACTERIA SPEC CULT: ABNORMAL
CC UR VC: ABNORMAL
SERVICE CMNT-IMP: ABNORMAL

## 2022-01-24 RX ORDER — SULFAMETHOXAZOLE AND TRIMETHOPRIM 400; 80 MG/1; MG/1
1 TABLET ORAL
Qty: 14 TABLET | Refills: 0 | Status: SHIPPED | OUTPATIENT
Start: 2022-01-24 | End: 2022-01-24 | Stop reason: SDUPTHER

## 2022-01-24 RX ORDER — SULFAMETHOXAZOLE AND TRIMETHOPRIM 400; 80 MG/1; MG/1
1 TABLET ORAL
Qty: 14 TABLET | Refills: 0 | Status: SHIPPED | OUTPATIENT
Start: 2022-01-24 | End: 2022-05-31 | Stop reason: ALTCHOICE

## 2022-01-24 NOTE — PROGRESS NOTES
Resistant to Cipro and Levofloxacin. Pt allergy to Keflex. Will try Bactrim. Also requesting something for pain. States has taken Bactrim. [place]. Discussed case with Dr. Bal Zaman, as pt is a PD patient with GFR 7 on last draw. Pt has allergy to Keflex, recommend abx then would be bactrim (is susceptible). Recommend one regular strength bactrim daily.

## 2022-02-02 ENCOUNTER — VIRTUAL VISIT (OUTPATIENT)
Dept: FAMILY MEDICINE CLINIC | Age: 82
End: 2022-02-02

## 2022-02-14 ENCOUNTER — APPOINTMENT (OUTPATIENT)
Dept: CT IMAGING | Age: 82
End: 2022-02-14
Attending: STUDENT IN AN ORGANIZED HEALTH CARE EDUCATION/TRAINING PROGRAM
Payer: MEDICARE

## 2022-02-14 ENCOUNTER — HOSPITAL ENCOUNTER (EMERGENCY)
Age: 82
Discharge: HOME HEALTH | End: 2022-02-14
Attending: STUDENT IN AN ORGANIZED HEALTH CARE EDUCATION/TRAINING PROGRAM
Payer: MEDICARE

## 2022-02-14 ENCOUNTER — APPOINTMENT (OUTPATIENT)
Dept: GENERAL RADIOLOGY | Age: 82
End: 2022-02-14
Attending: STUDENT IN AN ORGANIZED HEALTH CARE EDUCATION/TRAINING PROGRAM
Payer: MEDICARE

## 2022-02-14 VITALS
DIASTOLIC BLOOD PRESSURE: 83 MMHG | HEART RATE: 92 BPM | RESPIRATION RATE: 23 BRPM | SYSTOLIC BLOOD PRESSURE: 145 MMHG | OXYGEN SATURATION: 95 %

## 2022-02-14 DIAGNOSIS — N13.9 OBSTRUCTIVE UROPATHY: Primary | ICD-10-CM

## 2022-02-14 LAB
ALBUMIN SERPL-MCNC: 3.7 G/DL (ref 3.4–5)
ALBUMIN/GLOB SERPL: 1 {RATIO} (ref 0.8–1.7)
ALP SERPL-CCNC: 107 U/L (ref 45–117)
ALT SERPL-CCNC: 42 U/L (ref 13–56)
ANION GAP SERPL CALC-SCNC: 10 MMOL/L (ref 3–18)
APPEARANCE FLD: CLEAR
APPEARANCE UR: CLEAR
AST SERPL-CCNC: 36 U/L (ref 10–38)
ATRIAL RATE: 88 BPM
BACTERIA URNS QL MICRO: ABNORMAL /HPF
BASOPHILS # BLD: 0.1 K/UL (ref 0–0.1)
BASOPHILS NFR BLD: 1 % (ref 0–2)
BILIRUB SERPL-MCNC: 0.4 MG/DL (ref 0.2–1)
BILIRUB UR QL: NEGATIVE
BUN SERPL-MCNC: 55 MG/DL (ref 7–18)
BUN/CREAT SERPL: 7 (ref 12–20)
CALCIUM SERPL-MCNC: 10.2 MG/DL (ref 8.5–10.1)
CALCULATED P AXIS, ECG09: 50 DEGREES
CALCULATED R AXIS, ECG10: -51 DEGREES
CALCULATED T AXIS, ECG11: 3 DEGREES
CHLORIDE SERPL-SCNC: 107 MMOL/L (ref 100–111)
CO2 SERPL-SCNC: 23 MMOL/L (ref 21–32)
COLOR FLD: YELLOW
COLOR UR: YELLOW
CREAT SERPL-MCNC: 8.32 MG/DL (ref 0.6–1.3)
DIAGNOSIS, 93000: NORMAL
DIFFERENTIAL METHOD BLD: ABNORMAL
EOSINOPHIL # BLD: 0.1 K/UL (ref 0–0.4)
EOSINOPHIL NFR BLD: 1 % (ref 0–5)
EOSINOPHIL NFR FLD MANUAL: 3 %
EPITH CASTS URNS QL MICRO: ABNORMAL /LPF (ref 0–5)
ERYTHROCYTE [DISTWIDTH] IN BLOOD BY AUTOMATED COUNT: 13 % (ref 11.6–14.5)
GLOBULIN SER CALC-MCNC: 3.7 G/DL (ref 2–4)
GLUCOSE BLD STRIP.AUTO-MCNC: 131 MG/DL (ref 70–110)
GLUCOSE SERPL-MCNC: 134 MG/DL (ref 74–99)
GLUCOSE UR STRIP.AUTO-MCNC: 250 MG/DL
HCT VFR BLD AUTO: 32.9 % (ref 35–45)
HGB BLD-MCNC: 10.6 G/DL (ref 12–16)
HGB UR QL STRIP: ABNORMAL
IMM GRANULOCYTES # BLD AUTO: 0 K/UL (ref 0–0.04)
IMM GRANULOCYTES NFR BLD AUTO: 0 % (ref 0–0.5)
KETONES UR QL STRIP.AUTO: NEGATIVE MG/DL
LACTATE BLD-SCNC: 1.05 MMOL/L (ref 0.4–2)
LEUKOCYTE ESTERASE UR QL STRIP.AUTO: ABNORMAL
LYMPHOCYTES # BLD: 1.4 K/UL (ref 0.9–3.6)
LYMPHOCYTES NFR BLD: 17 % (ref 21–52)
LYMPHOCYTES NFR FLD: 22 %
MCH RBC QN AUTO: 29.3 PG (ref 24–34)
MCHC RBC AUTO-ENTMCNC: 32.2 G/DL (ref 31–37)
MCV RBC AUTO: 90.9 FL (ref 78–100)
MONOCYTES # BLD: 0.6 K/UL (ref 0.05–1.2)
MONOCYTES NFR BLD: 7 % (ref 3–10)
MONOCYTES NFR FLD: 36 %
MUCOUS THREADS URNS QL MICRO: ABNORMAL /LPF
NEUTROPHILS NFR FLD: 39 %
NEUTS BAND # FLD: 0 %
NEUTS SEG # BLD: 6.1 K/UL (ref 1.8–8)
NEUTS SEG NFR BLD: 74 % (ref 40–73)
NITRITE UR QL STRIP.AUTO: NEGATIVE
NRBC # BLD: 0 K/UL (ref 0–0.01)
NRBC BLD-RTO: 0 PER 100 WBC
NUC CELL # FLD: 61 /CU MM
P-R INTERVAL, ECG05: 190 MS
PH UR STRIP: 8.5 [PH] (ref 5–8)
PLATELET # BLD AUTO: 126 K/UL (ref 135–420)
PMV BLD AUTO: 10.9 FL (ref 9.2–11.8)
POTASSIUM SERPL-SCNC: 4 MMOL/L (ref 3.5–5.5)
PROT SERPL-MCNC: 7.4 G/DL (ref 6.4–8.2)
PROT UR STRIP-MCNC: 30 MG/DL
Q-T INTERVAL, ECG07: 428 MS
QRS DURATION, ECG06: 152 MS
QTC CALCULATION (BEZET), ECG08: 517 MS
RBC # BLD AUTO: 3.62 M/UL (ref 4.2–5.3)
RBC # FLD: 41 /CU MM
RBC #/AREA URNS HPF: ABNORMAL /HPF (ref 0–5)
SODIUM SERPL-SCNC: 140 MMOL/L (ref 136–145)
SP GR UR REFRACTOMETRY: 1.01 (ref 1–1.03)
SPECIMEN SOURCE FLD: ABNORMAL
UROBILINOGEN UR QL STRIP.AUTO: 0.2 EU/DL (ref 0.2–1)
VENTRICULAR RATE, ECG03: 88 BPM
WBC # BLD AUTO: 8.2 K/UL (ref 4.6–13.2)
WBC MORPH BLD: ABNORMAL
WBC URNS QL MICRO: ABNORMAL /HPF (ref 0–5)

## 2022-02-14 PROCEDURE — 99285 EMERGENCY DEPT VISIT HI MDM: CPT

## 2022-02-14 PROCEDURE — 74011000636 HC RX REV CODE- 636: Performed by: STUDENT IN AN ORGANIZED HEALTH CARE EDUCATION/TRAINING PROGRAM

## 2022-02-14 PROCEDURE — 83605 ASSAY OF LACTIC ACID: CPT

## 2022-02-14 PROCEDURE — 82962 GLUCOSE BLOOD TEST: CPT

## 2022-02-14 PROCEDURE — 87040 BLOOD CULTURE FOR BACTERIA: CPT

## 2022-02-14 PROCEDURE — 87070 CULTURE OTHR SPECIMN AEROBIC: CPT

## 2022-02-14 PROCEDURE — 93005 ELECTROCARDIOGRAM TRACING: CPT

## 2022-02-14 PROCEDURE — 85025 COMPLETE CBC W/AUTO DIFF WBC: CPT

## 2022-02-14 PROCEDURE — 96374 THER/PROPH/DIAG INJ IV PUSH: CPT

## 2022-02-14 PROCEDURE — 96376 TX/PRO/DX INJ SAME DRUG ADON: CPT

## 2022-02-14 PROCEDURE — 74177 CT ABD & PELVIS W/CONTRAST: CPT

## 2022-02-14 PROCEDURE — 81001 URINALYSIS AUTO W/SCOPE: CPT

## 2022-02-14 PROCEDURE — 80053 COMPREHEN METABOLIC PANEL: CPT

## 2022-02-14 PROCEDURE — 74011250636 HC RX REV CODE- 250/636: Performed by: STUDENT IN AN ORGANIZED HEALTH CARE EDUCATION/TRAINING PROGRAM

## 2022-02-14 PROCEDURE — 71045 X-RAY EXAM CHEST 1 VIEW: CPT

## 2022-02-14 PROCEDURE — 96375 TX/PRO/DX INJ NEW DRUG ADDON: CPT

## 2022-02-14 PROCEDURE — 89051 BODY FLUID CELL COUNT: CPT

## 2022-02-14 PROCEDURE — 74011250637 HC RX REV CODE- 250/637: Performed by: STUDENT IN AN ORGANIZED HEALTH CARE EDUCATION/TRAINING PROGRAM

## 2022-02-14 RX ORDER — OXYCODONE AND ACETAMINOPHEN 5; 325 MG/1; MG/1
1 TABLET ORAL
Qty: 12 TABLET | Refills: 0 | Status: SHIPPED | OUTPATIENT
Start: 2022-02-14 | End: 2022-02-17

## 2022-02-14 RX ORDER — ONDANSETRON 4 MG/1
4 TABLET, ORALLY DISINTEGRATING ORAL
Qty: 12 TABLET | Refills: 0 | Status: SHIPPED | OUTPATIENT
Start: 2022-02-14 | End: 2022-08-31 | Stop reason: ALTCHOICE

## 2022-02-14 RX ORDER — ONDANSETRON 2 MG/ML
4 INJECTION INTRAMUSCULAR; INTRAVENOUS ONCE
Status: COMPLETED | OUTPATIENT
Start: 2022-02-14 | End: 2022-02-14

## 2022-02-14 RX ORDER — MORPHINE SULFATE 4 MG/ML
4 INJECTION INTRAVENOUS ONCE
Status: COMPLETED | OUTPATIENT
Start: 2022-02-14 | End: 2022-02-14

## 2022-02-14 RX ORDER — OXYCODONE AND ACETAMINOPHEN 5; 325 MG/1; MG/1
1 TABLET ORAL
Status: COMPLETED | OUTPATIENT
Start: 2022-02-14 | End: 2022-02-14

## 2022-02-14 RX ADMIN — MORPHINE SULFATE 4 MG: 4 INJECTION, SOLUTION INTRAMUSCULAR; INTRAVENOUS at 12:35

## 2022-02-14 RX ADMIN — IOPAMIDOL 80 ML: 612 INJECTION, SOLUTION INTRAVENOUS at 13:21

## 2022-02-14 RX ADMIN — ONDANSETRON 4 MG: 2 INJECTION INTRAMUSCULAR; INTRAVENOUS at 12:35

## 2022-02-14 RX ADMIN — OXYCODONE HYDROCHLORIDE AND ACETAMINOPHEN 1 TABLET: 5; 325 TABLET ORAL at 17:54

## 2022-02-14 RX ADMIN — MORPHINE SULFATE 4 MG: 4 INJECTION, SOLUTION INTRAMUSCULAR; INTRAVENOUS at 16:21

## 2022-02-14 NOTE — ED PROVIDER NOTES
Patient is 55-year-old female history of end-stage renal disease currently on peritoneal dialysis, diabetes, and hypertension. Patient presents with primary complaint of 2 days of severe, sharp, nonradiating left lower quadrant pain with sudden onset of nonbilious/nonbloody nausea/vomiting today. Patient reports a normal bowel movement yesterday reports no associated diarrhea, hematochezia, or melena. Patient states that she has been using her peritoneal dialysis as prescribed.            Past Medical History:   Diagnosis Date    Allergic rhinitis     Chronic kidney disease     stage 5    Diabetes ([address]     H/O seasonal allergies     Hypertension     Left foot drop, chronic since 2004 fusion sx     Peritoneal dialysis catheter in place [facility])        Past Surgical History:   Procedure Laterality Date    HAND/FINGER SURGERY UNLISTED      HX CATARACT REMOVAL  2011    HX LUMBAR FUSION  2004-last sx    fused L2-S1, in [address] [place] [address]    HX TUBAL LIGATION      HX WISDOM TEETH EXTRACTION           Family History:   Problem Relation Age of Onset    Diabetes Maternal Aunt     Diabetes Maternal Uncle     Diabetes Maternal Grandfather     Diabetes Other     Hypertension Cousin        Social History     Socioeconomic History    Marital status:      Spouse name: Not on file    Number of children: Not on file    Years of education: Not on file    Highest education level: Not on file   Occupational History    Occupation: CNA - RET   Tobacco Use    Smoking status: Former Smoker    Smokeless tobacco: Never Used   Substance and Sexual Activity    Alcohol use: No    Drug use: No    Sexual activity: Yes     Partners: Male     Birth control/protection: None   Other Topics Concern    Not on file   Social History Narrative    Not on file     Social Determinants of Health     Financial Resource Strain:     Difficulty of Paying Living Expenses: Not on file   Food Insecurity:     Worried About [facility] in the Last Year: Not on file    Ran Out of Food in the Last Year: Not on file   Transportation Needs:     Lack of Transportation (Medical): Not on file    Lack of Transportation (Non-Medical): Not on file   Physical Activity:     Days of Exercise per Week: Not on file    Minutes of Exercise per Session: Not on file   Stress:     Feeling of Stress : Not on file   Social Connections:     Frequency of Communication with Friends and Family: Not on file    Frequency of Social Gatherings with Friends and Family: Not on file    Attends Mandaen Services: Not on file    Active Member of [address] [place] [address] or Organizations: Not on file    Attends Club or Organization Meetings: Not on file    Marital Status: Not on file   Intimate Partner Violence:     Fear of Current or Ex-Partner: Not on file    Emotionally Abused: Not on file    Physically Abused: Not on file    Sexually Abused: Not on file   Housing Stability:     Unable to Pay for Housing in the Last Year: Not on file    Number of [place] in the Last Year: Not on file    Unstable Housing in the Last Year: Not on file         ALLERGIES: Aspirin, Keflex [cephalexin], and Sevelamer carbonate    Review of Systems   Constitutional: Negative for chills and fever. HENT: Negative for rhinorrhea and sore throat. Eyes: Negative for discharge and redness. Respiratory: Negative for cough and shortness of breath. Cardiovascular: Negative for chest pain and leg swelling. Gastrointestinal: Positive for abdominal pain, nausea and vomiting. Negative for diarrhea. Genitourinary: Negative for difficulty urinating and dysuria. Musculoskeletal: Negative for back pain and neck pain. Skin: Negative for rash and wound. Neurological: Negative for syncope, light-headedness and headaches. There were no vitals filed for this visit. Physical Exam  Constitutional:       General: She is not in acute distress.      Appearance: She is not ill-appearing, toxic-appearing or diaphoretic. HENT:      Head: Normocephalic and atraumatic. Right Ear: External ear normal.      Left Ear: External ear normal.      Nose: No congestion or rhinorrhea. Mouth/Throat:      Mouth: Mucous membranes are moist.      Pharynx: No oropharyngeal exudate or posterior oropharyngeal erythema. Eyes:      General:         Right eye: No discharge. Left eye: No discharge. Pupils: Pupils are equal, round, and reactive to light. Neck:      Vascular: No carotid bruit. Cardiovascular:      Rate and Rhythm: Normal rate and regular rhythm. Heart sounds: No murmur heard. No friction rub. No gallop. Pulmonary:      Effort: Pulmonary effort is normal. No respiratory distress. Breath sounds: No stridor. No wheezing, rhonchi or rales. Abdominal:      General: Abdomen is flat. There is no distension. Tenderness: There is abdominal tenderness in the left lower quadrant. There is no right CVA tenderness, left CVA tenderness, guarding or rebound. Comments: Dialysis catheter right lower quadrant with no associated tenderness, erythema, fluctuance, or induration. Musculoskeletal:         General: No swelling, tenderness, deformity or signs of injury. Cervical back: No rigidity or tenderness. Right lower leg: No edema. Left lower leg: No edema. Lymphadenopathy:      Cervical: No cervical adenopathy. Skin:     General: Skin is warm. Capillary Refill: Capillary refill takes less than 2 seconds. Coloration: Skin is not jaundiced or pale. Findings: No bruising, erythema, lesion or rash. Neurological:      General: No focal deficit present. Mental Status: She is alert and oriented to person, place, and time. Sensory: No sensory deficit. Motor: No weakness.    Psychiatric:         Mood and Affect: Mood normal.          MDM  Number of Diagnoses or Management Options  Diagnosis management comments: Patient resting primary complaint of 2 days of left lower quadrant abdominal pain with development of nausea/vomiting earlier today. Concerns include intra-abdominal infection specifically diverticulitis, infection related to peritoneal dialysis, gastroenteritis, or musculoskeletal pain.        Amount and/or Complexity of Data Reviewed  Clinical lab tests: reviewed           Procedures

## 2022-02-14 NOTE — DISCHARGE INSTRUCTIONS
Please contact both your primary care doctor on the above noted urology practice as soon as possible to arrange a follow-up appointment in the next 3 to 7 days. Please continue take the prescribed medicines as needed for pain and or nausea however return immediately to emergency department if your pain becomes suddenly worse, you have a fever, you are unable to tolerate any food/drink or any other sudden/severe change in your condition.

## 2022-02-14 NOTE — ED TRIAGE NOTES
Per EMS, LUQ pain, body aches, chills, n/v, (-) diarrhea that started at 700 this morning.  , home peritoneal dialysis is done

## 2022-02-18 LAB
BACTERIA SPEC CULT: NORMAL
BACTERIA SPEC CULT: NORMAL
GRAM STN SPEC: NORMAL
GRAM STN SPEC: NORMAL
SERVICE CMNT-IMP: NORMAL

## 2022-02-21 ENCOUNTER — PATIENT OUTREACH (OUTPATIENT)
Dept: CASE MANAGEMENT | Age: 82
End: 2022-02-21

## 2022-03-01 ENCOUNTER — PATIENT OUTREACH (OUTPATIENT)
Dept: CASE MANAGEMENT | Age: 82
End: 2022-03-01

## 2022-03-01 NOTE — PROGRESS NOTES
Date/Time:  3/1/2022 1:14 PM    Method of communication with patient:phone    Ambulator Care Manager (ACM) contacted the patient by telephone to perform Ambulatory Care Coordination. Verified name and  (PHI) with patient as identifiers. Provided introduction to self, and explanation of the Ambulatory Care Manager's role. Reviewed most recent clinic visit w/ patient who verbalized understanding. Patient given an opportunity to ask questions. Notes / Challenges    Patient enrolled in Complex Case Management effective 3/1/2022 and will be followed per [facility] protocol. Initial questions answered with subsequent encounters planned. PHI documentation on chart  Further / follow up appointments listed below - reviewed upcoming appointments  1. Further questions answered as needed and patient has ACM contact information  2. Respiratory and cardiac status shows no issues at present  3. FBS 97 this AM  Diabetic issues reviewed with patient: low cholesterol diet, weight control and daily exercise discussed and home glucose monitoring emphasized. Hypertension issues reviewed with her: instructed to always take BP medication prior to office visits  Preliminary review of medications done during this encounter - will do full med rec on next encounter  Will continue to follow patient per [facility] protocol     The patient agrees to contact the PCP office or the [address] [place] [address] for questions related to their healthcare. Provided contact information for future reference. Disease Specific:   N/A    Home Health Active: No    DME Active: No    Barriers to care? depression, level of motivation, medication management    Advance Care Planning:   Does patient have an Advance Directive:  reviewed and current     Medication(s):   Medication reconciliation was not performed with patient, who verbalizes understanding of administration of home medications.   There were no barriers to obtaining medications identified at this time.         Current Outpatient Medications   Medication Sig    fluconazole (DIFLUCAN) 50 mg tablet Take 1 Tablet by mouth daily for 5 days. FDA advises cautious prescribing of oral fluconazole in pregnancy.  0.9% sodium chloride solp 100 mL with iron sucrose 20 MG/ mg.    lactulose (CHRONULAC) 10 gram/15 mL solution lactulose 10 gram/15 mL oral solution (Patient not taking: Reported on 2/23/2022)    Accu-Chek Evelin Plus test strp strip     calcitRIOL (ROCALTROL) 0.25 mcg capsule Take  by mouth. (Patient not taking: Reported on 2/23/2022)    cinacalcet (SENSIPAR) 30 mg tablet cinacalcet 30 mg tablet (Patient not taking: Reported on 2/23/2022)    b complex-vitamin c-folic acid 0.8 mg (Angelica-Osito) 0.8 mg tab tablet     gentamicin (GARAMYCIN) 0.1 % topical cream gentamicin 0.1 % topical cream   APPLY TO EXIT SITE DAILY    hydrALAZINE (APRESOLINE) 50 mg tablet     methoxy peg-epoetin beta (MIRCERA INJECTION) 100 mcg by SubCUTAneous route.  omeprazole (PRILOSEC) 40 mg capsule omeprazole 40 mg capsule,delayed release   1 cap po qd prn (Patient not taking: Reported on 2/23/2022)    predniSONE (DELTASONE) 20 mg tablet Take 1 Tablet by mouth. (Patient not taking: Reported on 2/23/2022)    trimethoprim-sulfamethoxazole (Bactrim)  mg per tablet Take 1 Tablet by mouth once over twenty-four (24) hours.  tamsulosin (FLOMAX) 0.4 mg capsule Take 1 Capsule by mouth daily.  ondansetron (ZOFRAN ODT) 4 mg disintegrating tablet Take 1 Tablet by mouth every eight (8) hours as needed for Nausea or Vomiting for up to 12 doses. (Patient not taking: Reported on 2/23/2022)    trimethoprim-sulfamethoxazole (Bactrim)  mg per tablet Take 1 Tablet by mouth once over twenty-four (24) hours.  docusate sodium (Colace) 100 mg capsule Take  by mouth.  (Patient not taking: Reported on 2/23/2022)    rosuvastatin (CRESTOR) 40 mg tablet TAKE 1 TABLET EVERY NIGHT    losartan (COZAAR) 50 mg tablet TAKE 1 TABLET EVERY DAY    metoprolol succinate (TOPROL-XL) 50 mg XL tablet TAKE 1 TABLET EVERY DAY    polyethylene glycol (MIRALAX) 17 gram/dose powder DISSOLVE THE CONTENTS OF ONE CAPFUL IN 8 OUNCES OF WATER AND DRINK BY MOUTH DAILY (Patient not taking: Reported on 2/23/2022)    sucroferric oxyhydroxide (VELPHORO PO) Take  by mouth.  insulin aspart U-100 (NovoLOG Flexpen U-100 Insulin) 100 unit/mL (3 mL) inpn by SubCUTAneous route.  amLODIPine (NORVASC) 10 mg tablet TAKE 1 TABLET BY MOUTH DAILY    cloNIDine HCl (CATAPRES) 0.2 mg tablet TK 1 T PO QHS FOR BLOOD PRESSURE    furosemide (LASIX) 20 mg tablet Take 20 mg by mouth BID Mon Wed & Fri.    insulin glargine (LANTUS SOLOSTAR) 100 unit/mL (3 mL) pen 24 Units by SubCUTAneous route daily. No current facility-administered medications for this visit. [facility] follow up appointment(s): No future appointments. Goals Addressed                 This Visit's Progress     Establish PCP relationships and regularly scheduled appointments.    On track     Prepare patients and caregivers for end of life decisions (ie. need for hospice, pain management, symptom relief, advance directives etc.)   On track     Patient has ACP       Take all medications as ordered   On track

## 2022-03-07 ENCOUNTER — PATIENT OUTREACH (OUTPATIENT)
Dept: CASE MANAGEMENT | Age: 82
End: 2022-03-07

## 2022-03-07 NOTE — PROGRESS NOTES
Date/Time:  3/7/2022 1:14 PM    Method of communication with patient:phone    Ambulator Care Manager (ACM) contacted the patient by telephone to perform Ambulatory Care Coordination. Verified name and  (PHI) with patient as identifiers. Provided introduction to self, and explanation of the Ambulatory Care Manager's role. Reviewed most recent clinic visit w/ patient who verbalized understanding. Patient given an opportunity to ask questions. Notes / Challenges    Spoke with patient - Patient states doing well at present   [place] documentation on chart  Further / follow up appointments listed below - reviewed upcoming appointments  1. Further questions answered as needed and patient has ACM contact information  2. GI referral remains active - Referred to [place] [id number] office , but office does not accept patient's insurance. patient calling a different provider. ACM offered assistance if necessary  3. Respiratory and cardiac status shows no issues at present  4. FBS 91 this AM  5. Medication reconciliation done at this encounter with patient. Shows understanding of medication therapy  6. Diabetic issues reviewed with patient: low cholesterol diet, weight control and daily exercise discussed and home glucose monitoring emphasized. 7.   Hypertension issues reviewed with pt: instructed to always take BP medication prior to office visits  8. Will continue to follow patient per AC protocol     The patient agrees to contact the PCP office or the [address] [place] [address] for questions related to their healthcare. Provided contact information for future reference. Disease Specific:   N/A    Home Health Active: No    DME Active: No    Barriers to care?  depression, level of motivation, medication management    Advance Care Planning:   Does patient have an Advance Directive:  reviewed and current     Medication(s):   Medication reconciliation was not performed with patient, who verbalizes understanding of administration of home medications. There were no barriers to obtaining medications identified at this time. Current Outpatient Medications   Medication Sig    0.9% sodium chloride solp 100 mL with iron sucrose 20 MG/ mg.    lactulose (CHRONULAC) 10 gram/15 mL solution lactulose 10 gram/15 mL oral solution (Patient not taking: Reported on 2/23/2022)    Accu-Chek Evelin Plus test strp strip     calcitRIOL (ROCALTROL) 0.25 mcg capsule Take  by mouth. (Patient not taking: Reported on 2/23/2022)    cinacalcet (SENSIPAR) 30 mg tablet cinacalcet 30 mg tablet (Patient not taking: Reported on 2/23/2022)    b complex-vitamin c-folic acid 0.8 mg (Angelica-Osito) 0.8 mg tab tablet     gentamicin (GARAMYCIN) 0.1 % topical cream gentamicin 0.1 % topical cream   APPLY TO EXIT SITE DAILY    hydrALAZINE (APRESOLINE) 50 mg tablet     methoxy peg-epoetin beta (MIRCERA INJECTION) 100 mcg by SubCUTAneous route.  omeprazole (PRILOSEC) 40 mg capsule omeprazole 40 mg capsule,delayed release   1 cap po qd prn (Patient not taking: Reported on 2/23/2022)    predniSONE (DELTASONE) 20 mg tablet Take 1 Tablet by mouth. (Patient not taking: Reported on 2/23/2022)    trimethoprim-sulfamethoxazole (Bactrim)  mg per tablet Take 1 Tablet by mouth once over twenty-four (24) hours.  tamsulosin (FLOMAX) 0.4 mg capsule Take 1 Capsule by mouth daily.  ondansetron (ZOFRAN ODT) 4 mg disintegrating tablet Take 1 Tablet by mouth every eight (8) hours as needed for Nausea or Vomiting for up to 12 doses. (Patient not taking: Reported on 2/23/2022)    trimethoprim-sulfamethoxazole (Bactrim)  mg per tablet Take 1 Tablet by mouth once over twenty-four (24) hours.  docusate sodium (Colace) 100 mg capsule Take  by mouth.  (Patient not taking: Reported on 2/23/2022)    rosuvastatin (CRESTOR) 40 mg tablet TAKE 1 TABLET EVERY NIGHT    losartan (COZAAR) 50 mg tablet TAKE 1 TABLET EVERY DAY    metoprolol succinate (TOPROL-XL) 50 mg XL tablet TAKE 1 TABLET EVERY DAY    polyethylene glycol (MIRALAX) 17 gram/dose powder DISSOLVE THE CONTENTS OF ONE CAPFUL IN 8 OUNCES OF WATER AND DRINK BY MOUTH DAILY (Patient not taking: Reported on 2/23/2022)    sucroferric oxyhydroxide (VELPHORO PO) Take  by mouth.  insulin aspart U-100 (NovoLOG Flexpen U-100 Insulin) 100 unit/mL (3 mL) inpn by SubCUTAneous route.  amLODIPine (NORVASC) 10 mg tablet TAKE 1 TABLET BY MOUTH DAILY    cloNIDine HCl (CATAPRES) 0.2 mg tablet TK 1 T PO QHS FOR BLOOD PRESSURE    furosemide (LASIX) 20 mg tablet Take 20 mg by mouth BID Mon Wed & Fri.    insulin glargine (LANTUS SOLOSTAR) 100 unit/mL (3 mL) pen 24 Units by SubCUTAneous route daily. No current facility-administered medications for this visit. [facility] follow up appointment(s): No future appointments. Goals Addressed                 This Visit's Progress     Establish PCP relationships and regularly scheduled appointments.    On track     Prepare patients and caregivers for end of life decisions (ie. need for hospice, pain management, symptom relief, advance directives etc.)   On track     Patient has ACP       Take all medications as ordered   On track

## 2022-03-14 ENCOUNTER — PATIENT OUTREACH (OUTPATIENT)
Dept: CASE MANAGEMENT | Age: 82
End: 2022-03-14

## 2022-03-15 ENCOUNTER — PATIENT OUTREACH (OUTPATIENT)
Dept: CASE MANAGEMENT | Age: 82
End: 2022-03-15

## 2022-03-15 NOTE — PROGRESS NOTES
Patient: Eryn Skelton discussed during interdisciplinary rounds 3/15/2022 to optimize plan of care. Patient with a past medical history of   Past Medical History:   Diagnosis Date    Allergic rhinitis     Chronic kidney disease     stage 5    Diabetes ([address]     H/O seasonal allergies     Hypertension     Left foot drop, chronic since 2004 fusion sx     Peritoneal dialysis catheter in place [facility])    . In attendance Nickolas Crigler, MD, Maximus Lantigua RN, Nolan Da Silva. Serge Recommendations from the team: follow up regarding GI practice that accepts patient's insurance, Need Yearly wellness check. Ambulatory  will continue to follow.   Maximus Lantigua RN

## 2022-03-18 PROBLEM — M21.372 LEFT FOOT DROP: Status: ACTIVE | Noted: 2018-09-12

## 2022-03-18 PROBLEM — K59.00 CONSTIPATION: Status: ACTIVE | Noted: 2018-02-02

## 2022-03-18 PROBLEM — E66.01 SEVERE OBESITY (BMI 35.0-39.9) WITH COMORBIDITY (HCC): Status: ACTIVE | Noted: 2018-04-19

## 2022-03-19 PROBLEM — M53.3 SACROILIAC JOINT DYSFUNCTION OF RIGHT SIDE: Status: ACTIVE | Noted: 2018-09-12

## 2022-03-19 PROBLEM — E11.21 TYPE 2 DIABETES MELLITUS WITH NEPHROPATHY (HCC): Status: ACTIVE | Noted: 2017-12-21

## 2022-03-19 PROBLEM — N18.5 CKD (CHRONIC KIDNEY DISEASE) STAGE 5, GFR LESS THAN 15 ML/MIN (HCC): Status: ACTIVE | Noted: 2019-09-26

## 2022-03-19 PROBLEM — R11.0 NAUSEA: Status: ACTIVE | Noted: 2018-02-02

## 2022-03-19 PROBLEM — M53.3 SI (SACROILIAC) JOINT DYSFUNCTION: Status: ACTIVE | Noted: 2018-09-12

## 2022-03-19 PROBLEM — M47.816 LUMBAR SPONDYLOSIS: Status: ACTIVE | Noted: 2018-09-12

## 2022-03-19 PROBLEM — R10.13 DYSPEPSIA: Status: ACTIVE | Noted: 2018-02-02

## 2022-03-20 PROBLEM — N25.81 SECONDARY HYPERPARATHYROIDISM OF RENAL ORIGIN (HCC): Status: ACTIVE | Noted: 2021-01-09

## 2022-03-23 ENCOUNTER — PATIENT OUTREACH (OUTPATIENT)
Dept: CASE MANAGEMENT | Age: 82
End: 2022-03-23

## 2022-03-23 NOTE — PROGRESS NOTES
Date/Time:  3/23/2022 1:14 PM    Method of communication with patient:phone    [facility] ([facility]) contacted the patient by telephone to perform Ambulatory Care Coordination. Verified name and  (PHI) with patient as identifiers. Provided introduction to self, and explanation of the Ambulatory Care Manager's role. Reviewed most recent clinic visit w/ patient who verbalized understanding. Patient given an opportunity to ask questions. Notes / Challenges    Spoke with patient - Patient states doing well at present   [facility] documentation on chart  Further / follow up appointments listed below - reviewed upcoming appointments  1. [facility] obtained appointment for wellness check for 22 @6460 - patient aware  2. Further questions answered as needed and patient has [facility] contact information  3. Respiratory and cardiac status shows no issues at present  4.  this AM  5. Medication reconciliation review done at this encounter with patient. Shows understanding of medication therapy  6. Diabetic issues reviewed with patient: low cholesterol diet, weight control and daily exercise discussed and home glucose monitoring emphasized. 7.   Hypertension issues reviewed with pt: instructed to always take BP medication prior to office visits  8. Will continue to follow patient per [facility] protocol     The patient agrees to contact the PCP office or the [address] [place] [address] for questions related to their healthcare. Provided contact information for future reference. Disease Specific:   N/A    Home Health Active: No    DME Active: No    Barriers to care? depression, level of motivation, medication management    Advance Care Planning:   Does patient have an Advance Directive:  reviewed and current     Medication(s):   Medication reconciliation was performed with patient, who verbalizes understanding of administration of home medications. There were no barriers to obtaining medications identified at this time. Current Outpatient Medications   Medication Sig    0.9% sodium chloride solp 100 mL with iron sucrose 20 MG/ mg.    lactulose (CHRONULAC) 10 gram/15 mL solution lactulose 10 gram/15 mL oral solution (Patient not taking: Reported on 2/23/2022)    Accu-Chek Evelin Plus test strp strip     calcitRIOL (ROCALTROL) 0.25 mcg capsule Take  by mouth. (Patient not taking: Reported on 2/23/2022)    cinacalcet (SENSIPAR) 30 mg tablet cinacalcet 30 mg tablet (Patient not taking: Reported on 2/23/2022)    b complex-vitamin c-folic acid 0.8 mg (Angelica-Osito) 0.8 mg tab tablet     gentamicin (GARAMYCIN) 0.1 % topical cream gentamicin 0.1 % topical cream   APPLY TO EXIT SITE DAILY    hydrALAZINE (APRESOLINE) 50 mg tablet     methoxy peg-epoetin beta (MIRCERA INJECTION) 100 mcg by SubCUTAneous route.  omeprazole (PRILOSEC) 40 mg capsule omeprazole 40 mg capsule,delayed release   1 cap po qd prn (Patient not taking: Reported on 2/23/2022)    predniSONE (DELTASONE) 20 mg tablet Take 1 Tablet by mouth. (Patient not taking: Reported on 2/23/2022)    trimethoprim-sulfamethoxazole (Bactrim)  mg per tablet Take 1 Tablet by mouth once over twenty-four (24) hours.  tamsulosin (FLOMAX) 0.4 mg capsule Take 1 Capsule by mouth daily.  ondansetron (ZOFRAN ODT) 4 mg disintegrating tablet Take 1 Tablet by mouth every eight (8) hours as needed for Nausea or Vomiting for up to 12 doses. (Patient not taking: Reported on 2/23/2022)    trimethoprim-sulfamethoxazole (Bactrim)  mg per tablet Take 1 Tablet by mouth once over twenty-four (24) hours.  docusate sodium (Colace) 100 mg capsule Take  by mouth.  (Patient not taking: Reported on 2/23/2022)    rosuvastatin (CRESTOR) 40 mg tablet TAKE 1 TABLET EVERY NIGHT    losartan (COZAAR) 50 mg tablet TAKE 1 TABLET EVERY DAY    metoprolol succinate (TOPROL-XL) 50 mg XL tablet TAKE 1 TABLET EVERY DAY    polyethylene glycol (MIRALAX) 17 gram/dose powder DISSOLVE THE CONTENTS OF ONE CAPFUL IN 8 OUNCES OF WATER AND DRINK BY MOUTH DAILY (Patient not taking: Reported on 2/23/2022)    sucroferric oxyhydroxide (VELPHORO PO) Take  by mouth.  insulin aspart U-100 (NovoLOG Flexpen U-100 Insulin) 100 unit/mL (3 mL) inpn by SubCUTAneous route.  amLODIPine (NORVASC) 10 mg tablet TAKE 1 TABLET BY MOUTH DAILY    cloNIDine HCl (CATAPRES) 0.2 mg tablet TK 1 T PO QHS FOR BLOOD PRESSURE    furosemide (LASIX) 20 mg tablet Take 20 mg by mouth BID Mon Wed & Fri.    insulin glargine (LANTUS SOLOSTAR) 100 unit/mL (3 mL) pen 24 Units by SubCUTAneous route daily. No current facility-administered medications for this visit. [facility] follow up appointment(s):   Future Appointments   Date Time Provider Slick Porter   4/20/2022 12:20 PM Veronica Chau MD [facility]          Goals Addressed                 This Visit's Progress     Establish PCP relationships and regularly scheduled appointments.    On track     Prepare patients and caregivers for end of life decisions (ie. need for hospice, pain management, symptom relief, advance directives etc.)   On track     Patient has ACP       Take all medications as ordered   On track

## 2022-03-24 ENCOUNTER — PATIENT OUTREACH (OUTPATIENT)
Dept: CASE MANAGEMENT | Age: 82
End: 2022-03-24

## 2022-04-06 ENCOUNTER — PATIENT OUTREACH (OUTPATIENT)
Dept: CASE MANAGEMENT | Age: 82
End: 2022-04-06

## 2022-04-06 NOTE — PROGRESS NOTES
Date/Time:  2022 1:14 PM    Method of communication with patient:phone    [facility] ([facility]) contacted the patient by telephone to perform Ambulatory Care Coordination. Verified name and  (PHI) with patient as identifiers. Provided introduction to self, and explanation of the Ambulatory Care Manager's role. Reviewed most recent clinic visit w/ patient who verbalized understanding. Patient given an opportunity to ask questions. Notes / Challenges    Spoke with patient - Patient states doing well at present   Patient complaining of night sweats since February. Patient has bedtime snack consisting of either Jello or applesauce. Advised patients to add protein such as peanut butter to her evening snack to avoid a drop in blood sugar during the evening. Patient states she also suffers from seasonal allergies which could possibly be the cause of the night sweats. PHI documentation on chart  Further / follow up appointments listed below - reviewed upcoming appointments  1. [facility] obtained appointment for wellness check for 22 @5855 - patient aware  2. Further questions answered as needed and patient has [facility] contact information  3. Respiratory and cardiac status shows no issues at present  4.  this AM  -  128/69  5. Medication reconciliation review done at this encounter with patient. Shows understanding of medication therapy  6. Diabetic issues reviewed with patient: low cholesterol diet, weight control and daily exercise discussed and home glucose monitoring emphasized. 7.   Hypertension issues reviewed with pt: instructed to always take BP medication prior to office visits  8. Will continue to follow patient per [facility] protocol     The patient agrees to contact the PCP office or the [address] [place] [address] for questions related to their healthcare. Provided contact information for future reference. Disease Specific:   N/A    Home Health Active: No    DME Active: No    Barriers to care? depression, level of motivation, medication management    Advance Care Planning:   Does patient have an Advance Directive:  reviewed and current     Medication(s):   Medication reconciliation was performed with patient, who verbalizes understanding of administration of home medications. There were no barriers to obtaining medications identified at this time. Current Outpatient Medications   Medication Sig    0.9% sodium chloride solp 100 mL with iron sucrose 20 MG/ mg.    lactulose (CHRONULAC) 10 gram/15 mL solution lactulose 10 gram/15 mL oral solution (Patient not taking: Reported on 2/23/2022)    Accu-Chek Evelin Plus test strp strip     calcitRIOL (ROCALTROL) 0.25 mcg capsule Take  by mouth. (Patient not taking: Reported on 2/23/2022)    cinacalcet (SENSIPAR) 30 mg tablet cinacalcet 30 mg tablet (Patient not taking: Reported on 2/23/2022)    b complex-vitamin c-folic acid 0.8 mg (Angelica-Osito) 0.8 mg tab tablet     gentamicin (GARAMYCIN) 0.1 % topical cream gentamicin 0.1 % topical cream   APPLY TO EXIT SITE DAILY    hydrALAZINE (APRESOLINE) 50 mg tablet     methoxy peg-epoetin beta (MIRCERA INJECTION) 100 mcg by SubCUTAneous route.  omeprazole (PRILOSEC) 40 mg capsule omeprazole 40 mg capsule,delayed release   1 cap po qd prn (Patient not taking: Reported on 2/23/2022)    predniSONE (DELTASONE) 20 mg tablet Take 1 Tablet by mouth. (Patient not taking: Reported on 2/23/2022)    trimethoprim-sulfamethoxazole (Bactrim)  mg per tablet Take 1 Tablet by mouth once over twenty-four (24) hours.  tamsulosin (FLOMAX) 0.4 mg capsule Take 1 Capsule by mouth daily.  ondansetron (ZOFRAN ODT) 4 mg disintegrating tablet Take 1 Tablet by mouth every eight (8) hours as needed for Nausea or Vomiting for up to 12 doses. (Patient not taking: Reported on 2/23/2022)    trimethoprim-sulfamethoxazole (Bactrim)  mg per tablet Take 1 Tablet by mouth once over twenty-four (24) hours.     docusate sodium (Colace) 100 mg capsule Take  by mouth. (Patient not taking: Reported on 2/23/2022)    rosuvastatin (CRESTOR) 40 mg tablet TAKE 1 TABLET EVERY NIGHT    losartan (COZAAR) 50 mg tablet TAKE 1 TABLET EVERY DAY    metoprolol succinate (TOPROL-XL) 50 mg XL tablet TAKE 1 TABLET EVERY DAY    polyethylene glycol (MIRALAX) 17 gram/dose powder DISSOLVE THE CONTENTS OF ONE CAPFUL IN 8 OUNCES OF WATER AND DRINK BY MOUTH DAILY (Patient not taking: Reported on 2/23/2022)    sucroferric oxyhydroxide (VELPHORO PO) Take  by mouth.  insulin aspart U-100 (NovoLOG Flexpen U-100 Insulin) 100 unit/mL (3 mL) inpn by SubCUTAneous route.  amLODIPine (NORVASC) 10 mg tablet TAKE 1 TABLET BY MOUTH DAILY    cloNIDine HCl (CATAPRES) 0.2 mg tablet TK 1 T PO QHS FOR BLOOD PRESSURE    furosemide (LASIX) 20 mg tablet Take 20 mg by mouth BID Mon Wed & Fri.    insulin glargine (LANTUS SOLOSTAR) 100 unit/mL (3 mL) pen 24 Units by SubCUTAneous route daily. No current facility-administered medications for this visit. [facility] follow up appointment(s):   Future Appointments   Date Time Provider Slick Porter   4/20/2022 12:20 PM Hakeem Pang MD [facility]          Goals Addressed                 This Visit's Progress     Establish PCP relationships and regularly scheduled appointments.    On track     Prepare patients and caregivers for end of life decisions (ie. need for hospice, pain management, symptom relief, advance directives etc.)   On track     Patient has ACP       Take all medications as ordered   On track

## 2022-04-14 ENCOUNTER — PATIENT OUTREACH (OUTPATIENT)
Dept: CASE MANAGEMENT | Age: 82
End: 2022-04-14

## 2022-04-14 NOTE — PROGRESS NOTES
Date/Time:  2022 1:14 PM    Method of communication with patient:phone    [facility] ([facility]) contacted the patient by telephone to perform Ambulatory Care Coordination. Verified name and  (PHI) with patient as identifiers. Provided introduction to self, and explanation of the Ambulatory Care Manager's role. Reviewed most recent clinic visit w/ patient who verbalized understanding. Patient given an opportunity to ask questions. Notes / Challenges    Received call from patient stating she hurt her back yesterday pulling weeds in the garden. She has tried Tylenol for pain relief without success. unable to take other pain meds due to peritoneal dialysis. [facility] called PCP to see if other medication could be called into patients pharmacy. Pain located in area of hip not in the flank area. patient has tried heat to affected area. The patient agrees to contact the PCP office or the [address] [place] [address] for questions related to their healthcare. Provided contact information for future reference. Disease Specific:   N/A    Home Health Active: No    DME Active: No    Barriers to care? depression, level of motivation, medication management    Advance Care Planning:   Does patient have an Advance Directive:  reviewed and current     Medication(s):   Medication reconciliation was performed with patient, who verbalizes understanding of administration of home medications. There were no barriers to obtaining medications identified at this time. Current Outpatient Medications   Medication Sig    0.9% sodium chloride solp 100 mL with iron sucrose 20 MG/ mg.    lactulose (CHRONULAC) 10 gram/15 mL solution lactulose 10 gram/15 mL oral solution (Patient not taking: Reported on 2022)    Accu-Chek Evelin Plus test strp strip     calcitRIOL (ROCALTROL) 0.25 mcg capsule Take  by mouth.  (Patient not taking: Reported on 2022)    cinacalcet (SENSIPAR) 30 mg tablet cinacalcet 30 mg tablet (Patient not taking: Reported on 2/23/2022)    b complex-vitamin c-folic acid 0.8 mg (Angelica-Osito) 0.8 mg tab tablet     gentamicin (GARAMYCIN) 0.1 % topical cream gentamicin 0.1 % topical cream   APPLY TO EXIT SITE DAILY    hydrALAZINE (APRESOLINE) 50 mg tablet     methoxy peg-epoetin beta (MIRCERA INJECTION) 100 mcg by SubCUTAneous route.  omeprazole (PRILOSEC) 40 mg capsule omeprazole 40 mg capsule,delayed release   1 cap po qd prn (Patient not taking: Reported on 2/23/2022)    predniSONE (DELTASONE) 20 mg tablet Take 1 Tablet by mouth. (Patient not taking: Reported on 2/23/2022)    trimethoprim-sulfamethoxazole (Bactrim)  mg per tablet Take 1 Tablet by mouth once over twenty-four (24) hours.  tamsulosin (FLOMAX) 0.4 mg capsule Take 1 Capsule by mouth daily.  ondansetron (ZOFRAN ODT) 4 mg disintegrating tablet Take 1 Tablet by mouth every eight (8) hours as needed for Nausea or Vomiting for up to 12 doses. (Patient not taking: Reported on 2/23/2022)    trimethoprim-sulfamethoxazole (Bactrim)  mg per tablet Take 1 Tablet by mouth once over twenty-four (24) hours.  docusate sodium (Colace) 100 mg capsule Take  by mouth. (Patient not taking: Reported on 2/23/2022)    rosuvastatin (CRESTOR) 40 mg tablet TAKE 1 TABLET EVERY NIGHT    losartan (COZAAR) 50 mg tablet TAKE 1 TABLET EVERY DAY    metoprolol succinate (TOPROL-XL) 50 mg XL tablet TAKE 1 TABLET EVERY DAY    polyethylene glycol (MIRALAX) 17 gram/dose powder DISSOLVE THE CONTENTS OF ONE CAPFUL IN 8 OUNCES OF WATER AND DRINK BY MOUTH DAILY (Patient not taking: Reported on 2/23/2022)    sucroferric oxyhydroxide (VELPHORO PO) Take  by mouth.  insulin aspart U-100 (NovoLOG Flexpen U-100 Insulin) 100 unit/mL (3 mL) inpn by SubCUTAneous route.     amLODIPine (NORVASC) 10 mg tablet TAKE 1 TABLET BY MOUTH DAILY    cloNIDine HCl (CATAPRES) 0.2 mg tablet TK 1 T PO QHS FOR BLOOD PRESSURE    furosemide (LASIX) 20 mg tablet Take 20 mg by mouth BID Mon Wed & Fri.    insulin glargine (LANTUS SOLOSTAR) 100 unit/mL (3 mL) pen 24 Units by SubCUTAneous route daily. No current facility-administered medications for this visit. [facility] follow up appointment(s):   Future Appointments   Date Time Provider Slick [place]   4/20/2022 12:20 PM Gretchen Jackson MD [facility] AMB          Goals Addressed                 This Visit's Progress     Establish PCP relationships and regularly scheduled appointments.    On track     Prepare patients and caregivers for end of life decisions (ie. need for hospice, pain management, symptom relief, advance directives etc.)   On track     Patient has ACP       Take all medications as ordered   On track

## 2022-04-19 ENCOUNTER — TELEPHONE (OUTPATIENT)
Dept: FAMILY MEDICINE CLINIC | Age: 82
End: 2022-04-19

## 2022-04-19 NOTE — TELEPHONE ENCOUNTER
----- Message from Mylakilo Jeongy sent at 4/14/2022  3:06 PM EDT -----  Subject: Message to Provider    QUESTIONS  Information for Provider? Morgan remy care manger called in and stated pt hurt   her back yesterday and want to know if provider can call in something for   pain stated that tylenol is not working . Pharmacy -[facility]   ---------------------------------------------------------------------------  --------------  Kaur Yarbrough INFO  What is the best way for the office to contact you? OK to leave message on   voicemail  Preferred Call Back Phone Number? [phone]  ---------------------------------------------------------------------------  --------------  SCRIPT ANSWERS  Relationship to Patient? Third Party  Third Party Type?  Other  Other Third Party Type? care manger   Representative Name? Viktor Carrera

## 2022-04-21 ENCOUNTER — PATIENT OUTREACH (OUTPATIENT)
Dept: CASE MANAGEMENT | Age: 82
End: 2022-04-21

## 2022-04-21 NOTE — PROGRESS NOTES
Patient attended appointment with MITZI New on 4-19-22. Ambulatory Care Manager reviewed EMR and will follow up on next scheduled outreach    Patient attended appointment with Dr. Ryanne Knapp - Nephrology on 4-18-22. Ambulatory Care Manager reviewed EMR and will follow up on next scheduled outreach.

## 2022-04-22 ENCOUNTER — TELEPHONE (OUTPATIENT)
Dept: FAMILY MEDICINE CLINIC | Age: 82
End: 2022-04-22

## 2022-04-22 NOTE — TELEPHONE ENCOUNTER
Spoke with patient to r/s medicare well visit with pcp.  Appt details has been confirmed with patient

## 2022-05-03 ENCOUNTER — PATIENT OUTREACH (OUTPATIENT)
Dept: CASE MANAGEMENT | Age: 82
End: 2022-05-03

## 2022-05-03 NOTE — PROGRESS NOTES
Date/Time:  5/3/2022 1:14 PM    Method of communication with patient:phone    Ambulator Care Manager (ACM) contacted the patient by telephone to perform Ambulatory Care Coordination. Verified name and  (PHI) with patient as identifiers. Provided introduction to self, and explanation of the Ambulatory Care Manager's role. Reviewed most recent clinic visit w/ patient who verbalized understanding. Patient given an opportunity to ask questions. Notes / Challenges    Spoke with patient - Patient states doing well at present  Message sent to PCP to determine receipt of [employer] paperwork to allow medication refills. PHI documentation on chart  Further / follow up appointments listed below - reviewed upcoming appointments  1. [facility] obtained appointment for wellness check for 22 @[id number] - patient aware  2. Further questions answered as needed and patient has ACM contact information  3. Respiratory and cardiac status shows no issues at present  4.  this AM  -  128/69  5. Medication reconciliation review done at this encounter with patient. Shows understanding of medication therapy  6. Diabetic issues reviewed with patient: low cholesterol diet, weight control and daily exercise discussed and home glucose monitoring emphasized. 7.   Hypertension issues reviewed with pt: instructed to always take BP medication prior to office visits  8. Will continue to follow patient per [facility] protocol     The patient agrees to contact the PCP office or the [address] [place] [address] for questions related to their healthcare. Provided contact information for future reference. Disease Specific:   N/A    Home Health Active: No    DME Active: No    Barriers to care?  depression, level of motivation, medication management    Advance Care Planning:   Does patient have an Advance Directive:  reviewed and current     Medication(s):   Medication reconciliation was performed with patient, who verbalizes understanding of administration of home medications. There were no barriers to obtaining medications identified at this time. Current Outpatient Medications   Medication Sig    calcium acetate,phosphat bind, (PHOSLO) 667 mg cap Take 2 Tablets by mouth three (3) times daily.  Droplet Pen Needle 32 gauge x 5/32\" ndle     0.9% sodium chloride solp 100 mL with iron sucrose 20 MG/ mg.    lactulose (CHRONULAC) 10 gram/15 mL solution lactulose 10 gram/15 mL oral solution (Patient not taking: Reported on 2/23/2022)    Accu-Chek Evelin Plus test strp strip     calcitRIOL (ROCALTROL) 0.25 mcg capsule Take  by mouth. (Patient not taking: Reported on 2/23/2022)    cinacalcet (SENSIPAR) 30 mg tablet cinacalcet 30 mg tablet (Patient not taking: Reported on 2/23/2022)    b complex-vitamin c-folic acid 0.8 mg (Angelica-Osito) 0.8 mg tab tablet     gentamicin (GARAMYCIN) 0.1 % topical cream gentamicin 0.1 % topical cream   APPLY TO EXIT SITE DAILY    hydrALAZINE (APRESOLINE) 50 mg tablet     methoxy peg-epoetin beta (MIRCERA INJECTION) 100 mcg by SubCUTAneous route.  omeprazole (PRILOSEC) 40 mg capsule omeprazole 40 mg capsule,delayed release   1 cap po qd prn (Patient not taking: Reported on 2/23/2022)    predniSONE (DELTASONE) 20 mg tablet Take 1 Tablet by mouth. (Patient not taking: Reported on 2/23/2022)    trimethoprim-sulfamethoxazole (Bactrim)  mg per tablet Take 1 Tablet by mouth once over twenty-four (24) hours.  tamsulosin (FLOMAX) 0.4 mg capsule Take 1 Capsule by mouth daily.  ondansetron (ZOFRAN ODT) 4 mg disintegrating tablet Take 1 Tablet by mouth every eight (8) hours as needed for Nausea or Vomiting for up to 12 doses. (Patient not taking: Reported on 2/23/2022)    trimethoprim-sulfamethoxazole (Bactrim)  mg per tablet Take 1 Tablet by mouth once over twenty-four (24) hours.  docusate sodium (Colace) 100 mg capsule Take  by mouth.  (Patient not taking: Reported on 2/23/2022)    rosuvastatin (CRESTOR) 40 mg tablet TAKE 1 TABLET EVERY NIGHT    losartan (COZAAR) 50 mg tablet TAKE 1 TABLET EVERY DAY    metoprolol succinate (TOPROL-XL) 50 mg XL tablet TAKE 1 TABLET EVERY DAY    polyethylene glycol (MIRALAX) 17 gram/dose powder DISSOLVE THE CONTENTS OF ONE CAPFUL IN 8 OUNCES OF WATER AND DRINK BY MOUTH DAILY (Patient not taking: Reported on 2/23/2022)    sucroferric oxyhydroxide (VELPHORO PO) Take  by mouth.  insulin aspart U-100 (NovoLOG Flexpen U-100 Insulin) 100 unit/mL (3 mL) inpn by SubCUTAneous route.  amLODIPine (NORVASC) 10 mg tablet TAKE 1 TABLET BY MOUTH DAILY    cloNIDine HCl (CATAPRES) 0.2 mg tablet TK 1 T PO QHS FOR BLOOD PRESSURE    furosemide (LASIX) 20 mg tablet Take 20 mg by mouth BID Mon Wed & Fri.    insulin glargine (LANTUS SOLOSTAR) 100 unit/mL (3 mL) pen 24 Units by SubCUTAneous route daily. No current facility-administered medications for this visit. [facility] follow up appointment(s):   Future Appointments   Date Time Provider Slick Porter   5/27/2022  1:40 PM tUe Mora PA-C [address] [place] [address]   5/31/2022  3:40 PM Eliud Nevarez MD [facility] BS AMB   8/22/2022 12:20 PM Harrison Nova PT [facility]   8/29/2022  1:20 PM Harrison Nova PT [facility]          Goals Addressed                 This Visit's Progress     Establish PCP relationships and regularly scheduled appointments.    On track     Prepare patients and caregivers for end of life decisions (ie. need for hospice, pain management, symptom relief, advance directives etc.)   On track     Patient has ACP       Take all medications as ordered   On track

## 2022-05-03 NOTE — ACP (ADVANCE CARE PLANNING)
Advance Care Planning:   Does patient have an Advance Directive: yes; reviewed and current      Needs to be submitted to PCP and scanned into chart

## 2022-05-04 RX ORDER — METOPROLOL SUCCINATE 50 MG/1
TABLET, EXTENDED RELEASE ORAL
Qty: 90 TABLET | Refills: 1 | Status: SHIPPED | OUTPATIENT
Start: 2022-05-04 | End: 2022-10-11

## 2022-05-04 RX ORDER — ROSUVASTATIN CALCIUM 40 MG/1
TABLET, COATED ORAL
Qty: 90 TABLET | Refills: 1 | Status: SHIPPED | OUTPATIENT
Start: 2022-05-04

## 2022-05-09 ENCOUNTER — HOSPITAL ENCOUNTER (OUTPATIENT)
Dept: CT IMAGING | Age: 82
Discharge: HOME | End: 2022-05-09
Attending: PHYSICIAN ASSISTANT
Payer: MEDICARE

## 2022-05-09 DIAGNOSIS — N13.30 HYDRONEPHROSIS, LEFT: ICD-10-CM

## 2022-05-09 LAB — CREAT UR-MCNC: 10.2 MG/DL (ref 0.6–1.3)

## 2022-05-09 PROCEDURE — 82565 ASSAY OF CREATININE: CPT

## 2022-05-09 PROCEDURE — 74011000636 HC RX REV CODE- 636: Performed by: PHYSICIAN ASSISTANT

## 2022-05-09 PROCEDURE — 74178 CT ABD&PLV WO CNTR FLWD CNTR: CPT

## 2022-05-09 RX ADMIN — IOPAMIDOL 100 ML: 755 INJECTION, SOLUTION INTRAVENOUS at 12:42

## 2022-05-17 ENCOUNTER — PATIENT OUTREACH (OUTPATIENT)
Dept: CASE MANAGEMENT | Age: 82
End: 2022-05-17

## 2022-05-17 LAB — HBA1C MFR BLD HPLC: 5.1 %

## 2022-05-17 NOTE — PROGRESS NOTES
Date/Time:  2022 1:14 PM    Method of communication with patient:phone    [facility] ([facility]) contacted the patient by telephone to perform Ambulatory Care Coordination. Verified name and  (PHI) with patient as identifiers. Provided introduction to self, and explanation of the Ambulatory Care Manager's role. Reviewed most recent clinic visit w/ patient who verbalized understanding. Patient given an opportunity to ask questions. Notes / Challenges    · Spoke with patient - Patient states doing well at present. · appointment today with Dr. Jessika Freed - Endocinoogy - other appts listed below - Patient states no issues attenting appointment  · Depression screen done - PHQ2 score - 1   · PHI documentation on chart  · Further / follow up appointments listed below - reviewed upcoming appointments  · Further questions answered as needed and patient has [facility] contact information  · Respiratory and cardiac status shows no issues at present  · FBS 78 this AM    · Medication reconciliation review done at this encounter with patient. Shows understanding of medication therapy  ·  Diabetic issues reviewed with patient: low cholesterol diet, weight control and daily exercise discussed and home glucose monitoring emphasized. ·   Hypertension issues reviewed with pt: instructed to always take BP medication prior to office visits  ·  Will continue to follow patient per [facility] protocol     The patient agrees to contact the PCP office or the [facility] for questions related to their healthcare. Provided contact information for future reference. Disease Specific:   N/A    Home Health Active: No    DME Active: No    Barriers to care?  depression, level of motivation, medication management    Advance Care Planning:   Does patient have an Advance Directive:  reviewed and current     Medication(s):   Medication reconciliation was performed with patient, who verbalizes understanding of administration of home medications. There were no barriers to obtaining medications identified at this time. Current Outpatient Medications   Medication Sig    rosuvastatin (CRESTOR) 40 mg tablet TAKE 1 TABLET EVERY NIGHT    metoprolol succinate (TOPROL-XL) 50 mg XL tablet TAKE 1 TABLET EVERY DAY    calcium acetate,phosphat bind, (PHOSLO) 667 mg cap Take 2 Tablets by mouth three (3) times daily.  Droplet Pen Needle 32 gauge x 5/32\" ndle     0.9% sodium chloride solp 100 mL with iron sucrose 20 MG/ mg.    lactulose (CHRONULAC) 10 gram/15 mL solution lactulose 10 gram/15 mL oral solution (Patient not taking: Reported on 2/23/2022)    Accu-Chek Evelin Plus test strp strip     calcitRIOL (ROCALTROL) 0.25 mcg capsule Take  by mouth. (Patient not taking: Reported on 2/23/2022)    cinacalcet (SENSIPAR) 30 mg tablet cinacalcet 30 mg tablet (Patient not taking: Reported on 2/23/2022)    b complex-vitamin c-folic acid 0.8 mg (Angelica-Osito) 0.8 mg tab tablet     gentamicin (GARAMYCIN) 0.1 % topical cream gentamicin 0.1 % topical cream   APPLY TO EXIT SITE DAILY    hydrALAZINE (APRESOLINE) 50 mg tablet     methoxy peg-epoetin beta (MIRCERA INJECTION) 100 mcg by SubCUTAneous route.  omeprazole (PRILOSEC) 40 mg capsule omeprazole 40 mg capsule,delayed release   1 cap po qd prn (Patient not taking: Reported on 2/23/2022)    predniSONE (DELTASONE) 20 mg tablet Take 1 Tablet by mouth. (Patient not taking: Reported on 2/23/2022)    trimethoprim-sulfamethoxazole (Bactrim)  mg per tablet Take 1 Tablet by mouth once over twenty-four (24) hours.  tamsulosin (FLOMAX) 0.4 mg capsule Take 1 Capsule by mouth daily.  ondansetron (ZOFRAN ODT) 4 mg disintegrating tablet Take 1 Tablet by mouth every eight (8) hours as needed for Nausea or Vomiting for up to 12 doses.  (Patient not taking: Reported on 2/23/2022)    trimethoprim-sulfamethoxazole (Bactrim)  mg per tablet Take 1 Tablet by mouth once over twenty-four (24) hours.    docusate sodium (Colace) 100 mg capsule Take  by mouth. (Patient not taking: Reported on 2/23/2022)    losartan (COZAAR) 50 mg tablet TAKE 1 TABLET EVERY DAY    polyethylene glycol (MIRALAX) 17 gram/dose powder DISSOLVE THE CONTENTS OF ONE CAPFUL IN 8 OUNCES OF WATER AND DRINK BY MOUTH DAILY (Patient not taking: Reported on 2/23/2022)    sucroferric oxyhydroxide (VELPHORO PO) Take  by mouth.  insulin aspart U-100 (NovoLOG Flexpen U-100 Insulin) 100 unit/mL (3 mL) inpn by SubCUTAneous route.  amLODIPine (NORVASC) 10 mg tablet TAKE 1 TABLET BY MOUTH DAILY    cloNIDine HCl (CATAPRES) 0.2 mg tablet TK 1 T PO QHS FOR BLOOD PRESSURE    furosemide (LASIX) 20 mg tablet Take 20 mg by mouth BID Mon Wed & Fri.    insulin glargine (LANTUS SOLOSTAR) 100 unit/mL (3 mL) pen 24 Units by SubCUTAneous route daily. No current facility-administered medications for this visit. [facility] follow up appointment(s):   Future Appointments   Date Time Provider Slick Porter   5/27/2022  1:40 PM Winthrop Merlin, PA-C [place]   5/31/2022  3:40 PM Isabel Thompson MD [facility] AMB   8/22/2022 12:20 PM MIRIAN Acuna   8/29/2022  1:20 PM MIRIAN Acuna          Goals Addressed                 This Visit's Progress     Establish PCP relationships and regularly scheduled appointments.    On track     Prepare patients and caregivers for end of life decisions (ie. need for hospice, pain management, symptom relief, advance directives etc.)   On track     Patient has ACP       Take all medications as ordered   On track

## 2022-05-23 ENCOUNTER — APPOINTMENT (OUTPATIENT)
Dept: CT IMAGING | Age: 82
End: 2022-05-23
Attending: STUDENT IN AN ORGANIZED HEALTH CARE EDUCATION/TRAINING PROGRAM
Payer: MEDICARE

## 2022-05-23 ENCOUNTER — HOSPITAL ENCOUNTER (EMERGENCY)
Age: 82
Discharge: HOME | End: 2022-05-23
Attending: STUDENT IN AN ORGANIZED HEALTH CARE EDUCATION/TRAINING PROGRAM
Payer: MEDICARE

## 2022-05-23 ENCOUNTER — APPOINTMENT (OUTPATIENT)
Dept: GENERAL RADIOLOGY | Age: 82
End: 2022-05-23
Attending: STUDENT IN AN ORGANIZED HEALTH CARE EDUCATION/TRAINING PROGRAM
Payer: MEDICARE

## 2022-05-23 VITALS
TEMPERATURE: 98.4 F | BODY MASS INDEX: 31.16 KG/M2 | WEIGHT: 187 LBS | RESPIRATION RATE: 20 BRPM | SYSTOLIC BLOOD PRESSURE: 175 MMHG | HEIGHT: 65 IN | HEART RATE: 98 BPM | OXYGEN SATURATION: 96 % | DIASTOLIC BLOOD PRESSURE: 85 MMHG

## 2022-05-23 DIAGNOSIS — Z99.2 ESRD (END STAGE RENAL DISEASE) ON DIALYSIS (HCC): ICD-10-CM

## 2022-05-23 DIAGNOSIS — B34.9 VIRAL SYNDROME: Primary | ICD-10-CM

## 2022-05-23 DIAGNOSIS — K52.9 GASTROENTERITIS, ACUTE: ICD-10-CM

## 2022-05-23 DIAGNOSIS — N18.6 ESRD (END STAGE RENAL DISEASE) ON DIALYSIS (HCC): ICD-10-CM

## 2022-05-23 LAB
ALBUMIN SERPL-MCNC: 3.4 G/DL (ref 3.4–5)
ALBUMIN/GLOB SERPL: 0.9 {RATIO} (ref 0.8–1.7)
ALP SERPL-CCNC: 87 U/L (ref 45–117)
ALT SERPL-CCNC: 28 U/L (ref 13–56)
ANION GAP SERPL CALC-SCNC: 10 MMOL/L (ref 3–18)
APPEARANCE FLD: CLEAR
APPEARANCE UR: CLEAR
AST SERPL-CCNC: 27 U/L (ref 10–38)
BACTERIA URNS QL MICRO: ABNORMAL /HPF
BASOPHILS # BLD: 0 K/UL (ref 0–0.1)
BASOPHILS NFR BLD: 0 % (ref 0–2)
BILIRUB SERPL-MCNC: 0.3 MG/DL (ref 0.2–1)
BILIRUB UR QL: NEGATIVE
BUN SERPL-MCNC: 38 MG/DL (ref 7–18)
BUN/CREAT SERPL: 4 (ref 12–20)
CALCIUM SERPL-MCNC: 10.1 MG/DL (ref 8.5–10.1)
CHLORIDE SERPL-SCNC: 106 MMOL/L (ref 100–111)
CO2 SERPL-SCNC: 23 MMOL/L (ref 21–32)
COLOR FLD: ABNORMAL
COLOR UR: YELLOW
CREAT SERPL-MCNC: 8.79 MG/DL (ref 0.6–1.3)
DIFFERENTIAL METHOD BLD: ABNORMAL
EOSINOPHIL # BLD: 0 K/UL (ref 0–0.4)
EOSINOPHIL NFR BLD: 0 % (ref 0–5)
EOSINOPHIL NFR FLD MANUAL: 0 %
EPITH CASTS URNS QL MICRO: ABNORMAL /LPF (ref 0–5)
ERYTHROCYTE [DISTWIDTH] IN BLOOD BY AUTOMATED COUNT: 13.1 % (ref 11.6–14.5)
EST. AVERAGE GLUCOSE BLD GHB EST-MCNC: 97 MG/DL
FLUAV RNA SPEC QL NAA+PROBE: NOT DETECTED
FLUBV RNA SPEC QL NAA+PROBE: NOT DETECTED
GLOBULIN SER CALC-MCNC: 3.8 G/DL (ref 2–4)
GLUCOSE BLD STRIP.AUTO-MCNC: 100 MG/DL (ref 70–110)
GLUCOSE BLD STRIP.AUTO-MCNC: 152 MG/DL (ref 70–110)
GLUCOSE FLD-MCNC: 330 MG/DL
GLUCOSE SERPL-MCNC: 220 MG/DL (ref 74–99)
GLUCOSE UR STRIP.AUTO-MCNC: 500 MG/DL
HBA1C MFR BLD: 5 % (ref 4.2–5.6)
HCT VFR BLD AUTO: 37.3 % (ref 35–45)
HGB BLD-MCNC: 12.1 G/DL (ref 12–16)
HGB UR QL STRIP: ABNORMAL
IMM GRANULOCYTES # BLD AUTO: 0.1 K/UL (ref 0–0.04)
IMM GRANULOCYTES NFR BLD AUTO: 1 % (ref 0–0.5)
KETONES UR QL STRIP.AUTO: NEGATIVE MG/DL
LDH FLD L TO P-CCNC: 16 U/L
LEUKOCYTE ESTERASE UR QL STRIP.AUTO: NEGATIVE
LIPASE SERPL-CCNC: 236 U/L (ref 73–393)
LYMPHOCYTES # BLD: 0.8 K/UL (ref 0.9–3.6)
LYMPHOCYTES NFR BLD: 10 % (ref 21–52)
LYMPHOCYTES NFR FLD: 67 %
MAGNESIUM SERPL-MCNC: 2.3 MG/DL (ref 1.6–2.6)
MCH RBC QN AUTO: 29.7 PG (ref 24–34)
MCHC RBC AUTO-ENTMCNC: 32.4 G/DL (ref 31–37)
MCV RBC AUTO: 91.4 FL (ref 78–100)
MONOCYTES # BLD: 0.5 K/UL (ref 0.05–1.2)
MONOCYTES NFR BLD: 6 % (ref 3–10)
MONOCYTES NFR FLD: 9 %
NEUTROPHILS NFR FLD: 17 % (ref 0–25)
NEUTS BAND # FLD: ABNORMAL %
NEUTS SEG # BLD: 6.6 K/UL (ref 1.8–8)
NEUTS SEG NFR BLD: 82 % (ref 40–73)
NITRITE UR QL STRIP.AUTO: NEGATIVE
NRBC # BLD: 0.05 K/UL (ref 0–0.01)
NRBC BLD-RTO: 0.6 PER 100 WBC
NUC CELL # FLD: 4 /CU MM
PH UR STRIP: >8.5 [PH] (ref 5–8)
PLATELET # BLD AUTO: 170 K/UL (ref 135–420)
PMV BLD AUTO: 10.6 FL (ref 9.2–11.8)
POTASSIUM SERPL-SCNC: 4 MMOL/L (ref 3.5–5.5)
PROT FLD-MCNC: <2 G/DL
PROT SERPL-MCNC: 7.2 G/DL (ref 6.4–8.2)
PROT UR STRIP-MCNC: 300 MG/DL
RBC # BLD AUTO: 4.08 M/UL (ref 4.2–5.3)
RBC # FLD: 9 /CU MM
RBC #/AREA URNS HPF: ABNORMAL /HPF (ref 0–5)
SARS-COV-2, COV2: NOT DETECTED
SODIUM SERPL-SCNC: 139 MMOL/L (ref 136–145)
SP GR UR REFRACTOMETRY: 1.01 (ref 1–1.03)
SPECIMEN SOURCE FLD: ABNORMAL
SPECIMEN SOURCE FLD: NORMAL
TROPONIN-HIGH SENSITIVITY: 28 NG/L (ref 0–54)
UROBILINOGEN UR QL STRIP.AUTO: 0.2 EU/DL (ref 0.2–1)
WBC # BLD AUTO: 8 K/UL (ref 4.6–13.2)
WBC URNS QL MICRO: ABNORMAL /HPF (ref 0–4)

## 2022-05-23 PROCEDURE — 71045 X-RAY EXAM CHEST 1 VIEW: CPT

## 2022-05-23 PROCEDURE — 81001 URINALYSIS AUTO W/SCOPE: CPT

## 2022-05-23 PROCEDURE — 83690 ASSAY OF LIPASE: CPT

## 2022-05-23 PROCEDURE — 89051 BODY FLUID CELL COUNT: CPT

## 2022-05-23 PROCEDURE — 83615 LACTATE (LD) (LDH) ENZYME: CPT

## 2022-05-23 PROCEDURE — 87636 SARSCOV2 & INF A&B AMP PRB: CPT

## 2022-05-23 PROCEDURE — 96365 THER/PROPH/DIAG IV INF INIT: CPT

## 2022-05-23 PROCEDURE — 82945 GLUCOSE OTHER FLUID: CPT

## 2022-05-23 PROCEDURE — 96361 HYDRATE IV INFUSION ADD-ON: CPT

## 2022-05-23 PROCEDURE — 84484 ASSAY OF TROPONIN QUANT: CPT

## 2022-05-23 PROCEDURE — 74011250636 HC RX REV CODE- 250/636: Performed by: STUDENT IN AN ORGANIZED HEALTH CARE EDUCATION/TRAINING PROGRAM

## 2022-05-23 PROCEDURE — 74011250637 HC RX REV CODE- 250/637: Performed by: STUDENT IN AN ORGANIZED HEALTH CARE EDUCATION/TRAINING PROGRAM

## 2022-05-23 PROCEDURE — 83036 HEMOGLOBIN GLYCOSYLATED A1C: CPT

## 2022-05-23 PROCEDURE — 96375 TX/PRO/DX INJ NEW DRUG ADDON: CPT

## 2022-05-23 PROCEDURE — 93005 ELECTROCARDIOGRAM TRACING: CPT

## 2022-05-23 PROCEDURE — 96376 TX/PRO/DX INJ SAME DRUG ADON: CPT

## 2022-05-23 PROCEDURE — 87015 SPECIMEN INFECT AGNT CONCNTJ: CPT

## 2022-05-23 PROCEDURE — 82962 GLUCOSE BLOOD TEST: CPT

## 2022-05-23 PROCEDURE — 80053 COMPREHEN METABOLIC PANEL: CPT

## 2022-05-23 PROCEDURE — 87070 CULTURE OTHR SPECIMN AEROBIC: CPT

## 2022-05-23 PROCEDURE — 99285 EMERGENCY DEPT VISIT HI MDM: CPT

## 2022-05-23 PROCEDURE — 74011000636 HC RX REV CODE- 636: Performed by: EMERGENCY MEDICINE

## 2022-05-23 PROCEDURE — 84157 ASSAY OF PROTEIN OTHER: CPT

## 2022-05-23 PROCEDURE — 85025 COMPLETE CBC W/AUTO DIFF WBC: CPT

## 2022-05-23 PROCEDURE — 74011636637 HC RX REV CODE- 636/637: Performed by: STUDENT IN AN ORGANIZED HEALTH CARE EDUCATION/TRAINING PROGRAM

## 2022-05-23 PROCEDURE — 74177 CT ABD & PELVIS W/CONTRAST: CPT

## 2022-05-23 PROCEDURE — 83735 ASSAY OF MAGNESIUM: CPT

## 2022-05-23 RX ORDER — INSULIN LISPRO 100 [IU]/ML
INJECTION, SOLUTION INTRAVENOUS; SUBCUTANEOUS EVERY 6 HOURS
Status: DISCONTINUED | OUTPATIENT
Start: 2022-05-23 | End: 2022-05-24 | Stop reason: HOSPADM

## 2022-05-23 RX ORDER — ONDANSETRON 2 MG/ML
4 INJECTION INTRAMUSCULAR; INTRAVENOUS ONCE
Status: COMPLETED | OUTPATIENT
Start: 2022-05-23 | End: 2022-05-23

## 2022-05-23 RX ORDER — LOPERAMIDE HYDROCHLORIDE 2 MG/1
2 CAPSULE ORAL
Qty: 10 CAPSULE | Refills: 0 | Status: SHIPPED | OUTPATIENT
Start: 2022-05-23 | End: 2022-09-20

## 2022-05-23 RX ORDER — MAGNESIUM SULFATE HEPTAHYDRATE 40 MG/ML
2 INJECTION, SOLUTION INTRAVENOUS ONCE
Status: COMPLETED | OUTPATIENT
Start: 2022-05-23 | End: 2022-05-23

## 2022-05-23 RX ORDER — HYDRALAZINE HYDROCHLORIDE 50 MG/1
50 TABLET, FILM COATED ORAL
Status: COMPLETED | OUTPATIENT
Start: 2022-05-23 | End: 2022-05-23

## 2022-05-23 RX ORDER — MAGNESIUM SULFATE 100 %
4 CRYSTALS MISCELLANEOUS AS NEEDED
Status: DISCONTINUED | OUTPATIENT
Start: 2022-05-23 | End: 2022-05-24 | Stop reason: HOSPADM

## 2022-05-23 RX ORDER — MAGNESIUM SULFATE HEPTAHYDRATE 40 MG/ML
2 INJECTION, SOLUTION INTRAVENOUS ONCE
Status: DISCONTINUED | OUTPATIENT
Start: 2022-05-23 | End: 2022-05-23

## 2022-05-23 RX ORDER — DEXTROSE MONOHYDRATE 100 MG/ML
0-250 INJECTION, SOLUTION INTRAVENOUS AS NEEDED
Status: DISCONTINUED | OUTPATIENT
Start: 2022-05-23 | End: 2022-05-24 | Stop reason: HOSPADM

## 2022-05-23 RX ORDER — CLONIDINE HYDROCHLORIDE 0.1 MG/1
0.2 TABLET ORAL
Status: DISCONTINUED | OUTPATIENT
Start: 2022-05-23 | End: 2022-05-24 | Stop reason: HOSPADM

## 2022-05-23 RX ORDER — ONDANSETRON 4 MG/1
4 TABLET, FILM COATED ORAL
Qty: 14 TABLET | Refills: 0 | Status: SHIPPED | OUTPATIENT
Start: 2022-05-23 | End: 2022-08-31 | Stop reason: ALTCHOICE

## 2022-05-23 RX ORDER — LOSARTAN POTASSIUM 50 MG/1
50 TABLET ORAL
Status: COMPLETED | OUTPATIENT
Start: 2022-05-23 | End: 2022-05-23

## 2022-05-23 RX ADMIN — Medication 2 UNITS: at 13:51

## 2022-05-23 RX ADMIN — SODIUM CHLORIDE 500 ML: 900 INJECTION, SOLUTION INTRAVENOUS at 16:05

## 2022-05-23 RX ADMIN — IOPAMIDOL 100 ML: 612 INJECTION, SOLUTION INTRAVENOUS at 17:55

## 2022-05-23 RX ADMIN — LOSARTAN POTASSIUM 50 MG: 50 TABLET, FILM COATED ORAL at 14:53

## 2022-05-23 RX ADMIN — HYDRALAZINE HYDROCHLORIDE 50 MG: 50 TABLET, FILM COATED ORAL at 14:53

## 2022-05-23 RX ADMIN — ONDANSETRON 4 MG: 2 INJECTION INTRAMUSCULAR; INTRAVENOUS at 14:11

## 2022-05-23 RX ADMIN — MAGNESIUM SULFATE 2 G: 2 INJECTION INTRAVENOUS at 14:53

## 2022-05-23 RX ADMIN — ONDANSETRON 4 MG: 2 INJECTION INTRAMUSCULAR; INTRAVENOUS at 10:06

## 2022-05-23 NOTE — ED NOTES
Spoke with Pauline Warner, charge nurse. Sample from peritoneal dialysis is needed. She will come to the ER to collect sample.

## 2022-05-23 NOTE — ED TRIAGE NOTES
Patient arrives to ED via EMS from home, patient c/o nausea, vomiting and diarrhea since midnight, hx htn, diabetes and idoes peritoneal dialysis at home. 4mg zofran given, 20 ga RAC.  FSBS 204

## 2022-05-23 NOTE — ED NOTES
Purposeful rounding completed:    Side rails up x 2:  YES  Bed in low position and wheels locked: YES  Call bell within reach: YES  Comfort addressed: YES    Toileting needs addressed: YES  Plan of care reviewed/updated with patient and or family members: YES  IV site assessed: YES  Pain assessed and addressed: YES, 0    Patient laying in bed, eyes closed, IV infusing. No respiratory distress observed.

## 2022-05-23 NOTE — ED NOTES
Assumed care of patient. Laying in bed, semi aviles position, heavy blanket covering legs and torso. White t-shirt is moist with sweat. Skin is warm and moist to touch. No respiratory distress observed. Alert and oriented x 4, endorses pain in abdomen, unable to describe it. Cardiac,SpO2 and blood pressure monitored. IV right AC. Will continue to monitor.

## 2022-05-23 NOTE — DISCHARGE INSTRUCTIONS
Make sure to take some nausea medication if needed and to return if you develop a fever, increased pain, or you are at all concerned.

## 2022-05-23 NOTE — ED NOTES
Assumed care, pt asleep, resting comfortably, asleep but arousable no complaints made at this time, to continue to monitor.  Attedned to

## 2022-05-23 NOTE — DIABETES MGMT
Diabetes/ Glycemic Control Plan of Care      Recommendations:   1.) correctional lispro insulin. Order obtained. 2.) add basal lantus insulin 10 units daily at bedtime if patient is admitted. 3.) include 3 carb choices when diet is ordered. 5/23/2022:  Seen patient with history diabetes on insulin at home and also on peritoneal dialysis  Was seen this morning in ED and noted that she presented with report of nausea, vomiting and diarrhea. Patient reported list of home diabetes meds:  Lantus pen insulin 24 units daily at bedtime and last taken on 5/22/2022  Novolog sliding scale pen insulin. Lab BG 5/23/2022: 220    Assessment:   DX:   1. Viral syndrome     2. Gastroenteritis, acute     3. ESRD (end stage renal disease) on dialysis ([facility] [address]        Fasting/ Morning blood glucose:   Lab Results   Component Value Date/Time    Glucose 220 (H) 05/23/2022 09:05 AM    Glucose (POC) 152 (H) 05/23/2022 01:19 PM    Glucose,  (H) 12/17/2019 08:13 AM     IV Fluids containing dextrose: None    Steroids:   None    Blood glucose values: Within target range (70-180mg/dL):  No.    Current insulin orders:   Correctional lispro insulin. Normal sensitivity dose    Total Daily Dose previous 24 hours: N/A. Patient presented to ED this morning, 5/23/2022    Current A1c:   Lab Results   Component Value Date/Time    Hemoglobin A1c 5.0 05/23/2022 09:05 AM    Hemoglobin A1c (POC) 6.9 07/11/2018 01:49 AM    Hemoglobin A1c, External 6.1 11/18/2021 12:00 AM      equivalent  to ave Blood Glucose of 97 mg/dl for 2-3 months prior to admission    Adequate glycemic control PTA: Yes. Nutrition/Diet:   Active Orders   There are no active orders of the following types: Diet. Meal Intake:  No data found. Supplement Intake:  No data found.     Home diabetes medications:  Verified with patient and she reported:  Lantus pen insulin 24 units daily at bedtime and last taken on 5/22/2022  Novolog sliding scale pen insulin  Key Antihyperglycemic Medications             insulin aspart U-100 (NovoLOG Flexpen U-100 Insulin) 100 unit/mL (3 mL) inpn by SubCUTAneous route. insulin glargine (LANTUS SOLOSTAR) 100 unit/mL (3 mL) pen 24 Units by SubCUTAneous route daily. Plan/Goals:   Blood glucose will be within target of 70 - 180 mg/dl within 72 hours  Reinforce dietary and medication compliance at home.        Education:  [] Refer to Diabetes Education Record                       [x] Education not indicated at this time     Lacie Avitia RN [facility]  Pager: [phone]

## 2022-05-23 NOTE — ED NOTES
Pt reports nausea. Pt family at bedside states, \"I'm not taking her home like this. \"  Provider updated and at bedside. Pt and family updated with recent results and plan of care.

## 2022-05-23 NOTE — ED NOTES
Purposeful rounding completed:    Side rails up x 2:  YES  Bed in low position and wheels locked: YES  Call bell within reach: YES  Comfort addressed: YES    Toileting needs addressed: YES  Plan of care reviewed/updated with patient and or family members: YES  IV site assessed: YES  Pain assessed and addressed: YES, 8    Patient laying in bed, eyes closed, IV infusing. Family at bedside. No respiratory distress observed.

## 2022-05-23 NOTE — ED NOTES
Purposeful rounding completed:    Side rails up x 2:  YES  Bed in low position and wheels locked: YES  Call bell within reach: YES  Comfort addressed: YES    Toileting needs addressed: YES  Plan of care reviewed/updated with patient and or family members: YES  IV site assessed: YES  Pain assessed and addressed: YES, 0    Patient laying in bed, eyes closed, softly snoring, IV infusing. No respiratory distress observed.

## 2022-05-23 NOTE — ED PROVIDER NOTES
EMERGENCY DEPARTMENT HISTORY AND PHYSICAL EXAM      Date: 5/23/2022  Patient Name: Rajiv Ochoa    History of Presenting Illness     Chief Complaint   Patient presents with    Nausea    Vomiting    Diarrhea       History (Context): Rajiv Ochoa is a 80 y.o. female with a past medical history significant for ESRD, hypertension, diabetes comes into the ED today due to viral syndrome symptoms. Patient states at around midnight she began to have multiple episodes of vomiting and diarrhea. She admits to worsening of her symptoms since onset. She does admit to associated nausea, fever, and chills. She denies any chest pain, dyspnea, numbness, tingling, or unilateral weakness. She denies taking medication for treatment of her symptoms prior to arrival.  Patient describes her symptoms as severe in quality. She denies any alleviating or exacerbating factors. PCP: Ashley Salomon MD    Current Outpatient Medications   Medication Sig Dispense Refill    rosuvastatin (CRESTOR) 40 mg tablet TAKE 1 TABLET EVERY NIGHT 90 Tablet 1    metoprolol succinate (TOPROL-XL) 50 mg XL tablet TAKE 1 TABLET EVERY DAY 90 Tablet 1    calcium acetate,phosphat bind, (PHOSLO) 667 mg cap Take 2 Tablets by mouth three (3) times daily.  Droplet Pen Needle 32 gauge x 5/32\" ndle       0.9% sodium chloride solp 100 mL with iron sucrose 20 MG/ mg.      lactulose (CHRONULAC) 10 gram/15 mL solution lactulose 10 gram/15 mL oral solution (Patient not taking: Reported on 2/23/2022)      Accu-Chek Evelin Plus test strp strip       calcitRIOL (ROCALTROL) 0.25 mcg capsule Take  by mouth.  (Patient not taking: Reported on 2/23/2022)      cinacalcet (SENSIPAR) 30 mg tablet cinacalcet 30 mg tablet (Patient not taking: Reported on 2/23/2022)      b complex-vitamin c-folic acid 0.8 mg (Angelica-Osito) 0.8 mg tab tablet       gentamicin (GARAMYCIN) 0.1 % topical cream gentamicin 0.1 % topical cream   APPLY TO EXIT SITE DAILY      hydrALAZINE (APRESOLINE) 50 mg tablet       methoxy peg-epoetin beta (MIRCERA INJECTION) 100 mcg by SubCUTAneous route.  omeprazole (PRILOSEC) 40 mg capsule omeprazole 40 mg capsule,delayed release   1 cap po qd prn (Patient not taking: Reported on 2/23/2022)      predniSONE (DELTASONE) 20 mg tablet Take 1 Tablet by mouth. (Patient not taking: Reported on 2/23/2022)      trimethoprim-sulfamethoxazole (Bactrim)  mg per tablet Take 1 Tablet by mouth once over twenty-four (24) hours. 14 Tablet 0    tamsulosin (FLOMAX) 0.4 mg capsule Take 1 Capsule by mouth daily. 90 Capsule 3    ondansetron (ZOFRAN ODT) 4 mg disintegrating tablet Take 1 Tablet by mouth every eight (8) hours as needed for Nausea or Vomiting for up to 12 doses. (Patient not taking: Reported on 2/23/2022) 12 Tablet 0    trimethoprim-sulfamethoxazole (Bactrim)  mg per tablet Take 1 Tablet by mouth once over twenty-four (24) hours. 14 Tablet 0    docusate sodium (Colace) 100 mg capsule Take  by mouth. (Patient not taking: Reported on 2/23/2022)      losartan (COZAAR) 50 mg tablet TAKE 1 TABLET EVERY DAY 90 Tablet 3    polyethylene glycol (MIRALAX) 17 gram/dose powder DISSOLVE THE CONTENTS OF ONE CAPFUL IN 8 OUNCES OF WATER AND DRINK BY MOUTH DAILY (Patient not taking: Reported on 2/23/2022) 510 g 3    sucroferric oxyhydroxide (VELPHORO PO) Take  by mouth.  insulin aspart U-100 (NovoLOG Flexpen U-100 Insulin) 100 unit/mL (3 mL) inpn by SubCUTAneous route.  amLODIPine (NORVASC) 10 mg tablet TAKE 1 TABLET BY MOUTH DAILY 90 Tab 6    cloNIDine HCl (CATAPRES) 0.2 mg tablet TK 1 T PO QHS FOR BLOOD PRESSURE  4    furosemide (LASIX) 20 mg tablet Take 20 mg by mouth BID Mon Wed & Fri.      insulin glargine (LANTUS SOLOSTAR) 100 unit/mL (3 mL) pen 24 Units by SubCUTAneous route daily.          Past History     Past Medical History:   Past Medical History:   Diagnosis Date    Allergic rhinitis     Chronic kidney disease stage 5    Diabetes ([facility] [address]     H/O seasonal allergies     Hypertension     Left foot drop, chronic since 2004 fusion sx     Peritoneal dialysis catheter in place [facility])        Past Surgical History:  Past Surgical History:   Procedure Laterality Date    HAND/FINGER SURGERY UNLISTED      HX CATARACT REMOVAL  2011    HX LUMBAR FUSION  2004-last sx    fused L2-S1, in [place]    HX TUBAL LIGATION      HX WISDOM TEETH EXTRACTION         Family History:  Family History   Problem Relation Age of Onset    Diabetes Maternal Aunt     Diabetes Maternal Uncle     Diabetes Maternal Grandfather     Diabetes Other     Hypertension Cousin        Social History:   Social History     Tobacco Use    Smoking status: Former Smoker     Types: Cigarettes    Smokeless tobacco: Never Used    Tobacco comment: quit when she was [age]   Substance Use Topics    Alcohol use: No    Drug use: No       Allergies: Allergies   Allergen Reactions    Sevelamer Diarrhea and Other (comments)    Aspirin Other (comments)     Ringing in ears    Keflex [Cephalexin] Rash    Sevelamer Carbonate Itching     With rash and sweatiness       PMH, PSH, family history, social history, allergies reviewed with the patient with significant items noted above. Review of Systems   Review of Systems   Constitutional: Positive for chills, fatigue and fever. HENT: Negative for sore throat. Eyes: Negative for visual disturbance. Respiratory: Negative for shortness of breath. Cardiovascular: Negative for chest pain. Gastrointestinal: Positive for abdominal pain, nausea and vomiting. Genitourinary: Negative for difficulty urinating. Musculoskeletal: Negative for myalgias. Skin: Negative for rash. Neurological: Positive for weakness. Negative for headaches. Physical Exam   There were no vitals filed for this visit. Physical Exam  Vitals and nursing note reviewed. Constitutional:       General: She is not in acute distress. Appearance: Normal appearance. She is ill-appearing. She is not toxic-appearing. HENT:      Head: Normocephalic and atraumatic. Mouth/Throat:      Mouth: Mucous membranes are moist.   Eyes:      General: No scleral icterus. Conjunctiva/sclera: Conjunctivae normal.   Cardiovascular:      Rate and Rhythm: Normal rate and regular rhythm. Comments: Normal peripheral perfusion  Pulmonary:      Effort: Pulmonary effort is normal. No respiratory distress. Abdominal:      General: There is no distension. Palpations: Abdomen is soft. Tenderness: There is abdominal tenderness (mild ttp to the abdomen diffusely ). There is no guarding or rebound. Musculoskeletal:         General: No deformity. Normal range of motion. Cervical back: Normal range of motion and neck supple. Skin:     General: Skin is warm and dry. Findings: No rash. Neurological:      General: No focal deficit present. Mental Status: She is alert and oriented to person, place, and time. Mental status is at baseline. Psychiatric:         Mood and Affect: Mood normal.         Thought Content: Thought content normal.         Diagnostic Study Results     Labs -   No results found for this or any previous visit (from the past 12 hour(s)). Labs Reviewed   CBC WITH AUTOMATED DIFF   METABOLIC PANEL, COMPREHENSIVE   TROPONIN-HIGH SENSITIVITY   LIPASE   MAGNESIUM       Radiologic Studies -   XR CHEST PORT    (Results Pending)     CT Results  (Last 48 hours)    None        CXR Results  (Last 48 hours)    None          The laboratory results, imaging results, and other diagnostic exams were reviewed in the EMR. Medical Decision Making   I am the first provider for this patient. I reviewed the vital signs, available nursing notes, past medical history, past surgical history, family history and social history. Vital Signs-Reviewed the patient's vital signs.     ED EKG interpretation:  Rhythm: normal sinus rhythm; and regular . Rate (approx.): 100; Axis: left axis deviation; P wave: normal; QRS interval: prolonged; ST/T wave: non-specific changes; Other findings: abnormal ekg. This EKG was interpreted by Valentino Harrell D.O. Records Reviewed: Personally, on initial evaluation    MDM:   Natasha Armando presents with complaint of Nausea, vomiting, and diarrhea  DDX includes but is not limited to: Viral syndrome, COVID, SBP    Patient overall ill-appearing, in no acute distress, and vital signs grossly within normal limits. Will obtain lab work and imaging for further evaluation of patients complaint. Will continue to monitor and evaluate patient while in the ED. Orders as below:  Orders Placed This Encounter    XR CHEST PORT    CBC WITH AUTOMATED DIFF    COMPREHENSIVE METABOLIC PANEL    TROPONIN-HIGH SENSITIVITY    LIPASE    MAGNESIUM    EKG, 12 LEAD, INITIAL        ED Course:   ED Course as of 05/23/22 1558   Mon May 23, 2022   0930 Patient's lab work consistent with ESRD but otherwise labs grossly within normal limits at this time. Negative for COVID and influenza. We will continue to monitor patient. [DV]   0605 Patient having episodes of emesis while here in the emergency department. Will provide patient with Zofran for further treatment. [DV]   1373 Patient's peritoneal fluid does not show any evidence of SBP. Otherwise labs grossly within normal limits. Will discharge patient home with return precautions and follow-up recommendations. Patient verbalized understanding is without any further questions. [DV]   2135 Upon discharge spoke with patient's daughter who believes patient is too sick to be taken home at this time. She continues to endorse nausea but has not had any further episodes of vomiting. Patient now slightly tachycardic. Daughter also concerned that patient taking a phosphorus binder which may be etiology for her symptoms. Will reach out to Dr. Libby George, Nephrology for further disposition. [DV]      ED Course User Index  [DV] Karina Flaherty DO       3:59 PM : Pt care transferred to Dr. Ranodlph Bettencourt  ,ED provider. History of patient complaint(s), available diagnostic reports and current treatment plan has been discussed thoroughly. Bedside rounding on patient occured : no . Intended disposition of patient : TBD  Pending diagnostics reports and/or labs (please list): Marisol Fair assistance in completion of this plan is greatly appreciated but it should be noted that I will be the provider of record for this patient. Procedures:  Procedures        Diagnosis and Disposition     CLINICAL IMPRESSION:  No diagnosis found. Current Discharge Medication List          Disposition: TBD      Dragon Disclaimer     Please note that this dictation was completed with Blued, the computer voice recognition software. Quite often unanticipated grammatical, syntax, homophones, and other interpretive errors are inadvertently transcribed by the computer software. Please disregard these errors. Please excuse any errors that have escaped final proofreading. Romeo PUGH.

## 2022-05-24 LAB
ATRIAL RATE: 100 BPM
CALCULATED P AXIS, ECG09: 64 DEGREES
CALCULATED R AXIS, ECG10: -53 DEGREES
CALCULATED T AXIS, ECG11: -4 DEGREES
DIAGNOSIS, 93000: NORMAL
P-R INTERVAL, ECG05: 172 MS
Q-T INTERVAL, ECG07: 416 MS
QRS DURATION, ECG06: 158 MS
QTC CALCULATION (BEZET), ECG08: 536 MS
VENTRICULAR RATE, ECG03: 100 BPM

## 2022-05-24 NOTE — ED NOTES
8:13 PM :Pt care assumed from Dr. Pia Morales , ED provider. Pt complaint(s), current treatment plan, progression and available diagnostic results have been discussed thoroughly. Rounding occurred: yes  Intended Disposition: DC if CT negative   Pending diagnostic reports and/or labs (please list): CT AP and PO challenge      The patient was signed out to me to follow the CT scan. The patient has history of peritoneal dialysis and came in with nausea vomiting and diarrhea. The symptoms seem to have improved and patient's labs were reassuring as well as the fluid that was reviewed by Dr. Pia Morales and Dr. Byron Reich. The CT scan shows some perinephric stranding however the patient has no evidence of pyuria on the urinalysis and will send urine culture. The patient has a soft abdomen and will reevaluate and if she tolerates p.o. consider outpatient care. Lance Guillaume DO 8:15 PM    Patient is tolerating p.o. and appears to be feeling better and resting comfortably. Patient has a mild ovation heart rate and blood pressure and will give her evening dose of clonidine. The patient will follow closely with her primary provider as well as her dialysis outpatient follow-up and return if at all worsened or concerned. Attempted to call the patient's daughter who is been with her majority of this day but she is no longer here. I was unable to get an answer on her cell phone. We will talk to her when she returns. Workup and recommendations were reviewed with the patient and all questions were answered. The patient understands the plan and will proceed with close outpatient care. I have encouraged the patient to return if at all worsened or concerned.    Lance Guillaume, DO 8:29 PM

## 2022-05-25 ENCOUNTER — PATIENT OUTREACH (OUTPATIENT)
Dept: CASE MANAGEMENT | Age: 82
End: 2022-05-25

## 2022-05-25 NOTE — PROGRESS NOTES
Date/Time:  2022 1:14 PM    Method of communication with patient:phone    [facility] ([facility]) contacted the patient by telephone to perform Ambulatory Care Coordination. Verified name and  (PHI) with patient as identifiers. Provided introduction to self, and explanation of the Ambulatory Care Manager's role. Reviewed most recent clinic visit w/ patient who verbalized understanding. Patient given an opportunity to ask questions. Notes / Challenges    · Spoke with patient - Patient states not doing as well at present. · New medication added to her dialysis that caused complications of N/V/D - unable to eat and required visit to ER  · Nepfrology aware of patient status from ER MD  · States she feels weak, but has improved since returning home from [facility] - ER  · Depression screen up date done - PHQ2 score - 2  · PHI documentation on chart  · Further / follow up appointments listed below - reviewed upcoming appointments  · Further questions answered as needed and patient has [facility] contact information  · Respiratory and cardiac status shows no issues at present  · Medication reconciliation review done at this encounter with patient. Shows understanding of medication therapy  ·  Will continue to follow patient per [facility] protocol     The patient agrees to contact the PCP office or the [facility] for questions related to their healthcare. Provided contact information for future reference. Disease Specific:   N/A    Home Health Active: No    DME Active: No    Barriers to care? depression, level of motivation, medication management    Advance Care Planning:   Does patient have an Advance Directive:  reviewed and current     Medication(s):   Medication reconciliation was performed with patient, who verbalizes understanding of administration of home medications. There were no barriers to obtaining medications identified at this time.          Current Outpatient Medications   Medication Sig    ondansetron hcl (Zofran) 4 mg tablet Take 1 Tablet by mouth every eight (8) hours as needed for Nausea.  loperamide (IMODIUM) 2 mg capsule Take 1 Capsule by mouth four (4) times daily as needed for Diarrhea for up to 10 doses.  rosuvastatin (CRESTOR) 40 mg tablet TAKE 1 TABLET EVERY NIGHT    metoprolol succinate (TOPROL-XL) 50 mg XL tablet TAKE 1 TABLET EVERY DAY    calcium acetate,phosphat bind, (PHOSLO) 667 mg cap Take 2 Tablets by mouth three (3) times daily.  Droplet Pen Needle 32 gauge x 5/32\" ndle     0.9% sodium chloride solp 100 mL with iron sucrose 20 MG/ mg.    lactulose (CHRONULAC) 10 gram/15 mL solution lactulose 10 gram/15 mL oral solution (Patient not taking: Reported on 2/23/2022)    Accu-Chek Evelin Plus test strp strip     calcitRIOL (ROCALTROL) 0.25 mcg capsule Take  by mouth. (Patient not taking: Reported on 2/23/2022)    cinacalcet (SENSIPAR) 30 mg tablet cinacalcet 30 mg tablet (Patient not taking: Reported on 2/23/2022)    b complex-vitamin c-folic acid 0.8 mg (Angelica-Osito) 0.8 mg tab tablet     gentamicin (GARAMYCIN) 0.1 % topical cream gentamicin 0.1 % topical cream   APPLY TO EXIT SITE DAILY    hydrALAZINE (APRESOLINE) 50 mg tablet     methoxy peg-epoetin beta (MIRCERA INJECTION) 100 mcg by SubCUTAneous route.  omeprazole (PRILOSEC) 40 mg capsule omeprazole 40 mg capsule,delayed release   1 cap po qd prn (Patient not taking: Reported on 2/23/2022)    predniSONE (DELTASONE) 20 mg tablet Take 1 Tablet by mouth. (Patient not taking: Reported on 2/23/2022)    trimethoprim-sulfamethoxazole (Bactrim)  mg per tablet Take 1 Tablet by mouth once over twenty-four (24) hours.  tamsulosin (FLOMAX) 0.4 mg capsule Take 1 Capsule by mouth daily.  ondansetron (ZOFRAN ODT) 4 mg disintegrating tablet Take 1 Tablet by mouth every eight (8) hours as needed for Nausea or Vomiting for up to 12 doses.  (Patient not taking: Reported on 2/23/2022)    trimethoprim-sulfamethoxazole (Bactrim)  mg per tablet Take 1 Tablet by mouth once over twenty-four (24) hours.  docusate sodium (Colace) 100 mg capsule Take  by mouth. (Patient not taking: Reported on 2/23/2022)    losartan (COZAAR) 50 mg tablet TAKE 1 TABLET EVERY DAY    polyethylene glycol (MIRALAX) 17 gram/dose powder DISSOLVE THE CONTENTS OF ONE CAPFUL IN 8 OUNCES OF WATER AND DRINK BY MOUTH DAILY (Patient not taking: Reported on 2/23/2022)    sucroferric oxyhydroxide (VELPHORO PO) Take  by mouth.  insulin aspart U-100 (NovoLOG Flexpen U-100 Insulin) 100 unit/mL (3 mL) inpn by SubCUTAneous route.  amLODIPine (NORVASC) 10 mg tablet TAKE 1 TABLET BY MOUTH DAILY    cloNIDine HCl (CATAPRES) 0.2 mg tablet TK 1 T PO QHS FOR BLOOD PRESSURE    furosemide (LASIX) 20 mg tablet Take 20 mg by mouth BID Mon Wed & Fri.    insulin glargine (LANTUS SOLOSTAR) 100 unit/mL (3 mL) pen 24 Units by SubCUTAneous route daily. No current facility-administered medications for this visit. [facility] follow up appointment(s):   Future Appointments   Date Time Provider Slick Porter   5/27/2022  1:40 PM BETH Zarate   5/31/2022  3:40 PM Wily Watkins MD [facility] BS AMB   8/22/2022 12:20 PM MIRIAN Lake   8/29/2022  1:20 PM MIRIAN Lake          Goals Addressed                 This Visit's Progress     Establish PCP relationships and regularly scheduled appointments.    On track     Prepare patients and caregivers for end of life decisions (ie. need for hospice, pain management, symptom relief, advance directives etc.)   On track     Patient has ACP       Take all medications as ordered   On track

## 2022-05-27 LAB
BACTERIA SPEC CULT: NORMAL
GRAM STN SPEC: NORMAL
GRAM STN SPEC: NORMAL
SERVICE CMNT-IMP: NORMAL

## 2022-05-31 ENCOUNTER — OFFICE VISIT (OUTPATIENT)
Dept: FAMILY MEDICINE CLINIC | Age: 82
End: 2022-05-31
Payer: MEDICARE

## 2022-05-31 VITALS
TEMPERATURE: 98.2 F | DIASTOLIC BLOOD PRESSURE: 71 MMHG | BODY MASS INDEX: 31.12 KG/M2 | SYSTOLIC BLOOD PRESSURE: 126 MMHG | HEART RATE: 99 BPM | HEIGHT: 65 IN | OXYGEN SATURATION: 97 % | RESPIRATION RATE: 16 BRPM

## 2022-05-31 DIAGNOSIS — E11.21 TYPE 2 DIABETES MELLITUS WITH NEPHROPATHY (HCC): ICD-10-CM

## 2022-05-31 DIAGNOSIS — I73.9 PAD (PERIPHERAL ARTERY DISEASE) (HCC): ICD-10-CM

## 2022-05-31 DIAGNOSIS — R10.84 GENERALIZED ABDOMINAL PAIN: Primary | ICD-10-CM

## 2022-05-31 DIAGNOSIS — N25.81 SECONDARY HYPERPARATHYROIDISM OF RENAL ORIGIN (HCC): ICD-10-CM

## 2022-05-31 PROCEDURE — G8399 PT W/DXA RESULTS DOCUMENT: HCPCS | Performed by: FAMILY MEDICINE

## 2022-05-31 PROCEDURE — G0439 PPPS, SUBSEQ VISIT: HCPCS | Performed by: FAMILY MEDICINE

## 2022-05-31 PROCEDURE — G8427 DOCREV CUR MEDS BY ELIG CLIN: HCPCS | Performed by: FAMILY MEDICINE

## 2022-05-31 PROCEDURE — 1090F PRES/ABSN URINE INCON ASSESS: CPT | Performed by: FAMILY MEDICINE

## 2022-05-31 PROCEDURE — 3044F HG A1C LEVEL LT 7.0%: CPT | Performed by: FAMILY MEDICINE

## 2022-05-31 PROCEDURE — 1123F ACP DISCUSS/DSCN MKR DOCD: CPT | Performed by: FAMILY MEDICINE

## 2022-05-31 PROCEDURE — G8536 NO DOC ELDER MAL SCRN: HCPCS | Performed by: FAMILY MEDICINE

## 2022-05-31 PROCEDURE — 99212 OFFICE O/P EST SF 10 MIN: CPT | Performed by: FAMILY MEDICINE

## 2022-05-31 PROCEDURE — 1101F PT FALLS ASSESS-DOCD LE1/YR: CPT | Performed by: FAMILY MEDICINE

## 2022-05-31 PROCEDURE — G8510 SCR DEP NEG, NO PLAN REQD: HCPCS | Performed by: FAMILY MEDICINE

## 2022-05-31 PROCEDURE — G9231 DOC ESRD DIA TRANS PREG: HCPCS | Performed by: FAMILY MEDICINE

## 2022-05-31 PROCEDURE — G8417 CALC BMI ABV UP PARAM F/U: HCPCS | Performed by: FAMILY MEDICINE

## 2022-05-31 NOTE — PATIENT INSTRUCTIONS

## 2022-05-31 NOTE — PROGRESS NOTES
1. \"Have you been to the ER, urgent care clinic since your last visit? Hospitalized since your last visit? \" Yes in chart    2. \"Have you seen or consulted any other health care providers outside of the [address] [place] [address] since your last visit? \" No     3. For patients aged 39-70: Has the patient had a colonoscopy / FIT/ Cologuard? NA - based on age      If the patient is female:    4. For patients aged 41-77: Has the patient had a mammogram within the past 2 years? NA - based on age or sex      11. For patients aged 21-65: Has the patient had a pap smear?  NA - based on age or sex

## 2022-05-31 NOTE — PROGRESS NOTES
This is the Subsequent Medicare Annual Wellness Exam, performed 12 months or more after the Initial AWV or the last Subsequent AWV    I have reviewed the patient's medical history in detail and updated the computerized patient record. Still has some abdominal pain; no significant nausea ; she will need a new referral to GI. States she has not had her appointment despite referral being placed previously. Had a screening PAD test thru her  [facility] [employer] insurance that was +; She needs a referral to vascular surgery. She is followed by endocrinology for DM, she is on dialysis;  she has secondary hyperparathyroidism; this is followed by Renal.     Assessment/Plan   Education and counseling provided:  Are appropriate based on today's review and evaluation  End-of-Life planning (with patient's consent)    1. Generalized abdominal pain  -     REFERRAL TO GASTROENTEROLOGY  2. Type 2 diabetes mellitus with nephropathy ([facility] [address]  3. Secondary hyperparathyroidism of renal origin [facility])  Assessment & Plan:   monitored by specialist. No acute findings meriting change in the plan  4. PAD (peripheral artery disease) (HCC)  -     REFERRAL TO VASCULAR SURGERY       As above  , treatment plan as listed below    Orders Placed This Encounter   [facility] [address] [facility]     Follow-up and Dispositions    · Return in about 3 months (around 8/31/2022) for stomach. This has been fully explained to the patient, who indicates understanding. An After Visit Summary was printed and given to the patient.       Depression Risk Factor Screening     3 most recent PHQ Screens 5/31/2022   PHQ Not Done -   Little interest or pleasure in doing things Not at all   Feeling down, depressed, irritable, or hopeless Not at all   Total Score PHQ 2 0       Alcohol & Drug Abuse Risk Screen    Do you average more than 1 drink per night or more than 7 drinks a week:  No    On any one occasion in the past three months have you have had more than 3 drinks containing alcohol:  No          Functional Ability and Level of Safety    Hearing: Hearing is good. Activities of Daily Living: The home contains: handrails and grab bars  Patient needs help with:  preparing meals and bathing      Ambulation: with difficulty, uses a wheelchair     Fall Risk:  Fall Risk Assessment, last 12 mths 5/31/2022   Able to walk? Yes   Fall in past 12 months? 1   Do you feel unsteady? -   Are you worried about falling -   Is TUG test greater than 12 seconds? -   Is the gait abnormal? -   Number of falls in past 12 months 2   Fall with injury? 1      Abuse Screen:  Patient is not abused       Cognitive Screening    Has your family/caregiver stated any concerns about your memory: no     Cognitive Screening: cognition is intact.     Health Maintenance Due     Health Maintenance Due   Topic Date Due    DTaP/Tdap/Td series (1 - Tdap) Never done    Shingrix Vaccine Age 50> (1 of 2) Never done    Foot Exam Q1  06/21/2020    COVID-19 Vaccine (2 - Pfizer 3-dose series) 04/28/2021    Medicare Yearly Exam  07/31/2021    Pneumococcal 65+ years (2 - PPSV23 or PCV20) 09/29/2021    Lipid Screen  01/04/2022       Patient Care Team   Patient Care Team:  Anthony Mcintyre MD as PCP - General (Family Medicine)  Anthony Mcintyre MD as PCP - REHABILITATION [facility] Provider  Aidan Georges RN as Ambulatory Care Manager      PE:  Visit Vitals  /71 (BP 1 Location: Right upper arm, BP Patient Position: Sitting)   Pulse 99   Temp 98.2 °F (36.8 °C) (Temporal)   Resp 16   Ht 5' 5\" (1.651 m)   SpO2 97%   BMI 31.12 kg/m²     General appearance: alert, cooperative, no distress, appears stated age    Lungs: clear to auscultation bilaterally  Heart: regular rate and rhythm, S1, S2 normal, no murmur, click, rub or gallop    Extremities: extremities normal, atraumatic, no cyanosis or edema    History     Patient Active Problem List   Diagnosis Code    Allergic rhinitis J30.9    Hypertension I10    Diabetes ([facility] [address] E11.9    Type 2 diabetes mellitus with nephropathy ([facility] [address] E11.21    Dyspepsia R10.13    Nausea R11.0    Constipation K59.00    Severe obesity (BMI 35.0-39. 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 by CT '18 [id number]    CKD (chronic kidney disease) stage 5, GFR less than 15 ml/min ([facility]) N18.5    Secondary hyperparathyroidism of renal origin ([facility] [address] N25.81    Chronic kidney disease N18.9    Peritoneal dialysis catheter in place [facility]) Z99.2     Past Medical History:   Diagnosis Date    Allergic rhinitis     Chronic kidney disease     stage 5    Diabetes ([facility] [address]     H/O seasonal allergies     Hypertension     Left foot drop, chronic since 2004 fusion sx     Peritoneal dialysis catheter in place [facility])       Past Surgical History:   Procedure Laterality Date    HAND/FINGER SURGERY UNLISTED      HX CATARACT REMOVAL  2011    HX LUMBAR FUSION  2004-last sx    fused L2-S1, in [place]    HX TUBAL LIGATION      HX WISDOM TEETH EXTRACTION       Current Outpatient Medications   Medication Sig Dispense Refill    ondansetron hcl (Zofran) 4 mg tablet Take 1 Tablet by mouth every eight (8) hours as needed for Nausea. 14 Tablet 0    loperamide (IMODIUM) 2 mg capsule Take 1 Capsule by mouth four (4) times daily as needed for Diarrhea for up to 10 doses. 10 Capsule 0    rosuvastatin (CRESTOR) 40 mg tablet TAKE 1 TABLET EVERY NIGHT 90 Tablet 1    metoprolol succinate (TOPROL-XL) 50 mg XL tablet TAKE 1 TABLET EVERY DAY 90 Tablet 1    calcium acetate,phosphat bind, (PHOSLO) 667 mg cap Take 2 Tablets by mouth three (3) times daily.       Droplet Pen Needle 32 gauge x 5/32\" ndle       0.9% sodium chloride solp 100 mL with iron sucrose 20 MG/ mg.     Callahan Saliva lactulose (CHRONULAC) 10 gram/15 mL solution lactulose 10 gram/15 mL oral solution      Accu-Chek Evelin Plus test strp strip       b complex-vitamin c-folic acid 0.8 mg (Angelica-Osito) 0.8 mg tab tablet       gentamicin (GARAMYCIN) 0.1 % topical cream gentamicin 0.1 % topical cream   APPLY TO EXIT SITE DAILY      hydrALAZINE (APRESOLINE) 50 mg tablet       methoxy peg-epoetin beta (MIRCERA INJECTION) 100 mcg by SubCUTAneous route.  omeprazole (PRILOSEC) 40 mg capsule omeprazole 40 mg capsule,delayed release   1 cap po qd prn      tamsulosin (FLOMAX) 0.4 mg capsule Take 1 Capsule by mouth daily. 90 Capsule 3    ondansetron (ZOFRAN ODT) 4 mg disintegrating tablet Take 1 Tablet by mouth every eight (8) hours as needed for Nausea or Vomiting for up to 12 doses. 12 Tablet 0    losartan (COZAAR) 50 mg tablet TAKE 1 TABLET EVERY DAY 90 Tablet 3    sucroferric oxyhydroxide (VELPHORO PO) Take  by mouth.  insulin aspart U-100 (NovoLOG Flexpen U-100 Insulin) 100 unit/mL (3 mL) inpn by SubCUTAneous route.  amLODIPine (NORVASC) 10 mg tablet TAKE 1 TABLET BY MOUTH DAILY 90 Tab 6    cloNIDine HCl (CATAPRES) 0.2 mg tablet TK 1 T PO QHS FOR BLOOD PRESSURE  4    furosemide (LASIX) 20 mg tablet Take 20 mg by mouth BID Mon Wed & Fri.      insulin glargine (LANTUS SOLOSTAR) 100 unit/mL (3 mL) pen 24 Units by SubCUTAneous route daily.        Allergies   Allergen Reactions    Sevelamer Diarrhea and Other (comments)    Aspirin Other (comments)     Ringing in ears    Keflex [Cephalexin] Rash    Sevelamer Carbonate Itching     With rash and sweatiness       Family History   Problem Relation Age of Onset    Diabetes Maternal Aunt     Diabetes Maternal Uncle     Diabetes Maternal Grandfather     Diabetes Other     Hypertension Cousin      Social History     Tobacco Use    Smoking status: Former Smoker     Types: Cigarettes    Smokeless tobacco: Never Used    Tobacco comment: [facility]   Substance Use Topics    Alcohol use: No         Shireen Dickinson MD

## 2022-06-07 ENCOUNTER — PATIENT OUTREACH (OUTPATIENT)
Dept: CASE MANAGEMENT | Age: 82
End: 2022-06-07

## 2022-06-08 NOTE — PROGRESS NOTES
Received call from patient's  regarding assistance need for a prescription. At time of call issue had been resolved by patient. Spoke with patient who stated she ws doing well at present. Will follow up as needed.

## 2022-06-10 ENCOUNTER — PATIENT OUTREACH (OUTPATIENT)
Dept: CASE MANAGEMENT | Age: 82
End: 2022-06-10

## 2022-06-10 NOTE — PROGRESS NOTES
Date/Time:  6/10/2022 1:14 PM    Method of communication with patient:phone    [facility] ([facility]) contacted the patient by telephone to perform Ambulatory Care Coordination. Verified name and  (PHI) with patient as identifiers. Provided introduction to self, and explanation of the Ambulatory Care Manager's role. Reviewed most recent clinic visit w/ patient who verbalized understanding. Patient given an opportunity to ask questions. Notes / Challenges    · Spoke with patient - Patient states not doing as well at present. · New medication added to her dialysis that caused complications of N/V/D - unable to eat and required visit to ER  · [facility] found HI physician who accepts her insurance and info related to patient. [facility] - [phone]  · Depression screen up date done - PHQ2 score - 2  · PHI documentation on chart  · Further / follow up appointments listed below - reviewed upcoming appointments  · Further questions answered as needed and patient has [facility] contact information  · Respiratory and cardiac status shows no issues at present  · Medication reconciliation review done at this encounter with patient. Shows understanding of medication therapy  · Will continue to follow patient per [facility] protocol     The patient agrees to contact the PCP office or the [address] [place] [address] for questions related to their healthcare. Provided contact information for future reference. Disease Specific:   N/A    Home Health Active: No    DME Active: No    Barriers to care? depression, level of motivation, medication management    Advance Care Planning:   Does patient have an Advance Directive:  reviewed and current     Medication(s):   Medication reconciliation was performed with patient, who verbalizes understanding of administration of home medications. There were no barriers to obtaining medications identified at this time.          Current Outpatient Medications   Medication Sig    ondansetron hcl (Zofran) 4 mg tablet Take 1 Tablet by mouth every eight (8) hours as needed for Nausea.  loperamide (IMODIUM) 2 mg capsule Take 1 Capsule by mouth four (4) times daily as needed for Diarrhea for up to 10 doses.  rosuvastatin (CRESTOR) 40 mg tablet TAKE 1 TABLET EVERY NIGHT    metoprolol succinate (TOPROL-XL) 50 mg XL tablet TAKE 1 TABLET EVERY DAY    calcium acetate,phosphat bind, (PHOSLO) 667 mg cap Take 2 Tablets by mouth three (3) times daily.  Droplet Pen Needle 32 gauge x 5/32\" ndle     0.9% sodium chloride solp 100 mL with iron sucrose 20 MG/ mg.    lactulose (CHRONULAC) 10 gram/15 mL solution lactulose 10 gram/15 mL oral solution    Accu-Chek Evelin Plus test strp strip     b complex-vitamin c-folic acid 0.8 mg (Angelica-Osito) 0.8 mg tab tablet     gentamicin (GARAMYCIN) 0.1 % topical cream gentamicin 0.1 % topical cream   APPLY TO EXIT SITE DAILY    hydrALAZINE (APRESOLINE) 50 mg tablet     methoxy peg-epoetin beta (MIRCERA INJECTION) 100 mcg by SubCUTAneous route.  omeprazole (PRILOSEC) 40 mg capsule omeprazole 40 mg capsule,delayed release   1 cap po qd prn    tamsulosin (FLOMAX) 0.4 mg capsule Take 1 Capsule by mouth daily.  ondansetron (ZOFRAN ODT) 4 mg disintegrating tablet Take 1 Tablet by mouth every eight (8) hours as needed for Nausea or Vomiting for up to 12 doses.  losartan (COZAAR) 50 mg tablet TAKE 1 TABLET EVERY DAY    sucroferric oxyhydroxide (VELPHORO PO) Take  by mouth.  insulin aspart U-100 (NovoLOG Flexpen U-100 Insulin) 100 unit/mL (3 mL) inpn by SubCUTAneous route.  amLODIPine (NORVASC) 10 mg tablet TAKE 1 TABLET BY MOUTH DAILY    cloNIDine HCl (CATAPRES) 0.2 mg tablet TK 1 T PO QHS FOR BLOOD PRESSURE    furosemide (LASIX) 20 mg tablet Take 20 mg by mouth BID Mon Wed & Fri.    insulin glargine (LANTUS SOLOSTAR) 100 unit/mL (3 mL) pen 24 Units by SubCUTAneous route daily.      No current facility-administered medications for this visit.       Zonia Bruno follow up appointment(s):   Future Appointments   Date Time Provider Slick Porter   6/14/2022  3:40 PM BETH Castrejon   8/22/2022 12:20 PM Samara Gomez, MIRIAN Guzman   8/29/2022  1:20 PM Samara Gomez, [facility]   8/31/2022  3:00 PM Krystyna Sosa MD [facility] AMB          Goals Addressed                 This Visit's Progress     Establish PCP relationships and regularly scheduled appointments.    On track     Prepare patients and caregivers for end of life decisions (ie. need for hospice, pain management, symptom relief, advance directives etc.)   On track     Patient has ACP       Take all medications as ordered   On track

## 2022-06-15 ENCOUNTER — TELEPHONE (OUTPATIENT)
Dept: FAMILY MEDICINE CLINIC | Age: 82
End: 2022-06-15

## 2022-06-15 NOTE — TELEPHONE ENCOUNTER
----- Message from Kerwin Trimble sent at 6/15/2022  9:58 AM EDT -----  Subject: Message to Provider    QUESTIONS  Information for Provider? Patient would like to let the office know they   can leave any messages with her daughter Blade Contreras at   [phone]. She will give the patient the message if there is a GI   doctor closer that she can see. (see referral Message)  ---------------------------------------------------------------------------  --------------  Lear How INFO  What is the best way for the office to contact you? OK to leave message on   voicemail  Preferred Call Back Phone Number? [phone]  ---------------------------------------------------------------------------  --------------  SCRIPT ANSWERS  Relationship to Patient?  Self

## 2022-06-17 ENCOUNTER — PATIENT OUTREACH (OUTPATIENT)
Dept: CASE MANAGEMENT | Age: 82
End: 2022-06-17

## 2022-06-17 NOTE — PROGRESS NOTES
Date/Time:  2022 1:14 PM    Method of communication with patient:phone    [address] [place] [address] ([facility]) contacted the patient by telephone to perform Ambulatory Care Coordination. Verified name and  (PHI) with patient as identifiers. Provided introduction to self, and explanation of the Ambulatory Care Manager's role. Reviewed most recent clinic visit w/ patient who verbalized understanding. Patient given an opportunity to ask questions. Notes / Challenges    · Spoke with patient - Patient states not doing as well at present. · patient obtaining appointment with Dr. Fitz Trejo (GI). [facility] obtained appointment with Dr. Vanna King (vascular) for  @ [phone]. · Depression screen up date done - PHQ2 score - 2  · PHI documentation on chart  · Further / follow up appointments listed below - reviewed upcoming appointments  · Further questions answered as needed and patient has [facility] contact information  · Respiratory and cardiac status shows no issues at present  · Medication reconciliation review done at this encounter with patient. Shows understanding of medication therapy  · Will continue to follow patient per [facility] protocol     The patient agrees to contact the PCP office or the [address] [place] [address] for questions related to their healthcare. Provided contact information for future reference. Disease Specific:   N/A    Home Health Active: No    DME Active: No    Barriers to care? depression, level of motivation, medication management    Advance Care Planning:   Does patient have an Advance Directive:  reviewed and current     Medication(s):   Medication reconciliation was performed with patient, who verbalizes understanding of administration of home medications. There were no barriers to obtaining medications identified at this time. Current Outpatient Medications   Medication Sig    ondansetron hcl (Zofran) 4 mg tablet Take 1 Tablet by mouth every eight (8) hours as needed for Nausea.     loperamide (IMODIUM) 2 mg capsule Take 1 Capsule by mouth four (4) times daily as needed for Diarrhea for up to 10 doses.  rosuvastatin (CRESTOR) 40 mg tablet TAKE 1 TABLET EVERY NIGHT    metoprolol succinate (TOPROL-XL) 50 mg XL tablet TAKE 1 TABLET EVERY DAY    calcium acetate,phosphat bind, (PHOSLO) 667 mg cap Take 2 Tablets by mouth three (3) times daily.  Droplet Pen Needle 32 gauge x 5/32\" ndle     0.9% sodium chloride solp 100 mL with iron sucrose 20 MG/ mg.    lactulose (CHRONULAC) 10 gram/15 mL solution lactulose 10 gram/15 mL oral solution    Accu-Chek Evelin Plus test strp strip     b complex-vitamin c-folic acid 0.8 mg (Angelica-Osito) 0.8 mg tab tablet     gentamicin (GARAMYCIN) 0.1 % topical cream gentamicin 0.1 % topical cream   APPLY TO EXIT SITE DAILY    hydrALAZINE (APRESOLINE) 50 mg tablet     methoxy peg-epoetin beta (MIRCERA INJECTION) 100 mcg by SubCUTAneous route.  omeprazole (PRILOSEC) 40 mg capsule omeprazole 40 mg capsule,delayed release   1 cap po qd prn    tamsulosin (FLOMAX) 0.4 mg capsule Take 1 Capsule by mouth daily.  ondansetron (ZOFRAN ODT) 4 mg disintegrating tablet Take 1 Tablet by mouth every eight (8) hours as needed for Nausea or Vomiting for up to 12 doses.  losartan (COZAAR) 50 mg tablet TAKE 1 TABLET EVERY DAY    sucroferric oxyhydroxide (VELPHORO PO) Take  by mouth.  insulin aspart U-100 (NovoLOG Flexpen U-100 Insulin) 100 unit/mL (3 mL) inpn by SubCUTAneous route.  amLODIPine (NORVASC) 10 mg tablet TAKE 1 TABLET BY MOUTH DAILY    cloNIDine HCl (CATAPRES) 0.2 mg tablet TK 1 T PO QHS FOR BLOOD PRESSURE    furosemide (LASIX) 20 mg tablet Take 20 mg by mouth BID Mon Wed & Fri.    insulin glargine (LANTUS SOLOSTAR) 100 unit/mL (3 mL) pen 24 Units by SubCUTAneous route daily. No current facility-administered medications for this visit.        [facility] follow up appointment(s):   Future Appointments   Date Time Provider Slick Porter   8/31/2022  3:00 PM Hawk Chacko MD [facility] BS AMB   6/15/2023  2:20 PM BETH Garcia          Goals Addressed                 This Visit's Progress     Establish PCP relationships and regularly scheduled appointments.    On track     Prepare patients and caregivers for end of life decisions (ie. need for hospice, pain management, symptom relief, advance directives etc.)   On track     Patient has ACP       Take all medications as ordered   On track

## 2022-06-21 ENCOUNTER — PATIENT OUTREACH (OUTPATIENT)
Dept: CASE MANAGEMENT | Age: 82
End: 2022-06-21

## 2022-06-21 NOTE — PROGRESS NOTES
Patient: Rajiv Ochoa discussed during interdisciplinary rounds 6/21/2022 to optimize plan of care. Patient with a past medical history of   Past Medical History:   Diagnosis Date    Allergic rhinitis     Chronic kidney disease     stage 5    Diabetes ([facility] [address]     H/O seasonal allergies     Hypertension     Left foot drop, chronic since 2004 fusion sx     Peritoneal dialysis catheter in place [facility])    . In attendance Ashley Salomon MD, Jacqueline Juarez RN, Recommendations from the team:    Needs resolution of GI issue prior to D/C from CCM,     Ambulatory  will continue to follow.   Jacqueline Juarez RN

## 2022-07-05 ENCOUNTER — PATIENT OUTREACH (OUTPATIENT)
Dept: CASE MANAGEMENT | Age: 82
End: 2022-07-05

## 2022-07-05 NOTE — PROGRESS NOTES
Patient has graduated from the Complex Case Management  program on 7/5/2022. Patient's symptoms are stable at this time. Patient/family has the ability to self-manage. Care management goals have been completed at this time. No further ACM follow up scheduled. Encouraged to make and attend appointment with PCP and specialities. Explained importance of care gaps and maintaining a healthy life style as much as possible. Goals Addressed                 This Visit's Progress     COMPLETED: Establish PCP relationships and regularly scheduled appointments.  COMPLETED: Prepare patients and caregivers for end of life decisions (ie. need for hospice, pain management, symptom relief, advance directives etc.)        Patient has ACP       COMPLETED: Take all medications as ordered             Pt has ACM's contact information for any further questions, concerns, or needs. Patients upcoming visits:    Future Appointments   Date Time Provider Slick Porter   8/31/2022  3:00 PM Geovanny Krishnamurthy MD [facility] BS AMB   6/15/2023  2:20 PM Jaison King PA-C  [facility] at [facility] time.  ACP has been reviewed and information sent to patient as needed.  Use of MyChart has been discussed with patient/family understanding its use.  Med rec has been completed and up to date at time of closing episode. Importance of taking all medications as prescribed and on time, maintaining an adequate supply of medication, and how to obtain refills.  Goals are updated and met to the best of patient's ability.  Importance of keeping all scheduled appointments and how to make those appointments discussed with patient/family understanding.  Review of symptoms and disease process discussed as needed, with patient/family showing understanding.    No further ACM contacts scheduled   Patient/family has ACM contact information for any further questions, concerns or needs

## 2022-07-28 ENCOUNTER — TELEPHONE (OUTPATIENT)
Dept: FAMILY MEDICINE CLINIC | Age: 82
End: 2022-07-28

## 2022-07-28 NOTE — TELEPHONE ENCOUNTER
Pt daughter called states mother has a heavy dry cough and headaches, declined next available vv, requesting nurse call for triage.  Please adv [phone]

## 2022-08-02 NOTE — TELEPHONE ENCOUNTER
Called patients daughter back who states \" thank you for calling I called a week ago but thank you for calling, you can call my mother and speak with her in regards to cough but thank you for calling\" daughter than hung up the phone. Called mother on [phone], no answer. Left message to call office when possible.

## 2022-08-31 ENCOUNTER — OFFICE VISIT (OUTPATIENT)
Dept: FAMILY MEDICINE CLINIC | Age: 82
End: 2022-08-31
Payer: MEDICARE

## 2022-08-31 VITALS
TEMPERATURE: 97.2 F | OXYGEN SATURATION: 95 % | SYSTOLIC BLOOD PRESSURE: 100 MMHG | HEART RATE: 63 BPM | BODY MASS INDEX: 29.12 KG/M2 | DIASTOLIC BLOOD PRESSURE: 57 MMHG | WEIGHT: 175 LBS

## 2022-08-31 DIAGNOSIS — I10 HTN (HYPERTENSION), BENIGN: Primary | ICD-10-CM

## 2022-08-31 DIAGNOSIS — N25.81 SECONDARY HYPERPARATHYROIDISM OF RENAL ORIGIN (HCC): ICD-10-CM

## 2022-08-31 DIAGNOSIS — E11.21 TYPE 2 DIABETES MELLITUS WITH NEPHROPATHY (HCC): ICD-10-CM

## 2022-08-31 PROCEDURE — 3044F HG A1C LEVEL LT 7.0%: CPT | Performed by: FAMILY MEDICINE

## 2022-08-31 PROCEDURE — 99214 OFFICE O/P EST MOD 30 MIN: CPT | Performed by: FAMILY MEDICINE

## 2022-08-31 PROCEDURE — G8417 CALC BMI ABV UP PARAM F/U: HCPCS | Performed by: FAMILY MEDICINE

## 2022-08-31 PROCEDURE — 1123F ACP DISCUSS/DSCN MKR DOCD: CPT | Performed by: FAMILY MEDICINE

## 2022-08-31 PROCEDURE — G8399 PT W/DXA RESULTS DOCUMENT: HCPCS | Performed by: FAMILY MEDICINE

## 2022-08-31 PROCEDURE — G8427 DOCREV CUR MEDS BY ELIG CLIN: HCPCS | Performed by: FAMILY MEDICINE

## 2022-08-31 PROCEDURE — 1090F PRES/ABSN URINE INCON ASSESS: CPT | Performed by: FAMILY MEDICINE

## 2022-08-31 PROCEDURE — G8536 NO DOC ELDER MAL SCRN: HCPCS | Performed by: FAMILY MEDICINE

## 2022-08-31 RX ORDER — AMLODIPINE BESYLATE 5 MG/1
5 TABLET ORAL DAILY
Qty: 90 TABLET | Refills: 3 | Status: SHIPPED | OUTPATIENT
Start: 2022-08-31

## 2022-08-31 RX ORDER — FERRIC CITRATE 210 MG/1
210 TABLET, COATED ORAL 2 TIMES DAILY WITH MEALS
COMMUNITY

## 2022-08-31 NOTE — PROGRESS NOTES
1. \"Have you been to the ER, urgent care clinic since your last visit? Hospitalized since your last visit? \" No    2. \"Have you seen or consulted any other health care providers outside of the [address] [place] [address] since your last visit? \" No     3. For patients aged 39-70: Has the patient had a colonoscopy / FIT/ Cologuard? NA - based on age      If the patient is female:    4. For patients aged 41-77: Has the patient had a mammogram within the past 2 years? NA - based on age or sex      11. For patients aged 21-65: Has the patient had a pap smear?  NA - based on age or sex

## 2022-08-31 NOTE — PROGRESS NOTES
HPI:  Leeann Wilcox is a 80 y.o. female who presents today with   Chief Complaint   Patient presents with    Follow-up     3mo          Patient is here to follow-up on high blood pressure,  Patient is on dialysis. She is overall feeling better than her last   She needs a refill on her amlodipine. She is trying to get some assistance at home to help care for herself. Forms have been filled out to assist patient with the same. She gets labs regularly with dialysis. She has a history of secondary hyperparathyroidism. She has not had a lipid profile  We will check that today. She still has some abdominal pain. She is followed by GI.    3 most recent PHQ Screens 8/31/2022   PHQ Not Done -   Little interest or pleasure in doing things Not at all   Feeling down, depressed, irritable, or hopeless Not at all   Total Score PHQ 2 0               PMH,  Meds, Allergies, Family History, Social history reviewed      Current Outpatient Medications   Medication Sig Dispense Refill    ferric citrate (Auryxia) 210 mg iron tablet Take  by mouth three (3) times daily (with meals). 0.9% sodium chloride solp 100 mL with iron sucrose 20 MG/ mg. amLODIPine (NORVASC) 5 mg tablet Take 1 Tablet by mouth daily. 90 Tablet 3    loperamide (IMODIUM) 2 mg capsule Take 1 Capsule by mouth four (4) times daily as needed for Diarrhea for up to 10 doses.  10 Capsule 0    rosuvastatin (CRESTOR) 40 mg tablet TAKE 1 TABLET EVERY NIGHT 90 Tablet 1    metoprolol succinate (TOPROL-XL) 50 mg XL tablet TAKE 1 TABLET EVERY DAY 90 Tablet 1    Droplet Pen Needle 32 gauge x 5/32\" ndle       Accu-Chek Evelin Plus test strp strip       b complex-vitamin c-folic acid 0.8 mg (Angelica-Osito) 0.8 mg tab tablet       gentamicin (GARAMYCIN) 0.1 % topical cream gentamicin 0.1 % topical cream   APPLY TO EXIT SITE DAILY      hydrALAZINE (APRESOLINE) 50 mg tablet       omeprazole (PRILOSEC) 40 mg capsule omeprazole 40 mg capsule,delayed release   1 cap po qd prn      tamsulosin (FLOMAX) 0.4 mg capsule Take 1 Capsule by mouth daily. 90 Capsule 3    losartan (COZAAR) 50 mg tablet TAKE 1 TABLET EVERY DAY 90 Tablet 3    insulin aspart U-100 (NOVOLOG) 100 unit/mL (3 mL) inpn by SubCUTAneous route. furosemide (LASIX) 20 mg tablet Take 20 mg by mouth BID Mon Wed & Fri.      insulin glargine (LANTUS,BASAGLAR) 100 unit/mL (3 mL) inpn 24 Units by SubCUTAneous route daily. calcium acetate,phosphat bind, (PHOSLO) 667 mg cap Take 2 Tablets by mouth three (3) times daily. (Patient not taking: Reported on 8/31/2022)          Allergies   Allergen Reactions    Sevelamer Diarrhea and Other (comments)    Aspirin Other (comments)     Ringing in ears    Keflex [Cephalexin] Rash    Sevelamer Carbonate Itching     With rash and sweatiness                  ROS as per HPI      Visit Vitals  BP (!) 100/57 (BP 1 Location: Right upper arm, BP Patient Position: Sitting)   Pulse 63   Temp 97.2 °F (36.2 °C) (Temporal)   Wt 175 lb (79.4 kg)   SpO2 95%   BMI 29.12 kg/m²     Physical Exam  General appearance: alert, cooperative, no distress, appears stated age  Neck: supple, symmetrical, trachea midline, no adenopathy, thyroid: not enlarged, symmetric, no tenderness/mass/nodules, no carotid bruit and no JVD  Lungs: clear to auscultation bilaterally  Heart: regular rate and rhythm, S1, S2 normal, no murmur, click, rub or gallop  Abdomen: soft, non-tender. Bowel sounds normal. No masses,  no organomegaly  Extremities: extremities normal, atraumatic, no cyanosis or edema      Assessment/Plan:  Diagnoses and all orders for this visit:    1. HTN (hypertension), benign  -     amLODIPine (NORVASC) 5 mg tablet; Take 1 Tablet by mouth daily. 2. Type 2 diabetes mellitus with nephropathy ([address]  -     LIPID PANEL; Future    3.  Secondary hyperparathyroidism of renal origin [facility])      As above  Patient stable  Labs as ordered  Follow-up with GI and renal as recommended  Follow-up and Dispositions    Return in about 4 months (around 12/31/2022) for medicare well. This has been fully explained to the patient, who indicates understanding. Follow-up and Dispositions    Return in about 4 months (around 12/31/2022) for medicare well.             Ike Glynn MD

## 2022-08-31 NOTE — PATIENT INSTRUCTIONS
DASH Diet: Care Instructions  Your Care Instructions     The DASH diet is an eating plan that can help lower your blood pressure. DASH stands for Dietary Approaches to Stop Hypertension. Hypertension is high blood pressure. The DASH diet focuses on eating foods that are high in calcium, potassium, and magnesium. These nutrients can lower blood pressure. The foods that are highest in these nutrients are fruits, vegetables, low-fat dairy products, nuts, seeds, and legumes. But taking calcium, potassium, and magnesium supplements instead of eating foods that are high in those nutrients does not have the same effect. The DASH diet also includes whole grains, fish, and poultry. The DASH diet is one of several lifestyle changes your doctor may recommend to lower your high blood pressure. Your doctor may also want you to decrease the amount of sodium in your diet. Lowering sodium while following the DASH diet can lower blood pressure even further than just the DASH diet alone. Follow-up care is a key part of your treatment and safety. Be sure to make and go to all appointments, and call your doctor if you are having problems. It's also a good idea to know your test results and keep a list of the medicines you take. How can you care for yourself at home? Following the DASH diet  Eat 4 to 5 servings of fruit each day. A serving is 1 medium-sized piece of fruit, ½ cup chopped or canned fruit, 1/4 cup dried fruit, or 4 ounces (½ cup) of fruit juice. Choose fruit more often than fruit juice. Eat 4 to 5 servings of vegetables each day. A serving is 1 cup of lettuce or raw leafy vegetables, ½ cup of chopped or cooked vegetables, or 4 ounces (½ cup) of vegetable juice. Choose vegetables more often than vegetable juice. Get 2 to 3 servings of low-fat and fat-free dairy each day. A serving is 8 ounces of milk, 1 cup of yogurt, or 1 ½ ounces of cheese. Eat 6 to 8 servings of grains each day.  A serving is 1 slice of bread, 1 ounce of dry cereal, or ½ cup of cooked rice, pasta, or cooked cereal. Try to choose whole-grain products as much as possible. Limit lean meat, poultry, and fish to 2 servings each day. A serving is 3 ounces, about the size of a deck of cards. Eat 4 to 5 servings of nuts, seeds, and legumes (cooked dried beans, lentils, and split peas) each week. A serving is 1/3 cup of nuts, 2 tablespoons of seeds, or ½ cup of cooked beans or peas. Limit fats and oils to 2 to 3 servings each day. A serving is 1 teaspoon of vegetable oil or 2 tablespoons of salad dressing. Limit sweets and added sugars to 5 servings or less a week. A serving is 1 tablespoon jelly or jam, ½ cup sorbet, or 1 cup of lemonade. Eat less than 2,300 milligrams (mg) of sodium a day. If you limit your sodium to 1,500 mg a day, you can lower your blood pressure even more. Be aware that all of these are the suggested number of servings for people who eat 1,800 to 2,000 calories a day. Your recommended number of servings may be different if you need more or fewer calories. Tips for success  Start small. Do not try to make dramatic changes to your diet all at once. You might feel that you are missing out on your favorite foods and then be more likely to not follow the plan. Make small changes, and stick with them. Once those changes become habit, add a few more changes. Try some of the following:  Make it a goal to eat a fruit or vegetable at every meal and at snacks. This will make it easy to get the recommended amount of fruits and vegetables each day. Try yogurt topped with fruit and nuts for a snack or healthy dessert. Add lettuce, tomato, cucumber, and onion to sandwiches. Combine a ready-made pizza crust with low-fat mozzarella cheese and lots of vegetable toppings. Try using tomatoes, squash, spinach, broccoli, carrots, cauliflower, and onions.   Have a variety of cut-up vegetables with a low-fat dip as an appetizer instead of chips and dip.  Sprinkle sunflower seeds or chopped almonds over salads. Or try adding chopped walnuts or almonds to cooked vegetables. Try some vegetarian meals using beans and peas. Add garbanzo or kidney beans to salads. Make burritos and tacos with mashed lake beans or black beans. Where can you learn more? Go to http://www.grant.com/  Enter H967 in the search box to learn more about \"DASH Diet: Care Instructions. \"  Current as of: January 10, 2022               Content Version: 13.2  © 9971-0090 [employer]. Care instructions adapted under license by [employer] (which disclaims liability or warranty for this information). If you have questions about a medical condition or this instruction, always ask your healthcare professional. [address] any warranty or liability for your use of this information.

## 2022-09-01 LAB
CHOLEST SERPL-MCNC: 166 MG/DL (ref 100–199)
HDLC SERPL-MCNC: 46 MG/DL
LDLC SERPL CALC-MCNC: 99 MG/DL (ref 0–99)
TRIGL SERPL-MCNC: 115 MG/DL (ref 0–149)
VLDLC SERPL CALC-MCNC: 21 MG/DL (ref 5–40)

## 2022-09-15 NOTE — TELEPHONE ENCOUNTER
Last Visit: 8/31/22 with MD Guanako Newton  Next Appointment: Advised to follow-up in 4 months  Previous Refill Encounter(s): 9/1/21 #90 with 3 refills    Requested Prescriptions     Pending Prescriptions Disp Refills    losartan (COZAAR) 50 mg tablet 90 Tablet 3     Sig: Take 1 Tablet by mouth daily. For [facility] in place:   Recommendation Provided To:    Intervention Detail: New Rx: 1, reason: Patient Preference  Gap Closed?:   Intervention Accepted By:   Time Spent (min): 5

## 2022-09-16 RX ORDER — LOSARTAN POTASSIUM 50 MG/1
50 TABLET ORAL DAILY
Qty: 90 TABLET | Refills: 3 | Status: SHIPPED
Start: 2022-09-16 | End: 2022-10-11

## 2022-09-20 RX ORDER — FLUTICASONE PROPIONATE 50 MCG
2 SPRAY, SUSPENSION (ML) NASAL
COMMUNITY
Start: 2022-06-30 | End: 2022-10-11

## 2022-09-20 NOTE — PERIOP NOTES
PRE-SURGICAL INSTRUCTIONS        Patient's Name:  Per Arrieta      VBVNGBRANDON Date:  9/20/2022            Covid Testing Date and Time:    Surgery Date:  9/26/2022                Do NOT eat or drink anything, including candy, gum, or ice chips after midnight on 09/25, unless you have specific instructions from your surgeon or anesthesia provider to do so. You may brush your teeth before coming to the hospital.  No smoking 24 hours prior to the day of surgery. No alcohol 24 hours prior to the day of surgery. No recreational drugs for one week prior to the day of surgery. Leave all valuables, including money/purse, at home. Remove all jewelry, nail polish, acrylic nails, and makeup (including mascara); no lotions powders, deodorant, or perfume/cologne/after shave on the skin. Follow instruction for Hibiclens washes and CHG wipes from surgeon's office. Glasses/contact lenses and dentures may be worn to the hospital.  They will be removed prior to surgery. Call your doctor if symptoms of a cold or illness develop within 24-48 hours prior to your surgery. 11.  If you are having an outpatient procedure, please make arrangements for a responsible ADULT TO [address] [place] [address] and stay with you for 24 hours after your surgery. 12. ONE VISITOR in the hospital at this time for outpatient procedures. Exceptions may be made for surgical admissions, per nursing unit guidelines      Special Instructions:      Bring list of CURRENT medications. Bring any pertinent legal medical records. Take these medications the morning of surgery with a sip of water:  BP medications  Follow physician instructions about insulin. Call  for instructions. Follow physician instructions about stopping anticoagulants. Complete bowel prep per MD instructions. On the day of surgery, come in the main entrance of [facility]. Let the  at the desk know you are there for surgery.   A staff member will come escort you to the surgical area on the second floor. If you have any questions or concerns, please do not hesitate to call:     (Prior to the day of surgery) PAT department:  [phone]   (Day of surgery) Pre-Op department:  [phone]    These surgical instructions were reviewed with Gaile Surendra during the PAT phone call.

## 2022-09-24 ENCOUNTER — ANESTHESIA EVENT (OUTPATIENT)
Dept: ENDOSCOPY | Age: 82
End: 2022-09-24
Payer: MEDICARE

## 2022-09-26 ENCOUNTER — HOSPITAL ENCOUNTER (OUTPATIENT)
Age: 82
Setting detail: OUTPATIENT SURGERY
Discharge: HOME | End: 2022-09-26
Attending: INTERNAL MEDICINE | Admitting: INTERNAL MEDICINE
Payer: MEDICARE

## 2022-09-26 ENCOUNTER — ANESTHESIA (OUTPATIENT)
Dept: ENDOSCOPY | Age: 82
End: 2022-09-26
Payer: MEDICARE

## 2022-09-26 VITALS
DIASTOLIC BLOOD PRESSURE: 44 MMHG | WEIGHT: 162.7 LBS | SYSTOLIC BLOOD PRESSURE: 115 MMHG | BODY MASS INDEX: 27.11 KG/M2 | HEIGHT: 65 IN | RESPIRATION RATE: 16 BRPM | OXYGEN SATURATION: 94 % | HEART RATE: 72 BPM | TEMPERATURE: 98.6 F

## 2022-09-26 LAB
ANION GAP SERPL CALC-SCNC: 13 MMOL/L (ref 3–18)
BUN SERPL-MCNC: 52 MG/DL (ref 7–18)
BUN/CREAT SERPL: 4 (ref 12–20)
CALCIUM SERPL-MCNC: 7.7 MG/DL (ref 8.5–10.1)
CHLORIDE SERPL-SCNC: 103 MMOL/L (ref 100–111)
CO2 SERPL-SCNC: 25 MMOL/L (ref 21–32)
CREAT SERPL-MCNC: 12.9 MG/DL (ref 0.6–1.3)
GLUCOSE BLD STRIP.AUTO-MCNC: 145 MG/DL (ref 70–110)
GLUCOSE BLD STRIP.AUTO-MCNC: 55 MG/DL (ref 70–110)
GLUCOSE BLD STRIP.AUTO-MCNC: 57 MG/DL (ref 70–110)
GLUCOSE BLD STRIP.AUTO-MCNC: 86 MG/DL (ref 70–110)
GLUCOSE SERPL-MCNC: 163 MG/DL (ref 74–99)
POTASSIUM SERPL-SCNC: 3 MMOL/L (ref 3.5–5.5)
SODIUM SERPL-SCNC: 141 MMOL/L (ref 136–145)

## 2022-09-26 PROCEDURE — 80048 BASIC METABOLIC PNL TOTAL CA: CPT

## 2022-09-26 PROCEDURE — 74011000250 HC RX REV CODE- 250: Performed by: NURSE ANESTHETIST, CERTIFIED REGISTERED

## 2022-09-26 PROCEDURE — 82962 GLUCOSE BLOOD TEST: CPT

## 2022-09-26 PROCEDURE — 74011250636 HC RX REV CODE- 250/636: Performed by: NURSE ANESTHETIST, CERTIFIED REGISTERED

## 2022-09-26 PROCEDURE — 00811 ANES LWR INTST NDSC NOS: CPT | Performed by: NURSE ANESTHETIST, CERTIFIED REGISTERED

## 2022-09-26 PROCEDURE — 74011250637 HC RX REV CODE- 250/637: Performed by: NURSE ANESTHETIST, CERTIFIED REGISTERED

## 2022-09-26 PROCEDURE — 88305 TISSUE EXAM BY PATHOLOGIST: CPT

## 2022-09-26 PROCEDURE — 77030013992 HC SNR POLYP ENDOSC BSC -B: Performed by: INTERNAL MEDICINE

## 2022-09-26 PROCEDURE — 99100 ANES PT EXTEME AGE<1 YR&>70: CPT | Performed by: ANESTHESIOLOGY

## 2022-09-26 PROCEDURE — 76040000019: Performed by: INTERNAL MEDICINE

## 2022-09-26 PROCEDURE — 76060000031 HC ANESTHESIA FIRST 0.5 HR: Performed by: INTERNAL MEDICINE

## 2022-09-26 PROCEDURE — 2709999900 HC NON-CHARGEABLE SUPPLY: Performed by: INTERNAL MEDICINE

## 2022-09-26 PROCEDURE — 77030021593 HC FCPS BIOP ENDOSC BSC -A: Performed by: INTERNAL MEDICINE

## 2022-09-26 PROCEDURE — 77030008565 HC TBNG SUC IRR ERBE -B: Performed by: INTERNAL MEDICINE

## 2022-09-26 PROCEDURE — 00811 ANES LWR INTST NDSC NOS: CPT | Performed by: ANESTHESIOLOGY

## 2022-09-26 PROCEDURE — 99100 ANES PT EXTEME AGE<1 YR&>70: CPT | Performed by: NURSE ANESTHETIST, CERTIFIED REGISTERED

## 2022-09-26 PROCEDURE — 74011000250 HC RX REV CODE- 250

## 2022-09-26 RX ORDER — SODIUM CHLORIDE 9 MG/ML
25 INJECTION, SOLUTION INTRAVENOUS CONTINUOUS
Status: CANCELLED | OUTPATIENT
Start: 2022-09-26

## 2022-09-26 RX ORDER — LIDOCAINE HYDROCHLORIDE 20 MG/ML
INJECTION, SOLUTION EPIDURAL; INFILTRATION; INTRACAUDAL; PERINEURAL AS NEEDED
Status: DISCONTINUED | OUTPATIENT
Start: 2022-09-26 | End: 2022-09-26 | Stop reason: HOSPADM

## 2022-09-26 RX ORDER — PROPOFOL 10 MG/ML
INJECTION, EMULSION INTRAVENOUS AS NEEDED
Status: DISCONTINUED | OUTPATIENT
Start: 2022-09-26 | End: 2022-09-26 | Stop reason: HOSPADM

## 2022-09-26 RX ORDER — SODIUM CHLORIDE 0.9 % (FLUSH) 0.9 %
5-40 SYRINGE (ML) INJECTION AS NEEDED
Status: DISCONTINUED | OUTPATIENT
Start: 2022-09-26 | End: 2022-09-26 | Stop reason: HOSPADM

## 2022-09-26 RX ORDER — SODIUM CHLORIDE 0.9 % (FLUSH) 0.9 %
5-40 SYRINGE (ML) INJECTION AS NEEDED
Status: CANCELLED | OUTPATIENT
Start: 2022-09-26

## 2022-09-26 RX ORDER — SODIUM CHLORIDE 0.9 % (FLUSH) 0.9 %
5-40 SYRINGE (ML) INJECTION EVERY 8 HOURS
Status: DISCONTINUED | OUTPATIENT
Start: 2022-09-26 | End: 2022-09-26 | Stop reason: HOSPADM

## 2022-09-26 RX ORDER — MAGNESIUM SULFATE 100 %
4 CRYSTALS MISCELLANEOUS AS NEEDED
Status: DISCONTINUED | OUTPATIENT
Start: 2022-09-26 | End: 2022-09-26 | Stop reason: HOSPADM

## 2022-09-26 RX ORDER — INSULIN LISPRO 100 [IU]/ML
INJECTION, SOLUTION INTRAVENOUS; SUBCUTANEOUS ONCE
Status: CANCELLED | OUTPATIENT
Start: 2022-09-26 | End: 2022-09-27

## 2022-09-26 RX ORDER — DEXTROSE MONOHYDRATE 100 MG/ML
0-250 INJECTION, SOLUTION INTRAVENOUS AS NEEDED
Status: CANCELLED | OUTPATIENT
Start: 2022-09-26

## 2022-09-26 RX ORDER — ONDANSETRON 2 MG/ML
4 INJECTION INTRAMUSCULAR; INTRAVENOUS ONCE
Status: CANCELLED | OUTPATIENT
Start: 2022-09-26 | End: 2022-09-26

## 2022-09-26 RX ORDER — FAMOTIDINE 20 MG/1
20 TABLET, FILM COATED ORAL ONCE
Status: COMPLETED | OUTPATIENT
Start: 2022-09-26 | End: 2022-09-26

## 2022-09-26 RX ORDER — DEXTROSE MONOHYDRATE 100 MG/ML
INJECTION, SOLUTION INTRAVENOUS
Status: COMPLETED
Start: 2022-09-26 | End: 2022-09-26

## 2022-09-26 RX ORDER — MAGNESIUM SULFATE 100 %
4 CRYSTALS MISCELLANEOUS AS NEEDED
Status: CANCELLED | OUTPATIENT
Start: 2022-09-26

## 2022-09-26 RX ORDER — LIDOCAINE HYDROCHLORIDE 10 MG/ML
0.1 INJECTION, SOLUTION EPIDURAL; INFILTRATION; INTRACAUDAL; PERINEURAL AS NEEDED
Status: DISCONTINUED | OUTPATIENT
Start: 2022-09-26 | End: 2022-09-26 | Stop reason: HOSPADM

## 2022-09-26 RX ORDER — SODIUM CHLORIDE 9 MG/ML
25 INJECTION, SOLUTION INTRAVENOUS CONTINUOUS
Status: DISCONTINUED | OUTPATIENT
Start: 2022-09-26 | End: 2022-09-26 | Stop reason: HOSPADM

## 2022-09-26 RX ORDER — DEXTROSE MONOHYDRATE 100 MG/ML
0-250 INJECTION, SOLUTION INTRAVENOUS AS NEEDED
Status: DISCONTINUED | OUTPATIENT
Start: 2022-09-26 | End: 2022-09-26 | Stop reason: HOSPADM

## 2022-09-26 RX ORDER — SODIUM CHLORIDE 0.9 % (FLUSH) 0.9 %
5-40 SYRINGE (ML) INJECTION EVERY 8 HOURS
Status: CANCELLED | OUTPATIENT
Start: 2022-09-26

## 2022-09-26 RX ADMIN — LIDOCAINE HYDROCHLORIDE 40 MG: 20 INJECTION, SOLUTION EPIDURAL; INFILTRATION; INTRACAUDAL; PERINEURAL at 13:16

## 2022-09-26 RX ADMIN — PROPOFOL 50 MG: 10 INJECTION, EMULSION INTRAVENOUS at 13:16

## 2022-09-26 RX ADMIN — PROPOFOL 25 MG: 10 INJECTION, EMULSION INTRAVENOUS at 13:29

## 2022-09-26 RX ADMIN — SODIUM CHLORIDE 25 ML/HR: 9 INJECTION, SOLUTION INTRAVENOUS at 12:21

## 2022-09-26 RX ADMIN — PROPOFOL 25 MG: 10 INJECTION, EMULSION INTRAVENOUS at 13:25

## 2022-09-26 RX ADMIN — PROPOFOL 25 MG: 10 INJECTION, EMULSION INTRAVENOUS at 13:18

## 2022-09-26 RX ADMIN — FAMOTIDINE 20 MG: 20 TABLET ORAL at 12:21

## 2022-09-26 RX ADMIN — DEXTROSE MONOHYDRATE 250 ML: 100 INJECTION, SOLUTION INTRAVENOUS at 11:56

## 2022-09-26 RX ADMIN — PROPOFOL 25 MG: 10 INJECTION, EMULSION INTRAVENOUS at 13:21

## 2022-09-26 NOTE — H&P
Daisa Teran [email or web address]  [phone]    GASTROENTEROLOGY Pre-Procedure H and P      Impression/Plan:   1.  This patient is consented for a colonoscopy for diarrhea    Addendum: All lab tests orders pertaining to the procedure have been ordered by the anesthesia personnel and results will be addressed by the anesthesia team    Chief Complaint: diarrhea      HPI:  Elle Chapin is a 80 y.o. female who is being is having a colonoscopy for diarrhea  PMH:   Past Medical History:   Diagnosis Date    Allergic rhinitis     Chronic kidney disease 2020    Peritoneal Dialysis    Diabetes ([facility] [address] 2015    IDDM    H/O seasonal allergies     Hypertension     Left foot drop, chronic since 2004 fusion sx     Peritoneal dialysis catheter in place [facility])        PSH:   Past Surgical History:   Procedure Laterality Date    HX CARPAL TUNNEL RELEASE Left     HX CATARACT REMOVAL  2011    HX LUMBAR FUSION  2004-last sx    fused L2-S1, in [address] [place] [address]    HX TUBAL LIGATION      HX WISDOM TEETH EXTRACTION         Social HX:   Social History     Socioeconomic History    Marital status:      Spouse name: Not on file    Number of children: Not on file    Years of education: Not on file    Highest education level: Not on file   Occupational History    Occupation: CNA - RET   Tobacco Use    Smoking status: Never    Smokeless tobacco: Never   Vaping Use    Vaping Use: Never used   Substance and Sexual Activity    Alcohol use: No    Drug use: Never    Sexual activity: Not on file   Other Topics Concern    Not on file   Social History Narrative    Not on file     Social Determinants of Health     Financial Resource Strain: Not on file   Food Insecurity: Not on file   Transportation Needs: Not on file   Physical Activity: Not on file   Stress: Not on file   Social Connections: Not on file   Intimate Partner Violence: Not on file   Housing Stability: Not on file       FHX:   Family History   Problem Relation Age of Onset    Diabetes Maternal Aunt     Diabetes Maternal Uncle     Diabetes Maternal Grandfather     Diabetes Other     Hypertension Cousin        Allergy:   Allergies   Allergen Reactions    Sevelamer Diarrhea and Other (comments)    Aspirin Other (comments)     Ringing in ears    Keflex [Cephalexin] Rash    Sevelamer Carbonate Itching     With rash and sweatiness       Home Medications:     Medications Prior to Admission   Medication Sig    fluticasone propionate (FLONASE) 50 mcg/actuation nasal spray 2 Sprays by Both Nostrils route daily as needed. folic acid/vit B complex and C (RENAL VITAMIN PO) Take 1 Capsule by mouth daily. losartan (COZAAR) 50 mg tablet Take 1 Tablet by mouth daily. ferric citrate (Auryxia) 210 mg iron tablet Take 210 mg by mouth two (2) times daily (with meals). amLODIPine (NORVASC) 5 mg tablet Take 1 Tablet by mouth daily. rosuvastatin (CRESTOR) 40 mg tablet TAKE 1 TABLET EVERY NIGHT    metoprolol succinate (TOPROL-XL) 50 mg XL tablet TAKE 1 TABLET EVERY DAY    gentamicin (GARAMYCIN) 0.1 % topical cream gentamicin 0.1 % topical cream   APPLY TO EXIT SITE DAILY    hydrALAZINE (APRESOLINE) 50 mg tablet Take 50 mg by mouth daily. tamsulosin (FLOMAX) 0.4 mg capsule Take 1 Capsule by mouth daily. insulin aspart U-100 (NOVOLOG) 100 unit/mL (3 mL) inpn 0-8 Units by SubCUTAneous route daily. Indications: type 2 diabetes mellitus    furosemide (LASIX) 20 mg tablet Take 20 mg by mouth BID Mon Wed & Fri.    insulin glargine (LANTUS,BASAGLAR) 100 unit/mL (3 mL) inpn 24 Units by SubCUTAneous route nightly. Indications: type 2 diabetes mellitus    Droplet Pen Needle 32 gauge x 5/32\" ndle     Accu-Chek Evelin Plus test strp strip     omeprazole (PRILOSEC) 40 mg capsule Take 40 mg by mouth daily as needed. Review of Systems:     Constitutional: No fevers, chills, weight loss, fatigue. Skin: No rashes, pruritis, jaundice, ulcerations, erythema. HENT: No headaches, nosebleeds, sinus pressure, rhinorrhea, sore throat.    Eyes: No visual changes, blurred vision, eye pain, photophobia, jaundice. Cardiovascular: No chest pain, heart palpitations. Respiratory: No cough, SOB, wheezing, chest discomfort, orthopnea. Gastrointestinal: Neg unless noted otherwise in H&P   Genitourinary: No dysuria, bleeding, discharge, pyuria. Musculoskeletal: No weakness, arthralgias, wasting. Endo: No sweats. Heme: No bruising, easy bleeding. Allergies: As noted. Neurological: Cranial nerves intact. Alert and oriented. Gait not assessed. Psychiatric:  No anxiety, depression, hallucinations. Visit Vitals  Ht 5' 5\" (1.651 m)   Wt 79.4 kg (175 lb)   BMI 29.12 kg/m²       Physical Assessment:     constitutional: appearance: well developed, well nourished, normal habitus, no deformities, in no acute distress. skin: inspection: no rashes, ulcers, icterus or other lesions; no clubbing or telangiectasias. palpation: no induration or subcutaneos nodules. eyes: inspection: normal conjunctivae and lids; no jaundice pupils: normal  ENMT: mouth: normal oral mucosa,lips and gums; good dentition. oropharynx: normal tongue, hard and soft palate; posterior pharynx without erithema, exudate or lesions. neck: thyroid: normal size, consistency and position; no masses or tenderness. respiratory: effort: normal chest excursion; no intercostal retraction or accessory muscle use. cardiovascular: abdominal aorta: normal size and position; no bruits. palpation: PMI of normal size and position; normal rhythm; no thrill or murmurs. abdominal: abdomen: normal consistency; no tenderness or masses. hernias: no hernias appreciated. liver: normal size and consistency. spleen: not palpable. rectal: hemoccult/guaiac: not performed. musculoskeletal: digits and nails: no clubbing, cyanosis, petechiae or other inflammatory conditions. gait: normal gait and station head and neck: normal range of motion; no pain, crepitation or contracture.  spine/ribs/pelvis: normal range of motion; no pain, deformity or contracture. neurologic: cranial nerves: II-XII normal.   psychiatric: judgement/insight: within normal limits. memory: within normal limits for recent and remote events. mood and affect: no evidence of depression, anxiety or agitation. orientation: oriented to time, space and person. Basic Metabolic Profile   No results for input(s): NA, K, CL, CO2, BUN, GLU, CA, MG, PHOS in the last 72 hours. No lab exists for component: CREAT      CBC w/Diff    No results for input(s): WBC, RBC, HGB, HCT, MCV, MCH, MCHC, RDW, PLT, HGBEXT, HCTEXT, PLTEXT in the last 72 hours. No lab exists for component: MPV No results for input(s): GRANS, LYMPH, EOS, PRO, MYELO, METAS, BLAST in the last 72 hours. No lab exists for component: MONO, BASO     Hepatic Function   No results for input(s): ALB, TP, TBILI, AP, AML, LPSE in the last 72 hours. No lab exists for component: DBILI, GPT, SGOT     Coags   No results for input(s): PTP, INR, APTT, INREXT in the last 72 hours. Octavio Barbour MD  [facility]  [facility], [address] [place] [address]  [email or web address]

## 2022-09-26 NOTE — ANESTHESIA POSTPROCEDURE EVALUATION
Procedure(s):  COLONOSCOPY with bx's and polypectomy. MAC    Anesthesia Post Evaluation      Multimodal analgesia: multimodal analgesia used between 6 hours prior to anesthesia start to PACU discharge  Patient location during evaluation: PACU  Patient participation: complete - patient participated  Level of consciousness: awake and alert  Pain management: adequate  Airway patency: patent  Anesthetic complications: no  Cardiovascular status: acceptable  Respiratory status: acceptable  Hydration status: acceptable  Post anesthesia nausea and vomiting:  none  Final Post Anesthesia Temperature Assessment:  Normothermia (36.0-37.5 degrees C)      INITIAL Post-op Vital signs:   Vitals Value Taken Time   /48 09/26/22 1403   Temp 36.5 °C (97.7 °F) 09/26/22 1345   Pulse 71 09/26/22 1405   Resp 15 09/26/22 1405   SpO2 99 % 09/26/22 1405   Vitals shown include unvalidated device data.

## 2022-09-26 NOTE — ANESTHESIA PREPROCEDURE EVALUATION
Relevant Problems   CARDIOVASCULAR   (+) Hypertension      RENAL FAILURE   (+) CKD (chronic kidney disease) stage 5, GFR less than 15 ml/min (HCC)   (+) Chronic kidney disease      ENDOCRINE   (+) Diabetes (HCC)   (+) Severe obesity (BMI 35.0-39. 9) with comorbidity ([facility] [address]   (+) Type 2 diabetes mellitus with nephropathy ([facility] [address]       Anesthetic History               Review of Systems / Medical History  Patient summary reviewed and pertinent labs reviewed    Pulmonary  Within defined limits                 Neuro/Psych   Within defined limits           Cardiovascular    Hypertension                   GI/Hepatic/Renal         Renal disease: dialysis and ESRD       Endo/Other    Diabetes: using insulin    Obesity and arthritis     Other Findings            Relevant Problems   CARDIOVASCULAR   (+) Hypertension      RENAL FAILURE   (+) CKD (chronic kidney disease) stage 5, GFR less than 15 ml/min (HCC)   (+) Chronic kidney disease      ENDOCRINE   (+) Diabetes (HCC)   (+) Severe obesity (BMI 35.0-39. 9) with comorbidity (HCC)   (+) Type 2 diabetes mellitus with nephropathy (HCC)       Anesthetic History   No history of anesthetic complications            Review of Systems / Medical History  Patient summary reviewed and pertinent labs reviewed    Pulmonary                   Neuro/Psych              Cardiovascular    Hypertension                   GI/Hepatic/Renal         Renal disease: ESRD       Endo/Other    Diabetes: type 2, using insulin    Morbid obesity and arthritis     Other Findings              Physical Exam    Airway  Mallampati: II  TM Distance: 4 - 6 cm  Neck ROM: normal range of motion   Mouth opening: Normal     Cardiovascular    Rhythm: regular  Rate: normal         Dental    Dentition: Poor dentition     Pulmonary  Breath sounds clear to auscultation               Abdominal  GI exam deferred       Other Findings            Anesthetic Plan    ASA: 3  Anesthesia type: general          Induction: Intravenous  Anesthetic plan and risks discussed with: Patient              Physical Exam    Airway  Mallampati: I  TM Distance: 4 - 6 cm  Neck ROM: normal range of motion   Mouth opening: Normal     Cardiovascular    Rhythm: regular  Rate: normal         Dental  No notable dental hx       Pulmonary  Breath sounds clear to auscultation               Abdominal  GI exam deferred       Other Findings            Anesthetic Plan    ASA: 3  Anesthesia type: MAC          Induction: Intravenous  Anesthetic plan and risks discussed with: Patient      Checking BMP for daily dialysis, last 3 K levels have been OK, but none checked since 5/22

## 2022-09-26 NOTE — DISCHARGE INSTRUCTIONS
Colonoscopy: What to Expect at [address] [place] [address]  After a colonoscopy, you'll stay at the clinic until you wake up. Then you can go home. But you'll need to arrange for a ride. Your doctor will tell you when you can eat and do your other usual activities. Your doctor will talk to you about when you'll need your next colonoscopy. Your doctor can help you decide how often you need to be checked. This will depend on the results of your test and your risk for colorectal cancer. After the test, you may be bloated or have gas pains. You may need to pass gas. If a biopsy was done or a polyp was removed, you may have streaks of blood in your stool (feces) for a few days. Problems such as heavy rectal bleeding may not occur until several weeks after the test. This isn't common. But it can happen after polyps are removed. This care sheet gives you a general idea about how long it will take for you to recover. But each person recovers at a different pace. Follow the steps below to get better as quickly as possible. How can you care for yourself at home? Activity    Rest when you feel tired. You can do your normal activities when it feels okay to do so. Diet    Follow your doctor's directions for eating. Unless your doctor has told you not to, drink plenty of fluids. This helps to replace the fluids that were lost during the colon prep. Do not drink alcohol. Medicines    Your doctor will tell you if and when you can restart your medicines. You will also be given instructions about taking any new medicines. If you take aspirin or some other blood thinner, ask your doctor if and when to start taking it again. Make sure that you understand exactly what your doctor wants you to do. If polyps were removed or a biopsy was done during the test, your doctor may tell you not to take aspirin or other anti-inflammatory medicines for a few days. These include ibuprofen (Advil, Motrin) and naproxen (Aleve). Other instructions    For your safety, do not drive or operate machinery until the medicine wears off and you can think clearly. Your doctor may tell you not to drive or operate machinery until the day after your test.     Do not sign legal documents or make major decisions until the medicine wears off and you can think clearly. The anesthesia can make it hard for you to fully understand what you are agreeing to. Follow-up care is a key part of your treatment and safety. Be sure to make and go to all appointments, and call your doctor if you are having problems. It's also a good idea to know your test results and keep a list of the medicines you take. When should you call for help? Call 911 anytime you think you may need emergency care. For example, call if:    You passed out (lost consciousness). You pass maroon or bloody stools. You have trouble breathing. Call your doctor now or seek immediate medical care if:    You have pain that does not get better after you take pain medicine. You are sick to your stomach or cannot drink fluids. You have new or worse belly pain. You have blood in your stools. You have a fever. You cannot pass stools or gas. Watch closely for changes in your health, and be sure to contact your doctor if you have any problems. Where can you learn more? Go to http://www.gray.com/  Enter E264 in the search box to learn more about \"Colonoscopy: What to Expect at Home. \"  Current as of: September 8, 2021               Content Version: 13.2  © 2006-2022 [employer]. Care instructions adapted under license by [employer] (which disclaims liability or warranty for this information). If you have questions about a medical condition or this instruction, always ask your healthcare professional. [place] [address] any warranty or liability for your use of this information.          Colon Polyps: Care Instructions  Your Care Instructions     Colon polyps are growths in the colon or the rectum. The cause of most colon polyps is not known, and most people who get them do not have any problems. But a certain kind can turn into cancer. For this reason, regular testing for colon polyps is important for people as they get older. It is also important for anyone who has an increased risk for colon cancer. Polyps are usually found through routine colon cancer screening tests. Although most colon polyps are not cancerous, they are usually removed and then tested for cancer. Screening for colon cancer saves lives because the cancer can usually be cured if it is caught early. If you have a polyp that is the type that can turn into cancer, you may need more tests to examine your entire colon. The doctor will remove any other polyps that he or she finds, and you will be tested more often. Follow-up care is a key part of your treatment and safety. Be sure to make and go to all appointments, and call your doctor if you are having problems. It's also a good idea to know your test results and keep a list of the medicines you take. How can you care for yourself at home? Regular exams to look for colon polyps are the best way to prevent polyps from turning into colon cancer. These can include stool tests, sigmoidoscopy, colonoscopy, and CT colonography. Talk with your doctor about a testing schedule that is right for you. To prevent polyps  There is no home treatment that can prevent colon polyps. But these steps may help lower your risk for cancer. Stay active. Being active can help you get to and stay at a healthy weight. Try to exercise on most days of the week. Walking is a good choice. Eat well. Choose a variety of vegetables, fruits, legumes (such as peas and beans), fish, poultry, and whole grains. Do not smoke. If you need help quitting, talk to your doctor about stop-smoking programs and medicines.  These can increase your chances of quitting for good. If you drink alcohol, limit how much you drink. Limit alcohol to 2 drinks a day for men and 1 drink a day for women. When should you call for help? Call your doctor now or seek immediate medical care if:    You have severe belly pain. Your stools are maroon or very bloody. Watch closely for changes in your health, and be sure to contact your doctor if:    You have a fever. You have nausea or vomiting. You have a change in bowel habits (new constipation or diarrhea). Your symptoms get worse or are not improving as expected. Where can you learn more? Go to http://www.grant.com/  Enter C571 in the search box to learn more about \"Colon Polyps: Care Instructions. \"  Current as of: September 8, 2021               Content Version: 13.2  © 2006-2022 [employer]. Care instructions adapted under license by [employer] (which disclaims liability or warranty for this information). If you have questions about a medical condition or this instruction, always ask your healthcare professional. [place] [address] any warranty or liability for your use of this information. DISCHARGE SUMMARY from Nurse    PATIENT INSTRUCTIONS:    After general anesthesia or intravenous sedation, for 24 hours or while taking prescription Narcotics:  Limit your activities  Do not drive and operate hazardous machinery  Do not make important personal or business decisions  Do  not drink alcoholic beverages  If you have not urinated within 8 hours after discharge, please contact your surgeon on call.     Report the following to your surgeon:  Excessive pain, swelling, redness or odor of or around the surgical area  Temperature over 100.5  Nausea and vomiting lasting longer than 4 hours or if unable to take medications  Any signs of decreased circulation or nerve impairment to extremity: change in color, persistent numbness, tingling, coldness or increase pain  Any questions        These are general instructions for a healthy lifestyle:    No smoking/ No tobacco products/ Avoid exposure to second hand smoke  Surgeon General's Warning:  Quitting smoking now greatly reduces serious risk to your health. Obesity, smoking, and sedentary lifestyle greatly increases your risk for illness    A healthy diet, regular physical exercise & weight monitoring are important for maintaining a healthy lifestyle    You may be retaining fluid if you have a history of heart failure or if you experience any of the following symptoms:  Weight gain of 3 pounds or more overnight or 5 pounds in a week, increased swelling in our hands or feet or shortness of breath while lying flat in bed. Please call your doctor as soon as you notice any of these symptoms; do not wait until your next office visit. The discharge information has been reviewed with the patient. The patient verbalized understanding. Discharge medications reviewed with the patient and appropriate educational materials and side effects teaching were provided.   ___________________________________________________________________________________________________________________________________

## 2022-10-07 ENCOUNTER — APPOINTMENT (OUTPATIENT)
Dept: GENERAL RADIOLOGY | Age: 82
DRG: 377 | End: 2022-10-07
Attending: STUDENT IN AN ORGANIZED HEALTH CARE EDUCATION/TRAINING PROGRAM
Payer: MEDICARE

## 2022-10-07 ENCOUNTER — HOSPITAL ENCOUNTER (INPATIENT)
Age: 82
LOS: 4 days | Discharge: HOME HEALTH | DRG: 377 | End: 2022-10-11
Attending: EMERGENCY MEDICINE | Admitting: INTERNAL MEDICINE
Payer: MEDICARE

## 2022-10-07 ENCOUNTER — APPOINTMENT (OUTPATIENT)
Dept: CT IMAGING | Age: 82
DRG: 377 | End: 2022-10-07
Attending: PHYSICIAN ASSISTANT
Payer: MEDICARE

## 2022-10-07 DIAGNOSIS — K92.2 UPPER GI BLEED: Primary | ICD-10-CM

## 2022-10-07 DIAGNOSIS — Z99.2 PERITONEAL DIALYSIS CATHETER IN PLACE (HCC): ICD-10-CM

## 2022-10-07 DIAGNOSIS — E87.6 HYPOKALEMIA: ICD-10-CM

## 2022-10-07 PROBLEM — R10.33 PERIUMBILICAL ABDOMINAL PAIN: Status: ACTIVE | Noted: 2022-10-07

## 2022-10-07 LAB
ALBUMIN SERPL-MCNC: 3 G/DL (ref 3.4–5)
ALBUMIN/GLOB SERPL: 0.8 {RATIO} (ref 0.8–1.7)
ALP SERPL-CCNC: 101 U/L (ref 45–117)
ALT SERPL-CCNC: 29 U/L (ref 13–56)
ANION GAP SERPL CALC-SCNC: 11 MMOL/L (ref 3–18)
ANION GAP SERPL CALC-SCNC: 11 MMOL/L (ref 3–18)
AST SERPL-CCNC: 25 U/L (ref 10–38)
BASOPHILS # BLD: 0.1 K/UL (ref 0–0.1)
BASOPHILS NFR BLD: 1 % (ref 0–2)
BILIRUB SERPL-MCNC: 0.9 MG/DL (ref 0.2–1)
BUN SERPL-MCNC: 39 MG/DL (ref 7–18)
BUN SERPL-MCNC: 40 MG/DL (ref 7–18)
BUN/CREAT SERPL: 3 (ref 12–20)
BUN/CREAT SERPL: 3 (ref 12–20)
CALCIUM SERPL-MCNC: 7.9 MG/DL (ref 8.5–10.1)
CALCIUM SERPL-MCNC: 8.2 MG/DL (ref 8.5–10.1)
CHLORIDE SERPL-SCNC: 101 MMOL/L (ref 100–111)
CHLORIDE SERPL-SCNC: 102 MMOL/L (ref 100–111)
CO2 SERPL-SCNC: 28 MMOL/L (ref 21–32)
CO2 SERPL-SCNC: 28 MMOL/L (ref 21–32)
COVID-19 RAPID TEST, COVR: NOT DETECTED
CREAT SERPL-MCNC: 13.1 MG/DL (ref 0.6–1.3)
CREAT SERPL-MCNC: 13.1 MG/DL (ref 0.6–1.3)
DIFFERENTIAL METHOD BLD: ABNORMAL
EOSINOPHIL # BLD: 0.1 K/UL (ref 0–0.4)
EOSINOPHIL NFR BLD: 2 % (ref 0–5)
ERYTHROCYTE [DISTWIDTH] IN BLOOD BY AUTOMATED COUNT: 13.8 % (ref 11.6–14.5)
EST. AVERAGE GLUCOSE BLD GHB EST-MCNC: 94 MG/DL
FERRITIN SERPL-MCNC: 1516 NG/ML (ref 8–388)
FOLATE SERPL-MCNC: >20 NG/ML (ref 3.1–17.5)
GLOBULIN SER CALC-MCNC: 3.8 G/DL (ref 2–4)
GLUCOSE BLD STRIP.AUTO-MCNC: 62 MG/DL (ref 70–110)
GLUCOSE BLD STRIP.AUTO-MCNC: 63 MG/DL (ref 70–110)
GLUCOSE BLD STRIP.AUTO-MCNC: 72 MG/DL (ref 70–110)
GLUCOSE BLD STRIP.AUTO-MCNC: 85 MG/DL (ref 70–110)
GLUCOSE SERPL-MCNC: 121 MG/DL (ref 74–99)
GLUCOSE SERPL-MCNC: 68 MG/DL (ref 74–99)
HBA1C MFR BLD: 4.9 % (ref 4.2–5.6)
HBV SURFACE AG SER QL: <0.1 INDEX
HBV SURFACE AG SER QL: NEGATIVE
HCT VFR BLD AUTO: 27 % (ref 35–45)
HEMOCCULT STL QL: NEGATIVE
HGB BLD-MCNC: 8.8 G/DL (ref 12–16)
IMM GRANULOCYTES # BLD AUTO: 0 K/UL (ref 0–0.04)
IMM GRANULOCYTES NFR BLD AUTO: 0 % (ref 0–0.5)
INR PPP: 1 (ref 0.8–1.2)
IRON SATN MFR SERPL: 55 % (ref 20–50)
IRON SERPL-MCNC: 84 UG/DL (ref 50–175)
LIPASE SERPL-CCNC: 433 U/L (ref 73–393)
LYMPHOCYTES # BLD: 2.2 K/UL (ref 0.9–3.6)
LYMPHOCYTES NFR BLD: 25 % (ref 21–52)
MAGNESIUM SERPL-MCNC: 2.5 MG/DL (ref 1.6–2.6)
MCH RBC QN AUTO: 29.8 PG (ref 24–34)
MCHC RBC AUTO-ENTMCNC: 32.6 G/DL (ref 31–37)
MCV RBC AUTO: 91.5 FL (ref 78–100)
MONOCYTES # BLD: 0.8 K/UL (ref 0.05–1.2)
MONOCYTES NFR BLD: 9 % (ref 3–10)
NEUTS SEG # BLD: 5.6 K/UL (ref 1.8–8)
NEUTS SEG NFR BLD: 63 % (ref 40–73)
NRBC # BLD: 0 K/UL (ref 0–0.01)
NRBC BLD-RTO: 0 PER 100 WBC
PLATELET # BLD AUTO: 158 K/UL (ref 135–420)
PMV BLD AUTO: 10.3 FL (ref 9.2–11.8)
POTASSIUM SERPL-SCNC: 2.8 MMOL/L (ref 3.5–5.5)
POTASSIUM SERPL-SCNC: 3.5 MMOL/L (ref 3.5–5.5)
PROT SERPL-MCNC: 6.8 G/DL (ref 6.4–8.2)
PROTHROMBIN TIME: 13.7 SEC (ref 11.5–15.2)
RBC # BLD AUTO: 2.95 M/UL (ref 4.2–5.3)
SODIUM SERPL-SCNC: 140 MMOL/L (ref 136–145)
SODIUM SERPL-SCNC: 141 MMOL/L (ref 136–145)
SOURCE, COVRS: NORMAL
TIBC SERPL-MCNC: 154 UG/DL (ref 250–450)
VIT B12 SERPL-MCNC: 1273 PG/ML (ref 211–911)
WBC # BLD AUTO: 8.9 K/UL (ref 4.6–13.2)

## 2022-10-07 PROCEDURE — 83036 HEMOGLOBIN GLYCOSYLATED A1C: CPT

## 2022-10-07 PROCEDURE — 85610 PROTHROMBIN TIME: CPT

## 2022-10-07 PROCEDURE — 99223 1ST HOSP IP/OBS HIGH 75: CPT | Performed by: PHYSICIAN ASSISTANT

## 2022-10-07 PROCEDURE — 74011000250 HC RX REV CODE- 250: Performed by: STUDENT IN AN ORGANIZED HEALTH CARE EDUCATION/TRAINING PROGRAM

## 2022-10-07 PROCEDURE — 36415 COLL VENOUS BLD VENIPUNCTURE: CPT

## 2022-10-07 PROCEDURE — 94762 N-INVAS EAR/PLS OXIMTRY CONT: CPT

## 2022-10-07 PROCEDURE — 74018 RADEX ABDOMEN 1 VIEW: CPT

## 2022-10-07 PROCEDURE — 82962 GLUCOSE BLOOD TEST: CPT

## 2022-10-07 PROCEDURE — 82728 ASSAY OF FERRITIN: CPT

## 2022-10-07 PROCEDURE — 87635 SARS-COV-2 COVID-19 AMP PRB: CPT

## 2022-10-07 PROCEDURE — 74011250637 HC RX REV CODE- 250/637: Performed by: PHYSICIAN ASSISTANT

## 2022-10-07 PROCEDURE — 83690 ASSAY OF LIPASE: CPT

## 2022-10-07 PROCEDURE — 86923 COMPATIBILITY TEST ELECTRIC: CPT

## 2022-10-07 PROCEDURE — 74011250636 HC RX REV CODE- 250/636: Performed by: INTERNAL MEDICINE

## 2022-10-07 PROCEDURE — 83735 ASSAY OF MAGNESIUM: CPT

## 2022-10-07 PROCEDURE — 96374 THER/PROPH/DIAG INJ IV PUSH: CPT

## 2022-10-07 PROCEDURE — 74011250636 HC RX REV CODE- 250/636: Performed by: NURSE PRACTITIONER

## 2022-10-07 PROCEDURE — 86900 BLOOD TYPING SEROLOGIC ABO: CPT

## 2022-10-07 PROCEDURE — 74011250636 HC RX REV CODE- 250/636: Performed by: PHYSICIAN ASSISTANT

## 2022-10-07 PROCEDURE — 74011250637 HC RX REV CODE- 250/637: Performed by: NURSE PRACTITIONER

## 2022-10-07 PROCEDURE — 65270000046 HC RM TELEMETRY

## 2022-10-07 PROCEDURE — 74011250636 HC RX REV CODE- 250/636: Performed by: STUDENT IN AN ORGANIZED HEALTH CARE EDUCATION/TRAINING PROGRAM

## 2022-10-07 PROCEDURE — 99285 EMERGENCY DEPT VISIT HI MDM: CPT

## 2022-10-07 PROCEDURE — 82607 VITAMIN B-12: CPT

## 2022-10-07 PROCEDURE — 74011000636 HC RX REV CODE- 636: Performed by: INTERNAL MEDICINE

## 2022-10-07 PROCEDURE — 82272 OCCULT BLD FECES 1-3 TESTS: CPT

## 2022-10-07 PROCEDURE — 2709999900 HC NON-CHARGEABLE SUPPLY

## 2022-10-07 PROCEDURE — 83540 ASSAY OF IRON: CPT

## 2022-10-07 PROCEDURE — 86706 HEP B SURFACE ANTIBODY: CPT

## 2022-10-07 PROCEDURE — 74011000250 HC RX REV CODE- 250: Performed by: PHYSICIAN ASSISTANT

## 2022-10-07 PROCEDURE — C9113 INJ PANTOPRAZOLE SODIUM, VIA: HCPCS | Performed by: STUDENT IN AN ORGANIZED HEALTH CARE EDUCATION/TRAINING PROGRAM

## 2022-10-07 PROCEDURE — 80053 COMPREHEN METABOLIC PANEL: CPT

## 2022-10-07 PROCEDURE — 74174 CTA ABD&PLVS W/CONTRAST: CPT

## 2022-10-07 PROCEDURE — 87340 HEPATITIS B SURFACE AG IA: CPT

## 2022-10-07 PROCEDURE — 85025 COMPLETE CBC W/AUTO DIFF WBC: CPT

## 2022-10-07 RX ORDER — ONDANSETRON 2 MG/ML
4 INJECTION INTRAMUSCULAR; INTRAVENOUS
Status: DISCONTINUED | OUTPATIENT
Start: 2022-10-07 | End: 2022-10-11 | Stop reason: HOSPADM

## 2022-10-07 RX ORDER — POTASSIUM CHLORIDE 20 MEQ/1
20 TABLET, EXTENDED RELEASE ORAL DAILY
Status: DISCONTINUED | OUTPATIENT
Start: 2022-10-08 | End: 2022-10-11 | Stop reason: HOSPADM

## 2022-10-07 RX ORDER — SODIUM CHLORIDE 0.9 % (FLUSH) 0.9 %
5-40 SYRINGE (ML) INJECTION EVERY 8 HOURS
Status: DISCONTINUED | OUTPATIENT
Start: 2022-10-07 | End: 2022-10-11 | Stop reason: HOSPADM

## 2022-10-07 RX ORDER — ROSUVASTATIN CALCIUM 10 MG/1
10 TABLET, COATED ORAL DAILY
Status: DISCONTINUED | OUTPATIENT
Start: 2022-10-08 | End: 2022-10-11 | Stop reason: HOSPADM

## 2022-10-07 RX ORDER — ACETAMINOPHEN 325 MG/1
650 TABLET ORAL
Status: DISCONTINUED | OUTPATIENT
Start: 2022-10-07 | End: 2022-10-11 | Stop reason: HOSPADM

## 2022-10-07 RX ORDER — POTASSIUM CHLORIDE 20 MEQ/1
40 TABLET, EXTENDED RELEASE ORAL
Status: COMPLETED | OUTPATIENT
Start: 2022-10-07 | End: 2022-10-07

## 2022-10-07 RX ORDER — ONDANSETRON 4 MG/1
4 TABLET, ORALLY DISINTEGRATING ORAL
Status: DISCONTINUED | OUTPATIENT
Start: 2022-10-07 | End: 2022-10-11 | Stop reason: HOSPADM

## 2022-10-07 RX ORDER — POLYETHYLENE GLYCOL 3350 17 G/17G
17 POWDER, FOR SOLUTION ORAL DAILY PRN
Status: DISCONTINUED | OUTPATIENT
Start: 2022-10-07 | End: 2022-10-11 | Stop reason: HOSPADM

## 2022-10-07 RX ORDER — INSULIN LISPRO 100 [IU]/ML
INJECTION, SOLUTION INTRAVENOUS; SUBCUTANEOUS
Status: DISCONTINUED | OUTPATIENT
Start: 2022-10-07 | End: 2022-10-11 | Stop reason: HOSPADM

## 2022-10-07 RX ORDER — ACETAMINOPHEN 500 MG
1000 TABLET ORAL
Status: COMPLETED | OUTPATIENT
Start: 2022-10-07 | End: 2022-10-07

## 2022-10-07 RX ORDER — SODIUM CHLORIDE 0.9 % (FLUSH) 0.9 %
5-40 SYRINGE (ML) INJECTION AS NEEDED
Status: DISCONTINUED | OUTPATIENT
Start: 2022-10-07 | End: 2022-10-11 | Stop reason: HOSPADM

## 2022-10-07 RX ORDER — MAGNESIUM SULFATE 100 %
4 CRYSTALS MISCELLANEOUS AS NEEDED
Status: DISCONTINUED | OUTPATIENT
Start: 2022-10-07 | End: 2022-10-11 | Stop reason: HOSPADM

## 2022-10-07 RX ORDER — ONDANSETRON 2 MG/ML
4 INJECTION INTRAMUSCULAR; INTRAVENOUS
Status: COMPLETED | OUTPATIENT
Start: 2022-10-07 | End: 2022-10-07

## 2022-10-07 RX ORDER — GENTAMICIN SULFATE 60 MG/50ML
120 INJECTION, SOLUTION INTRAVENOUS ONCE
Status: COMPLETED | OUTPATIENT
Start: 2022-10-07 | End: 2022-10-07

## 2022-10-07 RX ORDER — POTASSIUM CHLORIDE 7.45 MG/ML
10 INJECTION INTRAVENOUS
Status: DISPENSED | OUTPATIENT
Start: 2022-10-07 | End: 2022-10-07

## 2022-10-07 RX ORDER — DEXTROSE MONOHYDRATE 100 MG/ML
0-250 INJECTION, SOLUTION INTRAVENOUS AS NEEDED
Status: DISCONTINUED | OUTPATIENT
Start: 2022-10-07 | End: 2022-10-11 | Stop reason: HOSPADM

## 2022-10-07 RX ORDER — FACIAL-BODY WIPES
10 EACH TOPICAL DAILY PRN
Status: DISCONTINUED | OUTPATIENT
Start: 2022-10-07 | End: 2022-10-11 | Stop reason: HOSPADM

## 2022-10-07 RX ADMIN — SODIUM CHLORIDE, PRESERVATIVE FREE 10 ML: 5 INJECTION INTRAVENOUS at 22:19

## 2022-10-07 RX ADMIN — POTASSIUM CHLORIDE 40 MEQ: 1500 TABLET, EXTENDED RELEASE ORAL at 14:57

## 2022-10-07 RX ADMIN — IOPAMIDOL 100 ML: 755 INJECTION, SOLUTION INTRAVENOUS at 18:47

## 2022-10-07 RX ADMIN — Medication 16 G: at 22:33

## 2022-10-07 RX ADMIN — POTASSIUM CHLORIDE 10 MEQ: 7.46 INJECTION, SOLUTION INTRAVENOUS at 17:12

## 2022-10-07 RX ADMIN — POLYETHYLENE GLYCOL-3350 AND ELECTROLYTES 4000 ML: 236; 6.74; 5.86; 2.97; 22.74 POWDER, FOR SOLUTION ORAL at 17:47

## 2022-10-07 RX ADMIN — SODIUM CHLORIDE, PRESERVATIVE FREE 40 MG: 5 INJECTION INTRAVENOUS at 17:00

## 2022-10-07 RX ADMIN — GENTAMICIN SULFATE 120 MG: 60 INJECTION, SOLUTION INTRAVENOUS at 22:17

## 2022-10-07 RX ADMIN — ACETAMINOPHEN 1000 MG: 500 TABLET ORAL at 14:55

## 2022-10-07 RX ADMIN — POTASSIUM CHLORIDE 10 MEQ: 7.46 INJECTION, SOLUTION INTRAVENOUS at 19:09

## 2022-10-07 RX ADMIN — ONDANSETRON 4 MG: 2 INJECTION INTRAMUSCULAR; INTRAVENOUS at 11:13

## 2022-10-07 RX ADMIN — SODIUM CHLORIDE, PRESERVATIVE FREE 10 ML: 5 INJECTION INTRAVENOUS at 17:50

## 2022-10-07 NOTE — ED TRIAGE NOTES
Patient reports abdominal pain for several days. Patient had colonoscopy on 9/26/2022. Reports dark, tarry stools. Patient reports GI office advised her to come to ED. Denies use of anticoagulant. Nausea but no vomiting, decreased appetite.

## 2022-10-07 NOTE — PROGRESS NOTES
Received patient from ED, pt is a/ox4, denies pain, on room air sating at  99%  Skin intact, no open areas noted, pt has right peritoneal dialysis cath to RUQ, dressing is clean, dry and intact. Pt is schedule for colonoscopy 10/8/2022, pt has been educated on how to prep, prep started at 1800, pt and daughter verbalizes understanding. Pt is also scheduled for EGD 10/8/2022  Pt states she is no ambulatory w/o walker, I have not witnessed patient's mobility yet. 20G piv to left upper arm, saline locked. Call bell in reach, bed is in lowest position, pt encouraged to call for assistance before getting out of bed, patient verbalizes understanding. Will continue to monitor.

## 2022-10-07 NOTE — PROGRESS NOTES
Arrived at patient bedside at 1930 for CCPD. Per patient she was informed she would not cycle tonight as she is being required to drink a bowel prep and will be up to the bathroom several times. Call placed to Nephrologist and confirmed patient will receive CCPD likely on tomorrow 10/8/22. CCPD cycler and supplies left at patient bedside along with PD fluid.

## 2022-10-07 NOTE — PROGRESS NOTES
Problem: Pressure Injury - Risk of  Goal: *Prevention of pressure injury  Description: Document Jayden Scale and appropriate interventions in the flowsheet.   Outcome: Progressing Towards Goal  Note: Pressure Injury Interventions:  Sensory Interventions: Assess changes in LOC    Moisture Interventions: Absorbent underpads, Moisture barrier, Maintain skin hydration (lotion/cream)    Activity Interventions: Pressure redistribution bed/mattress(bed type), PT/OT evaluation    Mobility Interventions: HOB 30 degrees or less, Pressure redistribution bed/mattress (bed type), PT/OT evaluation    Nutrition Interventions: Document food/fluid/supplement intake, Discuss nutritional consult with provider, Offer support with meals,snacks and hydration    Friction and Shear Interventions: HOB 30 degrees or less, Apply protective barrier, creams and emollients                Problem: Patient Education: Go to Patient Education Activity  Goal: Patient/Family Education  Outcome: Progressing Towards Goal

## 2022-10-07 NOTE — ED PROVIDER NOTES
EMERGENCY DEPARTMENT HISTORY AND PHYSICAL EXAM    2:21 PM      Date: 10/7/2022  Patient Name: Juan Tobin    History of Presenting Illness     Chief Complaint   Patient presents with    Abdominal Pain         History Provided By: Patient    Additional History (Context): Juan Tobin is a 80 y.o. female with past medical history significant for end-stage renal disease on home peritoneal dialysis, diabetes, hypertension, HLD, GERD who presents with complaints of nausea, abdominal pain, decreased appetite x 10 days after having colonoscopy. Pain periumbilical and non radiating. No NSAIDs or etoh. No fever/chills. PCP: Patrick Patel MD    Current Facility-Administered Medications   Medication Dose Route Frequency Provider Last Rate Last Admin    mylanta/viscous lidocaine (GI COCKTAIL)  40 mL Oral ONCE [place] FREDY Eller         Current Outpatient Medications   Medication Sig Dispense Refill    fluticasone propionate (FLONASE) 50 mcg/actuation nasal spray 2 Sprays by Both Nostrils route daily as needed. folic acid/vit B complex and C (RENAL VITAMIN PO) Take 1 Capsule by mouth daily. losartan (COZAAR) 50 mg tablet Take 1 Tablet by mouth daily. 90 Tablet 3    ferric citrate (Auryxia) 210 mg iron tablet Take 210 mg by mouth two (2) times daily (with meals). amLODIPine (NORVASC) 5 mg tablet Take 1 Tablet by mouth daily. 90 Tablet 3    rosuvastatin (CRESTOR) 40 mg tablet TAKE 1 TABLET EVERY NIGHT 90 Tablet 1    metoprolol succinate (TOPROL-XL) 50 mg XL tablet TAKE 1 TABLET EVERY DAY 90 Tablet 1    Droplet Pen Needle 32 gauge x 5/32\" ndle       Accu-Chek Evelin Plus test strp strip       gentamicin (GARAMYCIN) 0.1 % topical cream gentamicin 0.1 % topical cream   APPLY TO EXIT SITE DAILY      hydrALAZINE (APRESOLINE) 50 mg tablet Take 50 mg by mouth daily. omeprazole (PRILOSEC) 40 mg capsule Take 40 mg by mouth daily as needed.       tamsulosin (FLOMAX) 0.4 mg capsule Take 1 Capsule by mouth daily. 90 Capsule 3    insulin aspart U-100 (NOVOLOG) 100 unit/mL (3 mL) inpn 0-8 Units by SubCUTAneous route daily. Indications: type 2 diabetes mellitus      furosemide (LASIX) 20 mg tablet Take 20 mg by mouth BID Mon Wed & Fri.      insulin glargine (LANTUS,BASAGLAR) 100 unit/mL (3 mL) inpn 24 Units by SubCUTAneous route nightly. Indications: type 2 diabetes mellitus         Past History     Past Medical History:  Past Medical History:   Diagnosis Date    Allergic rhinitis     Chronic kidney disease 2020    Peritoneal Dialysis    Diabetes ([address] 2015    IDDM    H/O seasonal allergies     Hypertension     Left foot drop, chronic since 2004 fusion sx     Peritoneal dialysis catheter in place [facility])        Past Surgical History:  Past Surgical History:   Procedure Laterality Date    COLONOSCOPY N/A 9/26/2022    COLONOSCOPY with bx's and polypectomy performed by José Manuel Anderson MD at [facility]    [address] Left     HX CATARACT REMOVAL  2011    HX LUMBAR FUSION  2004-last sx    fused L2-S1, in [address] [place] [address]    HX TUBAL LIGATION      HX WISDOM TEETH EXTRACTION         Family History:  Family History   Problem Relation Age of Onset    Diabetes Maternal Aunt     Diabetes Maternal Uncle     Diabetes Maternal Grandfather     Diabetes Other     Hypertension Cousin        Social History:  Social History     Tobacco Use    Smoking status: Never    Smokeless tobacco: Never   Vaping Use    Vaping Use: Never used   Substance Use Topics    Alcohol use: No    Drug use: Never       Allergies: Allergies   Allergen Reactions    Sevelamer Diarrhea and Other (comments)    Aspirin Other (comments)     Ringing in ears    Keflex [Cephalexin] Rash    Sevelamer Carbonate Itching     With rash and sweatiness         Review of Systems       Review of Systems   Constitutional:  Positive for appetite change. Negative for chills and fever. HENT: Negative. Negative for congestion, ear pain and rhinorrhea. Eyes: Negative.   Negative for pain and redness. Respiratory: Negative. Negative for cough and shortness of breath. Cardiovascular: Negative. Negative for chest pain, palpitations and leg swelling. Gastrointestinal:  Positive for abdominal pain and nausea. Negative for constipation, diarrhea and vomiting. Genitourinary: Negative. Negative for dysuria, frequency, hematuria and urgency. Musculoskeletal: Negative. Negative for back pain, gait problem, joint swelling and neck pain. Skin: Negative. Negative for rash and wound. Neurological: Negative. Negative for dizziness, seizures, speech difficulty, weakness, light-headedness and headaches. Hematological:  Negative for adenopathy. Does not bruise/bleed easily. All other systems reviewed and are negative. Physical Exam   Visit Vitals  BP (!) 103/47   Pulse 84   Temp 98.1 °F (36.7 °C)   Resp 17   Ht 5' 5\" (1.651 m)   Wt 73.5 kg (162 lb)   SpO2 100%   BMI 26.96 kg/m²         Physical Exam  Vitals and nursing note reviewed. Constitutional:       General: She is not in acute distress. Appearance: Normal appearance. She is not ill-appearing, toxic-appearing or diaphoretic. HENT:      Head: Normocephalic and atraumatic. Nose: Nose normal.      Mouth/Throat:      Mouth: Mucous membranes are moist.      Pharynx: Oropharynx is clear. Eyes:      General: Lids are normal. Vision grossly intact. Conjunctiva/sclera: Conjunctivae normal.   Cardiovascular:      Rate and Rhythm: Normal rate and regular rhythm. Pulses: Normal pulses. Heart sounds: Normal heart sounds. Pulmonary:      Effort: Pulmonary effort is normal. No respiratory distress. Breath sounds: Normal breath sounds. No stridor. No wheezing, rhonchi or rales. Chest:      Chest wall: No tenderness. Abdominal:      Palpations: Abdomen is soft. Tenderness: There is no abdominal tenderness. There is no right CVA tenderness, left CVA tenderness or guarding.       Comments: Abdomen grossly nontender throughout. Peritoneal dialysis catheter in place with no redness or swelling. Genitourinary:     Rectum: Guaiac result negative. External hemorrhoid (Small, nonthrombosed with no bleeding) present. No mass, tenderness, anal fissure or internal hemorrhoid. Normal anal tone. Musculoskeletal:         General: Normal range of motion. Cervical back: Full passive range of motion without pain, normal range of motion and neck supple. No tenderness. Lymphadenopathy:      Cervical: No cervical adenopathy. Skin:     General: Skin is warm and dry. Capillary Refill: Capillary refill takes less than 2 seconds. Neurological:      General: No focal deficit present. Mental Status: She is alert and oriented to person, place, and time. Psychiatric:         Mood and Affect: Mood normal.         Behavior: Behavior normal. Behavior is cooperative. Diagnostic Study Results     Labs -  Recent Results (from the past 12 hour(s))   CBC WITH AUTOMATED DIFF    Collection Time: 10/07/22 10:55 AM   Result Value Ref Range    WBC 8.9 4.6 - 13.2 K/uL    RBC 2.95 (L) 4.20 - 5.30 M/uL    HGB 8.8 (L) 12.0 - 16.0 g/dL    HCT 27.0 (L) 35.0 - 45.0 %    MCV 91.5 78.0 - 100.0 FL    MCH 29.8 24.0 - 34.0 PG    MCHC 32.6 31.0 - 37.0 g/dL    RDW 13.8 11.6 - 14.5 %    PLATELET 348 506 - 554 K/uL    MPV 10.3 9.2 - 11.8 FL    NRBC 0.0 0  WBC    ABSOLUTE NRBC 0.00 0.00 - 0.01 K/uL    NEUTROPHILS 63 40 - 73 %    LYMPHOCYTES 25 21 - 52 %    MONOCYTES 9 3 - 10 %    EOSINOPHILS 2 0 - 5 %    BASOPHILS 1 0 - 2 %    IMMATURE GRANULOCYTES 0 0.0 - 0.5 %    ABS. NEUTROPHILS 5.6 1.8 - 8.0 K/UL    ABS. LYMPHOCYTES 2.2 0.9 - 3.6 K/UL    ABS. MONOCYTES 0.8 0.05 - 1.2 K/UL    ABS. EOSINOPHILS 0.1 0.0 - 0.4 K/UL    ABS. BASOPHILS 0.1 0.0 - 0.1 K/UL    ABS. IMM.  GRANS. 0.0 0.00 - 0.04 K/UL    DF AUTOMATED     METABOLIC PANEL, COMPREHENSIVE    Collection Time: 10/07/22 10:55 AM   Result Value Ref Range    Sodium 140 136 - 145 mmol/L    Potassium 2.8 (LL) 3.5 - 5.5 mmol/L    Chloride 101 100 - 111 mmol/L    CO2 28 21 - 32 mmol/L    Anion gap 11 3.0 - 18 mmol/L    Glucose 121 (H) 74 - 99 mg/dL    BUN 40 (H) 7.0 - 18 MG/DL    Creatinine 13.10 (H) 0.6 - 1.3 MG/DL    BUN/Creatinine ratio 3 (L) 12 - 20      eGFR 3 (L) >60 ml/min/1.73m2    Calcium 8.2 (L) 8.5 - 10.1 MG/DL    Bilirubin, total 0.9 0.2 - 1.0 MG/DL    ALT (SGPT) 29 13 - 56 U/L    AST (SGOT) 25 10 - 38 U/L    Alk. phosphatase 101 45 - 117 U/L    Protein, total 6.8 6.4 - 8.2 g/dL    Albumin 3.0 (L) 3.4 - 5.0 g/dL    Globulin 3.8 2.0 - 4.0 g/dL    A-G Ratio 0.8 0.8 - 1.7     LIPASE    Collection Time: 10/07/22 10:55 AM   Result Value Ref Range    Lipase 433 (H) 73 - 393 U/L   PROTHROMBIN TIME + INR    Collection Time: 10/07/22 10:55 AM   Result Value Ref Range    Prothrombin time 13.7 11.5 - 15.2 sec    INR 1.0 0.8 - 1.2     OCCULT BLOOD, STOOL    Collection Time: 10/07/22  1:20 PM   Result Value Ref Range    Occult blood, stool Negative NEG     TYPE & SCREEN    Collection Time: 10/07/22  1:25 PM   Result Value Ref Range    Crossmatch Expiration 10/10/2022,2359     ABO/Rh(D) B POSITIVE     Antibody screen NEG    COVID-19 RAPID TEST    Collection Time: 10/07/22  2:50 PM   Result Value Ref Range    Specimen source Nasopharyngeal      COVID-19 rapid test Not detected NOTD         Radiologic Studies -   XR ABD (KUB)   Final Result      Nonobstructive bowel gas pattern. Medical Decision Making   I am the first provider for this patient. I reviewed available nursing notes, past medical history, past surgical history, family history and social history. Vital Signs-Reviewed the patient's vital signs.     Records Reviewed: Nursing Notes and Old Medical Records (Time of Review: 2:21 PM)    Pulse Oximetry Analysis - 100% on RA- normal     Cardiac Monitor:  Rate:   Rhythm:    EKG: Normal sinus rhythm with ventricular rate of 100 bpm.  Right bundle branch block with no significant change from prior. ED Course: Progress Notes, Reevaluation, and Consults:  12:21 PM  Initial assessment performed. The patients presenting problems have been discussed, and they/their family are in agreement with the care plan formulated and outlined with them. I have encouraged them to ask questions as they arise throughout their visit.    2:21 PM consultation with MARIA GUADALUPE, MITZI Josue. Recommend admission for patient and n.p.o. at midnight for EGD tomorrow. 3:14 PM consultation with hospitalist, Dr. Jasen Barreto. Standard discussion regarding patient's history, physical exam findings, and consultation with specialist.  He agrees to admission to the telemetry floor for upper GI bleed. 3:15 PM spoke with Dr. Cm Fraction with nephrology. Aware of patient and will see patient in the hospital upon admission. Provider Notes (Medical Decision Making):     Patient is an 22-year-old female who presents to the ER with complaints of melena, abdominal discomfort, nausea, and decreased appetite since earlier this week. Recent colonoscopy last week on 10/26 and that went well. Patient states she actually felt much better 2 to 3 days after the procedure. She denies any vomiting, fever, or chills. Abdomen is soft and nontender with peritoneal dialysis catheter in place. Diagnosis     Clinical Impression:   1. Upper GI bleed    2. Hypokalemia    3. Peritoneal dialysis catheter in place [facility])        Disposition: Admit    Follow-up Information    None          Current Discharge Medication List            Dictation disclaimer:  Please note that this dictation was completed with [id number], the computer voice recognition software. Quite often unanticipated grammatical, syntax, homophones, and other interpretive errors are inadvertently transcribed by the computer software. Please disregard these errors. Please excuse any errors that have escaped final proofreading.

## 2022-10-07 NOTE — H&P
History and Physical          Subjective     HPI: Perla Fisher is a 80 y.o. female with a PMHx of ESRD on PD, IDDM, HTN who presented to the ED with c/o occasional melena ongoing for a few months (Since June). She states she \"thought the melena was going to stop after the colonoscopy\". Additionally she reports periumbilical abdominal pain that has been intermittent for just as long. She says that nothing makes it better or worse, it just happens randomly. When it does occur, the pain is a 9/10, aching/cramp, and doesn't last very long. She denies cp, sob, cough, NVD. In the ED, VSS. Hgb 8.8, prior baseline earlier this year was 11-12. K 2.8. BUN/creatinine ratio 3. Hemoccult negative. GI consulted from ED - planning for EGD/colo if CTA negative for mesenteric ischemia.      PMHx:  Past Medical History:   Diagnosis Date    Allergic rhinitis     Chronic kidney disease 2020    Peritoneal Dialysis    Diabetes ([facility] [address] 2015    IDDM    H/O seasonal allergies     Hypertension     Left foot drop, chronic since 2004 fusion sx     Peritoneal dialysis catheter in place [facility])        PSurgHx:  Past Surgical History:   Procedure Laterality Date    COLONOSCOPY N/A 9/26/2022    COLONOSCOPY with bx's and polypectomy performed by Vanita Salomon MD at [facility]    R [address] 46 Left     HX CATARACT REMOVAL  2011    HX LUMBAR FUSION  2004-last sx    fused L2-S1, in [place]    HX TUBAL LIGATION      HX WISDOM TEETH EXTRACTION         SocialHx:  Social History     Socioeconomic History    Marital status:    Occupational History    Occupation: CNA - RET   Tobacco Use    Smoking status: Never    Smokeless tobacco: Never   Vaping Use    Vaping Use: Never used   Substance and Sexual Activity    Alcohol use: No    Drug use: Never       FamilyHx:  Family History   Problem Relation Age of Onset    Diabetes Maternal Aunt     Diabetes Maternal Uncle     Diabetes Maternal Grandfather     Diabetes Other     Hypertension Cousin Home Medications:  Prior to Admission Medications   Prescriptions Last Dose Informant Patient Reported? Taking? Accu-Chek Evelin Plus test strp strip 10/6/2022  Yes Yes   Droplet Pen Needle 32 gauge x \" ndle   Yes No   amLODIPine (NORVASC) 5 mg tablet 10/6/2022  No Yes   Sig: Take 1 Tablet by mouth daily. ferric citrate (Auryxia) 210 mg iron tablet 10/6/2022  Yes Yes   Sig: Take 210 mg by mouth two (2) times daily (with meals). fluticasone propionate (FLONASE) 50 mcg/actuation nasal spray Not Taking  Yes No   Si Sprays by Both Nostrils route daily as needed. Patient not taking: Reported on    folic acid/vit B complex and C (RENAL VITAMIN PO) 10/6/2022  Yes Yes   Sig: Take 1 Capsule by mouth daily. furosemide (LASIX) 20 mg tablet 10/6/2022  Yes Yes   Sig: Take 20 mg by mouth BID Mon Wed & Fri.   gentamicin (GARAMYCIN) 0.1 % topical cream 10/6/2022  Yes Yes   Sig: gentamicin 0.1 % topical cream   APPLY TO EXIT SITE DAILY   hydrALAZINE (APRESOLINE) 50 mg tablet 10/6/2022  Yes Yes   Sig: Take 50 mg by mouth daily. insulin aspart U-100 (NOVOLOG) 100 unit/mL (3 mL) inpn 10/6/2022  Yes Yes   Si-8 Units by SubCUTAneous route daily. Indications: type 2 diabetes mellitus   insulin glargine (LANTUS,BASAGLAR) 100 unit/mL (3 mL) inpn 10/6/2022  Yes Yes   Si Units by SubCUTAneous route nightly. Indications: type 2 diabetes mellitus   losartan (COZAAR) 50 mg tablet 10/6/2022  No Yes   Sig: Take 1 Tablet by mouth daily. metoprolol succinate (TOPROL-XL) 50 mg XL tablet 10/6/2022  No Yes   Sig: TAKE 1 TABLET EVERY DAY   omeprazole (PRILOSEC) 40 mg capsule Not Taking  Yes No   Sig: Take 40 mg by mouth daily as needed. Patient not taking: Reported on 10/7/2022   rosuvastatin (CRESTOR) 40 mg tablet 10/6/2022  No Yes   Sig: TAKE 1 TABLET EVERY NIGHT   tamsulosin (FLOMAX) 0.4 mg capsule 10/6/2022  No Yes   Sig: Take 1 Capsule by mouth daily.       Facility-Administered Medications: None Allergies: Allergies   Allergen Reactions    Sevelamer Diarrhea and Other (comments)    Aspirin Other (comments)     Ringing in ears    Keflex [Cephalexin] Rash    Sevelamer Carbonate Itching     With rash and sweatiness        Review of Systems:  CONST: no weight loss/gain, no fever or chills, no fatigue  Eyes: No change in vision, no itching or drainage  ENT: no sore throat or sinus congestion. PULM: No shortness of breath, no cough or wheeze. CV: no pnd or orthopnea, no CP, no palpitations, no edema  GI: + abdominal pain, no nausea, no vomiting or diarrhea, + melena   : No urinary frequency, no urgency, no hesitancy or dysuria. MSK: No joint or muscle pain, no back pain, no neck pain, no recent trauma. INTEG: No rash, no itching, no lesions. HEME: No anemia or easy bruising or bleeding.    NEURO: No headache, no dizziness  PSYCH: No anxiety, no depression      Objective     Physical Exam:  Visit Vitals  BP (!) 118/51 (BP 1 Location: Left upper arm)   Pulse 78   Temp 99.6 °F (37.6 °C)   Resp 18   Ht 5' 5\" (1.651 m)   Wt 73.5 kg (162 lb)   SpO2 99%   Breastfeeding No   BMI 26.96 kg/m²       General: NAD, appears stated age, alert  Skin: warm, dry, no rashes  Eyes: PERRL, sclera is non-icteric  HENT: normocephalic/atraumatic, moist mucus membranes  Respiratory: CTA with no signs of respiratory distress  Cardiovascular: RRR, no m/r/g, no cyanosis or peripheral edema of extremities  GI: soft, non-tender, normal bowel sounds   Neuro: moves all extremities, no focal deficits, normal speech  Psych: appropriate mood and affect, no visual or auditory hallucinations    Laboratory Studies:  Recent Results (from the past 24 hour(s))   CBC WITH AUTOMATED DIFF    Collection Time: 10/07/22 10:55 AM   Result Value Ref Range    WBC 8.9 4.6 - 13.2 K/uL    RBC 2.95 (L) 4.20 - 5.30 M/uL    HGB 8.8 (L) 12.0 - 16.0 g/dL    HCT 27.0 (L) 35.0 - 45.0 %    MCV 91.5 78.0 - 100.0 FL    MCH 29.8 24.0 - 34.0 PG    MCHC 32.6 31.0 - 37.0 g/dL    RDW 13.8 11.6 - 14.5 %    PLATELET 847 906 - 377 K/uL    MPV 10.3 9.2 - 11.8 FL    NRBC 0.0 0  WBC    ABSOLUTE NRBC 0.00 0.00 - 0.01 K/uL    NEUTROPHILS 63 40 - 73 %    LYMPHOCYTES 25 21 - 52 %    MONOCYTES 9 3 - 10 %    EOSINOPHILS 2 0 - 5 %    BASOPHILS 1 0 - 2 %    IMMATURE GRANULOCYTES 0 0.0 - 0.5 %    ABS. NEUTROPHILS 5.6 1.8 - 8.0 K/UL    ABS. LYMPHOCYTES 2.2 0.9 - 3.6 K/UL    ABS. MONOCYTES 0.8 0.05 - 1.2 K/UL    ABS. EOSINOPHILS 0.1 0.0 - 0.4 K/UL    ABS. BASOPHILS 0.1 0.0 - 0.1 K/UL    ABS. IMM. GRANS. 0.0 0.00 - 0.04 K/UL    DF AUTOMATED     METABOLIC PANEL, COMPREHENSIVE    Collection Time: 10/07/22 10:55 AM   Result Value Ref Range    Sodium 140 136 - 145 mmol/L    Potassium 2.8 (LL) 3.5 - 5.5 mmol/L    Chloride 101 100 - 111 mmol/L    CO2 28 21 - 32 mmol/L    Anion gap 11 3.0 - 18 mmol/L    Glucose 121 (H) 74 - 99 mg/dL    BUN 40 (H) 7.0 - 18 MG/DL    Creatinine 13.10 (H) 0.6 - 1.3 MG/DL    BUN/Creatinine ratio 3 (L) 12 - 20      eGFR 3 (L) >60 ml/min/1.73m2    Calcium 8.2 (L) 8.5 - 10.1 MG/DL    Bilirubin, total 0.9 0.2 - 1.0 MG/DL    ALT (SGPT) 29 13 - 56 U/L    AST (SGOT) 25 10 - 38 U/L    Alk. phosphatase 101 45 - 117 U/L    Protein, total 6.8 6.4 - 8.2 g/dL    Albumin 3.0 (L) 3.4 - 5.0 g/dL    Globulin 3.8 2.0 - 4.0 g/dL    A-G Ratio 0.8 0.8 - 1.7     LIPASE    Collection Time: 10/07/22 10:55 AM   Result Value Ref Range    Lipase 433 (H) 73 - 393 U/L   PROTHROMBIN TIME + INR    Collection Time: 10/07/22 10:55 AM   Result Value Ref Range    Prothrombin time 13.7 11.5 - 15.2 sec    INR 1.0 0.8 - 1.2     HEP B SURFACE AG    Collection Time: 10/07/22 10:55 AM   Result Value Ref Range    Hepatitis B surface Ag <0.10 <1.00 Index    Hep B surface Ag Interp.  Negative NEG     MAGNESIUM    Collection Time: 10/07/22 10:55 AM   Result Value Ref Range    Magnesium 2.5 1.6 - 2.6 mg/dL   OCCULT BLOOD, STOOL    Collection Time: 10/07/22  1:20 PM   Result Value Ref Range    Occult blood, stool Negative NEG     TYPE & SCREEN    Collection Time: 10/07/22  1:25 PM   Result Value Ref Range    Crossmatch Expiration 10/10/2022,2359     ABO/Rh(D) B POSITIVE     Antibody screen NEG    COVID-19 RAPID TEST    Collection Time: 10/07/22  2:50 PM   Result Value Ref Range    Specimen source Nasopharyngeal      COVID-19 rapid test Not detected NOTD     GLUCOSE, POC    Collection Time: 10/07/22  4:39 PM   Result Value Ref Range    Glucose (POC) 72 70 - 110 mg/dL       Imaging Reviewed:  XR ABD (KUB)    Result Date: 10/7/2022  EXAM: XR ABD (KUB) INDICATION: Abdominal pain, recent colonoscopy w/ polypectomy COMPARISON: None. FINDINGS: Supine view of the abdomen Nonobstructive bowel gas pattern. Mild to moderate colonic stool burden. No gross free intraperitoneal air on this limited supine projection. Peritoneal dialysis catheter overlies the mid pelvis. Osteopenia and extensive postsurgical changes of decompression and fusion in the lumbar spine. Nonobstructive bowel gas pattern. Assessment/Plan     Hospital Problems  Date Reviewed: 8/31/2022            Codes Class Noted POA    * (Principal) Upper GI bleed ICD-10-CM: K92.2  ICD-9-CM: 578.9  10/7/2022 Yes        Periumbilical abdominal pain ICD-10-CM: R10.33  ICD-9-CM: 789.05  10/7/2022 Yes        Peritoneal dialysis catheter in place [facility]) ICD-10-CM: Z99.2  ICD-9-CM: V45.11  Unknown Yes        Type 2 diabetes mellitus with nephropathy ([facility] [address] ICD-10-CM: E11.21  ICD-9-CM: 250.40, 583.81  12/21/2017 Yes        Hypertension ICD-10-CM: I10  ICD-9-CM: 401.9  Unknown Yes         Anemia, possible intermittent GIB: currently hemoccult negative. Hgb was previously 9.3 at outpatient GI appt. - appreciate GI assistance  - BID PPI  - monitor H&H  - planning for EGD/colo tomorrow if CTA negative  - clears now, NPO p MN, prep ordered by GI  - check iron, b12, folate, ferritin    Abdominal pain: intermittent x months. KUB negative.   - CTA pending to r/o mesenteric ischemia    Hypokalemia  - potassium replacement per nephrology    ESRD on PD  - appreciate nephrology assistance  - monitor lytes    DM  - ssi, monitor achs  - check a1c    HTN  - hold home medications due to borderline hypotension. Monitor.  - might need anti-hypertensives adjusted at discharge as she is on 4-5 of them      Anticipated Discharge: tomorrow if EGD/colo negative    DVT Prophylaxis:  []Lovenox  []Hep SQ  [x]SCDs  []Coumadin []DOAC  []On Heparin gtt     I have personally reviewed all pertinent labs, films and EKGs that have officially resulted. I reviewed available electronic documentation outlining the initial presentation as well as the emergency room physician's encounter.    Time spent reviewing records, independently interpreting results, obtaining history from patient or caregiver, performing physical exam, ordering tests and medications, communicating with specialists, documenting in the chart, and coordinating overall care is 75 minutes    Lucy Nichols PA-C  [address] [facility]  Office:  [phone]  Pager: [phone]

## 2022-10-07 NOTE — CONSULTS
Consult Note  Consult requested by: Dr. Armand Hinton is a 80 y.o. female BLACK/ who is being seen on consult for ESRD management   Chief Complaint   Patient presents with    Abdominal Pain     Admission diagnosis: anemia   80-year-old female with past medical history of hypertension, ESRD admitted for dark stool, anemia following for ESRD management  Impression & Plan:   IMPRESSION:   ESRD, on peritoneal dialysis  Access, PD catheter no issue  Anemia, presented with dark stool, on Epogen outpatient   hypokalemia,  Hypertension   PLAN:   Plan for EGD tomorrow noted. Order extra IV potassium. Plan for peritoneal dialysis tonight we will do gentle PD tonight, avoid hypotension. Start on potassium 20 twice daily. Check K level tonight and replete as needed. Check anemia panel. Continue Epogen. Adjust medication per ESRD status. HPI: 80-year-old female with past medical history of hypertension, ESRD admitted for anemia. Patient evaluated by GI colleague, scheduled for EGD tomorrow. Nephrology service consulted for ESRD management.     Past Medical History:   Diagnosis Date    Allergic rhinitis     Chronic kidney disease 2020    Peritoneal Dialysis    Diabetes ([facility] [address] 2015    IDDM    H/O seasonal allergies     Hypertension     Left foot drop, chronic since 2004 fusion sx     Peritoneal dialysis catheter in place [facility])       Past Surgical History:   Procedure Laterality Date    COLONOSCOPY N/A 9/26/2022    COLONOSCOPY with bx's and polypectomy performed by Viktoriya Davila MD at [facility]    R Jennifer [address] 46 Left     HX CATARACT REMOVAL  2011    HX LUMBAR FUSION  2004-last sx    fused L2-S1, in [place]    HX TUBAL LIGATION      HX WISDOM TEETH EXTRACTION         Social History     Socioeconomic History    Marital status:      Spouse name: Not on file    Number of children: Not on file    Years of education: Not on file    Highest education level: Not on file Occupational History    Occupation: CNA - RET   Tobacco Use    Smoking status: Never    Smokeless tobacco: Never   Vaping Use    Vaping Use: Never used   Substance and Sexual Activity    Alcohol use: No    Drug use: Never    Sexual activity: Not on file   Other Topics Concern    Not on file   Social History Narrative    Not on file     Social Determinants of Health     Financial Resource Strain: Not on file   Food Insecurity: Not on file   Transportation Needs: Not on file   Physical Activity: Not on file   Stress: Not on file   Social Connections: Not on file   Intimate Partner Violence: Not on file   Housing Stability: Not on file       Family History   Problem Relation Age of Onset    Diabetes Maternal Aunt     Diabetes Maternal Uncle     Diabetes Maternal Grandfather     Diabetes Other     Hypertension Cousin      Allergies   Allergen Reactions    Sevelamer Diarrhea and Other (comments)    Aspirin Other (comments)     Ringing in ears    Keflex [Cephalexin] Rash    Sevelamer Carbonate Itching     With rash and sweatiness        Home Medications:     Prior to Admission Medications   Prescriptions Last Dose Informant Patient Reported? Taking? Accu-Chek Evelin Plus test strp strip 10/6/2022  Yes Yes   Droplet Pen Needle 32 gauge x \" ndle   Yes No   amLODIPine (NORVASC) 5 mg tablet 10/6/2022  No Yes   Sig: Take 1 Tablet by mouth daily. ferric citrate (Auryxia) 210 mg iron tablet 10/6/2022  Yes Yes   Sig: Take 210 mg by mouth two (2) times daily (with meals). fluticasone propionate (FLONASE) 50 mcg/actuation nasal spray Not Taking  Yes No   Si Sprays by Both Nostrils route daily as needed. Patient not taking: Reported on    folic acid/vit B complex and C (RENAL VITAMIN PO) 10/6/2022  Yes Yes   Sig: Take 1 Capsule by mouth daily.    furosemide (LASIX) 20 mg tablet 10/6/2022  Yes Yes   Sig: Take 20 mg by mouth BID Mon Wed & Fri.   gentamicin (GARAMYCIN) 0.1 % topical cream 10/6/2022  Yes Yes Sig: gentamicin 0.1 % topical cream   APPLY TO EXIT SITE DAILY   hydrALAZINE (APRESOLINE) 50 mg tablet 10/6/2022  Yes Yes   Sig: Take 50 mg by mouth daily. insulin aspart U-100 (NOVOLOG) 100 unit/mL (3 mL) inpn 10/6/2022  Yes Yes   Si-8 Units by SubCUTAneous route daily. Indications: type 2 diabetes mellitus   insulin glargine (LANTUS,BASAGLAR) 100 unit/mL (3 mL) inpn 10/6/2022  Yes Yes   Si Units by SubCUTAneous route nightly. Indications: type 2 diabetes mellitus   losartan (COZAAR) 50 mg tablet 10/6/2022  No Yes   Sig: Take 1 Tablet by mouth daily. metoprolol succinate (TOPROL-XL) 50 mg XL tablet 10/6/2022  No Yes   Sig: TAKE 1 TABLET EVERY DAY   omeprazole (PRILOSEC) 40 mg capsule Not Taking  Yes No   Sig: Take 40 mg by mouth daily as needed. Patient not taking: Reported on 10/7/2022   rosuvastatin (CRESTOR) 40 mg tablet 10/6/2022  No Yes   Sig: TAKE 1 TABLET EVERY NIGHT   tamsulosin (FLOMAX) 0.4 mg capsule 10/6/2022  No Yes   Sig: Take 1 Capsule by mouth daily.       Facility-Administered Medications: None       Current Facility-Administered Medications   Medication Dose Route Frequency    mylanta/viscous lidocaine (GI COCKTAIL)  40 mL Oral ONCE    potassium chloride 10 mEq in 100 ml IVPB  10 mEq IntraVENous Q1H    pantoprazole (PROTONIX) 40 mg in 0.9% sodium chloride 10 mL injection  40 mg IntraVENous Q12H    sodium chloride (NS) flush 5-40 mL  5-40 mL IntraVENous Q8H    sodium chloride (NS) flush 5-40 mL  5-40 mL IntraVENous PRN    acetaminophen (TYLENOL) tablet 650 mg  650 mg Oral Q6H PRN    Or    acetaminophen (TYLENOL) suppository 650 mg  650 mg Rectal Q6H PRN    polyethylene glycol (MIRALAX) packet 17 g  17 g Oral DAILY PRN    bisacodyL (DULCOLAX) suppository 10 mg  10 mg Rectal DAILY PRN    ondansetron (ZOFRAN ODT) tablet 4 mg  4 mg Oral Q8H PRN    Or    ondansetron (ZOFRAN) injection 4 mg  4 mg IntraVENous Q6H PRN    iopamidoL (ISOVUE-370) 76 % injection  mL   mL IntraVENous RAD ONCE    [START ON 10/8/2022] potassium chloride (K-DUR, KLOR-CON M20) SR tablet 20 mEq  20 mEq Oral DAILY    [START ON 10/8/2022] peritoneal dialysis DEXTROSE 1.5% (2.5 mEq/L low calcium) solution 8,000 mL  8,000 mL IntraPERitoneal DAILY       Review of Systems:     Complete 10-point review of systems were obtained and discussed in length  with the patient. Complete review of systems was negative/unremarkable  except as mentioned in HPI section. Data Review:    Labs: Results:       Chemistry Recent Labs     10/07/22  1055   *      K 2.8*      CO2 28   BUN 40*   CREA 13.10*   CA 8.2*   AGAP 11   BUCR 3*      TP 6.8   ALB 3.0*   GLOB 3.8   AGRAT 0.8      CBC w/Diff Recent Labs     10/07/22  1055   WBC 8.9   RBC 2.95*   HGB 8.8*   HCT 27.0*      GRANS 63   LYMPH 25   EOS 2      Coagulation Recent Labs     10/07/22  1055   PTP 13.7   INR 1.0       Iron/Ferritin No results for input(s): IRON in the last 72 hours. No lab exists for component: TIBCCALC   BNP No results for input(s): BNPP in the last 72 hours. Cardiac Enzymes No results for input(s): CPK, CKND1, MEME in the last 72 hours. No lab exists for component: CKRMB, TROIP   Liver Enzymes Recent Labs     10/07/22  1055   TP 6.8   ALB 3.0*         Thyroid Studies No results found for: T4, T3U, TSH, TSHEXT      EKG: unchanged from previous tracings.     Physical Assessment:   Visit Vitals  BP (!) 118/51 (BP 1 Location: Left upper arm)   Pulse 78   Temp 99.6 °F (37.6 °C)   Resp 18   Ht 5' 5\" (1.651 m)   Wt 73.5 kg (162 lb)   SpO2 100%   Breastfeeding No   BMI 26.96 kg/m²     Weight change:   No intake or output data in the 24 hours ending 10/07/22 1810  Physical Exam:   General: comfortable, no acute distress   HEENT sclera anicteric, supple neck, no thyromegaly  CVS: S1S2 heard,  no rub  RS: + air entry b/l,   Abd: Soft, Non tender, Not distended, Positive bowel sounds, no organomegaly, no CVA / supra pubic tenderness  Neuro: non focal, awake, alert , CN II-XII are grossly intact  Extrm: edema, no cyanosis, clubbing   Skin: no visible  Rash  Musculoskeletal: No gross joints or bone deformities     Procedures/imaging: see electronic medical records for all procedures, Xrays and details which were not copied into this note but were reviewed prior to creation of Plan        Thank you very much for allowing me to participate in the care of this patient. We will continue to monitor with you and make recommendations as needed.          Toyin Henriquez MD  October 7, 2022  [facility]ph[facility]  Office [phone]

## 2022-10-07 NOTE — Clinical Note
Status[de-identified] INPATIENT [101]   Type of Bed: Telemetry [19]   Cardiac Monitoring Required?: Yes   Inpatient Hospitalization Certified Necessary for the Following Reasons: 3.  Patient receiving treatment that can only be provided in an inpatient setting (further clarification in H&P documentation)   Admitting Diagnosis: Upper GI bleed [399331]   Admitting Physician: Michael Diaz   Attending Physician: Michael Diaz   Estimated Length of Stay: 2 Midnights   Discharge Plan[de-identified] Home with Office Follow-up

## 2022-10-07 NOTE — CONSULTS
[email or web address]  [phone]    GASTROENTEROLOGY CONSULT      Impression:   1. Chronic anemia - 10/7 Hgb 8.8, 8/12/22 OP (ST appt) Hgb 9.3. Hgb is at recent baseline. 2. Dark stools - intermittent for several months, resolved a few days after recent colo but now back. Seen as an OP 8/12/2022 w/ colo performed 9/26/22 colonoscopy significant for mild diverticulosis of the colon and 1 12mm TA polyp. Denies f/c, vomiting, or weight loss. Denies use of NSAID, etoh, or anticoagulation. 3. Periumbilical abdominal pain -  new since colo and described as severe periumbilical sharp pain w/o modifying factors. Pt reports the pain is so severe that she has difficulty walking. 4. Hypokalemia   5. Nausea  6. ESRD on PD - high risk for AVM  7. HTN  8. HLD  9. DM  10. GERD    Plan:     1. Recommend admission given severity of symptoms w/ EGD planned for tomorrow to evaluate for upper GI source of bleeding. NPO after midnight. COVID screening ordered. Procedure posted to board. 2. KUB ordered to r/o perforation w/ recent colonoscopy. 3. Monitor H/H, transfuse if Hgb <7.  4. IV PPI BID, PO Carafate TID  5. Correct electrolytes as indicated. 6. Continue medical management per primary team.    Chief Complaint: anemia, dark stools, periumbilical abd pain, nausea      HPI:  Alis Hall is a 80 y.o. female w/ PMH ESRD on PD, HTN, HLD, DM, and GERD who I am being asked to see in consultation for an opinion regarding the above. Patient presented to the ED 10/7 with abdominal pain, decreased appetite, and nausea since colonoscopy 9/26. Nausea and abdominal pain are new since colo and described as severe periumbilical sharp pain w/o modifying factors. Pt reports the pain is so severe that she has difficulty walking. More chronically she has reported intermittent hematochezia and was seen as an OP 8/12/2022 w/ colo performed 9/26/22 colonoscopy significant for mild diverticulosis of the colon and 1 12mm TA polyp.  Denies f/c, vomiting, dysuria, hematuria, or weight loss. Denies use of NSAID, etoh, or anticoagulation. OP workup 8/2022 revealed a Hgb of 9.3. ED workup revealed Hgb 8.8, PLT nml, K 2.8, Cr 13.10, Lipase 322, nml AST/ALT/AP/Tbili.       PMH:   Past Medical History:   Diagnosis Date    Allergic rhinitis     Chronic kidney disease 2020    Peritoneal Dialysis    Diabetes ([facility] [address] 2015    IDDM    H/O seasonal allergies     Hypertension     Left foot drop, chronic since 2004 fusion sx     Peritoneal dialysis catheter in place [facility])        PSH:   Past Surgical History:   Procedure Laterality Date    COLONOSCOPY N/A 9/26/2022    COLONOSCOPY with bx's and polypectomy performed by Jose David España MD at [facility]    R Jennifer [address] 46 Left     HX CATARACT REMOVAL  2011    HX LUMBAR FUSION  2004-last sx    fused L2-S1, in [address] [place] [address]    HX TUBAL LIGATION      HX WISDOM TEETH EXTRACTION         Social HX:   Social History     Socioeconomic History    Marital status:      Spouse name: Not on file    Number of children: Not on file    Years of education: Not on file    Highest education level: Not on file   Occupational History    Occupation: CNA - RET   Tobacco Use    Smoking status: Never    Smokeless tobacco: Never   Vaping Use    Vaping Use: Never used   Substance and Sexual Activity    Alcohol use: No    Drug use: Never    Sexual activity: Not on file   Other Topics Concern    Not on file   Social History Narrative    Not on file     Social Determinants of Health     Financial Resource Strain: Not on file   Food Insecurity: Not on file   Transportation Needs: Not on file   Physical Activity: Not on file   Stress: Not on file   Social Connections: Not on file   Intimate Partner Violence: Not on file   Housing Stability: Not on file       FHX:   Family History   Problem Relation Age of Onset    Diabetes Maternal Aunt     Diabetes Maternal Uncle     Diabetes Maternal Grandfather     Diabetes Other     Hypertension Cousin Allergy:   Allergies   Allergen Reactions    Sevelamer Diarrhea and Other (comments)    Aspirin Other (comments)     Ringing in ears    Keflex [Cephalexin] Rash    Sevelamer Carbonate Itching     With rash and sweatiness       Patient Active Problem List   Diagnosis Code    Allergic rhinitis J30.9    Hypertension I10    Diabetes ([facility] [address] E11.9    Type 2 diabetes mellitus with nephropathy ([facility] [address] E11.21    Dyspepsia R10.13    Nausea R11.0    Constipation K59.00    Severe obesity (BMI 35.0-39. 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 Skin: No rashes, pruritis, jaundice, ulcerations, erythema. HENT: No headaches, nosebleeds, sinus pressure, rhinorrhea, sore throat. Eyes: No visual changes, blurred vision, eye pain, photophobia, jaundice. Cardiovascular: No chest pain, heart palpitations. Respiratory: No cough, SOB, wheezing, chest discomfort, orthopnea. Gastrointestinal: See HPI   Genitourinary: No dysuria, bleeding, discharge, pyuria. Musculoskeletal: No weakness, arthralgias, wasting. Endo: No sweats. Heme: No bruising, easy bleeding. Allergies: As noted. Neurological: Cranial nerves intact. Alert and oriented. Gait not assessed. Psychiatric:  No anxiety, depression, hallucinations.           Visit Vitals  BP (!) 119/55 (BP 1 Location: Left upper arm, BP Patient Position: At rest)   Pulse 71   Temp 98.1 °F (36.7 °C)   Resp 16   Ht 5' 5\" (1.651 m)   Wt 73.5 kg (162 lb)   SpO2 100%   BMI 26.96 kg/m²       Physical Assessment:     constitutional: normal habitus, in no acute distress. skin: no rashes, ulcers, icterus or other lesions  eyes: normal conjunctivae and lids; no jaundice pupils: normal  HEENT: normocephalic, atraumatic  neck: supple, normal ROM   respiratory: normal chest excursion; no intercostal retraction or accessory muscle use; clear to ascultation bilaterally    cardiovascular: regular rate and rhythm, no murmur, rub or gallop.   abdominal: non-distended, active bowel sounds, soft, mod BLQ TTP, non-acute, no palpable masses or hernias. liver/spleen: not palpable. extremities: no significant deformity or contracture, no edema. Gait not assessed   neurologic: cranial nerves: grossly normal.  psychiatric: judgement/insight: within normal limits. memory: within normal limits for recent and remote events. mood and affect: no evidence of depression, anxiety or agitation. orientation: oriented to time, space and person. Basic Metabolic Profile   Recent Labs     10/07/22  1055      K 2.8*      CO2 28   BUN 40*   *   CA 8.2*         CBC w/Diff    Recent Labs     10/07/22  1055   WBC 8.9   RBC 2.95*   HGB 8.8*   HCT 27.0*   MCV 91.5   MCH 29.8   MCHC 32.6   RDW 13.8       Recent Labs     10/07/22  1055   GRANS 63   LYMPH 25   EOS 2        Hepatic Function   Recent Labs     10/07/22  1055   ALB 3.0*   TP 6.8   TBILI 0.9      LPSE 433*        Coags   No results for input(s): PTP, INR, APTT, INREXT in the last 72 hours. Beverley Jarrett PA-C.   10/07/22, 2:25 PM   [facility]  [email or web address]  Phone: [phone]  Pager: [phone]

## 2022-10-07 NOTE — DISCHARGE INSTRUCTIONS
DISCHARGE SUMMARY from Nurse: Michelle Ghosh RN    PATIENT INSTRUCTIONS:    After general anesthesia or intravenous sedation, for 24 hours or while taking prescription Narcotics:  Limit your activities  Do not drive and operate hazardous machinery  Do not make important personal or business decisions  Do  not drink alcoholic beverages  If you have not urinated within 8 hours after discharge, please contact your surgeon on call. Report the following to your surgeon:  Excessive pain, swelling, redness or odor of or around the surgical area  Temperature over 100.5  Nausea and vomiting lasting longer than 4 hours or if unable to take medications  Any signs of decreased circulation or nerve impairment to extremity: change in color, persistent  numbness, tingling, coldness or increase pain  Any questions    What to do at Home:  Recommended activity: Activity as tolerated,     If you experience any of the following symptoms severe abdominal pain, dark tarry stools, chest pain, chest pressure,dizziness please follow up with Primary care physician. *  Please give a list of your current medications to your Primary Care Provider. *  Please update this list whenever your medications are discontinued, doses are      changed, or new medications (including over-the-counter products) are added. *  Please carry medication information at all times in case of emergency situations. These are general instructions for a healthy lifestyle:    No smoking/ No tobacco products/ Avoid exposure to second hand smoke  Surgeon General's Warning:  Quitting smoking now greatly reduces serious risk to your health.     Obesity, smoking, and sedentary lifestyle greatly increases your risk for illness    A healthy diet, regular physical exercise & weight monitoring are important for maintaining a healthy lifestyle    You may be retaining fluid if you have a history of heart failure or if you experience any of the following symptoms: Weight gain of 3 pounds or more overnight or 5 pounds in a week, increased swelling in our hands or feet or shortness of breath while lying flat in bed. Please call your doctor as soon as you notice any of these symptoms; do not wait until your next office visit. The discharge information has been reviewed with the patient. The patient verbalized understanding. Discharge medications reviewed with the patient and appropriate educational materials and side effects teaching were provided. Patient armband removed and shredded    Discharge Instructions    Patient: Mindy Alvarado MRN: [id number]  CSN: [id number]    YOB: 1940  Age: 80 y.o. Sex: female    DOA: 10/7/2022       DIET:  Diabetic and Renal liquid and soft Diet    ACTIVITY: Activity as tolerated  Home health care for Skilled care for DM, Hypertension and medication management       PT/OT consult      ADDITIONAL INFORMATION: If you experience any of the following symptoms but not limited to Fever, chills, nausea, vomiting, diarrhea, change in mentation, falling, bleeding, shortness of breath, chest pain, please call your primary care physician or return to the emergency room if you cannot get hold of your doctor:     FOLLOW UP CARE:  Dr. Jorge Oropeza in 5-7 days. Please call and set up an appointment.   Dr. Brenna Mcgrath, nephrology in 1 week  Dr. Draya Rios GI in 2-3 week      Dewayne Cohen MD  10/11/2022 10:22 AM          ___________________________________________________________________________________________________________________________________

## 2022-10-07 NOTE — PROGRESS NOTES
[email or web address]  [phone]    Gastroenterology Brief Note      Discussed case with weekend MD Dr. Jah Rubin in preparation for suspected procedures. Dr. Jah Rubin recommends CT Abd Pelvis w/ IV contrast to rule out ischemia. If contrast is completely contraindicated by radiology, we can perform CT w/o contrast. If CT is negative will move forward with both an EGD AND colonoscopy tomorrow to evaluate for GI bleed as well as recent polypectomy site. Patient updated. We also request that nephrology order intraperitoneal gentamicin for tonight. Orders already placed assuming CT is negative: 10/7 COVID neg. Clear liquid diet today, NPO after midnight. Golytely prep ordered. Procedures posted to board. Primary team aware. Becki Vasquez PA-C.   10/07/22, 4:06 PM   [facility]  [email or web address]  Phone: [phone]  Pager: [phone]

## 2022-10-08 ENCOUNTER — ANESTHESIA (OUTPATIENT)
Dept: ENDOSCOPY | Age: 82
DRG: 377 | End: 2022-10-08
Payer: MEDICARE

## 2022-10-08 ENCOUNTER — ANESTHESIA EVENT (OUTPATIENT)
Dept: ENDOSCOPY | Age: 82
DRG: 377 | End: 2022-10-08
Payer: MEDICARE

## 2022-10-08 LAB
ANION GAP SERPL CALC-SCNC: 11 MMOL/L (ref 3–18)
BUN SERPL-MCNC: 40 MG/DL (ref 7–18)
BUN/CREAT SERPL: 3 (ref 12–20)
CALCIUM SERPL-MCNC: 8.2 MG/DL (ref 8.5–10.1)
CHLORIDE SERPL-SCNC: 101 MMOL/L (ref 100–111)
CO2 SERPL-SCNC: 26 MMOL/L (ref 21–32)
CREAT SERPL-MCNC: 13.2 MG/DL (ref 0.6–1.3)
ERYTHROCYTE [DISTWIDTH] IN BLOOD BY AUTOMATED COUNT: 13.9 % (ref 11.6–14.5)
GLUCOSE BLD STRIP.AUTO-MCNC: 113 MG/DL (ref 70–110)
GLUCOSE BLD STRIP.AUTO-MCNC: 116 MG/DL (ref 70–110)
GLUCOSE BLD STRIP.AUTO-MCNC: 61 MG/DL (ref 70–110)
GLUCOSE BLD STRIP.AUTO-MCNC: 68 MG/DL (ref 70–110)
GLUCOSE BLD STRIP.AUTO-MCNC: 79 MG/DL (ref 70–110)
GLUCOSE BLD STRIP.AUTO-MCNC: 93 MG/DL (ref 70–110)
GLUCOSE BLD STRIP.AUTO-MCNC: 93 MG/DL (ref 70–110)
GLUCOSE SERPL-MCNC: 71 MG/DL (ref 74–99)
HCT VFR BLD AUTO: 22.5 % (ref 35–45)
HCT VFR BLD AUTO: 23.4 % (ref 35–45)
HGB BLD-MCNC: 7.4 G/DL (ref 12–16)
HGB BLD-MCNC: 7.5 G/DL (ref 12–16)
MCH RBC QN AUTO: 29.8 PG (ref 24–34)
MCHC RBC AUTO-ENTMCNC: 32.9 G/DL (ref 31–37)
MCV RBC AUTO: 90.7 FL (ref 78–100)
NRBC # BLD: 0 K/UL (ref 0–0.01)
NRBC BLD-RTO: 0 PER 100 WBC
PLATELET # BLD AUTO: 127 K/UL (ref 135–420)
PMV BLD AUTO: 10.6 FL (ref 9.2–11.8)
POTASSIUM SERPL-SCNC: 3.2 MMOL/L (ref 3.5–5.5)
RBC # BLD AUTO: 2.48 M/UL (ref 4.2–5.3)
SODIUM SERPL-SCNC: 138 MMOL/L (ref 136–145)
WBC # BLD AUTO: 6.7 K/UL (ref 4.6–13.2)

## 2022-10-08 PROCEDURE — 74011250637 HC RX REV CODE- 250/637: Performed by: PHYSICIAN ASSISTANT

## 2022-10-08 PROCEDURE — C9113 INJ PANTOPRAZOLE SODIUM, VIA: HCPCS | Performed by: STUDENT IN AN ORGANIZED HEALTH CARE EDUCATION/TRAINING PROGRAM

## 2022-10-08 PROCEDURE — 0DBM8ZX EXCISION OF DESCENDING COLON, VIA NATURAL OR ARTIFICIAL OPENING ENDOSCOPIC, DIAGNOSTIC: ICD-10-PCS | Performed by: STUDENT IN AN ORGANIZED HEALTH CARE EDUCATION/TRAINING PROGRAM

## 2022-10-08 PROCEDURE — 88305 TISSUE EXAM BY PATHOLOGIST: CPT

## 2022-10-08 PROCEDURE — 77030036656: Performed by: STUDENT IN AN ORGANIZED HEALTH CARE EDUCATION/TRAINING PROGRAM

## 2022-10-08 PROCEDURE — A4726 DIALYS SOL FLD VOL > 5999CC: HCPCS | Performed by: INTERNAL MEDICINE

## 2022-10-08 PROCEDURE — 82962 GLUCOSE BLOOD TEST: CPT

## 2022-10-08 PROCEDURE — 74011250637 HC RX REV CODE- 250/637: Performed by: INTERNAL MEDICINE

## 2022-10-08 PROCEDURE — 00813 ANES UPR LWR GI NDSC PX: CPT | Performed by: ANESTHESIOLOGY

## 2022-10-08 PROCEDURE — 77030019988 HC FCPS ENDOSC DISP BSC -B: Performed by: STUDENT IN AN ORGANIZED HEALTH CARE EDUCATION/TRAINING PROGRAM

## 2022-10-08 PROCEDURE — 0DJ08ZZ INSPECTION OF UPPER INTESTINAL TRACT, VIA NATURAL OR ARTIFICIAL OPENING ENDOSCOPIC: ICD-10-PCS | Performed by: STUDENT IN AN ORGANIZED HEALTH CARE EDUCATION/TRAINING PROGRAM

## 2022-10-08 PROCEDURE — 85018 HEMOGLOBIN: CPT

## 2022-10-08 PROCEDURE — 80048 BASIC METABOLIC PNL TOTAL CA: CPT

## 2022-10-08 PROCEDURE — 74011250636 HC RX REV CODE- 250/636: Performed by: HOSPITALIST

## 2022-10-08 PROCEDURE — 85027 COMPLETE CBC AUTOMATED: CPT

## 2022-10-08 PROCEDURE — 36415 COLL VENOUS BLD VENIPUNCTURE: CPT

## 2022-10-08 PROCEDURE — 74011250636 HC RX REV CODE- 250/636: Performed by: STUDENT IN AN ORGANIZED HEALTH CARE EDUCATION/TRAINING PROGRAM

## 2022-10-08 PROCEDURE — 74011250636 HC RX REV CODE- 250/636: Performed by: NURSE ANESTHETIST, CERTIFIED REGISTERED

## 2022-10-08 PROCEDURE — 0DBK8ZX EXCISION OF ASCENDING COLON, VIA NATURAL OR ARTIFICIAL OPENING ENDOSCOPIC, DIAGNOSTIC: ICD-10-PCS | Performed by: STUDENT IN AN ORGANIZED HEALTH CARE EDUCATION/TRAINING PROGRAM

## 2022-10-08 PROCEDURE — 99233 SBSQ HOSP IP/OBS HIGH 50: CPT | Performed by: HOSPITALIST

## 2022-10-08 PROCEDURE — 99100 ANES PT EXTEME AGE<1 YR&>70: CPT | Performed by: ANESTHESIOLOGY

## 2022-10-08 PROCEDURE — 97165 OT EVAL LOW COMPLEX 30 MIN: CPT

## 2022-10-08 PROCEDURE — 74011250637 HC RX REV CODE- 250/637: Performed by: HOSPITALIST

## 2022-10-08 PROCEDURE — 00813 ANES UPR LWR GI NDSC PX: CPT | Performed by: NURSE ANESTHETIST, CERTIFIED REGISTERED

## 2022-10-08 PROCEDURE — 90945 DIALYSIS ONE EVALUATION: CPT

## 2022-10-08 PROCEDURE — 2709999900 HC NON-CHARGEABLE SUPPLY

## 2022-10-08 PROCEDURE — 74011250636 HC RX REV CODE- 250/636: Performed by: INTERNAL MEDICINE

## 2022-10-08 PROCEDURE — 74011000250 HC RX REV CODE- 250: Performed by: STUDENT IN AN ORGANIZED HEALTH CARE EDUCATION/TRAINING PROGRAM

## 2022-10-08 PROCEDURE — 76040000007: Performed by: STUDENT IN AN ORGANIZED HEALTH CARE EDUCATION/TRAINING PROGRAM

## 2022-10-08 PROCEDURE — 77030008565 HC TBNG SUC IRR ERBE -B: Performed by: STUDENT IN AN ORGANIZED HEALTH CARE EDUCATION/TRAINING PROGRAM

## 2022-10-08 PROCEDURE — 65270000046 HC RM TELEMETRY

## 2022-10-08 PROCEDURE — 99100 ANES PT EXTEME AGE<1 YR&>70: CPT | Performed by: NURSE ANESTHETIST, CERTIFIED REGISTERED

## 2022-10-08 PROCEDURE — 74011250637 HC RX REV CODE- 250/637: Performed by: STUDENT IN AN ORGANIZED HEALTH CARE EDUCATION/TRAINING PROGRAM

## 2022-10-08 PROCEDURE — 76060000032 HC ANESTHESIA 0.5 TO 1 HR: Performed by: STUDENT IN AN ORGANIZED HEALTH CARE EDUCATION/TRAINING PROGRAM

## 2022-10-08 PROCEDURE — 74011000250 HC RX REV CODE- 250: Performed by: PHYSICIAN ASSISTANT

## 2022-10-08 PROCEDURE — 2709999900 HC NON-CHARGEABLE SUPPLY: Performed by: STUDENT IN AN ORGANIZED HEALTH CARE EDUCATION/TRAINING PROGRAM

## 2022-10-08 PROCEDURE — 74011000250 HC RX REV CODE- 250: Performed by: NURSE ANESTHETIST, CERTIFIED REGISTERED

## 2022-10-08 RX ORDER — LIDOCAINE HYDROCHLORIDE 20 MG/ML
INJECTION, SOLUTION EPIDURAL; INFILTRATION; INTRACAUDAL; PERINEURAL AS NEEDED
Status: DISCONTINUED | OUTPATIENT
Start: 2022-10-08 | End: 2022-10-08 | Stop reason: HOSPADM

## 2022-10-08 RX ORDER — SODIUM CHLORIDE 0.9 % (FLUSH) 0.9 %
5-40 SYRINGE (ML) INJECTION EVERY 8 HOURS
Status: CANCELLED | OUTPATIENT
Start: 2022-10-08

## 2022-10-08 RX ORDER — FLUCONAZOLE 200 MG/1
200 TABLET ORAL DAILY
Status: DISCONTINUED | OUTPATIENT
Start: 2022-10-08 | End: 2022-10-11 | Stop reason: HOSPADM

## 2022-10-08 RX ORDER — DIPHENHYDRAMINE HYDROCHLORIDE 50 MG/ML
12.5 INJECTION, SOLUTION INTRAMUSCULAR; INTRAVENOUS
Status: CANCELLED | OUTPATIENT
Start: 2022-10-08

## 2022-10-08 RX ORDER — METRONIDAZOLE 500 MG/100ML
500 INJECTION, SOLUTION INTRAVENOUS EVERY 12 HOURS
Status: DISCONTINUED | OUTPATIENT
Start: 2022-10-08 | End: 2022-10-11 | Stop reason: HOSPADM

## 2022-10-08 RX ORDER — MAGNESIUM SULFATE 100 %
4 CRYSTALS MISCELLANEOUS AS NEEDED
Status: CANCELLED | OUTPATIENT
Start: 2022-10-08

## 2022-10-08 RX ORDER — EPHEDRINE SULFATE/0.9% NACL/PF 25 MG/5 ML
SYRINGE (ML) INTRAVENOUS AS NEEDED
Status: DISCONTINUED | OUTPATIENT
Start: 2022-10-08 | End: 2022-10-08 | Stop reason: HOSPADM

## 2022-10-08 RX ORDER — SODIUM CHLORIDE 9 MG/ML
25 INJECTION, SOLUTION INTRAVENOUS CONTINUOUS
Status: CANCELLED | OUTPATIENT
Start: 2022-10-08

## 2022-10-08 RX ORDER — LEVOFLOXACIN 5 MG/ML
250 INJECTION, SOLUTION INTRAVENOUS
Status: DISCONTINUED | OUTPATIENT
Start: 2022-10-10 | End: 2022-10-09

## 2022-10-08 RX ORDER — SODIUM CHLORIDE 0.9 % (FLUSH) 0.9 %
5-40 SYRINGE (ML) INJECTION AS NEEDED
Status: CANCELLED | OUTPATIENT
Start: 2022-10-08

## 2022-10-08 RX ORDER — INSULIN LISPRO 100 [IU]/ML
INJECTION, SOLUTION INTRAVENOUS; SUBCUTANEOUS ONCE
Status: CANCELLED | OUTPATIENT
Start: 2022-10-08 | End: 2022-10-08

## 2022-10-08 RX ORDER — FENTANYL CITRATE 50 UG/ML
25 INJECTION, SOLUTION INTRAMUSCULAR; INTRAVENOUS AS NEEDED
Status: CANCELLED | OUTPATIENT
Start: 2022-10-08

## 2022-10-08 RX ORDER — POTASSIUM CHLORIDE 20 MEQ/1
20 TABLET, EXTENDED RELEASE ORAL
Status: COMPLETED | OUTPATIENT
Start: 2022-10-08 | End: 2022-10-08

## 2022-10-08 RX ORDER — ONDANSETRON 2 MG/ML
4 INJECTION INTRAMUSCULAR; INTRAVENOUS ONCE
Status: CANCELLED | OUTPATIENT
Start: 2022-10-08 | End: 2022-10-08

## 2022-10-08 RX ORDER — NALBUPHINE HYDROCHLORIDE 10 MG/ML
5 INJECTION, SOLUTION INTRAMUSCULAR; INTRAVENOUS; SUBCUTANEOUS
Status: CANCELLED | OUTPATIENT
Start: 2022-10-08

## 2022-10-08 RX ORDER — DEXTROMETHORPHAN/PSEUDOEPHED 2.5-7.5/.8
DROPS ORAL AS NEEDED
Status: DISCONTINUED | OUTPATIENT
Start: 2022-10-08 | End: 2022-10-08 | Stop reason: HOSPADM

## 2022-10-08 RX ORDER — LEVOFLOXACIN 5 MG/ML
500 INJECTION, SOLUTION INTRAVENOUS ONCE
Status: COMPLETED | OUTPATIENT
Start: 2022-10-08 | End: 2022-10-08

## 2022-10-08 RX ORDER — PROPOFOL 10 MG/ML
VIAL (ML) INTRAVENOUS
Status: DISCONTINUED | OUTPATIENT
Start: 2022-10-08 | End: 2022-10-08 | Stop reason: HOSPADM

## 2022-10-08 RX ADMIN — DEXTROSE MONOHYDRATE 125 ML: 100 INJECTION, SOLUTION INTRAVENOUS at 05:49

## 2022-10-08 RX ADMIN — SODIUM CHLORIDE, PRESERVATIVE FREE 10 ML: 5 INJECTION INTRAVENOUS at 16:30

## 2022-10-08 RX ADMIN — PROPOFOL 100 MCG/KG/MIN: 10 INJECTION, EMULSION INTRAVENOUS at 07:48

## 2022-10-08 RX ADMIN — Medication 10 MG: at 07:56

## 2022-10-08 RX ADMIN — SODIUM CHLORIDE, PRESERVATIVE FREE 40 MG: 5 INJECTION INTRAVENOUS at 16:58

## 2022-10-08 RX ADMIN — ROSUVASTATIN CALCIUM 10 MG: 10 TABLET, FILM COATED ORAL at 09:51

## 2022-10-08 RX ADMIN — Medication 4 TABLET: at 05:36

## 2022-10-08 RX ADMIN — SODIUM CHLORIDE, SODIUM LACTATE, CALCIUM CHLORIDE, MAGNESIUM CHLORIDE AND DEXTROSE 8000 ML: 1.5; 538; 448; 18.3; 5.08 INJECTION, SOLUTION INTRAPERITONEAL at 20:14

## 2022-10-08 RX ADMIN — SODIUM CHLORIDE, PRESERVATIVE FREE 40 MG: 5 INJECTION INTRAVENOUS at 05:30

## 2022-10-08 RX ADMIN — LIDOCAINE HYDROCHLORIDE 20 MG: 20 INJECTION, SOLUTION EPIDURAL; INFILTRATION; INTRACAUDAL; PERINEURAL at 07:48

## 2022-10-08 RX ADMIN — SODIUM CHLORIDE, PRESERVATIVE FREE 10 ML: 5 INJECTION INTRAVENOUS at 22:00

## 2022-10-08 RX ADMIN — METRONIDAZOLE 500 MG: 500 INJECTION, SOLUTION INTRAVENOUS at 23:50

## 2022-10-08 RX ADMIN — ACETAMINOPHEN 650 MG: 325 TABLET ORAL at 23:50

## 2022-10-08 RX ADMIN — LEVOFLOXACIN 500 MG: 500 INJECTION, SOLUTION INTRAVENOUS at 12:22

## 2022-10-08 RX ADMIN — POLYETHYLENE GLYCOL-3350 AND ELECTROLYTES 4000 ML: 236; 6.74; 5.86; 2.97; 22.74 POWDER, FOR SOLUTION ORAL at 02:00

## 2022-10-08 RX ADMIN — SODIUM CHLORIDE, PRESERVATIVE FREE 10 ML: 5 INJECTION INTRAVENOUS at 05:34

## 2022-10-08 RX ADMIN — METRONIDAZOLE 500 MG: 500 INJECTION, SOLUTION INTRAVENOUS at 09:51

## 2022-10-08 RX ADMIN — FLUCONAZOLE 200 MG: 200 TABLET ORAL at 12:18

## 2022-10-08 RX ADMIN — POTASSIUM CHLORIDE 20 MEQ: 1500 TABLET, EXTENDED RELEASE ORAL at 09:51

## 2022-10-08 RX ADMIN — POTASSIUM CHLORIDE 20 MEQ: 1500 TABLET, EXTENDED RELEASE ORAL at 09:50

## 2022-10-08 NOTE — PROGRESS NOTES
[facility] Hospitalist Group  Progress Note    Patient: Eryn Skelton Age: 80 y.o. : 1940 MR#: [id number] SSN: [id number]  Date/Time: 10/8/2022     Subjective: Patient lying in the bed, feels slightly weak and tired. Mild abdominal pain, no nausea or vomiting. Patient admits having melena at home, no bloody stool. Had worsening abdominal pain but much better today, no fevers or chills. Daughter at the bedside     Assessment/Plan:   GI bleed, post EGD duodenal angiectasia status post APC  Esophageal candidiasis  Acute diverticulitis  Acute blood loss anemia  Hypokalemia  ESRD on PD  Hypertension  Diabetes mellitus type 2    Plan  Post EGD and colonoscopy today  Discussed with GI team, will start Diflucan, Levaquin and Flagyl  Clear liquid diet, advance as tolerated  Monitor H&H, transfuse if needed  Will replace potassium  PD per nephrology  Continue to hold hypertensive medication given blood pressure lower side  Continue SSI and monitor blood sugar  Will obtain PT/OT eval and treatment    Discussed with the patient and also with her daughter at the bedside and explained in detail about my above plan care. Discussed about need for transfusion if hemoglobin drops, both understood and agreed with the plan. I spent 40 minutes with the patient in face-to-face consultation, of which greater than 50% was spent in counseling and coordination of care as described above.     Case discussed with:  [x]Patient  [x]Family  [x]Nursing  []Case Management  DVT Prophylaxis:  []Lovenox  []Hep SQ  [x]SCDs  []Coumadin   []Eliquis/Xarelto     Objective:   VS: Visit Vitals  BP (!) 117/58 (BP 1 Location: Left upper arm, BP Patient Position: At rest)   Pulse 89   Temp 97.7 °F (36.5 °C)   Resp 18   Ht 5' 5\" (1.651 m)   Wt 74.2 kg (163 lb 8 oz)   SpO2 97%   Breastfeeding No   BMI 27.21 kg/m²      Tmax/24hrs: Temp (24hrs), Av.4 °F (36.9 °C), Min:97.7 °F (36.5 °C), Max:99.6 °F (37.6 °C)  IOBRIEF  Intake/Output Summary (Last 24 hours) at 10/8/2022 1156  Last data filed at 10/8/2022 1125  Gross per 24 hour   Intake 520 ml   Output 0 ml   Net 520 ml       General:  Alert, cooperative, no acute distress    HEENT: PERRLA, anicteric sclerae. Pulmonary:  CTA Bilaterally. No Wheezing/Rales. Cardiovascular: Regular rate and Rhythm. GI:  Soft, Non distended, mild diffuse abdominal tenderness, no guarding, no rigidity + Bowel sounds. Extremities: Trace edema. No calf tenderness. Psych: Good insight. Not anxious or agitated. Neurologic: Alert and oriented X 3. Moves all ext.   Additional:    Medications:   Current Facility-Administered Medications   Medication Dose Route Frequency    levoFLOXacin (LEVAQUIN) 500 mg in D5W IVPB  500 mg IntraVENous ONCE    metroNIDAZOLE (FLAGYL) IVPB premix 500 mg  500 mg IntraVENous Q12H    fluconazole (DIFLUCAN) tablet 200 mg  200 mg Oral DAILY    [START ON 10/10/2022] levoFLOXacin (LEVAQUIN) 250 mg in D5W IVPB  250 mg IntraVENous Q48H    pantoprazole (PROTONIX) 40 mg in 0.9% sodium chloride 10 mL injection  40 mg IntraVENous Q12H    sodium chloride (NS) flush 5-40 mL  5-40 mL IntraVENous Q8H    sodium chloride (NS) flush 5-40 mL  5-40 mL IntraVENous PRN    acetaminophen (TYLENOL) tablet 650 mg  650 mg Oral Q6H PRN    Or    acetaminophen (TYLENOL) suppository 650 mg  650 mg Rectal Q6H PRN    polyethylene glycol (MIRALAX) packet 17 g  17 g Oral DAILY PRN    bisacodyL (DULCOLAX) suppository 10 mg  10 mg Rectal DAILY PRN    ondansetron (ZOFRAN ODT) tablet 4 mg  4 mg Oral Q8H PRN    Or    ondansetron (ZOFRAN) injection 4 mg  4 mg IntraVENous Q6H PRN    potassium chloride (K-DUR, KLOR-CON M20) SR tablet 20 mEq  20 mEq Oral DAILY    peritoneal dialysis DEXTROSE 1.5% (2.5 mEq/L low calcium) solution 8,000 mL  8,000 mL IntraPERitoneal DAILY    rosuvastatin (CRESTOR) tablet 10 mg  10 mg Oral DAILY    insulin lispro (HUMALOG) injection   SubCUTAneous AC&HS    glucose chewable tablet 16 g  4 Tablet Oral PRN    glucagon (GLUCAGEN) injection 1 mg  1 mg IntraMUSCular PRN    dextrose 10% infusion 0-250 mL  0-250 mL IntraVENous PRN       Labs:    Recent Results (from the past 24 hour(s))   OCCULT BLOOD, STOOL    Collection Time: 10/07/22  1:20 PM   Result Value Ref Range    Occult blood, stool Negative NEG     TYPE & SCREEN    Collection Time: 10/07/22  1:25 PM   Result Value Ref Range    Crossmatch Expiration 10/10/2022,2359     ABO/Rh(D) B POSITIVE     Antibody screen NEG    COVID-19 RAPID TEST    Collection Time: 10/07/22  2:50 PM   Result Value Ref Range    Specimen source Nasopharyngeal      COVID-19 rapid test Not detected NOTD     GLUCOSE, POC    Collection Time: 10/07/22  4:39 PM   Result Value Ref Range    Glucose (POC) 72 70 - 720 mg/dL   METABOLIC PANEL, BASIC    Collection Time: 10/07/22  9:07 PM   Result Value Ref Range    Sodium 141 136 - 145 mmol/L    Potassium 3.5 3.5 - 5.5 mmol/L    Chloride 102 100 - 111 mmol/L    CO2 28 21 - 32 mmol/L    Anion gap 11 3.0 - 18 mmol/L    Glucose 68 (L) 74 - 99 mg/dL    BUN 39 (H) 7.0 - 18 MG/DL    Creatinine 13.10 (H) 0.6 - 1.3 MG/DL    BUN/Creatinine ratio 3 (L) 12 - 20      eGFR 3 (L) >60 ml/min/1.73m2    Calcium 7.9 (L) 8.5 - 10.1 MG/DL   GLUCOSE, POC    Collection Time: 10/07/22 10:22 PM   Result Value Ref Range    Glucose (POC) 62 (L) 70 - 110 mg/dL   GLUCOSE, POC    Collection Time: 10/07/22 10:28 PM   Result Value Ref Range    Glucose (POC) 63 (L) 70 - 110 mg/dL   GLUCOSE, POC    Collection Time: 10/07/22 10:41 PM   Result Value Ref Range    Glucose (POC) 85 70 - 197 mg/dL   METABOLIC PANEL, BASIC    Collection Time: 10/08/22  2:46 AM   Result Value Ref Range    Sodium 138 136 - 145 mmol/L    Potassium 3.2 (L) 3.5 - 5.5 mmol/L    Chloride 101 100 - 111 mmol/L    CO2 26 21 - 32 mmol/L    Anion gap 11 3.0 - 18 mmol/L    Glucose 71 (L) 74 - 99 mg/dL    BUN 40 (H) 7.0 - 18 MG/DL    Creatinine 13.20 (H) 0.6 - 1.3 MG/DL    BUN/Creatinine ratio 3 (L) 12 - 20      eGFR 3 (L) >60 ml/min/1.73m2    Calcium 8.2 (L) 8.5 - 10.1 MG/DL   CBC W/O DIFF    Collection Time: 10/08/22  2:46 AM   Result Value Ref Range    WBC 6.7 4.6 - 13.2 K/uL    RBC 2.48 (L) 4.20 - 5.30 M/uL    HGB 7.4 (L) 12.0 - 16.0 g/dL    HCT 22.5 (L) 35.0 - 45.0 %    MCV 90.7 78.0 - 100.0 FL    MCH 29.8 24.0 - 34.0 PG    MCHC 32.9 31.0 - 37.0 g/dL    RDW 13.9 11.6 - 14.5 %    PLATELET 121 (L) 511 - 420 K/uL    MPV 10.6 9.2 - 11.8 FL    NRBC 0.0 0  WBC    ABSOLUTE NRBC 0.00 0.00 - 0.01 K/uL   GLUCOSE, POC    Collection Time: 10/08/22  5:35 AM   Result Value Ref Range    Glucose (POC) 61 (L) 70 - 110 mg/dL   GLUCOSE, POC    Collection Time: 10/08/22  5:45 AM   Result Value Ref Range    Glucose (POC) 68 (L) 70 - 110 mg/dL   GLUCOSE, POC    Collection Time: 10/08/22  6:00 AM   Result Value Ref Range    Glucose (POC) 113 (H) 70 - 110 mg/dL   GLUCOSE, POC    Collection Time: 10/08/22  8:46 AM   Result Value Ref Range    Glucose (POC) 93 70 - 110 mg/dL   GLUCOSE, POC    Collection Time: 10/08/22 11:22 AM   Result Value Ref Range    Glucose (POC) 79 70 - 110 mg/dL       Signed By: Snehal Gagnon MD     October 8, 2022      Disclaimer: Sections of this note are dictated using utilizing voice recognition software. Minor typographical errors may be present. If questions arise, please do not hesitate to contact me or call our department.

## 2022-10-08 NOTE — ROUTINE PROCESS
Bedside and Verbal shift change report given to [address] (oncoming nurse) Harris Sahu RN by  Bibiana Flynning nurse). Report included the following information SBAR, Kardex, OR Summary, MAR, Recent Results, and Cardiac Rhythm SR . Patient in GI station for procedure.

## 2022-10-08 NOTE — ROUTINE PROCESS
TRANSFER - OUT REPORT:    Verbal report given to [employer] (name) on Juan Tobin  being transferred to [facility](unit) for routine post - op       Report consisted of patients Situation, Background, Assessment and   Recommendations(SBAR). Information from the following report(s) SBAR was reviewed with the receiving nurse. Lines:   Peripheral IV 10/07/22 Left Antecubital (Active)   Site Assessment Clean, dry, & intact 10/07/22 1909   Phlebitis Assessment 0 10/07/22 1909   Infiltration Assessment 0 10/07/22 1909   Dressing Status Clean, dry, & intact 10/07/22 1909   Dressing Type Tape;Transparent 10/07/22 1909   Hub Color/Line Status Pink;Flushed 10/07/22 1909   Action Taken Blood drawn 10/07/22 1110   Alcohol Cap Used Yes 10/07/22 1909        Opportunity for questions and clarification was provided.       Patient transported with:   Registered Nurse

## 2022-10-08 NOTE — ROUTINE PROCESS
TRANSFER - IN REPORT:    Verbal report received from [facility] RN(name) on Harvie Dandy  being received from 212(unit) for ordered procedure      Report consisted of patients Situation, Background, Assessment and   Recommendations(SBAR). Information from the following report(s) SBAR was reviewed with the receiving nurse. Opportunity for questions and clarification was provided. Assessment completed upon patients arrival to unit and care assumed.

## 2022-10-08 NOTE — PROCEDURES
[email or web address] [address]  [place], [address]     Endoscopic Gastroduodenoscopy Procedure Note    Perla Fisher  1940  [id number]    Indication:  melena      : Christiano Schmidt MD    Referring Provider:  Emeli Fallon MD    Anesthesia/Sedation:  MAC anesthesia Propofol      Procedure Details   Full disclosure of risks were reviewed with patient as detailed on the consent form. The patient was placed in the left lateral decubitus position and monitored with continuous interval blood pressure monitoring and direct observations. After adequate sedation, the endoscope was carefully introduced into the oropharynx and passed in to the esophagus under direct visualization. The esophagus and GE junction were carefully examined. After advancement of the endoscope into the stomach, a careful examination was performed, including views of the antrum, incisura angularis, corpus and retroflexed views of the cardia and fundus. The pylorus was then intubated without any difficulty and the endoscope was advanced to the second portion of the duodenum. Careful examination of the second portion of the duodenum and the bulb was then performed. The stomach was then decompressed and the endoscope withdrawn. Findings and intervention(s) are detailed below. Findings:   Esophagus:  Candida esophagitis seen throughout the esophagus  Regular Z-line    Stomach: Insufflated appropriately  Gastric antrum appeared normal mucosa without evidence of erythema, erosion or ulceration  Retroflexed view of the incisura and cardia revealed normal mucosa    Duodenum/small bowel:  Examination into approximately 120cm, jejunum. There were two angioectasias, one of them being large in duodenal bulb. Treated with APC. Therapies:    APC      Complications:   none; patient tolerated the procedure well.   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

## 2022-10-08 NOTE — PROGRESS NOTES
Reason for Renal Dosing:  Per Renal Dosing Policy    Patient clinical status and labs ordered/reviewed. Pt Weight Weight: 74.2 kg (163 lb 8 oz)   Serum Creatinine Lab Results   Component Value Date/Time    Creatinine 13.20 (H) 10/08/2022 02:46 AM    Creatinine, POC 10.2 (H) 05/09/2022 12:38 PM       Creatinine Clearance Estimated Creatinine Clearance: 3.3 mL/min (A) (based on SCr of 13.2 mg/dL (H)). BUN Lab Results   Component Value Date/Time    BUN 40 (H) 10/08/2022 02:46 AM    BUN, POC 33 (H) 12/17/2019 08:13 AM       WBC Lab Results   Component Value Date/Time    WBC 6.7 10/08/2022 02:46 AM      Temperature Temp: 97.9 °F (36.6 °C)   HR Pulse (Heart Rate): 85     BP BP: (!) 103/54 (Reported to [employer])           Drug type: Antibiotic indicated for intra-abdominal infection    Drug/dose: was adjusted to : Levaquin 500 mg IV x 1 followed by Levaquin 250 mg IV q48h (from Levaquin 500 mg IV q24h) for HD pt. Continue to monitor. Signed Mary Cosby  Date 10/8/2022  Time 9:17 AM

## 2022-10-08 NOTE — ANESTHESIA PREPROCEDURE EVALUATION
Relevant Problems   No relevant active problems       Anesthetic History   No history of anesthetic complications            Review of Systems / Medical History  Patient summary reviewed and pertinent labs reviewed    Pulmonary  Within defined limits                 Neuro/Psych   Within defined limits           Cardiovascular    Hypertension: well controlled              Exercise tolerance: >4 METS     GI/Hepatic/Renal         Renal disease: ESRD and dialysis       Endo/Other    Diabetes: type 2         Other Findings              Physical Exam    Airway  Mallampati: II  TM Distance: 4 - 6 cm  Neck ROM: normal range of motion   Mouth opening: Normal     Cardiovascular  Regular rate and rhythm,  S1 and S2 normal,  no murmur, click, rub, or gallop  Rhythm: regular  Rate: normal         Dental    Dentition: Lower dentition intact and Upper dentition intact     Pulmonary  Breath sounds clear to auscultation               Abdominal  GI exam deferred       Other Findings            Anesthetic Plan    ASA: 3  Anesthesia type: MAC          Induction: Intravenous  Anesthetic plan and risks discussed with: Patient

## 2022-10-08 NOTE — PROCEDURES
[email or web address] [address]  [facility], [address]    Colonoscopy Procedure Note                 Indications:   melena      :  Shanel Cunha MD    Referring Provider: Jesus Jimenez MD    Sedation:  MAC anesthesia Propofol    Procedure Details:  After informed consent was obtained with all risks and benefits of procedure explained and preoperative exam completed, the patient was taken to the endoscopy suite and placed in the left lateral decubitus position. Upon sequential sedation as per above, a digital rectal exam was performed and was normal.  The Olympus videocolonoscope was inserted in the rectum and carefully advanced to the terminal ileum. The quality of preparation was excellent. The colonoscope was slowly withdrawn with careful evaluation between folds. Retroflexion in the rectum was performed and was normal.    Findings: Three 2mm polyps seen in ascending and descending colon, removed with biopsy forceps. Moderate diverticulosis in descending and sigmoid colon. One of the diverticula seen with purulent material.  No blood seen throughout the colon and push enteroscopy. Interventions:  3 complete polypectomy were performed using cold biopsy forceps and the polyps were  retrieved    Specimen Removed:    ID Type Source Tests Collected by Time Destination   1 : descending polyps Preservative Colon, Descending  Jude Sunshine MD 10/8/2022 0805 Pathology   2 : ascending polyp Preservative Colon, Ascending  Jude Sunshine MD 10/8/2022 2363 Pathology       Complications: None. EBL:  <5mL. Recommendations:   -Ok to start on clears. Advance diet as tolerated if H/H remains stable for 24hrs.  -Duodenal angioectasia could have been a potential source of bleeding, now treated. Consider NM tagged RBC scan if patient has recurrent bleeding.  She could have more small bowel angioectasias given chronic kidney disease.  -Start ciprofloxacin/metronidazole. This will not only be for peritonitis prophylaxis in a patient on peritoneal dialysis post-colonoscopy, but also for active diverticulitis seen.  -Complete 2 weeks of fluconazole for esophageal candidiasis. -Continue to follow with Dr Nolan Bower as outpatient. Consider VCE.       Harleen Cleveland MD  10/8/2022  8:24 AM

## 2022-10-08 NOTE — ROUTINE PROCESS
[facility] 02, repeated 63 following soda, patient asymptomatic, Dr Saira Ayers  at [facility], aware of result. Patient given glucose 4 tablets. Repeat  accucheck at 2241 85.    6397 Accucheck 61, patient asymptomatic glucose tablets given. 0545 Accucheck 68, Dextrose 10%, 125 ml IVF administered. 0600 Accucheck 113. Patient accepted a little over 3/4's Colyte, resulting in liq yellowish clr stools. 2187 Telephone report given to RATNA Shafer RN GI, surg/ procedure. Patient resting quietly.

## 2022-10-08 NOTE — PROGRESS NOTES
Verbal shift report received from [facility]. Pt off the unit for EGD. Report included SBAR and Kardex with recent vitals.

## 2022-10-08 NOTE — ANESTHESIA POSTPROCEDURE EVALUATION
Procedure(s):  ENDOSCOPY/ APC  COLONOSCOPY/ Polypectomies.     MAC    Anesthesia Post Evaluation      Multimodal analgesia: multimodal analgesia used between 6 hours prior to anesthesia start to PACU discharge  Patient location during evaluation: bedside  Patient participation: complete - patient participated  Level of consciousness: awake  Pain score: 0  Pain management: adequate  Airway patency: patent  Anesthetic complications: no  Cardiovascular status: stable  Respiratory status: acceptable  Hydration status: acceptable  Post anesthesia nausea and vomiting:  controlled  Final Post Anesthesia Temperature Assessment:  Normothermia (36.0-37.5 degrees C)      INITIAL Post-op Vital signs:   Vitals Value Taken Time   /55 10/08/22 0821   Temp     Pulse 90 10/08/22 0821   Resp 18 10/08/22 0821   SpO2 97 % 10/08/22 0821

## 2022-10-08 NOTE — PROGRESS NOTES
Problem: Self Care Deficits Care Plan (Adult)  Goal: *Acute Goals and Plan of Care (Insert Text)  Description: Occupational Therapy Goals  Initiated 10/8/2022 within 7 day(s). 1.  Patient will perform bathing with minimal assistance/contact guard assist.   2.  Patient will perform lower body dressing with minimal assistance/contact guard assist.  3.  Patient will perform upper body dressing with supervision/set-up. 4.  Patient will perform toilet transfers with minimal assistance/contact guard assist.  5.  Patient will perform all aspects of toileting with minimal assistance/contact guard assist.  6.  Patient will participate in upper extremity therapeutic exercise/activities with modified independence for 8 minutes. 7.  Patient will utilize energy conservation techniques during functional activities with verbal cues. Prior Level of Function: Pt reports that she lives in a one story home with her . Her  provides some assistance for all self care tasks. Outcome: Progressing Towards Goal    OCCUPATIONAL THERAPY EVALUATION    Patient: Viktor Hood (4 y.o. female)  Date: 10/8/2022  Primary Diagnosis: Upper GI bleed [K92.2]  Procedure(s) (LRB):  ENDOSCOPY/ APC (N/A)  COLONOSCOPY/ Polypectomies (N/A) Day of Surgery   Precautions: fall precautions     PLOF: Pt reports that she lives in a one story home with her . Her  provides some assistance for all self care tasks. ASSESSMENT :  Based on the objective data described below, the patient presents with decreased strength, endurance and independence in self care tasks. She requires some encouragement for OOB activities. Requires Wally for grooming tasks and UB dressing, and modA for bathing, toileting and LB dressing. Patient will benefit from skilled intervention to address the above impairments.   Patient's rehabilitation potential is considered to be Good  Factors which may influence rehabilitation potential include: []             None noted  []             Mental ability/status  [x]             Medical condition  []             Home/family situation and support systems  []             Safety awareness  []             Pain tolerance/management  []             Other:      PLAN :  Recommendations and Planned Interventions:   [x]               Self Care Training                  [x]      Therapeutic Activities  [x]               Functional Mobility Training   []      Cognitive Retraining  [x]               Therapeutic Exercises           [x]      Endurance Activities  []               Balance Training                    []      Neuromuscular Re-Education  []               Visual/Perceptual Training     [x]      Home Safety Training  [x]               Patient Education                   [x]      Family Training/Education  []               Other (comment):    Frequency/Duration: Patient will be followed by occupational therapy 3-5 times a week to address goals. Further Equipment Recommendations for Discharge: N/A    AMPAC: Current research shows that an AM-PAC score of 17 or less is not associated with a discharge to the patient's home setting. Based on an AM-PAC score of 16/24 and their current ADL deficits; it is recommended that the patient have 3-5 sessions per week of Occupational Therapy at d/c to increase the patient's independence. This AMPAC score should be considered in conjunction with interdisciplinary team recommendations to determine the most appropriate discharge setting. Patient's social support, diagnosis, medical stability, and prior level of function should also be taken into consideration. SUBJECTIVE:   Patient stated You have to speak up, I can't hear what you're saying.     OBJECTIVE DATA SUMMARY:     Past Medical History:   Diagnosis Date    Allergic rhinitis     Chronic kidney disease 2020    Peritoneal Dialysis    Diabetes ([facility] [address] 2015    IDDM    H/O seasonal allergies     Hypertension     Left foot drop, chronic since 2004 fusion sx     Peritoneal dialysis catheter in place [facility])      Past Surgical History:   Procedure Laterality Date    COLONOSCOPY N/A 9/26/2022    COLONOSCOPY with bx's and polypectomy performed by Georgina Patrick MD at [facility] ENDOSCOPY    R [address] 46 Left     HX CATARACT REMOVAL  2011    HX LUMBAR FUSION  2004-last sx    fused L2-S1, in [place]    HX TUBAL LIGATION      HX WISDOM TEETH EXTRACTION       Barriers to Learning/Limitations: None  Compensate with: visual, verbal, tactile, kinesthetic cues/model    Home Situation:   Home Situation  Home Environment: Private residence  # Steps to Enter: 3  One/Two Story Residence: One story  Living Alone: No  Support Systems: Spouse/Significant Other  Patient Expects to be Discharged to[de-identified] Home  Current DME Used/Available at Home: Walker  Tub or Shower Type: Tub/Shower combination  []  Right hand dominant   []  Left hand dominant    Cognitive/Behavioral Status:  Neurologic State: Alert; Appropriate for age  Orientation Level: Oriented X4  Cognition: Appropriate decision making  Safety/Judgement: Fall prevention    Skin: intact  Edema: none noted    Vision/Perceptual:    Acuity: Within Defined Limits      Coordination: BUE  Coordination: Generally decreased, functional  Fine Motor Skills-Upper: Left Intact; Right Intact    Gross Motor Skills-Upper: Left Intact; Right Intact    Balance:  Sitting: Intact; Without support  Standing: Impaired; With support    Strength: BUE  Strength: Generally decreased, functional    Tone & Sensation: BUE  Tone: Normal  Sensation: Intact    Range of Motion: BUE  AROM: Generally decreased, functional    Functional Mobility and Transfers for ADLs:  Bed Mobility:  Rolling: Contact guard assistance  Supine to Sit: Contact guard assistance  Sit to Supine: Contact guard assistance  Scooting: Contact guard assistance  Transfers:  Sit to Stand:  Moderate assistance  ADL Assessment:   Feeding: Modified independent  Oral Facial Hygiene/Grooming: Minimum assistance  Bathing: Moderate assistance  Upper Body Dressing: Minimum assistance  Lower Body Dressing: Moderate assistance  Toileting: Moderate assistance    ADL Intervention:  Cognitive Retraining  Safety/Judgement: Fall prevention    Pain:  Pain level pre-treatment: 0/10   Pain level post-treatment: 0/10   Pain Intervention(s): Medication (see MAR); Rest, Ice, Repositioning   Response to intervention: Nurse notified, See doc flow    Activity Tolerance:   Good   Please refer to the flowsheet for vital signs taken during this treatment. After treatment:   [] Patient left in no apparent distress sitting up in chair  [x] Patient left in no apparent distress in bed  [x] Call bell left within reach  [x] Nursing notified  [] Caregiver present  [] Bed alarm activated    COMMUNICATION/EDUCATION:   [x] Role of Occupational Therapy in the acute care setting  [x] Home safety education was provided and the patient/caregiver indicated understanding. [x] Patient/family have participated as able in goal setting and plan of care. [x] Patient/family agree to work toward stated goals and plan of care. [] Patient understands intent and goals of therapy, but is neutral about his/her participation. [] Patient is unable to participate in goal setting and plan of care. Thank you for this referral.  Brelin Kingsley OT  Time Calculation: 17 mins    Eval Complexity: History: LOW Complexity : Brief history review ; Examination: LOW Complexity : 1-3 performance deficits relating to physical, cognitive , or psychosocial skils that result in activity limitations and / or participation restrictions ;    Decision Making:LOW Complexity : No comorbidities that affect functional and no verbal or physical assistance needed to complete eval tasks     [facility]® Daily Activity Inpatient Short Form (6-Clicks)*    How much HELP from another person does the patient currently need    (If the patient hasn't done an activity recently, how much help from another person do you think he/she would need if he/she tried?)   Total (Total A or Dep)   A Lot  (Mod to Max A)   A Little (Sup or Min A)   None (Mod I to I)   Putting on and taking off regular lower body clothing? [] 1 [x] 2 [] 3 [] 4   2. Bathing (including washing, rinsing,      drying)? [] 1 [x] 2 [] 3 [] 4   3. Toileting, which includes using toilet, bedpan or urinal?   [] 1 [x] 2 [] 3 [] 4   4. Putting on and taking off regular upper body clothing? [] 1 [] 2 [x] 3 [] 4   5. Taking care of personal grooming such as brushing teeth? [] 1 [] 2 [x] 3 [] 4   6. Eating meals?    [] 1 [] 2 [] 3 [x] 4

## 2022-10-08 NOTE — PROGRESS NOTES
Progress  Note    66-year-old female with past medical history of hypertension, ESRD admitted for dark stool, anemia following for ESRD management  Subjective      Overnight event noted  Refuse for PD yesterday as going to have prep  Had EGD and colonoscopy   Colonoscopy shows diverticulitis  No active bleeding   EGD shows no bleeding, but esophageal candidiasis  Agree for PD today       IMPRESSION:   ESRD, on peritoneal dialysis  Access, PD catheter no issue  Anemia, presented with dark stool, on Epogen outpatient   hypokalemia,  Hypertension   PLAN:   Plan for PD today, ordered has been given. Continue potassium supplement   Agree with abx for diverticulitis as it carries higher risk for peritonitis. Continue abx for full course for diverticulitis. Adjust medication per ESRD status.          Facility-Administered Medications: None       Current Facility-Administered Medications   Medication Dose Route Frequency    levoFLOXacin (LEVAQUIN) 500 mg in D5W IVPB  500 mg IntraVENous ONCE    metroNIDAZOLE (FLAGYL) IVPB premix 500 mg  500 mg IntraVENous Q12H    fluconazole (DIFLUCAN) tablet 200 mg  200 mg Oral DAILY    [START ON 10/10/2022] levoFLOXacin (LEVAQUIN) 250 mg in D5W IVPB  250 mg IntraVENous Q48H    pantoprazole (PROTONIX) 40 mg in 0.9% sodium chloride 10 mL injection  40 mg IntraVENous Q12H    sodium chloride (NS) flush 5-40 mL  5-40 mL IntraVENous Q8H    sodium chloride (NS) flush 5-40 mL  5-40 mL IntraVENous PRN    acetaminophen (TYLENOL) tablet 650 mg  650 mg Oral Q6H PRN    Or    acetaminophen (TYLENOL) suppository 650 mg  650 mg Rectal Q6H PRN    polyethylene glycol (MIRALAX) packet 17 g  17 g Oral DAILY PRN    bisacodyL (DULCOLAX) suppository 10 mg  10 mg Rectal DAILY PRN    ondansetron (ZOFRAN ODT) tablet 4 mg  4 mg Oral Q8H PRN    Or    ondansetron (ZOFRAN) injection 4 mg  4 mg IntraVENous Q6H PRN    potassium chloride (K-DUR, KLOR-CON M20) SR tablet 20 mEq  20 mEq Oral DAILY    peritoneal dialysis DEXTROSE 1.5% (2.5 mEq/L low calcium) solution 8,000 mL  8,000 mL IntraPERitoneal DAILY    rosuvastatin (CRESTOR) tablet 10 mg  10 mg Oral DAILY    insulin lispro (HUMALOG) injection   SubCUTAneous AC&HS    glucose chewable tablet 16 g  4 Tablet Oral PRN    glucagon (GLUCAGEN) injection 1 mg  1 mg IntraMUSCular PRN    dextrose 10% infusion 0-250 mL  0-250 mL IntraVENous PRN       Review of Systems:     Complete 10-point review of systems were obtained and discussed in length  with the patient. Complete review of systems was negative/unremarkable  except as mentioned in HPI section. Data Review:    Labs: Results:       Chemistry Recent Labs     10/08/22  0246 10/07/22  2107 10/07/22  1055   GLU 71* 68* 121*    141 140   K 3.2* 3.5 2.8*    102 101   CO2 26 28 28   BUN 40* 39* 40*   CREA 13.20* 13.10* 13.10*   CA 8.2* 7.9* 8.2*   AGAP 11 11 11   BUCR 3* 3* 3*   AP  --   --  101   TP  --   --  6.8   ALB  --   --  3.0*   GLOB  --   --  3.8   AGRAT  --   --  0.8      CBC w/Diff Recent Labs     10/08/22  0246 10/07/22  1055   WBC 6.7 8.9   RBC 2.48* 2.95*   HGB 7.4* 8.8*   HCT 22.5* 27.0*   * 158   GRANS  --  63   LYMPH  --  25   EOS  --  2      Coagulation Recent Labs     10/07/22  1055   PTP 13.7   INR 1.0       Iron/Ferritin Recent Labs     10/07/22  1055   IRON 84      BNP No results for input(s): BNPP in the last 72 hours. Cardiac Enzymes No results for input(s): CPK, CKND1, MEME in the last 72 hours. No lab exists for component: CKRMB, TROIP   Liver Enzymes Recent Labs     10/07/22  1055   TP 6.8   ALB 3.0*         Thyroid Studies No results found for: T4, T3U, TSH, TSHEXT, TSHEXT      EKG: unchanged from previous tracings.     Physical Assessment:   Visit Vitals  BP (!) 103/54 (BP 1 Location: Right upper arm, BP Patient Position: At rest)   Pulse 85   Temp 97.9 °F (36.6 °C)   Resp (!) 97   Ht 5' 5\" (1.651 m)   Wt 74.2 kg (163 lb 8 oz)   SpO2 93%   Breastfeeding No   BMI 27.21 kg/m² Weight change:     Intake/Output Summary (Last 24 hours) at 10/8/2022 1106  Last data filed at 10/8/2022 0820  Gross per 24 hour   Intake 300 ml   Output 0 ml   Net 300 ml     Physical Exam:   General: comfortable, no acute distress   HEENT sclera anicteric, supple neck, no thyromegaly  CVS: S1S2 heard,  no rub  RS: + air entry b/l,   Abd: Soft, Non tender, Not distended, Positive bowel sounds, no organomegaly, no CVA / supra pubic tenderness  Neuro: non focal, awake, alert , CN II-XII are grossly intact  Extrm: edema, no cyanosis, clubbing   Skin: no visible  Rash  Musculoskeletal: No gross joints or bone deformities     Procedures/imaging: see electronic medical records for all procedures, Xrays and details which were not copied into this note but were reviewed prior to creation of Plan        Thank you very much for allowing me to participate in the care of this patient. We will continue to monitor with you and make recommendations as needed.          Naa Sahu MD  October 8, 2022  [facility]  Office [phone]

## 2022-10-09 LAB
ANION GAP SERPL CALC-SCNC: 13 MMOL/L (ref 3–18)
ATRIAL RATE: 90 BPM
BASOPHILS # BLD: 0 K/UL (ref 0–0.1)
BASOPHILS NFR BLD: 1 % (ref 0–2)
BUN SERPL-MCNC: 35 MG/DL (ref 7–18)
BUN/CREAT SERPL: 3 (ref 12–20)
CALCIUM SERPL-MCNC: 8.3 MG/DL (ref 8.5–10.1)
CALCULATED P AXIS, ECG09: 48 DEGREES
CALCULATED R AXIS, ECG10: -46 DEGREES
CALCULATED T AXIS, ECG11: -26 DEGREES
CHLORIDE SERPL-SCNC: 99 MMOL/L (ref 100–111)
CO2 SERPL-SCNC: 23 MMOL/L (ref 21–32)
CREAT SERPL-MCNC: 12 MG/DL (ref 0.6–1.3)
DIAGNOSIS, 93000: NORMAL
DIFFERENTIAL METHOD BLD: ABNORMAL
EOSINOPHIL # BLD: 0.1 K/UL (ref 0–0.4)
EOSINOPHIL NFR BLD: 2 % (ref 0–5)
ERYTHROCYTE [DISTWIDTH] IN BLOOD BY AUTOMATED COUNT: 13.9 % (ref 11.6–14.5)
GLUCOSE BLD STRIP.AUTO-MCNC: 137 MG/DL (ref 70–110)
GLUCOSE BLD STRIP.AUTO-MCNC: 66 MG/DL (ref 70–110)
GLUCOSE BLD STRIP.AUTO-MCNC: 88 MG/DL (ref 70–110)
GLUCOSE BLD STRIP.AUTO-MCNC: 92 MG/DL (ref 70–110)
GLUCOSE BLD STRIP.AUTO-MCNC: 95 MG/DL (ref 70–110)
GLUCOSE SERPL-MCNC: 117 MG/DL (ref 74–99)
HCT VFR BLD AUTO: 21.3 % (ref 35–45)
HCT VFR BLD AUTO: 22.4 % (ref 35–45)
HGB BLD-MCNC: 6.9 G/DL (ref 12–16)
HGB BLD-MCNC: 7.5 G/DL (ref 12–16)
IMM GRANULOCYTES # BLD AUTO: 0 K/UL (ref 0–0.04)
IMM GRANULOCYTES NFR BLD AUTO: 0 % (ref 0–0.5)
LYMPHOCYTES # BLD: 1.6 K/UL (ref 0.9–3.6)
LYMPHOCYTES NFR BLD: 25 % (ref 21–52)
MCH RBC QN AUTO: 29.4 PG (ref 24–34)
MCHC RBC AUTO-ENTMCNC: 32.4 G/DL (ref 31–37)
MCV RBC AUTO: 90.6 FL (ref 78–100)
MONOCYTES # BLD: 0.6 K/UL (ref 0.05–1.2)
MONOCYTES NFR BLD: 10 % (ref 3–10)
NEUTS SEG # BLD: 3.8 K/UL (ref 1.8–8)
NEUTS SEG NFR BLD: 61 % (ref 40–73)
NRBC # BLD: 0 K/UL (ref 0–0.01)
NRBC BLD-RTO: 0 PER 100 WBC
P-R INTERVAL, ECG05: 192 MS
PLATELET # BLD AUTO: 123 K/UL (ref 135–420)
PMV BLD AUTO: 10.9 FL (ref 9.2–11.8)
POTASSIUM SERPL-SCNC: 3.3 MMOL/L (ref 3.5–5.5)
Q-T INTERVAL, ECG07: 448 MS
QRS DURATION, ECG06: 160 MS
QTC CALCULATION (BEZET), ECG08: 548 MS
RBC # BLD AUTO: 2.35 M/UL (ref 4.2–5.3)
SODIUM SERPL-SCNC: 135 MMOL/L (ref 136–145)
VENTRICULAR RATE, ECG03: 90 BPM
WBC # BLD AUTO: 6.2 K/UL (ref 4.6–13.2)

## 2022-10-09 PROCEDURE — 80048 BASIC METABOLIC PNL TOTAL CA: CPT

## 2022-10-09 PROCEDURE — 85018 HEMOGLOBIN: CPT

## 2022-10-09 PROCEDURE — 74011000250 HC RX REV CODE- 250: Performed by: STUDENT IN AN ORGANIZED HEALTH CARE EDUCATION/TRAINING PROGRAM

## 2022-10-09 PROCEDURE — 74011000250 HC RX REV CODE- 250: Performed by: PHYSICIAN ASSISTANT

## 2022-10-09 PROCEDURE — 90945 DIALYSIS ONE EVALUATION: CPT

## 2022-10-09 PROCEDURE — 65270000046 HC RM TELEMETRY

## 2022-10-09 PROCEDURE — 74011250636 HC RX REV CODE- 250/636: Performed by: HOSPITALIST

## 2022-10-09 PROCEDURE — 99232 SBSQ HOSP IP/OBS MODERATE 35: CPT | Performed by: HOSPITALIST

## 2022-10-09 PROCEDURE — 97161 PT EVAL LOW COMPLEX 20 MIN: CPT

## 2022-10-09 PROCEDURE — C9113 INJ PANTOPRAZOLE SODIUM, VIA: HCPCS | Performed by: STUDENT IN AN ORGANIZED HEALTH CARE EDUCATION/TRAINING PROGRAM

## 2022-10-09 PROCEDURE — 74011250637 HC RX REV CODE- 250/637: Performed by: PHYSICIAN ASSISTANT

## 2022-10-09 PROCEDURE — 85025 COMPLETE CBC W/AUTO DIFF WBC: CPT

## 2022-10-09 PROCEDURE — 74011250637 HC RX REV CODE- 250/637: Performed by: HOSPITALIST

## 2022-10-09 PROCEDURE — 97116 GAIT TRAINING THERAPY: CPT

## 2022-10-09 PROCEDURE — 74011250636 HC RX REV CODE- 250/636: Performed by: STUDENT IN AN ORGANIZED HEALTH CARE EDUCATION/TRAINING PROGRAM

## 2022-10-09 PROCEDURE — 2709999900 HC NON-CHARGEABLE SUPPLY

## 2022-10-09 PROCEDURE — 74011250636 HC RX REV CODE- 250/636: Performed by: INTERNAL MEDICINE

## 2022-10-09 PROCEDURE — 93005 ELECTROCARDIOGRAM TRACING: CPT

## 2022-10-09 PROCEDURE — A4726 DIALYS SOL FLD VOL > 5999CC: HCPCS | Performed by: INTERNAL MEDICINE

## 2022-10-09 PROCEDURE — 74011250637 HC RX REV CODE- 250/637: Performed by: INTERNAL MEDICINE

## 2022-10-09 PROCEDURE — 36415 COLL VENOUS BLD VENIPUNCTURE: CPT

## 2022-10-09 RX ORDER — AMLODIPINE BESYLATE 5 MG/1
5 TABLET ORAL DAILY
Status: DISCONTINUED | OUTPATIENT
Start: 2022-10-10 | End: 2022-10-11 | Stop reason: HOSPADM

## 2022-10-09 RX ORDER — HYDRALAZINE HYDROCHLORIDE 20 MG/ML
10 INJECTION INTRAMUSCULAR; INTRAVENOUS
Status: DISCONTINUED | OUTPATIENT
Start: 2022-10-09 | End: 2022-10-11 | Stop reason: HOSPADM

## 2022-10-09 RX ORDER — SIMETHICONE 80 MG
80 TABLET,CHEWABLE ORAL
Status: DISCONTINUED | OUTPATIENT
Start: 2022-10-09 | End: 2022-10-11 | Stop reason: HOSPADM

## 2022-10-09 RX ADMIN — SODIUM CHLORIDE, PRESERVATIVE FREE 40 MG: 5 INJECTION INTRAVENOUS at 18:06

## 2022-10-09 RX ADMIN — ONDANSETRON 4 MG: 4 TABLET, ORALLY DISINTEGRATING ORAL at 09:17

## 2022-10-09 RX ADMIN — SODIUM CHLORIDE, PRESERVATIVE FREE 10 ML: 5 INJECTION INTRAVENOUS at 23:49

## 2022-10-09 RX ADMIN — SODIUM CHLORIDE, PRESERVATIVE FREE 10 ML: 5 INJECTION INTRAVENOUS at 14:06

## 2022-10-09 RX ADMIN — SODIUM CHLORIDE, PRESERVATIVE FREE 40 MG: 5 INJECTION INTRAVENOUS at 04:42

## 2022-10-09 RX ADMIN — METRONIDAZOLE 500 MG: 500 INJECTION, SOLUTION INTRAVENOUS at 23:49

## 2022-10-09 RX ADMIN — METRONIDAZOLE 500 MG: 500 INJECTION, SOLUTION INTRAVENOUS at 09:16

## 2022-10-09 RX ADMIN — ROSUVASTATIN CALCIUM 10 MG: 10 TABLET, FILM COATED ORAL at 09:17

## 2022-10-09 RX ADMIN — FLUCONAZOLE 200 MG: 200 TABLET ORAL at 09:17

## 2022-10-09 RX ADMIN — SODIUM CHLORIDE, SODIUM LACTATE, CALCIUM CHLORIDE, MAGNESIUM CHLORIDE AND DEXTROSE 8000 ML: 1.5; 538; 448; 18.3; 5.08 INJECTION, SOLUTION INTRAPERITONEAL at 19:20

## 2022-10-09 RX ADMIN — POTASSIUM CHLORIDE 20 MEQ: 1500 TABLET, EXTENDED RELEASE ORAL at 09:17

## 2022-10-09 RX ADMIN — SODIUM CHLORIDE, PRESERVATIVE FREE 10 ML: 5 INJECTION INTRAVENOUS at 08:02

## 2022-10-09 NOTE — DIALYSIS
[facility]PD started without difficulty. Dressing changed. Site intact no s/s of infection. Report given to oncoming nurse, Hawk Peng, RN Patient resting comfortably in bed and ready for sleep.

## 2022-10-09 NOTE — PROGRESS NOTES
Verbal bedside shift received from [facility] Patient resting in bed. Family/visitor present at bedside.  NAD

## 2022-10-09 NOTE — DIALYSIS
CCPD initiated without difficulty. Patient does not want catheter dressing change tonight. Report given to primary nurse, Ethan Doshi RN. Patient resting comortably in bed.

## 2022-10-09 NOTE — PROGRESS NOTES
Problem: Mobility Impaired (Adult and Pediatric)  Goal: *Acute Goals and Plan of Care (Insert Text)  Description: Physical Therapy Goals  Initiated 10/9/2022 and to be accomplished within 7 day(s)  1. Patient will move from supine to sit and sit to supine , scoot up and down, and roll side to side in bed with modified independence. 2.  Patient will transfer from bed to chair and chair to bed with modified independence using the least restrictive device. 3.  Patient will perform sit to stand with modified independence. 4.  Patient will ambulate with modified independence for 100 feet with the least restrictive device. 5.  Patient will ascend/descend 3 stairs with unilateral handrail(s) with minimal assistance/contact guard assist.     PLOF: Pt reporting she lives with  in 1 story house with 3 TERRI with handrails. Reporting assist with some ADLs from  and assisted up and down front steps. Maritza in home with upright walker. Outcome: Progressing Towards Goal       PHYSICAL THERAPY EVALUATION    Patient: Viktor Hood (89 y.o. female)  Date: 10/9/2022  Primary Diagnosis: Upper GI bleed [K92.2]  Procedure(s) (LRB):  ENDOSCOPY/ APC (N/A)  COLONOSCOPY/ Polypectomies (N/A) 1 Day Post-Op   Precautions:  Fall    ASSESSMENT :  Pt cleared to participate in PT session, pt received semi-reclined in bed and agreeable to therapy session. Based on the objective data described below, the patient presents with decreased endurance, decreased strength, decreased balance reactions, gait deviations, and decreased independence in functional mobility. Pt completing bed mobility with supervision. Sitting on EOB with good balance. Standing with CGA to RW, ambulating at EOB due to being attached to PD at the time. Dialysis entering room and disconnecting from PD. Pt standing with CGA and ambulating with shuffled gait pattern. L foot drop, reporting this is her normal. Pt sitting in recliner.   Pt positioned for comfort and educated to call for assist before getting up, pt verbalized understanding. Pt left with all needs met and call bell in reach. RN notified of position and participation. Patient will benefit from skilled intervention to address the above impairments. Patient's rehabilitation potential is considered to be Good  Factors which may influence rehabilitation potential include:   [x]         None noted  []         Mental ability/status  []         Medical condition  []         Home/family situation and support systems  []         Safety awareness  []         Pain tolerance/management  []         Other:      PLAN :  Recommendations and Planned Interventions:   [x]           Bed Mobility Training             []    Neuromuscular Re-Education  [x]           Transfer Training                   []    Orthotic/Prosthetic Training  [x]           Gait Training                          []    Modalities  [x]           Therapeutic Exercises           []    Edema Management/Control  [x]           Therapeutic Activities            [x]    Family Training/Education  [x]           Patient Education  []           Other (comment):    Frequency/Duration: Patient will be followed by physical therapy 1-2 times per day/4-7 days per week to address goals. Further Equipment Recommendations for Discharge: rolling walker    AMPAC: Current research shows that an AM-PAC score of 18 or greater is associated with a discharge to the patient's home setting. Based on an AM-PAC score of 18/24 and their current functional mobility deficits, it is recommended that the patient have 2-3 sessions per week of Physical Therapy at d/c to increase the patient's independence. This AMPAC score should be considered in conjunction with interdisciplinary team recommendations to determine the most appropriate discharge setting. Patient's social support, diagnosis, medical stability, and prior level of function should also be taken into consideration. SUBJECTIVE:   Patient stated It feels good to be up.     OBJECTIVE DATA SUMMARY:     Past Medical History:   Diagnosis Date    Allergic rhinitis     Chronic kidney disease 2020    Peritoneal Dialysis    Diabetes ([address] 2015    IDDM    H/O seasonal allergies     Hypertension     Left foot drop, chronic since 2004 fusion sx     Peritoneal dialysis catheter in place [facility])      Past Surgical History:   Procedure Laterality Date    COLONOSCOPY N/A 9/26/2022    COLONOSCOPY with bx's and polypectomy performed by Vanita Salomon MD at [facility]COPY    [address] Left     HX CATARACT REMOVAL  2011    HX LUMBAR FUSION  2004-last sx    fused L2-S1, in [place]    HX TUBAL LIGATION      HX WISDOM TEETH EXTRACTION       Barriers to Learning/Limitations: None  Compensate with: N/A  Home Situation:  Home Situation  Home Environment: Private residence  # Steps to Enter: 3  Rails to Enter: Yes  Hand Rails : Bilateral  Wheelchair Ramp: No  One/Two Story Residence: One story  Living Alone: No  Support Systems: Spouse/Significant Other  Patient Expects to be Discharged to[de-identified] Home  Current DME Used/Available at Home:  (upright walker)  Tub or Shower Type: Tub/Shower combination  Critical Behavior:  Neurologic State: Alert  Orientation Level: Oriented X4  Cognition: Appropriate decision making  Safety/Judgement: Awareness of environment; Fall prevention  Psychosocial  Patient Behaviors: Calm; Cooperative    Strength:    Strength: Within functional limits    Tone & Sensation:   Tone: Normal    Sensation: Intact    Range Of Motion:  AROM: Within functional limits    Posture:  Posture (WDL): Within defined limits     Functional Mobility:  Bed Mobility:     Supine to Sit: Supervision     Scooting: Supervision  Transfers:  Sit to Stand: Contact guard assistance  Stand to Sit: Contact guard assistance    Balance:   Sitting: Intact  Standing: Impaired; Without support  Standing - Static: Good  Standing - Dynamic : Fair (+)    Ambulation/Gait Training:  Distance (ft): 20 Feet (ft)  Assistive Device: Walker, rolling  Ambulation - Level of Assistance: Contact guard assistance     Gait Description (WDL): Exceptions to WDL  Gait Abnormalities: Decreased step clearance   Base of Support: Narrowed     Speed/Violeta: Slow  Step Length: Right shortened;Left shortened    Pain:  Pain level pre-treatment: 0/10   Pain level post-treatment: 0/10     Activity Tolerance:   Good     Please refer to the flowsheet for vital signs taken during this treatment. After treatment:   []         Patient left in no apparent distress sitting up in chair  [x]         Patient left in no apparent distress in bed  [x]         Call bell left within reach  [x]         Nursing notified  []         Caregiver present  []         Bed alarm activated  []         SCDs applied    COMMUNICATION/EDUCATION:   [x]         Role of Physical Therapy in the acute care setting. [x]         Fall prevention education was provided and the patient/caregiver indicated understanding. [x]         Patient/family have participated as able in goal setting and plan of care. [x]         Patient/family agree to work toward stated goals and plan of care. []         Patient understands intent and goals of therapy, but is neutral about his/her participation. []         Patient is unable to participate in goal setting/plan of care: ongoing with therapy staff.  []         Other:     Thank you for this referral.  Mariella Babin, PT   Time Calculation: 25 mins      Eval Complexity: History: MEDIUM  Complexity : 1-2 comorbidities / personal factors will impact the outcome/ POC Exam:LOW Complexity : 1-2 Standardized tests and measures addressing body structure, function, activity limitation and / or participation in recreation  Presentation: LOW Complexity : Stable, uncomplicated  Clinical Decision Making:Low Complexity low  Overall Complexity:LOW     [facility] AM-PAC® Basic Mobility Inpatient Short Form (6-Clicks) Version 2    How much HELP from another person does the patient currently need    (If the patient hasn't done an activity recently, how much help from another person do you think he/she would need if he/she tried?)   Total (Total A or Dep)   A Lot  (Mod to Max A)   A Little (Sup or Min A)   None (Mod I to I)   Turning from your back to your side while in a flat bed without using bedrails? [] 1 [] 2 [x] 3 [] 4   2. Moving from lying on your back to sitting on the side of a flat bed without using bedrails? [] 1 [] 2 [x] 3 [] 4   3. Moving to and from a bed to a chair (including a wheelchair)? [] 1 [] 2 [x] 3 [] 4   4. Standing up from a chair using your arms (e.g., wheelchair, or bedside chair)? [] 1 [] 2 [x] 3 [] 4   5. Walking in hospital room? [] 1 [] 2 [x] 3 [] 4   6. Climbing 3-5 steps with a railing?+   [] 1 [] 2 [x] 3 [] 4   +If stair climbing cannot be assessed, skip item #6. Sum responses from items 1-5.

## 2022-10-09 NOTE — PROGRESS NOTES
[facility] Hospitalist Group  Progress Note    Patient: Luis Martínez Age: 80 y.o. : 1940 MR#: [id number] SSN: [id number]  Date/Time: 10/9/2022     Subjective: Patient feels slightly better, still complains of a lot of gas pain. Mild left lower quadrant abdominal pain. Tolerating clear liquids. Assessment/Plan:   GI bleed, post EGD duodenal angiectasia status post APC, post colonoscopy 10/8/2022  Esophageal candidiasis  Acute diverticulitis  Acute blood loss anemia  Hypokalemia  ESRD on PD  Hypertension  Diabetes mellitus type 2    Plan  Continue Diflucan, Levaquin and Flagyl  Will advance diet as tolerated  Monitor H&H, transfuse if needed  Will replace potassium  PD per nephrology  BP slowly trending up, will start amlodipine and monitor  Continue SSI and monitor blood sugar  PT/OT eval and treatment    Discussed with the patient and family at the bedside and explained in detail about my above plan care. Discussed about need for transfusion if hemoglobin drops, both understood and agreed with the plan. Disposition: Home with home health care in 1 to 2 days    Case discussed with:  [x]Patient  [x]Family  [x]Nursing  []Case Management  DVT Prophylaxis:  []Lovenox  []Hep SQ  [x]SCDs  []Coumadin   []Eliquis/Xarelto     Objective:   VS: Visit Vitals  BP (!) 162/60 (BP 1 Location: Right upper arm, BP Patient Position: At rest) Comment: Reported to TRINITY Foreman   Pulse 86   Temp 97.2 °F (36.2 °C)   Resp 16   Ht 5' 5\" (1.651 m)   Wt 74.2 kg (163 lb 8 oz)   SpO2 99%   Breastfeeding No   BMI 27.21 kg/m²      Tmax/24hrs: Temp (24hrs), Av.2 °F (36.8 °C), Min:97.2 °F (36.2 °C), Max:99.8 °F (37.7 °C)  IOBRIEF  Intake/Output Summary (Last 24 hours) at 10/9/2022 1328  Last data filed at 10/9/2022 1219  Gross per 24 hour   Intake 480 ml   Output 250 ml   Net 230 ml       General:  Alert, cooperative, no acute distress    HEENT: PERRLA, anicteric sclerae. Pulmonary:  CTA Bilaterally. No Wheezing/Rales. Cardiovascular: Regular rate and Rhythm. GI:  Soft, Non distended, mild left lower quadrant tenderness, no guarding, no rigidity + Bowel sounds. Extremities: Trace edema. No calf tenderness. Psych: Good insight. Not anxious or agitated. Neurologic: Alert and oriented X 3. Moves all ext.   Additional:    Medications:   Current Facility-Administered Medications   Medication Dose Route Frequency    simethicone (MYLICON) tablet 80 mg  80 mg Oral QID PRN    metroNIDAZOLE (FLAGYL) IVPB premix 500 mg  500 mg IntraVENous Q12H    fluconazole (DIFLUCAN) tablet 200 mg  200 mg Oral DAILY    [START ON 10/10/2022] levoFLOXacin (LEVAQUIN) 250 mg in D5W IVPB  250 mg IntraVENous Q48H    pantoprazole (PROTONIX) 40 mg in 0.9% sodium chloride 10 mL injection  40 mg IntraVENous Q12H    sodium chloride (NS) flush 5-40 mL  5-40 mL IntraVENous Q8H    sodium chloride (NS) flush 5-40 mL  5-40 mL IntraVENous PRN    acetaminophen (TYLENOL) tablet 650 mg  650 mg Oral Q6H PRN    Or    acetaminophen (TYLENOL) suppository 650 mg  650 mg Rectal Q6H PRN    polyethylene glycol (MIRALAX) packet 17 g  17 g Oral DAILY PRN    bisacodyL (DULCOLAX) suppository 10 mg  10 mg Rectal DAILY PRN    ondansetron (ZOFRAN ODT) tablet 4 mg  4 mg Oral Q8H PRN    Or    ondansetron (ZOFRAN) injection 4 mg  4 mg IntraVENous Q6H PRN    potassium chloride (K-DUR, KLOR-CON M20) SR tablet 20 mEq  20 mEq Oral DAILY    peritoneal dialysis DEXTROSE 1.5% (2.5 mEq/L low calcium) solution 8,000 mL  8,000 mL IntraPERitoneal DAILY    rosuvastatin (CRESTOR) tablet 10 mg  10 mg Oral DAILY    insulin lispro (HUMALOG) injection   SubCUTAneous AC&HS    glucose chewable tablet 16 g  4 Tablet Oral PRN    glucagon (GLUCAGEN) injection 1 mg  1 mg IntraMUSCular PRN    dextrose 10% infusion 0-250 mL  0-250 mL IntraVENous PRN       Labs:    Recent Results (from the past 24 hour(s))   GLUCOSE, POC    Collection Time: 10/08/22  4:56 PM   Result Value Ref Range    Glucose (POC) 93 70 - 110 mg/dL   GLUCOSE, POC    Collection Time: 10/08/22 11:49 PM   Result Value Ref Range    Glucose (POC) 116 (H) 70 - 772 mg/dL   METABOLIC PANEL, BASIC    Collection Time: 10/09/22 12:58 AM   Result Value Ref Range    Sodium 135 (L) 136 - 145 mmol/L    Potassium 3.3 (L) 3.5 - 5.5 mmol/L    Chloride 99 (L) 100 - 111 mmol/L    CO2 23 21 - 32 mmol/L    Anion gap 13 3.0 - 18 mmol/L    Glucose 117 (H) 74 - 99 mg/dL    BUN 35 (H) 7.0 - 18 MG/DL    Creatinine 12.00 (H) 0.6 - 1.3 MG/DL    BUN/Creatinine ratio 3 (L) 12 - 20      eGFR 3 (L) >60 ml/min/1.73m2    Calcium 8.3 (L) 8.5 - 10.1 MG/DL   CBC WITH AUTOMATED DIFF    Collection Time: 10/09/22 12:58 AM   Result Value Ref Range    WBC 6.2 4.6 - 13.2 K/uL    RBC 2.35 (L) 4.20 - 5.30 M/uL    HGB 6.9 (L) 12.0 - 16.0 g/dL    HCT 21.3 (L) 35.0 - 45.0 %    MCV 90.6 78.0 - 100.0 FL    MCH 29.4 24.0 - 34.0 PG    MCHC 32.4 31.0 - 37.0 g/dL    RDW 13.9 11.6 - 14.5 %    PLATELET 733 (L) 763 - 420 K/uL    MPV 10.9 9.2 - 11.8 FL    NRBC 0.0 0  WBC    ABSOLUTE NRBC 0.00 0.00 - 0.01 K/uL    NEUTROPHILS 61 40 - 73 %    LYMPHOCYTES 25 21 - 52 %    MONOCYTES 10 3 - 10 %    EOSINOPHILS 2 0 - 5 %    BASOPHILS 1 0 - 2 %    IMMATURE GRANULOCYTES 0 0.0 - 0.5 %    ABS. NEUTROPHILS 3.8 1.8 - 8.0 K/UL    ABS. LYMPHOCYTES 1.6 0.9 - 3.6 K/UL    ABS. MONOCYTES 0.6 0.05 - 1.2 K/UL    ABS. EOSINOPHILS 0.1 0.0 - 0.4 K/UL    ABS. BASOPHILS 0.0 0.0 - 0.1 K/UL    ABS. IMM.  GRANS. 0.0 0.00 - 0.04 K/UL    DF AUTOMATED     GLUCOSE, POC    Collection Time: 10/09/22  8:04 AM   Result Value Ref Range    Glucose (POC) 88 70 - 110 mg/dL   EKG, 12 LEAD, INITIAL    Collection Time: 10/09/22  8:33 AM   Result Value Ref Range    Ventricular Rate 90 BPM    Atrial Rate 90 BPM    P-R Interval 192 ms    QRS Duration 160 ms    Q-T Interval 448 ms    QTC Calculation (Bezet) 548 ms    Calculated P Axis 48 degrees    Calculated R Axis -46 degrees    Calculated T Axis -26 degrees    Diagnosis       Normal sinus rhythm  Right bundle branch block  Left anterior fascicular block  Bifascicular block  Abnormal ECG  When compared with ECG of 23-MAY-2022 09:17,  No significant change was found  Confirmed by Iaindanny Bower [id number]) on 10/9/2022 10:53:19 AM     HGB & HCT    Collection Time: 10/09/22 12:00 PM   Result Value Ref Range    HGB 7.5 (L) 12.0 - 16.0 g/dL    HCT 22.4 (L) 35.0 - 45.0 %   GLUCOSE, POC    Collection Time: 10/09/22 12:07 PM   Result Value Ref Range    Glucose (POC) 92 70 - 110 mg/dL       Signed By: Son Azul MD     October 9, 2022      Disclaimer: Sections of this note are dictated using utilizing voice recognition software. Minor typographical errors may be present. If questions arise, please do not hesitate to contact me or call our department.

## 2022-10-09 NOTE — PROGRESS NOTES
Progress  Note    59-year-old female with past medical history of hypertension, ESRD admitted for dark stool, anemia following for ESRD management  Subjective      Overnight event noted  Received PD last night , UF about 360cc  Receiving BT today as Hb lower side,    IMPRESSION:   ESRD, on peritoneal dialysis  Access, PD catheter no issue  Anemia, presented with dark stool, on Epogen outpatient   hypokalemia,  Hypertension   PLAN:   Continue PD with current rx, will use yellow bag today . Continue potassium supplement. Continue abx. Monitor Hb  Adjust medication per ESRD status. Discussed with Dr. Karen Howe.        Facility-Administered Medications: None       Current Facility-Administered Medications   Medication Dose Route Frequency    metroNIDAZOLE (FLAGYL) IVPB premix 500 mg  500 mg IntraVENous Q12H    fluconazole (DIFLUCAN) tablet 200 mg  200 mg Oral DAILY    [START ON 10/10/2022] levoFLOXacin (LEVAQUIN) 250 mg in D5W IVPB  250 mg IntraVENous Q48H    pantoprazole (PROTONIX) 40 mg in 0.9% sodium chloride 10 mL injection  40 mg IntraVENous Q12H    sodium chloride (NS) flush 5-40 mL  5-40 mL IntraVENous Q8H    sodium chloride (NS) flush 5-40 mL  5-40 mL IntraVENous PRN    acetaminophen (TYLENOL) tablet 650 mg  650 mg Oral Q6H PRN    Or    acetaminophen (TYLENOL) suppository 650 mg  650 mg Rectal Q6H PRN    polyethylene glycol (MIRALAX) packet 17 g  17 g Oral DAILY PRN    bisacodyL (DULCOLAX) suppository 10 mg  10 mg Rectal DAILY PRN    ondansetron (ZOFRAN ODT) tablet 4 mg  4 mg Oral Q8H PRN    Or    ondansetron (ZOFRAN) injection 4 mg  4 mg IntraVENous Q6H PRN    potassium chloride (K-DUR, KLOR-CON M20) SR tablet 20 mEq  20 mEq Oral DAILY    peritoneal dialysis DEXTROSE 1.5% (2.5 mEq/L low calcium) solution 8,000 mL  8,000 mL IntraPERitoneal DAILY    rosuvastatin (CRESTOR) tablet 10 mg  10 mg Oral DAILY    insulin lispro (HUMALOG) injection   SubCUTAneous AC&HS    glucose chewable tablet 16 g  4 Tablet Oral PRN    glucagon (GLUCAGEN) injection 1 mg  1 mg IntraMUSCular PRN    dextrose 10% infusion 0-250 mL  0-250 mL IntraVENous PRN       Review of Systems:     Complete 10-point review of systems were obtained and discussed in length  with the patient. Complete review of systems was negative/unremarkable  except as mentioned in HPI section. Data Review:    Labs: Results:       Chemistry Recent Labs     10/09/22  0058 10/08/22  0246 10/07/22  2107 10/07/22  1055   * 71* 68* 121*   * 138 141 140   K 3.3* 3.2* 3.5 2.8*   CL 99* 101 102 101   CO2 23 26 28 28   BUN 35* 40* 39* 40*   CREA 12.00* 13.20* 13.10* 13.10*   CA 8.3* 8.2* 7.9* 8.2*   AGAP 13 11 11 11   BUCR 3* 3* 3* 3*   AP  --   --   --  101   TP  --   --   --  6.8   ALB  --   --   --  3.0*   GLOB  --   --   --  3.8   AGRAT  --   --   --  0.8      CBC w/Diff Recent Labs     10/09/22  0058 10/08/22  1229 10/08/22  0246 10/07/22  1055   WBC 6.2  --  6.7 8.9   RBC 2.35*  --  2.48* 2.95*   HGB 6.9* 7.5* 7.4* 8.8*   HCT 21.3* 23.4* 22.5* 27.0*   *  --  127* 158   GRANS 61  --   --  63   LYMPH 25  --   --  25   EOS 2  --   --  2      Coagulation Recent Labs     10/07/22  1055   PTP 13.7   INR 1.0       Iron/Ferritin Recent Labs     10/07/22  1055   IRON 84      BNP No results for input(s): BNPP in the last 72 hours. Cardiac Enzymes No results for input(s): CPK, CKND1, MEME in the last 72 hours. No lab exists for component: CKRMB, TROIP   Liver Enzymes Recent Labs     10/07/22  1055   TP 6.8   ALB 3.0*         Thyroid Studies No results found for: T4, T3U, TSH, TSHEXT, TSHEXT      EKG: unchanged from previous tracings.     Physical Assessment:   Visit Vitals  BP (!) 125/58 (BP 1 Location: Left upper arm, BP Patient Position: At rest)   Pulse 89   Temp 97.9 °F (36.6 °C)   Resp 16   Ht 5' 5\" (1.651 m)   Wt 74.2 kg (163 lb 8 oz)   SpO2 98%   Breastfeeding No   BMI 27.21 kg/m²     Weight change:     Intake/Output Summary (Last 24 hours) at 10/9/2022 1120  Last data filed at 10/9/2022 0600  Gross per 24 hour   Intake 340 ml   Output 250 ml   Net 90 ml     Physical Exam:   General: comfortable, no acute distress   HEENT sclera anicteric, supple neck, no thyromegaly  CVS: S1S2 heard,  no rub  RS: + air entry b/l,   Abd: Soft, Non tender, Not distended, Positive bowel sounds, no organomegaly, no CVA / supra pubic tenderness  Neuro: non focal, awake, alert , CN II-XII are grossly intact  Extrm: edema, no cyanosis, clubbing   Skin: no visible  Rash  Musculoskeletal: No gross joints or bone deformities     Procedures/imaging: see electronic medical records for all procedures, Xrays and details which were not copied into this note but were reviewed prior to creation of Plan        Thank you very much for allowing me to participate in the care of this patient. We will continue to monitor with you and make recommendations as needed.          Andria Rao MD  October 9, 2022  [facility]  Office [phone]

## 2022-10-10 LAB
ANION GAP SERPL CALC-SCNC: 12 MMOL/L (ref 3–18)
BASOPHILS # BLD: 0 K/UL (ref 0–0.1)
BASOPHILS NFR BLD: 1 % (ref 0–2)
BUN SERPL-MCNC: 34 MG/DL (ref 7–18)
BUN/CREAT SERPL: 3 (ref 12–20)
CALCIUM SERPL-MCNC: 8.4 MG/DL (ref 8.5–10.1)
CHLORIDE SERPL-SCNC: 99 MMOL/L (ref 100–111)
CO2 SERPL-SCNC: 24 MMOL/L (ref 21–32)
CREAT SERPL-MCNC: 11.9 MG/DL (ref 0.6–1.3)
DIFFERENTIAL METHOD BLD: ABNORMAL
EOSINOPHIL # BLD: 0.2 K/UL (ref 0–0.4)
EOSINOPHIL NFR BLD: 4 % (ref 0–5)
ERYTHROCYTE [DISTWIDTH] IN BLOOD BY AUTOMATED COUNT: 13.9 % (ref 11.6–14.5)
GLUCOSE BLD STRIP.AUTO-MCNC: 109 MG/DL (ref 70–110)
GLUCOSE BLD STRIP.AUTO-MCNC: 120 MG/DL (ref 70–110)
GLUCOSE BLD STRIP.AUTO-MCNC: 160 MG/DL (ref 70–110)
GLUCOSE BLD STRIP.AUTO-MCNC: 66 MG/DL (ref 70–110)
GLUCOSE BLD STRIP.AUTO-MCNC: 95 MG/DL (ref 70–110)
GLUCOSE SERPL-MCNC: 130 MG/DL (ref 74–99)
HBV SURFACE AB SER QL IA: POSITIVE
HBV SURFACE AB SERPL IA-ACNC: >1000 MIU/ML
HCT VFR BLD AUTO: 20.7 % (ref 35–45)
HCT VFR BLD AUTO: 21.3 % (ref 35–45)
HEP BS AB COMMENT,HBSAC: NORMAL
HGB BLD-MCNC: 6.7 G/DL (ref 12–16)
HGB BLD-MCNC: 7.1 G/DL (ref 12–16)
HISTORY CHECKED?,CKHIST: NORMAL
IMM GRANULOCYTES # BLD AUTO: 0 K/UL (ref 0–0.04)
IMM GRANULOCYTES NFR BLD AUTO: 0 % (ref 0–0.5)
LYMPHOCYTES # BLD: 1.5 K/UL (ref 0.9–3.6)
LYMPHOCYTES NFR BLD: 27 % (ref 21–52)
MCH RBC QN AUTO: 29.5 PG (ref 24–34)
MCHC RBC AUTO-ENTMCNC: 32.4 G/DL (ref 31–37)
MCV RBC AUTO: 91.2 FL (ref 78–100)
MONOCYTES # BLD: 0.7 K/UL (ref 0.05–1.2)
MONOCYTES NFR BLD: 12 % (ref 3–10)
NEUTS SEG # BLD: 3.2 K/UL (ref 1.8–8)
NEUTS SEG NFR BLD: 57 % (ref 40–73)
NRBC # BLD: 0 K/UL (ref 0–0.01)
NRBC BLD-RTO: 0 PER 100 WBC
PLATELET # BLD AUTO: 123 K/UL (ref 135–420)
PMV BLD AUTO: 10.6 FL (ref 9.2–11.8)
POTASSIUM SERPL-SCNC: 3.6 MMOL/L (ref 3.5–5.5)
RBC # BLD AUTO: 2.27 M/UL (ref 4.2–5.3)
SODIUM SERPL-SCNC: 135 MMOL/L (ref 136–145)
WBC # BLD AUTO: 5.6 K/UL (ref 4.6–13.2)

## 2022-10-10 PROCEDURE — 36415 COLL VENOUS BLD VENIPUNCTURE: CPT

## 2022-10-10 PROCEDURE — 85025 COMPLETE CBC W/AUTO DIFF WBC: CPT

## 2022-10-10 PROCEDURE — 74011250637 HC RX REV CODE- 250/637: Performed by: PHYSICIAN ASSISTANT

## 2022-10-10 PROCEDURE — 74011250637 HC RX REV CODE- 250/637: Performed by: HOSPITALIST

## 2022-10-10 PROCEDURE — 2709999900 HC NON-CHARGEABLE SUPPLY

## 2022-10-10 PROCEDURE — 30233N1 TRANSFUSION OF NONAUTOLOGOUS RED BLOOD CELLS INTO PERIPHERAL VEIN, PERCUTANEOUS APPROACH: ICD-10-PCS | Performed by: STUDENT IN AN ORGANIZED HEALTH CARE EDUCATION/TRAINING PROGRAM

## 2022-10-10 PROCEDURE — 90945 DIALYSIS ONE EVALUATION: CPT

## 2022-10-10 PROCEDURE — 74011250637 HC RX REV CODE- 250/637: Performed by: INTERNAL MEDICINE

## 2022-10-10 PROCEDURE — 99232 SBSQ HOSP IP/OBS MODERATE 35: CPT | Performed by: HOSPITALIST

## 2022-10-10 PROCEDURE — 65270000046 HC RM TELEMETRY

## 2022-10-10 PROCEDURE — C9113 INJ PANTOPRAZOLE SODIUM, VIA: HCPCS | Performed by: STUDENT IN AN ORGANIZED HEALTH CARE EDUCATION/TRAINING PROGRAM

## 2022-10-10 PROCEDURE — 36430 TRANSFUSION BLD/BLD COMPNT: CPT

## 2022-10-10 PROCEDURE — 74011000250 HC RX REV CODE- 250: Performed by: PHYSICIAN ASSISTANT

## 2022-10-10 PROCEDURE — P9016 RBC LEUKOCYTES REDUCED: HCPCS

## 2022-10-10 PROCEDURE — 80048 BASIC METABOLIC PNL TOTAL CA: CPT

## 2022-10-10 PROCEDURE — A4726 DIALYS SOL FLD VOL > 5999CC: HCPCS | Performed by: INTERNAL MEDICINE

## 2022-10-10 PROCEDURE — 85018 HEMOGLOBIN: CPT

## 2022-10-10 PROCEDURE — 74011250636 HC RX REV CODE- 250/636: Performed by: HOSPITALIST

## 2022-10-10 PROCEDURE — 74011250636 HC RX REV CODE- 250/636: Performed by: INTERNAL MEDICINE

## 2022-10-10 PROCEDURE — 74011250636 HC RX REV CODE- 250/636: Performed by: STUDENT IN AN ORGANIZED HEALTH CARE EDUCATION/TRAINING PROGRAM

## 2022-10-10 PROCEDURE — 74011000250 HC RX REV CODE- 250: Performed by: STUDENT IN AN ORGANIZED HEALTH CARE EDUCATION/TRAINING PROGRAM

## 2022-10-10 RX ORDER — LANOLIN ALCOHOL/MO/W.PET/CERES
100 CREAM (GRAM) TOPICAL DAILY
Status: DISCONTINUED | OUTPATIENT
Start: 2022-10-11 | End: 2022-10-11 | Stop reason: HOSPADM

## 2022-10-10 RX ORDER — AMOXICILLIN AND CLAVULANATE POTASSIUM 500; 125 MG/1; MG/1
1 TABLET, FILM COATED ORAL EVERY 12 HOURS
Status: DISCONTINUED | OUTPATIENT
Start: 2022-10-10 | End: 2022-10-11 | Stop reason: HOSPADM

## 2022-10-10 RX ORDER — AMOXICILLIN AND CLAVULANATE POTASSIUM 875; 125 MG/1; MG/1
1 TABLET, FILM COATED ORAL EVERY 12 HOURS
Status: DISCONTINUED | OUTPATIENT
Start: 2022-10-10 | End: 2022-10-10 | Stop reason: DRUGHIGH

## 2022-10-10 RX ORDER — PANTOPRAZOLE SODIUM 40 MG/1
40 TABLET, DELAYED RELEASE ORAL
Status: DISCONTINUED | OUTPATIENT
Start: 2022-10-10 | End: 2022-10-11 | Stop reason: HOSPADM

## 2022-10-10 RX ORDER — SODIUM CHLORIDE 9 MG/ML
250 INJECTION, SOLUTION INTRAVENOUS AS NEEDED
Status: DISCONTINUED | OUTPATIENT
Start: 2022-10-10 | End: 2022-10-11 | Stop reason: HOSPADM

## 2022-10-10 RX ADMIN — ROSUVASTATIN CALCIUM 10 MG: 10 TABLET, FILM COATED ORAL at 08:45

## 2022-10-10 RX ADMIN — POTASSIUM CHLORIDE 20 MEQ: 1500 TABLET, EXTENDED RELEASE ORAL at 08:45

## 2022-10-10 RX ADMIN — AMOXICILLIN AND CLAVULANATE POTASSIUM 1 TABLET: 500; 125 TABLET, FILM COATED ORAL at 21:40

## 2022-10-10 RX ADMIN — SODIUM CHLORIDE, PRESERVATIVE FREE 10 ML: 5 INJECTION INTRAVENOUS at 06:25

## 2022-10-10 RX ADMIN — SODIUM CHLORIDE, PRESERVATIVE FREE 40 MG: 5 INJECTION INTRAVENOUS at 06:25

## 2022-10-10 RX ADMIN — METRONIDAZOLE 500 MG: 500 INJECTION, SOLUTION INTRAVENOUS at 09:56

## 2022-10-10 RX ADMIN — AMLODIPINE BESYLATE 5 MG: 5 TABLET ORAL at 08:45

## 2022-10-10 RX ADMIN — FLUCONAZOLE 200 MG: 200 TABLET ORAL at 08:45

## 2022-10-10 RX ADMIN — METRONIDAZOLE 500 MG: 500 INJECTION, SOLUTION INTRAVENOUS at 22:49

## 2022-10-10 RX ADMIN — SODIUM CHLORIDE, PRESERVATIVE FREE 10 ML: 5 INJECTION INTRAVENOUS at 15:23

## 2022-10-10 RX ADMIN — SODIUM CHLORIDE, SODIUM LACTATE, CALCIUM CHLORIDE, MAGNESIUM CHLORIDE AND DEXTROSE 8000 ML: 1.5; 538; 448; 18.3; 5.08 INJECTION, SOLUTION INTRAPERITONEAL at 18:25

## 2022-10-10 RX ADMIN — AMOXICILLIN AND CLAVULANATE POTASSIUM 1 TABLET: 500; 125 TABLET, FILM COATED ORAL at 12:15

## 2022-10-10 RX ADMIN — PANTOPRAZOLE SODIUM 40 MG: 40 TABLET, DELAYED RELEASE ORAL at 17:26

## 2022-10-10 NOTE — PROGRESS NOTES
Comprehensive Nutrition Assessment    Type and Reason for Visit: Initial, Positive nutrition screen    Nutrition Recommendations/Plan:   Add oral nutrition supplement to optimize nutrition intake opportunity, potassium/phosphorus restriction and peritoneal dialysis: Nepro with Carb Steady (each provides 425 kcal, 19g protein) BID    Plan to add thiamine d/t Peritoneal Dialysis  Continue current diet as tolerated by patient   Monitor PO intake, compliance of oral supplement, weight, labs, and plan of care during admission. Malnutrition Assessment:  Malnutrition Status: At risk for malnutrition (specify) (r/t weight loss, advanced age and PD.) (10/10/22 1517)      Nutrition History and Allergies:   Past medical history: allergic rhinitis, CKD-peritoneal dialysis, Diabetes, HTN. NKFA. Weight history per chart review:  CBW: 10/08/22 : 74.2 kg (163 lb 8 oz), 30 days: 09/26/22 : 73.8 kg (162 lb 11.2 oz), >90 days: 06/14/22 : 84.8 kg (187 lb), >180 days: 02/23/22 : 83.9 kg (185 lb), 1 year: 11/16/21 : 90.7 kg (200 lb). Weight trending down: -12.8% significant change x > 90 days. Nutrition Assessment:    Visited pt in room, laying in bed, alert and able to communicate-very pleasant. Pt admitted for with complaint of Melena and abdominal pain. Per chart review, 1 unit PRBC as previously ordered d/t low H/H. At the time of visit, pt was on FULL LIQUID diet and tolerating well with 50-75% PO intake. Pt transition to [address] [place] [address] currently. Pt denies abdominal pain nor d/c/n/v. Pt reported tolerating Ensure PTA and receptive to add while inpatient. Current lab shows low H/H (7.1/21.3), Na (135), and elevated BUN (34) and Creatinine (11.90). Pt receiving peritoneal dialysis 1.5 dextrose of 8000mL providing 120 g of dextrose and 408 kcal.    Nutrition Related Findings:    Last BM 10/8. Output: 100mL (urine-oliguria).  Pertinent Medications: pantoprazole, amlodipine, flagyl, miralax, dulcolax, zofran, potassium chloride, peritoneal dialysis 1.5 dextrose of 8000mL, crestor, humalog, Wound Type: Surgical incision     Current Nutrition Intake & Therapies:  Average Meal Intake: 51-75%  Average Supplement Intake: None ordered  ADULT DIET Regular; Low Sodium (2 gm); Low Potassium (Less than 3000 mg/day); Low Phosphorus (Less than 1000 mg); 60 to 80 gm    Anthropometric Measures:  Height: 5' 5\" (165.1 cm)  Ideal Body Weight (IBW): 125 lbs (57 kg)     Current Body Wt:  74.2 kg (163 lb 9.3 oz), 130.9 % IBW. Bed scale  Current BMI (kg/m2): 27.2  BMI Category: Overweight (BMI 25.0-29. 9)    Estimated Daily Nutrient Needs:  Energy Requirements Based On: Kcal/kg (25-30)  Weight Used for Energy Requirements: Current  Energy (kcal/day): 6272-6779  Weight Used for Protein Requirements: Current (1.0-1.2)  Protein (g/day): 74-89  Method Used for Fluid Requirements: Standard renal  Fluid (ml/day): 750-1500mL    Nutrition Diagnosis:   Increased nutrient needs related to increased demand for energy/nutrients as evidenced by intake 51-75%, dialysis    Nutrition Interventions:   Food and/or Nutrient Delivery: Continue current diet, Start oral nutrition supplement, Vitamin supplement  Nutrition Education/Counseling: Education not indicated, No recommendations at this time  Coordination of Nutrition Care: Continue to monitor while inpatient  Plan of Care discussed with: patient    Goals:     Goals: Meet at least 75% of estimated needs, by next RD assessment       Nutrition Monitoring and Evaluation:   Behavioral-Environmental Outcomes: None identified  Food/Nutrient Intake Outcomes: Diet advancement/tolerance, Food and nutrient intake, Supplement intake, Vitamin/mineral intake  Physical Signs/Symptoms Outcomes: Biochemical data, Hemodynamic status, Meal time behavior, Nutrition focused physical findings, Weight, GI status    Discharge Planning:    Continue current diet, Continue oral nutrition supplement    Patrice Newton MA, RDN, LD   Contact: [phone]

## 2022-10-10 NOTE — PROGRESS NOTES
Peritoneal Dialysis Disconnection         Metrics   I-Drain: 12mL   Total UF: 396mL   Last Fill: N/A   Last Manual Drain: N/A   Net UF:            Avg Dwell Time: 1:09   Lost Dwell Time: 0:30   Alarms: Low UF   Effluent: Clear light yellow     Access   Type & Location:    Comments: Prior to disconnection, one-minute Alcavis scrub performed. Sterile mini cap applied and secured to abdomen. Dressing clean, dry and intact. Dressingg date: 10/9/22                                         Safety:   Primary Nurse Rpt Pre:    Primary Nurse Rpt Post:      Comments:   . Patient disconnected w/o complications

## 2022-10-10 NOTE — PROGRESS NOTES
conducted an initial consultation and Spiritual Assessment for [employer], who is a 80 y.o.,female. Patients Primary Language is: [place]. According to the patients EMR Synagogue Affiliation is: [employer].     The reason the Patient came to the hospital is:   Patient Active Problem List    Diagnosis Date Noted    Upper GI bleed 83/73/0749    Periumbilical abdominal pain 10/07/2022    Chronic kidney disease     Peritoneal dialysis catheter in place [facility])     Secondary hyperparathyroidism of renal origin ([facility] 01/09/2021    CKD (chronic kidney disease) stage 5, GFR less than 15 ml/min ([address] 09/26/2019    Adjacent segment disease with spinal stenosis, severe at L1/2 w/ DDD by CT '18 10/20/2018    SI (sacroiliac) joint dysfunction 09/12/2018    Left foot drop 09/12/2018    Lumbar spondylosis 09/12/2018    Sacroiliac joint dysfunction of right side 09/12/2018    Severe obesity (BMI 35.0-39. 9) with comorbidity ([facility] 04/19/2018    Dyspepsia 02/02/2018    Nausea 02/02/2018    Constipation 02/02/2018    Type 2 diabetes mellitus with nephropathy ([address] 12/21/2017    Allergic rhinitis     Hypertension     Diabetes ([address]         The  provided the following Interventions:  Initiated a relationship of care and support. Listened empathically. Provided information about Spiritual Care Services. Chart reviewed. The following outcomes where achieved:  Patient is not interested at this time in completing an Advance Medical Directive.  confirmed Patient's Synagogue Affiliation. Patient expressed gratitude for 's visit. Assessment:  Patient does not have any Restorationism/cultural needs that will affect patients preferences in health care. There are no spiritual or Restorationism issues which require intervention at this time. Plan:  Chaplains will continue to follow and will provide pastoral care on an as needed/requested basis.    recommends bedside caregivers page  on duty if patient shows signs of acute spiritual or emotional distress.     [address]  ([phone])

## 2022-10-10 NOTE — PROGRESS NOTES
Progress  Note    80-year-old female with past medical history of hypertension, ESRD admitted for dark stool, anemia following for ESRD management  Subjective      Overnight event noted  Tolerated overnight PD without much issue  Still makes urine. Had EGD and colonoscopy   Colonoscopy showed diverticulitis  EGD shows no bleeding, but esophageal candidiasis  Duodenal AVM s/p APC        IMPRESSION:   ESRD, on peritoneal dialysis  Access, PD catheter no issue  GI Bleed with duodoneal AVM  Colonoscopy with diverticulitis  EGD with esophageal candidiasis  Anemia, presented with dark stool, on [facility] outpatient   Hypertension   PLAN:   Continue with CCPD in night with 4 exchanges-yellow bags- 2L each  Adjust medication per ESRD status.   Continue with antiboitics and antifungals         Facility-Administered Medications: None       Current Facility-Administered Medications   Medication Dose Route Frequency    0.9% sodium chloride infusion 250 mL  250 mL IntraVENous PRN    pantoprazole (PROTONIX) tablet 40 mg  40 mg Oral ACB&D    amoxicillin-clavulanate (AUGMENTIN) 500-125 mg per tablet 1 Tablet  1 Tablet Oral Q12H    simethicone (MYLICON) tablet 80 mg  80 mg Oral QID PRN    amLODIPine (NORVASC) tablet 5 mg  5 mg Oral DAILY    hydrALAZINE (APRESOLINE) 20 mg/mL injection 10 mg  10 mg IntraVENous Q6H PRN    metroNIDAZOLE (FLAGYL) IVPB premix 500 mg  500 mg IntraVENous Q12H    fluconazole (DIFLUCAN) tablet 200 mg  200 mg Oral DAILY    sodium chloride (NS) flush 5-40 mL  5-40 mL IntraVENous Q8H    sodium chloride (NS) flush 5-40 mL  5-40 mL IntraVENous PRN    acetaminophen (TYLENOL) tablet 650 mg  650 mg Oral Q6H PRN    Or    acetaminophen (TYLENOL) suppository 650 mg  650 mg Rectal Q6H PRN    polyethylene glycol (MIRALAX) packet 17 g  17 g Oral DAILY PRN    bisacodyL (DULCOLAX) suppository 10 mg  10 mg Rectal DAILY PRN    ondansetron (ZOFRAN ODT) tablet 4 mg  4 mg Oral Q8H PRN    Or    ondansetron (ZOFRAN) injection 4 mg 4 mg IntraVENous Q6H PRN    potassium chloride (K-DUR, KLOR-CON M20) SR tablet 20 mEq  20 mEq Oral DAILY    peritoneal dialysis DEXTROSE 1.5% (2.5 mEq/L low calcium) solution 8,000 mL  8,000 mL IntraPERitoneal DAILY    rosuvastatin (CRESTOR) tablet 10 mg  10 mg Oral DAILY    insulin lispro (HUMALOG) injection   SubCUTAneous AC&HS    glucose chewable tablet 16 g  4 Tablet Oral PRN    glucagon (GLUCAGEN) injection 1 mg  1 mg IntraMUSCular PRN    dextrose 10% infusion 0-250 mL  0-250 mL IntraVENous PRN       Review of Systems:     Complete 10-point review of systems were obtained and discussed in length  with the patient. Complete review of systems was negative/unremarkable  except as mentioned in HPI section. Data Review:    Labs: Results:       Chemistry Recent Labs     10/10/22  0054 10/09/22  0058 10/08/22  0246   * 117* 71*   * 135* 138   K 3.6 3.3* 3.2*   CL 99* 99* 101   CO2 24 23 26   BUN 34* 35* 40*   CREA 11.90* 12.00* 13.20*   CA 8.4* 8.3* 8.2*   AGAP 12 13 11   BUCR 3* 3* 3*        CBC w/Diff Recent Labs     10/10/22  0054 10/09/22  1200 10/09/22  0058 10/08/22  1229 10/08/22  0246   WBC 5.6  --  6.2  --  6.7   RBC 2.27*  --  2.35*  --  2.48*   HGB 6.7* 7.5* 6.9*   < > 7.4*   HCT 20.7* 22.4* 21.3*   < > 22.5*   *  --  123*  --  127*   GRANS 57  --  61  --   --    LYMPH 27  --  25  --   --    EOS 4  --  2  --   --     < > = values in this interval not displayed. Coagulation No results for input(s): PTP, INR, APTT, INREXT, INREXT in the last 72 hours. Iron/Ferritin No results for input(s): IRON in the last 72 hours. No lab exists for component: TIBCCALC     BNP No results for input(s): BNPP in the last 72 hours. Cardiac Enzymes No results for input(s): CPK, CKND1, MEME in the last 72 hours. No lab exists for component: CKRMB, TROIP   Liver Enzymes No results for input(s): TP, ALB, TBIL, AP in the last 72 hours.     No lab exists for component: SGOT, GPT, DBIL     Thyroid Studies No results found for: T4, T3U, TSH, TSHEXT, TSHEXT      EKG: unchanged from previous tracings. Physical Assessment:   Visit Vitals  /64 (BP 1 Location: Left upper arm, BP Patient Position: At rest)   Pulse 88   Temp 98.4 °F (36.9 °C)   Resp 18   Ht 5' 5\" (1.651 m)   Wt 74.2 kg (163 lb 8 oz)   SpO2 96%   Breastfeeding No   BMI 27.21 kg/m²     Weight change:     Intake/Output Summary (Last 24 hours) at 10/10/2022 1133  Last data filed at 10/10/2022 4634  Gross per 24 hour   Intake 180 ml   Output 350 ml   Net -170 ml       Physical Exam:   General: comfortable, no acute distress   HEENT sclera anicteric, supple neck, no thyromegaly  CVS: S1S2 heard,  no rub  RS: + air entry b/l,   Abd: Soft, Non tender, Not distended, Positive bowel sounds, no organomegaly, no CVA / supra pubic tenderness  Neuro: non focal, awake, alert , CN II-XII are grossly intact  Extrm: no edema, no cyanosis, clubbing   Skin: no visible  Rash  Musculoskeletal: No gross joints or bone deformities     Procedures/imaging: see electronic medical records for all procedures, Xrays and details which were not copied into this note but were reviewed prior to creation of Plan        Thank you very much for allowing me to participate in the care of this patient. We will continue to monitor with you and make recommendations as needed.          Otis Haley MD  October 10, 2022  [facility]  Office [phone]

## 2022-10-10 NOTE — PROGRESS NOTES
RENAL DOSE ADJUSTMENT MADE PER P/T PROTOCOL     PREVIOUS ORDER:  Augmentin 875 mg po Q12H     Estimated Creatinine Clearance: 3.7 mL/min (A) (based on SCr of 11.9 mg/dL (H)). --PD patient  Recent Labs     10/10/22  0054 10/09/22  0058 10/08/22  0246 10/07/22  2107 10/07/22  1055   BUN 34* 35* 40*   < > 40*   * 123* 127*  --  158   INR  --   --   --   --  1.0    < > = values in this interval not displayed.         NEW RENALLY ADJUSTED ORDER: 500 mg  po Q12H     Kevin Curtis PHARMD  10/10/2022 10:48 AM

## 2022-10-10 NOTE — PROGRESS NOTES
[email or web address]  [phone]    Gastroenterology follow up-Progress note    Impression:  1. Acute on Chronic anemia - Hgb 6.7, 1 unit PRBC ordered, Recent complarison 9.3 (8/12/22)  - EGD 10/8/22 - candida esophagitis, duodenal angioectasia, treated w/ APC  - Colonoscopy 10/8/22 - three 2mm polyps, removed (pending path), diverticulosis w/ small focus of diverticulitis  2. Melena - intermittent for several months, resolved a few days after recent colo but now back. Seen as an OP 8/12/2022 w/ colo performed 9/26/22 colonoscopy significant for mild diverticulosis of the colon and 1 12mm TA polyp. Denies f/c, vomiting, or weight loss. Denies use of NSAID, etoh, or anticoagulation. 3. Periumbilical abdominal pain -  new since colo and described as severe periumbilical sharp pain w/o modifying factors. Pt reports the pain is so severe that she has difficulty walking. 4. Hypokalemia   5. Nausea  6. ESRD on PD - high risk for AVM  7. HTN  8. HLD  9. DM  10. GERD    Plan:  1. Monitor H/H, transfuse for Hgb <7  2. 1 unit PRBC as previously ordered  3. Cipro/Flagyl for diverticulitis as well as peritonitis prophylaxis in patient on PD post-colonoscopy  4. 2 week course of fluconazole for esohageal candidiasis  5. NM Bleed scan if recurrent bleeding - could have additional small bowel angioectasias given CKD  6. Follow up with Dr. Estella Ayers as outpatient, consider VCE    Chief Complaint: Anemia, melena      Subjective:  reports significant improvement in abdominal pain since starting antibiotics. Reviewed results of EGD/colonoscopy 10/8. ROS: Denies any fevers, chills, rash.        General: well developed, well nourished, no acute distress  Eyes: conjunctiva normal, EOM normal  Cardiovascular: heart normal, intact distal pulses, normal rate and regular rhythm  Pulmonary: breath sounds normal and effort normal  Abdominal: appearance normal, bowel sounds normal and soft, non-acute, non-tender    Patient Active Problem List   Diagnosis Code    Allergic rhinitis J30.9    Hypertension I10    Diabetes ([facility] E11.9    Type 2 diabetes mellitus with nephropathy ([facility] E11.21    Dyspepsia R10.13    Nausea R11.0    Constipation K59.00    Severe obesity (BMI 35.0-39. 9) with comorbidity ([facility]) E66.01    SI (sacroiliac) joint dysfunction M53.3    Left foot drop M21.372    Lumbar spondylosis M47.816    Sacroiliac joint dysfunction of right side M53.3    Adjacent segment disease with spinal stenosis, severe at L1/2 w/ DDD by CT '18 [id number]    CKD (chronic kidney disease) stage 5, GFR less than 15 ml/min ([facility]) N18.5    Secondary hyperparathyroidism of renal origin ([facility] N25.81    Chronic kidney disease N18.9    Peritoneal dialysis catheter in place [facility]) Z99.2    Upper GI bleed Q73.6    Periumbilical abdominal pain R10.33         Visit Vitals  /70   Pulse 88   Temp 99.2 °F (37.3 °C)   Resp 18   Ht 5' 5\" (1.651 m)   Wt 74.2 kg (163 lb 8 oz)   SpO2 96%   Breastfeeding No   BMI 27.21 kg/m²           Intake/Output Summary (Last 24 hours) at 10/10/2022 1416  Last data filed at 10/10/2022 0635  Gross per 24 hour   Intake --   Output 350 ml   Net -350 ml       CBC w/Diff    Lab Results   Component Value Date/Time    WBC 5.6 10/10/2022 12:54 AM    RBC 2.27 (L) 10/10/2022 12:54 AM    HGB 7.1 (L) 10/10/2022 11:39 AM    HCT 21.3 (L) 10/10/2022 11:39 AM    MCV 91.2 10/10/2022 12:54 AM    MCH 29.5 10/10/2022 12:54 AM    MCHC 32.4 10/10/2022 12:54 AM    RDW 13.9 10/10/2022 12:54 AM     (L) 10/10/2022 12:54 AM    Lab Results   Component Value Date/Time    GRANS 57 10/10/2022 12:54 AM    LYMPH 27 10/10/2022 12:54 AM    EOS 4 10/10/2022 12:54 AM    BASOS 1 10/10/2022 12:54 AM      Basic Metabolic Profile   Recent Labs     10/10/22  0054   *   K 3.6   CL 99*   CO2 24   BUN 34*   CA 8.4*        Hepatic Function    Lab Results   Component Value Date/Time    ALB 3.0 (L) 10/07/2022 10:55 AM    TP 6.8 10/07/2022 10:55 AM     10/07/2022 10:55 AM No results found for: TBIL       Coags   No results for input(s): PTP, INR, APTT, INREXT in the last 72 hours. MITZI Osuna  10/10/2022, 2:16 PM  [facility].  [email or web address]  Office: [phone]  Cell: [phone]

## 2022-10-10 NOTE — PROGRESS NOTES
Peritoneal Dialysis Initiation            Orders   Therapy Time:  6.7hrs   Cycles:  4   Fill Volume: 2000 ml   Last Fill Volume: 0ml   Total Volume: 8000ml   Solution: Dextrose Dianeal bags      Access   Type & Location: Right lower abdomen   Comments: Prior to connection, one-minute Alcavis scrub performed. Dsg change date:   10/10/22                                 Labs   HBsAg (Antigen) / date:          Negative 10/7/22                                     HBsAb (Antibody) / date: Immune 10/7/22   Source: EMR     Safety:   Time Out Done:   (Time) 1828   Consent obtained/signed: yes   Education: yes   Primary Nurse Rpt Pre: Sharan Mcmillan RN   Primary Nurse Rpt Post: DIMPLE Galdamez RN     Comments:   Pt orders, notes, labs, code status reviewed. Start time: 1829  Estimated End Time: 7 hrs    Remained present during initial drain followed by initiation of first fill. Prior to departure, bed in lowest position, call bell and personal belongings within reach.

## 2022-10-10 NOTE — PROGRESS NOTES
[facility] Hospitalist Group  Progress Note    Patient: Alis Hall Age: 80 y.o. : 1940 MR#: [id number] SSN: [id number]  Date/Time: 10/10/2022     Subjective: Patient feels slightly better, abdominal pain improving, tolerating diet well. Denies any melena hematochezia. Assessment/Plan:   GI bleed, post EGD duodenal angiectasia status post APC, post colonoscopy 10/8/2022  Esophageal candidiasis  Acute diverticulitis  Acute blood loss anemia  Hypokalemia  ESRD on PD  Hypertension  Diabetes mellitus type 2    Plan  H&H down, will transfuse 1 unit and monitor  Given daily pronation, will discontinue Levaquin and start Augmentin  Continue Diflucan and Flagyl  Will advance diet as tolerated  Will replace potassium  PD per nephrology  BP better, continue amlodipine and monitor  Continue SSI and monitor blood sugar  PT/OT eval and treatment    Discussed with GI team, agree with above plan    Discussed with the patient at bedside and discussed about transfusion, risk and benefits of transfusion. Patient understood and wants to proceed with transfusion. Disposition: Home with home health care tomorrow if remains stable    Case discussed with:  [x]Patient  [x]Family  [x]Nursing  []Case Management  DVT Prophylaxis:  []Lovenox  []Hep SQ  [x]SCDs  []Coumadin   []Eliquis/Xarelto     Objective:   VS: Visit Vitals  BP (!) 129/57 (BP 1 Location: Left upper arm, BP Patient Position: At rest)   Pulse 89   Temp 97.7 °F (36.5 °C)   Resp 18   Ht 5' 5\" (1.651 m)   Wt 74.2 kg (163 lb 8 oz)   SpO2 98%   Breastfeeding No   BMI 27.21 kg/m²      Tmax/24hrs: Temp (24hrs), Av °F (36.7 °C), Min:97.2 °F (36.2 °C), Max:98.5 °F (36.9 °C)  IOBRIEF  Intake/Output Summary (Last 24 hours) at 10/10/2022 1045  Last data filed at 10/10/2022 0635  Gross per 24 hour   Intake 180 ml   Output 350 ml   Net -170 ml       General:  Alert, cooperative, no acute distress    Pulmonary:  CTA Bilaterally.  No Wheezing/Rales. Cardiovascular: Regular rate and Rhythm. GI:  Soft, Non distended, mild left lower quadrant tenderness, no guarding, no rigidity + Bowel sounds. Extremities: Trace edema. No calf tenderness. Psych: Good insight. Not anxious or agitated. Neurologic: Alert and oriented X 3. Moves all ext.   Additional:    Medications:   Current Facility-Administered Medications   Medication Dose Route Frequency    0.9% sodium chloride infusion 250 mL  250 mL IntraVENous PRN    pantoprazole (PROTONIX) tablet 40 mg  40 mg Oral ACB&D    amoxicillin-clavulanate (AUGMENTIN) 875-125 mg per tablet 1 Tablet  1 Tablet Oral Q12H    simethicone (MYLICON) tablet 80 mg  80 mg Oral QID PRN    amLODIPine (NORVASC) tablet 5 mg  5 mg Oral DAILY    hydrALAZINE (APRESOLINE) 20 mg/mL injection 10 mg  10 mg IntraVENous Q6H PRN    metroNIDAZOLE (FLAGYL) IVPB premix 500 mg  500 mg IntraVENous Q12H    fluconazole (DIFLUCAN) tablet 200 mg  200 mg Oral DAILY    sodium chloride (NS) flush 5-40 mL  5-40 mL IntraVENous Q8H    sodium chloride (NS) flush 5-40 mL  5-40 mL IntraVENous PRN    acetaminophen (TYLENOL) tablet 650 mg  650 mg Oral Q6H PRN    Or    acetaminophen (TYLENOL) suppository 650 mg  650 mg Rectal Q6H PRN    polyethylene glycol (MIRALAX) packet 17 g  17 g Oral DAILY PRN    bisacodyL (DULCOLAX) suppository 10 mg  10 mg Rectal DAILY PRN    ondansetron (ZOFRAN ODT) tablet 4 mg  4 mg Oral Q8H PRN    Or    ondansetron (ZOFRAN) injection 4 mg  4 mg IntraVENous Q6H PRN    potassium chloride (K-DUR, KLOR-CON M20) SR tablet 20 mEq  20 mEq Oral DAILY    peritoneal dialysis DEXTROSE 1.5% (2.5 mEq/L low calcium) solution 8,000 mL  8,000 mL IntraPERitoneal DAILY    rosuvastatin (CRESTOR) tablet 10 mg  10 mg Oral DAILY    insulin lispro (HUMALOG) injection   SubCUTAneous AC&HS    glucose chewable tablet 16 g  4 Tablet Oral PRN    glucagon (GLUCAGEN) injection 1 mg  1 mg IntraMUSCular PRN    dextrose 10% infusion 0-250 mL  0-250 mL IntraVENous PRN       Labs:    Recent Results (from the past 24 hour(s))   HGB & HCT    Collection Time: 10/09/22 12:00 PM   Result Value Ref Range    HGB 7.5 (L) 12.0 - 16.0 g/dL    HCT 22.4 (L) 35.0 - 45.0 %   GLUCOSE, POC    Collection Time: 10/09/22 12:07 PM   Result Value Ref Range    Glucose (POC) 92 70 - 110 mg/dL   GLUCOSE, POC    Collection Time: 10/09/22  4:35 PM   Result Value Ref Range    Glucose (POC) 95 70 - 110 mg/dL   GLUCOSE, POC    Collection Time: 10/09/22 11:47 PM   Result Value Ref Range    Glucose (POC) 137 (H) 70 - 454 mg/dL   METABOLIC PANEL, BASIC    Collection Time: 10/10/22 12:54 AM   Result Value Ref Range    Sodium 135 (L) 136 - 145 mmol/L    Potassium 3.6 3.5 - 5.5 mmol/L    Chloride 99 (L) 100 - 111 mmol/L    CO2 24 21 - 32 mmol/L    Anion gap 12 3.0 - 18 mmol/L    Glucose 130 (H) 74 - 99 mg/dL    BUN 34 (H) 7.0 - 18 MG/DL    Creatinine 11.90 (H) 0.6 - 1.3 MG/DL    BUN/Creatinine ratio 3 (L) 12 - 20      eGFR 3 (L) >60 ml/min/1.73m2    Calcium 8.4 (L) 8.5 - 10.1 MG/DL   CBC WITH AUTOMATED DIFF    Collection Time: 10/10/22 12:54 AM   Result Value Ref Range    WBC 5.6 4.6 - 13.2 K/uL    RBC 2.27 (L) 4.20 - 5.30 M/uL    HGB 6.7 (L) 12.0 - 16.0 g/dL    HCT 20.7 (L) 35.0 - 45.0 %    MCV 91.2 78.0 - 100.0 FL    MCH 29.5 24.0 - 34.0 PG    MCHC 32.4 31.0 - 37.0 g/dL    RDW 13.9 11.6 - 14.5 %    PLATELET 436 (L) 708 - 420 K/uL    MPV 10.6 9.2 - 11.8 FL    NRBC 0.0 0  WBC    ABSOLUTE NRBC 0.00 0.00 - 0.01 K/uL    NEUTROPHILS 57 40 - 73 %    LYMPHOCYTES 27 21 - 52 %    MONOCYTES 12 (H) 3 - 10 %    EOSINOPHILS 4 0 - 5 %    BASOPHILS 1 0 - 2 %    IMMATURE GRANULOCYTES 0 0.0 - 0.5 %    ABS. NEUTROPHILS 3.2 1.8 - 8.0 K/UL    ABS. LYMPHOCYTES 1.5 0.9 - 3.6 K/UL    ABS. MONOCYTES 0.7 0.05 - 1.2 K/UL    ABS. EOSINOPHILS 0.2 0.0 - 0.4 K/UL    ABS. BASOPHILS 0.0 0.0 - 0.1 K/UL    ABS. IMM.  GRANS. 0.0 0.00 - 0.04 K/UL    DF AUTOMATED     GLUCOSE, POC    Collection Time: 10/10/22  8:43 AM   Result Value Ref Range    Glucose (POC) 109 70 - 110 mg/dL       Signed By: Jerzy Mao MD     October 10, 2022      Disclaimer: Sections of this note are dictated using utilizing voice recognition software. Minor typographical errors may be present. If questions arise, please do not hesitate to contact me or call our department.

## 2022-10-11 ENCOUNTER — HOME HEALTH ADMISSION (OUTPATIENT)
Dept: HOME HEALTH SERVICES | Facility: HOME HEALTH | Age: 82
End: 2022-10-11
Payer: MEDICARE

## 2022-10-11 VITALS
HEART RATE: 92 BPM | OXYGEN SATURATION: 94 % | RESPIRATION RATE: 18 BRPM | BODY MASS INDEX: 27.24 KG/M2 | WEIGHT: 163.5 LBS | SYSTOLIC BLOOD PRESSURE: 145 MMHG | TEMPERATURE: 98.4 F | HEIGHT: 65 IN | DIASTOLIC BLOOD PRESSURE: 67 MMHG

## 2022-10-11 LAB
ABO + RH BLD: NORMAL
BLD PROD TYP BPU: NORMAL
BLOOD GROUP ANTIBODIES SERPL: NORMAL
BPU ID: NORMAL
CALLED TO:,BCALL1: NORMAL
CROSSMATCH RESULT,%XM: NORMAL
GLUCOSE BLD STRIP.AUTO-MCNC: 101 MG/DL (ref 70–110)
HCT VFR BLD AUTO: 26.4 % (ref 35–45)
HGB BLD-MCNC: 8.9 G/DL (ref 12–16)
SPECIMEN EXP DATE BLD: NORMAL
STATUS OF UNIT,%ST: NORMAL
UNIT DIVISION, %UDIV: 0

## 2022-10-11 PROCEDURE — 97530 THERAPEUTIC ACTIVITIES: CPT

## 2022-10-11 PROCEDURE — 85018 HEMOGLOBIN: CPT

## 2022-10-11 PROCEDURE — 97535 SELF CARE MNGMENT TRAINING: CPT

## 2022-10-11 PROCEDURE — 74011636637 HC RX REV CODE- 636/637: Performed by: PHYSICIAN ASSISTANT

## 2022-10-11 PROCEDURE — 74011000250 HC RX REV CODE- 250: Performed by: PHYSICIAN ASSISTANT

## 2022-10-11 PROCEDURE — 74011250637 HC RX REV CODE- 250/637: Performed by: INTERNAL MEDICINE

## 2022-10-11 PROCEDURE — 82962 GLUCOSE BLOOD TEST: CPT

## 2022-10-11 PROCEDURE — 74011250636 HC RX REV CODE- 250/636: Performed by: HOSPITALIST

## 2022-10-11 PROCEDURE — 2709999900 HC NON-CHARGEABLE SUPPLY

## 2022-10-11 PROCEDURE — 99239 HOSP IP/OBS DSCHRG MGMT >30: CPT | Performed by: HOSPITALIST

## 2022-10-11 PROCEDURE — 36415 COLL VENOUS BLD VENIPUNCTURE: CPT

## 2022-10-11 PROCEDURE — 74011250637 HC RX REV CODE- 250/637: Performed by: PHYSICIAN ASSISTANT

## 2022-10-11 PROCEDURE — 74011250637 HC RX REV CODE- 250/637: Performed by: HOSPITALIST

## 2022-10-11 RX ORDER — INSULIN GLARGINE 100 [IU]/ML
10 INJECTION, SOLUTION SUBCUTANEOUS
Qty: 1 EACH | Refills: 0 | Status: SHIPPED
Start: 2022-10-11

## 2022-10-11 RX ORDER — PANTOPRAZOLE SODIUM 40 MG/1
40 TABLET, DELAYED RELEASE ORAL
Qty: 60 TABLET | Refills: 0 | Status: SHIPPED | OUTPATIENT
Start: 2022-10-11 | End: 2022-11-10

## 2022-10-11 RX ORDER — METOPROLOL SUCCINATE 25 MG/1
25 TABLET, EXTENDED RELEASE ORAL DAILY
Status: DISCONTINUED | OUTPATIENT
Start: 2022-10-11 | End: 2022-10-11 | Stop reason: HOSPADM

## 2022-10-11 RX ORDER — AMOXICILLIN AND CLAVULANATE POTASSIUM 500; 125 MG/1; MG/1
1 TABLET, FILM COATED ORAL EVERY 12 HOURS
Qty: 20 TABLET | Refills: 0 | Status: SHIPPED | OUTPATIENT
Start: 2022-10-11 | End: 2022-10-21

## 2022-10-11 RX ORDER — POTASSIUM CHLORIDE 20 MEQ/1
20 TABLET, EXTENDED RELEASE ORAL DAILY
Qty: 30 TABLET | Refills: 0 | Status: SHIPPED | OUTPATIENT
Start: 2022-10-12 | End: 2022-11-11

## 2022-10-11 RX ORDER — FUROSEMIDE 40 MG/1
40 TABLET ORAL 2 TIMES DAILY
Status: DISCONTINUED | OUTPATIENT
Start: 2022-10-11 | End: 2022-10-11 | Stop reason: HOSPADM

## 2022-10-11 RX ORDER — SIMETHICONE 80 MG
80 TABLET,CHEWABLE ORAL
Qty: 15 TABLET | Refills: 0 | Status: SHIPPED | OUTPATIENT
Start: 2022-10-11

## 2022-10-11 RX ORDER — METOPROLOL SUCCINATE 25 MG/1
25 TABLET, EXTENDED RELEASE ORAL DAILY
Qty: 30 TABLET | Refills: 0 | Status: SHIPPED | OUTPATIENT
Start: 2022-10-11 | End: 2022-11-10

## 2022-10-11 RX ORDER — FLUCONAZOLE 200 MG/1
200 TABLET ORAL DAILY
Qty: 9 TABLET | Refills: 0 | Status: SHIPPED | OUTPATIENT
Start: 2022-10-12 | End: 2022-10-21

## 2022-10-11 RX ORDER — METRONIDAZOLE 500 MG/1
500 TABLET ORAL 2 TIMES DAILY
Qty: 20 TABLET | Refills: 0 | Status: SHIPPED | OUTPATIENT
Start: 2022-10-11 | End: 2022-10-21

## 2022-10-11 RX ADMIN — AMLODIPINE BESYLATE 5 MG: 5 TABLET ORAL at 08:40

## 2022-10-11 RX ADMIN — POTASSIUM CHLORIDE 20 MEQ: 1500 TABLET, EXTENDED RELEASE ORAL at 08:40

## 2022-10-11 RX ADMIN — Medication 1 CAPSULE: at 11:21

## 2022-10-11 RX ADMIN — Medication 100 MG: at 08:40

## 2022-10-11 RX ADMIN — AMOXICILLIN AND CLAVULANATE POTASSIUM 1 TABLET: 500; 125 TABLET, FILM COATED ORAL at 08:40

## 2022-10-11 RX ADMIN — PANTOPRAZOLE SODIUM 40 MG: 40 TABLET, DELAYED RELEASE ORAL at 08:40

## 2022-10-11 RX ADMIN — INSULIN LISPRO 2 UNITS: 100 INJECTION, SOLUTION INTRAVENOUS; SUBCUTANEOUS at 00:30

## 2022-10-11 RX ADMIN — METRONIDAZOLE 500 MG: 500 INJECTION, SOLUTION INTRAVENOUS at 09:31

## 2022-10-11 RX ADMIN — SODIUM CHLORIDE, PRESERVATIVE FREE 10 ML: 5 INJECTION INTRAVENOUS at 00:33

## 2022-10-11 RX ADMIN — METOPROLOL SUCCINATE 25 MG: 25 TABLET, EXTENDED RELEASE ORAL at 11:21

## 2022-10-11 RX ADMIN — FLUCONAZOLE 200 MG: 200 TABLET ORAL at 08:40

## 2022-10-11 RX ADMIN — FUROSEMIDE 40 MG: 40 TABLET ORAL at 11:21

## 2022-10-11 RX ADMIN — ROSUVASTATIN CALCIUM 10 MG: 10 TABLET, FILM COATED ORAL at 08:40

## 2022-10-11 NOTE — DISCHARGE SUMMARY
Discharge Summary    Patient: Griselda Semen MRN: [id number]  CSN: [id number]    YOB: 1940  Age: 80 y.o. Sex: female    DOA: 10/7/2022 LOS:  LOS: 4 days        Disposition: Home with Harsha Junior    Discharge Date:   10/11/2022    Admission Diagnosis: Upper GI bleed [K92.2]    Discharge Diagnosis:    GI bleed, post EGD duodenal angiectasia status post APC, post colonoscopy 10/8/2022  Esophageal candidiasis  Acute diverticulitis  Acute blood loss anemia  Hypokalemia  ESRD on PD  Hypertension  Diabetes mellitus type 2    Discharge Condition: Stable      PHYSICAL EXAM  Visit Vitals  BP (!) 145/67 (BP 1 Location: Right upper arm, BP Patient Position: At rest)   Pulse 92   Temp 98.4 °F (36.9 °C)   Resp 18   Ht 5' 5\" (1.651 m)   Wt 74.2 kg (163 lb 8 oz)   SpO2 94%   Breastfeeding No   BMI 27.21 kg/m²       General: Alert, cooperative, no acute distress    HEENT: PERRLA, EOMI. Anicteric sclerae. Lungs:  CTA Bilaterally. No Wheezing/Rales. Heart:             Regular rate and Rhythm. Abdomen: Soft, Non distended, Non tender. + Bowel sounds. Extremities: No edema. Psych:   Good insight. Not anxious or agitated. Neurologic:  AA, oriented X 3. Moves all ext                                 Hospital Course:   Griselda Semen is a 80 y.o. female who presents to [facility] ER with complaint of Melena. Patient reports that she has been having dark, melenic stools and momentary, 9/10 intensity, \"aching, cramping\" periumbilical pain that starts superior the umbilicus and then radiates inferiorly. In the emergency room patient noted to have acute blood loss anemia but heme-negative, hemoglobin had dropped significantly from previous count. GI was consulted, started on PPI. Patient also abdominal pain for which CT abdomen pelvis was ordered. GI saw the patient recommended EGD and colonoscopy.   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 GI recommended to start patient on antibiotics for diverticulitis and also Diflucan for esophageal candidiasis. Recommended clear liquid and advance as tolerated. Patient required monitor blood transplant in the hospital after which her hemoglobin remained stable. Nephrology was consulted for PD, patient underwent PD in the hospital.  Patient also noted to have low normal blood pressure, antiplatelet medications were held. No blood pressure started trending up she has been resumed back on metoprolol and amlodipine. Further adjustment of blood pressure medication needs to be done outpatient. Patient started tolerating diet well, is advanced to full liquid and to regular diet. Patient currently states she feels more comfortable with liquid diet. Patient will be discharged on liquid and soft diet as tolerated. GI signed off on the patient. Discussed with nephrology recommend discharge with p.o. Lasix twice daily along with potassium supplement and they will follow-up outpatient and monitor labs and adjust hypertensive medication accordingly. Patient hospital seen and eval by PT/OT recommended home health care. Patient is currently hemodynamically and medically stable for discharge home. Discussed with the patient and also with her daughter at the bedside about her discharge plan, follow-up appointments, new medications and side effects. I discussed with them about advancing diet as tolerated at home, discussed with them about starting Lantus at low-dose and increase based on the blood sugars and patient's p.o. intake. Both of them understood and agreed with the plan.           Procedures:   Colonoscopy on 10/8/2022 with to move limited polyps, moderate diverticulosis with concerning for diverticulitis given purulent material  EGD on 10/8/2022, showed esophageal candidiasis, duodenal angiectasis post argon plasma coagulation treatment. Consults:   Dr. Vicenta Page, gastroenterology  Dr. Rc Cintron, nephrology    Imaging studies:   CT Results (most recent):  Results from Hospital Encounter encounter on 10/07/22    CTA ABD PELV W WO CONT    Narrative  CTA ABDOMEN AND PELVIS    INDICATION: Abdominal pain for several days, colonoscopy 9/26/2022, dark tarry  stools, nausea, decreased appetite, end-stage renal disease on peritoneal  dialysis. TECHNIQUE: Axial CTA images obtained of the abdomen and pelvis following the  uneventful administration of IV contrast. Post contrast imaging performed during  arterial and venous phases of enhancement. Coronal and sagittal reformatted  images of the abdomen and pelvis were obtained. 3-D and MIPS reconstructions  were created at an independent workstation for assessment of the arteries. All CT scans at this facility are performed using dose optimization technique as  appropriate to a performed exam, to include automated exposure control,  adjustment of the mA and/or kV according to patient size (including appropriate  matching first site-specific examinations), or use of iterative reconstruction  technique. COMPARISON: 5/23/2022. VASCULAR FINDINGS:  Abdominal aorta is nonaneurysmal.  No evidence of dissection. Diffuse peripheral atherosclerotic wall calcifications throughout. Celiac trunk is patent. Peripherally calcified oval segment of splenic artery appears enlarged up to 10  mm diameter axial 52 and coronal T1, suggesting a chronic aneurysm. SMA is patent. Bilateral main renal arteries are patent with mild degree of atherosclerotic  irregularity. Inferior mesenteric artery is patent. Common iliac arteries patent bilaterally with diffuse peripheral atherosclerotic  wall calcification. Internal and external iliac vessels patent to the extent included with diffuse  peripheral atherosclerotic wall calcification. ABDOMEN FINDINGS:    Liver:  There are a few too small to characterize lesions, statistically most  likely benign. Rhenda Alexa Spleen: Unremarkable. Pancreas: Similar region of fatty interdigitation along the uncinate process. .  Otherwise unremarkable. Biliary: No biliary ductal dilatation. Gallbladder not distended. No calcified  stones. Bowel: Small sliding hiatal hernia. . No small bowel distention to suggest  obstruction. Descending and sigmoid diverticulosis. Similar potential wall thickening at the level of the rectosigmoid, without any  regional inflammatory change. Area is under distended however. Peritoneum/ Retroperitoneum: Small amount of free fluid in the pelvis and  adnexa, nonspecific in the presence of peritoneal dialysis catheter. Tiny amount  of free fluid adjacent to the right lobe liver. Rhenda Alexa Lymph Nodes: No lymphadenopathy. Adrenal Glands: Similar mild lobular thickening bilateral adrenal glands. .  Kidneys: 1.7 cm left interpolar cyst. A few too small to characterize renal  lesions otherwise, statistically most likely benign. . No hydronephrosis. No  calcified stones. PELVIS FINDINGS:  Bladder/ Pelvic Organs:  Bladder is decompressed contributing appearance of mild  asymmetric wall thickening. Uterus contains multiple punctate and lenticular  calcifications likely related to vascular calcifications and some underlying  fibroid disease. .. Lung Base: Motion degraded. Mild degree of chronic appearing scarring. Bones: Mild anasarca. Osteopenia. Lumbar facet hypertrophy. SI joint  degenerative changes. Degenerative changes pubic symphysis. Straightening  thoracolumbar spine. Posterior L2-S1 pedicle screw fixation with osteopenia of  the involved vertebral bodies. Multifocal severe degenerative disc disease. Narrowing bilateral hip joint spaces. .    Findings initially provided as a wet reading on 10/7/2022. Impression  1.   There is similar possible wall thickening at the level of the rectum and  sigmoid though underdistended, versus sequela of chronic diverticulitis in the  sigmoid colon. 2.  Aorta nonaneurysmal. Diffuse peripheral atherosclerotic disease as above  without evidence of dissection or any major luminal stenosis. 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 3.  Diverticulosis. Small sliding hiatal hernia. Renal cysts and probable tiny  cysts. Possible tiny hepatic cyst, too small to characterize. 4.  Mild anasarca with very small amount of perihepatic and pelvic free fluid,  nonspecific in the presence of peritoneal dialysis catheter. Lindy Raddle Discharge Medications:     Current Discharge Medication List        START taking these medications    Details   simethicone (MYLICON) 80 mg chewable tablet Take 1 Tablet by mouth four (4) times daily as needed for GI Pain. Qty: 15 Tablet, Refills: 0      potassium chloride (K-DUR, KLOR-CON M20) 20 mEq tablet Take 1 Tablet by mouth daily for 30 days. Qty: 30 Tablet, Refills: 0      pantoprazole (PROTONIX) 40 mg tablet Take 1 Tablet by mouth Before breakfast and dinner for 30 days. Qty: 60 Tablet, Refills: 0      metroNIDAZOLE (FlagyL) 500 mg tablet Take 1 Tablet by mouth two (2) times a day for 10 days. Qty: 20 Tablet, Refills: 0      L. acidophilus,casei,rhamnosus (BIO-K PLUS) 50 billion cell cpDR capsule Take 1 Capsule by mouth daily for 10 days. Qty: 10 Capsule, Refills: 0      fluconazole (DIFLUCAN) 200 mg tablet Take 1 Tablet by mouth daily for 9 days. FDA advises cautious prescribing of oral fluconazole in pregnancy. Qty: 9 Tablet, Refills: 0      amoxicillin-clavulanate (AUGMENTIN) 500-125 mg per tablet Take 1 Tablet by mouth every twelve (12) hours for 10 days. Qty: 20 Tablet, Refills: 0      carbamide peroxide (Debrox) 6.5 % otic solution Administer 5 Drops into each ear two (2) times a day.   Qty: 30 mL, Refills: 0           CONTINUE these medications which have CHANGED    Details   metoprolol succinate (TOPROL-XL) 25 mg XL tablet Take 1 Tablet by mouth daily for 30 days.  Qty: 30 Tablet, Refills: 0      insulin glargine (LANTUS,BASAGLAR) 100 unit/mL (3 mL) inpn 10 Units by SubCUTAneous route nightly. Indications: type 2 diabetes mellitus  Qty: 1 Each, Refills: 0           CONTINUE these medications which have NOT CHANGED    Details   folic acid/vit B complex and C (RENAL VITAMIN PO) Take 1 Capsule by mouth daily. ferric citrate (Auryxia) 210 mg iron tablet Take 210 mg by mouth two (2) times daily (with meals). amLODIPine (NORVASC) 5 mg tablet Take 1 Tablet by mouth daily. Qty: 90 Tablet, Refills: 3    Comments: **Patient requests 90 days supply**  Associated Diagnoses: HTN (hypertension), benign      rosuvastatin (CRESTOR) 40 mg tablet TAKE 1 TABLET EVERY NIGHT  Qty: 90 Tablet, Refills: 1      Accu-Chek Evelin Plus test strp strip       gentamicin (GARAMYCIN) 0.1 % topical cream gentamicin 0.1 % topical cream   APPLY TO EXIT SITE DAILY      tamsulosin (FLOMAX) 0.4 mg capsule Take 1 Capsule by mouth daily. Qty: 90 Capsule, Refills: 3      insulin aspart U-100 (NOVOLOG) 100 unit/mL (3 mL) inpn 0-8 Units by SubCUTAneous route daily. Indications: type 2 diabetes mellitus      Droplet Pen Needle 32 gauge x 5/32\" ndle            STOP taking these medications       losartan (COZAAR) 50 mg tablet Comments:   Reason for Stopping:         hydrALAZINE (APRESOLINE) 50 mg tablet Comments:   Reason for Stopping:         furosemide (LASIX) 20 mg tablet Comments:   Reason for Stopping:         fluticasone propionate (FLONASE) 50 mcg/actuation nasal spray Comments:   Reason for Stopping:         omeprazole (PRILOSEC) 40 mg capsule Comments:   Reason for Stopping: It is important that you take the medication exactly as they are prescribed. Keep your medication in the bottles provided by the pharmacist and keep a list of the medication names, dosages, and times to be taken in your wallet. Do not take other medications without consulting your doctor.      DIET:  Diabetic and Renal liquid and soft Diet    ACTIVITY: Activity as tolerated  Home health care for Skilled care for DM, Hypertension and medication management       PT/OT consult      ADDITIONAL INFORMATION: If you experience any of the following symptoms but not limited to Fever, chills, nausea, vomiting, diarrhea, change in mentation, falling, bleeding, shortness of breath, chest pain, please call your primary care physician or return to the emergency room if you cannot get hold of your doctor:     FOLLOW UP CARE:  Dr. Zhanna Mallory in 5-7 days. Please call and set up an appointment. Dr. Miguel Angel Ochoa, nephrology in 1 week  Dr. Mayuri Cruz, GI in 2-3 week    Minutes spent on discharge: 40 minutes spent coordinating this discharge (review instructions/follow-up, prescriptions, preparing report for sign off)    Viktoria Jorgensen MD  10/11/2022 10:24 AM    Disclaimer: Sections of this note are dictated using utilizing voice recognition software. Minor typographical errors may be present. If questions arise, please do not hesitate to contact me or call our department.

## 2022-10-11 NOTE — PROGRESS NOTES
Peritoneal Dialysis Disconnection         Metrics   I-Drain: 10mL   Total UF: 37mL   Last Fill: na   Last Manual Drain: na   Net UF:            Avg Dwell Time: 1:14   Lost Dwell Time: 0:23   Alarms: Low UF   Effluent: Clear yellow     Access   Type & Location:    Comments: Prior to disconnection, one-minute Alcavis scrub performed. Sterile mini cap applied and secured to abdomen. Dressing clean, dry and intact. Dressingg date: 10/10/22                                         Safety:   Primary Nurse Rpt Pre: Tawanda Varela RN   Primary Nurse Rpt Post: DIMPLE Castrejon RN        Comments:   . Pt disconnection w/o complications

## 2022-10-11 NOTE — PROGRESS NOTES
[email or web address]  [phone]    Gastroenterology follow up-Progress note    Impression:  1. Acute on Chronic anemia - Hgb 6.7, 1 unit PRBC ordered, Recent complarison 9.3 (8/12/22)  - EGD 10/8/22 - candida esophagitis, duodenal angioectasia, treated w/ APC  - Colonoscopy 10/8/22 - three 2mm polyps, removed (pending path), diverticulosis w/ small focus of diverticulitis  - good response to 1 unit PRBC yesterday, Hgb 6.7 ->8.9  2. Melena - intermittent for several months, resolved a few days after recent colo but now back. Seen as an OP 8/12/2022 w/ colo performed 9/26/22 colonoscopy significant for mild diverticulosis of the colon and 1 12mm TA polyp. Denies f/c, vomiting, or weight loss. Denies use of NSAID, etoh, or anticoagulation. 3. Periumbilical abdominal pain -  new since colo and described as severe periumbilical sharp pain w/o modifying factors. Pt reports the pain is so severe that she has difficulty walking. 4. Hypokalemia   5. Nausea  6. ESRD on PD - high risk for AVM  7. HTN  8. HLD  9. DM  10. GERD    Plan:  1. Monitor H/H, transfuse for Hgb <7  2. Continue course Cipro/Flagyl for diverticulitis  3. 2 week course of fluconazole for esohageal candidiasis  4. Continue medical management per primary team  5. Follow up with Dr. Nino Rowe as outpatient in 6-8 weeks, consider VCE  Thank you for this consultation and the opportunity to participate in the care of this patient. Please do not hesitate to call with any questions or concerns, or should event occur that may necessitate additional GI evaluation. Chief Complaint: Anemia, melena      Subjective:  Feeling well, up in chair, RN in room. Marlou Christie to go home. Good response to transfusion yesterday    ROS: Denies any fevers, chills, rash.        General: well developed, well nourished, no acute distress  Eyes: conjunctiva normal, EOM normal  Cardiovascular: heart normal, intact distal pulses, normal rate and regular rhythm  Pulmonary: breath sounds normal and effort normal  Abdominal: appearance normal, bowel sounds normal and soft, non-acute, non-tender    Patient Active Problem List   Diagnosis Code    Allergic rhinitis J30.9    Hypertension I10    Diabetes ([facility] E11.9    Type 2 diabetes mellitus with nephropathy ([facility]) E11.21    Dyspepsia R10.13    Nausea R11.0    Constipation K59.00    Severe obesity (BMI 35.0-39. 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 by CT '18 [id number]    CKD (chronic kidney disease) stage 5, GFR less than 15 ml/min ([facility]) N18.5    Secondary hyperparathyroidism of renal origin ([facility] N25.81    Chronic kidney disease N18.9    Peritoneal dialysis catheter in place [facility]) Z99.2    Upper GI bleed E18.2    Periumbilical abdominal pain R10.33         Visit Vitals  BP (!) 145/67 (BP 1 Location: Right upper arm, BP Patient Position: At rest)   Pulse 92   Temp 98.4 °F (36.9 °C)   Resp 18   Ht 5' 5\" (1.651 m)   Wt 74.2 kg (163 lb 8 oz)   SpO2 94%   Breastfeeding No   BMI 27.21 kg/m²           Intake/Output Summary (Last 24 hours) at 10/11/2022 0918  Last data filed at 10/11/2022 0738  Gross per 24 hour   Intake 368.75 ml   Output 37 ml   Net 331.75 ml       CBC w/Diff    Lab Results   Component Value Date/Time    WBC 5.6 10/10/2022 12:54 AM    RBC 2.27 (L) 10/10/2022 12:54 AM    HGB 8.9 (L) 10/11/2022 12:58 AM    HCT 26.4 (L) 10/11/2022 12:58 AM    MCV 91.2 10/10/2022 12:54 AM    MCH 29.5 10/10/2022 12:54 AM    MCHC 32.4 10/10/2022 12:54 AM    RDW 13.9 10/10/2022 12:54 AM     (L) 10/10/2022 12:54 AM    Lab Results   Component Value Date/Time    GRANS 57 10/10/2022 12:54 AM    LYMPH 27 10/10/2022 12:54 AM    EOS 4 10/10/2022 12:54 AM    BASOS 1 10/10/2022 12:54 AM      Basic Metabolic Profile   Recent Labs     10/10/22  0054   * K 3.6   CL 99*   CO2 24   BUN 34*   CA 8.4*        Hepatic Function    Lab Results   Component Value Date/Time    ALB 3.0 (L) 10/07/2022 10:55 AM    TP 6.8 10/07/2022 10:55 AM     10/07/2022 10:55 AM    No results found for: TBIL       Coags   No results for input(s): PTP, INR, APTT, INREXT, INREXT in the last 72 hours. MITZI Vazquez  10/11/2022, 9:21 AM   [facility].  [email or web address]  Office: [phone]  Cell: [phone]

## 2022-10-11 NOTE — PROGRESS NOTES
SBAR report given to oncoming nurse, Attila Bernardo RN at the patients bedside. Bed in low position, call bell in reach, no further complaints at this time.

## 2022-10-11 NOTE — PROGRESS NOTES
Reason for Admission:  Upper GI bleed [K92.2]                 RUR Score:    17            Plan for utilizing home health:    yes                      Likelihood of Readmission:   Moderate                         Do you (patient/family) have any concerns for transition/discharge?  no    Transition of Care Plan:       Initial assessment completed with patient. Cognitive status of patient: oriented to time, place, person and situation. Face sheet information confirmed:  yes. The patient designates spouse and daughter to participate in her discharge plan and to receive any needed information. This patient lives in a single family home with spouse. Patient is not able to navigate steps as needed. Able to get up the 3 to 4 steps with family assist  Prior to hospitalization, patient was considered to be independent with ADLs/IADLS : no . If not independent,  patient needs assist with : dressing and bathing    Patient has a current ACP document on file: yes      Healthcare Decision Maker:   Primary Decision Maker: Cristi Fontanez - Spouse - [phone]    Click here to complete [address] including selection of the Healthcare Decision Maker Relationship (ie \"Primary\")    The daughter and  will be available to transport patient home upon discharge. The patient already has John Sera, and  medical equipment available in the home. Patient is not currently active with home health. Patient has not stayed in a skilled nursing facility or rehab. Was  stay within last 60 days : no. This patient is on dialysis :yes    If yes, peritoneal dialysis patient and receives treatment on  daily at night and spouse assists         List of available Home Health agencies were provided and reviewed with the patient prior to discharge. Freedom of choice signed: yes, for [facility]. Currently, the discharge plan is Home with [address] [place] [address] Chester Garay.     The patient states that she can obtain her medications from the pharmacy, and take her medications as directed. Patient's current insurance is [employer]       Care Management Interventions  PCP Verified by CM:  Yes (Dr Joce Randall)  Mode of Transport at Discharge: Self (family)  Transition of Care Consult (CM Consult): Discharge Planning  Support Systems: Spouse/Significant Other, Child(adalberto)  Confirm Follow Up Transport: Family  The Plan for Transition of Care is Related to the Following Treatment Goals : home health  The Patient and/or Patient Representative was Provided with a Choice of Provider and Agrees with the Discharge Plan?: Yes  Freedom of Choice List was Provided with Basic Dialogue that Supports the Patient's Individualized Plan of Care/Goals, Treatment Preferences and Shares the Quality Data Associated with the Providers?: Yes  Discharge Location  Patient Expects to be Discharged to[de-identified] Home with home health        Lily Washington RN BSN  /Discharge Planner

## 2022-10-11 NOTE — PROGRESS NOTES
Discharge order noted for today. Pt has been accepted to [address] [place]. Met with patient  and are agreeable to the transition plan today. Transport has been arranged through family. Patient's discharge summary and home health  orders have been forwarded to [facility] home health  agency via [employer] and call to Norbert .    Discharge information has been documented on the AVS.

## 2022-10-11 NOTE — PROGRESS NOTES
Problem: Self Care Deficits Care Plan (Adult)  Goal: *Acute Goals and Plan of Care (Insert Text)  Description: Occupational Therapy Goals  Initiated 10/8/2022 within 7 day(s). 1.  Patient will perform bathing with minimal assistance/contact guard assist.   2.  Patient will perform lower body dressing with minimal assistance/contact guard assist.  3.  Patient will perform upper body dressing with supervision/set-up. 4.  Patient will perform toilet transfers with minimal assistance/contact guard assist.  5.  Patient will perform all aspects of toileting with minimal assistance/contact guard assist.  6.  Patient will participate in upper extremity therapeutic exercise/activities with modified independence for 8 minutes. 7.  Patient will utilize energy conservation techniques during functional activities with verbal cues. Prior Level of Function: Pt reports that she lives in a one story home with her . Her  provides some assistance for all self care tasks. Outcome: Progressing Towards Goal   OCCUPATIONAL THERAPY TREATMENT    Patient: Ashwin Woody (87 y.o. female)  Date: 10/11/2022  Diagnosis: Upper GI bleed [K92.2] Upper GI bleed  Procedure(s) (LRB):  ENDOSCOPY/ APC (N/A)  COLONOSCOPY/ Polypectomies (N/A) 3 Days Post-Op  Precautions: Fall  PLOF: Pt reports that she lives in a one story home with her . Her  provides some assistance for all self care tasks. Chart, occupational therapy assessment, plan of care, and goals were reviewed. ASSESSMENT:  Pt presented supine in bed upon entry and agreeable to work with OT. She came to EOB from supine with Supervision in prep for ADL tasks. STS transfer CGA with RW and maneuvered to reclining chair ~ 10 ft w/ CGA utilizing RW, simulating BR mobility. Pt with noted L foot drop. Once sitting, pt performed simple grooming tasks with S/U requiring increased time.  Pt positioned for comfort and reinforced utilizing call bell for assist. RN made aware. Progression toward goals:  []          Improving appropriately and progressing toward goals  [x]          Improving slowly and progressing toward goals  []          Not making progress toward goals and plan of care will be adjusted     PLAN:  Patient continues to benefit from skilled intervention to address the above impairments. Continue treatment per established plan of care. Further Equipment Recommendations for Discharge:  RW    AMPAC:   Current research shows that an AM-PAC score of 18 or greater is associated with a discharge to the patient's home setting. Based on an AM-PAC score of 18/24 and their current ADL deficits; it is recommended that the patient have 2-3 sessions per week of Occupational Therapy at d/c to increase the patient's independence. This AMPAC score should be considered in conjunction with interdisciplinary team recommendations to determine the most appropriate discharge setting. Patient's social support, diagnosis, medical stability, and prior level of function should also be taken into consideration. SUBJECTIVE:   Patient stated I think I may go home today.     OBJECTIVE DATA SUMMARY:   Cognitive/Behavioral Status:  Neurologic State: Alert  Orientation Level: Oriented X4  Cognition: Follows commands  Safety/Judgement: Awareness of environment, Fall prevention    Functional Mobility and Transfers for ADLs:   Bed Mobility:     Supine to Sit: Supervision     Scooting: Supervision   Transfers:  Sit to Stand: Contact guard assistance  Stand to Sit: Contact guard assistance       Balance:  Sitting: Intact  Standing: Impaired; With support  Standing - Static: Good  Standing - Dynamic : Fair (+)    ADL Intervention:       Grooming  Position Performed: Seated in chair  Washing Face: Set-up  Brushing Teeth: Set-up       Pain:  Pain level pre-treatment: 0/10   Pain level post-treatment: 0/10    Activity Tolerance:    Fair   Please refer to the flowsheet for vital signs taken during this treatment. After treatment:   [x]  Patient left in no apparent distress sitting up in chair  []  Patient left in no apparent distress in bed  [x]  Call bell left within reach  [x]  Nursing notified  []  Caregiver present  []  Bed alarm activated    COMMUNICATION/EDUCATION:   [x] Role of Occupational Therapy in the acute care setting  [] Home safety education was provided and the patient/caregiver indicated understanding. [x] Patient/family have participated as able in working towards goals and plan of care. [x] Patient/family agree to work toward stated goals and plan of care. [] Patient understands intent and goals of therapy, but is neutral about his/her participation. [] Patient is unable to participate in goal setting and plan of care. Thank you for this referral.  VIPUL Shaffer  Time Calculation: 23 mins    [employer] AM-PAC® Daily Activity Inpatient Short Form (6-Clicks)    How much HELP from another person does the patient currently need    (If the patient hasn't done an activity recently, how much help from another person do you think he/she would need if he/she tried?)   Total (Total A or Dep)   A Lot  (Mod to Max A)   A Little (Sup or Min A)   None (Mod I to I)   Putting on and taking off regular lower body clothing? [] 1 [x] 2 [] 3 [] 4   2. Bathing (including washing, rinsing,      drying)? [] 1 [] 2 [x] 3 [] 4   3. Toileting, which includes using toilet, bedpan or urinal?   [] 1 [] 2 [x] 3 [] 4   4. Putting on and taking off regular upper body clothing? [] 1 [] 2 [x] 3 [] 4   5. Taking care of personal grooming such as brushing teeth? [] 1 [] 2 [x] 3 [] 4   6. Eating meals?    [] 1 [] 2 [] 3 [x] 4

## 2022-10-11 NOTE — PROGRESS NOTES
Received discharge prescriptions for patient at outpatient pharmacy. Patient has COPAY $30.82. Will collect copay and deliver when ready.

## 2022-10-11 NOTE — PROGRESS NOTES
Progress  Note    66-year-old female with past medical history of hypertension, ESRD admitted for dark stool, anemia following for ESRD management  Subjective      Overnight event noted  Tolerated overnight PD without much issue  Still makes urine. Had EGD and colonoscopy   Colonoscopy showed diverticulitis  EGD shows no bleeding, but esophageal candidiasis  Duodenal AVM s/p APC  Not much UF  Not tolerating solid food        IMPRESSION:   ESRD, on peritoneal dialysis  Access, PD catheter no issue  GI Bleed with duodoneal AVM  Colonoscopy with diverticulitis  EGD with esophageal candidiasis  Anemia, presented with dark stool, on Ep[facility] outpatient   Hypertension   PLAN:   Continue with CCPD in night with 4 exchanges-yellow bags- 2L each  Adjust medication per ESRD status. Continue with antiboitics and antifungals  Add lasix 40 mg po bid and when ready advise on discharge on this dose of diuresis.            Facility-Administered Medications: None       Current Facility-Administered Medications   Medication Dose Route Frequency    furosemide (LASIX) tablet 40 mg  40 mg Oral BID    0.9% sodium chloride infusion 250 mL  250 mL IntraVENous PRN    pantoprazole (PROTONIX) tablet 40 mg  40 mg Oral ACB&D    amoxicillin-clavulanate (AUGMENTIN) 500-125 mg per tablet 1 Tablet  1 Tablet Oral Q12H    thiamine HCL (B-1) tablet 100 mg  100 mg Oral DAILY    simethicone (MYLICON) tablet 80 mg  80 mg Oral QID PRN    amLODIPine (NORVASC) tablet 5 mg  5 mg Oral DAILY    hydrALAZINE (APRESOLINE) 20 mg/mL injection 10 mg  10 mg IntraVENous Q6H PRN    metroNIDAZOLE (FLAGYL) IVPB premix 500 mg  500 mg IntraVENous Q12H    fluconazole (DIFLUCAN) tablet 200 mg  200 mg Oral DAILY    sodium chloride (NS) flush 5-40 mL  5-40 mL IntraVENous Q8H    sodium chloride (NS) flush 5-40 mL  5-40 mL IntraVENous PRN    acetaminophen (TYLENOL) tablet 650 mg  650 mg Oral Q6H PRN    Or    acetaminophen (TYLENOL) suppository 650 mg  650 mg Rectal Q6H PRN polyethylene glycol (MIRALAX) packet 17 g  17 g Oral DAILY PRN    bisacodyL (DULCOLAX) suppository 10 mg  10 mg Rectal DAILY PRN    ondansetron (ZOFRAN ODT) tablet 4 mg  4 mg Oral Q8H PRN    Or    ondansetron (ZOFRAN) injection 4 mg  4 mg IntraVENous Q6H PRN    potassium chloride (K-DUR, KLOR-CON M20) SR tablet 20 mEq  20 mEq Oral DAILY    peritoneal dialysis DEXTROSE 1.5% (2.5 mEq/L low calcium) solution 8,000 mL  8,000 mL IntraPERitoneal DAILY    rosuvastatin (CRESTOR) tablet 10 mg  10 mg Oral DAILY    insulin lispro (HUMALOG) injection   SubCUTAneous AC&HS    glucose chewable tablet 16 g  4 Tablet Oral PRN    glucagon (GLUCAGEN) injection 1 mg  1 mg IntraMUSCular PRN    dextrose 10% infusion 0-250 mL  0-250 mL IntraVENous PRN       Review of Systems:     Complete 10-point review of systems were obtained and discussed in length  with the patient. Complete review of systems was negative/unremarkable  except as mentioned in HPI section. Data Review:    Labs: Results:       Chemistry Recent Labs     10/10/22  0054 10/09/22  0058   * 117*   * 135*   K 3.6 3.3*   CL 99* 99*   CO2 24 23   BUN 34* 35*   CREA 11.90* 12.00*   CA 8.4* 8.3*   AGAP 12 13   BUCR 3* 3*        CBC w/Diff Recent Labs     10/11/22  0058 10/10/22  1139 10/10/22  0054 10/09/22  1200 10/09/22  0058   WBC  --   --  5.6  --  6.2   RBC  --   --  2.27*  --  2.35*   HGB 8.9* 7.1* 6.7*   < > 6.9*   HCT 26.4* 21.3* 20.7*   < > 21.3*   PLT  --   --  123*  --  123*   GRANS  --   --  57  --  61   LYMPH  --   --  27  --  25   EOS  --   --  4  --  2    < > = values in this interval not displayed. Coagulation No results for input(s): PTP, INR, APTT, INREXT, INREXT in the last 72 hours. Iron/Ferritin No results for input(s): IRON in the last 72 hours. No lab exists for component: TIBCCALC     BNP No results for input(s): BNPP in the last 72 hours. Cardiac Enzymes No results for input(s): CPK, CKND1, MEME in the last 72 hours.     No lab exists for component: CKRMB, TROIP   Liver Enzymes No results for input(s): TP, ALB, TBIL, AP in the last 72 hours. No lab exists for component: SGOT, GPT, DBIL     Thyroid Studies No results found for: T4, T3U, TSH, TSHEXT, TSHEXT      EKG: unchanged from previous tracings. Physical Assessment:   Visit Vitals  BP (!) 145/67 (BP 1 Location: Right upper arm, BP Patient Position: At rest)   Pulse 92   Temp 98.4 °F (36.9 °C)   Resp 18   Ht 5' 5\" (1.651 m)   Wt 74.2 kg (163 lb 8 oz)   SpO2 94%   Breastfeeding No   BMI 27.21 kg/m²     Weight change:     Intake/Output Summary (Last 24 hours) at 10/11/2022 0941  Last data filed at 10/11/2022 6887  Gross per 24 hour   Intake 368.75 ml   Output 37 ml   Net 331.75 ml       Physical Exam:   General: comfortable, no acute distress   HEENT sclera anicteric, supple neck, no thyromegaly  CVS: S1S2 heard,  no rub  RS: + air entry b/l,   Abd: Soft, Non tender, Not distended, Positive bowel sounds, no organomegaly, no CVA / supra pubic tenderness  Neuro: non focal, awake, alert , CN II-XII are grossly intact  Extrm: no edema, no cyanosis, clubbing   Skin: no visible  Rash  Musculoskeletal: No gross joints or bone deformities     Procedures/imaging: see electronic medical records for all procedures, Xrays and details which were not copied into this note but were reviewed prior to creation of Plan        Thank you very much for allowing me to participate in the care of this patient. We will continue to monitor with you and make recommendations as needed.          Viktor Randall MD  October 11, 2022  [facility]  Office [phone]

## 2022-10-12 ENCOUNTER — TELEPHONE (OUTPATIENT)
Dept: FAMILY MEDICINE CLINIC | Age: 82
End: 2022-10-12

## 2022-10-12 ENCOUNTER — HOME CARE VISIT (OUTPATIENT)
Dept: HOME HEALTH SERVICES | Facility: HOME HEALTH | Age: 82
End: 2022-10-12
Payer: MEDICARE

## 2022-10-12 ENCOUNTER — HOME CARE VISIT (OUTPATIENT)
Dept: SCHEDULING | Facility: HOME HEALTH | Age: 82
End: 2022-10-12
Payer: MEDICARE

## 2022-10-12 PROCEDURE — G0151 HHCP-SERV OF PT,EA 15 MIN: HCPCS

## 2022-10-12 PROCEDURE — 400013 HH SOC

## 2022-10-12 PROCEDURE — G0299 HHS/HOSPICE OF RN EA 15 MIN: HCPCS

## 2022-10-12 NOTE — Clinical Note
Patient is an 81 y/o female with PMHx of Allergic rhinitis chronic kidney disease  2020 Peritoneal Dialysis Diabetes ([facility] 2015 IDDM H/O seasonal allergies Hypertension Left foot drop, chronic since 2004 fusion sx Peritoneal dialysis catheter in place  admitted to hospital on 10/7/22 due to GI bleed, post EGD duodenal angiectasia status post APC, post colonoscopy 10/8/2022 Esophageal candidiasis, Acute diverticulitis and was d/c to home on 10/11/22. Patient is now back to one level private residence with 4 steps with rail from front door, 2 steps with rails from garage. Patient lives with spouse. Spouse is primary cg present during this visit. Primary caregiver is available 24/7 and can assist with bathing, dressing, walking, turn in bed, bathroom, meal prep and setup, medication management, perform wound care, grocery shopping, household chores, transportation to MD appointment, transferring from bed to chair and transferring to assistive device    CG verbalized taking care of his has taking a toll on him. Per cg, he feels he has lost his spouse and he is just now her caretaker. Secondary caregiver: Daughter. Discussed with patient and caregiver(s) availability and willingness to provide care. Patient has a PLOF of indep with ADLs, bed mobility, transfers, and ambulation without AD both for household and community distances. Patient can drive. During this visit, patient presents with decreased bilateral LEs strength, impaired balance and decreased coordination , poor functional activity tolerance and decreased safety in functional mobility skills as evidenced by functional tests. FTSTS: unable, Tinetti and gait test: 6/28, TUG: unable.  SSWS: 0.2 m/s    Patient requiring moderate assistance in bed mobility for  LE management and minimal verbal cues for proper techniques, moderate assistance in safe transfers to and from bed, chair and BSC with 25% verbal cues without AD due to not enough space to move around. Patient has decreased  static standing balance at Pheobe Lapidus and needs to hold on to walker for support to prevent LOB. Patient is limited with ambulation to 25 ft with 2 turns using walker requiring minimal assistance. Impairments in gait include forward lean, downward gaze, decreased  hip/knee flexion during swing phase, unequal step length, decreased sherin, - heel strike , wide KARINA, ataxic gait. Deferred step management assessment d/t safety reasons. Patient reported  SOB after ambulating 10 ft. PATIENT RESPONSE TO PROCEDURE PERFORMED: Requires rest breaks in between task due to c/o fatigue. Patient exhibiting difficulty performing bed mobility, transfers and gait which resulting to increase level of care. These functional deficits were caused by rodriguez LEs weakness and decreased balance. Skilled HHPT services necessary to improve bed mobility, transfers, ambulation and restore patient to level of functionality that her spouse can manage at home. Without therapy, patient is at risk for falls, further immobility, dependence upon caregivers, risk for rehospitalization and failure to return to her PLOF. Therapeutic treatment will also focus safety on functional mobility skills and address gait impairments to decrease risk for falls, decrease risk of rehospitalization  and increase independence in performing ADLs. Patient will benefit from [facility] for community resources education. Due cg reluctance in providing care and assistance secondary to medical issues, emotional and physical stress. Patient will benefit from [facility] to assist personal services such as sponge bathing and dressing. Please call #[phone] with any questions. Thank you! Dea Sands

## 2022-10-12 NOTE — HOME HEALTH
Skilled services/Home bound verification:     Skilled Reason for admission/summary of clinical condition: Patient is an 81 y/o female with PMHx of Allergic rhinitis Chronic kidney disease 2020 Peritoneal Dialysis Diabetes ([facility] [address] 2015 IDDM H/O seasonal allergies Hypertension Left foot drop, chronic since 2004 fusion sx Peritoneal dialysis catheter in place  admitted to hospital on 10/7/22 due to GI bleed, post EGD duodenal angiectasia status post APC, post colonoscopy 10/8/2022 Esophageal candidiasis, Acute diverticulitis and was d/c to home on 10/11/22. This patient is homebound for the following reasons Requires considerable and taxing effort to leave the home , Requires the assistance of 1 or more persons to leave the home  and only leaves the home for medical reasons or Shinto services and are infrequent and of short duration for other reasons . Caregiver: spouse. Discussed with patient and caregiver(s) availability and willingness to provide care. Primary caregiver is available 24/7 and can assist with bathing, dressing, walking, turn in bed, bathroom, meal prep and setup, medication management, perform wound care, grocery shopping, household chores, transportation to MD appointment, transferring from bed to chair and transferring to assistive device    CG verbalized taking care of his has taking a toll on him physically, emotionally and mentally. Per cg, he feels he has lost his spouse and he is just now her caretaker. Secondary caregiver: Daughter. Discussed with patient and caregiver(s) availability and willingness to provide care. Secondary caregiver is available occasional short term and can assist with medication management, grocery shopping, transportation to MD appointment. Medications reconciled and are not all medications are available in the home this visit. carbamide peroxide 6.6% otic solution, L-acidophilus, casei, rhamnosus are not present during this visit.  Patient was told by her pharmacy that she can obtain these medications over the counter. Daughter went out to [facility] to buy these 2 medications. The following education was provided regarding medications, medication interactions, and look alike medications (specify): No significant adverse reactions noted. Medications are effective at this time. High risk medication teaching regarding anticoagulants, hyperglycemic agents or opioid narcotics performed (specify) Novolog, Lantus:  Instruct patient/caregiver these medications may result in lower blood sugars than body is accustomed to notify SN/PT if dizziness, confusion, nervousness, or any other symptoms of low blood sugar. Jesus Jimenez MD notified of any discrepancies/medication interactions. Home health supplies by type and quantity ordered/delivered this visit include NA    Pt/Caregiver instructed on plan of care and are agreeable to plan of care at this time. Physician Jesus Jimenez MD notified of patient admission to home health and plan of care including anticipated frequency of 2w4, 1w2  and treatments/interventions/modalities of education on pain management techniques, graded therapeutic exercises, balance training, bed mobility training, transfers training, HEP ed, education on energy conservation techniques, gait/steps management training and d/c planning. Discharge planning discussed with patient and caregiver. Discharge planning as follows: d/c to self, family and under supervision of MD.  Pt/Caregiver did verbalize understanding of discharge planning. Next MD appointment 6/15/2023  with Lilibeth Ames PA-C. Patient/caregiver encouraged/instructed to keep appointment as lack of follow through with physician appointment could result in discontinuation of home care services for non-compliance. -  A written copy of the [facility] of Rights was given and verbally reviewed on this visit.  The patient or representative was given the opportunity to ask pertinent questions regarding the bill of rights. Luana Settler of rights information was received by patient. Reviewed with patient and spouse. Admission handbook left in the home. Emergency Evacuation Information   Reviewed emergency plan with patient/caregiver. -  Subjective:   Pain: Patient c/o intermittent  achy pain on lower back with highest pain level of 4/10 and least pain level of 0/10. Patient denies taking pain medication and manages pain by resting. Patient's goal: to be able do her daily activities and be able to care for herself. -  Objective  Balance:  Unsupported sitting: Normal  Static Standing balance: Fair  Dynamic standing balance: NA d/t safety reasons  Tinetti balance and gait test: 6/28 indicative of high fall risks. TUG test: unable   ROM: WFL on bilateral LEs. BLE MMT:  R hip flex 3+/5   R hip Abd 4-/5   R hip add 4-/5   R knee flex 3+/5  R knee ext. 3+/5    R ankle DF 4-/5  L hip flex 3+/5  L hip Abd 4-/5  L hip add 4-/5  L knee flex 3+/5  L knee ext. 3+/5  L ankle DF 2-/5  FTSTS:  Unable  SSWS: 0.2 m/s  -  Assessment:    Patient is an 81 y/o female with PMHx of Allergic rhinitis chronic kidney disease  2020 Peritoneal Dialysis Diabetes ([facility] [address] 2015 IDDM H/O seasonal allergies Hypertension Left foot drop, chronic since 2004 fusion sx Peritoneal dialysis catheter in place  admitted to hospital on 10/7/22 due to GI bleed, post EGD duodenal angiectasia status post APC, post colonoscopy 10/8/2022 Esophageal candidiasis, Acute diverticulitis and was d/c to home on 10/11/22. Patient is now back to one level private residence with 4 steps with rail from front door, 2 steps with rails from garage. Patient lives with spouse. Spouse is primary cg present during this visit.      Primary caregiver is available 24/7 and can assist with bathing, dressing, walking, turn in bed, bathroom, meal prep and setup, medication management, perform wound care, grocery shopping, household chores, transportation to MD appointment, transferring from bed to chair and transferring to assistive device    CG verbalized taking care of his has taking a toll on him. Per cg, he feels he has lost his spouse and he is just now her caretaker. Secondary caregiver: Daughter. Discussed with patient and caregiver(s) availability and willingness to provide care. Patient has a PLOF of indep with ADLs, bed mobility, transfers, and ambulation without AD both for household and community distances. Patient can drive. During this visit, patient presents with decreased bilateral LEs strength, impaired balance and decreased coordination , poor functional activity tolerance and decreased safety in functional mobility skills as evidenced by functional tests. FTSTS: unable, Tinetti and gait test: 6/28, TUG: unable. SSWS: 0.2 m/s    Patient requiring moderate assistance in bed mobility for  LE management and minimal verbal cues for proper techniques, moderate assistance in safe transfers to and from bed, chair and BSC with 25% verbal cues without AD due to not enough space to move around. Patient has decreased  static standing balance at [facility] and needs to hold on to walker for support to prevent LOB. Patient is limited with ambulation to 25 ft with 2 turns using walker requiring minimal assistance. Impairments in gait include forward lean, downward gaze, decreased  hip/knee flexion during swing phase, unequal step length, decreased sherin, - heel strike , wide KARINA, ataxic gait. Deferred step management assessment d/t safety reasons. Patient reported  SOB after ambulating 10 ft.     PATIENT EDUCATION PROVIDED THIS VISIT TO INCLUDE:   FALL PREVENTION TECHNIQUES: monitor medication that may alter mental status,  keeping walking pathways clean and clear of clutter and throw rugs, keeping night lights on for ability to see and easily, good visibility for safe transitioning thresholds and proper use and good compliance of using AD. PAIN MANAGEMENT TECHNIQUES: positioning and relaxation. PRESSURE ULCER PREVENTION TECHNIQUES:  proper positioning strategies including frequency of repositioning, prevention of shear and friction, appropriate skin hygiene and protection from moisture. ENERGY CONSERVATION TECHNIQUES:  rest, slow down, pacing, and complete tasks in manageable steps. S/S OF INFECTION: increased pain, swelling, redness, warmer skin surrounding, fever  Clinician instructed patient/CG on proper disposal of sharps: Containers should be made of hard plastic, be puncture-resistant and leakproof,   such as a laundry detergent or bleach bottle. When the container is ¾ full, it should be sealed with tape and labeled   DO NOT RECYCLE prior to discarding in the regular trash. PATIENT LEVEL OF UNDERSTANDING OF EDUCATION PROVIDED: Patient verbalized understanding and able to partially teach back information provided. PATIENT RESPONSE TO PROCEDURE PERFORMED: Requires rest breaks in between task due to c/o fatigue. Patient exhibiting difficulty performing bed mobility, transfers and gait which resulting to increase level of care. These functional deficits were caused by rodriguez LEs weakness and decreased balance. Skilled HHPT services necessary to improve bed mobility, transfers, ambulation and restore patient to level of functionality that her spouse can manage at home. Without therapy, patient is at risk for falls, further immobility, dependence upon caregivers, risk for rehospitalization and failure to return to her PLOF. Therapeutic treatment will also focus safety on functional mobility skills and address gait impairments to decrease risk for falls, decrease risk of rehospitalization  and increase independence in performing ADLs. Patient will benefit from [facility] for community resources education. Due cg reluctance in providing care and assistance secondary to medical issues, emotional and physical stress. Patient will benefit from [facility] to assist personal services such as sponge bathing and dressing.  -  Plan: Patient is seen for initial PT eval today with POC established. Discussed to patient POC and visit frequency of 2w4, 1w2. Patient verbalized agreement. Patient to receive education on pain management techniques, graded therapeutic exercises, balance and training, bed mobility training, transfers training, HEP ed, education on energy conservation techniques, fall prevention techniques ed, pressure ulcer prevention techniques, gait/steps management training and d/c planning.

## 2022-10-12 NOTE — Clinical Note
carbamide peroxide 6.6% otic solution, L-acidophilus, casei, rhamnosus are not present during this visit. Patient was told by her pharmacy that she can obtain these medications over the counter. Daughter went out to [facility] to buy these 2 medications. Please call #[phone] with any questions. Thank you! Rajni Wharton

## 2022-10-13 ENCOUNTER — HOME CARE VISIT (OUTPATIENT)
Dept: HOME HEALTH SERVICES | Facility: HOME HEALTH | Age: 82
End: 2022-10-13
Payer: MEDICARE

## 2022-10-13 VITALS
SYSTOLIC BLOOD PRESSURE: 120 MMHG | OXYGEN SATURATION: 99 % | HEART RATE: 82 BPM | RESPIRATION RATE: 18 BRPM | TEMPERATURE: 98.6 F | DIASTOLIC BLOOD PRESSURE: 70 MMHG

## 2022-10-14 ENCOUNTER — TELEPHONE (OUTPATIENT)
Dept: FAMILY MEDICINE CLINIC | Age: 82
End: 2022-10-14

## 2022-10-14 ENCOUNTER — HOME CARE VISIT (OUTPATIENT)
Dept: SCHEDULING | Facility: HOME HEALTH | Age: 82
End: 2022-10-14
Payer: MEDICARE

## 2022-10-14 VITALS
OXYGEN SATURATION: 99 % | DIASTOLIC BLOOD PRESSURE: 62 MMHG | TEMPERATURE: 98.6 F | RESPIRATION RATE: 18 BRPM | SYSTOLIC BLOOD PRESSURE: 120 MMHG | HEART RATE: 82 BPM

## 2022-10-14 PROCEDURE — G0157 HHC PT ASSISTANT EA 15: HCPCS

## 2022-10-14 NOTE — TELEPHONE ENCOUNTER
Care Transitions Initial Follow Up Call    Outreach made within 2 business days of discharge: Yes    Patient: Kristy Mcneill Patient : 1940   MRN: [id number]  Reason for Admission: Upper GI bleed  Discharge Date: 10/11/22       Spoke with: n/a No answer      Scheduled appointment with PCP within 7-14 days    Follow Up  Future Appointments   Date Time Provider Slick Porter   10/17/2022 To Be Determined BRUNO [address]   10/18/2022 To Be Determined TRINITY [address]   10/20/2022 To Be Determined Mindy Rm [address]   10/21/2022 To Be Determined TRINITY [address]   10/25/2022 To Be Determined BRUNO [address]   10/25/2022 10:30 AM Ally Solitario DO [facility] BS AMB   10/27/2022 To Be Determined BRUNO [address]   10/28/2022 To Be Determined TRINITY Enriquez   2022 To Be Determined Chauncey Dasilva [facility] HR [facility]   11/3/2022 To Be Determined [facility]   2022 To Be Determined TRINITY [address]   2022 To Be Determined [facility]   2022 To Be Determined TRINITY [address]   2022 To Be Determined Chauncey Dasilva [facility]   6/15/2023  2:20 PM Johan Toledo PA-C [id number]     Unable to get in contact with patient.   No answer x 2  Susanne Bustamante RN

## 2022-10-14 NOTE — HOME HEALTH
Summary of clinical health condition: 80 y.o. female who presents to  [facility] ER with complaint of Melena. Patient reports that she has been having dark, melenic stools and momentary, 9/10 intensity, \"aching, cramping\" periumbilical pain that starts superior the umbilicus and then radiates inferiorly. In the emergency room patient noted to have acute blood loss anemia but heme-negative, hemoglobin had dropped significantly from previous count. GI was consulted, started on PPI. Patient also abdominal pain for which CT abdomen pelvis was ordered. GI saw the patient recommended EGD and colonoscopy. 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 GI recommended to start patient on antibiotics for diverticulitis and also Diflucan for esophageal candidiasis. Recommended clear liquid and advance as tolerated. Patient required monitor blood transplant in the hospital after which her hemoglobin remained stable. Nephrology was consulted for PD, soren sheikh underwent PD in the hospital.  Patient also noted to have low normal blood pressure, antiplatelet medications were held. No blood pressure started trending up she has been resumed back on metoprolol and amlodipine. Further adjustment of blood pressure medication needs to be done outpatient. Patient started tolerating diet well, is advanced to full liquid and to regular diet. Patient currently states she feels more com fortable with liquid diet. Patient will be discharged on liquid and soft diet as tolerated. GI signed off on the patient. Discussed with nephrology recommend discharge with p.o. Lasix twice daily along with potassium supplement and they will follow-up outpatient and monitor labs and adjust hypertensive medication accordingly.      Medications reconciled, all medications are at home. The following education was provided regarding medications, medication interactions, and look a like medications: N/A all med's reviewed. Discussed importance of compliance, timely taking all prescribed med's, proper dosage and freq. Teaching provided with medication; Augmentin S/E; diarrhea, drowsiness and tiredness, black and tarry stool and urine discoloration. Medications are somewhat effective at this time. .    Caregiver: caregiver/ is available to assist with daily meals, ADL's prn, provide reminders with daily medications, daughter run errands, groceries and accompany to MD appt. prn. .   Skilled care provided: Teaching disease and medication, completed assessment. Completed SN Initial eval, explained POC, SNV freq and D/C plan. Instruction/teaching provided to pt/CG with good understanding. Patient education provided this visit to include: Discussed intervention to prevent infection; hand washing and avoiding sick person. Continue to monitor S/S of bleeding; black and tarry stools and urine discoloration. Ambulate q 2hrs as tolerated, skin care; apply daily moisturizing to BLE, keeping collins-area dry, clean and changing pads when its wet. Safety and fall prec; clearing walkway, placing  in easy access, avoid getting up too quickly when feeling weak, dizzy, and to call for assistance prn. Continue ADA and heart healthy diet, avoiding foods with high Na contents, and reading labels. Monitor for S/S of infection; fever 100.4, increase pain, redness, swelling,  coughing with yellow thick sputum, cloudy urine with foul smell, confusion, not feeling well 2-3 days, SOB, and to call [facility] or MD for assistance if experiencing any of these S/S. To call 911 with chest pains, facial drooping, difficulty talking, non arousable/unconscious and uncontrollable bleeding. Patient/caregiver degree of understanding: Pt/CG has good understanding of the teaching provided during visit.     Home health supplies by type and quantity ordered/delivered this visit include: N/A  Pt./CG instructed on plan of care,  SN freq visit; 1d1, 2w1, 1w3, 2 prn and D/C plan. Home exercise program/Homework provided: Ambulation, HEP deep breathing exercises 10x when having SOB, pain and anxiety. Discharge planning discussed with patient and caregiver. Discharge planning as follows: Pt/CG will be able to manage disease and medication independently, and health condition stable. Pt/Caregiver did verbalize understanding of discharge planning. Patient/caregiver encouraged/instructed to keep appointment as lack of follow through with physician appointment could result in discontinuation of home care services for non-compliance. COVID - 23 Screening completed before visit:     Denies and no family member  has any of these S/S:  Fever, dry cough, sore throat diarrhea, body aches, chills, not feeling well and loss of taste.

## 2022-10-16 ENCOUNTER — APPOINTMENT (OUTPATIENT)
Dept: CT IMAGING | Age: 82
End: 2022-10-16
Attending: EMERGENCY MEDICINE
Payer: MEDICARE

## 2022-10-16 ENCOUNTER — APPOINTMENT (OUTPATIENT)
Dept: GENERAL RADIOLOGY | Age: 82
End: 2022-10-16
Attending: EMERGENCY MEDICINE
Payer: MEDICARE

## 2022-10-16 ENCOUNTER — HOSPITAL ENCOUNTER (EMERGENCY)
Age: 82
Discharge: HOME | End: 2022-10-16
Attending: EMERGENCY MEDICINE
Payer: MEDICARE

## 2022-10-16 VITALS
HEART RATE: 80 BPM | OXYGEN SATURATION: 99 % | RESPIRATION RATE: 20 BRPM | SYSTOLIC BLOOD PRESSURE: 141 MMHG | BODY MASS INDEX: 26.66 KG/M2 | TEMPERATURE: 97.5 F | HEIGHT: 65 IN | DIASTOLIC BLOOD PRESSURE: 66 MMHG | WEIGHT: 160 LBS

## 2022-10-16 DIAGNOSIS — E16.2 HYPOGLYCEMIA: Primary | ICD-10-CM
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
## 2022-10-16 LAB
ALBUMIN SERPL-MCNC: 2.9 G/DL (ref 3.4–5)
ALBUMIN/GLOB SERPL: 0.8 {RATIO} (ref 0.8–1.7)
ALP SERPL-CCNC: 88 U/L (ref 45–117)
ALT SERPL-CCNC: 32 U/L (ref 13–56)
ANION GAP SERPL CALC-SCNC: 8 MMOL/L (ref 3–18)
AST SERPL-CCNC: 39 U/L (ref 10–38)
BASOPHILS # BLD: 0.1 K/UL (ref 0–0.1)
BASOPHILS NFR BLD: 1 % (ref 0–2)
BILIRUB SERPL-MCNC: 0.5 MG/DL (ref 0.2–1)
BNP SERPL-MCNC: 5833 PG/ML (ref 0–1800)
BUN SERPL-MCNC: 27 MG/DL (ref 7–18)
BUN/CREAT SERPL: 2 (ref 12–20)
CALCIUM SERPL-MCNC: 10.3 MG/DL (ref 8.5–10.1)
CHLORIDE SERPL-SCNC: 106 MMOL/L (ref 100–111)
CK SERPL-CCNC: 454 U/L (ref 26–192)
CO2 SERPL-SCNC: 25 MMOL/L (ref 21–32)
CREAT SERPL-MCNC: 12.2 MG/DL (ref 0.6–1.3)
DIFFERENTIAL METHOD BLD: ABNORMAL
EOSINOPHIL # BLD: 0 K/UL (ref 0–0.4)
EOSINOPHIL NFR BLD: 1 % (ref 0–5)
ERYTHROCYTE [DISTWIDTH] IN BLOOD BY AUTOMATED COUNT: 13.9 % (ref 11.6–14.5)
GLOBULIN SER CALC-MCNC: 3.6 G/DL (ref 2–4)
GLUCOSE BLD STRIP.AUTO-MCNC: 142 MG/DL (ref 70–110)
GLUCOSE SERPL-MCNC: 146 MG/DL (ref 74–99)
HCT VFR BLD AUTO: 31.2 % (ref 35–45)
HGB BLD-MCNC: 10.1 G/DL (ref 12–16)
IMM GRANULOCYTES # BLD AUTO: 0 K/UL (ref 0–0.04)
IMM GRANULOCYTES NFR BLD AUTO: 0 % (ref 0–0.5)
LYMPHOCYTES # BLD: 0.8 K/UL (ref 0.9–3.6)
LYMPHOCYTES NFR BLD: 15 % (ref 21–52)
MCH RBC QN AUTO: 28.9 PG (ref 24–34)
MCHC RBC AUTO-ENTMCNC: 32.4 G/DL (ref 31–37)
MCV RBC AUTO: 89.1 FL (ref 78–100)
MONOCYTES # BLD: 0.4 K/UL (ref 0.05–1.2)
MONOCYTES NFR BLD: 9 % (ref 3–10)
NEUTS SEG # BLD: 3.8 K/UL (ref 1.8–8)
NEUTS SEG NFR BLD: 74 % (ref 40–73)
NRBC # BLD: 0 K/UL (ref 0–0.01)
NRBC BLD-RTO: 0 PER 100 WBC
PLATELET # BLD AUTO: 129 K/UL (ref 135–420)
PMV BLD AUTO: 11.4 FL (ref 9.2–11.8)
POTASSIUM SERPL-SCNC: 4.7 MMOL/L (ref 3.5–5.5)
PROT SERPL-MCNC: 6.5 G/DL (ref 6.4–8.2)
RBC # BLD AUTO: 3.5 M/UL (ref 4.2–5.3)
SODIUM SERPL-SCNC: 139 MMOL/L (ref 136–145)
TROPONIN-HIGH SENSITIVITY: 32 NG/L (ref 0–54)
WBC # BLD AUTO: 5.1 K/UL (ref 4.6–13.2)

## 2022-10-16 PROCEDURE — 99284 EMERGENCY DEPT VISIT MOD MDM: CPT

## 2022-10-16 PROCEDURE — 71045 X-RAY EXAM CHEST 1 VIEW: CPT

## 2022-10-16 PROCEDURE — 85025 COMPLETE CBC W/AUTO DIFF WBC: CPT

## 2022-10-16 PROCEDURE — 84484 ASSAY OF TROPONIN QUANT: CPT

## 2022-10-16 PROCEDURE — 80053 COMPREHEN METABOLIC PANEL: CPT

## 2022-10-16 PROCEDURE — 70450 CT HEAD/BRAIN W/O DYE: CPT

## 2022-10-16 PROCEDURE — 83880 ASSAY OF NATRIURETIC PEPTIDE: CPT

## 2022-10-16 PROCEDURE — 82962 GLUCOSE BLOOD TEST: CPT

## 2022-10-16 PROCEDURE — 82550 ASSAY OF CK (CPK): CPT

## 2022-10-16 NOTE — ED PROVIDER NOTES
The patient is an 55-year-old woman with past medical history significant for chronic kidney disease who is currently on peritoneal dialysis, diabetes, and hypertension who presents to the ED today with a low blood sugar and a fall. The patient's daughter states that the patient's  went to [place] for approximately 2 to 3 hours and came home and found the patient on the floor. When EMS arrived she had a low blood sugar in the 50s. She received D10 and she became more coherent after receiving IV fluids and IV dextrose. She states that she does not recall what had happened. Her fall was unwitnessed by her daughter. Her daughter states that she was just discharged on Tuesday for diverticulitis.        Past Medical History:   Diagnosis Date    Allergic rhinitis     Chronic kidney disease 2020    Peritoneal Dialysis    Diabetes ([facility] [address] 2015    IDDM    H/O seasonal allergies     Hypertension     Left foot drop, chronic since 2004 fusion sx     Peritoneal dialysis catheter in place [facility])        Past Surgical History:   Procedure Laterality Date    COLONOSCOPY N/A 9/26/2022    COLONOSCOPY with bx's and polypectomy performed by Maame Calhoun MD at [facility] ENDOSCOPY    COLONOSCOPY N/A 10/8/2022    COLONOSCOPY/ Polypectomies performed by Florentin Morales MD at [facility]     HX CATARACT REMOVAL  2011    HX LUMBAR FUSION  2004-last sx    fused L2-S1, in [facility]    HX TUBAL LIGATION      HX WISDOM TEETH EXTRACTION           Family History:   Problem Relation Age of Onset    Diabetes Maternal Aunt     Diabetes Maternal Uncle     Diabetes Maternal Grandfather     Diabetes Other     Hypertension Cousin        Social History     Socioeconomic History    Marital status:      Spouse name: Not on file    Number of children: Not on file    Years of education: Not on file    Highest education level: Not on file   Occupational History    Occupation: CNA - RET   Tobacco Use    Smoking status: Never    Smokeless tobacco: Never   Vaping Use    Vaping Use: Never used   Substance and Sexual Activity    Alcohol use: No    Drug use: Never    Sexual activity: Not on file   Other Topics Concern    Not on file   Social History Narrative    Not on file     Social Determinants of Health     Financial Resource Strain: Not on file   Food Insecurity: Not on file   Transportation Needs: Not on file   Physical Activity: Not on file   Stress: Not on file   Social Connections: Not on file   Intimate Partner Violence: Not on file   Housing Stability: Not on file         ALLERGIES: Sevelamer, Aspirin, Keflex [cephalexin], and Sevelamer carbonate    Review of Systems   All other systems reviewed and are negative. Vitals:    10/16/22 1541   BP: (!) 141/66   Pulse: 80   Resp: 20   Temp: 97.5 °F (36.4 °C)   SpO2: 99%   Weight: 72.6 kg (160 lb)   Height: 5' 5\" (1.651 m)            Physical Exam  Vitals and nursing note reviewed. Constitutional:       Appearance: Normal appearance. HENT:      Head: Normocephalic and atraumatic. Right Ear: External ear normal.      Left Ear: External ear normal.      Nose: Nose normal.      Mouth/Throat:      Mouth: Mucous membranes are moist.      Pharynx: Oropharynx is clear. Eyes:      Extraocular Movements: Extraocular movements intact. Conjunctiva/sclera: Conjunctivae normal.      Pupils: Pupils are equal, round, and reactive to light. Cardiovascular:      Rate and Rhythm: Normal rate and regular rhythm. Pulses: Normal pulses. Heart sounds: Normal heart sounds. Pulmonary:      Effort: Pulmonary effort is normal.      Breath sounds: Normal breath sounds. Abdominal:      General: Abdomen is flat. Bowel sounds are normal.      Palpations: Abdomen is soft. Comments: Very mild tenderness to palpation of the abdomen   Musculoskeletal:      Cervical back: Normal range of motion and neck supple. Skin:     General: Skin is warm.       Capillary Refill: Capillary refill takes less than 2 seconds. Comments: Has wet clothing from diaphoresis   Neurological:      General: No focal deficit present. Mental Status: She is alert and oriented to person, place, and time. Psychiatric:         Mood and Affect: Mood normal.         Behavior: Behavior normal.         Thought Content: Thought content normal.         Judgment: Judgment normal.      Recent Results (from the past 12 hour(s))   GLUCOSE, POC    Collection Time: 10/16/22  3:43 PM   Result Value Ref Range    Glucose (POC) 142 (H) 70 - 110 mg/dL   CBC WITH AUTOMATED DIFF    Collection Time: 10/16/22  3:50 PM   Result Value Ref Range    WBC 5.1 4.6 - 13.2 K/uL    RBC 3.50 (L) 4.20 - 5.30 M/uL    HGB 10.1 (L) 12.0 - 16.0 g/dL    HCT 31.2 (L) 35.0 - 45.0 %    MCV 89.1 78.0 - 100.0 FL    MCH 28.9 24.0 - 34.0 PG    MCHC 32.4 31.0 - 37.0 g/dL    RDW 13.9 11.6 - 14.5 %    PLATELET 582 (L) 242 - 420 K/uL    MPV 11.4 9.2 - 11.8 FL    NRBC 0.0 0  WBC    ABSOLUTE NRBC 0.00 0.00 - 0.01 K/uL    NEUTROPHILS 74 (H) 40 - 73 %    LYMPHOCYTES 15 (L) 21 - 52 %    MONOCYTES 9 3 - 10 %    EOSINOPHILS 1 0 - 5 %    BASOPHILS 1 0 - 2 %    IMMATURE GRANULOCYTES 0 0.0 - 0.5 %    ABS. NEUTROPHILS 3.8 1.8 - 8.0 K/UL    ABS. LYMPHOCYTES 0.8 (L) 0.9 - 3.6 K/UL    ABS. MONOCYTES 0.4 0.05 - 1.2 K/UL    ABS. EOSINOPHILS 0.0 0.0 - 0.4 K/UL    ABS. BASOPHILS 0.1 0.0 - 0.1 K/UL    ABS. IMM.  GRANS. 0.0 0.00 - 0.04 K/UL    DF AUTOMATED     METABOLIC PANEL, COMPREHENSIVE    Collection Time: 10/16/22  3:50 PM   Result Value Ref Range    Sodium 139 136 - 145 mmol/L    Potassium 4.7 3.5 - 5.5 mmol/L    Chloride 106 100 - 111 mmol/L    CO2 25 21 - 32 mmol/L    Anion gap 8 3.0 - 18 mmol/L    Glucose 146 (H) 74 - 99 mg/dL    BUN 27 (H) 7.0 - 18 MG/DL    Creatinine 12.20 (H) 0.6 - 1.3 MG/DL    BUN/Creatinine ratio 2 (L) 12 - 20      eGFR 3 (L) >60 ml/min/1.73m2    Calcium 10.3 (H) 8.5 - 10.1 MG/DL    Bilirubin, total 0.5 0.2 - 1.0 MG/DL    ALT (SGPT) 32 13 - 56 U/L    AST (SGOT) 39 (H) 10 - 38 U/L    Alk. phosphatase 88 45 - 117 U/L    Protein, total 6.5 6.4 - 8.2 g/dL    Albumin 2.9 (L) 3.4 - 5.0 g/dL    Globulin 3.6 2.0 - 4.0 g/dL    A-G Ratio 0.8 0.8 - 1.7     CK    Collection Time: 10/16/22  3:50 PM   Result Value Ref Range     (H) 26 - 192 U/L   NT-PRO BNP    Collection Time: 10/16/22  3:50 PM   Result Value Ref Range    NT pro-BNP 5,833 (H) 0 - 1,800 PG/ML   TROPONIN-HIGH SENSITIVITY    Collection Time: 10/16/22  3:50 PM   Result Value Ref Range    Troponin-High Sensitivity 32 0 - 54 ng/L     CT HEAD WO CONT   Final Result      No acute findings. Empty sella. Chronic right maxillary sinusitis. Small bilateral mastoid effusions. XR CHEST PORT   Final Result   1. No acute cardiopulmonary process. MDM  Number of Diagnoses or Management Options  Fall, initial encounter  Hypoglycemia  Diagnosis management comments: The patient is an 66-year-old woman with past medical history significant for diabetes, hypertension, and end-stage renal disease on peritoneal dialysis, who presents to the ED today after being found on the ground by her  when he had come back from [place]. Her blood sugar was low when EMS arrived. The patient received IV D10 when EMS arrived. She is now awake and appropriate in the ED. Her work-up is fairly unremarkable and everything appears to be at her baseline. She was able to eat a meal and she has been able to maintain her blood sugar in the 100s with the last check being 142. She will be discharged home and advised to follow-up with her primary care physician in 2 to 3 days. Return precautions have been given.            Procedures

## 2022-10-17 ENCOUNTER — HOME CARE VISIT (OUTPATIENT)
Dept: HOME HEALTH SERVICES | Facility: HOME HEALTH | Age: 82
End: 2022-10-17
Payer: MEDICARE

## 2022-10-17 ENCOUNTER — TELEPHONE (OUTPATIENT)
Dept: FAMILY MEDICINE CLINIC | Age: 82
End: 2022-10-17

## 2022-10-17 NOTE — ED TRIAGE NOTES
Patient arrived via medic with hypoglycemia in the 50's. Patient received D10 via medic. Upon arrival to the ER patient was noted to have a glucose of 195. NAD noted. Patient is A/O x4. Daughter at bedside.

## 2022-10-17 NOTE — ED NOTES
I have reviewed discharge instructions with the patient. The patient verbalized understanding. Pt discharged from ED ambulatory, stable in no distress.

## 2022-10-17 NOTE — TELEPHONE ENCOUNTER
Saritha, home care nurse calling requesting if she can get a verbal from  for patient to receive a home health aide and . Says orders was faxed back on 10/12/22. Requesting call back from nurse for verbal consent.  Please advise [phone]

## 2022-10-17 NOTE — CASE COMMUNICATION
OT called pt to schedule visit however pt reports street will be closed at 10am due to parade. OT instructed will schedule visit next available day of 10.21.22. pt verbalized understanding and agreed visit for 10.21.22.

## 2022-10-18 ENCOUNTER — HOME CARE VISIT (OUTPATIENT)
Dept: SCHEDULING | Facility: HOME HEALTH | Age: 82
End: 2022-10-18
Payer: MEDICARE

## 2022-10-18 ENCOUNTER — HOME CARE VISIT (OUTPATIENT)
Dept: HOME HEALTH SERVICES | Facility: HOME HEALTH | Age: 82
End: 2022-10-18
Payer: MEDICARE

## 2022-10-18 VITALS
RESPIRATION RATE: 17 BRPM | TEMPERATURE: 98.4 F | OXYGEN SATURATION: 98 % | DIASTOLIC BLOOD PRESSURE: 68 MMHG | HEART RATE: 82 BPM | SYSTOLIC BLOOD PRESSURE: 122 MMHG

## 2022-10-18 PROCEDURE — G0157 HHC PT ASSISTANT EA 15: HCPCS

## 2022-10-18 NOTE — Clinical Note
Pt reports she trying to walk from bedroom to living room using up walker and she passed out resulting in fall on 10/16/22. Pt sent to ER and her BS was 50 per pt. Pt was discharged home after stablized and no reports of injury. Pt reports she has been eating better since and monitoring her BS. Pt has no reported falls or passing out since that date.

## 2022-10-18 NOTE — HOME HEALTH
SUBJECTIVE: Pt reports she has been tired today and has not been out of bed until LPTA arrived. CAREGIVER INVOLVEMENT/ASSISTANCE NEEDED FOR: Pts  present during PT session and assist pt with all needs prn. HOME HEALTH SUPPLIES BY TYPE AND QUANTITY ORDERED/DELIVERED THIS VISIT INCLUDE:n/a  OBJECTIVE:  See interventions. 1.Patient level of understanding of education provided: Pt demonstrates a good understanding of HEP in sitting and   2. Patient response to treatment:  Pt reports 5/10 RPE with activities today during PT session and requires frequent rest breaks secondary to c/o fatigue. ASSESSMENT OF PROGRESS TOWARD GOALS: Pt in bed when LPTA arrived. Pt required much encouragement initially to amb in home today. Pt uses upright walker for amb and cuing for safety. Pt remained sitting up in recliner in living room after PT session. CONTINUED NEED FOR THE FOLLOWING SKILLS: Pt requires continued PT to improve LE strength/gait ability. PLAN FOR NEXT VISIT: Will plan to continue PT working towards improving LE strength/gait ability. THE FOLLOWING DISCHARGE PLANNING WAS DISCUSSED WITH THE PATIENT/CAREGIVER: Pt is scheduled to continue PT 2w3, 1w2. Reviewed POC and PT goals with pt/pts .

## 2022-10-19 ENCOUNTER — HOME CARE VISIT (OUTPATIENT)
Dept: HOME HEALTH SERVICES | Facility: HOME HEALTH | Age: 82
End: 2022-10-19
Payer: MEDICARE

## 2022-10-19 NOTE — TELEPHONE ENCOUNTER
I'm not sure if Dr Crista Bower signed the form or not. Genie Hampton will be in tomorrow so I'll know by tomorrow.     Daniela Burris

## 2022-10-20 ENCOUNTER — TELEPHONE (OUTPATIENT)
Dept: FAMILY MEDICINE CLINIC | Age: 82
End: 2022-10-20

## 2022-10-20 ENCOUNTER — HOME CARE VISIT (OUTPATIENT)
Dept: SCHEDULING | Facility: HOME HEALTH | Age: 82
End: 2022-10-20
Payer: MEDICARE

## 2022-10-20 VITALS
DIASTOLIC BLOOD PRESSURE: 68 MMHG | HEART RATE: 82 BPM | OXYGEN SATURATION: 98 % | SYSTOLIC BLOOD PRESSURE: 122 MMHG | RESPIRATION RATE: 17 BRPM | TEMPERATURE: 98.2 F

## 2022-10-20 PROCEDURE — G0151 HHCP-SERV OF PT,EA 15 MIN: HCPCS

## 2022-10-20 NOTE — HOME HEALTH
SUBJECTIVE: Pt reports she had decreased BS and fell passing out on 10/16/22. Pt reports she had no injuries but is very fearful of falling again. Pt also reports she has been making sure she is eating better and [facility]. CAREGIVER INVOLVEMENT/ASSISTANCE NEEDED FOR: pts  present during PT session and assist pt with all needs prn. HOME HEALTH SUPPLIES BY TYPE AND QUANTITY ORDERED/DELIVERED THIS VISIT INCLUDE: n/a  OBJECTIVE:  See interventions. 1.Patient level of understanding of education provided: Pt demonstrates a fair understanding of seated HEP following HEP and requires cuing for amb to keep walker closer to her. 2.Patient response to treatment: Pt requries frequent rest breaks throughout PT session secondary to c/o fatigue. ASSESSMENT OF PROGRESS TOWARD GOALS: Pt reports a fall passing out on 10/16/22 secondary to decreased BS resulting in going to ER. Pt was discharged same day once stable. Pt is more fearful with amb today but reports increased compliance with tracking her BS and eating better. CONTINUED NEED FOR THE FOLLOWING SKILLS: Pt requires continued PT to improve LE strength and transfer/gait ability. PLAN FOR NEXT VISIT: Will plan for a  PFA with PT next visit. THE FOLLOWING DISCHARGE PLANNING WAS DISCUSSED WITH THE PATIENT/CAREGIVER: Pt is scheduled to continue PT 1 more visit this week 2w2, 1w2.

## 2022-10-20 NOTE — TELEPHONE ENCOUNTER
----- Message from Geovany Paris sent at 10/17/2022  8:39 AM EDT -----  Subject: Message to Provider    QUESTIONS  Information for Provider? Patient states that she was in the hospital due   to low blood sugar, and that she has no appetite and is not eating which   is why her blood sugars keep dropping. Pt would like to know if anything   can be sent to the pharmacy to help her appetite please? If there are any   questions, pt would like a call back. Thank you! Pharmacy? [facility]  ---------------------------------------------------------------------------  --------------  [address]  [phone]; OK to leave message on voicemail  ---------------------------------------------------------------------------  --------------  SCRIPT ANSWERS  Relationship to Patient?  Self

## 2022-10-21 ENCOUNTER — HOME CARE VISIT (OUTPATIENT)
Dept: SCHEDULING | Facility: HOME HEALTH | Age: 82
End: 2022-10-21
Payer: MEDICARE

## 2022-10-21 VITALS
OXYGEN SATURATION: 99 % | DIASTOLIC BLOOD PRESSURE: 72 MMHG | HEART RATE: 80 BPM | SYSTOLIC BLOOD PRESSURE: 118 MMHG | RESPIRATION RATE: 16 BRPM | TEMPERATURE: 98.6 F

## 2022-10-21 PROCEDURE — G0152 HHCP-SERV OF OT,EA 15 MIN: HCPCS

## 2022-10-21 PROCEDURE — G0299 HHS/HOSPICE OF RN EA 15 MIN: HCPCS

## 2022-10-22 VITALS
TEMPERATURE: 99.1 F | SYSTOLIC BLOOD PRESSURE: 125 MMHG | DIASTOLIC BLOOD PRESSURE: 85 MMHG | OXYGEN SATURATION: 97 % | RESPIRATION RATE: 16 BRPM | HEART RATE: 92 BPM

## 2022-10-22 NOTE — HOME HEALTH
Clinical Condition per EPIC:  \"Subjective: Mindy Alvarado is a 80 y.o. female who presents to [facility] ER with complaint of Melena. Patient reports that she has been having dark, melenic stools and momentary, 9/10 intensity, \"aching, cramping\" periumbilical pain that starts superior the umbilicus and then radiates inferiorly. Patient reports that this has been on-going for 4-5 months (since June 2022) and did not improve after Colonoscopy was performed to detect the cause of the symptoms. Patient reports that she has End-Stage Renal Disease and is on Peritoneal Dialysis. Patient denies fevers, chills, nausea, vomiting, diarrhea, dysuria, and cough,chest pain, and pain with inspiration. Patient is admitted to [facility] Telemetry Unit for management of Hemoccult (-) Melena. Hospital Course:   Mindy Alvarado is a 80 y.o. female who presents to [facility] ER with complaint of Melena. Patient reports that she has been having dark, melenic stools and momentary, 9/10 intensity, \"aching, cramping\" periumbilical pain that starts superior the umbilicus and then radiates inferiorly. In the emergency room patient noted to have acute blood loss anemia but heme-negative, hemoglobin had dropped significantly from previous count. GI was consulted, started on PPI. Patient also abdominal pain for which CT abdomen pelvis was ordered. GI saw the patient recommended EGD and colonoscopy. 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 GI recommended to start patient on antibiotics for diverticulitis and also Diflucan for esophageal candidiasis. Recommended clear liquid and advance as tolerated. Patient required monitor blood transplant in the hospital after which her hemoglobin remained stable.  Nephrology was consulted for PD, patient underwent PD in the hospital. Patient also noted to have low normal blood pressure, antiplatelet medications were held. No blood pressure started trending up she has been resumed back on metoprolol and amlodipine. Further adjustment of blood pressure medication needs to be done outpatient. Patient started tolerating diet well, is advanced to full liquid and to regular diet. Patient currently states she feels more comfortable with liquid diet. Patient will be discharged on liquid and soft diet as tolerated. GI signed off on the patient. Discussed with nephrology recommend discharge with p.o. Lasix twice daily along with potassium supplement and they will follow-up outpatient and monitor labs and adjust hypertensive medication accordingly. Patient hospital seen and eval by PT/OT recommended home health care. Patient is currently hemodynamically and medically stable for discharge home. Discussed with the patient and also with her daughter at the bedside about her discharge plan, follow-up appointments, new medications and side effects. I discussed with them about advancing diet as tolerated at home, discussed with them about starting Lantus at low-dose and increase based on the blood sugars and patient's p.o. intake. Both of them understood and agreed with the plan. Past Medical History:  Diagnosis Date  o Allergic rhinitis    o Chronic kidney disease 2020    Peritoneal Dialysis  o Diabetes ([facility] [address] 2015    IDDM  o H/O seasonal allergies    o Hypertension    o Left foot drop, chronic since 2004 fusion sx    o Peritoneal dialysis catheter in place [facility])         Past Surgical History:  Procedure Laterality Date  o COLONOSCOPY N/A 9/26/2022    COLONOSCOPY with bx's and polypectomy performed by Nancy Renteria MD at [address]    o HX CATARACT REMOVAL   2011  o HX LUMBAR FUSION   2004-last sx    fused L2-S1, in [place]  o HX TUBAL LIGATION      o HX WISDOM TEETH EXTRACTION\"  .   SUBJECTIVE:  Pt stated \"I'm not ready to go into the bathroom yet. I will go to the potty chair. \" pt lying supine bed upon OT arrival. pt agreed tx. pt spouse and daughter present for visit. CAREGIVER INVOLVEMENT: pt spouse and daughter provide A ADL tasks, transfers, and ambulation PRN, pt spouse/daughter perform all IADL tasks, shopping, transportation, and A with medications. MEDICATION RECONCILIATION: OT reconcilled medications with pt with no changes   DME ORDERED/RECOMMENEDED: NA, pt/spouse/daughter instructed regarding tub bench and insurance does not pay for device. pt daughter verbalized will obtain bench. .  OBJECTIVE:  BATHING: pt requires max A via sponge bath sitting toilet in bathroom. TOILETING: max A for clothing management and hygiene due to impaired balance. pt reports toilets via BSC primarily. pt goal is to toilet self use commode in bathroom. UB DRESSING: mod A  LB DRESSING: D due to impaired balance   GROOMING: mod A  FEEDING: setup A  .  pt reports Modified [place] RPE 5/10 after performing ambulation, transfers, and ADL assessment. pt and caregiver instructed regarding importance pt performing increased self care tasks with caregiver providing A only as required for safety or when pt unable for pt increased functional strength, endurance, and return to prior level. pt/caregiver with good understanding.   .  OT instructed/demonstrated pt the following with good understanding:    - energy conservation while performing bathing, dressing, and setup such as set clothing out night before, gather items required to perform task in one trip, sit while performing tasks, take rest breaks as needed, perform shower/bathing on days with no other appointments or activities scheduled, etc.     - pt is to perform bathing/dressing tasks sitting, when possible, for safety/fall prevention.     - while sitting perform weight shift technique bringing LE contra laterally onto opposite LE while performing LB bathing/dressing for safety and fall prevention. pt unable to perform technique. - pt instructed/demonstrated one handed technique while standing to perform functional tasks with use rollator walker/grab bar for increased stability, fall prevention, and safety while standing. -ADL training performed for improved body mechanics, safety, and technique for reduced risk of falls and improved functional level and participation of tasks. Lawernce Roughen IADL: NT due to pt was not performing IADL tasks prior. Lawernce Roughen BALANCE:  STATIC STANDING: fair- as pt requires BUE support to maintain upright standing. DYNAMIC STANDING: poor+  . AMBULATION: pt performed ambulation while holding onto caregiver with min A reciprocal pattern min v/c increased foot clearance and toe height for safety/fall prevention. pt with poor follow through. OT instructed pt/caregiver regarding use upright walker for increased safety/fall prevention however pt with no follow through. .  EOB/BED TRANSFER: supine to/from sit SBA, sit/stand EOB min A per caregiver and use step stool due to height of bed. CHAIR: NT  BSC: sit/stand BSC SBA min v/c encouragement pt perform transfer per self as opposed to caregiver providing A and not allowing pt to perform transfer. pt reports is able to get to the commode in the bathroom for toileting however declined transfer this day due to weakness/fatigue. TUB SHOWER/SHOWER CHAIR/TUB BENCH: NT due to safety and pt is not able to perform transfer at this time. OT instructed pt/caregiver regarding tub bench for increased safety tub shower transfer as opposed to use shower chair. pt and caregiver verbalized good understanding. pt daughter reports will obtain device for pt. .  -gait and transfer training performed for improved body mechanics and technique for fall prevention and safety while performing ADL tasks. Lawernce Roughen PATIENT RESPONSE TO TREATMENT:  pt reports no increased pain or discomfort with activity throughout tx.      PATIENT EDUCATION PROVIDED THIS VISIT: UB HEP, OT role, energy conservation, fall prevention/safety training ADL tasks, transfer training   . PATIENT LEVEL OF UNDERSTANDING OF EDUCATION PROVIDED: pt verbalized good understanding energy conservation and UB HEP. pt with no follow through use AD during ambulation for fall prevention. REHAB POTENTIAL: good for stated goals   HOME EXERCISE PROGRAM: Written HEP of the following issued. MATTHEW will instruct/re-instruct pt next treatment. UB HEP includes the following: BUE shoulder press, shoulder flexion, shoulder abduction, shoulder extension, chest press, elbow flexion/extension x 10, x 2 per day. pt aware MATTHEW will update HEP throughout POC. UB HEP performed for pt increased endurance and strength to perform ADL tasks and ambulation/transfers to perform tasks with improved safety, increased functional level, and for fall prevention. CONTINUED NEED FOR THE FOLLOWING SKILLS: HH OT is medically necessary to address pain, decreased ROM, decreased functional strength, increased swelling, impaired bed mobility to perform ADL tasks, decreased independence and safety with functional transfers, decreased independence and safety performing ADL tasks, decreased activity and standing tolerance, decreased functional endurance, and impaired balance in order to improve functional independence, obtain set goals, reduce risk of falls, reduce pain, improve quality of life, and return to PLOF. ASSESSMENT: pt presents with decreased endurance, decreased functional strength, decreased safety transfers, increased A with transfers, decreased safety/participation ADL tasks, decreased balance, increased burden of care, and no UB HEP. PLAN: pt will be seen to address ther ex: establish and upgrade HEP, ther act: activity and standing tolerance training, endurance training, balance training, safety training, energy conservation training, transfer training, ADL training, and pt/caregiver education.   DISCHARGE PLANNING DISCUSSED WITH PT/CAREGIVER: Anticipate D/C skilled OT week of 11.7. 22 when goals met or when pt obtained maximum benefit. pt frequency 1w1, 2w3. discharge to self and family care under MD supervision once all goals have been met or patient has reached max potential. pt and caregiver verbalized understanding and agree POC.

## 2022-10-22 NOTE — CASE COMMUNICATION
dx: GI Bleed w/ Anemia,DM,HTN, zip: 21 , freq: 1w1, 2w3, focus: increased safety transfers, ambulation, and while performing ADL tasks, increased pt participation and independence ADL tasks, toileting via commode in bathroom vs BSC, use AD during ambulation and while standing to perform ADL tasks for increased safety, fall prevention, and increased independence as well as decreased caregiver A.

## 2022-10-22 NOTE — HOME HEALTH
Skilled reason for visit: skilled assessment, education, medication management    Caregiver involvement:  Family assists ADL's, medications, meals, transportation, errands. Medications reviewed and all medications are available in the home this visit. The following education was provided regarding medications:  instructed patient on site rotation, signs and symptoms of hypoglycemia      MD notified of any discrepancies/look a-like medications/medication interactions: na  Medications are effective at this time.       Home health supplies by type and quantity ordered/delivered this visit include: na    Patient education provided this visit: sn instructed patient how BP meds keep veins open to allow for blood flow at lower pressure and how this can lead to dizziness and falls with sudden, quick movements      Sharps education provided: Clinician instructed patient/CG on proper disposal of sharps: Containers should be made of hard plastic, be puncture-resistant and leakproof,   such as a laundry detergent or bleach bottle.  When the container is ¾ full, it should be sealed with tape and labeled   DO NOT RECYCLE prior to discarding in the regular trash.        Patient level of understanding of education provided:  verbalized full understanding of information provided      Skilled Care Performed this visit: skilled assessment, education, medication management      Patient response to procedure performed:   verbalized full understanding of information provided      Agency Progress toward goals: progressing    Patient's Progress towards personal goals: progressing    Home exercise program: breathing techniques    Continued need for the following skills: Nursing    Plan for next visit: skilled assessment, education, medication management      Patient and/or caregiver notified and agrees to changes in the Plan of Care YES/NO/NA: N/A      The following discharge planning was discussed with the pt/caregiver:  Discharge planning as follows: when goals met, patient/caregiver able to manage disease process, medications and pain

## 2022-10-23 VITALS
TEMPERATURE: 97.6 F | OXYGEN SATURATION: 97 % | DIASTOLIC BLOOD PRESSURE: 86 MMHG | RESPIRATION RATE: 19 BRPM | SYSTOLIC BLOOD PRESSURE: 126 MMHG | HEART RATE: 79 BPM

## 2022-10-24 ENCOUNTER — HOME CARE VISIT (OUTPATIENT)
Dept: SCHEDULING | Facility: HOME HEALTH | Age: 82
End: 2022-10-24
Payer: MEDICARE

## 2022-10-24 PROCEDURE — G0158 HHC OT ASSISTANT EA 15: HCPCS

## 2022-10-24 PROCEDURE — G0156 HHCP-SVS OF AIDE,EA 15 MIN: HCPCS

## 2022-10-24 NOTE — HOME HEALTH
POST FALL:   Date and Time of Fall: 10/16/22 can't remember time   Fall observed by [facility] Staff? No  Describe Event and Document any re-training or treatment plan modifications indicated: Patient reported she walked with her walker from bedroom to dining then suddenly fell on the floor. Patient was sent to ER and she was found to have very low glucose. She was d/c to home same day. Patient stated daughter is now visiting everyday making sure she eats breakfast and dinner. Fall prevention reinforced such as  monitor medication that may alter mental status,  keeping walking pathways clean and clear of clutter and throw rugs, keeping night lights on for ability to see and easily, good visibility for safe transitioning threshholds and proper use and good compliance of using AD. Response to re-training or treatment plan modifications: Patient stated she is eating well now and feels better. Assisted Devices used by patient prior to fall: stand up walker  Was equipment in use at time of fall? (Yes )  Injury ( No). Emergent Care Received: (Yes), if yes, describe: EMS was called and patient was sent to ER  Was patient identified as High Risk for falls? (Yes )  List Tests Performed, Scores of Tests, and Patient Risk Factors: Tinetti balance and gait test score of 6/28 during [facility] and weakness on rodriguez LEs. MD Notified (name and time): Patient spouse called EMS and patient was sent to ER.

## 2022-10-25 VITALS
OXYGEN SATURATION: 98 % | TEMPERATURE: 98 F | DIASTOLIC BLOOD PRESSURE: 84 MMHG | RESPIRATION RATE: 17 BRPM | SYSTOLIC BLOOD PRESSURE: 132 MMHG | HEART RATE: 88 BPM

## 2022-10-25 NOTE — HOME HEALTH
SUBJECTIVE: Patient reports no pain, no changes to medication, and no medical concerns. Phani Abraham CAREGIVER INVOLVEMENT/ASSISTANCE NEEDED FOR: The pt Family-Friend helps with all I/ADLS due to his inabilty to do so. Phani Abraham HOME HEALTH SUPPLIES BY TYPE AND QUANTITY ORDERED/DELIVERED THIS VISIT INCLUDE: NONE     . OBJECTIVE:  See interventions. .    Patient education provided this visit:  MATTHEW ROLE, Home Modifications, transfer training Energy conservation techniques,and Purse-lip breathing techniques. .         Patient level of understanding of education provided: . Patient and daughter in agreeance to education provided an was able to perform education provided with 85%. Pt daughter stated she would purchase an grab bar to help with decreased periods of LOB and unsteadyness. .    RESPONSE TO TREATMENT: Pt reported an 5/10 on numeric scale after performing AROM exercises and transfers to and from the bathroomwith use of energy conservation techiques. .     ASSESSMENT OF PROGRESS TOWARD GOALS: Pt presents decreased activity tolerance while demostrating BUE exercises while seated in chair with SUP. Pt able to perform functional balance with MIN A. Min verbal cuing provided for proper trunk alignment for proper breathing when displaying AROM execises and transfers with use of purse-lip breathing techniques from MATTHEW. MATTHEW recommeds an grab bar to be installed in shower for increased safety and declutter in home to prevent falls in home due to patient being an fall risk. It is advised that client continues to adhere to HEP addressing BUE exercies for continued strength and endurance. Patient RPE score on modfied barbra scale was 6/10 on this date. It is also advised that client use FWW at all times due to fear of falling and decreased safety awarness.       .  CONTINUED NEED FOR THE FOLLOWING SKILLS: [place] OT is medically necessary to address pain, decreased functional strength, decreased independence and safety with functional transfers, decreased independence and safety performing ADL/IADL tasks, decreased activity and standing tolerance, decreased functional endurance, and impaired balance in order to improve functional independence, obtain set goals, reduce risk of falls, reduce pain, improve quality of life, and return to PLOF. Umair Cunningham PLAN FOR NEXT VISIT: Ryan Marie will address functional transfers and BUE gross exercises    .     THE FOLLOWING DISCHARGE PLANNING WAS DISCUSSED WITH THE PATIENT/CAREGIVER: Tenative d/c 1w2, 2w2

## 2022-10-26 ENCOUNTER — HOME CARE VISIT (OUTPATIENT)
Dept: SCHEDULING | Facility: HOME HEALTH | Age: 82
End: 2022-10-26
Payer: MEDICARE

## 2022-10-26 PROCEDURE — G0155 HHCP-SVS OF CSW,EA 15 MIN: HCPCS

## 2022-10-26 PROCEDURE — G0151 HHCP-SERV OF PT,EA 15 MIN: HCPCS

## 2022-10-27 ENCOUNTER — HOME CARE VISIT (OUTPATIENT)
Dept: SCHEDULING | Facility: HOME HEALTH | Age: 82
End: 2022-10-27
Payer: MEDICARE

## 2022-10-27 PROCEDURE — G0156 HHCP-SVS OF AIDE,EA 15 MIN: HCPCS

## 2022-10-28 ENCOUNTER — HOME CARE VISIT (OUTPATIENT)
Dept: SCHEDULING | Facility: HOME HEALTH | Age: 82
End: 2022-10-28
Payer: MEDICARE

## 2022-10-28 ENCOUNTER — HOME CARE VISIT (OUTPATIENT)
Dept: HOME HEALTH SERVICES | Facility: HOME HEALTH | Age: 82
End: 2022-10-28
Payer: MEDICARE

## 2022-10-28 VITALS
HEART RATE: 91 BPM | RESPIRATION RATE: 18 BRPM | DIASTOLIC BLOOD PRESSURE: 60 MMHG | TEMPERATURE: 98.2 F | SYSTOLIC BLOOD PRESSURE: 104 MMHG | OXYGEN SATURATION: 97 %

## 2022-10-28 VITALS
OXYGEN SATURATION: 98 % | DIASTOLIC BLOOD PRESSURE: 87 MMHG | RESPIRATION RATE: 17 BRPM | TEMPERATURE: 98.6 F | HEART RATE: 87 BPM | SYSTOLIC BLOOD PRESSURE: 132 MMHG

## 2022-10-28 PROCEDURE — G0158 HHC OT ASSISTANT EA 15: HCPCS

## 2022-10-28 PROCEDURE — G0299 HHS/HOSPICE OF RN EA 15 MIN: HCPCS

## 2022-10-29 NOTE — HOME HEALTH
SUBJECTIVE: Patient reports pain in her left and right shoulders an 5/10 on the numeric scale. Ms. Charlanne Goltz Reports no falls and no changes to her medications. Ruby Leon CAREGIVER INVOLVEMENT/ASSISTANCE NEEDED FOR: The pt Family-Friend helps with all I/ADLS due to his inabilty to do so. Ruby Leon HOME HEALTH SUPPLIES BY TYPE AND QUANTITY ORDERED/DELIVERED THIS VISIT INCLUDE: NONE        . OBJECTIVE:  See interventions. .     Patient education provided this visit:  Portia David provided continued education on the importance of locking her brakes on her upright walker when standing and/or sitting down to prevent falls and increase safety awarness. .         Patient level of understanding of education provided: Pt stated was in agreeance to education provided from Portia Hernandez, Patient able to demo locking the brakes on the upright walker successfully within 3 trails. .     RESPONSE TO TREATMENT: Pt reported an 4/10 on numeric scale after performing functional transfers and balance retraining use of energy conservation techiques. Pt required an period of  three breaks while demostrating functional tasks due to decreased actvity tolerance. .    ASSESSMENT OF PROGRESS TOWARD GOALS: Pt presents decreased activity tolerance while demostrating Funcitonal trasdfers from various surfaces with SUP and balance retraining . Pt able to perform functional balance with SUP. Moderate  verbal cuing provided for proper trunk alignment for proper breathing when displaying balance and transfers with use of purse-lip breathing techniques from MATTHEW. MATTHEW recommeds it is important for patient to lock brakes before standing and siting down due to decreased safety awarness. .  It is advised that client continues to adhere to HEP addressing BUE exercies for continued strength and endurance. Patient RPE score on modfied barbra scale was 7/10 on this date.   It is also advised that client use Upright walker at all times due to fear of falling and decreased safety awarness. .    CONTINUED NEED FOR THE FOLLOWING SKILLS: HH OT is medically necessary to address pain, decreased functional strength, decreased independence and safety with functional transfers, decreased independence and safety performing ADL/IADL tasks, decreased activity and standing tolerance, decreased functional endurance, and impaired balance in order to improve functional independence, obtain set goals, reduce risk of falls, reduce pain, improve quality of life, and return to PLOF. Robb Santana PLAN FOR NEXT VISIT: Piedad Palomares will address functional transfers and BUE gross exercises         .          THE FOLLOWING DISCHARGE PLANNING WAS DISCUSSED WITH THE PATIENT/CAREGIVER: Tenative d/c 1w1,2w4

## 2022-10-31 ENCOUNTER — HOME CARE VISIT (OUTPATIENT)
Dept: SCHEDULING | Facility: HOME HEALTH | Age: 82
End: 2022-10-31
Payer: MEDICARE

## 2022-10-31 PROCEDURE — G0156 HHCP-SVS OF AIDE,EA 15 MIN: HCPCS

## 2022-11-01 ENCOUNTER — HOME CARE VISIT (OUTPATIENT)
Dept: SCHEDULING | Facility: HOME HEALTH | Age: 82
End: 2022-11-01
Payer: MEDICARE

## 2022-11-01 ENCOUNTER — HOME CARE VISIT (OUTPATIENT)
Dept: HOME HEALTH SERVICES | Facility: HOME HEALTH | Age: 82
End: 2022-11-01
Payer: MEDICARE

## 2022-11-01 VITALS
SYSTOLIC BLOOD PRESSURE: 128 MMHG | RESPIRATION RATE: 18 BRPM | DIASTOLIC BLOOD PRESSURE: 68 MMHG | TEMPERATURE: 98.2 F | OXYGEN SATURATION: 100 % | HEART RATE: 92 BPM

## 2022-11-01 PROCEDURE — G0158 HHC OT ASSISTANT EA 15: HCPCS

## 2022-11-02 VITALS
TEMPERATURE: 98 F | DIASTOLIC BLOOD PRESSURE: 8 MMHG | OXYGEN SATURATION: 98 % | HEART RATE: 88 BPM | SYSTOLIC BLOOD PRESSURE: 132 MMHG | RESPIRATION RATE: 17 BRPM

## 2022-11-02 NOTE — HOME HEALTH
SUBJECTIVE: Patient reports no pain, no falls, and no changes to medications. Pt reports she feel stronger and feels like she is doing better and eating better. Catia Mtz CAREGIVER INVOLVEMENT/ASSISTANCE NEEDED FOR: The pt Family-Friend helps with all I/ADLS due to his inabilty to do so. MariahBanner Bibi HOME HEALTH SUPPLIES BY TYPE AND QUANTITY ORDERED/DELIVERED THIS VISIT INCLUDE: NONE     . OBJECTIVE:  See interventions. .       Patient education provided this visit:  [place]ourche Cam provided continued extended education on the importance of locking her brakes on her upright walker when standing and/or sitting down to prevent falls and increase safety awarness. .       Patient level of understanding of education provided: Pt stated was in agreeance to education provided from Abilio Cam, Patient able to demo locking the brakes on the upright walker successfully within 2/3 trails for increased safety. .       RESPONSE TO TREATMENT: Pt reported an 3/10 on numeric scale after performing IADL retraining and balance retraining use of energy conservation techiques. Pt required two breaks while demostrating IADLS And BUE exercises due to decreased actvity tolerance and quick to fatigue. .       ASSESSMENT OF PROGRESS TOWARD GOALS: MATTHEW provided moderate prompting for locking brakes when perforning IADLS with SB/CGA due to fear of falling. Pt presents decreased activity tolerance while demostrating BUE AROM exercises with SBA. Pt balance on 11.01.22 was fair with use of upright walker. balance with SUP. MATTHEW recommeds it is important for patient to lock brakes before standing and siting down due to decreased safety awarness. .  It is advised that client continues to adhere to HEP addressing BUE exercies for continued strength and endurance. Patient RPE score on modfied barbra scale was 5/10 on this date.   It is also advised that client use Upright walker at all times due to fear of falling and decreased safety awarness. .         CONTINUED NEED FOR THE FOLLOWING SKILLS: HH OT is medically necessary to address pain, decreased functional strength, decreased independence and safety with functional transfers, decreased independence and safety performing ADL/IADL tasks, decreased activity and standing tolerance, decreased functional endurance, and impaired balance in order to improve functional independence, obtain set goals, reduce risk of falls, reduce pain, improve quality of life, and return to PLOF. Jason Arthur PLAN FOR NEXT VISIT: Muriel Claudio will address balance retraining and ADL retraining tasks.     .      THE FOLLOWING DISCHARGE PLANNING WAS DISCUSSED WITH THE PATIENT/CAREGIVER: Tenative d/c 2w3

## 2022-11-02 NOTE — HOME HEALTH
MSW met with the pt privately; pt agreed to relay information provided to her daughter and spouse. MSW provided the pt documentation of social service resources, 211 (resource hotline), [facility], and support programs. MSW also provided information about care assistance options/costs/funding sources, Medicaid parameters, and to request a [facility] long-term care screening if desired. Pt plans to confer with her spouse and daughter, and was invited to contact the MSW if additional resource questions arise.

## 2022-11-03 ENCOUNTER — HOME CARE VISIT (OUTPATIENT)
Dept: SCHEDULING | Facility: HOME HEALTH | Age: 82
End: 2022-11-03
Payer: MEDICARE

## 2022-11-03 VITALS
OXYGEN SATURATION: 96 % | DIASTOLIC BLOOD PRESSURE: 87 MMHG | RESPIRATION RATE: 17 BRPM | TEMPERATURE: 98 F | HEART RATE: 80 BPM | SYSTOLIC BLOOD PRESSURE: 132 MMHG

## 2022-11-03 PROCEDURE — G0156 HHCP-SVS OF AIDE,EA 15 MIN: HCPCS

## 2022-11-03 PROCEDURE — G0158 HHC OT ASSISTANT EA 15: HCPCS

## 2022-11-04 ENCOUNTER — HOME CARE VISIT (OUTPATIENT)
Dept: SCHEDULING | Facility: HOME HEALTH | Age: 82
End: 2022-11-04
Payer: MEDICARE

## 2022-11-04 VITALS
OXYGEN SATURATION: 98 % | TEMPERATURE: 98.5 F | RESPIRATION RATE: 16 BRPM | HEART RATE: 90 BPM | SYSTOLIC BLOOD PRESSURE: 128 MMHG | DIASTOLIC BLOOD PRESSURE: 78 MMHG

## 2022-11-04 PROCEDURE — G0299 HHS/HOSPICE OF RN EA 15 MIN: HCPCS

## 2022-11-04 NOTE — HOME HEALTH
SUBJECTIVE: Patient reports no pain, no falls, and no changes to medications. Pt reports she feel stronger and feels like she is doing better and eating better. Dea Sands CAREGIVER INVOLVEMENT/ASSISTANCE NEEDED FOR: The pt  helps with all I/ADLS due to his inabilty to do so. Dea Sands HOME HEALTH SUPPLIES BY TYPE AND QUANTITY ORDERED/DELIVERED THIS VISIT INCLUDE: NONE     . OBJECTIVE:  See interventions. .  Patient education provided this visit:  Oksana Tan provided continued education provided   . Patient level of understanding of education provided: Pt stated was in agreeance to education provided from Oksana Tan, Patient able to demo locking the brakes on the upright walker successfully within 2/3 trails for increased safety. .  RESPONSE TO TREATMENT: Pt reported an 3/10 on numeric scale after performing IADL retraining and balance retraining use of energy conservation techiques. Pt required two breaks while demostrating IADLS And BUE exercises due to decreased actvity tolerance and quick to fatigue. .  ASSESSMENT OF PROGRESS TOWARD GOALS: MATTHEW provided verbal prompting for energy conservation techniques when perforning ADLS with CGA due to fear of falling. Pt presents decreased activity tolerance while demostrating CGA with fair balance with on 11.03.22 with use of upright walker. MATTHEW provided education on energy conservation techniqes with an RPE score modified barbra scale of 5/10. It is advised that client continues to adhere to HEP addressing BUE exercies for continued strength and endurance. It is also advised that client use Upright walker at all times due to fear of falling and decreased safety awarness.     .    CONTINUED NEED FOR THE FOLLOWING SKILLS: [facility] OT is medically necessary to address pain, decreased functional strength, decreased independence and safety with functional transfers, decreased independence and safety performing ADL/IADL tasks, decreased activity and standing tolerance, decreased functional endurance, and impaired balance in order to improve functional independence, obtain set goals, reduce risk of falls, reduce pain, improve quality of life, and return to PLOF. Chelita Kam PLAN FOR NEXT VISIT: Mel Garg will address balance retraining and ADL retraining tasks.     .    THE FOLLOWING DISCHARGE PLANNING WAS DISCUSSED WITH THE PATIENT/CAREGIVER: Tenative d/c 2w1

## 2022-11-04 NOTE — HOME HEALTH
At reassessment, pt. presents with the following clinical findings:   SUBJECTIVE: Patient denies fall since evaluation. Patient reported she has been doing her HEP daily and has been going to the living room during the day. Patient denies pain for the past 24 hours and denies taking pain medication and non-pharmacologic pain management. CAREGIVER ASSISTANCE: No cg present during this visit. MEDICATIONS RECONCILED AND UPDATED: no changes in medications at this time     MMT: presents with : R hip flex 4-/5  R hip Abd  4-/5  R hip add 4-/5  R knee flex 4-/5  R knee ext. 4-/5  R ankle DF 4-/5  L hip flex 4-/5  L hip Abd 4-/5  L hip add 4-/5  L knee flex 4-/5  L knee ext. 4-/5  L ankle DF 4-/5  FTSTS: unable. 41 seconds with rodriguez UE push up  compared to : R hip flex 3+/5   R hip Abd 4-/5   R hip add 4-/5   R knee flex 3+/5  R knee ext. 3+/5   R ankle DF 4-/5  L hip flex 3+/5  L hip Abd 4-/5  L hip add 4-/5  L knee flex 3+/5  L knee ext. 3+/5  FTSTS: Unable during initial evaluation. BALANCE: 10/28 on Tinetti balance and gait test compared to 6/28 during initial evaluation. BED MOBILITY: minimal assistance for LEs management and 25% verbal cues for proper hand placements and techniques compared to moderate assistance in bed mobility for LE management and minimal verbal cues for proper techniques during initial evaluation. TRANSFERS: touching assistance for stability using stand up walker compared to moderate assistance in safe transfers to and from bed, chair and BSC with 25% verbal cues without AD due to not enough space to move around during initial evaluation. GAIT: 50ft with 2 turns using stand up walker with supervision assistance for cues to extend trunk and tilt pelvic anteriorly to prevent forward leaning on to walker, increase hip/knee flexion for adequate foot clearance compared to 25 ft with 2 turns using walker requiring minimal assistance.  Impairments in gait include forward lean, downward gaze, decreased hip/knee flexion during swing phase, unequal step length, decreased sherin, - heel strike , wide KARINA, ataxic gait during initial evaluation     PATIENT EDUCATION PROVIDED THIS VISIT TO INCLUDE: FALL PREVENTION TECHNIQUES: monitor medication that may alter mental status, keeping walking pathways clean and clear of clutter and throw rugs. PAIN MANAGEMENT TECHNIQUES: take pain medication as prescribed by MD, application of cold pack for soreness and achy type of pain /hot packs for achy, stiffness type of pain on painful area x 20 mins, positioning and relaxation. PRESSURE ULCER PREVENTION TECHNIQUES: proper positioning strategies including frequency of repositioning, prevention of shear and friction, appropriate skin hygiene and protection from moisture. ENERGY CONSERVATION TECHNIQUES: rest, slow down, pacing, and complete tasks in manageable steps. SWELLING MANAGEMENT: elevate LEs, ankle pumps  PATIENT LEVEL OF UNDERSTANDING OF EDUCATION PROVIDED: Patient  verbalized understanding. PATIENT RESPONSE TO PROCEDURE PERFORMED: Patient progressing towards goals as evidenced decrease level of care in bed mobility, transfers, and gait. ASSESSMENT: Patient demonstrated improvement in bed mobility, transfers and gait as evidenced by functional mobility tests and increased level of active participation in bed mobility, transfers, and gait. PLAN: Due patient's insurance denial for further skilled HHPT services, patient would like to think for few more days and talk to her spouse regarding appealing to have HHPT services.

## 2022-11-05 NOTE — HOME HEALTH
Skilled reason for visit: skilled assessment, education, medication management    Caregiver involvement:  Family assists ADL's, medications, meals, transportation, errands. Medications reviewed and all medications are available in the home this visit. The following education was provided regarding medications:  patient knowledgeable about all medications, has no questions or concerns at this time. MD notified of any discrepancies/look a-like medications/medication interactions: na  Medications are effective at this time. Home health supplies by type and quantity ordered/delivered this visit include: na    Patient education provided this visit: see care plan    Sharps education provided: Clinician instructed patient/CG on proper disposal of sharps: Containers should be made of hard plastic, be puncture-resistant and leakproof,   such as a laundry detergent or bleach bottle.  When the container is ¾ full, it should be sealed with tape and labeled   DO NOT RECYCLE prior to discarding in the regular trash.        Patient level of understanding of education provided:  verbalized full understanding of information provided      Skilled Care Performed this visit: skilled assessment, education, medication management      Patient response to procedure performed:   verbalized full understanding of information provided      Agency Progress toward goals: met    Patient's Progress towards personal goals: met    Home exercise program: see care plan    Continued need for the following skills: Physical Therapy and Occupational Therapy    Plan for next visit: na    Patient and/or caregiver notified and agrees to changes in the Plan of Care YES/NO/NA: YES      The following discharge planning was discussed with the pt/caregiver:  Patient discharged from nursing due to met goals.  Patient/caregiver able to manage disease process, medications and pain

## 2022-11-07 ENCOUNTER — OFFICE VISIT (OUTPATIENT)
Dept: FAMILY MEDICINE CLINIC | Age: 82
End: 2022-11-07
Payer: MEDICARE

## 2022-11-07 VITALS
OXYGEN SATURATION: 97 % | HEART RATE: 82 BPM | BODY MASS INDEX: 26.69 KG/M2 | WEIGHT: 160.2 LBS | SYSTOLIC BLOOD PRESSURE: 118 MMHG | DIASTOLIC BLOOD PRESSURE: 73 MMHG | HEIGHT: 65 IN | RESPIRATION RATE: 12 BRPM | TEMPERATURE: 98.2 F

## 2022-11-07 DIAGNOSIS — K92.0 GASTROINTESTINAL HEMORRHAGE WITH HEMATEMESIS: Primary | ICD-10-CM

## 2022-11-07 DIAGNOSIS — I10 HTN (HYPERTENSION), BENIGN: ICD-10-CM

## 2022-11-07 PROCEDURE — G8536 NO DOC ELDER MAL SCRN: HCPCS | Performed by: FAMILY MEDICINE

## 2022-11-07 PROCEDURE — 3074F SYST BP LT 130 MM HG: CPT | Performed by: FAMILY MEDICINE

## 2022-11-07 PROCEDURE — 1090F PRES/ABSN URINE INCON ASSESS: CPT | Performed by: FAMILY MEDICINE

## 2022-11-07 PROCEDURE — G8427 DOCREV CUR MEDS BY ELIG CLIN: HCPCS | Performed by: FAMILY MEDICINE

## 2022-11-07 PROCEDURE — G8510 SCR DEP NEG, NO PLAN REQD: HCPCS | Performed by: FAMILY MEDICINE

## 2022-11-07 PROCEDURE — 1123F ACP DISCUSS/DSCN MKR DOCD: CPT | Performed by: FAMILY MEDICINE

## 2022-11-07 PROCEDURE — 99214 OFFICE O/P EST MOD 30 MIN: CPT | Performed by: FAMILY MEDICINE

## 2022-11-07 PROCEDURE — 1111F DSCHRG MED/CURRENT MED MERGE: CPT | Performed by: FAMILY MEDICINE

## 2022-11-07 PROCEDURE — G8417 CALC BMI ABV UP PARAM F/U: HCPCS | Performed by: FAMILY MEDICINE

## 2022-11-07 PROCEDURE — 3078F DIAST BP <80 MM HG: CPT | Performed by: FAMILY MEDICINE

## 2022-11-07 PROCEDURE — G8399 PT W/DXA RESULTS DOCUMENT: HCPCS | Performed by: FAMILY MEDICINE

## 2022-11-07 PROCEDURE — 1101F PT FALLS ASSESS-DOCD LE1/YR: CPT | Performed by: FAMILY MEDICINE

## 2022-11-07 RX ORDER — ROSUVASTATIN CALCIUM 40 MG/1
40 TABLET, COATED ORAL DAILY
Qty: 90 TABLET | Refills: 3 | Status: SHIPPED | OUTPATIENT
Start: 2022-11-07

## 2022-11-07 RX ORDER — AMLODIPINE BESYLATE 5 MG/1
5 TABLET ORAL DAILY
Qty: 90 TABLET | Refills: 3 | Status: SHIPPED | OUTPATIENT
Start: 2022-11-07

## 2022-11-07 NOTE — PROGRESS NOTES
1. \"Have you been to the ER, urgent care clinic since your last visit? Hospitalized since your last visit? \" Yes When: 10- Where: [facility] ER Reason for visit: hypoglycemia     2. \"Have you seen or consulted any other health care providers outside of the [address] [place] [address] since your last visit? \" No     3. For patients aged 39-70: Has the patient had a colonoscopy / FIT/ Cologuard? NA - based on age      If the patient is female:    4. For patients aged 41-77: Has the patient had a mammogram within the past 2 years? NA - based on age or sex      11. For patients aged 21-65: Has the patient had a pap smear?  NA - based on age or sex

## 2022-11-07 NOTE — PROGRESS NOTES
HPI:  Jolie Roads is a 80 y.o. female who presents today with   Chief Complaint   Patient presents with    ED Follow-up     Sen at [facility] on 10- for hypoglycemia       Patient has had 2 recent hospitalizations. The first hospitalization was for a GI bleed. 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 Hospital course is as listed below in parentheses: \"Hospital Course:   Jolie Rodas is a 80 y.o. female who presents to [facility] ER with complaint of Melena. Patient reports that she has been having dark, melenic stools and momentary, 9/10 intensity, \"aching, cramping\" periumbilical pain that starts superior the umbilicus and then radiates inferiorly. In the emergency room patient noted to have acute blood loss anemia but heme-negative, hemoglobin had dropped significantly from previous count. GI was consulted, started on PPI. Patient also abdominal pain for which CT abdomen pelvis was ordered. GI saw the patient recommended EGD and colonoscopy. 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 GI recommended to start patient on antibiotics for diverticulitis and also Diflucan for esophageal candidiasis. Recommended clear liquid and advance as tolerated. Patient required monitor blood transplant in the hospital after which her hemoglobin remained stable. Nephrology was consulted for PD, patient underwent PD in the hospital.  Patient also noted to have low normal blood pressure, antiplatelet medications were held. No blood pressure started trending up she has been resumed back on metoprolol and amlodipine. Further adjustment of blood pressure medication needs to be done outpatient. Patient started tolerating diet well, is advanced to full liquid and to regular diet.   Patient currently states she feels more comfortable with liquid diet. Patient will be discharged on liquid and soft diet as tolerated. GI signed off on the patient. Discussed with nephrology recommend discharge with p.o. Lasix twice daily along with potassium supplement and they will follow-up outpatient and monitor labs and adjust hypertensive medication accordingly. Patient hospital seen and eval by PT/OT recommended home health care. Patient is currently hemodynamically and medically stable for discharge home. Discussed with the patient and also with her daughter at the bedside about her discharge plan, follow-up appointments, new medications and side effects. I discussed with them about advancing diet as tolerated at home, discussed with them about starting Lantus at low-dose and increase based on the blood sugars and patient's p.o. intake. Both of them understood and agreed with the plan. \"    She subsequently had an ED visit for hypoglycemia. This was corrected and patient was discharged from the ED. Needs a refill on Norvasc and Crestor    Skin itches ; she does try to moisturize her skin. Patient advised to consider zyrtec ; she was advised that this may also help her complaints of runny nose. 3 most recent PHQ Screens 11/7/2022   PHQ Not Done -   Little interest or pleasure in doing things Not at all   Feeling down, depressed, irritable, or hopeless Not at all   Total Score PHQ 2 0               PMH,  Meds, Allergies, Family History, Social history reviewed      Current Outpatient Medications   Medication Sig Dispense Refill    amLODIPine (NORVASC) 5 mg tablet Take 1 Tablet by mouth daily. 90 Tablet 3    rosuvastatin (CRESTOR) 40 mg tablet Take 1 Tablet by mouth daily. 90 Tablet 3    simethicone (MYLICON) 80 mg chewable tablet Take 1 Tablet by mouth four (4) times daily as needed for GI Pain.  15 Tablet 0    potassium chloride (K-DUR, KLOR-CON M20) 20 mEq tablet Take 1 Tablet by mouth daily for 30 days. 30 Tablet 0    pantoprazole (PROTONIX) 40 mg tablet Take 1 Tablet by mouth Before breakfast and dinner for 30 days. 60 Tablet 0    metoprolol succinate (TOPROL-XL) 25 mg XL tablet Take 1 Tablet by mouth daily for 30 days. 30 Tablet 0    insulin glargine (LANTUS,BASAGLAR) 100 unit/mL (3 mL) inpn 10 Units by SubCUTAneous route nightly. Indications: type 2 diabetes mellitus 1 Each 0    carbamide peroxide (Debrox) 6.5 % otic solution Administer 5 Drops into each ear two (2) times a day. 30 mL 0    folic acid/vit B complex and C (RENAL VITAMIN PO) Take 1 Capsule by mouth daily. ferric citrate (Auryxia) 210 mg iron tablet Take 210 mg by mouth two (2) times daily (with meals). Droplet Pen Needle 32 gauge x 5/32\" ndle       Accu-Chek Evelin Plus test strp strip       gentamicin (GARAMYCIN) 0.1 % topical cream gentamicin 0.1 % topical cream apply to peritoneal dialysis exit site  DAILY      tamsulosin (FLOMAX) 0.4 mg capsule Take 1 Capsule by mouth daily. 90 Capsule 3    insulin aspart U-100 (NOVOLOG) 100 unit/mL (3 mL) inpn by SubCUTAneous route daily.   inject 4 units  121-160 inject 6 units  161-200 inject 8 units  201-300 inject 10 units  301-400 inject 12 units  over 400 inject 14 units  BEFORE MEALS  Indications: type 2 diabetes mellitus          Allergies   Allergen Reactions    Sevelamer Diarrhea and Other (comments)    Aspirin Other (comments)     Ringing in ears    Keflex [Cephalexin] Rash    Sevelamer Carbonate Itching     With rash and sweatiness                  ROS as per HPI      Visit Vitals  /73 (BP 1 Location: Right arm, BP Patient Position: Sitting, BP Cuff Size: Large adult)   Pulse 82   Temp 98.2 °F (36.8 °C) (Temporal)   Resp 12   Ht 5' 5\" (1.651 m)   Wt 160 lb 3.2 oz (72.7 kg)   SpO2 97%   BMI 26.66 kg/m²     Physical Exam    General appearance: alert, cooperative, no distress, appears stated age  Neck: supple, symmetrical, trachea midline, no adenopathy, thyroid: not enlarged, symmetric, no tenderness/mass/nodules, no carotid bruit and no JVD  Lungs: clear to auscultation bilaterally  Heart: regular rate and rhythm, S1, S2 normal, no murmur, click, rub or gallop    Extremities: extremities normal, atraumatic, no cyanosis or edema      Assessment/Plan:    Diagnoses and all orders for this visit:    1. Gastrointestinal hemorrhage with hematemesis    2. HTN (hypertension), benign    Other orders  -     amLODIPine (NORVASC) 5 mg tablet; Take 1 Tablet by mouth daily. -     rosuvastatin (CRESTOR) 40 mg tablet; Take 1 Tablet by mouth daily. Patient is stable today  Refilled medications needed  Follow-up with consultants as scheduled  Follow-up and Dispositions    Return in about 4 months (around 3/7/2023) for htn. This has been fully explained to the patient, who indicates understanding. An After Visit Summary was printed and given to the patient.         Aranza Villeda MD

## 2022-11-09 ENCOUNTER — HOME CARE VISIT (OUTPATIENT)
Dept: SCHEDULING | Facility: HOME HEALTH | Age: 82
End: 2022-11-09
Payer: MEDICARE

## 2022-11-09 NOTE — Clinical Note
thank you!  ----- Message -----  From: Emerson Ballard  Sent: 11/10/2022   9:12 AM EST  To: Tad Singh MD, Gabriel Maloney, OTR/L, *      Ms. 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 Skilled OT goals were to maximize safety and participation with self care, balance retraining and functional mobility in home setting. Patient  has been educated on energy conservation strategies to reduce SOB and level of exertion with I/ADL tasks. Education has also been provided to the pt for Purse-lip breathing techniques to prevent exertion when performing daily tasks I/ADLS. Patient has met goal on OT POC addressing energy conservation techniques. Pt able to demonstrate/verbalize I energy conservation bathing, dressing, and setup with MOD I, Pt able to perform UB HEP without  rest break for increased endurance to perform functional tasks with MOD I, Pt able to demonstrate improved activity tolerance as evidence by performing functional tasks standing for 5 mins without rest while performing meal (Making Cereal) without rest with RPE on modified barbra scale was 3/10. Pt has met all goals on OT POC addressing Optimal Level of function. Pt able to perform BUE HEP for increased functional endurance to perform ADL tasks  with IND. Patient has not met goal on OT POC addressing functional mobility and function. Pt able to perform functional transfers from [facility] and toilet with SUP with good safety and technique. Unable to address tub/shower transfers due to patient declining to participate in this goal due to fear of falling. Patient has not met goal on OT POC addressing ADL retraining tasks. Pt able to perform grooming tasks in bathroom such as toileting/clothing management use grab bar PRN with SUP and However, Pt declined participaing in tub bench transfers due to fear of falling.  Patient has met goal on OT POC addressing balance retraining. Pt static and dynamic balance is currently Fair. Pt was able to stand unsupported without BUE support, while standing unsupported, weight shifting, and reaching ipsilaterally, w/o  LOB when crossing midline. Therapist suggests continued use of HEP for continued BUE strength and endurance to perform house-hold tasks in home-setting. Patient has reached maximal potential wish skilled OT at this time. No further skilled OT is indicated at this time. MD office notified.  Thank you for your Referral!!!

## 2022-11-09 NOTE — Clinical Note
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 Skilled OT goals were to maximize safety and participation with self care, balance retraining and functional mobility in home setting. Patient  has been educated on energy conservation strategies to reduce SOB and level of exertion with I/ADL tasks. Education has also been provided to the pt for Purse-lip breathing techniques to prevent exertion when performing daily tasks I/ADLS. Patient has met goal on OT POC addressing energy conservation techniques. Pt able to demonstrate/verbalize I energy conservation bathing, dressing, and setup with MOD I, Pt able to perform UB HEP without  rest break for increased endurance to perform functional tasks with MOD I, Pt able to demonstrate improved activity tolerance as evidence by performing functional tasks standing for 5 mins without rest while performing meal (Making Cereal) without rest with RPE on modified barbra scale was 3/10. Pt has met all goals on OT POC addressing Optimal Level of function. Pt able to perform BUE HEP for increased functional endurance to perform ADL tasks  with IND. Patient has not met goal on OT POC addressing functional mobility and function. Pt able to perform functional transfers from [facility] and toilet with SUP with good safety and technique. Unable to address tub/shower transfers due to patient declining to participate in this goal due to fear of falling. Patient has not met goal on OT POC addressing ADL retraining tasks. Pt able to perform grooming tasks in bathroom such as toileting/clothing management use grab bar PRN with SUP and However, Pt declined participaing in tub bench transfers due to fear of falling. Patient has met goal on OT POC addressing balance retraining. Pt static and dynamic balance is currently Fair.  Pt was able to stand unsupported without BUE support, while standing unsupported, weight shifting, and reaching ipsilaterally, w/o  LOB when crossing midline. Therapist suggests continued use of HEP for continued BUE strength and endurance to perform house-hold tasks in home-setting. Patient has reached maximal potential wish skilled OT at this time. No further skilled OT is indicated at this time. MD office notified.  Thank you for your Referral!!!

## 2022-11-10 VITALS
RESPIRATION RATE: 18 BRPM | DIASTOLIC BLOOD PRESSURE: 84 MMHG | OXYGEN SATURATION: 98 % | HEART RATE: 87 BPM | SYSTOLIC BLOOD PRESSURE: 130 MMHG | TEMPERATURE: 98 F

## 2022-11-10 NOTE — HOME HEALTH
Caregiver involvement: Ms. Lorena Faulkner currently lives in an [place] home with her  Tony Calix)  . The patients  provides daily emotional support and assistance with self care and mobility. Medications reconciled and all medications are available in the home this visit. The following education was provided regarding medications, medication interactions, and look a like medications: Continue as directed by MD.  Medications  are effective at this time. Home health supplies by type and quantity ordered/delivered this visit include: n/a    Patient education provided this visit:  Educated on importance of performing BUE exercise daily for continued strength and endurance. Educated client on the of continue use of energy conservation and purse-lip breathing to prevent exertion when engaging in daily tasks living . Progress toward goals: Patient has met goal on OT POC addressing energy conservation techniques. Pt able to demonstrate/verbalize I energy conservation bathing, dressing, and setup with MOD I, Pt able to perform UB HEP without  rest break for increased endurance to perform functional tasks with MOD I, Pt able to demonstrate improved activity tolerance as evidence by performing functional tasks standing for 5 mins without rest while performing meal (Making Cereal) without rest with RPE on modified barbra scale was 3/10. Pt has met all goals on OT POC addressing Optimal Level of function. Pt able to perform BUE HEP for increased functional endurance to perform ADL tasks  with IND. Patient has not met goal on OT POC addressing functional mobility and function. Pt able to perform functional transfers from [facility] and toilet with SUP with good safety and technique. Unable to address tub/shower transfers due to patient declining to participate in this goal due to fear of falling. Patient has not met goal on OT POC addressing ADL retraining tasks.  Pt able to perform grooming tasks in bathroom such as toileting/clothing management use grab bar PRN with SUP and However, Pt declined participaing in tub bench transfers due to fear of falling. Patient has met goal on OT POC addressing balance retraining. Pt static and dynamic balance is currently Fair. Pt was able to stand unsupported without BUE support, while standing unsupported, weight shifting, and reaching ipsilaterally, w/o  LOB when crossing midline. Home exercise program: Continue with BUE HEP addressing AROM. Pt is to perform HEP that consist of AROM exercises such as Straight arm swings, Shoulder Shrugs, Shoulder rotation backwards and forwards, Flexion, extension)  to increase BUE strength,endurance,ROM and flexion to perform adls and iadls. HEP is to be performed 2-3x a day with rest breaks as needed, Completing each exercises 15 times each daily. while seated    Continued need for the following skills: Nursing and Physical Therapy    The following discharge planning was discussed with the pt/caregiver: MANISHA OT. Pt has reached maximal potential with self care and functional mobility in her home setting.   Caregiver/spouse

## 2022-11-14 ENCOUNTER — HOME CARE VISIT (OUTPATIENT)
Dept: HOME HEALTH SERVICES | Facility: HOME HEALTH | Age: 82
End: 2022-11-14
Payer: MEDICARE

## 2022-11-21 ENCOUNTER — HOME CARE VISIT (OUTPATIENT)
Dept: HOME HEALTH SERVICES | Facility: HOME HEALTH | Age: 82
End: 2022-11-21
Payer: MEDICARE

## 2022-11-22 NOTE — HOME HEALTH
Unobserved HHPT discipline d/c warranted as patient decided not to appeal her insurance denial for further HHPT services. As od 10/26/22 reassessment visit, patient presenyt with the folllowing clinical findings:  MMT: presents with : R hip flex 4-/5  R hip Abd 4-/5  R hip add 4-/5  R knee flex 4-/5  R knee ext. 4-/5  R ankle DF 4-/5  L hip flex 4-/5  L hip Abd 4-/5  L hip add 4-/5  L knee flex 4-/5  L knee ext. 4-/5  L ankle DF 4-/5  FTSTS: unable. 41 seconds with rodriguez UE push up  BALANCE: 10/28 on Tinetti balance and gait test .  BED MOBILITY: minimal assistance for LEs management and 25% verbal cues for proper hand placements and techniques. TRANSFERS: touching assistance for stability using stand up walker . GAIT: 50ft with 2 turns using stand up walker with supervision assistance for cues to extend trunk and tilt pelvic anteriorly to prevent forward leaning on to walker, increase hip/knee flexion for adequate foot clearance. STEPS : NA  PATIENT EDUCATION PROVIDED THIS VISIT TO INCLUDE: FALL PREVENTION TECHNIQUES: monitor medication that may alter mental status, keeping walking pathways clean and clear of clutter and throw rugs. PAIN MANAGEMENT TECHNIQUES: take pain medication as prescribed by MD, application of cold pack for soreness and achy type of pain /hot packs for achy, stiffness type of pain on painful area x 20 mins, positioning and relaxation. PRESSURE ULCER PREVENTION TECHNIQUES: proper positioning strategies including frequency of repositioning, prevention of shear and friction, appropriate skin hygiene and protection from moisture. ENERGY CONSERVATION TECHNIQUES: rest, slow down, pacing, and complete tasks in manageable steps. SWELLING MANAGEMENT: elevate LEs, ankle pumps  PATIENT LEVEL OF UNDERSTANDING OF EDUCATION PROVIDED: Patient verbalized understanding.    PATIENT RESPONSE TO PROCEDURE PERFORMED: Patient progressing towards goals as evidenced decrease level of care in bed mobility, transfers, and gait.

## 2023-01-01 ENCOUNTER — TELEPHONE (OUTPATIENT)
Age: 83
End: 2023-01-01

## 2023-01-09 ENCOUNTER — HOSPITAL ENCOUNTER (EMERGENCY)
Dept: CT IMAGING | Age: 83
Discharge: HOME | End: 2023-01-09
Attending: EMERGENCY MEDICINE
Payer: MEDICARE

## 2023-01-09 ENCOUNTER — HOSPITAL ENCOUNTER (EMERGENCY)
Age: 83
Discharge: HOME | End: 2023-01-10
Attending: EMERGENCY MEDICINE
Payer: MEDICARE

## 2023-01-09 ENCOUNTER — APPOINTMENT (OUTPATIENT)
Dept: MRI IMAGING | Age: 83
End: 2023-01-09
Attending: EMERGENCY MEDICINE
Payer: MEDICARE

## 2023-01-09 ENCOUNTER — APPOINTMENT (OUTPATIENT)
Dept: GENERAL RADIOLOGY | Age: 83
End: 2023-01-09
Attending: EMERGENCY MEDICINE
Payer: MEDICARE

## 2023-01-09 DIAGNOSIS — J01.90 ACUTE SINUSITIS, RECURRENCE NOT SPECIFIED, UNSPECIFIED LOCATION: ICD-10-CM

## 2023-01-09 DIAGNOSIS — Z99.2 ESRD ON PERITONEAL DIALYSIS (HCC): ICD-10-CM

## 2023-01-09 DIAGNOSIS — R29.6 FREQUENT FALLS: Primary | ICD-10-CM

## 2023-01-09 DIAGNOSIS — R42 DIZZINESS: ICD-10-CM

## 2023-01-09 DIAGNOSIS — N18.6 ESRD ON PERITONEAL DIALYSIS (HCC): ICD-10-CM

## 2023-01-09 LAB
ALBUMIN SERPL-MCNC: 3 G/DL (ref 3.4–5)
ALBUMIN/GLOB SERPL: 0.9 (ref 0.8–1.7)
ALP SERPL-CCNC: 149 U/L (ref 45–117)
ALT SERPL-CCNC: 40 U/L (ref 13–56)
ANION GAP SERPL CALC-SCNC: 11 MMOL/L (ref 3–18)
APPEARANCE UR: CLEAR
AST SERPL-CCNC: 36 U/L (ref 10–38)
ATRIAL RATE: 65 BPM
BACTERIA URNS QL MICRO: ABNORMAL /HPF
BASOPHILS # BLD: 0.1 K/UL (ref 0–0.1)
BASOPHILS NFR BLD: 1 % (ref 0–2)
BILIRUB SERPL-MCNC: 0.4 MG/DL (ref 0.2–1)
BILIRUB UR QL: NEGATIVE
BUN SERPL-MCNC: 74 MG/DL (ref 7–18)
BUN/CREAT SERPL: 7 (ref 12–20)
CALCIUM SERPL-MCNC: 9.6 MG/DL (ref 8.5–10.1)
CALCULATED P AXIS, ECG09: 35 DEGREES
CALCULATED R AXIS, ECG10: -37 DEGREES
CALCULATED T AXIS, ECG11: -12 DEGREES
CHLORIDE SERPL-SCNC: 105 MMOL/L (ref 100–111)
CO2 SERPL-SCNC: 25 MMOL/L (ref 21–32)
COLOR UR: YELLOW
CREAT SERPL-MCNC: 10.1 MG/DL (ref 0.6–1.3)
DIAGNOSIS, 93000: NORMAL
DIFFERENTIAL METHOD BLD: ABNORMAL
EOSINOPHIL # BLD: 0.2 K/UL (ref 0–0.4)
EOSINOPHIL NFR BLD: 3 % (ref 0–5)
EPITH CASTS URNS QL MICRO: ABNORMAL /LPF (ref 0–5)
ERYTHROCYTE [DISTWIDTH] IN BLOOD BY AUTOMATED COUNT: 15.8 % (ref 11.6–14.5)
GLOBULIN SER CALC-MCNC: 3.2 G/DL (ref 2–4)
GLUCOSE SERPL-MCNC: 116 MG/DL (ref 74–99)
GLUCOSE UR STRIP.AUTO-MCNC: 250 MG/DL
HCT VFR BLD AUTO: 31.3 % (ref 35–45)
HGB BLD-MCNC: 10 G/DL (ref 12–16)
HGB UR QL STRIP: ABNORMAL
IMM GRANULOCYTES # BLD AUTO: 0 K/UL (ref 0–0.04)
IMM GRANULOCYTES NFR BLD AUTO: 0 % (ref 0–0.5)
KETONES UR QL STRIP.AUTO: NEGATIVE MG/DL
LEUKOCYTE ESTERASE UR QL STRIP.AUTO: NEGATIVE
LYMPHOCYTES # BLD: 1.5 K/UL (ref 0.9–3.6)
LYMPHOCYTES NFR BLD: 28 % (ref 21–52)
MCH RBC QN AUTO: 29.9 PG (ref 24–34)
MCHC RBC AUTO-ENTMCNC: 31.9 G/DL (ref 31–37)
MCV RBC AUTO: 93.4 FL (ref 78–100)
MONOCYTES # BLD: 0.6 K/UL (ref 0.05–1.2)
MONOCYTES NFR BLD: 11 % (ref 3–10)
NEUTS SEG # BLD: 3.1 K/UL (ref 1.8–8)
NEUTS SEG NFR BLD: 58 % (ref 40–73)
NITRITE UR QL STRIP.AUTO: NEGATIVE
NRBC # BLD: 0 K/UL (ref 0–0.01)
NRBC BLD-RTO: 0 PER 100 WBC
P-R INTERVAL, ECG05: 182 MS
PH UR STRIP: 7.5 (ref 5–8)
PLATELET # BLD AUTO: 128 K/UL (ref 135–420)
PMV BLD AUTO: 11.1 FL (ref 9.2–11.8)
POTASSIUM SERPL-SCNC: 5.2 MMOL/L (ref 3.5–5.5)
PROT SERPL-MCNC: 6.2 G/DL (ref 6.4–8.2)
PROT UR STRIP-MCNC: 100 MG/DL
Q-T INTERVAL, ECG07: 446 MS
QRS DURATION, ECG06: 146 MS
QTC CALCULATION (BEZET), ECG08: 463 MS
RBC # BLD AUTO: 3.35 M/UL (ref 4.2–5.3)
RBC #/AREA URNS HPF: ABNORMAL /HPF (ref 0–5)
SODIUM SERPL-SCNC: 141 MMOL/L (ref 136–145)
SP GR UR REFRACTOMETRY: 1.01 (ref 1–1.03)
TROPONIN-HIGH SENSITIVITY: 36 NG/L (ref 0–54)
UROBILINOGEN UR QL STRIP.AUTO: 0.2 EU/DL (ref 0.2–1)
VENTRICULAR RATE, ECG03: 65 BPM
WBC # BLD AUTO: 5.4 K/UL (ref 4.6–13.2)
WBC URNS QL MICRO: ABNORMAL /HPF (ref 0–4)

## 2023-01-09 PROCEDURE — 81001 URINALYSIS AUTO W/SCOPE: CPT

## 2023-01-09 PROCEDURE — 70551 MRI BRAIN STEM W/O DYE: CPT

## 2023-01-09 PROCEDURE — 99285 EMERGENCY DEPT VISIT HI MDM: CPT

## 2023-01-09 PROCEDURE — 73564 X-RAY EXAM KNEE 4 OR MORE: CPT

## 2023-01-09 PROCEDURE — 80053 COMPREHEN METABOLIC PANEL: CPT

## 2023-01-09 PROCEDURE — 72125 CT NECK SPINE W/O DYE: CPT

## 2023-01-09 PROCEDURE — 71260 CT THORAX DX C+: CPT

## 2023-01-09 PROCEDURE — 70450 CT HEAD/BRAIN W/O DYE: CPT

## 2023-01-09 PROCEDURE — 93005 ELECTROCARDIOGRAM TRACING: CPT

## 2023-01-09 PROCEDURE — 84484 ASSAY OF TROPONIN QUANT: CPT

## 2023-01-09 PROCEDURE — 96374 THER/PROPH/DIAG INJ IV PUSH: CPT

## 2023-01-09 PROCEDURE — 85025 COMPLETE CBC W/AUTO DIFF WBC: CPT

## 2023-01-09 PROCEDURE — 74011250636 HC RX REV CODE- 250/636: Performed by: EMERGENCY MEDICINE

## 2023-01-09 PROCEDURE — 96375 TX/PRO/DX INJ NEW DRUG ADDON: CPT

## 2023-01-09 PROCEDURE — 74011000636 HC RX REV CODE- 636: Performed by: EMERGENCY MEDICINE

## 2023-01-09 RX ORDER — LORAZEPAM 2 MG/ML
1 INJECTION INTRAMUSCULAR ONCE
Status: COMPLETED | OUTPATIENT
Start: 2023-01-09 | End: 2023-01-09

## 2023-01-09 RX ORDER — MORPHINE SULFATE 2 MG/ML
2 INJECTION, SOLUTION INTRAMUSCULAR; INTRAVENOUS
Status: COMPLETED | OUTPATIENT
Start: 2023-01-09 | End: 2023-01-09

## 2023-01-09 RX ADMIN — IOPAMIDOL 100 ML: 612 INJECTION, SOLUTION INTRAVENOUS at 17:21

## 2023-01-09 RX ADMIN — MORPHINE SULFATE 2 MG: 2 INJECTION, SOLUTION INTRAMUSCULAR; INTRAVENOUS at 17:29

## 2023-01-09 RX ADMIN — LORAZEPAM 1 MG: 2 INJECTION INTRAMUSCULAR; INTRAVENOUS at 23:56

## 2023-01-09 NOTE — ED PROVIDER NOTES
EMERGENCY DEPARTMENT HISTORY AND PHYSICAL EXAM    Date: 1/9/2023  Patient Name: Harvie Dandy    History of Presenting Illness     Chief Complaint   Patient presents with    Fall    Knee Pain    Hip Pain         History Provided By: Patient and Patient's Daughter        Additional History (Context): Harvie Dandy is a 80 y.o. female with diabetes, hypertension, hyperlipidemia, and ESRD on PD nightly followed by Hair Briscoe  who presents with fall x4 in past week. Probably hit her head first time 9d ago. Has had trouble leaning to the left; c/o right shoulder, right lower back/hip and right knee pain since. Uses walker but now her  walks beside her to prevent her from falling again. Denies anticoagulation. Feels lightheaded prior to falls. No change in stooling or voiding habits. Denies n/v/d, melena, hematochezia. Denies tobacco, ETOH. PCP: Nessa Cheung MD    Current Facility-Administered Medications   Medication Dose Route Frequency Provider Last Rate Last Admin    LORazepam (ATIVAN) injection 1 mg  1 mg IntraVENous ONCE Josefina Chaudhary PA         Current Outpatient Medications   Medication Sig Dispense Refill    amLODIPine (NORVASC) 5 mg tablet Take 1 Tablet by mouth daily. 90 Tablet 3    rosuvastatin (CRESTOR) 40 mg tablet Take 1 Tablet by mouth daily. 90 Tablet 3    simethicone (MYLICON) 80 mg chewable tablet Take 1 Tablet by mouth four (4) times daily as needed for GI Pain. 15 Tablet 0    insulin glargine (LANTUS,BASAGLAR) 100 unit/mL (3 mL) inpn 10 Units by SubCUTAneous route nightly. Indications: type 2 diabetes mellitus 1 Each 0    carbamide peroxide (Debrox) 6.5 % otic solution Administer 5 Drops into each ear two (2) times a day. 30 mL 0    folic acid/vit B complex and C (RENAL VITAMIN PO) Take 1 Capsule by mouth daily. ferric citrate (Auryxia) 210 mg iron tablet Take 210 mg by mouth two (2) times daily (with meals).       Droplet Pen Needle 32 gauge x 5/32\" ndle       Accu-Chek Evelin Plus test strp strip       gentamicin (GARAMYCIN) 0.1 % topical cream gentamicin 0.1 % topical cream apply to peritoneal dialysis exit site  DAILY      tamsulosin (FLOMAX) 0.4 mg capsule Take 1 Capsule by mouth daily. 90 Capsule 3    insulin aspart U-100 (NOVOLOG) 100 unit/mL (3 mL) inpn by SubCUTAneous route daily.  inject 4 units  121-160 inject 6 units  161-200 inject 8 units  201-300 inject 10 units  301-400 inject 12 units  over 400 inject 14 units  BEFORE MEALS  Indications: type 2 diabetes mellitus         Past History     Past Medical History:  Past Medical History:   Diagnosis Date    Allergic rhinitis     Chronic kidney disease 2020    Peritoneal Dialysis    Diabetes ([facility] [address] 2015    IDDM    H/O seasonal allergies     Hypertension     Left foot drop, chronic since 2004 fusion sx     Peritoneal dialysis catheter in place [facility])        Past Surgical History:  Past Surgical History:   Procedure Laterality Date    COLONOSCOPY N/A 9/26/2022    COLONOSCOPY with bx's and polypectomy performed by Claudine Belcher MD at [facility]OSCOPY    COLONOSCOPY N/A 10/8/2022    COLONOSCOPY/ Polypectomies performed by Rere Montez MD [place] [facility]     HX CATARACT REMOVAL  2011    HX LUMBAR FUSION  2004-last sx    fused L2-S1, in [facility]    HX TUBAL LIGATION      HX WISDOM TEETH EXTRACTION         Family History:  Family History   Problem Relation Age of Onset    Diabetes Maternal Aunt     Diabetes Maternal Uncle     Diabetes Maternal Grandfather     Diabetes Other     Hypertension Cousin        Social History:  Social History     Tobacco Use    Smoking status: Never    Smokeless tobacco: Never   Vaping Use    Vaping Use: Never used   Substance Use Topics    Alcohol use: No    Drug use: Never       Allergies:   Allergies   Allergen Reactions    Sevelamer Diarrhea and Other (comments)    Aspirin Other (comments)     Ringing in ears    Keflex [Cephalexin] Rash    Sevelamer Carbonate Itching With rash and sweatiness         Review of Systems   Review of Systems   Constitutional: Negative. HENT: Negative. Eyes: Negative. Respiratory: Negative. Negative for shortness of breath. Cardiovascular:  Negative for chest pain. Gastrointestinal: Negative. Endocrine: Negative. Genitourinary: Negative. Musculoskeletal:  Positive for arthralgias, back pain and gait problem. Allergic/Immunologic: Negative. Neurological:  Positive for light-headedness. Hematological:  Does not bruise/bleed easily. Psychiatric/Behavioral: Negative. Negative for confusion. All Other Systems Negative  Physical Exam     Vitals:    01/09/23 1215   BP: (!) 169/71   Pulse: 67   Resp: 16   Temp: 97.8 °F (36.6 °C)   SpO2: 100%     Physical Exam  Vitals and nursing note reviewed. Constitutional:       Appearance: Normal appearance. She is well-developed. HENT:      Head: Normocephalic and atraumatic. Right Ear: External ear normal.      Left Ear: External ear normal.      Nose: Nose normal.   Eyes:      Conjunctiva/sclera: Conjunctivae normal.      Pupils: Pupils are equal, round, and reactive to light. Neck:      Vascular: No JVD. Trachea: No tracheal deviation. Cardiovascular:      Rate and Rhythm: Normal rate and regular rhythm. Heart sounds: Normal heart sounds. No murmur heard. No friction rub. No gallop. Pulmonary:      Effort: Pulmonary effort is normal. No respiratory distress. Breath sounds: Normal breath sounds. No wheezing or rales. Abdominal:      General: Bowel sounds are normal. There is no distension. Palpations: Abdomen is soft. There is no mass. Tenderness: There is no abdominal tenderness. There is no guarding or rebound. Musculoskeletal:         General: Tenderness and signs of injury present. Normal range of motion. Cervical back: Normal range of motion and neck supple.       Comments: Right knee: TTP of the posterolateral, anteromedial, posteromedial jt line.  +crepitus and +Yulisa's. DP PT pulses palpable. Inferorlateral lumbar area/posterior superior iliac crest and SI jts TTP. Right shoulder: pain w/FF. Radial pulse palpable. NT elbow. Skin:     General: Skin is warm and dry. Findings: No rash. Neurological:      Mental Status: She is alert and oriented to person, place, and time. Cranial Nerves: No cranial nerve deficit. Deep Tendon Reflexes: Reflexes are normal and symmetric. Psychiatric:         Behavior: Behavior normal.          Diagnostic Study Results     Labs -     Recent Results (from the past 12 hour(s))   METABOLIC PANEL, COMPREHENSIVE    Collection Time: 01/09/23  2:45 PM   Result Value Ref Range    Sodium 141 136 - 145 mmol/L    Potassium 5.2 3.5 - 5.5 mmol/L    Chloride 105 100 - 111 mmol/L    CO2 25 21 - 32 mmol/L    Anion gap 11 3.0 - 18 mmol/L    Glucose 116 (H) 74 - 99 mg/dL    BUN 74 (H) 7.0 - 18 MG/DL    Creatinine 10.10 (H) 0.6 - 1.3 MG/DL    BUN/Creatinine ratio 7 (L) 12 - 20      eGFR 4 (L) >60 ml/min/1.73m2    Calcium 9.6 8.5 - 10.1 MG/DL    Bilirubin, total 0.4 0.2 - 1.0 MG/DL    ALT (SGPT) 40 13 - 56 U/L    AST (SGOT) 36 10 - 38 U/L    Alk.  phosphatase 149 (H) 45 - 117 U/L    Protein, total 6.2 (L) 6.4 - 8.2 g/dL    Albumin 3.0 (L) 3.4 - 5.0 g/dL    Globulin 3.2 2.0 - 4.0 g/dL    A-G Ratio 0.9 0.8 - 1.7     CBC WITH AUTOMATED DIFF    Collection Time: 01/09/23  2:45 PM   Result Value Ref Range    WBC 5.4 4.6 - 13.2 K/uL    RBC 3.35 (L) 4.20 - 5.30 M/uL    HGB 10.0 (L) 12.0 - 16.0 g/dL    HCT 31.3 (L) 35.0 - 45.0 %    MCV 93.4 78.0 - 100.0 FL    MCH 29.9 24.0 - 34.0 PG    MCHC 31.9 31.0 - 37.0 g/dL    RDW 15.8 (H) 11.6 - 14.5 %    PLATELET 867 (L) 286 - 420 K/uL    MPV 11.1 9.2 - 11.8 FL    NRBC 0.0 0  WBC    ABSOLUTE NRBC 0.00 0.00 - 0.01 K/uL    NEUTROPHILS 58 40 - 73 %    LYMPHOCYTES 28 21 - 52 %    MONOCYTES 11 (H) 3 - 10 %    EOSINOPHILS 3 0 - 5 %    BASOPHILS 1 0 - 2 % IMMATURE GRANULOCYTES 0 0.0 - 0.5 %    ABS. NEUTROPHILS 3.1 1.8 - 8.0 K/UL    ABS. LYMPHOCYTES 1.5 0.9 - 3.6 K/UL    ABS. MONOCYTES 0.6 0.05 - 1.2 K/UL    ABS. EOSINOPHILS 0.2 0.0 - 0.4 K/UL    ABS. BASOPHILS 0.1 0.0 - 0.1 K/UL    ABS. IMM. GRANS. 0.0 0.00 - 0.04 K/UL    DF AUTOMATED     TROPONIN-HIGH SENSITIVITY    Collection Time: 01/09/23  2:45 PM   Result Value Ref Range    Troponin-High Sensitivity 36 0 - 54 ng/L   EKG, 12 LEAD, INITIAL    Collection Time: 01/09/23  2:52 PM   Result Value Ref Range    Ventricular Rate 65 BPM    Atrial Rate 65 BPM    P-R Interval 182 ms    QRS Duration 146 ms    Q-T Interval 446 ms    QTC Calculation (Bezet) 463 ms    Calculated P Axis 35 degrees    Calculated R Axis -37 degrees    Calculated T Axis -12 degrees    Diagnosis       Normal sinus rhythm  Left axis deviation  Right bundle branch block  Abnormal ECG  When compared with ECG of 09-OCT-2022 08:33,  T wave inversion no longer evident in Anterior leads    Confirmed by Sophie Enriquez MD, Jg Saavedra ([id number]) on 1/9/2023 4:29:12 PM     URINALYSIS W/ RFLX MICROSCOPIC    Collection Time: 01/09/23  3:00 PM   Result Value Ref Range    Color YELLOW      Appearance CLEAR      Specific gravity 1.012 1.005 - 1.030      pH (UA) 7.5 5.0 - 8.0      Protein 100 (A) NEG mg/dL    Glucose 250 (A) NEG mg/dL    Ketone Negative NEG mg/dL    Bilirubin Negative NEG      Blood TRACE (A) NEG      Urobilinogen 0.2 0.2 - 1.0 EU/dL    Nitrites Negative NEG      Leukocyte Esterase Negative NEG     URINE MICROSCOPIC ONLY    Collection Time: 01/09/23  3:00 PM   Result Value Ref Range    WBC 0 to 3 0 - 4 /hpf    RBC 0 to 3 0 - 5 /hpf    Epithelial cells 3+ 0 - 5 /lpf    Bacteria 1+ (A) NEG /hpf       Radiologic Studies -   CT HEAD WO CONT   Final Result      No acute finding. There is no hemorrhage, mass, nor acute infarct. Mild atrophy. Right maxillary sinus mucus retention cyst      CT SPINE CERV WO CONT   Final Result      No fracture.    Severe degenerative disc disease with neural foraminal narrowing and central   spinal canal narrowing. Multiple thyroid nodules. Recommend follow-up nonemergent ultrasound      CT CHEST ABD PELV W CONT   Final Result   No evidence of acute fracture. Multiple small thyroid nodules. Recommend nonemergent follow-up thyroid   ultrasound. Left renal lower pole 1.7 cm low density mass does not meet the criteria of a   simple cyst as density measurement is higher. Possibly artifactual from beam   hardening artifact from the spinal hardware. Recommend follow-up dedicated CT or   MR renal protocol as clinically indicated. Mild circumferential wall thickening of the urinary bladder. Correlate for   cystitis. Small hiatal hernia. XR KNEE RT MIN 4 V   Final Result   DJD and pseudogout      MRI BRAIN WO CONT    (Results Pending)     CT Results  (Last 48 hours)                 01/09/23 1721  CT HEAD WO CONT Final result    Impression:      No acute finding. There is no hemorrhage, mass, nor acute infarct. Mild atrophy. Right maxillary sinus mucus retention cyst       Narrative:  EXAM: CT HEAD WITHOUT CONTRAST. CLINICAL HISTORY/INDICATION:  fall , pain       COMPARISON: None. TECHNIQUE: Routine axial images have been obtained from skull base to vertex at   5 mm thick slices. All CT scans at this facility are performed using dose   optimization technique as appropriate to a performed exam, to include automated   exposure control, adjustment of the mA and/or kV according to patient's size   (including appropriate matching for site-specific examinations), or use of   iterative reconstruction technique. FINDINGS:       There are no intra nor extra axial fluid collections. There is no hemorrhage. The gray white differentiation is normal.       The ventricular system is midline without mass effect or shift. Mild global   enlargement.        Right maxillary sinus mucus retention cyst. 01/09/23 1721  CT SPINE CERV WO CONT Final result    Impression:      No fracture. Severe degenerative disc disease with neural foraminal narrowing and central   spinal canal narrowing. Multiple thyroid nodules. Recommend follow-up nonemergent ultrasound       Narrative:  EXAM: CT CERVICAL SPINE WITHOUT CONTRAST       CLINICAL HISTORY/INDICATION:  fall , complains of pain status post multiple   falls patient does not remember falling       COMPARISON: None. TECHNIQUE:  Volumetric images obtained with 2.5 mm axial reformations without   contrast. Sagittal and coronal reformations also obtained. All CT scans at this facility are performed using dose optimization technique as   appropriate to a performed exam, to include automated exposure control,   adjustment of the mA and/or kV according to patient's size (including   appropriate matching for site-specific examinations), or use of iterative   reconstruction technique. FINDINGS:        Alignment - normal AP alignment. Vertebral body height -  maintained. There is no intracanal high density collection. Disc space - severe disc space narrowing C3/4, 4/5, and 5/6. Marginal spurring. Posterior spurring left foramina at C3/4, C5/cyst and C6/7. Narrowing of the   central spinal canal at these levels as well. There is no evidence of herniated disc. No fracture. The lung apices demonstrate no abnormality. Multiple thyroid small nodules. 01/09/23 1721  CT CHEST ABD PELV W CONT Final result    Impression:  No evidence of acute fracture. Multiple small thyroid nodules. Recommend nonemergent follow-up thyroid   ultrasound. Left renal lower pole 1.7 cm low density mass does not meet the criteria of a   simple cyst as density measurement is higher. Possibly artifactual from beam   hardening artifact from the spinal hardware. Recommend follow-up dedicated CT or   MR renal protocol as clinically indicated. Mild circumferential wall thickening of the urinary bladder. Correlate for   cystitis. Small hiatal hernia. Narrative:  EXAM:  CT CHEST ABD PELV W CONT       INDICATION: fall  pain following multiple frequent falls recently, patient does   not remember falling       COMPARISON: 10/7/2022 CT abdomen and pelvis       CONTRAST: Intravenous contrast administered. .       TECHNIQUE:    Following the uneventful intravenous administration of contrast, thin axial   images were obtained through the chest, abdomen and pelvis. Coronal and sagittal   reconstructions were generated. Oral contrast was not administered. CT dose   reduction was achieved through use of a standardized protocol tailored for this   examination and automatic exposure control for dose modulation. FINDINGS:        CHEST:   THYROID: Multiple thyroid nodules. .   MEDIASTINUM: No mass or lymphadenopathy. ELINA: No mass or lymphadenopathy. THORACIC AORTA: No dissection or aneurysm. MAIN PULMONARY ARTERY: Normal in caliber. TRACHEA/BRONCHI: Patent. ESOPHAGUS: No wall thickening or dilatation. HEART: Normal in size. Coronary artery calcium: absent   PLEURA: No effusion or pneumothorax. LUNGS: No nodule, mass, or airspace disease. ABDOMEN:   LIVER: No mass or biliary dilatation. GALLBLADDER: Unremarkable. SPLEEN: No mass. PANCREAS: No mass or ductal dilatation. ADRENALS: Unremarkable. KIDNEYS: Mild obscuration of detail at the kidney secondary to lateral hardware   at the spine. Bilateral renal cortical sharply circumscribed low density masses. The largest   at the lower pole left kidney measuring 1.8 cm. Density measurement is greater   than simple water. STOMACH: Small hiatal hernia. SMALL BOWEL: No dilatation or wall thickening. COLON: No dilatation or wall thickening. APPENDIX: Unremarkable. PERITONEUM: A percutaneous right abdominal wall approach catheter placed distal   tip coiled in the left pelvis. RETROPERITONEUM: No lymphadenopathy or aortic aneurysm. PELVIS:   REPRODUCTIVE ORGANS: Several calcified uterine fibroids   URINARY BLADDER: Mild circumferential wall thickening of the urinary bladder   which is minimally distended. Wall measures 6 mm. .   BONES: Laminectomy with pedicle iqra or effusion. Narrowing of the hip joints   with spurring. No fracture demonstrated. ADDITIONAL COMMENTS: N/A                 CXR Results  (Last 48 hours)      None              Medical Decision Making   I am the first provider for this patient. I reviewed the vital signs, available nursing notes, past medical history, past surgical history, family history and social history. Vital Signs-Reviewed the patient's vital signs. Records Reviewed: Nursing Notes    Procedures:  Procedures    Provider Notes (Medical Decision Making): pt w/frequent falls this past week. CT scan of the head shows no acute intracranial process. CT scan of the cervical spine as well is unremarkable. X-ray of left of right knee it shows no fracture. Urine analysis electrolytes CBC shows no anemia no electrolyte abnormalities no elevated troponin indicating ischemic event to the heart or infectious process of her urine. Patient's biggest complaint is that she is suffering from persistent ataxia. MRI has been ordered of the brain. Screening form being performed. Patient updated as to plan. We will also place case management consult for PT OT for at home pending negative MRI scan otherwise should be admitted for an acute stroke. 8:10 PM : Pt care transferred to [facility] provider. History of patient complaint(s), available diagnostic reports and current treatment plan has been discussed thoroughly. Bedside rounding on patient occured : yes .   Intended disposition of patient : TBD  Pending diagnostics reports and/or labs (please list): MRI brain    MED RECONCILIATION:  Current Facility-Administered Medications   Medication Dose Route Frequency    LORazepam (ATIVAN) injection 1 mg  1 mg IntraVENous ONCE     Current Outpatient Medications   Medication Sig    amLODIPine (NORVASC) 5 mg tablet Take 1 Tablet by mouth daily. rosuvastatin (CRESTOR) 40 mg tablet Take 1 Tablet by mouth daily. simethicone (MYLICON) 80 mg chewable tablet Take 1 Tablet by mouth four (4) times daily as needed for GI Pain. insulin glargine (LANTUS,BASAGLAR) 100 unit/mL (3 mL) inpn 10 Units by SubCUTAneous route nightly. Indications: type 2 diabetes mellitus    carbamide peroxide (Debrox) 6.5 % otic solution Administer 5 Drops into each ear two (2) times a day. folic acid/vit B complex and C (RENAL VITAMIN PO) Take 1 Capsule by mouth daily. ferric citrate (Auryxia) 210 mg iron tablet Take 210 mg by mouth two (2) times daily (with meals). Droplet Pen Needle 32 gauge x 5/32\" ndle     Accu-Chek Evelin Plus test strp strip     gentamicin (GARAMYCIN) 0.1 % topical cream gentamicin 0.1 % topical cream apply to peritoneal dialysis exit site  DAILY    tamsulosin (FLOMAX) 0.4 mg capsule Take 1 Capsule by mouth daily. insulin aspart U-100 (NOVOLOG) 100 unit/mL (3 mL) inpn by SubCUTAneous route daily.  inject 4 units  121-160 inject 6 units  161-200 inject 8 units  201-300 inject 10 units  301-400 inject 12 units  over 400 inject 14 units  BEFORE MEALS  Indications: type 2 diabetes mellitus       Disposition:  TBD    Follow-up Information    None         Current Discharge Medication List            Diagnosis     Clinical Impression:   1. Frequent falls    2.  ESRD on peritoneal dialysis ([facility] [address]

## 2023-01-09 NOTE — ED TRIAGE NOTES
Pt arrived with c/o of multiple falls since Saturday. Pt also c/o of right knee and hip pain. Pt does not remember with the falls if she had any lost of consciousness.

## 2023-01-09 NOTE — PROGRESS NOTES
CT Delay     1526 - spoke with MITZI Gan - she is aware we are still waiting for lab results - lek  1511 - 18g rt ac - labs still pending - lek   1444 - labs & iv pending - lek  1418 - labs & iv pending - lek  1352 - lasb & iv pending - lek  1338 - labs & iv pending - lek  5020 - labs & iv pending - lek  1306 - labs & iv pending - lek  2976 - labs & iv access pending - lek  6771 - labs & iv access pending - lek

## 2023-01-10 VITALS
HEART RATE: 78 BPM | DIASTOLIC BLOOD PRESSURE: 86 MMHG | SYSTOLIC BLOOD PRESSURE: 116 MMHG | OXYGEN SATURATION: 98 % | RESPIRATION RATE: 17 BRPM | TEMPERATURE: 97.8 F

## 2023-01-10 PROCEDURE — 74011250637 HC RX REV CODE- 250/637: Performed by: EMERGENCY MEDICINE

## 2023-01-10 RX ORDER — DOXYCYCLINE 100 MG/1
100 CAPSULE ORAL
Status: COMPLETED | OUTPATIENT
Start: 2023-01-10 | End: 2023-01-10

## 2023-01-10 RX ORDER — DOXYCYCLINE HYCLATE 100 MG
100 TABLET ORAL 2 TIMES DAILY
Qty: 14 TABLET | Refills: 0 | Status: SHIPPED | OUTPATIENT
Start: 2023-01-10 | End: 2023-01-17

## 2023-01-10 RX ADMIN — DOXYCYCLINE HYCLATE 100 MG: 100 CAPSULE ORAL at 04:18

## 2023-01-10 NOTE — ED NOTES
Pt given both verbal and written dc instructions, verbalized understanding. All questions answered, pt taken to ED lobby via hospital wheelchair. Daughter driving patient home.

## 2023-01-10 NOTE — DISCHARGE INSTRUCTIONS
Your MRI was negative for any evidence of stroke however to have a sinus infection. We will put you on some antibiotics and please follow closely with your primary doctor, use a walker to make sure you do not fall, get up slowly, and return if you are at all worsened or concerned.

## 2023-01-10 NOTE — ED NOTES
8:42 PM Assumed care of the pt at this time. Discussed with MITZI Chaudhary concerning patient Luis Martínez, standard discussion of reason for visit, HPI, ROS, PE, and current results available. Recommendation for obtaining pending MRI followed by bedside re-evaluation to dispo the pt Angelica Francis PA-C     1:09 AM pt has had her MRI, results pending Angelica Francis PA-C     2:11 AM MRI still pending, will turn over to night ED attending Dr. Omkar Martinez who will follow-up with this pt and dispo accordingly. Angelica Francis PA-C     Disposition: TBD     Dictation disclaimer:  Please note that this dictation was completed with Change[employer], the computer voice recognition software. Quite often unanticipated grammatical, syntax, homophones, and other interpretive errors are inadvertently transcribed by the computer software. Please disregard these errors. Please excuse any errors that have escaped final proofreading.

## 2023-01-11 ENCOUNTER — TRANSCRIBE ORDER (OUTPATIENT)
Dept: SCHEDULING | Age: 83
End: 2023-01-11

## 2023-01-11 DIAGNOSIS — I70.213 ATHEROSCLEROSIS OF NATIVE ARTERY OF BOTH LOWER EXTREMITIES WITH INTERMITTENT CLAUDICATION (HCC): Primary | ICD-10-CM

## 2023-01-20 ENCOUNTER — HOSPITAL ENCOUNTER (OUTPATIENT)
Dept: CT IMAGING | Age: 83
Discharge: HOME | End: 2023-01-20
Attending: SURGERY
Payer: MEDICARE

## 2023-01-20 DIAGNOSIS — I70.213 ATHEROSCLEROSIS OF NATIVE ARTERY OF BOTH LOWER EXTREMITIES WITH INTERMITTENT CLAUDICATION (HCC): ICD-10-CM

## 2023-01-20 PROCEDURE — 74011000636 HC RX REV CODE- 636: Performed by: SURGERY

## 2023-01-20 PROCEDURE — 75635 CT ANGIO ABDOMINAL ARTERIES: CPT

## 2023-01-20 RX ADMIN — IOPAMIDOL 95 ML: 755 INJECTION, SOLUTION INTRAVENOUS at 13:03

## 2023-01-23 ENCOUNTER — OFFICE VISIT (OUTPATIENT)
Dept: FAMILY MEDICINE CLINIC | Age: 83
End: 2023-01-23
Payer: MEDICARE

## 2023-01-23 VITALS
HEIGHT: 65 IN | SYSTOLIC BLOOD PRESSURE: 149 MMHG | TEMPERATURE: 97.7 F | BODY MASS INDEX: 28.32 KG/M2 | WEIGHT: 170 LBS | DIASTOLIC BLOOD PRESSURE: 69 MMHG | OXYGEN SATURATION: 98 % | RESPIRATION RATE: 12 BRPM | HEART RATE: 76 BPM

## 2023-01-23 DIAGNOSIS — N28.89 RENAL MASS, RIGHT: ICD-10-CM

## 2023-01-23 DIAGNOSIS — E11.21 TYPE 2 DIABETES MELLITUS WITH DIABETIC NEPHROPATHY, WITH LONG-TERM CURRENT USE OF INSULIN (HCC): ICD-10-CM

## 2023-01-23 DIAGNOSIS — N25.81 SECONDARY HYPERPARATHYROIDISM OF RENAL ORIGIN (HCC): ICD-10-CM

## 2023-01-23 DIAGNOSIS — M25.551 HIP PAIN, RIGHT: ICD-10-CM

## 2023-01-23 DIAGNOSIS — M25.561 ACUTE PAIN OF RIGHT KNEE: ICD-10-CM
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
## 2023-01-23 DIAGNOSIS — Z79.4 TYPE 2 DIABETES MELLITUS WITH DIABETIC NEPHROPATHY, WITH LONG-TERM CURRENT USE OF INSULIN (HCC): ICD-10-CM

## 2023-01-23 PROCEDURE — G8536 NO DOC ELDER MAL SCRN: HCPCS | Performed by: FAMILY MEDICINE

## 2023-01-23 PROCEDURE — G8510 SCR DEP NEG, NO PLAN REQD: HCPCS | Performed by: FAMILY MEDICINE

## 2023-01-23 PROCEDURE — 1101F PT FALLS ASSESS-DOCD LE1/YR: CPT | Performed by: FAMILY MEDICINE

## 2023-01-23 PROCEDURE — 3077F SYST BP >= 140 MM HG: CPT | Performed by: FAMILY MEDICINE

## 2023-01-23 PROCEDURE — G8427 DOCREV CUR MEDS BY ELIG CLIN: HCPCS | Performed by: FAMILY MEDICINE

## 2023-01-23 PROCEDURE — 1123F ACP DISCUSS/DSCN MKR DOCD: CPT | Performed by: FAMILY MEDICINE

## 2023-01-23 PROCEDURE — G8417 CALC BMI ABV UP PARAM F/U: HCPCS | Performed by: FAMILY MEDICINE

## 2023-01-23 PROCEDURE — 3078F DIAST BP <80 MM HG: CPT | Performed by: FAMILY MEDICINE

## 2023-01-23 PROCEDURE — 1090F PRES/ABSN URINE INCON ASSESS: CPT | Performed by: FAMILY MEDICINE

## 2023-01-23 PROCEDURE — G8399 PT W/DXA RESULTS DOCUMENT: HCPCS | Performed by: FAMILY MEDICINE

## 2023-01-23 PROCEDURE — 99214 OFFICE O/P EST MOD 30 MIN: CPT | Performed by: FAMILY MEDICINE

## 2023-01-23 NOTE — PROGRESS NOTES
HPI:  Tano Cramer is a 80 y.o. female who presents today with   Chief Complaint   Patient presents with    ED Follow-up     Seen at [facility] on 1-9-23 for a fall         As above    ED provider notes are as noted below in parentheses    \"Additional History (Context): Tano Cramer is a 80 y.o. female with diabetes, hypertension, hyperlipidemia, and ESRD on PD nightly followed by Raquel Loera  who presents with fall x4 in past week. Probably hit her head first time 9d ago. Has had trouble leaning to the left; c/o right shoulder, right lower back/hip and right knee pain since. Uses walker but now her  walks beside her to prevent her from falling again. Denies anticoagulation. Feels lightheaded prior to falls. No change in stooling or voiding habits. Denies n/v/d, melena, hematochezia. Denies tobacco, ETOH. \"       Pt has had continued knee pain and hip pain; but sx are better; has only taken Tylenol for the discomfort. She states that she was told she had a severe sinus infection likely causes her some lightheadedness and then she fell. She has not fallen since being treated for the sinus infection. She is walking in the home at her baseline. Pt was treated with Doxycycline. Other health conditions managed by specialty or that are stable include unless otherwise noted are: DM and secondary Hypoparathyroidism. UA from ED noted; The thyroid is followed by Endocrine l        Radiologic Studies -   CT HEAD WO CONT   Final Result       No acute finding. There is no hemorrhage, mass, nor acute infarct. Mild atrophy. Right maxillary sinus mucus retention cyst       CT SPINE CERV WO CONT   Final Result       No fracture. Severe degenerative disc disease with neural foraminal narrowing and central   spinal canal narrowing. Multiple thyroid nodules. Recommend follow-up nonemergent ultrasound       CT CHEST ABD PELV W CONT   Final Result   No evidence of acute fracture.    Multiple small thyroid nodules. Recommend nonemergent follow-up thyroid   ultrasound. Left renal lower pole 1.7 cm low density mass does not meet the criteria of a   simple cyst as density measurement is higher. Possibly artifactual from beam   hardening artifact from the spinal hardware. Recommend follow-up dedicated CT or   MR renal protocol as clinically indicated. Mild circumferential wall thickening of the urinary bladder. Correlate for   cystitis. Small hiatal hernia. XR KNEE RT MIN 4 V   Final Result   DJD and pseudogout       3 most recent PHQ Screens 1/23/2023   PHQ Not Done -   Little interest or pleasure in doing things Not at all   Feeling down, depressed, irritable, or hopeless Not at all   Total Score PHQ 2 0               PMH,  Meds, Allergies, Family History, Social history reviewed      Current Outpatient Medications   Medication Sig Dispense Refill    amLODIPine (NORVASC) 5 mg tablet Take 1 Tablet by mouth daily. 90 Tablet 3    rosuvastatin (CRESTOR) 40 mg tablet Take 1 Tablet by mouth daily. 90 Tablet 3    carbamide peroxide (Debrox) 6.5 % otic solution Administer 5 Drops into each ear two (2) times a day. 30 mL 0    folic acid/vit B complex and C (RENAL VITAMIN PO) Take 1 Capsule by mouth daily. ferric citrate (Auryxia) 210 mg iron tablet Take 210 mg by mouth two (2) times daily (with meals). Droplet Pen Needle 32 gauge x 5/32\" ndle       Accu-Chek Evelin Plus test strp strip       gentamicin (GARAMYCIN) 0.1 % topical cream gentamicin 0.1 % topical cream apply to peritoneal dialysis exit site  DAILY      tamsulosin (FLOMAX) 0.4 mg capsule Take 1 Capsule by mouth daily. 90 Capsule 3    insulin aspart U-100 (NOVOLOG) 100 unit/mL (3 mL) inpn by SubCUTAneous route daily.   inject 4 units  121-160 inject 6 units  161-200 inject 8 units  201-300 inject 10 units  301-400 inject 12 units  over 400 inject 14 units  BEFORE MEALS  Indications: type 2 diabetes mellitus      simethicone (MYLICON) 80 mg chewable tablet Take 1 Tablet by mouth four (4) times daily as needed for GI Pain. (Patient not taking: Reported on 1/23/2023) 15 Tablet 0    insulin glargine (LANTUS,BASAGLAR) 100 unit/mL (3 mL) inpn 10 Units by SubCUTAneous route nightly. Indications: type 2 diabetes mellitus (Patient not taking: Reported on 1/23/2023) 1 Each 0        Allergies   Allergen Reactions    Sevelamer Diarrhea and Other (comments)    Aspirin Other (comments)     Ringing in ears    Keflex [Cephalexin] Rash    Sevelamer Carbonate Itching     With rash and sweatiness                  ROS  as per HPI      Visit Vitals  BP (!) 149/69 (BP 1 Location: Right arm, BP Patient Position: Sitting, BP Cuff Size: Large adult)   Pulse 76   Temp 97.7 °F (36.5 °C) (Temporal)   Resp 12   Ht 5' 5\" (1.651 m)   Wt 170 lb (77.1 kg)   SpO2 98%   BMI 28.29 kg/m²     Physical Exam  General appearance: alert, cooperative, no distress, appears stated age  Neck: supple, symmetrical, trachea midline, no adenopathy, thyroid: not enlarged, symmetric, no tenderness/mass/nodules, no carotid bruit and no JVD  Lungs: clear to auscultation bilaterally  Heart: regular rate and rhythm, S1, S2 normal, no murmur, click, rub or gallop  Abdomen: soft, non-tender. Bowel sounds normal. No masses,  no organomegaly  Extremities: extremities normal, atraumatic, no cyanosis or edema; no right CVA TTP     Assessment/Plan:    Diagnoses and all orders for this visit:    1. Fall, initial encounter    2. Hip pain, right    3. Acute pain of right knee    4. Type 2 diabetes mellitus with diabetic nephropathy, with long-term current use of insulin ([address]    5. Secondary hyperparathyroidism of renal origin [facility])  Assessment & Plan:   monitored by specialist. No acute findings meriting change in the plan      6.  Renal mass, right  -     MRI ABD WO CONT; Future       as above  Pt is better now  Treatment for her sinus infection has controlled the dizziness and the falls  Pt still needs follow up on the right renal mass; MRI has been ordered  Pt to follow up with endocrine regarding US. ? Need for thyroid US. Follow-up and Dispositions    Return for 3/7/2023 as scheduled. This has been fully explained to the patient, who indicates understanding. An After Visit Summary was printed and given to the patient.           lAessia Mendez MD

## 2023-01-23 NOTE — PROGRESS NOTES
1. \"Have you been to the ER, urgent care clinic since your last visit? Hospitalized since your last visit? \" Yes When: 01- Where: [facility] Reason for visit: fall    2. \"Have you seen or consulted any other health care providers outside of the [address] [place] [address] since your last visit? \" No     3. For patients aged 39-70: Has the patient had a colonoscopy / FIT/ Cologuard? NA - based on age      If the patient is female:    4. For patients aged 41-77: Has the patient had a mammogram within the past 2 years? NA - based on age or sex      11. For patients aged 21-65: Has the patient had a pap smear?  NA - based on age or sex

## 2023-02-08 ENCOUNTER — HOSPITAL ENCOUNTER (EMERGENCY)
Age: 83
Discharge: HOME | End: 2023-02-08
Attending: STUDENT IN AN ORGANIZED HEALTH CARE EDUCATION/TRAINING PROGRAM
Payer: MEDICARE

## 2023-02-08 VITALS — OXYGEN SATURATION: 100 % | DIASTOLIC BLOOD PRESSURE: 67 MMHG | SYSTOLIC BLOOD PRESSURE: 154 MMHG

## 2023-02-08 DIAGNOSIS — R73.9 HYPERGLYCEMIA: Primary | ICD-10-CM

## 2023-02-08 LAB
ALBUMIN SERPL-MCNC: 2.5 G/DL (ref 3.4–5)
ALBUMIN/GLOB SERPL: 0.8 (ref 0.8–1.7)
ALP SERPL-CCNC: 121 U/L (ref 45–117)
ALT SERPL-CCNC: 22 U/L (ref 13–56)
ANION GAP SERPL CALC-SCNC: 10 MMOL/L (ref 3–18)
AST SERPL-CCNC: 26 U/L (ref 10–38)
BASOPHILS # BLD: 0 K/UL (ref 0–0.1)
BASOPHILS NFR BLD: 1 % (ref 0–2)
BILIRUB SERPL-MCNC: 0.3 MG/DL (ref 0.2–1)
BUN SERPL-MCNC: 76 MG/DL (ref 7–18)
BUN/CREAT SERPL: 7 (ref 12–20)
CALCIUM SERPL-MCNC: 9 MG/DL (ref 8.5–10.1)
CHLORIDE SERPL-SCNC: 110 MMOL/L (ref 100–111)
CO2 SERPL-SCNC: 19 MMOL/L (ref 21–32)
CREAT SERPL-MCNC: 11.5 MG/DL (ref 0.6–1.3)
DIFFERENTIAL METHOD BLD: ABNORMAL
EOSINOPHIL # BLD: 0.1 K/UL (ref 0–0.4)
EOSINOPHIL NFR BLD: 3 % (ref 0–5)
ERYTHROCYTE [DISTWIDTH] IN BLOOD BY AUTOMATED COUNT: 15.5 % (ref 11.6–14.5)
GLOBULIN SER CALC-MCNC: 3 G/DL (ref 2–4)
GLUCOSE BLD STRIP.AUTO-MCNC: 206 MG/DL (ref 70–110)
GLUCOSE SERPL-MCNC: 215 MG/DL (ref 74–99)
HCT VFR BLD AUTO: 25.9 % (ref 35–45)
HGB BLD-MCNC: 8.2 G/DL (ref 12–16)
IMM GRANULOCYTES # BLD AUTO: 0 K/UL (ref 0–0.04)
IMM GRANULOCYTES NFR BLD AUTO: 0 % (ref 0–0.5)
LIPASE SERPL-CCNC: 403 U/L (ref 73–393)
LYMPHOCYTES # BLD: 1 K/UL (ref 0.9–3.6)
LYMPHOCYTES NFR BLD: 24 % (ref 21–52)
MCH RBC QN AUTO: 29.7 PG (ref 24–34)
MCHC RBC AUTO-ENTMCNC: 31.7 G/DL (ref 31–37)
MCV RBC AUTO: 93.8 FL (ref 78–100)
MONOCYTES # BLD: 0.5 K/UL (ref 0.05–1.2)
MONOCYTES NFR BLD: 13 % (ref 3–10)
NEUTS SEG # BLD: 2.4 K/UL (ref 1.8–8)
NEUTS SEG NFR BLD: 59 % (ref 40–73)
NRBC # BLD: 0 K/UL (ref 0–0.01)
NRBC BLD-RTO: 0 PER 100 WBC
PLATELET # BLD AUTO: 109 K/UL (ref 135–420)
PMV BLD AUTO: 10.7 FL (ref 9.2–11.8)
POTASSIUM SERPL-SCNC: 4.5 MMOL/L (ref 3.5–5.5)
PROT SERPL-MCNC: 5.5 G/DL (ref 6.4–8.2)
RBC # BLD AUTO: 2.76 M/UL (ref 4.2–5.3)
SODIUM SERPL-SCNC: 139 MMOL/L (ref 136–145)
WBC # BLD AUTO: 4.1 K/UL (ref 4.6–13.2)

## 2023-02-08 PROCEDURE — 83690 ASSAY OF LIPASE: CPT

## 2023-02-08 PROCEDURE — 80053 COMPREHEN METABOLIC PANEL: CPT

## 2023-02-08 PROCEDURE — 74011250636 HC RX REV CODE- 250/636: Performed by: STUDENT IN AN ORGANIZED HEALTH CARE EDUCATION/TRAINING PROGRAM

## 2023-02-08 PROCEDURE — 96361 HYDRATE IV INFUSION ADD-ON: CPT

## 2023-02-08 PROCEDURE — 99284 EMERGENCY DEPT VISIT MOD MDM: CPT

## 2023-02-08 PROCEDURE — 85025 COMPLETE CBC W/AUTO DIFF WBC: CPT

## 2023-02-08 PROCEDURE — 96360 HYDRATION IV INFUSION INIT: CPT

## 2023-02-08 PROCEDURE — 82962 GLUCOSE BLOOD TEST: CPT

## 2023-02-08 RX ORDER — ONDANSETRON 4 MG/1
4 TABLET, FILM COATED ORAL
Qty: 14 TABLET | Refills: 0 | Status: SHIPPED | OUTPATIENT
Start: 2023-02-08

## 2023-02-08 RX ADMIN — SODIUM CHLORIDE 1000 ML: 9 INJECTION, SOLUTION INTRAVENOUS at 00:24

## 2023-02-08 NOTE — ED PROVIDER NOTES
EMERGENCY DEPARTMENT HISTORY AND PHYSICAL EXAM      Date: 2/8/2023  Patient Name: Paloma Ortiz    History of Presenting Illness     No chief complaint on file. History Provided By: Patient and Patient's Daughter    80-year-old female presenting to the emergency department for EMS for evaluation of nausea. Patient has a history of diabetes. Did not take her insulin tonight after eating and patient was feeling generally unwell with nausea and 1 episode of vomiting. Family was concerned and brought her in for evaluation      PCP: Ester Galeano MD    Current Facility-Administered Medications   Medication Dose Route Frequency Provider Last Rate Last Admin    sodium chloride 0.9 % bolus infusion 1,000 mL  1,000 mL IntraVENous ONCE [place] Sol, DO         Current Outpatient Medications   Medication Sig Dispense Refill    amLODIPine (NORVASC) 5 mg tablet Take 1 Tablet by mouth daily. 90 Tablet 3    rosuvastatin (CRESTOR) 40 mg tablet Take 1 Tablet by mouth daily. 90 Tablet 3    simethicone (MYLICON) 80 mg chewable tablet Take 1 Tablet by mouth four (4) times daily as needed for GI Pain. (Patient not taking: Reported on 1/23/2023) 15 Tablet 0    insulin glargine (LANTUS,BASAGLAR) 100 unit/mL (3 mL) inpn 10 Units by SubCUTAneous route nightly. Indications: type 2 diabetes mellitus (Patient not taking: Reported on 1/23/2023) 1 Each 0    carbamide peroxide (Debrox) 6.5 % otic solution Administer 5 Drops into each ear two (2) times a day. 30 mL 0    folic acid/vit B complex and C (RENAL VITAMIN PO) Take 1 Capsule by mouth daily. ferric citrate (Auryxia) 210 mg iron tablet Take 210 mg by mouth two (2) times daily (with meals).       Droplet Pen Needle 32 gauge x 5/32\" ndle       Accu-Chek Evelin Plus test strp strip       gentamicin (GARAMYCIN) 0.1 % topical cream gentamicin 0.1 % topical cream apply to peritoneal dialysis exit site  DAILY      tamsulosin (FLOMAX) 0.4 mg capsule Take 1 Capsule by mouth daily. 90 Capsule 3    insulin aspart U-100 (NOVOLOG) 100 unit/mL (3 mL) inpn by SubCUTAneous route daily.  inject 4 units  121-160 inject 6 units  161-200 inject 8 units  201-300 inject 10 units  301-400 inject 12 units  over 400 inject 14 units  BEFORE MEALS  Indications: type 2 diabetes mellitus         Past History     Past Medical History:  Past Medical History:   Diagnosis Date    Allergic rhinitis     Chronic kidney disease 2020    Peritoneal Dialysis    Diabetes ([address] 2015    IDDM    H/O seasonal allergies     Hypertension     Left foot drop, chronic since 2004 fusion sx     Peritoneal dialysis catheter in place [facility])        Past Surgical History:  Past Surgical History:   Procedure Laterality Date    COLONOSCOPY N/A 9/26/2022    COLONOSCOPY with bx's and polypectomy performed by Jorge Valerio MD at [facility] ENDOSCOPY    COLONOSCOPY N/A 10/8/2022    COLONOSCOPY/ Polypectomies performed by Shantal Peoples MD at [facility]     HX CATARACT REMOVAL  2011    HX LUMBAR FUSION  2004-last sx    fused L2-S1, in [facility]    HX TUBAL LIGATION      HX WISDOM TEETH EXTRACTION         Family History:  Family History   Problem Relation Age of Onset    Diabetes Maternal Aunt     Diabetes Maternal Uncle     Diabetes Maternal Grandfather     Diabetes Other     Hypertension Cousin        Social History:  Social History     Tobacco Use    Smoking status: Never    Smokeless tobacco: Never   Vaping Use    Vaping Use: Never used   Substance Use Topics    Alcohol use: No    Drug use: Never       Allergies: Allergies   Allergen Reactions    Sevelamer Diarrhea and Other (comments)    Aspirin Other (comments)     Ringing in ears    Keflex [Cephalexin] Rash    Sevelamer Carbonate Itching     With rash and sweatiness         Review of Systems       Review of Systems   Constitutional:  Positive for fatigue. Negative for activity change, chills, diaphoresis and fever.    Eyes:  Negative for photophobia, pain and visual disturbance. Respiratory:  Negative for cough, chest tightness, shortness of breath, wheezing and stridor. Cardiovascular:  Negative for chest pain and palpitations. Gastrointestinal:  Positive for nausea and vomiting. Negative for abdominal distention, abdominal pain, constipation and diarrhea. Genitourinary:  Negative for difficulty urinating, dysuria and hematuria. Musculoskeletal:  Negative for back pain, joint swelling and myalgias. Skin:  Negative for rash and wound. Neurological:  Negative for dizziness, weakness and headaches. Psychiatric/Behavioral:  Negative for agitation. The patient is not nervous/anxious. Physical Exam   There were no vitals taken for this visit. Physical Exam  Constitutional:       General: She is not in acute distress. Appearance: She is not toxic-appearing. HENT:      Head: Normocephalic and atraumatic. Mouth/Throat:      Mouth: Mucous membranes are moist.   Eyes:      Extraocular Movements: Extraocular movements intact. Pupils: Pupils are equal, round, and reactive to light. Cardiovascular:      Rate and Rhythm: Normal rate and regular rhythm. Heart sounds: Normal heart sounds. No murmur heard. No friction rub. No gallop. Pulmonary:      Effort: Pulmonary effort is normal.      Breath sounds: Normal breath sounds. Abdominal:      General: There is no distension. Palpations: Abdomen is soft. There is no mass. Tenderness: There is no abdominal tenderness. There is no guarding. Hernia: No hernia is present. Musculoskeletal:         General: No swelling, tenderness or deformity. Cervical back: Normal range of motion and neck supple. Skin:     General: Skin is warm and dry. Capillary Refill: Capillary refill takes less than 2 seconds. Findings: No rash. Neurological:      General: No focal deficit present. Mental Status: She is alert and oriented to person, place, and time. Psychiatric:         Mood and Affect: Mood normal.         Diagnostic Study Results     Labs -  No results found for this or any previous visit (from the past 12 hour(s)). Radiologic Studies -   No orders to display           Medical Decision Making   I am the first provider for this patient. I reviewed the vital signs, available nursing notes, past medical history, past surgical history, family history and social history. Records Reviewed: None (Time of Review: 12:11 AM)    Vital Signs-Reviewed the patient's vital signs. EKG:     ED Course: Progress Notes, Reevaluation, and Consults:    Provider Notes (Medical Decision Making):       MDM  Number of Diagnoses or Management Options  Hyperglycemia  Diagnosis management comments: Patient appears no acute distress on exam.  Vital signs are stable. She has no vomiting here. Screening labs were obtained which are grossly unremarkable. Patient and family reassured. Instructed on proper insulin use and to continue the patient's peritoneal dialysis as scheduled. Will prescribe Zofran for her nausea. Return precautions advised. Patient verbalizes understanding and agreement with plan. Stable for discharge                 Procedures          Diagnosis     Clinical Impression: No diagnosis found. Disposition: discharged    Follow-up Information    None          Patient's Medications   Start Taking    No medications on file   Continue Taking    ACCU-CHEK VINH PLUS TEST STRP STRIP        AMLODIPINE (NORVASC) 5 MG TABLET    Take 1 Tablet by mouth daily. CARBAMIDE PEROXIDE (DEBROX) 6.5 % OTIC SOLUTION    Administer 5 Drops into each ear two (2) times a day. DROPLET PEN NEEDLE 32 GAUGE X 5/32\" NDLE        FERRIC CITRATE (AURYXIA) 210 MG IRON TABLET    Take 210 mg by mouth two (2) times daily (with meals). FOLIC ACID/VIT B COMPLEX AND C (RENAL VITAMIN PO)    Take 1 Capsule by mouth daily.     GENTAMICIN (GARAMYCIN) 0.1 % TOPICAL CREAM    gentamicin 0.1 % topical cream apply to peritoneal dialysis exit site  DAILY    INSULIN ASPART U-100 (NOVOLOG) 100 UNIT/ML (3 ML) INPN    by SubCUTAneous route daily.  inject 4 units  121-160 inject 6 units  161-200 inject 8 units  201-300 inject 10 units  301-400 inject 12 units  over 400 inject 14 units  BEFORE MEALS  Indications: type 2 diabetes mellitus    INSULIN GLARGINE (LANTUS,BASAGLAR) 100 UNIT/ML (3 ML) INPN    10 Units by SubCUTAneous route nightly. Indications: type 2 diabetes mellitus    ROSUVASTATIN (CRESTOR) 40 MG TABLET    Take 1 Tablet by mouth daily. SIMETHICONE (MYLICON) 80 MG CHEWABLE TABLET    Take 1 Tablet by mouth four (4) times daily as needed for GI Pain. TAMSULOSIN (FLOMAX) 0.4 MG CAPSULE    Take 1 Capsule by mouth daily. These Medications have changed    No medications on file   Stop Taking    No medications on file     Disclaimer: Sections of this note are dictated using utilizing voice recognition software. Minor typographical errors may be present. If questions arise, please do not hesitate to contact me or call our department.

## 2023-03-07 ENCOUNTER — OFFICE VISIT (OUTPATIENT)
Age: 83
End: 2023-03-07
Payer: MEDICARE

## 2023-03-07 VITALS
RESPIRATION RATE: 16 BRPM | BODY MASS INDEX: 30.89 KG/M2 | OXYGEN SATURATION: 98 % | SYSTOLIC BLOOD PRESSURE: 187 MMHG | WEIGHT: 185.4 LBS | TEMPERATURE: 98.4 F | DIASTOLIC BLOOD PRESSURE: 70 MMHG | HEART RATE: 74 BPM | HEIGHT: 65 IN

## 2023-03-07 DIAGNOSIS — I10 ESSENTIAL (PRIMARY) HYPERTENSION: Primary | ICD-10-CM

## 2023-03-07 DIAGNOSIS — M17.0 PRIMARY OSTEOARTHRITIS OF BOTH KNEES: ICD-10-CM

## 2023-03-07 DIAGNOSIS — R42 DIZZINESS: ICD-10-CM

## 2023-03-07 PROCEDURE — 99214 OFFICE O/P EST MOD 30 MIN: CPT | Performed by: FAMILY MEDICINE

## 2023-03-07 PROCEDURE — 1123F ACP DISCUSS/DSCN MKR DOCD: CPT | Performed by: FAMILY MEDICINE

## 2023-03-07 PROCEDURE — 3074F SYST BP LT 130 MM HG: CPT | Performed by: FAMILY MEDICINE

## 2023-03-07 PROCEDURE — 3078F DIAST BP <80 MM HG: CPT | Performed by: FAMILY MEDICINE

## 2023-03-07 RX ORDER — ONDANSETRON 4 MG/1
TABLET, FILM COATED ORAL
COMMUNITY
Start: 2023-02-08

## 2023-03-07 RX ORDER — GABAPENTIN 300 MG/1
CAPSULE ORAL
COMMUNITY
Start: 2023-03-03

## 2023-03-07 RX ORDER — METOPROLOL SUCCINATE 50 MG/1
TABLET, EXTENDED RELEASE ORAL
COMMUNITY
Start: 2022-12-29

## 2023-03-07 RX ORDER — CLONIDINE HYDROCHLORIDE 0.2 MG/1
TABLET ORAL
COMMUNITY
Start: 2022-04-27

## 2023-03-07 RX ORDER — CINACALCET 60 MG/1
1 TABLET, FILM COATED ORAL
COMMUNITY
Start: 2021-03-23 | End: 2023-03-19 | Stop reason: ALTCHOICE

## 2023-03-07 RX ORDER — PEN NEEDLE, DIABETIC 30 GX3/16"
NEEDLE, DISPOSABLE MISCELLANEOUS
COMMUNITY
Start: 2022-02-23

## 2023-03-07 RX ORDER — FUROSEMIDE 20 MG/1
1 TABLET ORAL 2 TIMES DAILY
COMMUNITY
Start: 2018-04-11 | End: 2023-03-07 | Stop reason: ALTCHOICE

## 2023-03-07 RX ORDER — CELECOXIB 200 MG/1
CAPSULE ORAL
COMMUNITY
Start: 2015-02-05 | End: 2023-03-07

## 2023-03-07 RX ORDER — OMEPRAZOLE 40 MG/1
CAPSULE, DELAYED RELEASE ORAL
COMMUNITY
Start: 2020-11-20

## 2023-03-07 RX ORDER — BLOOD SUGAR DIAGNOSTIC
STRIP MISCELLANEOUS
COMMUNITY
Start: 2022-02-04

## 2023-03-07 SDOH — ECONOMIC STABILITY: HOUSING INSECURITY
IN THE LAST 12 MONTHS, WAS THERE A TIME WHEN YOU DID NOT HAVE A STEADY PLACE TO SLEEP OR SLEPT IN A SHELTER (INCLUDING NOW)?: NO

## 2023-03-07 SDOH — ECONOMIC STABILITY: FOOD INSECURITY: WITHIN THE PAST 12 MONTHS, THE FOOD YOU BOUGHT JUST DIDN'T LAST AND YOU DIDN'T HAVE MONEY TO GET MORE.: NEVER TRUE

## 2023-03-07 SDOH — ECONOMIC STABILITY: INCOME INSECURITY: HOW HARD IS IT FOR YOU TO PAY FOR THE VERY BASICS LIKE FOOD, HOUSING, MEDICAL CARE, AND HEATING?: NOT VERY HARD

## 2023-03-07 SDOH — ECONOMIC STABILITY: FOOD INSECURITY: WITHIN THE PAST 12 MONTHS, YOU WORRIED THAT YOUR FOOD WOULD RUN OUT BEFORE YOU GOT MONEY TO BUY MORE.: NEVER TRUE

## 2023-03-07 ASSESSMENT — PATIENT HEALTH QUESTIONNAIRE - PHQ9
SUM OF ALL RESPONSES TO PHQ QUESTIONS 1-9: 0
SUM OF ALL RESPONSES TO PHQ QUESTIONS 1-9: 0
2. FEELING DOWN, DEPRESSED OR HOPELESS: 0
SUM OF ALL RESPONSES TO PHQ9 QUESTIONS 1 & 2: 0
SUM OF ALL RESPONSES TO PHQ QUESTIONS 1-9: 0
1. LITTLE INTEREST OR PLEASURE IN DOING THINGS: 0
SUM OF ALL RESPONSES TO PHQ QUESTIONS 1-9: 0

## 2023-03-07 NOTE — PROGRESS NOTES
1. \"Have you been to the ER, urgent care clinic since your last visit? Hospitalized since your last visit? \" No    2. \"Have you seen or consulted any other health care providers outside of the [address] [place] [address] since your last visit? \" No     3. For patients aged 39-70: Has the patient had a colonoscopy / FIT/ Cologuard? NA - based on age      If the patient is female:    4. For patients aged 41-77: Has the patient had a mammogram within the past 2 years? NA - based on age or sex      11. For patients aged 21-65: Has the patient had a pap smear?  NA - based on age or sex

## 2023-03-07 NOTE — PROGRESS NOTES
HPI:  Rafita Castle is a 80 y.o. female who presents today with   Chief Complaint   Patient presents with    Hypertension     4 month follow-up        Pt had a fall about  2 weeks ago. Hurt her right hand and her right knee; She has a h/o vertigo. She has known osteoarthritis of the knee. HTN:  her BP is elevated today. Thinks she may have an element of white coat HTN. She is compliant with her medications as listed below. She is on clonidine, metoprolol, and amlodipine. She is overall feeling better from her last visit. She is significantly less weak than previous. PMH,  Meds, Allergies, Family History, Social history reviewed      Current Outpatient Medications   Medication Sig Dispense Refill    blood glucose test strips (EXACTECH TEST) strip       Insulin Pen Needle (PEN NEEDLES) 32G X 4 MM MISC       B Complex-C-Folic Acid (ADAMA-PROSPER PO) Take 1 tablet by mouth      cloNIDine (CATAPRES) 0.2 MG tablet TAKE 1 TABLET TWICE DAILY      gabapentin (NEURONTIN) 300 MG capsule       metoprolol succinate (TOPROL XL) 50 MG extended release tablet       omeprazole (PRILOSEC) 40 MG delayed release capsule Take by mouth      ondansetron (ZOFRAN) 4 MG tablet       amLODIPine (NORVASC) 5 MG tablet Take 5 mg by mouth daily      ferric citrate (AURYXIA) 1  MG(Fe) TABS tablet Take 210 mg by mouth 2 times daily (with meals)      gentamicin (GARAMYCIN) 0.1 % cream [The details of the medication are not available because there are pending changes by a home health clinician.]      insulin aspart (NOVOLOG) 100 UNIT/ML injection pen Inject into the skin daily      insulin glargine (LANTUS SOLOSTAR) 100 UNIT/ML injection pen Inject 10 Units into the skin      rosuvastatin (CRESTOR) 40 MG tablet Take 40 mg by mouth daily       No current facility-administered medications for this visit.         Allergies   Allergen Reactions    Aspirin Other (See Comments)     Ringing in ears  Other reaction(s): ringing in ears, tinnitus    Cephalexin Rash     Other reaction(s): hives, Skin Rash    Sevelamer Diarrhea and Other (See Comments)    Sevelamer Carbonate Itching     With rash and sweatiness    Ibuprofen      Other reaction(s): stomach upset                  Review of Systems ROS        BP (!) 187/70 (Site: Left Upper Arm, Position: Sitting, Cuff Size: Medium Adult)   Pulse 74   Temp 98.4 °F (36.9 °C) (Temporal)   Resp 16   Ht 5' 5\" (1.651 m)   Wt 185 lb 6.4 oz (84.1 kg)   LMP  (LMP Unknown)   SpO2 98%   BMI 30.85 kg/m²   General appearance: alert, cooperative, no distress, appears stated age in wheelchair    Lungs: clear to auscultation bilaterally  Heart: regular rate and rhythm, S1, S2 normal, no murmur, click, rub or gallop  Extremities: extremities normal, atraumatic, no cyanosis or edema; mild TTP at the right anterior knee; ; hand is unremarkable. Assessment/Plan:  Shahana Mcmahon was seen today for hypertension. Diagnoses and all orders for this visit:    Essential (primary) hypertension    Primary osteoarthritis of both knees    Dizziness    As above   treatment plan as listed below  Continue to watch for any worsening hand and or knee pain. Pain likely due to just the contused areas of the hand and knee. No specific acute abnormality noted at the hand or knee. Return in about 4 months (around 7/7/2023) for medicare well. This has been fully explained to the patient, who indicates understanding. AVS is accessible thru mychart and pt has been advised of same.        Laxmi Paulson MD

## 2023-04-27 ENCOUNTER — TELEPHONE (OUTPATIENT)
Age: 83
End: 2023-04-27

## 2023-05-01 NOTE — TELEPHONE ENCOUNTER
Spoke with Mr. Erica Del Angel who stated to call Juan Daniel Taylor ([facility] permission- All access on file) for further information in regards to the matter at [phone]. She's handling the ordering for the Wheelchairs since they have already had the wheelchair ramp built sometime last week, thank you.

## 2023-05-01 NOTE — TELEPHONE ENCOUNTER
Unable to reach [facility] left a detailed message in regards to ordering the patients requested Motorized Scooter. Per the provider, #1. Is there a company that your requesting the DME Order for Motorized Scooter from that we need to send DME order to? If so the facilities information, name, phone number, fax number, address and what's required? #2. Patients would need PT agreement for evaluation for type of Scooter. #3. Face to Face appointment needed for all paper work, thank you.

## 2023-05-02 ENCOUNTER — TELEPHONE (OUTPATIENT)
Age: 83
End: 2023-05-02

## 2023-05-02 NOTE — TELEPHONE ENCOUNTER
[facility]     9:56 AM    Ms Pablo Byrne (Emergency Contact) contacted Devendra Braun     9:58 AM  Note      Spoke with Audelia Rivas in regards to patient's dme order request and Physical Therapy evaluation. She wanted to know if there is a company that we primarily use for the motorized scooter if so she wants the dme order sent to them. Also patient has agreed to doing the physical therapy evaluation.

## 2023-05-02 NOTE — TELEPHONE ENCOUNTER
Spoke with Bella Avalos in regards to patient's dme order request and Physical Therapy evaluation. She wanted to know if there is a company that we primarily use for the motorized scooter if so she wants the dme order sent to them. Also patient has agreed to doing the physical therapy evaluation.

## 2023-05-09 ENCOUNTER — HOSPITAL ENCOUNTER (EMERGENCY)
Facility: HOSPITAL | Age: 83
Discharge: HOME | End: 2023-05-09
Attending: EMERGENCY MEDICINE
Payer: MEDICARE

## 2023-05-09 VITALS
TEMPERATURE: 98.3 F | WEIGHT: 185 LBS | SYSTOLIC BLOOD PRESSURE: 122 MMHG | RESPIRATION RATE: 20 BRPM | DIASTOLIC BLOOD PRESSURE: 60 MMHG | BODY MASS INDEX: 30.79 KG/M2 | HEART RATE: 89 BPM | OXYGEN SATURATION: 97 %

## 2023-05-09 DIAGNOSIS — R11.0 NAUSEA: ICD-10-CM

## 2023-05-09 DIAGNOSIS — R10.9 ABDOMINAL PAIN, UNSPECIFIED ABDOMINAL LOCATION: Primary | ICD-10-CM

## 2023-05-09 DIAGNOSIS — R19.7 DIARRHEA, UNSPECIFIED TYPE: ICD-10-CM

## 2023-05-09 LAB
ALBUMIN SERPL-MCNC: 3.2 G/DL (ref 3.4–5)
ALBUMIN/GLOB SERPL: 0.8 (ref 0.8–1.7)
ALP SERPL-CCNC: 184 U/L (ref 45–117)
ALT SERPL-CCNC: 31 U/L (ref 13–56)
ANION GAP SERPL CALC-SCNC: 12 MMOL/L (ref 3–18)
APPEARANCE FLD: CLEAR
APPEARANCE UR: ABNORMAL
AST SERPL-CCNC: 32 U/L (ref 10–38)
BACTERIA URNS QL MICRO: ABNORMAL /HPF
BASOPHILS # BLD: 0 K/UL (ref 0–0.1)
BASOPHILS NFR BLD: 1 % (ref 0–2)
BILIRUB SERPL-MCNC: 0.6 MG/DL (ref 0.2–1)
BILIRUB UR QL: ABNORMAL
BUN SERPL-MCNC: 56 MG/DL (ref 7–18)
BUN/CREAT SERPL: 4 (ref 12–20)
C DIFF GDH STL QL: NEGATIVE
C DIFF TOX A+B STL QL IA: NEGATIVE
C DIFF TOXIN INTERPRETATION: NORMAL
CALCIUM SERPL-MCNC: 7.4 MG/DL (ref 8.5–10.1)
CHLORIDE SERPL-SCNC: 101 MMOL/L (ref 100–111)
CO2 SERPL-SCNC: 21 MMOL/L (ref 21–32)
COLOR FLD: YELLOW
COLOR UR: ABNORMAL
CREAT SERPL-MCNC: 12.7 MG/DL (ref 0.6–1.3)
DIFFERENTIAL METHOD BLD: ABNORMAL
EKG ATRIAL RATE: 90 BPM
EKG DIAGNOSIS: NORMAL
EKG P AXIS: 37 DEGREES
EKG P-R INTERVAL: 138 MS
EKG Q-T INTERVAL: 440 MS
EKG QRS DURATION: 146 MS
EKG QTC CALCULATION (BAZETT): 538 MS
EKG R AXIS: -38 DEGREES
EKG T AXIS: 0 DEGREES
EKG VENTRICULAR RATE: 90 BPM
EOSINOPHIL # BLD: 0.1 K/UL (ref 0–0.4)
EOSINOPHIL NFR BLD: 1 % (ref 0–5)
EOSINOPHIL NFR FLD MANUAL: 0 %
EPITH CASTS URNS QL MICRO: ABNORMAL /LPF (ref 0–5)
ERYTHROCYTE [DISTWIDTH] IN BLOOD BY AUTOMATED COUNT: 15.5 % (ref 11.6–14.5)
GLOBULIN SER CALC-MCNC: 3.8 G/DL (ref 2–4)
GLUCOSE SERPL-MCNC: 213 MG/DL (ref 74–99)
GLUCOSE UR STRIP.AUTO-MCNC: 100 MG/DL
HCT VFR BLD AUTO: 36.6 % (ref 35–45)
HGB BLD-MCNC: 11.9 G/DL (ref 12–16)
HGB UR QL STRIP: ABNORMAL
IMM GRANULOCYTES # BLD AUTO: 0 K/UL (ref 0–0.04)
IMM GRANULOCYTES NFR BLD AUTO: 0 % (ref 0–0.5)
KETONES UR QL STRIP.AUTO: NEGATIVE MG/DL
LACTATE SERPL-SCNC: 1.2 MMOL/L (ref 0.4–2)
LEUKOCYTE ESTERASE UR QL STRIP.AUTO: ABNORMAL
LIPASE SERPL-CCNC: 729 U/L (ref 73–393)
LYMPHOCYTES # BLD: 1.9 K/UL (ref 0.9–3.6)
LYMPHOCYTES NFR BLD: 27 % (ref 21–52)
LYMPHOCYTES NFR FLD: 32 %
MCH RBC QN AUTO: 28.5 PG (ref 24–34)
MCHC RBC AUTO-ENTMCNC: 32.5 G/DL (ref 31–37)
MCV RBC AUTO: 87.6 FL (ref 78–100)
MONOCYTES # BLD: 0.6 K/UL (ref 0.05–1.2)
MONOCYTES NFR BLD: 9 % (ref 3–10)
MONOCYTES NFR FLD: 52 %
MUCOUS THREADS URNS QL MICRO: ABNORMAL /LPF
NEUTROPHILS NFR FLD: 16 % (ref 0–25)
NEUTS BAND # FLD: 0 %
NEUTS SEG # BLD: 4.2 K/UL (ref 1.8–8)
NEUTS SEG NFR BLD: 62 % (ref 40–73)
NITRITE UR QL STRIP.AUTO: NEGATIVE
NRBC # BLD: 0 K/UL (ref 0–0.01)
NRBC BLD-RTO: 0 PER 100 WBC
NUC CELL # FLD: 6 /CU MM
PH UR STRIP: 6.5 (ref 5–8)
PLATELET # BLD AUTO: 134 K/UL (ref 135–420)
PMV BLD AUTO: 10.3 FL (ref 9.2–11.8)
POTASSIUM SERPL-SCNC: 4.3 MMOL/L (ref 3.5–5.5)
PROT SERPL-MCNC: 7 G/DL (ref 6.4–8.2)
PROT UR STRIP-MCNC: 300 MG/DL
RBC # BLD AUTO: 4.18 M/UL (ref 4.2–5.3)
RBC # FLD: 3 /CU MM
RBC #/AREA URNS HPF: ABNORMAL /HPF (ref 0–5)
SODIUM SERPL-SCNC: 134 MMOL/L (ref 136–145)
SP GR UR REFRACTOMETRY: 1.01 (ref 1–1.03)
SPECIMEN SOURCE FLD: ABNORMAL
TOTAL CELLS COUNTED SPEC: 25
UROBILINOGEN UR QL STRIP.AUTO: 0.2 EU/DL (ref 0.2–1)
WBC # BLD AUTO: 6.8 K/UL (ref 4.6–13.2)
WBC MORPH BLD: ABNORMAL
WBC URNS QL MICRO: ABNORMAL /HPF (ref 0–4)
YEAST URNS QL MICRO: ABNORMAL

## 2023-05-09 PROCEDURE — 96372 THER/PROPH/DIAG INJ SC/IM: CPT

## 2023-05-09 PROCEDURE — 87205 SMEAR GRAM STAIN: CPT

## 2023-05-09 PROCEDURE — 81001 URINALYSIS AUTO W/SCOPE: CPT

## 2023-05-09 PROCEDURE — 99284 EMERGENCY DEPT VISIT MOD MDM: CPT

## 2023-05-09 PROCEDURE — 2580000003 HC RX 258: Performed by: NURSE PRACTITIONER

## 2023-05-09 PROCEDURE — 93005 ELECTROCARDIOGRAM TRACING: CPT | Performed by: NURSE PRACTITIONER

## 2023-05-09 PROCEDURE — 83605 ASSAY OF LACTIC ACID: CPT

## 2023-05-09 PROCEDURE — 87324 CLOSTRIDIUM AG IA: CPT

## 2023-05-09 PROCEDURE — 83690 ASSAY OF LIPASE: CPT

## 2023-05-09 PROCEDURE — 87449 NOS EACH ORGANISM AG IA: CPT

## 2023-05-09 PROCEDURE — 93010 ELECTROCARDIOGRAM REPORT: CPT | Performed by: INTERNAL MEDICINE

## 2023-05-09 PROCEDURE — 96374 THER/PROPH/DIAG INJ IV PUSH: CPT

## 2023-05-09 PROCEDURE — 87070 CULTURE OTHR SPECIMN AEROBIC: CPT

## 2023-05-09 PROCEDURE — 89051 BODY FLUID CELL COUNT: CPT

## 2023-05-09 PROCEDURE — 80053 COMPREHEN METABOLIC PANEL: CPT

## 2023-05-09 PROCEDURE — 85025 COMPLETE CBC W/AUTO DIFF WBC: CPT

## 2023-05-09 PROCEDURE — 6360000002 HC RX W HCPCS: Performed by: NURSE PRACTITIONER

## 2023-05-09 RX ORDER — 0.9 % SODIUM CHLORIDE 0.9 %
500 INTRAVENOUS SOLUTION INTRAVENOUS ONCE
Status: COMPLETED | OUTPATIENT
Start: 2023-05-09 | End: 2023-05-09

## 2023-05-09 RX ORDER — DICYCLOMINE HYDROCHLORIDE 10 MG/1
20 CAPSULE ORAL
Status: DISCONTINUED | OUTPATIENT
Start: 2023-05-09 | End: 2023-05-09

## 2023-05-09 RX ORDER — DICYCLOMINE HYDROCHLORIDE 10 MG/ML
20 INJECTION INTRAMUSCULAR
Status: COMPLETED | OUTPATIENT
Start: 2023-05-09 | End: 2023-05-09

## 2023-05-09 RX ORDER — ONDANSETRON 2 MG/ML
4 INJECTION INTRAMUSCULAR; INTRAVENOUS
Status: COMPLETED | OUTPATIENT
Start: 2023-05-09 | End: 2023-05-09

## 2023-05-09 RX ORDER — LOPERAMIDE HYDROCHLORIDE 2 MG/1
2 CAPSULE ORAL 4 TIMES DAILY PRN
Qty: 12 CAPSULE | Refills: 0 | Status: SHIPPED | OUTPATIENT
Start: 2023-05-09 | End: 2023-05-19

## 2023-05-09 RX ADMIN — ONDANSETRON 4 MG: 2 INJECTION INTRAMUSCULAR; INTRAVENOUS at 03:48

## 2023-05-09 RX ADMIN — DICYCLOMINE HYDROCHLORIDE 20 MG: 10 INJECTION INTRAMUSCULAR at 03:48

## 2023-05-09 RX ADMIN — SODIUM CHLORIDE 500 ML: 9 INJECTION, SOLUTION INTRAVENOUS at 03:48

## 2023-05-09 ASSESSMENT — ENCOUNTER SYMPTOMS
NAUSEA: 1
SINUS PRESSURE: 0
SORE THROAT: 0
SHORTNESS OF BREATH: 0
ANAL BLEEDING: 0
BLOOD IN STOOL: 0
ABDOMINAL PAIN: 1
BACK PAIN: 0
CONSTIPATION: 0
DIARRHEA: 1
VOMITING: 0
EYES NEGATIVE: 1
ABDOMINAL DISTENTION: 0
APNEA: 0
SINUS PAIN: 0
FACIAL SWELLING: 0
RHINORRHEA: 0
TROUBLE SWALLOWING: 0
CHEST TIGHTNESS: 0

## 2023-05-09 ASSESSMENT — LIFESTYLE VARIABLES
HOW OFTEN DO YOU HAVE A DRINK CONTAINING ALCOHOL: NEVER
HOW MANY STANDARD DRINKS CONTAINING ALCOHOL DO YOU HAVE ON A TYPICAL DAY: PATIENT DOES NOT DRINK

## 2023-05-09 ASSESSMENT — PAIN SCALES - GENERAL: PAINLEVEL_OUTOF10: 8

## 2023-05-09 ASSESSMENT — PAIN DESCRIPTION - LOCATION: LOCATION: ABDOMEN

## 2023-05-09 NOTE — ED TRIAGE NOTES
Pt presents via medic c/o abdominal pain with intermittent diarrhea. States she recently finished a course of flagyl. Pt does PD at home.

## 2023-05-09 NOTE — ED NOTES
Patient changed multiple times, noted soft formed stool , small      Leonidas Loose, RN  05/09/23 1060

## 2023-05-09 NOTE — DIALYSIS
PD fluid sample collected using sterile technique, new pd cap applied. Sample given to the primary nurse. Patient tolerated the procedure.

## 2023-05-09 NOTE — ED PROVIDER NOTES
pen  Generic drug: insulin glargine     metoprolol succinate 50 MG extended release tablet  Commonly known as: TOPROL XL     omeprazole 40 MG delayed release capsule  Commonly known as: PRILOSEC     ondansetron 4 MG tablet  Commonly known as: ZOFRAN     ADAMA-PROSPER PO     rosuvastatin 40 MG tablet  Commonly known as: CRESTOR               Dictation disclaimer:  Please note that this dictation was completed with [employer], the computer voice recognition software. Quite often unanticipated grammatical, syntax, homophones, and other interpretive errors are inadvertently transcribed by the computer software. Please disregard these errors. Please excuse any errors that have escaped final proofreading.          CHING Sherman NP  05/09/23 7318

## 2023-05-09 NOTE — DISCHARGE INSTRUCTIONS
When you are at the pharmacy, please also  a course of probiotics. Th this will help with your symptoms as well.

## 2023-05-09 NOTE — ED NOTES
Report to [facility] SPECIALTY [facility] RN  05/09/23 1905

## 2023-05-13 LAB
BACTERIA SPEC CULT: NORMAL
GRAM STN SPEC: NORMAL
GRAM STN SPEC: NORMAL
SERVICE CMNT-IMP: NORMAL

## 2023-05-16 ENCOUNTER — TELEPHONE (OUTPATIENT)
Age: 83
End: 2023-05-16

## 2023-05-16 NOTE — TELEPHONE ENCOUNTER
----- Message from Mary Hugo sent at 5/11/2023  2:00 PM EDT -----  Subject: Appointment Request    Reason for Call: Established Patient Appointment needed: Routine ED Follow   Up Visit    QUESTIONS    Reason for appointment request? No appointments available during search     Additional Information for Provider?  Patient called to schedule ed follow   up, no appointments available   ---------------------------------------------------------------------------  --------------  Pema Buchanan  [phone]; OK to leave message on voicemail  ---------------------------------------------------------------------------  --------------  SCRIPT ANSWERS  COVID Screen: Anaya Powell

## 2023-05-16 NOTE — TELEPHONE ENCOUNTER
Spoke with patient about scheduling ed follow up.  Patient says she is okay with waiting until her appt on 6/7

## 2023-05-24 ENCOUNTER — TELEPHONE (OUTPATIENT)
Age: 83
End: 2023-05-24

## 2023-06-01 ENCOUNTER — OFFICE VISIT (OUTPATIENT)
Age: 83
End: 2023-06-01
Payer: MEDICARE

## 2023-06-01 DIAGNOSIS — R20.2 NUMBNESS AND TINGLING OF RIGHT HAND: ICD-10-CM

## 2023-06-01 DIAGNOSIS — R20.0 NUMBNESS AND TINGLING OF RIGHT HAND: ICD-10-CM

## 2023-06-01 DIAGNOSIS — M19.031 ARTHRITIS OF BOTH WRISTS: ICD-10-CM

## 2023-06-01 DIAGNOSIS — M25.532 PAIN IN BOTH WRISTS: ICD-10-CM

## 2023-06-01 DIAGNOSIS — M19.032 ARTHRITIS OF BOTH WRISTS: ICD-10-CM

## 2023-06-01 DIAGNOSIS — R20.0 NUMBNESS AND TINGLING OF LEFT HAND: ICD-10-CM

## 2023-06-01 DIAGNOSIS — M79.642 PAIN IN BOTH HANDS: ICD-10-CM

## 2023-06-01 DIAGNOSIS — R20.2 NUMBNESS AND TINGLING OF LEFT HAND: ICD-10-CM

## 2023-06-01 DIAGNOSIS — M25.531 PAIN IN BOTH WRISTS: ICD-10-CM

## 2023-06-01 DIAGNOSIS — M25.552 PAIN IN LEFT HIP: Primary | ICD-10-CM

## 2023-06-01 DIAGNOSIS — M79.641 PAIN IN BOTH HANDS: ICD-10-CM

## 2023-06-01 PROCEDURE — 1123F ACP DISCUSS/DSCN MKR DOCD: CPT | Performed by: PHYSICIAN ASSISTANT

## 2023-06-01 PROCEDURE — 99204 OFFICE O/P NEW MOD 45 MIN: CPT | Performed by: PHYSICIAN ASSISTANT

## 2023-06-01 NOTE — PROGRESS NOTES
changes by a home health clinician.], Disp: , Rfl:     insulin aspart (NOVOLOG) 100 UNIT/ML injection pen, Inject into the skin daily, Disp: , Rfl:     insulin glargine (LANTUS SOLOSTAR) 100 UNIT/ML injection pen, Inject 10 Units into the skin, Disp: , Rfl:     rosuvastatin (CRESTOR) 40 MG tablet, Take 1 tablet by mouth daily, Disp: , Rfl:     Allergies   Allergen Reactions    Aspirin Other (See Comments)     Ringing in ears  Other reaction(s): ringing in ears, tinnitus    Cephalexin Rash     Other reaction(s): hives, Skin Rash    Sevelamer Diarrhea and Other (See Comments)    Sevelamer Carbonate Itching     With rash and sweatiness    Ibuprofen      Other reaction(s): stomach upset       Social History     Socioeconomic History    Marital status:      Spouse name: Not on file    Number of children: Not on file    Years of education: Not on file    Highest education level: Not on file   Occupational History    Not on file   Tobacco Use    Smoking status: Never    Smokeless tobacco: Never   Substance and Sexual Activity    Alcohol use: No    Drug use: Never    Sexual activity: Not on file   Other Topics Concern    Not on file   Social History Narrative    Not on file     Social Determinants of Health     Financial Resource Strain: Low Risk     Difficulty of Paying Living Expenses: Not very hard   Food Insecurity: No Food Insecurity    Worried About Running Out of Food in the Last Year: Never true    Ran Out of Food in the Last Year: Never true   Transportation Needs: Unknown    Lack of Transportation (Medical): Not on file    Lack of Transportation (Non-Medical):  No   Physical Activity: Not on file   Stress: Not on file   Social Connections: Not on file   Intimate Partner Violence: Not on file   Housing Stability: Unknown    Unable to Pay for Housing in the Last Year: Not on file    Number of Places Lived in the Last Year: Not on file    Unstable Housing in the Last Year: No       Past Surgical History:

## 2023-07-05 ENCOUNTER — TELEPHONE (OUTPATIENT)
Age: 83
End: 2023-07-05

## 2023-07-05 NOTE — TELEPHONE ENCOUNTER
----- Message from Sammy Borjas sent at 7/5/2023  3:22 PM EDT -----  Subject: Message to Provider    QUESTIONS  Information for Provider? Rashida called for Geovanni Winston. Please send referral   information to fax# [phone] to [employer] on [facility]. in [place].  ---------------------------------------------------------------------------  --------------  Mark [place] Leah  [phone]; OK to leave message on voicemail  ---------------------------------------------------------------------------  --------------  SCRIPT ANSWERS  Relationship to Patient? Other/Third Party  Representative Name? Izzy Gamez  Is the representative on the Communication Release of Information (CLOTILDE)   form in Epic?  Yes

## 2023-07-05 NOTE — TELEPHONE ENCOUNTER
Referral already faxed and scanned into the patients chart after speaking with the daughter, thank you.

## 2023-07-06 NOTE — TELEPHONE ENCOUNTER
Pt daughter called states advised by [employer] that they only recvd faxed referral that is unable to be read, printed and re-faxed info to [address] [facility] location

## 2023-07-07 ENCOUNTER — TELEPHONE (OUTPATIENT)
Age: 83
End: 2023-07-07

## 2023-07-13 ENCOUNTER — HOSPITAL ENCOUNTER (OUTPATIENT)
Facility: HOSPITAL | Age: 83
Setting detail: RECURRING SERIES
Discharge: HOME | End: 2023-07-16
Payer: MEDICARE

## 2023-07-13 PROCEDURE — 97166 OT EVAL MOD COMPLEX 45 MIN: CPT

## 2023-07-19 ENCOUNTER — OFFICE VISIT (OUTPATIENT)
Age: 83
End: 2023-07-19
Payer: MEDICARE

## 2023-07-19 VITALS — HEIGHT: 65 IN | BODY MASS INDEX: 26.99 KG/M2 | TEMPERATURE: 97 F

## 2023-07-19 DIAGNOSIS — M19.041 PRIMARY OSTEOARTHRITIS OF BOTH HANDS: Primary | ICD-10-CM

## 2023-07-19 DIAGNOSIS — M19.042 PRIMARY OSTEOARTHRITIS OF BOTH HANDS: Primary | ICD-10-CM

## 2023-07-19 PROCEDURE — 1123F ACP DISCUSS/DSCN MKR DOCD: CPT | Performed by: ORTHOPAEDIC SURGERY

## 2023-07-19 PROCEDURE — 99213 OFFICE O/P EST LOW 20 MIN: CPT | Performed by: ORTHOPAEDIC SURGERY

## 2023-07-19 PROCEDURE — 73130 X-RAY EXAM OF HAND: CPT | Performed by: ORTHOPAEDIC SURGERY

## 2023-07-19 NOTE — PROGRESS NOTES
Radha Olivarez is a 80 y.o. female right handed retiree. Worker's Compensation and legal considerations: none    Chief Complaint   Patient presents with    Hand Pain     Bilateral hand pain     Pain Score:   7    HPI: Patient presents today with complaints of bilateral hand pain and stiffness.     Date of onset: Indeterminate  Injury: Yes: Comment: Nonspecific possible fall  Prior Treatment:  No    ROS: Review of Systems - General ROS: negative except HPI    Past Medical History:   Diagnosis Date    Allergic rhinitis     Chronic kidney disease 2020    Peritoneal Dialysis    Diabetes ([address]) 2015    IDDM    H/O seasonal allergies     Hypertension     Left foot drop     Peritoneal dialysis catheter in place [facility])        Past Surgical History:   Procedure Laterality Date    CARPAL TUNNEL RELEASE Left     CATARACT REMOVAL  2011    COLONOSCOPY N/A 9/26/2022    COLONOSCOPY with bx's and polypectomy performed by Susy Benson MD at [facility]    COLONOSCOPY N/A 10/8/2022    COLONOSCOPY/ Polypectomies performed by Jan Lanes, MD at [address] [facility]  2004-last sx    fused L2-S1, in [address] EXTRACTION          Current Outpatient Medications   Medication Sig Dispense Refill    cilostazol (PLETAL) 50 MG tablet       cinacalcet (SENSIPAR) 60 MG tablet Take 1 tablet by mouth daily      clopidogrel (PLAVIX) 75 MG tablet Take 1 tablet by mouth daily      pantoprazole (PROTONIX) 40 MG tablet       potassium chloride (KLOR-CON) 10 MEQ extended release tablet       metoprolol succinate (TOPROL XL) 25 MG extended release tablet Take 1 tablet by mouth daily 90 tablet 2    fluticasone (FLONASE) 50 MCG/ACT nasal spray 2 sprays by Each Nostril route daily 16 g 0    blood glucose test strips (EXACTECH TEST) strip       Insulin Pen Needle (PEN NEEDLES) 32G X 4 MM MISC       B Complex-C-Folic Acid (ADAMA-PROSPER PO) Take 1 tablet by mouth      gabapentin (NEURONTIN) 300 MG capsule

## 2023-08-07 ENCOUNTER — TELEPHONE (OUTPATIENT)
Age: 83
End: 2023-08-07

## 2023-08-07 DIAGNOSIS — M19.041 PRIMARY OSTEOARTHRITIS OF BOTH HANDS: Primary | ICD-10-CM

## 2023-08-07 DIAGNOSIS — M19.042 PRIMARY OSTEOARTHRITIS OF BOTH HANDS: Primary | ICD-10-CM

## 2023-08-07 NOTE — TELEPHONE ENCOUNTER
Patient called requesting OT referral be sent to Cory PADILLA on [facility] in [facility], since Thien Hansen no longer accepts her insurance.      Cory PADILLA  Tel: [phone]  Fax: [phone]

## 2023-08-09 NOTE — TELEPHONE ENCOUNTER
Patient called back regarding previous message and stated that she just spoke with Cory PADILLA and I advised we are working on it.      Cory PADILLA  Tel: [phone]  Fax: [phone]

## 2023-08-10 ENCOUNTER — TELEPHONE (OUTPATIENT)
Age: 83
End: 2023-08-10

## 2023-08-10 ENCOUNTER — OFFICE VISIT (OUTPATIENT)
Age: 83
End: 2023-08-10
Payer: MEDICARE

## 2023-08-10 VITALS
DIASTOLIC BLOOD PRESSURE: 66 MMHG | RESPIRATION RATE: 16 BRPM | HEIGHT: 65 IN | BODY MASS INDEX: 24.96 KG/M2 | TEMPERATURE: 97.5 F | SYSTOLIC BLOOD PRESSURE: 153 MMHG | WEIGHT: 149.8 LBS | OXYGEN SATURATION: 98 % | HEART RATE: 72 BPM

## 2023-08-10 DIAGNOSIS — M21.372 LEFT FOOT DROP: ICD-10-CM

## 2023-08-10 DIAGNOSIS — M53.3 SI (SACROILIAC) JOINT DYSFUNCTION: Primary | ICD-10-CM

## 2023-08-10 DIAGNOSIS — M47.816 LUMBAR SPONDYLOSIS: ICD-10-CM

## 2023-08-10 DIAGNOSIS — R26.2 DIFFICULTY IN WALKING: ICD-10-CM

## 2023-08-10 PROCEDURE — 3077F SYST BP >= 140 MM HG: CPT | Performed by: FAMILY MEDICINE

## 2023-08-10 PROCEDURE — 99215 OFFICE O/P EST HI 40 MIN: CPT | Performed by: FAMILY MEDICINE

## 2023-08-10 PROCEDURE — 3078F DIAST BP <80 MM HG: CPT | Performed by: FAMILY MEDICINE

## 2023-08-10 PROCEDURE — 1123F ACP DISCUSS/DSCN MKR DOCD: CPT | Performed by: FAMILY MEDICINE

## 2023-08-10 RX ORDER — FOLIC ACID/VIT B COMPLEX AND C 0.8 MG
1 TABLET ORAL DAILY
COMMUNITY
Start: 2023-06-23

## 2023-08-10 RX ORDER — DIPHENOXYLATE HYDROCHLORIDE AND ATROPINE SULFATE 2.5; .025 MG/1; MG/1
TABLET ORAL
COMMUNITY
Start: 2023-07-08

## 2023-08-10 ASSESSMENT — PATIENT HEALTH QUESTIONNAIRE - PHQ9
SUM OF ALL RESPONSES TO PHQ QUESTIONS 1-9: 0
SUM OF ALL RESPONSES TO PHQ QUESTIONS 1-9: 0
2. FEELING DOWN, DEPRESSED OR HOPELESS: 0
SUM OF ALL RESPONSES TO PHQ QUESTIONS 1-9: 0
SUM OF ALL RESPONSES TO PHQ9 QUESTIONS 1 & 2: 0
SUM OF ALL RESPONSES TO PHQ QUESTIONS 1-9: 0
1. LITTLE INTEREST OR PLEASURE IN DOING THINGS: 0

## 2023-08-10 NOTE — PROGRESS NOTES
1. \"Have you been to the ER, urgent care clinic since your last visit? Hospitalized since your last visit? \" No    2. \"Have you seen or consulted any other health care providers outside of the [address] [place] [address] since your last visit? \" No     3. For patients aged 43-73: Has the patient had a colonoscopy / FIT/ Cologuard? NA - based on age      If the patient is female:    4. For patients aged 43-66: Has the patient had a mammogram within the past 2 years? NA - based on age or sex      11. For patients aged 21-65: Has the patient had a pap smear?  NA - based on age or sex

## 2023-08-10 NOTE — PROGRESS NOTES
HPI:  Delilah Benavides is a 80 y.o. female who presents today with   Chief Complaint   Patient presents with    Other     Hoveround Evaluation & Forms        Pt is  here for a Mobility Examination. Pt desires a Hoveround     Patient has a history of sacroiliitis with weakness of the right leg. She additionally has a history of lumbar spondylosis and left foot drop. Patient has developed  difficulty walking because her right leg is weak and will give way unpredictably. She has a history of frequent falls. This is the reason she is in need of a power mobility device (PMD)      Her  current Height is  5 \"3 \"; Her current weight is 149 lbs. Her 02 sats are 98% on RA    Her upper and lower extremity assessment has been performed by Physical Therapy. Please see the attached OT notes. Pt's PMD will allow her to take care of her ADLs by safely getting into the bathroom to bathe , groom and toilet. She will also be able to safely get into the kitchen to eat and prepare meals. Madhu Marin will not meet patient's mobility needs because she will still need to stand and ambulate and her leg is weak, gives way and subjects her to falling. A manual wheelchair will not meet patient's mobility needs because patients upper body strength will not allow her to use a manual wheelchair for all of her ADLs. A POV will not assist with patient's mobility needs as it is too large for her home . Patient can safely operate a PMD both mentally and physically  and she is willing and motivated to use the PMD in her home. No refills needed.                PMH,  Meds, Allergies, Family History, Social history reviewed      Current Outpatient Medications   Medication Sig Dispense Refill    diphenoxylate-atropine (LOMOTIL) 2.5-0.025 MG per tablet TAKE 1 TABLET BY MOUTH 4 TIMES DAILY AS NEEDED FOR DIARRHEA FOR UP TO 10 DAYS MAX DAILY AMOUNT 4 TABLETS      Probiotic Product (PROBIOTIC-10 PO) Take by mouth      clopidogrel

## 2023-08-22 ENCOUNTER — TELEPHONE (OUTPATIENT)
Age: 83
End: 2023-08-22

## 2023-08-22 NOTE — TELEPHONE ENCOUNTER
Mercedes swan from [facility] to confirm if we received a product description form for the patient. Says he was faxed yesterday morning.

## 2023-08-23 NOTE — TELEPHONE ENCOUNTER
Form signed and to be faxed back. . Confirmation sheet to be scanned into the patients chart, thank you. [phone]

## 2023-08-30 NOTE — TELEPHONE ENCOUNTER
Care Transitions Initial Follow Up Call    Outreach made within 2 business days of discharge: Yes    Patient: Triny Escalante Patient : 1940   MRN: [id number]  Reason for Admission: GI Bleed   Discharge Date: 10/11/22       Spoke with:  left HIIPA compliant VM to call office to schedule appt. We attempt again at a different time.        Scheduled appointment with PCP within 7-14 days    Follow Up  Future Appointments   Date Time Provider Slick Porter   6/15/2023  2:20 PM Juan Manuel Valdovinos PA-C [id number] [facility] [address]

## 2023-09-12 NOTE — ED NOTES
2:32 AM :Pt care assumed from [facility] ED provider. Pt complaint(s), current treatment plan, progression and available diagnostic results have been discussed thoroughly. The patient was seen and evaluated on my shift.    Rounding occurred: yes  Intended Disposition: TBD  Pending diagnostic reports and/or labs (please list): MRI Tarsorrhaphy Text: A tarsorrhaphy was performed using Frost sutures.

## 2023-09-27 ENCOUNTER — APPOINTMENT (OUTPATIENT)
Facility: HOSPITAL | Age: 83
End: 2023-09-27
Payer: MEDICARE

## 2023-09-27 ENCOUNTER — HOSPITAL ENCOUNTER (EMERGENCY)
Facility: HOSPITAL | Age: 83
Discharge: HOME | End: 2023-09-27
Payer: MEDICARE

## 2023-09-27 VITALS
BODY MASS INDEX: 25.33 KG/M2 | OXYGEN SATURATION: 98 % | DIASTOLIC BLOOD PRESSURE: 57 MMHG | SYSTOLIC BLOOD PRESSURE: 150 MMHG | WEIGHT: 152 LBS | HEIGHT: 65 IN | RESPIRATION RATE: 19 BRPM | TEMPERATURE: 98.9 F | HEART RATE: 87 BPM

## 2023-09-27 DIAGNOSIS — R10.84 GENERALIZED ABDOMINAL PAIN: ICD-10-CM

## 2023-09-27 DIAGNOSIS — R19.7 DIARRHEA, UNSPECIFIED TYPE: Primary | ICD-10-CM

## 2023-09-27 LAB
ALBUMIN SERPL-MCNC: 2.5 G/DL (ref 3.4–5)
ALBUMIN/GLOB SERPL: 0.7 (ref 0.8–1.7)
ALP SERPL-CCNC: 112 U/L (ref 45–117)
ALT SERPL-CCNC: 35 U/L (ref 13–56)
ANION GAP SERPL CALC-SCNC: 10 MMOL/L (ref 3–18)
AST SERPL-CCNC: 25 U/L (ref 10–38)
BASOPHILS # BLD: 0.1 K/UL (ref 0–0.1)
BASOPHILS NFR BLD: 1 % (ref 0–2)
BILIRUB SERPL-MCNC: 0.7 MG/DL (ref 0.2–1)
BUN SERPL-MCNC: 60 MG/DL (ref 7–18)
BUN/CREAT SERPL: 5 (ref 12–20)
CALCIUM SERPL-MCNC: 8.6 MG/DL (ref 8.5–10.1)
CHLORIDE SERPL-SCNC: 101 MMOL/L (ref 100–111)
CO2 SERPL-SCNC: 25 MMOL/L (ref 21–32)
CREAT SERPL-MCNC: 12.2 MG/DL (ref 0.6–1.3)
DIFFERENTIAL METHOD BLD: ABNORMAL
EOSINOPHIL # BLD: 0.4 K/UL (ref 0–0.4)
EOSINOPHIL NFR BLD: 7 % (ref 0–5)
ERYTHROCYTE [DISTWIDTH] IN BLOOD BY AUTOMATED COUNT: 13.7 % (ref 11.6–14.5)
GLOBULIN SER CALC-MCNC: 3.8 G/DL (ref 2–4)
GLUCOSE SERPL-MCNC: 103 MG/DL (ref 74–99)
HCT VFR BLD AUTO: 29.7 % (ref 35–45)
HGB BLD-MCNC: 9.7 G/DL (ref 12–16)
IMM GRANULOCYTES # BLD AUTO: 0 K/UL (ref 0–0.04)
IMM GRANULOCYTES NFR BLD AUTO: 0 % (ref 0–0.5)
LACTATE SERPL-SCNC: 0.5 MMOL/L (ref 0.4–2)
LIPASE SERPL-CCNC: 236 U/L (ref 73–393)
LYMPHOCYTES # BLD: 1.4 K/UL (ref 0.9–3.6)
LYMPHOCYTES NFR BLD: 24 % (ref 21–52)
MAGNESIUM SERPL-MCNC: 2.1 MG/DL (ref 1.6–2.6)
MCH RBC QN AUTO: 29.6 PG (ref 24–34)
MCHC RBC AUTO-ENTMCNC: 32.7 G/DL (ref 31–37)
MCV RBC AUTO: 90.5 FL (ref 78–100)
MONOCYTES # BLD: 0.8 K/UL (ref 0.05–1.2)
MONOCYTES NFR BLD: 14 % (ref 3–10)
NEUTS SEG # BLD: 3.2 K/UL (ref 1.8–8)
NEUTS SEG NFR BLD: 54 % (ref 40–73)
NRBC # BLD: 0 K/UL (ref 0–0.01)
NRBC BLD-RTO: 0 PER 100 WBC
PLATELET # BLD AUTO: 185 K/UL (ref 135–420)
PMV BLD AUTO: 9.9 FL (ref 9.2–11.8)
POTASSIUM SERPL-SCNC: 4.2 MMOL/L (ref 3.5–5.5)
PROT SERPL-MCNC: 6.3 G/DL (ref 6.4–8.2)
RBC # BLD AUTO: 3.28 M/UL (ref 4.2–5.3)
SODIUM SERPL-SCNC: 136 MMOL/L (ref 136–145)
WBC # BLD AUTO: 5.9 K/UL (ref 4.6–13.2)

## 2023-09-27 PROCEDURE — 83735 ASSAY OF MAGNESIUM: CPT

## 2023-09-27 PROCEDURE — 74177 CT ABD & PELVIS W/CONTRAST: CPT

## 2023-09-27 PROCEDURE — 83690 ASSAY OF LIPASE: CPT

## 2023-09-27 PROCEDURE — 93005 ELECTROCARDIOGRAM TRACING: CPT

## 2023-09-27 PROCEDURE — 6360000004 HC RX CONTRAST MEDICATION

## 2023-09-27 PROCEDURE — 83605 ASSAY OF LACTIC ACID: CPT

## 2023-09-27 PROCEDURE — 80053 COMPREHEN METABOLIC PANEL: CPT

## 2023-09-27 PROCEDURE — 85025 COMPLETE CBC W/AUTO DIFF WBC: CPT

## 2023-09-27 PROCEDURE — 99285 EMERGENCY DEPT VISIT HI MDM: CPT

## 2023-09-27 RX ORDER — IODIXANOL 320 MG/ML
80 INJECTION, SOLUTION INTRAVASCULAR
Status: COMPLETED | OUTPATIENT
Start: 2023-09-27 | End: 2023-09-27

## 2023-09-27 RX ADMIN — IODIXANOL 80 ML: 320 INJECTION, SOLUTION INTRAVASCULAR at 18:44

## 2023-09-27 ASSESSMENT — ENCOUNTER SYMPTOMS
RHINORRHEA: 0
ABDOMINAL DISTENTION: 0
NAUSEA: 0
ABDOMINAL PAIN: 1
VOMITING: 0
DIARRHEA: 0

## 2023-09-27 ASSESSMENT — PAIN SCALES - GENERAL: PAINLEVEL_OUTOF10: 5

## 2023-09-27 NOTE — ED NOTES
Assumed care of Pt resting NAD, VS assessed hypertensive. Pt reports no BM today. Pt reports abdominal pain decreased appetite x 1.5wks intermittent. Reports pain 5/10 intermittent. Pt reports abdominal issues.       Jamel Diaz RN  09/27/23 [facility] Je Person RN  09/27/23 Denice Rinaldi

## 2023-09-27 NOTE — ED TRIAGE NOTES
Pt in ED with c/o of abdominal cramping, Diarrhea, and loss of apatite onset 1 week ago. PT does peritoneal Dialysis nightly, last session was last night . Pt was told by PCP to come to ER.  Pt denies SOB, and Chest pain at this time

## 2023-09-27 NOTE — ED PROVIDER NOTES
I was available for consultation on the patient if deemed necessary by advanced practice provider. I was not involved in the care of this patient nor had any participation in the medical decision making process. I was the attending on duty when the patient came to the ER was seen independently by the JORGE. The patient was discharged or admitted from the ER without my knowledge. I was available for consultation but was not requested to evaluate the patient, nor asked supervised the case. I did not see the patient and I am signing this chart administratively after the fact. EKG  The Ekg interpreted by me shows  normal sinus rhythm with a rate of 79  Axis is   Left axis deviation  QTc is   prolonged at 520 ms  Intervals and Durations are unremarkable. ST Segments: elevation in  I and depression in  II  Delta waves, Brugada Syndrome, and Short WV are not present. Prior EKG to compare with was available.  No significant changes compared to prior EKG from May 9, 2023        Pretty Perales MD  09/27/23 [id number]
Sevelamer Diarrhea and Other (See Comments)    Sevelamer Carbonate Itching     With rash and sweatiness    Ibuprofen      Other reaction(s): stomach upset       Social Determinants of Health:  Social Determinants of Health     Tobacco Use: Low Risk  (8/10/2023)    Patient History     Smoking Tobacco Use: Never     Smokeless Tobacco Use: Never     Passive Exposure: Not on file   Alcohol Use: Not At Risk (6/7/2023)    AUDIT-C     Frequency of Alcohol Consumption: Never     Average Number of Drinks: Patient does not drink     Frequency of Binge Drinking: Never   Financial Resource Strain: Low Risk  (3/7/2023)    Overall Financial Resource Strain (CARDIA)     Difficulty of Paying Living Expenses: Not very hard   Food Insecurity: No Food Insecurity (3/7/2023)    Hunger Vital Sign     Worried About Running Out of Food in the Last Year: Never true     Ran Out of Food in the Last Year: Never true   Transportation Needs: Unknown (3/7/2023)    PRAPARE - Transportation     Lack of Transportation (Medical): Not on file     Lack of Transportation (Non-Medical): No   Physical Activity: Inactive (6/7/2023)    Exercise Vital Sign     Days of Exercise per Week: 0 days     Minutes of Exercise per Session: 0 min   Stress: Not on file   Social Connections: Not on file   Intimate Partner Violence: Not on file   Depression: Not at risk (8/10/2023)    PHQ-2     PHQ-2 Score: 0   Housing Stability: Unknown (3/7/2023)    Housing Stability Vital Sign     Unable to Pay for Housing in the Last Year: Not on file     Number of Places Lived in the Last Year: Not on file     Unstable Housing in the Last Year: No       Review of Systems   Review of Systems   Constitutional:  Negative for activity change, chills and fever. HENT:  Negative for congestion and rhinorrhea. Cardiovascular:  Negative for chest pain and palpitations. Gastrointestinal:  Positive for abdominal pain. Negative for abdominal distention, diarrhea, nausea and vomiting.

## 2023-09-28 LAB
EKG ATRIAL RATE: 79 BPM
EKG DIAGNOSIS: NORMAL
EKG P AXIS: 59 DEGREES
EKG P-R INTERVAL: 176 MS
EKG Q-T INTERVAL: 454 MS
EKG QRS DURATION: 152 MS
EKG QTC CALCULATION (BAZETT): 520 MS
EKG R AXIS: -43 DEGREES
EKG T AXIS: 16 DEGREES
EKG VENTRICULAR RATE: 79 BPM

## 2023-09-28 PROCEDURE — 93010 ELECTROCARDIOGRAM REPORT: CPT | Performed by: INTERNAL MEDICINE

## 2023-09-28 NOTE — DISCHARGE INSTRUCTIONS
Please follow-up closely with GI doctor, and on strict return precautions any worsening or new development of symptoms such as fever worsening abdominal pain nausea or vomiting please return to ED immediately.

## 2023-10-19 ENCOUNTER — OFFICE VISIT (OUTPATIENT)
Age: 83
End: 2023-10-19
Payer: MEDICARE

## 2023-10-19 VITALS
OXYGEN SATURATION: 98 % | BODY MASS INDEX: 25.29 KG/M2 | HEIGHT: 65 IN | HEART RATE: 85 BPM | RESPIRATION RATE: 16 BRPM | TEMPERATURE: 98.3 F | DIASTOLIC BLOOD PRESSURE: 65 MMHG | SYSTOLIC BLOOD PRESSURE: 160 MMHG

## 2023-10-19 DIAGNOSIS — M25.551 RIGHT HIP PAIN: ICD-10-CM

## 2023-10-19 DIAGNOSIS — J06.9 ACUTE URI: Primary | ICD-10-CM

## 2023-10-19 PROCEDURE — 1123F ACP DISCUSS/DSCN MKR DOCD: CPT | Performed by: FAMILY MEDICINE

## 2023-10-19 PROCEDURE — 99214 OFFICE O/P EST MOD 30 MIN: CPT | Performed by: FAMILY MEDICINE

## 2023-10-19 PROCEDURE — 3074F SYST BP LT 130 MM HG: CPT | Performed by: FAMILY MEDICINE

## 2023-10-19 PROCEDURE — 3078F DIAST BP <80 MM HG: CPT | Performed by: FAMILY MEDICINE

## 2023-10-19 RX ORDER — AZITHROMYCIN 250 MG/1
TABLET, FILM COATED ORAL
Qty: 6 TABLET | Refills: 0 | Status: SHIPPED | OUTPATIENT
Start: 2023-10-19

## 2023-10-19 RX ORDER — HYDROXYZINE HYDROCHLORIDE 10 MG/1
10 TABLET, FILM COATED ORAL 3 TIMES DAILY PRN
Qty: 30 TABLET | Refills: 0 | Status: SHIPPED | OUTPATIENT
Start: 2023-10-19 | End: 2023-10-29

## 2023-10-19 RX ORDER — FAMOTIDINE 40 MG/1
TABLET, FILM COATED ORAL
COMMUNITY
Start: 2023-08-24

## 2023-10-23 ENCOUNTER — HOSPITAL ENCOUNTER (OUTPATIENT)
Facility: HOSPITAL | Age: 83
Discharge: HOME | End: 2023-10-26
Payer: MEDICARE

## 2023-10-23 DIAGNOSIS — M25.551 RIGHT HIP PAIN: ICD-10-CM

## 2023-10-23 PROCEDURE — 73502 X-RAY EXAM HIP UNI 2-3 VIEWS: CPT

## 2023-11-03 LAB — HBA1C MFR BLD HPLC: 5.9 %

## 2023-11-09 NOTE — TELEPHONE ENCOUNTER
Contacted Love at [facility] [employer] and informed her that prescription for levaquin per Delroy Benedict is for 7 days. Yes

## 2023-11-13 ENCOUNTER — APPOINTMENT (OUTPATIENT)
Facility: HOSPITAL | Age: 83
DRG: 947 | End: 2023-11-13
Payer: MEDICARE

## 2023-11-13 ENCOUNTER — HOSPITAL ENCOUNTER (INPATIENT)
Facility: HOSPITAL | Age: 83
LOS: 8 days | Discharge: NURSING, REHAB OR LONG-TERM CARE | DRG: 947 | End: 2023-11-21
Attending: EMERGENCY MEDICINE | Admitting: HOSPITALIST
Payer: MEDICARE

## 2023-11-13 DIAGNOSIS — I63.9 ACUTE STROKE DUE TO ISCHEMIA (HCC): ICD-10-CM

## 2023-11-13 DIAGNOSIS — E16.2 HYPOGLYCEMIA: ICD-10-CM

## 2023-11-13 DIAGNOSIS — N18.6 ESRD ON PERITONEAL DIALYSIS (HCC): ICD-10-CM

## 2023-11-13 DIAGNOSIS — Z99.2 ESRD ON PERITONEAL DIALYSIS (HCC): ICD-10-CM

## 2023-11-13 DIAGNOSIS — I63.9 ACUTE ISCHEMIC STROKE (HCC): Primary | ICD-10-CM

## 2023-11-13 LAB
ALBUMIN SERPL-MCNC: 3 G/DL (ref 3.4–5)
ALBUMIN/GLOB SERPL: 0.9 (ref 0.8–1.7)
ALP SERPL-CCNC: 127 U/L (ref 45–117)
ALT SERPL-CCNC: 44 U/L (ref 13–56)
ANION GAP SERPL CALC-SCNC: 10 MMOL/L (ref 3–18)
AST SERPL-CCNC: 35 U/L (ref 10–38)
BASOPHILS # BLD: 0 K/UL (ref 0–0.1)
BASOPHILS NFR BLD: 1 % (ref 0–2)
BILIRUB SERPL-MCNC: 0.5 MG/DL (ref 0.2–1)
BUN SERPL-MCNC: 51 MG/DL (ref 7–18)
BUN/CREAT SERPL: 5 (ref 12–20)
CALCIUM SERPL-MCNC: 10.2 MG/DL (ref 8.5–10.1)
CHLORIDE SERPL-SCNC: 99 MMOL/L (ref 100–111)
CO2 SERPL-SCNC: 26 MMOL/L (ref 21–32)
CREAT SERPL-MCNC: 10.4 MG/DL (ref 0.6–1.3)
DIFFERENTIAL METHOD BLD: NORMAL
EOSINOPHIL # BLD: 0 K/UL (ref 0–0.4)
EOSINOPHIL NFR BLD: 1 % (ref 0–5)
ERYTHROCYTE [DISTWIDTH] IN BLOOD BY AUTOMATED COUNT: 14.3 % (ref 11.6–14.5)
GLOBULIN SER CALC-MCNC: 3.3 G/DL (ref 2–4)
GLUCOSE BLD STRIP.AUTO-MCNC: 107 MG/DL (ref 70–110)
GLUCOSE BLD STRIP.AUTO-MCNC: 258 MG/DL (ref 70–110)
GLUCOSE BLD STRIP.AUTO-MCNC: 51 MG/DL (ref 70–110)
GLUCOSE BLD STRIP.AUTO-MCNC: 51 MG/DL (ref 70–110)
GLUCOSE BLD STRIP.AUTO-MCNC: 54 MG/DL (ref 70–110)
GLUCOSE SERPL-MCNC: 65 MG/DL (ref 74–99)
HCT VFR BLD AUTO: 37.3 % (ref 35–45)
HGB BLD-MCNC: 12.1 G/DL (ref 12–16)
IMM GRANULOCYTES # BLD AUTO: 0 K/UL (ref 0–0.04)
IMM GRANULOCYTES NFR BLD AUTO: 0 % (ref 0–0.5)
INR PPP: 1.1 (ref 0.9–1.1)
LYMPHOCYTES # BLD: 1.3 K/UL (ref 0.9–3.6)
LYMPHOCYTES NFR BLD: 22 % (ref 21–52)
MCH RBC QN AUTO: 28.5 PG (ref 24–34)
MCHC RBC AUTO-ENTMCNC: 32.4 G/DL (ref 31–37)
MCV RBC AUTO: 88 FL (ref 78–100)
MONOCYTES # BLD: 0.4 K/UL (ref 0.05–1.2)
MONOCYTES NFR BLD: 7 % (ref 3–10)
NEUTS SEG # BLD: 4.1 K/UL (ref 1.8–8)
NEUTS SEG NFR BLD: 70 % (ref 40–73)
NRBC # BLD: 0 K/UL (ref 0–0.01)
NRBC BLD-RTO: 0 PER 100 WBC
PLATELET # BLD AUTO: 187 K/UL (ref 135–420)
PMV BLD AUTO: 10 FL (ref 9.2–11.8)
POTASSIUM SERPL-SCNC: 4.8 MMOL/L (ref 3.5–5.5)
PROT SERPL-MCNC: 6.3 G/DL (ref 6.4–8.2)
PROTHROMBIN TIME: 14.1 SEC (ref 11.9–14.7)
RBC # BLD AUTO: 4.24 M/UL (ref 4.2–5.3)
SODIUM SERPL-SCNC: 135 MMOL/L (ref 136–145)
TROPONIN I SERPL HS-MCNC: 133 NG/L (ref 0–54)
TROPONIN I SERPL HS-MCNC: 147 NG/L (ref 0–54)
WBC # BLD AUTO: 5.8 K/UL (ref 4.6–13.2)

## 2023-11-13 PROCEDURE — 70450 CT HEAD/BRAIN W/O DYE: CPT

## 2023-11-13 PROCEDURE — 2580000003 HC RX 258: Performed by: EMERGENCY MEDICINE

## 2023-11-13 PROCEDURE — 6370000000 HC RX 637 (ALT 250 FOR IP): Performed by: PHYSICIAN ASSISTANT

## 2023-11-13 PROCEDURE — 85025 COMPLETE CBC W/AUTO DIFF WBC: CPT

## 2023-11-13 PROCEDURE — 1100000003 HC PRIVATE W/ TELEMETRY

## 2023-11-13 PROCEDURE — 96374 THER/PROPH/DIAG INJ IV PUSH: CPT

## 2023-11-13 PROCEDURE — 85610 PROTHROMBIN TIME: CPT

## 2023-11-13 PROCEDURE — 80053 COMPREHEN METABOLIC PANEL: CPT

## 2023-11-13 PROCEDURE — 99223 1ST HOSP IP/OBS HIGH 75: CPT | Performed by: PHYSICIAN ASSISTANT

## 2023-11-13 PROCEDURE — 82962 GLUCOSE BLOOD TEST: CPT

## 2023-11-13 PROCEDURE — 36415 COLL VENOUS BLD VENIPUNCTURE: CPT

## 2023-11-13 PROCEDURE — 93005 ELECTROCARDIOGRAM TRACING: CPT | Performed by: EMERGENCY MEDICINE

## 2023-11-13 PROCEDURE — 84484 ASSAY OF TROPONIN QUANT: CPT

## 2023-11-13 PROCEDURE — 6360000002 HC RX W HCPCS: Performed by: PHYSICIAN ASSISTANT

## 2023-11-13 PROCEDURE — 99285 EMERGENCY DEPT VISIT HI MDM: CPT

## 2023-11-13 RX ORDER — ATORVASTATIN CALCIUM 40 MG/1
80 TABLET, FILM COATED ORAL NIGHTLY
Status: DISCONTINUED | OUTPATIENT
Start: 2023-11-13 | End: 2023-11-21 | Stop reason: HOSPADM

## 2023-11-13 RX ORDER — HEPARIN SODIUM 5000 [USP'U]/ML
5000 INJECTION, SOLUTION INTRAVENOUS; SUBCUTANEOUS EVERY 8 HOURS SCHEDULED
Status: DISCONTINUED | OUTPATIENT
Start: 2023-11-13 | End: 2023-11-21 | Stop reason: HOSPADM

## 2023-11-13 RX ORDER — ASPIRIN 81 MG/1
81 TABLET, CHEWABLE ORAL DAILY
Status: DISCONTINUED | OUTPATIENT
Start: 2023-11-13 | End: 2023-11-13

## 2023-11-13 RX ORDER — SODIUM CHLORIDE 9 MG/ML
INJECTION, SOLUTION INTRAVENOUS CONTINUOUS
Status: DISCONTINUED | OUTPATIENT
Start: 2023-11-13 | End: 2023-11-14

## 2023-11-13 RX ORDER — CILOSTAZOL 50 MG/1
TABLET ORAL
COMMUNITY
Start: 2023-11-11 | End: 2023-11-21

## 2023-11-13 RX ORDER — INSULIN LISPRO 100 [IU]/ML
0-4 INJECTION, SOLUTION INTRAVENOUS; SUBCUTANEOUS NIGHTLY
Status: DISCONTINUED | OUTPATIENT
Start: 2023-11-13 | End: 2023-11-21 | Stop reason: HOSPADM

## 2023-11-13 RX ORDER — POLYETHYLENE GLYCOL 3350 17 G/17G
17 POWDER, FOR SOLUTION ORAL DAILY PRN
Status: DISCONTINUED | OUTPATIENT
Start: 2023-11-13 | End: 2023-11-21 | Stop reason: HOSPADM

## 2023-11-13 RX ORDER — INSULIN LISPRO 100 [IU]/ML
0-4 INJECTION, SOLUTION INTRAVENOUS; SUBCUTANEOUS
Status: DISCONTINUED | OUTPATIENT
Start: 2023-11-14 | End: 2023-11-21 | Stop reason: HOSPADM

## 2023-11-13 RX ORDER — ONDANSETRON 4 MG/1
4 TABLET, ORALLY DISINTEGRATING ORAL EVERY 8 HOURS PRN
Status: DISCONTINUED | OUTPATIENT
Start: 2023-11-13 | End: 2023-11-21 | Stop reason: HOSPADM

## 2023-11-13 RX ORDER — ONDANSETRON 2 MG/ML
4 INJECTION INTRAMUSCULAR; INTRAVENOUS EVERY 6 HOURS PRN
Status: DISCONTINUED | OUTPATIENT
Start: 2023-11-13 | End: 2023-11-21 | Stop reason: HOSPADM

## 2023-11-13 RX ORDER — ASPIRIN 300 MG/1
300 SUPPOSITORY RECTAL DAILY
Status: DISCONTINUED | OUTPATIENT
Start: 2023-11-13 | End: 2023-11-13

## 2023-11-13 RX ORDER — REPAGLINIDE 0.5 MG/1
TABLET ORAL
COMMUNITY
Start: 2023-11-04

## 2023-11-13 RX ORDER — ASPIRIN 81 MG/1
81 TABLET, CHEWABLE ORAL DAILY
Status: DISCONTINUED | OUTPATIENT
Start: 2023-11-14 | End: 2023-11-14

## 2023-11-13 RX ORDER — LABETALOL HYDROCHLORIDE 5 MG/ML
10 INJECTION, SOLUTION INTRAVENOUS EVERY 10 MIN PRN
Status: DISCONTINUED | OUTPATIENT
Start: 2023-11-13 | End: 2023-11-21 | Stop reason: HOSPADM

## 2023-11-13 RX ORDER — GLUCAGON 1 MG
1 KIT INJECTION PRN
Status: DISCONTINUED | OUTPATIENT
Start: 2023-11-13 | End: 2023-11-21 | Stop reason: HOSPADM

## 2023-11-13 RX ORDER — DEXTROSE MONOHYDRATE 100 MG/ML
INJECTION, SOLUTION INTRAVENOUS CONTINUOUS PRN
Status: DISCONTINUED | OUTPATIENT
Start: 2023-11-13 | End: 2023-11-21 | Stop reason: HOSPADM

## 2023-11-13 RX ORDER — DEXTROSE MONOHYDRATE 25 G/50ML
25 INJECTION, SOLUTION INTRAVENOUS PRN
Status: DISCONTINUED | OUTPATIENT
Start: 2023-11-13 | End: 2023-11-13

## 2023-11-13 RX ADMIN — DEXTROSE MONOHYDRATE 125 ML: 10 INJECTION, SOLUTION INTRAVENOUS at 16:30

## 2023-11-13 RX ADMIN — ATORVASTATIN CALCIUM 80 MG: 40 TABLET, FILM COATED ORAL at 22:51

## 2023-11-13 RX ADMIN — HEPARIN SODIUM 5000 UNITS: 5000 INJECTION INTRAVENOUS; SUBCUTANEOUS at 22:51

## 2023-11-13 ASSESSMENT — ENCOUNTER SYMPTOMS
EYES NEGATIVE: 1
CHEST TIGHTNESS: 0
ABDOMINAL PAIN: 0

## 2023-11-13 NOTE — ED PROVIDER NOTES
EMERGENCY DEPARTMENT HISTORY AND PHYSICAL EXAM    5:04 PM      Date: 11/13/2023  Patient Name: Cristina Finch    History of Presenting Illness     Chief Complaint   Patient presents with    Stroke       History From: Patient  Patient is an 80-year-old female on peritoneal dialysis that presents to the emergency department with a complaint of right arm weakness and difficulty speaking which was noted today upon waking. Patient went to bed at 8 PM and went to bed at her baseline. Patient is on peritoneal dialysis and does the dialysis at home and has been doing well with that and is compliant with all of her medications. Patient is also diabetic. Patient said her blood sugars dropped down low in the past and she had symptoms with it but was not aware her blood sugar was low today. There are no other known aggravating or alleviating factors. Patient is not a smoker, drinker, nor drug user. The patient lives with her family. Nursing Notes were all reviewed and agreed with or any disagreements were addressed in the HPI. PCP: Emi Edmonds MD    No current facility-administered medications for this encounter.      Current Outpatient Medications   Medication Sig Dispense Refill    Calcium Carbonate Antacid (TUMS ULTRA PO) Tums Ultra      famotidine (PEPCID) 40 MG tablet       azithromycin (ZITHROMAX) 250 MG tablet Take 2 tabs PO on Day #1 and then 1 tab PO on Days #2-5 6 tablet 0    diphenoxylate-atropine (LOMOTIL) 2.5-0.025 MG per tablet TAKE 1 TABLET BY MOUTH 4 TIMES DAILY AS NEEDED FOR DIARRHEA FOR UP TO 10 DAYS MAX DAILY AMOUNT 4 TABLETS      pantoprazole (PROTONIX) 40 MG tablet       potassium chloride (KLOR-CON) 10 MEQ extended release tablet       metoprolol succinate (TOPROL XL) 25 MG extended release tablet Take 1 tablet by mouth daily 90 tablet 2    fluticasone (FLONASE) 50 MCG/ACT nasal spray 2 sprays by Each Nostril route daily 16 g 0    blood glucose test strips (EXACTECH TEST) strip

## 2023-11-13 NOTE — ED TRIAGE NOTES
Pt arrived in ER for stroke like symptoms via EMS. LKW was 2000 on 11/12/23. Hx of hypertension, ESRD, and diabetes.

## 2023-11-13 NOTE — H&P
History and Physical          Subjective     HPI: Sarah Hendrickson is a 80 y.o. female with a PMHx of ESRD on PD, DM, HTN who presented to the ED with c/o right sided weakness that was first noted when she woke up this morning. Sx are still present. No exacerbating or alleviating factors. She denies prior hx of CVA. She also denies numbness, tingling, palpitations, cp, sob, abd pain, nvd, leg swelling. In the ED, BP initially 217/165. Labs with initial BG 65, now 258. Trop 147. CTH negative.      PMHx:  Past Medical History:   Diagnosis Date    Allergic rhinitis     Chronic kidney disease 2020    Peritoneal Dialysis    Diabetes ([address]) 2015    IDDM    H/O seasonal allergies     Hypertension     Left foot drop     Peritoneal dialysis catheter in place [facility])        PSurgHx:  Past Surgical History:   Procedure Laterality Date    CARPAL TUNNEL RELEASE Left     CATARACT REMOVAL  2011    COLONOSCOPY N/A 9/26/2022    COLONOSCOPY with bx's and polypectomy performed by Robles Hoffmann MD at [facility]    COLONOSCOPY N/A 10/8/2022    COLONOSCOPY/ Polypectomies performed by Andriy Francis MD at [address] [facility]  2004-last sx    fused L2-S1, in [address] [facility] EXTRACTION         SocialHx:  Social History     Socioeconomic History    Marital status:      Spouse name: None    Number of children: None    Years of education: None    Highest education level: None   Tobacco Use    Smoking status: Never    Smokeless tobacco: Never   Vaping Use    Vaping Use: Never used   Substance and Sexual Activity    Alcohol use: No    Drug use: Never     Social Determinants of Health     Financial Resource Strain: Low Risk  (3/7/2023)    Overall Financial Resource Strain (CARDIA)     Difficulty of Paying Living Expenses: Not very hard   Food Insecurity: No Food Insecurity (3/7/2023)    Hunger Vital Sign     Worried About Running Out of Food in the Last Year: Never true     [address] in the

## 2023-11-14 ENCOUNTER — APPOINTMENT (OUTPATIENT)
Facility: HOSPITAL | Age: 83
DRG: 947 | End: 2023-11-14
Payer: MEDICARE

## 2023-11-14 LAB
ANION GAP SERPL CALC-SCNC: 12 MMOL/L (ref 3–18)
BUN SERPL-MCNC: 51 MG/DL (ref 7–18)
BUN/CREAT SERPL: 5 (ref 12–20)
CALCIUM SERPL-MCNC: 9.7 MG/DL (ref 8.5–10.1)
CHLORIDE SERPL-SCNC: 99 MMOL/L (ref 100–111)
CHOLEST SERPL-MCNC: 204 MG/DL
CO2 SERPL-SCNC: 23 MMOL/L (ref 21–32)
CREAT SERPL-MCNC: 10.5 MG/DL (ref 0.6–1.3)
EKG ATRIAL RATE: 90 BPM
EKG DIAGNOSIS: NORMAL
EKG P AXIS: 46 DEGREES
EKG P-R INTERVAL: 168 MS
EKG Q-T INTERVAL: 400 MS
EKG QRS DURATION: 146 MS
EKG QTC CALCULATION (BAZETT): 489 MS
EKG R AXIS: -40 DEGREES
EKG T AXIS: -1 DEGREES
EKG VENTRICULAR RATE: 90 BPM
ERYTHROCYTE [DISTWIDTH] IN BLOOD BY AUTOMATED COUNT: 14.2 % (ref 11.6–14.5)
EST. AVERAGE GLUCOSE BLD GHB EST-MCNC: 111 MG/DL
GLUCOSE BLD STRIP.AUTO-MCNC: 71 MG/DL (ref 70–110)
GLUCOSE BLD STRIP.AUTO-MCNC: 89 MG/DL (ref 70–110)
GLUCOSE BLD STRIP.AUTO-MCNC: 98 MG/DL (ref 70–110)
GLUCOSE SERPL-MCNC: 44 MG/DL (ref 74–99)
HBA1C MFR BLD: 5.5 % (ref 4.2–5.6)
HCT VFR BLD AUTO: 29 % (ref 35–45)
HDLC SERPL-MCNC: 70 MG/DL (ref 40–60)
HDLC SERPL: 2.9 (ref 0–5)
HGB BLD-MCNC: 9.5 G/DL (ref 12–16)
LDLC SERPL CALC-MCNC: 115.2 MG/DL (ref 0–100)
LIPID PANEL: ABNORMAL
MCH RBC QN AUTO: 28.4 PG (ref 24–34)
MCHC RBC AUTO-ENTMCNC: 32.8 G/DL (ref 31–37)
MCV RBC AUTO: 86.6 FL (ref 78–100)
NRBC # BLD: 0 K/UL (ref 0–0.01)
NRBC BLD-RTO: 0 PER 100 WBC
PLATELET # BLD AUTO: 168 K/UL (ref 135–420)
PMV BLD AUTO: 10.2 FL (ref 9.2–11.8)
POTASSIUM SERPL-SCNC: 4.1 MMOL/L (ref 3.5–5.5)
RBC # BLD AUTO: 3.35 M/UL (ref 4.2–5.3)
SODIUM SERPL-SCNC: 134 MMOL/L (ref 136–145)
TRIGL SERPL-MCNC: 94 MG/DL
VLDLC SERPL CALC-MCNC: 18.8 MG/DL
WBC # BLD AUTO: 5.6 K/UL (ref 4.6–13.2)

## 2023-11-14 PROCEDURE — 70547 MR ANGIOGRAPHY NECK W/O DYE: CPT

## 2023-11-14 PROCEDURE — 6360000002 HC RX W HCPCS: Performed by: PHYSICIAN ASSISTANT

## 2023-11-14 PROCEDURE — 99223 1ST HOSP IP/OBS HIGH 75: CPT | Performed by: PSYCHIATRY & NEUROLOGY

## 2023-11-14 PROCEDURE — 93010 ELECTROCARDIOGRAM REPORT: CPT | Performed by: INTERNAL MEDICINE

## 2023-11-14 PROCEDURE — 70544 MR ANGIOGRAPHY HEAD W/O DYE: CPT

## 2023-11-14 PROCEDURE — 85027 COMPLETE CBC AUTOMATED: CPT

## 2023-11-14 PROCEDURE — 97162 PT EVAL MOD COMPLEX 30 MIN: CPT

## 2023-11-14 PROCEDURE — 94761 N-INVAS EAR/PLS OXIMETRY MLT: CPT

## 2023-11-14 PROCEDURE — 6370000000 HC RX 637 (ALT 250 FOR IP): Performed by: PHYSICIAN ASSISTANT

## 2023-11-14 PROCEDURE — 99232 SBSQ HOSP IP/OBS MODERATE 35: CPT | Performed by: STUDENT IN AN ORGANIZED HEALTH CARE EDUCATION/TRAINING PROGRAM

## 2023-11-14 PROCEDURE — 97166 OT EVAL MOD COMPLEX 45 MIN: CPT

## 2023-11-14 PROCEDURE — 70551 MRI BRAIN STEM W/O DYE: CPT

## 2023-11-14 PROCEDURE — 2500000003 HC RX 250 WO HCPCS: Performed by: HOSPITALIST

## 2023-11-14 PROCEDURE — 80048 BASIC METABOLIC PNL TOTAL CA: CPT

## 2023-11-14 PROCEDURE — 82962 GLUCOSE BLOOD TEST: CPT

## 2023-11-14 PROCEDURE — 2580000003 HC RX 258: Performed by: PHYSICIAN ASSISTANT

## 2023-11-14 PROCEDURE — 97530 THERAPEUTIC ACTIVITIES: CPT

## 2023-11-14 PROCEDURE — 80061 LIPID PANEL: CPT

## 2023-11-14 PROCEDURE — 1100000003 HC PRIVATE W/ TELEMETRY

## 2023-11-14 PROCEDURE — 6370000000 HC RX 637 (ALT 250 FOR IP): Performed by: STUDENT IN AN ORGANIZED HEALTH CARE EDUCATION/TRAINING PROGRAM

## 2023-11-14 PROCEDURE — 97535 SELF CARE MNGMENT TRAINING: CPT

## 2023-11-14 PROCEDURE — 83036 HEMOGLOBIN GLYCOSYLATED A1C: CPT

## 2023-11-14 PROCEDURE — 2500000003 HC RX 250 WO HCPCS: Performed by: STUDENT IN AN ORGANIZED HEALTH CARE EDUCATION/TRAINING PROGRAM

## 2023-11-14 PROCEDURE — 93306 TTE W/DOPPLER COMPLETE: CPT

## 2023-11-14 PROCEDURE — 36415 COLL VENOUS BLD VENIPUNCTURE: CPT

## 2023-11-14 RX ORDER — HYDROMORPHONE HYDROCHLORIDE 1 MG/ML
1 INJECTION, SOLUTION INTRAMUSCULAR; INTRAVENOUS; SUBCUTANEOUS ONCE
Status: COMPLETED | OUTPATIENT
Start: 2023-11-14 | End: 2023-11-14

## 2023-11-14 RX ORDER — ACYCLOVIR 200 MG/1
10 CAPSULE ORAL ONCE
Status: COMPLETED | OUTPATIENT
Start: 2023-11-14 | End: 2023-11-14

## 2023-11-14 RX ORDER — CLOPIDOGREL BISULFATE 75 MG/1
75 TABLET ORAL DAILY
Status: DISCONTINUED | OUTPATIENT
Start: 2023-11-14 | End: 2023-11-21 | Stop reason: HOSPADM

## 2023-11-14 RX ORDER — SODIUM CHLORIDE, SODIUM LACTATE, CALCIUM CHLORIDE, MAGNESIUM CHLORIDE AND DEXTROSE 2.5; 538; 448; 18.3; 5.08 G/100ML; MG/100ML; MG/100ML; MG/100ML; MG/100ML
10000 INJECTION, SOLUTION INTRAPERITONEAL DAILY
Status: DISCONTINUED | OUTPATIENT
Start: 2023-11-14 | End: 2023-11-21 | Stop reason: HOSPADM

## 2023-11-14 RX ADMIN — SODIUM CHLORIDE 10 ML: 9 INJECTION INTRAMUSCULAR; INTRAVENOUS; SUBCUTANEOUS at 17:36

## 2023-11-14 RX ADMIN — HEPARIN SODIUM 5000 UNITS: 5000 INJECTION INTRAVENOUS; SUBCUTANEOUS at 05:44

## 2023-11-14 RX ADMIN — HYDROMORPHONE HYDROCHLORIDE 1 MG: 1 INJECTION, SOLUTION INTRAMUSCULAR; INTRAVENOUS; SUBCUTANEOUS at 09:06

## 2023-11-14 RX ADMIN — HEPARIN SODIUM 5000 UNITS: 5000 INJECTION INTRAVENOUS; SUBCUTANEOUS at 16:53

## 2023-11-14 RX ADMIN — SODIUM CHLORIDE: 9 INJECTION, SOLUTION INTRAVENOUS at 01:34

## 2023-11-14 RX ADMIN — HEPARIN SODIUM 5000 UNITS: 5000 INJECTION INTRAVENOUS; SUBCUTANEOUS at 22:18

## 2023-11-14 RX ADMIN — ATORVASTATIN CALCIUM 80 MG: 40 TABLET, FILM COATED ORAL at 22:18

## 2023-11-14 RX ADMIN — CLOPIDOGREL BISULFATE 75 MG: 75 TABLET, FILM COATED ORAL at 16:53

## 2023-11-14 ASSESSMENT — PAIN SCALES - GENERAL
PAINLEVEL_OUTOF10: 0

## 2023-11-14 NOTE — CARE COORDINATION
Case Management Assessment  Initial Evaluation    Date/Time of Evaluation: 11/14/2023 4:06 PM  Assessment Completed by: Genny Gonzalez    If patient is discharged prior to next notation, then this note serves as note for discharge by case management. Patient Name: Francisca Oleary                   YOB: 1940  Diagnosis: Acute ischemic stroke [facility]) [I63.9]                   Date / Time: 11/13/2023  3:21 PM    Patient Admission Status: Inpatient   Readmission Risk (Low < 19, Mod (19-27), High > 27): Readmission Risk Score: 20.6    Current PCP: Logan Jaquez MD  PCP verified by CM? (P) Yes    Chart Reviewed: Yes      History Provided by: (P) Patient  Patient Orientation: (P) Alert and Oriented    Patient Cognition: (P) Alert    Hospitalization in the last 30 days (Readmission):  No    If yes, Readmission Assessment in CM Navigator will be completed. Advance Directives:      Code Status: Full Code   Patient's Primary Decision Maker is:      Primary Decision Maker: KaiAlpesh - Spouse - [phone]    Discharge Planning:    Patient lives with: (P) Spouse/Significant Other Type of Home: (P) House  Primary Care Giver: (P) Self  Patient Support Systems include: (P) Spouse/Significant Other, Children   Current services prior to admission: (P) None            Current DME:  walker, scooter, w/c, cane            Type of Home Care services:       ADLS  Prior functional level: (P) Independent in ADLs/IADLs, Assistance with the following:, Bathing, Dressing, Toileting, Cooking, Housework, Shopping, Mobility  Current functional level: (P) Independent in ADLs/IADLs, Assistance with the following:, Bathing, Dressing, Toileting, Feeding, Cooking, Housework, Shopping, Mobility    PT AM-PAC:   /24  OT AM-PAC: 16 /24    Family can provide assistance at DC: (P) Yes  Would you like Case Management to discuss the discharge plan with any other family members/significant others, and if so, who?     Plans to Return to

## 2023-11-15 ENCOUNTER — TELEPHONE (OUTPATIENT)
Age: 83
End: 2023-11-15

## 2023-11-15 LAB
ANION GAP SERPL CALC-SCNC: 12 MMOL/L (ref 3–18)
BUN SERPL-MCNC: 57 MG/DL (ref 7–18)
BUN/CREAT SERPL: 5 (ref 12–20)
CALCIUM SERPL-MCNC: 9.6 MG/DL (ref 8.5–10.1)
CHLORIDE SERPL-SCNC: 99 MMOL/L (ref 100–111)
CO2 SERPL-SCNC: 22 MMOL/L (ref 21–32)
CREAT SERPL-MCNC: 11 MG/DL (ref 0.6–1.3)
ECHO AO ASC DIAM: 3.1 CM
ECHO AO ASCENDING AORTA INDEX: 1.73 CM/M2
ECHO AO ROOT DIAM: 2.9 CM
ECHO AO ROOT INDEX: 1.62 CM/M2
ECHO BSA: 1.81 M2
ECHO EST RA PRESSURE: 8 MMHG
ECHO LA VOL A-L A2C: 60 ML (ref 22–52)
ECHO LA VOL A-L A4C: 76 ML (ref 22–52)
ECHO LA VOL MOD A2C: 55 ML (ref 22–52)
ECHO LA VOL MOD A4C: 69 ML (ref 22–52)
ECHO LA VOLUME AREA LENGTH: 69 ML
ECHO LA VOLUME INDEX A-L A2C: 34 ML/M2 (ref 16–34)
ECHO LA VOLUME INDEX A-L A4C: 42 ML/M2 (ref 16–34)
ECHO LA VOLUME INDEX AREA LENGTH: 39 ML/M2 (ref 16–34)
ECHO LA VOLUME INDEX MOD A2C: 31 ML/M2 (ref 16–34)
ECHO LA VOLUME INDEX MOD A4C: 39 ML/M2 (ref 16–34)
ECHO LV FRACTIONAL SHORTENING: 11 % (ref 28–44)
ECHO LV INTERNAL DIMENSION DIASTOLE INDEX: 2.46 CM/M2
ECHO LV INTERNAL DIMENSION DIASTOLIC: 4.4 CM (ref 3.9–5.3)
ECHO LV INTERNAL DIMENSION SYSTOLIC INDEX: 2.18 CM/M2
ECHO LV INTERNAL DIMENSION SYSTOLIC: 3.9 CM
ECHO LV IVSD: 1.4 CM (ref 0.6–0.9)
ECHO LV MASS 2D: 240.3 G (ref 67–162)
ECHO LV MASS INDEX 2D: 134.2 G/M2 (ref 43–95)
ECHO LV POSTERIOR WALL DIASTOLIC: 1.4 CM (ref 0.6–0.9)
ECHO LV RELATIVE WALL THICKNESS RATIO: 0.64
ECHO LVOT AREA: 3.1 CM2
ECHO LVOT DIAM: 2 CM
ECHO LVOT MEAN GRADIENT: 2 MMHG
ECHO LVOT PEAK GRADIENT: 3 MMHG
ECHO LVOT PEAK VELOCITY: 0.9 M/S
ECHO LVOT STROKE VOLUME INDEX: 38.2 ML/M2
ECHO LVOT SV: 68.5 ML
ECHO LVOT VTI: 21.8 CM
ECHO RV FREE WALL PEAK S': 8 CM/S
ECHO RV TAPSE: 1.8 CM (ref 1.7–?)
ERYTHROCYTE [DISTWIDTH] IN BLOOD BY AUTOMATED COUNT: 14.2 % (ref 11.6–14.5)
GLUCOSE BLD STRIP.AUTO-MCNC: 151 MG/DL (ref 70–110)
GLUCOSE SERPL-MCNC: 124 MG/DL (ref 74–99)
HBV SURFACE AB SER QL IA: POSITIVE
HBV SURFACE AB SERPL IA-ACNC: >1000 MIU/ML
HBV SURFACE AG SER QL: <0.1 INDEX
HBV SURFACE AG SER QL: NEGATIVE
HCT VFR BLD AUTO: 29.4 % (ref 35–45)
HEP BS AB COMMENT: NORMAL
HGB BLD-MCNC: 9.6 G/DL (ref 12–16)
MCH RBC QN AUTO: 28.7 PG (ref 24–34)
MCHC RBC AUTO-ENTMCNC: 32.7 G/DL (ref 31–37)
MCV RBC AUTO: 88 FL (ref 78–100)
NRBC # BLD: 0 K/UL (ref 0–0.01)
NRBC BLD-RTO: 0 PER 100 WBC
PLATELET # BLD AUTO: 175 K/UL (ref 135–420)
PMV BLD AUTO: 10.5 FL (ref 9.2–11.8)
POTASSIUM SERPL-SCNC: 4.1 MMOL/L (ref 3.5–5.5)
RBC # BLD AUTO: 3.34 M/UL (ref 4.2–5.3)
SODIUM SERPL-SCNC: 133 MMOL/L (ref 136–145)
WBC # BLD AUTO: 5.4 K/UL (ref 4.6–13.2)

## 2023-11-15 PROCEDURE — 99232 SBSQ HOSP IP/OBS MODERATE 35: CPT | Performed by: STUDENT IN AN ORGANIZED HEALTH CARE EDUCATION/TRAINING PROGRAM

## 2023-11-15 PROCEDURE — 82962 GLUCOSE BLOOD TEST: CPT

## 2023-11-15 PROCEDURE — 85027 COMPLETE CBC AUTOMATED: CPT

## 2023-11-15 PROCEDURE — 6360000002 HC RX W HCPCS: Performed by: PHYSICIAN ASSISTANT

## 2023-11-15 PROCEDURE — 1100000003 HC PRIVATE W/ TELEMETRY

## 2023-11-15 PROCEDURE — 2580000003 HC RX 258: Performed by: INTERNAL MEDICINE

## 2023-11-15 PROCEDURE — 6370000000 HC RX 637 (ALT 250 FOR IP): Performed by: PHYSICIAN ASSISTANT

## 2023-11-15 PROCEDURE — 90945 DIALYSIS ONE EVALUATION: CPT

## 2023-11-15 PROCEDURE — 87340 HEPATITIS B SURFACE AG IA: CPT

## 2023-11-15 PROCEDURE — 80048 BASIC METABOLIC PNL TOTAL CA: CPT

## 2023-11-15 PROCEDURE — 93306 TTE W/DOPPLER COMPLETE: CPT | Performed by: INTERNAL MEDICINE

## 2023-11-15 PROCEDURE — 6370000000 HC RX 637 (ALT 250 FOR IP): Performed by: STUDENT IN AN ORGANIZED HEALTH CARE EDUCATION/TRAINING PROGRAM

## 2023-11-15 PROCEDURE — 36415 COLL VENOUS BLD VENIPUNCTURE: CPT

## 2023-11-15 PROCEDURE — 86706 HEP B SURFACE ANTIBODY: CPT

## 2023-11-15 PROCEDURE — 92610 EVALUATE SWALLOWING FUNCTION: CPT

## 2023-11-15 PROCEDURE — 92526 ORAL FUNCTION THERAPY: CPT

## 2023-11-15 PROCEDURE — 3E1M39Z IRRIGATION OF PERITONEAL CAVITY USING DIALYSATE, PERCUTANEOUS APPROACH: ICD-10-PCS | Performed by: INTERNAL MEDICINE

## 2023-11-15 PROCEDURE — 6370000000 HC RX 637 (ALT 250 FOR IP): Performed by: INTERNAL MEDICINE

## 2023-11-15 RX ORDER — AMLODIPINE BESYLATE 5 MG/1
5 TABLET ORAL DAILY
Status: DISCONTINUED | OUTPATIENT
Start: 2023-11-15 | End: 2023-11-21 | Stop reason: HOSPADM

## 2023-11-15 RX ORDER — CLOPIDOGREL BISULFATE 75 MG/1
75 TABLET ORAL DAILY
Qty: 30 TABLET | Refills: 3 | Status: SHIPPED | OUTPATIENT
Start: 2023-11-15

## 2023-11-15 RX ORDER — ATORVASTATIN CALCIUM 80 MG/1
80 TABLET, FILM COATED ORAL NIGHTLY
Qty: 30 TABLET | Refills: 3 | Status: SHIPPED | OUTPATIENT
Start: 2023-11-15

## 2023-11-15 RX ORDER — LACTULOSE 10 G/15ML
20 SOLUTION ORAL DAILY
Status: DISCONTINUED | OUTPATIENT
Start: 2023-11-15 | End: 2023-11-21 | Stop reason: HOSPADM

## 2023-11-15 RX ADMIN — SODIUM CHLORIDE, SODIUM LACTATE, CALCIUM CHLORIDE, MAGNESIUM CHLORIDE AND DEXTROSE 10000 ML: 2.5; 538; 448; 18.3; 5.08 INJECTION, SOLUTION INTRAPERITONEAL at 00:42

## 2023-11-15 RX ADMIN — POLYETHYLENE GLYCOL 3350 17 G: 17 POWDER, FOR SOLUTION ORAL at 17:53

## 2023-11-15 RX ADMIN — HEPARIN SODIUM 5000 UNITS: 5000 INJECTION INTRAVENOUS; SUBCUTANEOUS at 07:25

## 2023-11-15 RX ADMIN — CLOPIDOGREL BISULFATE 75 MG: 75 TABLET, FILM COATED ORAL at 11:31

## 2023-11-15 RX ADMIN — HEPARIN SODIUM 5000 UNITS: 5000 INJECTION INTRAVENOUS; SUBCUTANEOUS at 21:13

## 2023-11-15 RX ADMIN — ATORVASTATIN CALCIUM 80 MG: 40 TABLET, FILM COATED ORAL at 21:13

## 2023-11-15 RX ADMIN — HEPARIN SODIUM 5000 UNITS: 5000 INJECTION INTRAVENOUS; SUBCUTANEOUS at 17:52

## 2023-11-15 RX ADMIN — AMLODIPINE BESYLATE 5 MG: 5 TABLET ORAL at 17:53

## 2023-11-15 RX ADMIN — LACTULOSE 20 G: 20 SOLUTION ORAL at 17:53

## 2023-11-15 ASSESSMENT — PAIN SCALES - GENERAL
PAINLEVEL_OUTOF10: 0

## 2023-11-15 NOTE — CARE COORDINATION
Patient is a Peritoneal Dialysis patient per notes. TAMY doddd Rola with SNF [facility] in [place] and updated. Peritoneal Dialysis may be barrier for SNF placement. Dr. Sloan Spencer updated.                Tiffany Alicia, RN  Case Management [phone]

## 2023-11-15 NOTE — TELEPHONE ENCOUNTER
Called patient for transition of care outreach, no answer. Left message to call office back when possible.

## 2023-11-15 NOTE — CONSULTS
Consult Note  Consult requested by: Ali Cowden, MD   Francisca Oleary is a 80 y.o. female Black / Elan [place]ulton who is being seen on consult for ESRD  Chief Complaint   Patient presents with    Stroke     Admission diagnosis: Acute ischemic stroke [facility])     HPI:  80 yr old with hx of diabetes,hypertension,esrd who presents with some slurred speech and arm weakness. Stroke work up is essentially negative. We were consulted for ESRD and management on PD. No complaints currently and is back to baseline. She uses 4 exchanges of 2.5 L CCPD nightly  Her urine output has been recently dropping  Her RRF is going down and hence her Kt/v recently has been an issue.     Past Medical History:   Diagnosis Date    Allergic rhinitis     Chronic kidney disease 2020    Peritoneal Dialysis    Diabetes ([address]) 2015    IDDM    H/O seasonal allergies     Hypertension     Left foot drop     Peritoneal dialysis catheter in place [facility])       Past Surgical History:   Procedure Laterality Date    CARPAL TUNNEL RELEASE Left     CATARACT REMOVAL  2011    COLONOSCOPY N/A 9/26/2022    COLONOSCOPY with bx's and polypectomy performed by Medina Stokes MD at [facility]    COLONOSCOPY N/A 10/8/2022    COLONOSCOPY/ Polypectomies performed by Ted Newton MD at [facility]  2004-last sx    fused L2-S1, in [facility] EXTRACTION         Social History     Socioeconomic History    Marital status:      Spouse name: Not on file    Number of children: Not on file    Years of education: Not on file    Highest education level: Not on file   Occupational History    Not on file   Tobacco Use    Smoking status: Never    Smokeless tobacco: Never   Vaping Use    Vaping Use: Never used   Substance and Sexual Activity    Alcohol use: No    Drug use: Never    Sexual activity: Not on file   Other Topics Concern    Not on file   Social History Narrative    Not on file     Social Determinants of Health
insulin lispro (HUMALOG) injection vial 0-4 Units  0-4 Units SubCUTAneous TID WC ReproglCaroline selby PA        insulin lispro (HUMALOG) injection vial 0-4 Units  0-4 Units SubCUTAneous Nightly ReprogleJeff PA        glucose chewable tablet 16 g  4 tablet Oral PRN ReproglCaroline selby PA        dextrose bolus 10% 125 mL  125 mL IntraVENous PRN ReprogleCarol PA        Or    dextrose bolus 10% 250 mL  250 mL IntraVENous PRN ReprogleCarol PA        Glucagon Emergency SOLR 1 mg  1 mg SubCUTAneous PRN ReprogleCarol PA        dextrose 10 % infusion   IntraVENous Continuous PRN ReprogleCarol PA           Past Medical History:   Diagnosis Date    Allergic rhinitis     Chronic kidney disease 2020    Peritoneal Dialysis    Diabetes ([facility]) 2015    IDDM    H/O seasonal allergies     Hypertension     Left foot drop     Peritoneal dialysis catheter in place [facility])        Past Surgical History:   Procedure Laterality Date    CARPAL TUNNEL RELEASE Left     CATARACT REMOVAL  2011    COLONOSCOPY N/A 9/26/2022    COLONOSCOPY with bx's and polypectomy performed by Esteban Story MD at [facility]    COLONOSCOPY N/A 10/8/2022    COLONOSCOPY/ Polypectomies performed by Julio Lin MD at [address] [facility]  2004-last sx    fused L2-S1, in [address] [facility] [address] EXTRACTION         Allergies   Allergen Reactions    Aspirin Other (See Comments) and Headaches     Ringing in ears  Other reaction(s): ringing in ears, tinnitus    Cephalexin Rash and Hives     Other reaction(s): hives, Skin Rash    Sevelamer Diarrhea and Other (See Comments)    Sevelamer Carbonate Itching     With rash and sweatiness    Ibuprofen      Other reaction(s): stomach upset       Patient Active Problem List   Diagnosis    Chronic kidney disease    Left foot drop    Constipation    Diabetes (HCC)    Severe obesity (BMI 35.0-39. 9) with comorbidity ([address])    Type 2 diabetes mellitus with nephropathy

## 2023-11-15 NOTE — TELEPHONE ENCOUNTER
----- Message from [facility] sent at 11/15/2023 10:49 AM EST -----  Subject: Hospital Follow Up    QUESTIONS  What hospital was the Patient Discharged from? [address]   Date of Discharge? 2023-11-15  Discharge Location? Home  Reason for hospitalization as patient stated? What question does the patient have, if applicable? Alfredobal Gómez would like   office to call back to schedule this patient.   ---------------------------------------------------------------------------  --------------  CALL BACK INFO  What is the best way for the office to contact you? OK to leave message on   voicemail  Preferred Call Back Phone Number? [phone]  ---------------------------------------------------------------------------  --------------  SCRIPT ANSWERS  Relationship to Patient? Covered Entity  Covered Entity Type? Hospital?  Representative Name?  Martinez Resendez

## 2023-11-15 NOTE — CARE COORDINATION
TAMY completed UAI/LTSS on [place] Medicaid Site, Status is Successfully Submitted, tracking #  [id number]. Edward Thompson and asked to review and sign UAI/LTSS when possible, needed for SNF.              Alex Petty, RN  Case Management [phone]

## 2023-11-15 NOTE — CARE COORDINATION
Patient and daughter agreeable to [facility] in admissions notified and will start auth    Patient will need UAI

## 2023-11-15 NOTE — DIALYSIS
CCPD disconnected. Patient completed and tolerated therapy.     Total UF: 1497 ml  I-Drain:  105 ml   Average Dwell:  1:23  Hrs  Lost Dwell:  0:21 min

## 2023-11-15 NOTE — CARE COORDINATION
CM spoke with patient and verified patient owns home, needed for UAI/LTSS for SNF [address] [place] [address].            Sudeep Pedraza, TERESA  Case Management [phone]

## 2023-11-15 NOTE — CARE COORDINATION
CM received a voicemail from patient's daughter Evelia Cortez [phone] requesting a return phone call. CM called and spoke with patient's daughter Lucy Johnson, asking what SNF patient is going to, and when patient is going to SNF. CM gave patient's daughter Lucy Johnson the name of SNF [address] [place] [address], and that auth for SNF was started today, and auth can take 24-72 hours, before approved or denied.            Segundo Bush, RN  Case Management [phone]

## 2023-11-15 NOTE — CARE COORDINATION
MSW met with patient and daughter at bedside to discuss discharge plan    Both are agreeable to patient being posted to all local facilities

## 2023-11-16 LAB
ANION GAP SERPL CALC-SCNC: 9 MMOL/L (ref 3–18)
BUN SERPL-MCNC: 47 MG/DL (ref 7–18)
BUN/CREAT SERPL: 5 (ref 12–20)
CALCIUM SERPL-MCNC: 9.8 MG/DL (ref 8.5–10.1)
CHLORIDE SERPL-SCNC: 99 MMOL/L (ref 100–111)
CO2 SERPL-SCNC: 25 MMOL/L (ref 21–32)
CREAT SERPL-MCNC: 10.4 MG/DL (ref 0.6–1.3)
ERYTHROCYTE [DISTWIDTH] IN BLOOD BY AUTOMATED COUNT: 14.1 % (ref 11.6–14.5)
GLUCOSE BLD STRIP.AUTO-MCNC: 137 MG/DL (ref 70–110)
GLUCOSE BLD STRIP.AUTO-MCNC: 185 MG/DL (ref 70–110)
GLUCOSE BLD STRIP.AUTO-MCNC: 194 MG/DL (ref 70–110)
GLUCOSE BLD STRIP.AUTO-MCNC: 97 MG/DL (ref 70–110)
GLUCOSE SERPL-MCNC: 201 MG/DL (ref 74–99)
HCT VFR BLD AUTO: 29.6 % (ref 35–45)
HGB BLD-MCNC: 9.6 G/DL (ref 12–16)
MCH RBC QN AUTO: 28.4 PG (ref 24–34)
MCHC RBC AUTO-ENTMCNC: 32.4 G/DL (ref 31–37)
MCV RBC AUTO: 87.6 FL (ref 78–100)
NRBC # BLD: 0 K/UL (ref 0–0.01)
NRBC BLD-RTO: 0 PER 100 WBC
PLATELET # BLD AUTO: 179 K/UL (ref 135–420)
PMV BLD AUTO: 10 FL (ref 9.2–11.8)
POTASSIUM SERPL-SCNC: 3.7 MMOL/L (ref 3.5–5.5)
RBC # BLD AUTO: 3.38 M/UL (ref 4.2–5.3)
SODIUM SERPL-SCNC: 133 MMOL/L (ref 136–145)
WBC # BLD AUTO: 5.4 K/UL (ref 4.6–13.2)

## 2023-11-16 PROCEDURE — 99232 SBSQ HOSP IP/OBS MODERATE 35: CPT | Performed by: PSYCHIATRY & NEUROLOGY

## 2023-11-16 PROCEDURE — 1100000003 HC PRIVATE W/ TELEMETRY

## 2023-11-16 PROCEDURE — 99232 SBSQ HOSP IP/OBS MODERATE 35: CPT | Performed by: STUDENT IN AN ORGANIZED HEALTH CARE EDUCATION/TRAINING PROGRAM

## 2023-11-16 PROCEDURE — 6370000000 HC RX 637 (ALT 250 FOR IP): Performed by: PHYSICIAN ASSISTANT

## 2023-11-16 PROCEDURE — 6370000000 HC RX 637 (ALT 250 FOR IP): Performed by: INTERNAL MEDICINE

## 2023-11-16 PROCEDURE — 82962 GLUCOSE BLOOD TEST: CPT

## 2023-11-16 PROCEDURE — 97530 THERAPEUTIC ACTIVITIES: CPT

## 2023-11-16 PROCEDURE — 6360000002 HC RX W HCPCS: Performed by: PHYSICIAN ASSISTANT

## 2023-11-16 PROCEDURE — 85027 COMPLETE CBC AUTOMATED: CPT

## 2023-11-16 PROCEDURE — 2580000003 HC RX 258: Performed by: INTERNAL MEDICINE

## 2023-11-16 PROCEDURE — 36415 COLL VENOUS BLD VENIPUNCTURE: CPT

## 2023-11-16 PROCEDURE — 97535 SELF CARE MNGMENT TRAINING: CPT

## 2023-11-16 PROCEDURE — 80048 BASIC METABOLIC PNL TOTAL CA: CPT

## 2023-11-16 PROCEDURE — 6370000000 HC RX 637 (ALT 250 FOR IP): Performed by: STUDENT IN AN ORGANIZED HEALTH CARE EDUCATION/TRAINING PROGRAM

## 2023-11-16 RX ORDER — FLUTICASONE PROPIONATE 50 MCG
1 SPRAY, SUSPENSION (ML) NASAL DAILY
Status: DISCONTINUED | OUTPATIENT
Start: 2023-11-16 | End: 2023-11-21 | Stop reason: HOSPADM

## 2023-11-16 RX ORDER — BENZONATATE 100 MG/1
200 CAPSULE ORAL 3 TIMES DAILY PRN
Status: DISCONTINUED | OUTPATIENT
Start: 2023-11-16 | End: 2023-11-21 | Stop reason: HOSPADM

## 2023-11-16 RX ORDER — AZELASTINE 1 MG/ML
1 SPRAY, METERED NASAL 2 TIMES DAILY
Status: DISCONTINUED | OUTPATIENT
Start: 2023-11-16 | End: 2023-11-21 | Stop reason: HOSPADM

## 2023-11-16 RX ADMIN — AMLODIPINE BESYLATE 5 MG: 5 TABLET ORAL at 09:33

## 2023-11-16 RX ADMIN — HEPARIN SODIUM 5000 UNITS: 5000 INJECTION INTRAVENOUS; SUBCUTANEOUS at 09:38

## 2023-11-16 RX ADMIN — ATORVASTATIN CALCIUM 80 MG: 40 TABLET, FILM COATED ORAL at 22:43

## 2023-11-16 RX ADMIN — SODIUM CHLORIDE, SODIUM LACTATE, CALCIUM CHLORIDE, MAGNESIUM CHLORIDE AND DEXTROSE 10000 ML: 2.5; 538; 448; 18.3; 5.08 INJECTION, SOLUTION INTRAPERITONEAL at 18:52

## 2023-11-16 RX ADMIN — BENZONATATE 200 MG: 100 CAPSULE ORAL at 16:47

## 2023-11-16 RX ADMIN — HEPARIN SODIUM 5000 UNITS: 5000 INJECTION INTRAVENOUS; SUBCUTANEOUS at 16:47

## 2023-11-16 RX ADMIN — CLOPIDOGREL BISULFATE 75 MG: 75 TABLET, FILM COATED ORAL at 09:33

## 2023-11-16 RX ADMIN — LACTULOSE 20 G: 20 SOLUTION ORAL at 09:33

## 2023-11-16 ASSESSMENT — PAIN SCALES - GENERAL
PAINLEVEL_OUTOF10: 0

## 2023-11-16 NOTE — CARE COORDINATION
Fax failed through [employer] for clinicals for SN[facility]. TAMY manually faxed clinicals to [facility] with [facility] to [phone]. SNF will need to review, and if able to accept, auth for SNF will need to be started.              Alysha Kirby, RN  Case Management [phone]

## 2023-11-16 NOTE — H&P
[facility] does PD at facilities    Clinicals faxed to facilities     Patient and daughter aware

## 2023-11-16 NOTE — CARE COORDINATION
Elke Camarillo with [facility] [address] declined patient in [address] due to patient is a Peritoneal Dialysis patient.            Patrica Hancock, TERESA  Case Management [phone]

## 2023-11-16 NOTE — CARE COORDINATION
[facility] [place] [facility] patient.  Daughter agreeable    [facility] to start insurance [address]    UAI faxed to facility

## 2023-11-17 PROBLEM — I63.9 ACUTE ISCHEMIC STROKE (HCC): Status: RESOLVED | Noted: 2023-11-13 | Resolved: 2023-11-17

## 2023-11-17 LAB
GLUCOSE BLD STRIP.AUTO-MCNC: 102 MG/DL (ref 70–110)
GLUCOSE BLD STRIP.AUTO-MCNC: 126 MG/DL (ref 70–110)
GLUCOSE BLD STRIP.AUTO-MCNC: 159 MG/DL (ref 70–110)
GLUCOSE BLD STRIP.AUTO-MCNC: 170 MG/DL (ref 70–110)

## 2023-11-17 PROCEDURE — 6370000000 HC RX 637 (ALT 250 FOR IP): Performed by: STUDENT IN AN ORGANIZED HEALTH CARE EDUCATION/TRAINING PROGRAM

## 2023-11-17 PROCEDURE — 97535 SELF CARE MNGMENT TRAINING: CPT

## 2023-11-17 PROCEDURE — 6360000002 HC RX W HCPCS: Performed by: PHYSICIAN ASSISTANT

## 2023-11-17 PROCEDURE — 99231 SBSQ HOSP IP/OBS SF/LOW 25: CPT | Performed by: STUDENT IN AN ORGANIZED HEALTH CARE EDUCATION/TRAINING PROGRAM

## 2023-11-17 PROCEDURE — 2580000003 HC RX 258: Performed by: INTERNAL MEDICINE

## 2023-11-17 PROCEDURE — 92526 ORAL FUNCTION THERAPY: CPT

## 2023-11-17 PROCEDURE — 82962 GLUCOSE BLOOD TEST: CPT

## 2023-11-17 PROCEDURE — 94761 N-INVAS EAR/PLS OXIMETRY MLT: CPT

## 2023-11-17 PROCEDURE — 97530 THERAPEUTIC ACTIVITIES: CPT

## 2023-11-17 PROCEDURE — 97110 THERAPEUTIC EXERCISES: CPT

## 2023-11-17 PROCEDURE — 1100000003 HC PRIVATE W/ TELEMETRY

## 2023-11-17 PROCEDURE — 6370000000 HC RX 637 (ALT 250 FOR IP): Performed by: PHYSICIAN ASSISTANT

## 2023-11-17 PROCEDURE — 97116 GAIT TRAINING THERAPY: CPT

## 2023-11-17 RX ORDER — ACETAMINOPHEN 325 MG/1
650 TABLET ORAL EVERY 6 HOURS PRN
Status: DISCONTINUED | OUTPATIENT
Start: 2023-11-17 | End: 2023-11-21 | Stop reason: HOSPADM

## 2023-11-17 RX ADMIN — CLOPIDOGREL BISULFATE 75 MG: 75 TABLET, FILM COATED ORAL at 08:47

## 2023-11-17 RX ADMIN — ATORVASTATIN CALCIUM 80 MG: 40 TABLET, FILM COATED ORAL at 21:14

## 2023-11-17 RX ADMIN — HEPARIN SODIUM 5000 UNITS: 5000 INJECTION INTRAVENOUS; SUBCUTANEOUS at 08:47

## 2023-11-17 RX ADMIN — AMLODIPINE BESYLATE 5 MG: 5 TABLET ORAL at 08:47

## 2023-11-17 RX ADMIN — AZELASTINE HYDROCHLORIDE 1 SPRAY: 137 SPRAY, METERED NASAL at 08:48

## 2023-11-17 RX ADMIN — FLUTICASONE PROPIONATE 1 SPRAY: 50 SPRAY, METERED NASAL at 08:48

## 2023-11-17 RX ADMIN — HEPARIN SODIUM 5000 UNITS: 5000 INJECTION INTRAVENOUS; SUBCUTANEOUS at 17:04

## 2023-11-17 RX ADMIN — SODIUM CHLORIDE, SODIUM LACTATE, CALCIUM CHLORIDE, MAGNESIUM CHLORIDE AND DEXTROSE 10000 ML: 2.5; 538; 448; 18.3; 5.08 INJECTION, SOLUTION INTRAPERITONEAL at 22:32

## 2023-11-17 NOTE — CARE COORDINATION
Facility to start insurance auth today at [address]    RENETTA obtained at bedside    Patient/Daughter/ aware patient may remain in hospital throughout the weekend if auth doesn't come back

## 2023-11-18 LAB
GLUCOSE BLD STRIP.AUTO-MCNC: 100 MG/DL (ref 70–110)
GLUCOSE BLD STRIP.AUTO-MCNC: 122 MG/DL (ref 70–110)
GLUCOSE BLD STRIP.AUTO-MCNC: 209 MG/DL (ref 70–110)

## 2023-11-18 PROCEDURE — 82962 GLUCOSE BLOOD TEST: CPT

## 2023-11-18 PROCEDURE — 6370000000 HC RX 637 (ALT 250 FOR IP): Performed by: STUDENT IN AN ORGANIZED HEALTH CARE EDUCATION/TRAINING PROGRAM

## 2023-11-18 PROCEDURE — 6370000000 HC RX 637 (ALT 250 FOR IP): Performed by: PHYSICIAN ASSISTANT

## 2023-11-18 PROCEDURE — 1100000003 HC PRIVATE W/ TELEMETRY

## 2023-11-18 PROCEDURE — 6360000002 HC RX W HCPCS: Performed by: PHYSICIAN ASSISTANT

## 2023-11-18 PROCEDURE — 94761 N-INVAS EAR/PLS OXIMETRY MLT: CPT

## 2023-11-18 PROCEDURE — 99231 SBSQ HOSP IP/OBS SF/LOW 25: CPT | Performed by: STUDENT IN AN ORGANIZED HEALTH CARE EDUCATION/TRAINING PROGRAM

## 2023-11-18 RX ADMIN — AMLODIPINE BESYLATE 5 MG: 5 TABLET ORAL at 09:41

## 2023-11-18 RX ADMIN — CLOPIDOGREL BISULFATE 75 MG: 75 TABLET, FILM COATED ORAL at 09:41

## 2023-11-18 RX ADMIN — AZELASTINE HYDROCHLORIDE 1 SPRAY: 137 SPRAY, METERED NASAL at 09:43

## 2023-11-18 RX ADMIN — HEPARIN SODIUM 5000 UNITS: 5000 INJECTION INTRAVENOUS; SUBCUTANEOUS at 00:48

## 2023-11-18 RX ADMIN — HEPARIN SODIUM 5000 UNITS: 5000 INJECTION INTRAVENOUS; SUBCUTANEOUS at 09:41

## 2023-11-18 RX ADMIN — FLUTICASONE PROPIONATE 1 SPRAY: 50 SPRAY, METERED NASAL at 09:43

## 2023-11-18 RX ADMIN — INSULIN LISPRO 1 UNITS: 100 INJECTION, SOLUTION INTRAVENOUS; SUBCUTANEOUS at 09:41

## 2023-11-18 RX ADMIN — BENZONATATE 200 MG: 100 CAPSULE ORAL at 09:51

## 2023-11-18 RX ADMIN — ATORVASTATIN CALCIUM 80 MG: 40 TABLET, FILM COATED ORAL at 20:55

## 2023-11-18 RX ADMIN — HEPARIN SODIUM 5000 UNITS: 5000 INJECTION INTRAVENOUS; SUBCUTANEOUS at 18:12

## 2023-11-18 ASSESSMENT — PAIN SCALES - GENERAL
PAINLEVEL_OUTOF10: 0

## 2023-11-18 NOTE — DIALYSIS
CCPD disconnected. Therapy completed. Total UF 1477  Average dwell 1:21  Lost Dwell  0:27    Patient tolerated treatment overnight no complaints.

## 2023-11-19 LAB
GLUCOSE BLD STRIP.AUTO-MCNC: 108 MG/DL (ref 70–110)
GLUCOSE BLD STRIP.AUTO-MCNC: 134 MG/DL (ref 70–110)
GLUCOSE BLD STRIP.AUTO-MCNC: 188 MG/DL (ref 70–110)
GLUCOSE BLD STRIP.AUTO-MCNC: 90 MG/DL (ref 70–110)

## 2023-11-19 PROCEDURE — 90945 DIALYSIS ONE EVALUATION: CPT

## 2023-11-19 PROCEDURE — 99231 SBSQ HOSP IP/OBS SF/LOW 25: CPT | Performed by: STUDENT IN AN ORGANIZED HEALTH CARE EDUCATION/TRAINING PROGRAM

## 2023-11-19 PROCEDURE — 6370000000 HC RX 637 (ALT 250 FOR IP): Performed by: STUDENT IN AN ORGANIZED HEALTH CARE EDUCATION/TRAINING PROGRAM

## 2023-11-19 PROCEDURE — 97535 SELF CARE MNGMENT TRAINING: CPT

## 2023-11-19 PROCEDURE — 1100000003 HC PRIVATE W/ TELEMETRY

## 2023-11-19 PROCEDURE — 6370000000 HC RX 637 (ALT 250 FOR IP): Performed by: PHYSICIAN ASSISTANT

## 2023-11-19 PROCEDURE — 82962 GLUCOSE BLOOD TEST: CPT

## 2023-11-19 PROCEDURE — 6360000002 HC RX W HCPCS: Performed by: PHYSICIAN ASSISTANT

## 2023-11-19 RX ADMIN — FLUTICASONE PROPIONATE 1 SPRAY: 50 SPRAY, METERED NASAL at 09:28

## 2023-11-19 RX ADMIN — HEPARIN SODIUM 5000 UNITS: 5000 INJECTION INTRAVENOUS; SUBCUTANEOUS at 01:23

## 2023-11-19 RX ADMIN — HEPARIN SODIUM 5000 UNITS: 5000 INJECTION INTRAVENOUS; SUBCUTANEOUS at 17:44

## 2023-11-19 RX ADMIN — AZELASTINE HYDROCHLORIDE 1 SPRAY: 137 SPRAY, METERED NASAL at 09:28

## 2023-11-19 RX ADMIN — AZELASTINE HYDROCHLORIDE 1 SPRAY: 137 SPRAY, METERED NASAL at 20:57

## 2023-11-19 RX ADMIN — ATORVASTATIN CALCIUM 80 MG: 40 TABLET, FILM COATED ORAL at 20:56

## 2023-11-19 RX ADMIN — AMLODIPINE BESYLATE 5 MG: 5 TABLET ORAL at 09:27

## 2023-11-19 RX ADMIN — HEPARIN SODIUM 5000 UNITS: 5000 INJECTION INTRAVENOUS; SUBCUTANEOUS at 09:27

## 2023-11-19 RX ADMIN — CLOPIDOGREL BISULFATE 75 MG: 75 TABLET, FILM COATED ORAL at 09:27

## 2023-11-19 ASSESSMENT — PAIN SCALES - GENERAL
PAINLEVEL_OUTOF10: 0

## 2023-11-20 LAB
ANION GAP SERPL CALC-SCNC: 12 MMOL/L (ref 3–18)
BASOPHILS # BLD: 0.1 K/UL (ref 0–0.1)
BASOPHILS NFR BLD: 1 % (ref 0–2)
BUN SERPL-MCNC: 56 MG/DL (ref 7–18)
BUN/CREAT SERPL: 5 (ref 12–20)
CALCIUM SERPL-MCNC: 10.6 MG/DL (ref 8.5–10.1)
CHLORIDE SERPL-SCNC: 98 MMOL/L (ref 100–111)
CO2 SERPL-SCNC: 24 MMOL/L (ref 21–32)
CREAT SERPL-MCNC: 11.1 MG/DL (ref 0.6–1.3)
DIFFERENTIAL METHOD BLD: ABNORMAL
EOSINOPHIL # BLD: 0.2 K/UL (ref 0–0.4)
EOSINOPHIL NFR BLD: 3 % (ref 0–5)
ERYTHROCYTE [DISTWIDTH] IN BLOOD BY AUTOMATED COUNT: 13.8 % (ref 11.6–14.5)
GLUCOSE BLD STRIP.AUTO-MCNC: 118 MG/DL (ref 70–110)
GLUCOSE BLD STRIP.AUTO-MCNC: 160 MG/DL (ref 70–110)
GLUCOSE BLD STRIP.AUTO-MCNC: 168 MG/DL (ref 70–110)
GLUCOSE BLD STRIP.AUTO-MCNC: 192 MG/DL (ref 70–110)
GLUCOSE BLD STRIP.AUTO-MCNC: 70 MG/DL (ref 70–110)
GLUCOSE SERPL-MCNC: 185 MG/DL (ref 74–99)
HCT VFR BLD AUTO: 31.6 % (ref 35–45)
HGB BLD-MCNC: 10.4 G/DL (ref 12–16)
IMM GRANULOCYTES # BLD AUTO: 0 K/UL (ref 0–0.04)
IMM GRANULOCYTES NFR BLD AUTO: 0 % (ref 0–0.5)
LACTATE SERPL-SCNC: 1.4 MMOL/L (ref 0.4–2)
LYMPHOCYTES # BLD: 1.9 K/UL (ref 0.9–3.6)
LYMPHOCYTES NFR BLD: 33 % (ref 21–52)
MCH RBC QN AUTO: 29 PG (ref 24–34)
MCHC RBC AUTO-ENTMCNC: 32.9 G/DL (ref 31–37)
MCV RBC AUTO: 88 FL (ref 78–100)
MONOCYTES # BLD: 0.6 K/UL (ref 0.05–1.2)
MONOCYTES NFR BLD: 10 % (ref 3–10)
NEUTS SEG # BLD: 3.1 K/UL (ref 1.8–8)
NEUTS SEG NFR BLD: 53 % (ref 40–73)
NRBC # BLD: 0 K/UL (ref 0–0.01)
NRBC BLD-RTO: 0 PER 100 WBC
PLATELET # BLD AUTO: 185 K/UL (ref 135–420)
PMV BLD AUTO: 10.6 FL (ref 9.2–11.8)
POTASSIUM SERPL-SCNC: 3.5 MMOL/L (ref 3.5–5.5)
RBC # BLD AUTO: 3.59 M/UL (ref 4.2–5.3)
SODIUM SERPL-SCNC: 134 MMOL/L (ref 136–145)
WBC # BLD AUTO: 5.9 K/UL (ref 4.6–13.2)

## 2023-11-20 PROCEDURE — 97535 SELF CARE MNGMENT TRAINING: CPT

## 2023-11-20 PROCEDURE — 83605 ASSAY OF LACTIC ACID: CPT

## 2023-11-20 PROCEDURE — 82962 GLUCOSE BLOOD TEST: CPT

## 2023-11-20 PROCEDURE — 97530 THERAPEUTIC ACTIVITIES: CPT

## 2023-11-20 PROCEDURE — 99232 SBSQ HOSP IP/OBS MODERATE 35: CPT | Performed by: STUDENT IN AN ORGANIZED HEALTH CARE EDUCATION/TRAINING PROGRAM

## 2023-11-20 PROCEDURE — 6370000000 HC RX 637 (ALT 250 FOR IP): Performed by: STUDENT IN AN ORGANIZED HEALTH CARE EDUCATION/TRAINING PROGRAM

## 2023-11-20 PROCEDURE — 94761 N-INVAS EAR/PLS OXIMETRY MLT: CPT

## 2023-11-20 PROCEDURE — 2580000003 HC RX 258: Performed by: INTERNAL MEDICINE

## 2023-11-20 PROCEDURE — 36415 COLL VENOUS BLD VENIPUNCTURE: CPT

## 2023-11-20 PROCEDURE — 1100000003 HC PRIVATE W/ TELEMETRY

## 2023-11-20 PROCEDURE — 6370000000 HC RX 637 (ALT 250 FOR IP): Performed by: PHYSICIAN ASSISTANT

## 2023-11-20 PROCEDURE — 85025 COMPLETE CBC W/AUTO DIFF WBC: CPT

## 2023-11-20 PROCEDURE — 80048 BASIC METABOLIC PNL TOTAL CA: CPT

## 2023-11-20 PROCEDURE — 6370000000 HC RX 637 (ALT 250 FOR IP): Performed by: INTERNAL MEDICINE

## 2023-11-20 PROCEDURE — 6360000002 HC RX W HCPCS: Performed by: PHYSICIAN ASSISTANT

## 2023-11-20 RX ADMIN — ATORVASTATIN CALCIUM 80 MG: 40 TABLET, FILM COATED ORAL at 22:05

## 2023-11-20 RX ADMIN — HEPARIN SODIUM 5000 UNITS: 5000 INJECTION INTRAVENOUS; SUBCUTANEOUS at 00:02

## 2023-11-20 RX ADMIN — HEPARIN SODIUM 5000 UNITS: 5000 INJECTION INTRAVENOUS; SUBCUTANEOUS at 10:18

## 2023-11-20 RX ADMIN — HEPARIN SODIUM 5000 UNITS: 5000 INJECTION INTRAVENOUS; SUBCUTANEOUS at 19:39

## 2023-11-20 RX ADMIN — ONDANSETRON 4 MG: 4 TABLET, ORALLY DISINTEGRATING ORAL at 10:17

## 2023-11-20 RX ADMIN — SODIUM CHLORIDE, SODIUM LACTATE, CALCIUM CHLORIDE, MAGNESIUM CHLORIDE AND DEXTROSE 10000 ML: 2.5; 538; 448; 18.3; 5.08 INJECTION, SOLUTION INTRAPERITONEAL at 20:37

## 2023-11-20 RX ADMIN — AMLODIPINE BESYLATE 5 MG: 5 TABLET ORAL at 10:18

## 2023-11-20 RX ADMIN — CLOPIDOGREL BISULFATE 75 MG: 75 TABLET, FILM COATED ORAL at 10:18

## 2023-11-20 ASSESSMENT — PAIN SCALES - GENERAL: PAINLEVEL_OUTOF10: 0

## 2023-11-20 NOTE — CARE COORDINATION
CM called [facility] [place] [phone], admissions, CM received voicemail, CM left message checking on auth status for SNF, CM left phone number for a return call. CM called Dami with [facility] [place] [phone], CM received voicemail, CM left a message, checking status of auth for SNF, started Friday. CM left phone number for a return call.            Jessica Matute, RN  Case Management [phone]

## 2023-11-20 NOTE — DIALYSIS
CCPD disconnected this am and therapy completed. No issue overnight with treatment.    Total UF 1659  Average Dwell  1:47  Lost Dwell  0:44

## 2023-11-20 NOTE — CARE COORDINATION
CM messaged SNF [facility] [place], [facility]. [facility] in [address], checking on Daija Edmondson for SNF status, or updates.                Tiffany Alicia, RN  Case Management [phone]

## 2023-11-20 NOTE — CARE COORDINATION
CM received a voicemail from [place] [phone], stated patient's insurance company requesting updated PT and OT notes, and MD note from the last 48 hours, for auth for SNF. CM faxed [place] [phone] PT and OT notes, and MD note through [facility], requested for auth for SNF from patient's insurance company. TAMY called and spoke with [place] with [facility], and updated that CM faxed PT and OT notes, and MD notes to her. [place] said she will keep CM updated.              Lita Soria, RN  Case Management [phone]

## 2023-11-20 NOTE — CARE COORDINATION
CM called [facility] [phone], admissions, CM received voicemail, CM left message checking on auth status for SNF, CM left phone number for a return call. Auth for SNF started Friday.              Kenn Galo RN  Case Management [phone]

## 2023-11-21 VITALS
TEMPERATURE: 98.3 F | OXYGEN SATURATION: 99 % | SYSTOLIC BLOOD PRESSURE: 167 MMHG | HEART RATE: 82 BPM | DIASTOLIC BLOOD PRESSURE: 83 MMHG | HEIGHT: 65 IN | WEIGHT: 150.2 LBS | BODY MASS INDEX: 25.02 KG/M2 | RESPIRATION RATE: 17 BRPM

## 2023-11-21 LAB
GLUCOSE BLD STRIP.AUTO-MCNC: 122 MG/DL (ref 70–110)
GLUCOSE BLD STRIP.AUTO-MCNC: 91 MG/DL (ref 70–110)

## 2023-11-21 PROCEDURE — 97535 SELF CARE MNGMENT TRAINING: CPT

## 2023-11-21 PROCEDURE — 94761 N-INVAS EAR/PLS OXIMETRY MLT: CPT

## 2023-11-21 PROCEDURE — 6360000002 HC RX W HCPCS: Performed by: PHYSICIAN ASSISTANT

## 2023-11-21 PROCEDURE — 99239 HOSP IP/OBS DSCHRG MGMT >30: CPT | Performed by: STUDENT IN AN ORGANIZED HEALTH CARE EDUCATION/TRAINING PROGRAM

## 2023-11-21 PROCEDURE — 97530 THERAPEUTIC ACTIVITIES: CPT

## 2023-11-21 PROCEDURE — 6370000000 HC RX 637 (ALT 250 FOR IP): Performed by: STUDENT IN AN ORGANIZED HEALTH CARE EDUCATION/TRAINING PROGRAM

## 2023-11-21 PROCEDURE — 6370000000 HC RX 637 (ALT 250 FOR IP): Performed by: INTERNAL MEDICINE

## 2023-11-21 PROCEDURE — 82962 GLUCOSE BLOOD TEST: CPT

## 2023-11-21 RX ADMIN — CLOPIDOGREL BISULFATE 75 MG: 75 TABLET, FILM COATED ORAL at 10:32

## 2023-11-21 RX ADMIN — HEPARIN SODIUM 5000 UNITS: 5000 INJECTION INTRAVENOUS; SUBCUTANEOUS at 10:33

## 2023-11-21 RX ADMIN — AMLODIPINE BESYLATE 5 MG: 5 TABLET ORAL at 10:32

## 2023-11-21 RX ADMIN — HEPARIN SODIUM 5000 UNITS: 5000 INJECTION INTRAVENOUS; SUBCUTANEOUS at 02:35

## 2023-11-21 RX ADMIN — LACTULOSE 20 G: 20 SOLUTION ORAL at 10:43

## 2023-11-21 ASSESSMENT — PAIN SCALES - GENERAL: PAINLEVEL_OUTOF10: 0

## 2023-11-21 NOTE — CARE COORDINATION
CM received voicemail from [place] with SNF [facility] [place], that auth for SNF approved last night, said they can take patient today, but need Peritoneal Dialysis machine and supplies delivered to SNF for patient. CM called and spoke with patient's  Lebron Cochran [phone], updated with above information, said he cannot drive right now, due to teeth just being pulled, said to call patient's daughter. CM called patient's daughter Mariam Saez [phone], CM received voicemail, CM left message that auth for SNF [facility] of [place] approved, SNF can take patient today, but need Peritoneal Dialysis Machine, and supplies delivered to SNF for patient. CM left phone number for a return call.              Connie Adam, RN  Case Management [phone]

## 2023-11-21 NOTE — CARE COORDINATION
Valery let CM know that [employer] called, and patient is scheduled for 12:30 pm today. CM uploaded Discharge Summary, SNF note, and MAR to [facility] and faxed to [address] [place] [address] to [place] to [phone]. CM spoke with patient's daughter, and updated that Medical Transport scheduled for 12:30 pm today. Patient's daughter said she will come to hospital and follow Medical Transport to SNF. CM spoke with [place] with SN[facility] and updated that CM faxed Discharge Summary, MAR, and SNF note to her, and Medical Transport scheduled for 12:30 pm today.              Abdoulaye Walton, RN  Case Management [phone]

## 2023-11-21 NOTE — CARE COORDINATION
CM faxed PCS form and facesheet to [facility]. Braydon Cervantes with [facility] [place] called CM, asked for [facility] to be faxed to her.                Lemuel Lieberman, RN  Case Management [phone]

## 2023-11-21 NOTE — CARE COORDINATION
Transport Envelope placed on patient's chart with PCS form, 2 facesheets, and Discharge Summary for [employer] [facility] to transport patient to [facility] [place] today at 12:30 pm.             Paty Quijano RN  Case Management [phone]

## 2023-11-21 NOTE — CARE COORDINATION
CM called and spoke with patient's daughter David Paris [phone], said she has Peritoneal Dialysis machine and boxes of supplies in truck with her, and will drive to SNF [facility] to deliver. CM called and spoke with [facility] with SNF [facility] [place] [phone], and updated. [facility] requesting discharge summary be faxed to her, and requested that book for Peritoneal Dialysis be brought also. [facility] said she will call patient's daughter and request the Peritoneal Dialysis book, name and phone number given. Patient's daughter came to see CM, said she is headed to SNF now to deliver Peritoneal Dialysis Machine and supplies, and CM updated her that SNF to call  her requesting Peritoneal Dialysis book. Patient's daughter said she will have patient's  find book, and will bring to SNF. CM spoke with Dr. Sloan Spencer and updated that patient can go to SNF today, patient's daughter to take Peritoneal Dialysis machine and supplies to SNF.            Tiffany Alicia, RN  Case Management [phone]

## 2023-11-21 NOTE — CARE COORDINATION
Transition of Care Plan to SNF/Rehab    SNF/Rehab Transition:  Patient has been accepted to [address] and meets criteria for admission. Patient will transported by [address] is scheduled for 12:30 pm today. Communication to Patient/Family:  Met with patient's daughter (identified care giver) and she is  agreeable to the transition plan. Communication to SNF/Rehab:  Bedside RN, Brittni Rivas, has been notified to update the transition plan to the facility and call report (phone number [phone]). Discharge information has been updated on the AVS.     Discharge Summary to be faxed to SNF, and will be placed in Transport Envelope to go with patient to SNF. Nursing Please include all hard scripts for controlled substances, med rec and dc summary, and AVS in packet. Reviewed and confirmed with facility,  [facility], can manage the patient care needs for the following:     Rosmery Serrano with (X) only those applicable:    Medication:  [x]  Medications will be available at the facility  []  IV Antibiotics   []  Controlled Substance - hard copy to be sent with patient   []  Weekly Labs   Documents:  [] Hard RX  [] MAR  [] Kardex  [] AVS  []Transfer Summary  [x]Discharge Summary   Equipment:  []  CPAP/BiPAP  []  Wound Vacuum  []  El or Urinary Device  []  PICC/Central Line  []  Nebulizer  []  Ventilator   Treatment:  []Isolation (for MRSA, VRE, etc.)  []Surgical Drain Management  []Tracheostomy Care  []Dressing Changes  [x] Peritoneal Dialysis, machine and supplies being brought to SNF by patient's daughter.   []PEG Care  []Oxygen  []Daily Weights for Heart Failure   Dietary:  [x]Any diet limitations    Diet: regular diet  []Tube Feedings   []Total Parenteral Management (TPN)   Eligible for Medicaid Long Term Services and Supports  Yes:  [] Eligible for medical assistance or will become eligible within 180 days and UAI completed.    [] Provider/Patient and/or

## 2023-11-21 NOTE — CARE COORDINATION
Danny with [facility] [place] let CM know Discharge Summary does not have all frequency on Continued meds. CM updated Dr. Guero Zepeda not transferring from AVS.   Dr. Alexander Gerber updated Discharge Summary. CM faxed updated Discharge Summary to [facility] with [facility].              Rosario Cramer RN  Case Management [phone]

## 2023-12-14 ENCOUNTER — TELEPHONE (OUTPATIENT)
Age: 83
End: 2023-12-14

## 2023-12-14 NOTE — TELEPHONE ENCOUNTER
Sweta Carcamo calling from [facility] requesting if Dr. Maddie Fontana will sign off on patient's home health orders. Says she is willing to take a verbal for confirmation. Requesting call back at [phone]. Says she also found a drug interaction between clopidogrel and pantoprazole.

## 2023-12-14 NOTE — TELEPHONE ENCOUNTER
Spoke with Piper Helton from [facility] and informed her of the message below per the provider. She acknowledged understanding and had no questions or concerns for the provider and will fax the forms for signing, thank you.

## 2023-12-21 ENCOUNTER — TELEMEDICINE (OUTPATIENT)
Age: 83
End: 2023-12-21
Payer: MEDICARE

## 2023-12-21 DIAGNOSIS — R29.6 RECURRENT FALLS: Primary | ICD-10-CM

## 2023-12-21 DIAGNOSIS — R54 AGE-RELATED PHYSICAL DEBILITY: ICD-10-CM

## 2023-12-21 PROCEDURE — 99213 OFFICE O/P EST LOW 20 MIN: CPT | Performed by: FAMILY MEDICINE

## 2023-12-21 PROCEDURE — 1123F ACP DISCUSS/DSCN MKR DOCD: CPT | Performed by: FAMILY MEDICINE

## 2023-12-21 RX ORDER — FAMOTIDINE 40 MG/1
TABLET, FILM COATED ORAL
COMMUNITY
Start: 2023-12-11

## 2023-12-21 NOTE — PROGRESS NOTES
2023    TELEHEALTH EVALUATION -- Audio/Visual    HPI:    Johnathon Trejo (:  1940) has requested an audio/video evaluation for the following concern(s):    Patient is on this current call with her daughter. Patient has had recurrent falls. She has fallen most recently on . She was seen at the emergency department. She essentially is unable to walk at this time. She is in a wheelchair most of the time. She also did receive a motorized scooter. She is currently receiving home care ([facility])      Her daughter and  are asking for resources regarding the need for pt  to move to assisted living. Her  is having difficulty caring for her at home. Patient also feels that she  needs to move to an assisted living facility. Advised the family to check to see what  benefits patient has for assisted living facilities or any other type of home care assistance under her insurance. I did also give her some names of some names of assisted living facilities that they may want to look into ( [employer], [facility], [facility]) . Patient and the family voiced understanding. Her insulin has been decreased to 5 units daily. Review of Systems as per HPI    Prior to Visit Medications    Medication Sig Taking? Authorizing Provider   famotidine (PEPCID) 40 MG tablet  Yes ProviderAnh MD   atorvastatin (LIPITOR) 80 MG tablet Take 1 tablet by mouth nightly Yes Carlos Beasley MD   Calcium Carbonate Antacid (TUMS ULTRA PO) Tums Ultra Yes Provider, MD Anh   diphenoxylate-atropine (LOMOTIL) 2.5-0.025 MG per tablet 4 times daily as needed for Diarrhea.  Yes ProviderAnh MD   metoprolol succinate (TOPROL XL) 25 MG extended release tablet Take 1 tablet by mouth daily Yes Richardson Muller MD   fluticasone (FLONASE) 50 MCG/ACT nasal spray 2 sprays by Each Nostril route daily Yes Richardson Muller MD   blood glucose test strips (EXACTECH TEST) strip  Yes

## 2023-12-21 NOTE — PROGRESS NOTES
Kaylynn Gee, was evaluated through a synchronous (real-time) audio-video encounter. The patient (or guardian if applicable) is aware that this is a billable service, which includes applicable co-pays. This Virtual Visit was conducted with patient's (and/or legal guardian's) consent. Patient identification was verified, and a caregiver was present when appropriate.    The patient was located at Home: [address] [place] [address]  Provider was located at [facility] ([address] [place] [address]): [address] [facility], [address] [place] [address],  [address] [place] [address]      Kaylynn Gee (:  1940) is a Established patient, presenting virtually for evaluation of the following:    --Minnie Daly MA

## 2023-12-26 ENCOUNTER — TELEPHONE (OUTPATIENT)
Age: 83
End: 2023-12-26

## 2023-12-27 NOTE — TELEPHONE ENCOUNTER
Last Visit: 12- VV   Next Appointment: 02-  Previous Refill Encounter: 10- #30 tabs with 0 refills      Requested Prescriptions     Pending Prescriptions Disp Refills    hydrOXYzine HCl (ATARAX) 10 MG tablet [Pharmacy Med Name: hydrOXYzine HCL 10 MG TABLET] 30 tablet 0     Sig: TAKE ONE TABLET BY MOUTH THREE TIMES A DAY AS NEEDED FOR ITCHING

## 2023-12-28 ENCOUNTER — TELEPHONE (OUTPATIENT)
Age: 83
End: 2023-12-28

## 2023-12-28 NOTE — TELEPHONE ENCOUNTER
----- Message from Jared Hamman sent at 12/27/2023  4:31 PM EST -----  Subject: Refill Request    QUESTIONS  Name of Medication? hydrOXYzine HCl (ATARAX) 10 MG tablet  Patient-reported dosage and instructions? 1 10mg tablet 3 times a day   How many days do you have left? 0  Preferred Pharmacy? [id number] phone number (if available)? [phone]  ---------------------------------------------------------------------------  --------------  CALL BACK INFO  What is the best way for the office to contact you? OK to leave message on   voicemail  Preferred Call Back Phone Number? [phone]  ---------------------------------------------------------------------------  --------------  SCRIPT ANSWERS  Relationship to Patient?  Self

## 2023-12-28 NOTE — TELEPHONE ENCOUNTER
Patient's daughter, Rashida calling requesting if her mom can be prescribed an anti depressant medication.

## 2023-12-31 RX ORDER — HYDROXYZINE HYDROCHLORIDE 10 MG/1
TABLET, FILM COATED ORAL
Qty: 30 TABLET | Refills: 0 | OUTPATIENT
Start: 2023-12-31

## 2024-01-02 NOTE — TELEPHONE ENCOUNTER
Unable to reach the patient. I left a detailed message requesting the patient return the clinics call in regards to requested medication and let her know there is a message left for her for message 12/27/23, thank you.

## 2024-01-03 NOTE — TELEPHONE ENCOUNTER
Unable to reach the patient. I left a detailed message requesting the patient return the clinics call in regards to requested medication and let her know there is a message left for her, thank you.

## 2024-01-09 NOTE — TELEPHONE ENCOUNTER
Last Visit: 12- VV   Next Appointment: 01-  Previous Refill Encounter: 10- #30 tabs with 0 refills      Requested Prescriptions     Pending Prescriptions Disp Refills    hydrOXYzine HCl (ATARAX) 10 MG tablet [Pharmacy Med Name: hydrOXYzine HCL 10 MG TABLET] 30 tablet 0     Sig: TAKE ONE TABLET BY MOUTH THREE TIMES A DAY AS NEEDED FOR ITCHING

## 2024-01-10 LAB — DIABETIC RETINOPATHY: POSITIVE

## 2024-01-10 NOTE — TELEPHONE ENCOUNTER
Pt daughter called to check status of refill request for hydrOXYzine HCl (ATARAX) 10 MG tablet that was requested by the pharmacy on yesterday. Adv pending provider approval, went over refill time frame

## 2024-01-13 RX ORDER — HYDROXYZINE HYDROCHLORIDE 10 MG/1
TABLET, FILM COATED ORAL
Qty: 30 TABLET | Refills: 0 | Status: SHIPPED | OUTPATIENT
Start: 2024-01-13

## 2024-01-22 NOTE — TELEPHONE ENCOUNTER
Duplicate Medication request. Please see message from 091/09/2024.. Medication was filled as listed below, thank you.      hydrOXYzine HCl (ATARAX) 10 MG tablet 30 tablet 0 1/13/2024 --    Sig: TAKE ONE TABLET BY MOUTH THREE TIMES A DAY AS NEEDED FOR ITCHING    Sent to pharmacy as: hydrOXYzine HCl 10 MG Oral Tablet (ATARAX)    E-Prescribing Status: Receipt confirmed by pharmacy (1/13/2024  1:15 AM EST)    Pharmacy    [id number] - [place], [place] - [address] [facility] - P [phone] - F [phone]  [address] [place] [address]  Phone: [phone]  Fax: [phone]

## 2024-01-24 ENCOUNTER — TELEMEDICINE (OUTPATIENT)
Age: 84
End: 2024-01-24
Payer: MEDICARE

## 2024-01-24 DIAGNOSIS — R26.2 DIFFICULTY IN WALKING: ICD-10-CM

## 2024-01-24 DIAGNOSIS — R54 AGE-RELATED PHYSICAL DEBILITY: ICD-10-CM

## 2024-01-24 DIAGNOSIS — R62.7 FAILURE TO THRIVE IN ADULT: Primary | ICD-10-CM

## 2024-01-24 PROCEDURE — 99215 OFFICE O/P EST HI 40 MIN: CPT | Performed by: FAMILY MEDICINE

## 2024-01-24 PROCEDURE — 1123F ACP DISCUSS/DSCN MKR DOCD: CPT | Performed by: FAMILY MEDICINE

## 2024-01-24 RX ORDER — POLYETHYLENE GLYCOL 3350 17 G/17G
17 POWDER, FOR SOLUTION ORAL
COMMUNITY
Start: 2020-01-24

## 2024-01-24 RX ORDER — OMEPRAZOLE 40 MG/1
CAPSULE, DELAYED RELEASE ORAL
COMMUNITY
Start: 2020-11-20

## 2024-01-24 RX ORDER — HYDROXYZINE HYDROCHLORIDE 25 MG/1
25 TABLET, FILM COATED ORAL NIGHTLY
Qty: 30 TABLET | Refills: 0 | Status: SHIPPED | OUTPATIENT
Start: 2024-01-24 | End: 2024-02-23

## 2024-01-24 RX ORDER — FOLIC ACID/VIT B COMPLEX AND C 0.8 MG
1 TABLET ORAL
COMMUNITY
Start: 2020-04-02

## 2024-01-24 RX ORDER — GENTAMICIN SULFATE 1 MG/G
CREAM TOPICAL
COMMUNITY
Start: 2020-05-20

## 2024-01-24 NOTE — PROGRESS NOTES
Tatyana FAM Na, was evaluated through a synchronous (real-time) audio-video encounter. The patient (or guardian if applicable) is aware that this is a billable service, which includes applicable co-pays. This Virtual Visit was conducted with patient's (and/or legal guardian's) consent. Patient identification was verified, and a caregiver was present when appropriate.   The patient was located at Home: [address] [place] [address]  Provider was located at Facility (Appt Dept): [address] [facility] [address]  [place] [address]      Tatyana Monzon (:  1940) is a Established patient, presenting virtually for evaluation of the following:    --Luz Strong MA         
Unable to reach the patient for Virtual Appointment at 1620 today with the provider. I left a detailed message informing patient I will be calling back at a later time, thank you.   
commands      Eyes:  EOM    []  Normal  [] Abnormal-  Sclera  []  Normal  [] Abnormal -         Discharge []  None visible  [] Abnormal -    HENT:   [] Normocephalic, atraumatic.  [] Abnormal   [] Mouth/Throat: Mucous membranes are moist.     External Ears [] Normal  [] Abnormal-     Neck: [] No visualized mass     Pulmonary/Chest: [] Respiratory effort normal.  [] No visualized signs of difficulty breathing or respiratory distress        [] Abnormal-      Musculoskeletal:   [] Normal gait with no signs of ataxia         [] Normal range of motion of neck        [] Abnormal-       Neurological:        [] No Facial Asymmetry (Cranial nerve 7 motor function) (limited exam to video visit)          [] No gaze palsy        [] Abnormal-         Skin:        [] No significant exanthematous lesions or discoloration noted on facial skin         [] Abnormal-            Psychiatric:       [] Normal Affect [] No Hallucinations        [] Abnormal-     Other pertinent observable physical exam findings-     ASSESSMENT/PLAN:  1. Age-related physical debility  Not controlled  - External Referral To Home Health    2. Difficulty in walking  Not controlled  - External Referral To Home Health    3. Failure to thrive in adult  new  - External Referral To Home Health    As above  Home health consult as per orders  Increase Atarax as per orders  Patient to continue other current medications  Return feb as scheduled.        Tatyana Monzon, was evaluated through a synchronous (real-time) audio-video encounter. The patient (or guardian if applicable) is aware that this is a billable service, which includes applicable co-pays. This Virtual Visit was conducted with patient's (and/or legal guardian's) consent. Patient identification was verified, and a caregiver was present when appropriate.   The patient was located at Home: [address] [place] [address]  Provider was located at Facility (Appt Dept): [address], [address]  [place],

## 2024-01-27 RX ORDER — HYDROXYZINE HYDROCHLORIDE 10 MG/1
TABLET, FILM COATED ORAL
Qty: 30 TABLET | Refills: 0 | OUTPATIENT
Start: 2024-01-27

## 2024-02-01 ENCOUNTER — HOSPITAL ENCOUNTER (EMERGENCY)
Facility: HOSPITAL | Age: 84
Discharge: HOME | End: 2024-02-01
Attending: EMERGENCY MEDICINE
Payer: MEDICARE

## 2024-02-01 ENCOUNTER — APPOINTMENT (OUTPATIENT)
Facility: HOSPITAL | Age: 84
End: 2024-02-01
Payer: MEDICARE

## 2024-02-01 VITALS
OXYGEN SATURATION: 100 % | BODY MASS INDEX: 23.49 KG/M2 | TEMPERATURE: 97.8 F | RESPIRATION RATE: 16 BRPM | SYSTOLIC BLOOD PRESSURE: 148 MMHG | WEIGHT: 141 LBS | HEIGHT: 65 IN | HEART RATE: 79 BPM | DIASTOLIC BLOOD PRESSURE: 86 MMHG

## 2024-02-01 DIAGNOSIS — L29.8 UREMIC PRURITUS: Primary | ICD-10-CM

## 2024-02-01 LAB
ALBUMIN SERPL-MCNC: 1.9 G/DL (ref 3.4–5)
ALBUMIN/GLOB SERPL: 0.7 (ref 0.8–1.7)
ALP SERPL-CCNC: 92 U/L (ref 45–117)
ALT SERPL-CCNC: 21 U/L (ref 13–56)
AMMONIA PLAS-SCNC: <10 UMOL/L (ref 11–32)
ANION GAP SERPL CALC-SCNC: 9 MMOL/L (ref 3–18)
APPEARANCE UR: CLEAR
AST SERPL-CCNC: 19 U/L (ref 10–38)
BACTERIA URNS QL MICRO: NEGATIVE /HPF
BASOPHILS # BLD: 0 K/UL (ref 0–0.1)
BASOPHILS NFR BLD: 1 % (ref 0–2)
BILIRUB SERPL-MCNC: 0.4 MG/DL (ref 0.2–1)
BILIRUB UR QL: NEGATIVE
BUN SERPL-MCNC: 43 MG/DL (ref 7–18)
BUN/CREAT SERPL: 5 (ref 12–20)
CALCIUM SERPL-MCNC: 8.8 MG/DL (ref 8.5–10.1)
CHLORIDE SERPL-SCNC: 100 MMOL/L (ref 100–111)
CO2 SERPL-SCNC: 25 MMOL/L (ref 21–32)
COLOR UR: YELLOW
CREAT SERPL-MCNC: 8.16 MG/DL (ref 0.6–1.3)
DIFFERENTIAL METHOD BLD: ABNORMAL
EOSINOPHIL # BLD: 0.1 K/UL (ref 0–0.4)
EOSINOPHIL NFR BLD: 2 % (ref 0–5)
EPITH CASTS URNS QL MICRO: NORMAL /LPF (ref 0–5)
ERYTHROCYTE [DISTWIDTH] IN BLOOD BY AUTOMATED COUNT: 13.7 % (ref 11.6–14.5)
GLOBULIN SER CALC-MCNC: 2.8 G/DL (ref 2–4)
GLUCOSE SERPL-MCNC: 98 MG/DL (ref 74–99)
GLUCOSE UR STRIP.AUTO-MCNC: NEGATIVE MG/DL
HCT VFR BLD AUTO: 26.5 % (ref 35–45)
HGB BLD-MCNC: 8.6 G/DL (ref 12–16)
HGB UR QL STRIP: ABNORMAL
IMM GRANULOCYTES # BLD AUTO: 0.1 K/UL (ref 0–0.04)
IMM GRANULOCYTES NFR BLD AUTO: 1 % (ref 0–0.5)
KETONES UR QL STRIP.AUTO: NEGATIVE MG/DL
LACTATE SERPL-SCNC: 1.1 MMOL/L (ref 0.4–2)
LEUKOCYTE ESTERASE UR QL STRIP.AUTO: NEGATIVE
LIPASE SERPL-CCNC: 51 U/L (ref 13–75)
LYMPHOCYTES # BLD: 1.3 K/UL (ref 0.9–3.6)
LYMPHOCYTES NFR BLD: 23 % (ref 21–52)
MCH RBC QN AUTO: 28.8 PG (ref 24–34)
MCHC RBC AUTO-ENTMCNC: 32.5 G/DL (ref 31–37)
MCV RBC AUTO: 88.6 FL (ref 78–100)
MONOCYTES # BLD: 0.6 K/UL (ref 0.05–1.2)
MONOCYTES NFR BLD: 11 % (ref 3–10)
NEUTS SEG # BLD: 3.4 K/UL (ref 1.8–8)
NEUTS SEG NFR BLD: 63 % (ref 40–73)
NITRITE UR QL STRIP.AUTO: NEGATIVE
NRBC # BLD: 0 K/UL (ref 0–0.01)
NRBC BLD-RTO: 0 PER 100 WBC
PH UR STRIP: 7.5 (ref 5–8)
PLATELET # BLD AUTO: 245 K/UL (ref 135–420)
PMV BLD AUTO: 10 FL (ref 9.2–11.8)
POTASSIUM SERPL-SCNC: 3.7 MMOL/L (ref 3.5–5.5)
PROT SERPL-MCNC: 4.7 G/DL (ref 6.4–8.2)
PROT UR STRIP-MCNC: 100 MG/DL
RBC # BLD AUTO: 2.99 M/UL (ref 4.2–5.3)
RBC #/AREA URNS HPF: NEGATIVE /HPF (ref 0–5)
SODIUM SERPL-SCNC: 134 MMOL/L (ref 136–145)
SP GR UR REFRACTOMETRY: 1.01 (ref 1–1.03)
TROPONIN I SERPL HS-MCNC: 49 NG/L (ref 0–54)
UROBILINOGEN UR QL STRIP.AUTO: 0.2 EU/DL (ref 0.2–1)
WBC # BLD AUTO: 5.5 K/UL (ref 4.6–13.2)
WBC URNS QL MICRO: NORMAL /HPF (ref 0–4)

## 2024-02-01 PROCEDURE — 99285 EMERGENCY DEPT VISIT HI MDM: CPT

## 2024-02-01 PROCEDURE — 93005 ELECTROCARDIOGRAM TRACING: CPT | Performed by: EMERGENCY MEDICINE

## 2024-02-01 PROCEDURE — 83605 ASSAY OF LACTIC ACID: CPT

## 2024-02-01 PROCEDURE — 83690 ASSAY OF LIPASE: CPT

## 2024-02-01 PROCEDURE — 85025 COMPLETE CBC W/AUTO DIFF WBC: CPT

## 2024-02-01 PROCEDURE — 6370000000 HC RX 637 (ALT 250 FOR IP): Performed by: EMERGENCY MEDICINE

## 2024-02-01 PROCEDURE — 84484 ASSAY OF TROPONIN QUANT: CPT

## 2024-02-01 PROCEDURE — 80053 COMPREHEN METABOLIC PANEL: CPT

## 2024-02-01 PROCEDURE — 82140 ASSAY OF AMMONIA: CPT

## 2024-02-01 PROCEDURE — 71045 X-RAY EXAM CHEST 1 VIEW: CPT

## 2024-02-01 PROCEDURE — 81001 URINALYSIS AUTO W/SCOPE: CPT

## 2024-02-01 RX ORDER — HYDROXYZINE HYDROCHLORIDE 25 MG/1
25 TABLET, FILM COATED ORAL EVERY 8 HOURS PRN
Qty: 30 TABLET | Refills: 0 | Status: SHIPPED | OUTPATIENT
Start: 2024-02-01

## 2024-02-01 RX ORDER — HYDROXYZINE HYDROCHLORIDE 25 MG/1
50 TABLET, FILM COATED ORAL
Status: COMPLETED | OUTPATIENT
Start: 2024-02-01 | End: 2024-02-01

## 2024-02-01 RX ADMIN — HYDROXYZINE HYDROCHLORIDE 50 MG: 25 TABLET, FILM COATED ORAL at 07:40

## 2024-02-01 ASSESSMENT — PAIN - FUNCTIONAL ASSESSMENT: PAIN_FUNCTIONAL_ASSESSMENT: NONE - DENIES PAIN

## 2024-02-01 NOTE — ED NOTES
Assumed care of patient. Patient is A&O x4 and is restless in bed due to itchiness. Patient is awake and watching television.

## 2024-02-01 NOTE — ED NOTES
Spoke with  to arrange transport;  stated that he was unable to get her at this time but can come later. He provided this nurse with daughters number to provide transportation. Daughter did not answer the phone. Patient aware. Charge nurse aware.    9:33 Daughter returned call. Daughter expressed she would not be able to lift her by herself and prefers for transportation to be arranged.

## 2024-02-01 NOTE — ACP (ADVANCE CARE PLANNING)
Advance Care Planning     Advance Care Planning Inpatient Note  [facility] Department    Today's Date: 2/1/2024  Unit: [facility] EMERGENCY DEPT    Received request from .  Upon review of chart and communication with care team, patient's decision making abilities are not in question.. Patient was/were present in the room during visit.    Goals of ACP Conversation:  Discuss advance care planning documents    Health Care Decision Makers:       Primary Decision Maker: Alpesh Quinn - Spouse - [phone]  Summary:  No Decision Maker named by patient at this time    Advance Care Planning Documents (Patient Wishes):  None     Assessment:  Patient seen in bed 11 of the emergency room where she will possibly be admitted due to allergies and itching. There is no advance directive on file.    Interventions:  Patient DECLINED ACP conversation    Care Preferences Communicated:   No    Outcomes/Plan:      Electronically signed by Miguelito Rodriges Jr., Psychiatric on 2/1/2024 at 10:14 AM

## 2024-02-01 NOTE — ED NOTES
Patient reports that benadryl and calamine lotion do not provide relief for her itching. Also reports that she does PD nightly and did receive her PD last night.

## 2024-02-01 NOTE — DISCHARGE INSTRUCTIONS
Your Itching is due to your Chronic kidney disease, and elevated blood urea nitrogen which is often associated with chronic kidney failure.  Continue with your dialysis as you normally do.  However you need to follow-up with your kidney specialist as he may need to adjust something with her dialysis worsening also medications for the pruritus.    I wrote prescription for hydroxyzine as needed for itching up to 3 times a day.  You were previously prescribed a slightly for sleep to take at night.  However if you are taking this still at night make sure to not take any more than 2 other tabs throughout the day every 8 hours for a total of 3 max of the day.

## 2024-02-01 NOTE — ED PROVIDER NOTES
While it is impossible to completely exclude the possibility of underlying serious disease or worsening condition, I feel the relative likelihood is extremely low. I discussed this uncertainty with the patient and/or family member/caregiver, who understood ED evaluation and treatment and felt comfortable with the outpatient treatment plan. All questions regarding care, test results and follow up were answered. At discharge, pt looked well, nontoxic, no distress, and is good candidate for outpatient follow up. They understand that they should return to the Emergency Department for any new or worsening symptoms. I stressed the importance of follow up for repeat assessment and possibly further evaluation/treatment.        Meds Given in ED:  Medications   hydrOXYzine HCl (ATARAX) tablet 50 mg (50 mg Oral Given 2/1/24 0033)       Final Diagnosis:  1. Uremic pruritus        Disposition: Discharge  Destination: Home    Discharge Rx:   New Prescriptions    HYDROXYZINE HCL (ATARAX) 25 MG TABLET    Take 1 tablet by mouth every 8 hours as needed for Itching         Dictation disclaimer: Please note that this dictation was completed with [employer], the computer voice recognition software. Quite often unanticipated grammatical, syntax, homophones, and other interpretive errors are inadvertently transcribed by the computer software. Please disregard these errors. Please excuse any errors that have escaped final proofreading.     Dusty Valdivia MD  Emergency Physician   Acute Care [facility]             Dusty Valdivia MD  02/01/24 0926

## 2024-02-01 NOTE — ED TRIAGE NOTES
Patient arrived via EMS for c/o of restlessness and itching. Per EMS, spouse reports that patient behaves this way every week or so. She is oriented to self at baseline. Upon arrival patient scratching incessantly. Patient has PD catheter. Site WDL. Patient had received benadryl at home pta. Shouting, \"I itch!\" Very restless, cannot stay still, unable to follow commands.

## 2024-02-01 NOTE — ED NOTES
Medical transport came in. Endorsed accordingly. Facilitated discharge, patient is stable, not in cardiopulmonary distress.

## 2024-02-01 NOTE — PROGRESS NOTES
completed the initial Spiritual Assessment of the patient, and offered Pastoral Care support to the patient in bed 11 of the emergency room  does not have an advance directive on file.,   Patient does not have any Sabianism/cultural needs that will affect patient’s preferences in health care. Chaplains will continue to follow and will provide pastoral care on an as needed/requested basis.    tan Rodriges  Board Certified   Spiritual Care Department  [phone]

## 2024-02-02 ENCOUNTER — TELEPHONE (OUTPATIENT)
Age: 84
End: 2024-02-02

## 2024-02-02 LAB
EKG ATRIAL RATE: 80 BPM
EKG DIAGNOSIS: NORMAL
EKG P AXIS: 47 DEGREES
EKG P-R INTERVAL: 148 MS
EKG Q-T INTERVAL: 432 MS
EKG QRS DURATION: 154 MS
EKG QTC CALCULATION (BAZETT): 498 MS
EKG R AXIS: -32 DEGREES
EKG T AXIS: -15 DEGREES
EKG VENTRICULAR RATE: 80 BPM

## 2024-02-02 PROCEDURE — 93010 ELECTROCARDIOGRAM REPORT: CPT | Performed by: INTERNAL MEDICINE

## 2024-02-02 NOTE — TELEPHONE ENCOUNTER
Deanne,  calling from [facility] informing provider that they do not provide the PT, OT services and is requesting if Dr. Carrera can send a order to a home health agency for PT, OT and health care aide.

## 2024-02-07 ENCOUNTER — OFFICE VISIT (OUTPATIENT)
Age: 84
End: 2024-02-07

## 2024-02-07 DIAGNOSIS — M19.012 GLENOHUMERAL ARTHRITIS, LEFT: ICD-10-CM

## 2024-02-07 DIAGNOSIS — M25.512 LEFT SHOULDER PAIN, UNSPECIFIED CHRONICITY: Primary | ICD-10-CM

## 2024-02-07 RX ORDER — LIDOCAINE HYDROCHLORIDE 10 MG/ML
6 INJECTION, SOLUTION INFILTRATION; PERINEURAL ONCE
Status: COMPLETED | OUTPATIENT
Start: 2024-02-07 | End: 2024-02-07

## 2024-02-07 RX ORDER — TRIAMCINOLONE ACETONIDE 40 MG/ML
40 INJECTION, SUSPENSION INTRA-ARTICULAR; INTRAMUSCULAR ONCE
Status: COMPLETED | OUTPATIENT
Start: 2024-02-07 | End: 2024-02-07

## 2024-02-07 RX ADMIN — TRIAMCINOLONE ACETONIDE 40 MG: 40 INJECTION, SUSPENSION INTRA-ARTICULAR; INTRAMUSCULAR at 15:09

## 2024-02-07 RX ADMIN — LIDOCAINE HYDROCHLORIDE 6 ML: 10 INJECTION, SOLUTION INFILTRATION; PERINEURAL at 15:08

## 2024-02-07 NOTE — PROGRESS NOTES
Tatyana Monzon  1940   Chief Complaint   Patient presents with    Shoulder Pain     Lt         HISTORY OF PRESENT ILLNESS  Tatyana Monzon is a 84 y.o. female who presents today for evaluation of left shoulder pain.  Pain is a 10/10. Pain has been present since her fall in mid December 2023. Pt presented to the ED on 12/18/2023 following a fall from her wheelchair while going down a sliding ramp. Pt states that she fell and injured her left shoulder. She describes severe pain with movement. She ambulated to clinic with a wheelchair.     Has tried following treatments: Injections:No; Brace:No; Therapy:No; Cane/Crutch:No      Allergies   Allergen Reactions    Aspirin Headaches and Other (See Comments)     Ringing in ears    Other reaction(s): ringing in ears, tinnitus    Ringing in ears Other reaction(s): ringing in ears, tinnitus    Cephalexin Hives, Rash and Itching     Other reaction(s): hives, Skin Rash    Sevelamer Diarrhea, Other (See Comments), Nausea And Vomiting and Itching     With rash and sweatiness    Sevelamer Carbonate Itching     With rash and sweatiness    Ibuprofen      Other reaction(s): stomach upset        Past Medical History:   Diagnosis Date    Allergic rhinitis     Chronic kidney disease 2020    Peritoneal Dialysis    Diabetes (HCC) 2015    IDDM    H/O seasonal allergies     Hypertension     Left foot drop     Peritoneal dialysis catheter in place ([facility])       Social History       Tobacco History       Smoking Status  Never      Smokeless Tobacco Use  Never              Alcohol History       Alcohol Use Status  No              Drug Use       Drug Use Status  Never              Sexual Activity       Sexually Active  Not Asked Birth Control/Protection  None                   Past Surgical History:   Procedure Laterality Date    CARPAL TUNNEL RELEASE Left     CATARACT REMOVAL  2011    COLONOSCOPY N/A 9/26/2022    COLONOSCOPY with bx's and polypectomy performed by Cam

## 2024-02-07 NOTE — TELEPHONE ENCOUNTER
Unable to reach the patients daughter, Mrs Field. I left a detailed message inform ing her that [facility] does not provider PT & OT Service and per the provider is there another facility she would like her mother to go through or is she okay with her putting a Referral to Home Health Agency with [facility]? Please call the clinic and advise, thank you.

## 2024-02-12 ENCOUNTER — TELEPHONE (OUTPATIENT)
Age: 84
End: 2024-02-12

## 2024-02-12 NOTE — TELEPHONE ENCOUNTER
Olamide [facility] called states just spoke with patient's daughter regarding PT/OT orders, states that they are able to see the orders, however, notes/documentation needs to state patient's medical diagnosis that is the cause for the need for treatment, can include referral if possible

## 2024-02-13 ENCOUNTER — APPOINTMENT (OUTPATIENT)
Facility: HOSPITAL | Age: 84
DRG: 371 | End: 2024-02-13
Payer: MEDICARE

## 2024-02-13 ENCOUNTER — HOSPITAL ENCOUNTER (INPATIENT)
Facility: HOSPITAL | Age: 84
LOS: 8 days | Discharge: NURSING, REHAB OR LONG-TERM CARE | DRG: 371 | End: 2024-02-21
Attending: EMERGENCY MEDICINE | Admitting: HOSPITALIST
Payer: MEDICARE

## 2024-02-13 DIAGNOSIS — N18.6 ESRD (END STAGE RENAL DISEASE) ON DIALYSIS (HCC): ICD-10-CM

## 2024-02-13 DIAGNOSIS — Z99.2 ESRD (END STAGE RENAL DISEASE) ON DIALYSIS (HCC): ICD-10-CM

## 2024-02-13 DIAGNOSIS — R10.84 GENERALIZED ABDOMINAL PAIN: ICD-10-CM

## 2024-02-13 DIAGNOSIS — K29.00 ACUTE GASTRITIS WITHOUT HEMORRHAGE, UNSPECIFIED GASTRITIS TYPE: Primary | ICD-10-CM

## 2024-02-13 PROBLEM — K21.9 GERD (GASTROESOPHAGEAL REFLUX DISEASE): Status: ACTIVE | Noted: 2024-02-13

## 2024-02-13 PROBLEM — E87.5 HYPERKALEMIA: Status: ACTIVE | Noted: 2024-02-13

## 2024-02-13 PROBLEM — E78.5 HYPERLIPIDEMIA: Status: ACTIVE | Noted: 2024-02-13

## 2024-02-13 PROBLEM — K65.9 PERITONITIS (HCC): Status: ACTIVE | Noted: 2024-02-13

## 2024-02-13 PROBLEM — K29.70 GASTRITIS: Status: ACTIVE | Noted: 2024-02-13

## 2024-02-13 PROBLEM — R94.31 QT PROLONGATION: Status: ACTIVE | Noted: 2024-02-13

## 2024-02-13 PROBLEM — R10.9 ABDOMINAL PAIN: Status: ACTIVE | Noted: 2024-02-13

## 2024-02-13 PROBLEM — D63.8 ANEMIA OF CHRONIC DISEASE: Status: ACTIVE | Noted: 2024-02-13

## 2024-02-13 LAB
ALBUMIN SERPL-MCNC: 2 G/DL (ref 3.4–5)
ALBUMIN/GLOB SERPL: 0.6 (ref 0.8–1.7)
ALP SERPL-CCNC: 127 U/L (ref 45–117)
ALT SERPL-CCNC: 23 U/L (ref 13–56)
AMORPH CRY URNS QL MICRO: NEGATIVE
ANION GAP SERPL CALC-SCNC: 8 MMOL/L (ref 3–18)
APPEARANCE UR: CLEAR
AST SERPL-CCNC: 45 U/L (ref 10–38)
BACTERIA URNS QL MICRO: ABNORMAL /HPF
BASOPHILS # BLD: 0 K/UL (ref 0–0.1)
BASOPHILS NFR BLD: 0 % (ref 0–2)
BILIRUB SERPL-MCNC: 0.6 MG/DL (ref 0.2–1)
BILIRUB UR QL: NEGATIVE
BUN SERPL-MCNC: 66 MG/DL (ref 7–18)
BUN/CREAT SERPL: 6 (ref 12–20)
CALCIUM SERPL-MCNC: 6.5 MG/DL (ref 8.5–10.1)
CHLORIDE SERPL-SCNC: 106 MMOL/L (ref 100–111)
CO2 SERPL-SCNC: 24 MMOL/L (ref 21–32)
COLOR UR: YELLOW
CREAT SERPL-MCNC: 10.9 MG/DL (ref 0.6–1.3)
DIFFERENTIAL METHOD BLD: ABNORMAL
EOSINOPHIL # BLD: 0.1 K/UL (ref 0–0.4)
EOSINOPHIL NFR BLD: 1 % (ref 0–5)
EPITH CASTS URNS QL MICRO: ABNORMAL /LPF (ref 0–5)
ERYTHROCYTE [DISTWIDTH] IN BLOOD BY AUTOMATED COUNT: 14.9 % (ref 11.6–14.5)
EST. AVERAGE GLUCOSE BLD GHB EST-MCNC: 111 MG/DL
GLOBULIN SER CALC-MCNC: 3.2 G/DL (ref 2–4)
GLUCOSE BLD STRIP.AUTO-MCNC: 77 MG/DL (ref 70–110)
GLUCOSE SERPL-MCNC: 90 MG/DL (ref 74–99)
GLUCOSE UR STRIP.AUTO-MCNC: 100 MG/DL
HBA1C MFR BLD: 5.5 % (ref 4.2–5.6)
HCT VFR BLD AUTO: 31.3 % (ref 35–45)
HGB BLD-MCNC: 9.9 G/DL (ref 12–16)
HGB UR QL STRIP: ABNORMAL
IMM GRANULOCYTES # BLD AUTO: 0 K/UL (ref 0–0.04)
IMM GRANULOCYTES NFR BLD AUTO: 0 % (ref 0–0.5)
KETONES UR QL STRIP.AUTO: NEGATIVE MG/DL
LACTATE SERPL-SCNC: 0.7 MMOL/L (ref 0.4–2)
LEUKOCYTE ESTERASE UR QL STRIP.AUTO: NEGATIVE
LIPASE SERPL-CCNC: 49 U/L (ref 13–75)
LYMPHOCYTES # BLD: 0.9 K/UL (ref 0.9–3.6)
LYMPHOCYTES NFR BLD: 11 % (ref 21–52)
MCH RBC QN AUTO: 28.2 PG (ref 24–34)
MCHC RBC AUTO-ENTMCNC: 31.6 G/DL (ref 31–37)
MCV RBC AUTO: 89.2 FL (ref 78–100)
MONOCYTES # BLD: 0.7 K/UL (ref 0.05–1.2)
MONOCYTES NFR BLD: 9 % (ref 3–10)
MUCOUS THREADS URNS QL MICRO: NEGATIVE /LPF
NEUTS SEG # BLD: 6.3 K/UL (ref 1.8–8)
NEUTS SEG NFR BLD: 79 % (ref 40–73)
NITRITE UR QL STRIP.AUTO: NEGATIVE
NRBC # BLD: 0.02 K/UL (ref 0–0.01)
NRBC BLD-RTO: 0.3 PER 100 WBC
PH UR STRIP: 8.5 (ref 5–8)
PLATELET # BLD AUTO: 225 K/UL (ref 135–420)
PMV BLD AUTO: 9.3 FL (ref 9.2–11.8)
POTASSIUM SERPL-SCNC: 5.6 MMOL/L (ref 3.5–5.5)
PROT SERPL-MCNC: 5.2 G/DL (ref 6.4–8.2)
PROT UR STRIP-MCNC: 100 MG/DL
RBC # BLD AUTO: 3.51 M/UL (ref 4.2–5.3)
RBC #/AREA URNS HPF: 0 /HPF (ref 0–5)
SODIUM SERPL-SCNC: 138 MMOL/L (ref 136–145)
SP GR UR REFRACTOMETRY: 1.01 (ref 1–1.03)
UROBILINOGEN UR QL STRIP.AUTO: 0.2 EU/DL (ref 0.2–1)
WBC # BLD AUTO: 7.9 K/UL (ref 4.6–13.2)
WBC URNS QL MICRO: 1 /HPF (ref 0–4)

## 2024-02-13 PROCEDURE — 83605 ASSAY OF LACTIC ACID: CPT

## 2024-02-13 PROCEDURE — 6370000000 HC RX 637 (ALT 250 FOR IP)

## 2024-02-13 PROCEDURE — 6360000002 HC RX W HCPCS: Performed by: STUDENT IN AN ORGANIZED HEALTH CARE EDUCATION/TRAINING PROGRAM

## 2024-02-13 PROCEDURE — 6360000002 HC RX W HCPCS

## 2024-02-13 PROCEDURE — 74177 CT ABD & PELVIS W/CONTRAST: CPT

## 2024-02-13 PROCEDURE — 99285 EMERGENCY DEPT VISIT HI MDM: CPT

## 2024-02-13 PROCEDURE — 1100000000 HC RM PRIVATE

## 2024-02-13 PROCEDURE — 85025 COMPLETE CBC W/AUTO DIFF WBC: CPT

## 2024-02-13 PROCEDURE — 6360000004 HC RX CONTRAST MEDICATION

## 2024-02-13 PROCEDURE — A4216 STERILE WATER/SALINE, 10 ML: HCPCS

## 2024-02-13 PROCEDURE — 2580000003 HC RX 258

## 2024-02-13 PROCEDURE — 96374 THER/PROPH/DIAG INJ IV PUSH: CPT

## 2024-02-13 PROCEDURE — C9113 INJ PANTOPRAZOLE SODIUM, VIA: HCPCS

## 2024-02-13 PROCEDURE — 83690 ASSAY OF LIPASE: CPT

## 2024-02-13 PROCEDURE — 99222 1ST HOSP IP/OBS MODERATE 55: CPT

## 2024-02-13 PROCEDURE — 83036 HEMOGLOBIN GLYCOSYLATED A1C: CPT

## 2024-02-13 PROCEDURE — 81001 URINALYSIS AUTO W/SCOPE: CPT

## 2024-02-13 PROCEDURE — 87086 URINE CULTURE/COLONY COUNT: CPT

## 2024-02-13 PROCEDURE — 93005 ELECTROCARDIOGRAM TRACING: CPT | Performed by: EMERGENCY MEDICINE

## 2024-02-13 PROCEDURE — 82962 GLUCOSE BLOOD TEST: CPT

## 2024-02-13 PROCEDURE — 80053 COMPREHEN METABOLIC PANEL: CPT

## 2024-02-13 RX ORDER — IODIXANOL 320 MG/ML
80 INJECTION, SOLUTION INTRAVASCULAR
Status: COMPLETED | OUTPATIENT
Start: 2024-02-13 | End: 2024-02-13

## 2024-02-13 RX ORDER — HEPARIN SODIUM 5000 [USP'U]/ML
5000 INJECTION, SOLUTION INTRAVENOUS; SUBCUTANEOUS EVERY 8 HOURS SCHEDULED
Status: DISCONTINUED | OUTPATIENT
Start: 2024-02-13 | End: 2024-02-21 | Stop reason: HOSPADM

## 2024-02-13 RX ORDER — DIPHENHYDRAMINE HYDROCHLORIDE 50 MG/ML
25 INJECTION INTRAMUSCULAR; INTRAVENOUS
Status: COMPLETED | OUTPATIENT
Start: 2024-02-13 | End: 2024-02-13

## 2024-02-13 RX ORDER — ONDANSETRON 2 MG/ML
4 INJECTION INTRAMUSCULAR; INTRAVENOUS EVERY 6 HOURS PRN
Status: DISCONTINUED | OUTPATIENT
Start: 2024-02-13 | End: 2024-02-13

## 2024-02-13 RX ORDER — SODIUM CHLORIDE 0.9 % (FLUSH) 0.9 %
5-40 SYRINGE (ML) INJECTION PRN
Status: DISCONTINUED | OUTPATIENT
Start: 2024-02-13 | End: 2024-02-21 | Stop reason: HOSPADM

## 2024-02-13 RX ORDER — SODIUM CHLORIDE 0.9 % (FLUSH) 0.9 %
5-40 SYRINGE (ML) INJECTION EVERY 12 HOURS SCHEDULED
Status: DISCONTINUED | OUTPATIENT
Start: 2024-02-13 | End: 2024-02-21 | Stop reason: HOSPADM

## 2024-02-13 RX ORDER — ATORVASTATIN CALCIUM 40 MG/1
80 TABLET, FILM COATED ORAL NIGHTLY
Status: DISCONTINUED | OUTPATIENT
Start: 2024-02-13 | End: 2024-02-21 | Stop reason: HOSPADM

## 2024-02-13 RX ORDER — ONDANSETRON 4 MG/1
4 TABLET, ORALLY DISINTEGRATING ORAL EVERY 8 HOURS PRN
Status: DISCONTINUED | OUTPATIENT
Start: 2024-02-13 | End: 2024-02-13

## 2024-02-13 RX ORDER — ACETAMINOPHEN 325 MG/1
650 TABLET ORAL EVERY 6 HOURS PRN
Status: DISCONTINUED | OUTPATIENT
Start: 2024-02-13 | End: 2024-02-21 | Stop reason: HOSPADM

## 2024-02-13 RX ORDER — AMLODIPINE BESYLATE 5 MG/1
5 TABLET ORAL DAILY
Status: DISCONTINUED | OUTPATIENT
Start: 2024-02-14 | End: 2024-02-21 | Stop reason: HOSPADM

## 2024-02-13 RX ORDER — CINACALCET 60 MG/1
60 TABLET, FILM COATED ORAL DAILY
Status: DISCONTINUED | OUTPATIENT
Start: 2024-02-14 | End: 2024-02-21 | Stop reason: HOSPADM

## 2024-02-13 RX ORDER — ACETAMINOPHEN 500 MG
1000 TABLET ORAL
Status: COMPLETED | OUTPATIENT
Start: 2024-02-13 | End: 2024-02-13

## 2024-02-13 RX ORDER — ACETAMINOPHEN 650 MG/1
650 SUPPOSITORY RECTAL EVERY 6 HOURS PRN
Status: DISCONTINUED | OUTPATIENT
Start: 2024-02-13 | End: 2024-02-21 | Stop reason: HOSPADM

## 2024-02-13 RX ORDER — DEXTROSE MONOHYDRATE 100 MG/ML
INJECTION, SOLUTION INTRAVENOUS CONTINUOUS PRN
Status: DISCONTINUED | OUTPATIENT
Start: 2024-02-13 | End: 2024-02-21 | Stop reason: HOSPADM

## 2024-02-13 RX ORDER — INSULIN LISPRO 100 [IU]/ML
0-8 INJECTION, SOLUTION INTRAVENOUS; SUBCUTANEOUS
Status: DISCONTINUED | OUTPATIENT
Start: 2024-02-13 | End: 2024-02-18

## 2024-02-13 RX ORDER — VANCOMYCIN 1.75 G/350ML
1250 INJECTION, SOLUTION INTRAVENOUS ONCE
Status: COMPLETED | OUTPATIENT
Start: 2024-02-13 | End: 2024-02-13

## 2024-02-13 RX ORDER — SODIUM CHLORIDE, SODIUM LACTATE, CALCIUM CHLORIDE, MAGNESIUM CHLORIDE AND DEXTROSE 1.5; 538; 448; 18.3; 5.08 G/100ML; MG/100ML; MG/100ML; MG/100ML; MG/100ML
10000 INJECTION, SOLUTION INTRAPERITONEAL CONTINUOUS
Status: DISCONTINUED | OUTPATIENT
Start: 2024-02-13 | End: 2024-02-21 | Stop reason: HOSPADM

## 2024-02-13 RX ORDER — METOPROLOL SUCCINATE 25 MG/1
25 TABLET, EXTENDED RELEASE ORAL DAILY
Status: DISCONTINUED | OUTPATIENT
Start: 2024-02-14 | End: 2024-02-21 | Stop reason: HOSPADM

## 2024-02-13 RX ORDER — PROCHLORPERAZINE EDISYLATE 5 MG/ML
10 INJECTION INTRAMUSCULAR; INTRAVENOUS EVERY 6 HOURS PRN
Status: DISCONTINUED | OUTPATIENT
Start: 2024-02-13 | End: 2024-02-21 | Stop reason: HOSPADM

## 2024-02-13 RX ORDER — POLYETHYLENE GLYCOL 3350 17 G/17G
17 POWDER, FOR SOLUTION ORAL DAILY PRN
Status: DISCONTINUED | OUTPATIENT
Start: 2024-02-13 | End: 2024-02-21 | Stop reason: HOSPADM

## 2024-02-13 RX ORDER — CINACALCET 60 MG/1
60 TABLET, FILM COATED ORAL DAILY
Status: ON HOLD | COMMUNITY
Start: 2024-02-01 | End: 2024-02-21 | Stop reason: HOSPADM

## 2024-02-13 RX ORDER — HYDROXYZINE HYDROCHLORIDE 25 MG/1
25 TABLET, FILM COATED ORAL EVERY 8 HOURS PRN
Status: DISCONTINUED | OUTPATIENT
Start: 2024-02-13 | End: 2024-02-21 | Stop reason: HOSPADM

## 2024-02-13 RX ORDER — GENTAMICIN SULFATE 1 MG/G
CREAM TOPICAL DAILY
Status: DISCONTINUED | OUTPATIENT
Start: 2024-02-14 | End: 2024-02-21 | Stop reason: HOSPADM

## 2024-02-13 RX ADMIN — PANTOPRAZOLE SODIUM 40 MG: 40 INJECTION, POWDER, FOR SOLUTION INTRAVENOUS at 20:08

## 2024-02-13 RX ADMIN — DIPHENHYDRAMINE HYDROCHLORIDE 25 MG: 50 INJECTION INTRAMUSCULAR; INTRAVENOUS at 22:16

## 2024-02-13 RX ADMIN — ALUMINUM HYDROXIDE AND MAGNESIUM HYDROXIDE 30 ML: 200; 200 SUSPENSION ORAL at 20:03

## 2024-02-13 RX ADMIN — VANCOMYCIN 1250 MG: 1.75 INJECTION, SOLUTION INTRAVENOUS at 21:21

## 2024-02-13 RX ADMIN — ACETAMINOPHEN 1000 MG: 500 TABLET ORAL at 20:02

## 2024-02-13 RX ADMIN — ATORVASTATIN CALCIUM 80 MG: 40 TABLET ORAL at 22:16

## 2024-02-13 RX ADMIN — HEPARIN SODIUM 5000 UNITS: 5000 INJECTION INTRAVENOUS; SUBCUTANEOUS at 21:12

## 2024-02-13 RX ADMIN — PIPERACILLIN AND TAZOBACTAM 4500 MG: 4; .5 INJECTION, POWDER, LYOPHILIZED, FOR SOLUTION INTRAVENOUS; PARENTERAL at 20:50

## 2024-02-13 RX ADMIN — SODIUM CHLORIDE, PRESERVATIVE FREE 10 ML: 5 INJECTION INTRAVENOUS at 21:14

## 2024-02-13 RX ADMIN — IODIXANOL 80 ML: 320 INJECTION, SOLUTION INTRAVASCULAR at 19:22

## 2024-02-13 ASSESSMENT — PAIN DESCRIPTION - DESCRIPTORS: DESCRIPTORS: ACHING

## 2024-02-13 ASSESSMENT — PAIN SCALES - GENERAL: PAINLEVEL_OUTOF10: 7

## 2024-02-13 ASSESSMENT — PAIN DESCRIPTION - LOCATION: LOCATION: ABDOMEN

## 2024-02-13 NOTE — ED PROVIDER NOTES
[facility] EMERGENCY DEPT  EMERGENCY DEPARTMENT ENCOUNTER       Pt Name: Tatyana Monzon  MRN: [id number]  Birthdate 1940  Date of evaluation: 2/13/2024  Provider: Pablo Smith MD   PCP: Keerthi Carrera MD  Note Started: 6:06 PM EST 2/13/24     CHIEF COMPLAINT       Chief Complaint   Patient presents with    Abdominal Pain    Dysuria        HISTORY OF PRESENT ILLNESS: 1 or more elements      History From: patient, History limited by: none     Tatyana Monzon is a 84 y.o. female with pmhx ESRD (on peritoneal dialysis), HTN, insulin-dependent T2DM, GERD, who was BIB EMS for abdominal pain. She says she's been having two days of diffuse abdominal pain and non-bloody diarrhea. Normally she is constipated. Also with a few days of dysuria, no hematuria, no fevers, no chills, no nausea or vomiting. No chest pain or SOB, no HA, no muscle weakness. She does peritoneal dialysis at home and last completed session 2 evenings ago.        Please See [facility] for Additional Details of the HPI/PMH  Nursing Notes were all reviewed and agreed with or any disagreements were addressed in the HPI.     REVIEW OF SYSTEMS        Positives and Pertinent negatives as per HPI.    PAST HISTORY     Past Medical History:  Past Medical History:   Diagnosis Date    Allergic rhinitis     Chronic kidney disease 2020    Peritoneal Dialysis    Diabetes (HCC) 2015    IDDM    H/O seasonal allergies     Hypertension     Left foot drop     Peritoneal dialysis catheter in place ([facility])        Past Surgical History:  Past Surgical History:   Procedure Laterality Date    CARPAL TUNNEL RELEASE Left     CATARACT REMOVAL  2011    COLONOSCOPY N/A 9/26/2022    COLONOSCOPY with bx's and polypectomy performed by Lang Levy MD at [facility] ENDOSCOPY    COLONOSCOPY N/A 10/8/2022    COLONOSCOPY/ Polypectomies performed by Cal Tate MD at [facility] ENDOSCOPY    LUMBAR FUSION  2004-last sx    fused L2-S1, in [place]    TUBAL LIGATION      WISDOM TOOTH EXTRACTION         Family        Family History:  Family History   Problem Relation Age of Onset   • Diabetes Maternal Grandfather    • Diabetes Other    • Hypertension Cousin    • Diabetes Maternal Aunt    • Diabetes Maternal Uncle        Social History:  Social History     Tobacco Use   • Smoking status: Never   • Smokeless tobacco: Never   Vaping Use   • Vaping Use: Never used   Substance Use Topics   • Alcohol use: No   • Drug use: Never       Allergies:  Allergies   Allergen Reactions   • Aspirin Headaches and Other (See Comments)     Ringing in ears    Other reaction(s): ringing in ears, tinnitus    Ringing in ears Other reaction(s): ringing in ears, tinnitus   • Cephalexin Hives, Rash and Itching     Other reaction(s): hives, Skin Rash   • Sevelamer Diarrhea, Other (See Comments), Nausea And Vomiting and Itching     With rash and sweatiness   • Sevelamer Carbonate Itching     With rash and sweatiness   • Ibuprofen      Other reaction(s): stomach upset       CURRENT MEDICATIONS      Previous Medications    AMLODIPINE (NORVASC) 5 MG TABLET    Take 1 tablet by mouth daily    ATORVASTATIN (LIPITOR) 80 MG TABLET    Take 1 tablet by mouth nightly    B COMPLEX-C-FOLIC ACID (NEPHRO-PROSPER) 0.8 MG TABS    Take 1 tablet by mouth    B COMPLEX-C-FOLIC ACID (ADAMA-PROSPER PO)    Take 1 tablet by mouth    BLOOD GLUCOSE TEST STRIPS (EXACTECH TEST) STRIP        CALCIUM CARBONATE ANTACID (TUMS ULTRA PO)    Tums Ultra    CINACALCET (SENSIPAR) 60 MG TABLET    Take 1 tablet by mouth daily    DIPHENOXYLATE-ATROPINE (LOMOTIL) 2.5-0.025 MG PER TABLET    4 times daily as needed for Diarrhea.    FAMOTIDINE (PEPCID) 40 MG TABLET        FLUTICASONE (FLONASE) 50 MCG/ACT NASAL SPRAY    2 sprays by Each Nostril route daily    GENTAMICIN (GARAMYCIN) 0.1 % CREAM    Apply topically    HYDROXYZINE HCL (ATARAX) 25 MG TABLET    Take 1 tablet by mouth every 8 hours as needed for Itching    INSULIN ASPART (NOVOLOG) 100 UNIT/ML INJECTION PEN    Inject into the skin daily     proofreading.)

## 2024-02-13 NOTE — ED TRIAGE NOTES
Pt came from home via EMS, per EMS, pt started to have abd pain last night with pain in urination , denies fever/chills/chest pain/SOB at this time. Pt also do Peritonial dialysis every night last one was 2 days ago as per pt.

## 2024-02-14 LAB
ANION GAP SERPL CALC-SCNC: 10 MMOL/L (ref 3–18)
APPEARANCE FLD: ABNORMAL
BACTERIA SPEC CULT: NORMAL
BASOPHILS # BLD: 0 K/UL (ref 0–0.1)
BASOPHILS NFR BLD: 1 % (ref 0–2)
BUN SERPL-MCNC: 64 MG/DL (ref 7–18)
BUN/CREAT SERPL: 6 (ref 12–20)
C DIFF TOXIN INTERPRETATION: ABNORMAL
C. DIFFICILE TOXIN MOLECULAR: POSITIVE
CALCIUM SERPL-MCNC: 6.6 MG/DL (ref 8.5–10.1)
CHLORIDE SERPL-SCNC: 104 MMOL/L (ref 100–111)
CO2 SERPL-SCNC: 23 MMOL/L (ref 21–32)
COLOR FLD: COLORLESS
CREAT SERPL-MCNC: 10.1 MG/DL (ref 0.6–1.3)
DIFFERENTIAL METHOD BLD: ABNORMAL
EKG ATRIAL RATE: 79 BPM
EKG DIAGNOSIS: NORMAL
EKG P AXIS: 61 DEGREES
EKG P-R INTERVAL: 154 MS
EKG Q-T INTERVAL: 458 MS
EKG QRS DURATION: 142 MS
EKG QTC CALCULATION (BAZETT): 525 MS
EKG R AXIS: -32 DEGREES
EKG T AXIS: 18 DEGREES
EKG VENTRICULAR RATE: 79 BPM
EOSINOPHIL # BLD: 0.1 K/UL (ref 0–0.4)
EOSINOPHIL NFR BLD: 1 % (ref 0–5)
EOSINOPHIL NFR FLD MANUAL: 0 %
ERYTHROCYTE [DISTWIDTH] IN BLOOD BY AUTOMATED COUNT: 14.9 % (ref 11.6–14.5)
GLUCOSE BLD STRIP.AUTO-MCNC: 110 MG/DL (ref 70–110)
GLUCOSE BLD STRIP.AUTO-MCNC: 134 MG/DL (ref 70–110)
GLUCOSE BLD STRIP.AUTO-MCNC: 145 MG/DL (ref 70–110)
GLUCOSE BLD STRIP.AUTO-MCNC: 161 MG/DL (ref 70–110)
GLUCOSE SERPL-MCNC: 125 MG/DL (ref 74–99)
HCT VFR BLD AUTO: 32.3 % (ref 35–45)
HGB BLD-MCNC: 10.1 G/DL (ref 12–16)
IMM GRANULOCYTES # BLD AUTO: 0 K/UL (ref 0–0.04)
IMM GRANULOCYTES NFR BLD AUTO: 0 % (ref 0–0.5)
LYMPHOCYTES # BLD: 0.8 K/UL (ref 0.9–3.6)
LYMPHOCYTES NFR BLD: 15 % (ref 21–52)
LYMPHOCYTES NFR FLD: 6 %
MCH RBC QN AUTO: 28 PG (ref 24–34)
MCHC RBC AUTO-ENTMCNC: 31.3 G/DL (ref 31–37)
MCV RBC AUTO: 89.5 FL (ref 78–100)
MONOCYTES # BLD: 0.6 K/UL (ref 0.05–1.2)
MONOCYTES NFR BLD: 10 % (ref 3–10)
MONOCYTES NFR FLD: 12 %
NEUTROPHILS NFR FLD: 82 % (ref 0–25)
NEUTS BAND # FLD: 0 %
NEUTS SEG # BLD: 4.2 K/UL (ref 1.8–8)
NEUTS SEG NFR BLD: 73 % (ref 40–73)
NRBC # BLD: 0 K/UL (ref 0–0.01)
NRBC BLD-RTO: 0 PER 100 WBC
NUC CELL # FLD: 6320 /CU MM
PLATELET # BLD AUTO: 231 K/UL (ref 135–420)
PMV BLD AUTO: 9.2 FL (ref 9.2–11.8)
POTASSIUM SERPL-SCNC: 4.2 MMOL/L (ref 3.5–5.5)
RBC # BLD AUTO: 3.61 M/UL (ref 4.2–5.3)
RBC # FLD: <2000 /CU MM
REFLEX: ABNORMAL
SERVICE CMNT-IMP: NORMAL
SODIUM SERPL-SCNC: 137 MMOL/L (ref 136–145)
SPECIMEN SOURCE FLD: ABNORMAL
VANCOMYCIN SERPL-MCNC: 1.7 UG/ML (ref 5–40)
WBC # BLD AUTO: 5.7 K/UL (ref 4.6–13.2)

## 2024-02-14 PROCEDURE — 87070 CULTURE OTHR SPECIMN AEROBIC: CPT

## 2024-02-14 PROCEDURE — 36415 COLL VENOUS BLD VENIPUNCTURE: CPT

## 2024-02-14 PROCEDURE — 87102 FUNGUS ISOLATION CULTURE: CPT

## 2024-02-14 PROCEDURE — 97530 THERAPEUTIC ACTIVITIES: CPT

## 2024-02-14 PROCEDURE — 6360000002 HC RX W HCPCS

## 2024-02-14 PROCEDURE — 2580000003 HC RX 258

## 2024-02-14 PROCEDURE — 2580000003 HC RX 258: Performed by: INTERNAL MEDICINE

## 2024-02-14 PROCEDURE — 2580000003 HC RX 258: Performed by: HOSPITALIST

## 2024-02-14 PROCEDURE — 87205 SMEAR GRAM STAIN: CPT

## 2024-02-14 PROCEDURE — 99232 SBSQ HOSP IP/OBS MODERATE 35: CPT | Performed by: INTERNAL MEDICINE

## 2024-02-14 PROCEDURE — 89051 BODY FLUID CELL COUNT: CPT

## 2024-02-14 PROCEDURE — 87449 NOS EACH ORGANISM AG IA: CPT

## 2024-02-14 PROCEDURE — 97166 OT EVAL MOD COMPLEX 45 MIN: CPT

## 2024-02-14 PROCEDURE — 82962 GLUCOSE BLOOD TEST: CPT

## 2024-02-14 PROCEDURE — 87493 C DIFF AMPLIFIED PROBE: CPT

## 2024-02-14 PROCEDURE — 94761 N-INVAS EAR/PLS OXIMETRY MLT: CPT

## 2024-02-14 PROCEDURE — 6360000002 HC RX W HCPCS: Performed by: INTERNAL MEDICINE

## 2024-02-14 PROCEDURE — 87324 CLOSTRIDIUM AG IA: CPT

## 2024-02-14 PROCEDURE — A4216 STERILE WATER/SALINE, 10 ML: HCPCS

## 2024-02-14 PROCEDURE — 85025 COMPLETE CBC W/AUTO DIFF WBC: CPT

## 2024-02-14 PROCEDURE — 6360000002 HC RX W HCPCS: Performed by: HOSPITALIST

## 2024-02-14 PROCEDURE — 6370000000 HC RX 637 (ALT 250 FOR IP): Performed by: INTERNAL MEDICINE

## 2024-02-14 PROCEDURE — 80048 BASIC METABOLIC PNL TOTAL CA: CPT

## 2024-02-14 PROCEDURE — 1100000003 HC PRIVATE W/ TELEMETRY

## 2024-02-14 PROCEDURE — 93010 ELECTROCARDIOGRAM REPORT: CPT | Performed by: INTERNAL MEDICINE

## 2024-02-14 PROCEDURE — 6370000000 HC RX 637 (ALT 250 FOR IP)

## 2024-02-14 PROCEDURE — 97162 PT EVAL MOD COMPLEX 30 MIN: CPT

## 2024-02-14 PROCEDURE — 80202 ASSAY OF VANCOMYCIN: CPT

## 2024-02-14 PROCEDURE — A4722 DIALYS SOL FLD VOL > 1999CC: HCPCS | Performed by: INTERNAL MEDICINE

## 2024-02-14 PROCEDURE — 90945 DIALYSIS ONE EVALUATION: CPT

## 2024-02-14 PROCEDURE — C9113 INJ PANTOPRAZOLE SODIUM, VIA: HCPCS

## 2024-02-14 RX ORDER — PANTOPRAZOLE SODIUM 40 MG/1
40 TABLET, DELAYED RELEASE ORAL
Status: DISCONTINUED | OUTPATIENT
Start: 2024-02-15 | End: 2024-02-21 | Stop reason: HOSPADM

## 2024-02-14 RX ORDER — FLUCONAZOLE 200 MG/1
200 TABLET ORAL ONCE
Status: COMPLETED | OUTPATIENT
Start: 2024-02-14 | End: 2024-02-14

## 2024-02-14 RX ORDER — METRONIDAZOLE 500 MG/100ML
500 INJECTION, SOLUTION INTRAVENOUS EVERY 8 HOURS
Status: DISCONTINUED | OUTPATIENT
Start: 2024-02-14 | End: 2024-02-19

## 2024-02-14 RX ORDER — LACTOBACILLUS RHAMNOSUS GG 10B CELL
1 CAPSULE ORAL
Status: DISCONTINUED | OUTPATIENT
Start: 2024-02-14 | End: 2024-02-21 | Stop reason: HOSPADM

## 2024-02-14 RX ADMIN — ACETAMINOPHEN 325MG 650 MG: 325 TABLET ORAL at 03:33

## 2024-02-14 RX ADMIN — AMLODIPINE BESYLATE 5 MG: 5 TABLET ORAL at 09:18

## 2024-02-14 RX ADMIN — HEPARIN SODIUM 5000 UNITS: 5000 INJECTION INTRAVENOUS; SUBCUTANEOUS at 13:56

## 2024-02-14 RX ADMIN — HYDROXYZINE HYDROCHLORIDE 25 MG: 25 TABLET, FILM COATED ORAL at 03:33

## 2024-02-14 RX ADMIN — SODIUM CHLORIDE, SODIUM LACTATE, CALCIUM CHLORIDE, MAGNESIUM CHLORIDE AND DEXTROSE 10000 ML: 1.5; 538; 448; 18.3; 5.08 INJECTION, SOLUTION INTRAPERITONEAL at 00:37

## 2024-02-14 RX ADMIN — INSULIN LISPRO 2 UNITS: 100 INJECTION, SOLUTION INTRAVENOUS; SUBCUTANEOUS at 09:19

## 2024-02-14 RX ADMIN — GENTAMICIN SULFATE: 1 CREAM TOPICAL at 22:36

## 2024-02-14 RX ADMIN — HEPARIN SODIUM 5000 UNITS: 5000 INJECTION INTRAVENOUS; SUBCUTANEOUS at 05:27

## 2024-02-14 RX ADMIN — METOPROLOL SUCCINATE 25 MG: 25 TABLET, FILM COATED, EXTENDED RELEASE ORAL at 09:18

## 2024-02-14 RX ADMIN — Medication 250 MG: at 23:27

## 2024-02-14 RX ADMIN — CINACALCET HYDROCHLORIDE 60 MG: 60 TABLET, FILM COATED ORAL at 09:18

## 2024-02-14 RX ADMIN — FLUCONAZOLE 200 MG: 200 TABLET ORAL at 12:38

## 2024-02-14 RX ADMIN — Medication 250 MG: at 16:51

## 2024-02-14 RX ADMIN — PIPERACILLIN AND TAZOBACTAM 3375 MG: 3; .375 INJECTION, POWDER, FOR SOLUTION INTRAVENOUS at 09:13

## 2024-02-14 RX ADMIN — SODIUM CHLORIDE, PRESERVATIVE FREE 10 ML: 5 INJECTION INTRAVENOUS at 09:19

## 2024-02-14 RX ADMIN — Medication 1 CAPSULE: at 15:18

## 2024-02-14 RX ADMIN — ACETAMINOPHEN 325MG 650 MG: 325 TABLET ORAL at 09:18

## 2024-02-14 RX ADMIN — SODIUM CHLORIDE, PRESERVATIVE FREE 10 ML: 5 INJECTION INTRAVENOUS at 23:28

## 2024-02-14 RX ADMIN — METRONIDAZOLE 500 MG: 500 INJECTION, SOLUTION INTRAVENOUS at 23:26

## 2024-02-14 RX ADMIN — SODIUM CHLORIDE, SODIUM LACTATE, CALCIUM CHLORIDE, MAGNESIUM CHLORIDE AND DEXTROSE 10000 ML: 1.5; 538; 448; 18.3; 5.08 INJECTION, SOLUTION INTRAPERITONEAL at 21:10

## 2024-02-14 RX ADMIN — PANTOPRAZOLE SODIUM 40 MG: 40 INJECTION, POWDER, FOR SOLUTION INTRAVENOUS at 09:11

## 2024-02-14 RX ADMIN — ATORVASTATIN CALCIUM 80 MG: 40 TABLET ORAL at 23:27

## 2024-02-14 RX ADMIN — METRONIDAZOLE 500 MG: 500 INJECTION, SOLUTION INTRAVENOUS at 15:18

## 2024-02-14 RX ADMIN — ACETAMINOPHEN 325MG 650 MG: 325 TABLET ORAL at 23:27

## 2024-02-14 RX ADMIN — GENTAMICIN SULFATE: 1 CREAM TOPICAL at 10:39

## 2024-02-14 RX ADMIN — HEPARIN SODIUM 5000 UNITS: 5000 INJECTION INTRAVENOUS; SUBCUTANEOUS at 23:27

## 2024-02-14 RX ADMIN — HYDROXYZINE HYDROCHLORIDE 25 MG: 25 TABLET, FILM COATED ORAL at 23:27

## 2024-02-14 ASSESSMENT — PAIN SCALES - GENERAL
PAINLEVEL_OUTOF10: 3
PAINLEVEL_OUTOF10: 0
PAINLEVEL_OUTOF10: 5
PAINLEVEL_OUTOF10: 2
PAINLEVEL_OUTOF10: 0
PAINLEVEL_OUTOF10: 3

## 2024-02-14 ASSESSMENT — PAIN DESCRIPTION - LOCATION
LOCATION: ABDOMEN
LOCATION: ABDOMEN

## 2024-02-14 NOTE — CARE COORDINATION
02/14/24 1549   Service Assessment   Patient Orientation Alert and Oriented   Cognition Alert   History Provided By Child/Family;Medical Record  (daughter sharri)   Primary Caregiver Self   Support Systems Spouse/Significant Other   PCP Verified by CM Yes   Last Visit to PCP Within last 3 months   Prior Functional Level Assistance with the following:;Bathing;Dressing;Toileting;Shopping   Current Functional Level Assistance with the following:;Bathing;Dressing;Shopping;Toileting   Can patient return to prior living arrangement Yes   Ability to make needs known: Fair   Family able to assist with home care needs: No   Financial Resources Medicare   Social/Functional History   Lives With Spouse   Type of Home House   Home Layout One level   Home Access Stairs to enter with rails   Entrance Stairs - Number of Steps 3   Bathroom Shower/Tub Tub/Shower unit   Bathroom Toilet Standard   Bathroom Accessibility Accessible   Home Equipment Rollator;Wheelchair-manual  (Peritoneal dialysis 6 days a week, supplied by [employer]   Receives Help From Family   ADL Assistance Needs assistance   Toileting Needs assistance   Homemaking Assistance Needs assistance   Ambulation Assistance Needs assistance   Transfer Assistance Needs assistance   Occupation Retired   Discharge Planning   Type of Residence Skilled Nursing Facility   Living Arrangements Spouse/Significant Other   Current Services Prior To Admission None   Potential Assistance Needed Skilled Nursing Facility   DME Ordered? No   Potential Assistance Purchasing Medications No   Type of Home Care Services None   Patient expects to be discharged to: Skilled nursing facility   Services At/After Discharge   Transition of Care Consult (CM Consult) SNF   Services At/After Discharge Skilled Nursing Facility (SNF)   [place] Resource Information Provided? No   Mode of Transport at Discharge BLS     Case Management Assessment  Initial Evaluation    Date/Time of Evaluation: 2/14/2024  and if so, who?    Plans to Return to Present Housing: (P) Yes  Other Identified Issues/Barriers to RETURNING to current housing:   Potential Assistance needed at discharge: (P) Skilled Nursing Facility            Potential DME:    Patient expects to discharge to: (P) Skilled nursing facility  Plan for transportation at discharge:      Financial    Payor: [employer] MEDICARE / Plan: [facility] MEDICARE / Product Type: *No Product type* /     Does insurance require precert for SNF: Yes    Potential assistance Purchasing Medications: (P) No  Meds-to-Beds request:        [facility] [id number] [place], [place] - [address] - P [phone] - F [phone]  [address] [place] [address]  Phone: [phone] Fax: [phone]    [facility] [address] [facility] [address] - P [phone] - F [phone]  [address] [place] [address] ROAD  [facility] [address]  Phone: [phone] Fax: [phone]    [facility] [id number] - [place], [place] - [address] - P [phone] - F [phone]  [address] [place] [address]  Phone: [phone] Fax: [phone]      Notes:    Factors facilitating achievement of predicted outcomes: Family support, Cooperative, and Pleasant    Barriers to discharge:     Additional Case Management Notes: no other cm needs at this time     The Plan for Transition of Care is related to the following treatment goals of Abdominal pain [R10.9]  Generalized abdominal pain [R10.84]  ESRD (end stage renal disease) on dialysis (HCC) [N18.6, Z99.2]  Acute gastritis without hemorrhage, unspecified gastritis type [K29.00]    IF APPLICABLE: The Patient and/or patient representative Tatyana and her family were provided with a choice of provider and agrees with the discharge plan. Freedom of choice list with basic dialogue that supports the patient's individualized plan of care/goals and shares the quality data associated with the providers was  provided to:     Patient Representative Name:       The Patient and/or Patient Representative Agree with the Discharge Plan?      More Phan  Case Management Department

## 2024-02-14 NOTE — PROGRESS NOTES
Peritoneal Dialysis Initiation            Orders   Therapy Time: 10hrs   Cycles: 5   Fill Volume: 2000ml   Last Fill Volume: ml   Total Volume: 87504ww   Solution: 1.5% Dextrose Dianeal bags      Access   Type & Location: Right lower abdomen   Comments: Prior to connection, one-minute Alcavis scrub performed.   Dsg change date: 2/14/24                                  Labs   HBsAg (Antigen) / date: 11/15/23 Neg                                             HBsAb (Antibody) / date: 11/15/23 Immune   Source: [facility]      Safety:   Time Out Done:   (Time) 0040   Consent obtained/signed: yes   Education: yes   Primary Nurse Rpt Pre: Ning RN   Primary Nurse Rpt Post: Ning RN     Comments:   Pt orders, notes, labs, code status reviewed.     Start time: 0055  Estimated End Time:1055    Remained present during initial drain followed by initiation of first fill. Prior to departure, bed in lowest position, call bell and personal belongings within reach.

## 2024-02-14 NOTE — PROGRESS NOTES
[facility] Hospitalist Group  Progress Note    Patient: Tatyana Monzon Age: 84 y.o. : 1940 MR#: [id number] SSN: [id number]  Date/Time: 2024    Subjective:   Pt reports that she feels better. Not as much diarrhea  Assessment/Plan:   84 year old female w/ multiple medical conditions including ESRD on PD admitted after presenting w/ c/o diarrhea and abd pain.    -Bacterial peritonitis:  ID consulted, IV abx dc'd, to get intraperitoneal vanc and gent  -C.diff: has been started on oral vanc. ID consulted  -Hyperkalemia: resolved  -ESRD on PD  -Qtc prolongation    HISTORY OF:  -DM2  -HLD  -HTN    PLAN:  -Abx per ID  -PD   -Avoid Qtc prolonging agents  -PT/OT        Additional Notes:      Case discussed with:  [x]Patient  []Family  []Nursing  []Case Management  DVT Prophylaxis:  []Lovenox  [x]Hep SQ  []SCDs  []Coumadin   []On Heparin gtt    Objective:   VS: BP (!) 179/77   Pulse 87   Temp 98 °F (36.7 °C) (Oral)   Resp 18   Ht 1.651 m (5' 5\")   Wt 63.5 kg (140 lb)   LMP  (LMP Unknown)   SpO2 97%   BMI 23.30 kg/m²    Tmax/24hrs: Temp (24hrs), Av.1 °F (36.7 °C), Min:97.9 °F (36.6 °C), Max:98.4 °F (36.9 °C)    Input/Output:   Intake/Output Summary (Last 24 hours) at 2024 1531  Last data filed at 2024 1145  Gross per 24 hour   Intake 2140 ml   Output -228 ml   Net 2368 ml       General: alert, awake, in NAD  HEENT: NCAT, sclerae anicteric,  mmm, neck supple  COR:  no peripheral edema  PULM: w/o increased wob  ABD: soft, nt, nd  EXT: no edema  NEURO: follows commands, no gross focal abnormalities, speech normal, no tremors  PSYCH: non agitated      Labs:    Recent Results (from the past 24 hour(s))   CBC with Diff    Collection Time: 24  5:54 PM   Result Value Ref Range    WBC 7.9 4.6 - 13.2 K/uL    RBC 3.51 (L) 4.20 - 5.30 M/uL    Hemoglobin 9.9 (L) 12.0 - 16.0 g/dL    Hematocrit 31.3 (L) 35.0 - 45.0 %    MCV 89.2 78.0 - 100.0 FL    MCH 28.2 24.0 - 34.0 PG    MCHC  Miscellaneous sample    Stool specimen   Result Value Ref Range    C. difficile toxin Molecular Positive (A) NEG     POCT Glucose    Collection Time: 02/14/24 12:42 PM   Result Value Ref Range    POC Glucose 134 (H) 70 - 110 mg/dL     Additional Data Reviewed:      Signed By: Jamie Calle MD     February 14, 2024 3:31 PM

## 2024-02-14 NOTE — PROGRESS NOTES
84-year-old female with past medical history of ESRD on peritoneal dialysis came to the emergency room with complaining abdominal pain.  Preliminary CT report does not show any acute inflammatory process.  No leukocytosis or fever noted, she has generalized tenderness on exam per ER colleague.  Recommend broad-spectrum antibiotics, order cell count and dialysis for tonight.  Case discussed with dialysis nurse.  Full consult to follow.

## 2024-02-14 NOTE — DIALYSIS
Visited patient at bedside , doing 4 of 5 cycles of CCPD, expected time of end tx @ 1100, PD effluent sample collected and sent to the lab for culture and gram stain

## 2024-02-14 NOTE — ED NOTES
Report was given to Carrie ROSENTHAL RN for assumption of care. All questions and concerns regarding assumption of care were answered and the nurse assuming care verbalized understanding.

## 2024-02-14 NOTE — PROGRESS NOTES
[facility]   Pharmacy Pharmacokinetic Monitoring Service - Vancomycin     Tatyana Monzon is a 84 y.o. female starting on vancomycin therapy for Intra-abdominal Infection. Pharmacy was consulted for monitoring and adjustment.    Target Concentration: Dosing based on anticipated concentration <15 mg/L due to renal impairment/insufficiency    Additional Antimicrobials: Piperacillin/Tazobactam    Pertinent Laboratory Values:   Temp: 98.4 °F (36.9 °C), Weight - Scale: 63.5 kg (140 lb)  Recent Labs     02/13/24  1754   CREATININE 10.90*   BUN 66*   WBC 7.9     Estimated Creatinine Clearance: 3 mL/min (A) (based on SCr of 10.9 mg/dL (H)).    Pertinent Cultures:  Culture Date Source Results   02/13 Body fluid IP   02/13 UA IP   MRSA Nasal Swab: N/A. Non-respiratory infection    Plan:  Concentration-guided dosing due to renal impairment/insufficiency  Start vancomycin 1250 mg x1,   Will dose according to vancomycin concentration levels at this time due to renal insufficiency  May be starting on PD tonight  Renal labs as indicated   Vancomycin concentration ordered for tomorrow with AM labs  Pharmacy will continue to monitor patient and adjust therapy as indicated    Thank you for the consult,  JULIUS BOWERS RPH  2/13/2024

## 2024-02-14 NOTE — PROGRESS NOTES
Arrived at patients bedside in the ED to initiate PD cycler, per patients nurse patient will be transferring to [facility] of [facility]. PD will be initiated once patient is transferred.

## 2024-02-14 NOTE — H&P
History and Physical          Subjective     HPI: Tatyana Monzon is a 84 y.o. female with a PMHx of ESRD on peritoneal dialysis, DMT2, hypertension who presented to the ED with complaints of abdominal pain and diarrhea since yesterday. Patient denies any blood in diarrhea. Has a PD catheter in place without any signs of surrounding infection. Does PD every night. Last PD done at home on Sunday evening as patient states she was not feeling well yesterday, so she did not do it. Patient states she does make urine. Daughter at bedside who assisted in providing history. 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 Denies associated nausea, vomiting. Upon my interview, patient states abdominal tenderness has improved since coming to the ED. VSS. Denies chest pain, sob, fever, chills, headache, cough, dizziness. Denies smoking, drinking, illicit drug use.     In the ED, Temp 98.4, respiration 11-15, heart rate 80s, blood pressure 130/60, 100% on room air.  Potassium 5.6, creatinine 10.90.  Lactic acid 0.7.  Alk phos 127, AST 45.  Hemoglobin 9.9, hematocrit 31.3.  CT abdomen and pelvis with moderate gastritis, mild cystitis, small volume ascites, small right pleural effusion.  EKG with normal sinus rhythm, prolonged QTc.  Nephrology consulted by the ED.  Received tylenol, protonix in the ED. Has Vanc and Zosyn ordered but has not received yet.     Home meds reconciled with the patient  Code status discussed- FULL code     PMHx:  Past Medical History:   Diagnosis Date    Allergic rhinitis     Chronic kidney disease 2020    Peritoneal Dialysis    Diabetes (HCC) 2015    IDDM    H/O seasonal allergies     Hypertension     Left foot drop     Peritoneal dialysis catheter in place ([facility])        PSurgHx:  Past Surgical History:   Procedure Laterality Date    CARPAL TUNNEL RELEASE Left     CATARACT REMOVAL  2011    COLONOSCOPY N/A 9/26/2022    COLONOSCOPY with bx's  mouth 11/20/20   Anh Beverly MD   gentamicin (GARAMYCIN) 0.1 % cream Apply topically 5/20/20   Anh Beverly MD   famotidine (PEPCID) 40 MG tablet  12/11/23   Anh Beverly MD   atorvastatin (LIPITOR) 80 MG tablet Take 1 tablet by mouth nightly 11/15/23   Julio C Granados MD   Calcium Carbonate Antacid (TUMS ULTRA PO) Tums Ultra 10/12/23   Anh Beverly MD   diphenoxylate-atropine (LOMOTIL) 2.5-0.025 MG per tablet 4 times daily as needed for Diarrhea. 7/8/23   Anh Beverly MD   pantoprazole (PROTONIX) 40 MG tablet  4/11/23   Anh Beverly MD   metoprolol succinate (TOPROL XL) 25 MG extended release tablet Take 1 tablet by mouth daily 6/7/23   Keerthi Carrera MD   fluticasone (FLONASE) 50 MCG/ACT nasal spray 2 sprays by Each Nostril route daily 6/7/23   Keerthi Carrera MD   blood glucose test strips (EXACTECH TEST) strip  2/4/22   Anh Beverly MD   Insulin Pen Needle (PEN NEEDLES) 32G X 4 MM MISC  2/23/22   Provider, Historical, MD   B Complex-C-Folic Acid (ADAMA-PROSPER PO) Take 1 tablet by mouth 4/2/20   Anh Beverly MD   amLODIPine (NORVASC) 5 MG tablet Take 1 tablet by mouth daily 11/7/22   Automatic Reconciliation, Ar   insulin aspart (NOVOLOG) 100 UNIT/ML injection pen Inject into the skin daily    Automatic Reconciliation, Ar   insulin glargine (LANTUS SOLOSTAR) 100 UNIT/ML injection pen Inject 5 Units into the skin daily 10/11/22   Automatic Reconciliation, Ar       Allergies:  Allergies   Allergen Reactions    Aspirin Headaches and Other (See Comments)     Ringing in ears    Other reaction(s): ringing in ears, tinnitus    Ringing in ears Other reaction(s): ringing in ears, tinnitus    Cephalexin Hives, Rash and Itching     Other reaction(s): hives, Skin Rash    Sevelamer Diarrhea, Other (See Comments), Nausea And Vomiting and Itching     With rash and sweatiness    Sevelamer Carbonate Itching     With rash and sweatiness    Ibuprofen       spine. Lumbar sacral fixation.      1.  Moderate gastritis. 2.  Mild cystitis. 3.  Aortic valve calcifications. Severe coronary arteriosclerosis. 4.  Dilated left ventricle and enlarged left atrium. 5.  Small volume ascites. New small right pleural effusion. Moderate anasarca. 6.  Moderate atherosclerosis with multifocal coarse calcification visceral ostia. 7.  Diverticulosis. 8.  Fibroid uterus.           Assessment/Plan     Hospital Problems             Last Modified POA    * (Principal) Peritonitis ([facility]) 2/13/2024 Yes    Type 2 diabetes mellitus with nephropathy ([facility]) 2/13/2024 Yes    Peritoneal dialysis catheter in place ([facility]) 2/13/2024 Yes    Hypertension 2/13/2024 Yes    Abdominal pain 2/13/2024 Yes    Gastritis 2/13/2024 Yes    Hyperkalemia 2/13/2024 Yes    ESRD on peritoneal dialysis ([facility]) 2/13/2024 Yes    QT prolongation 2/13/2024 Yes    Anemia of chronic disease 2/13/2024 Yes    GERD (gastroesophageal reflux disease) 2/13/2024 Yes    Hyperlipidemia 2/13/2024 Yes     Assessment:  Suspected Peritonitis   Moderate Gastritis - per CT AP   Mild Cystitis - per CT AP  Hyperkalemia-potassium 5.6   Diarrhea   ESRD on PD   QT prolongation  Anemia of chronic disease  Hypertension   Hyperlipidemia   DMT2  GERD    Plan:   - IV Vanc and Zosyn   - Fluid studies ordered- follow up   - PD per nephro   - IV Protonix daily   - Avoid QT prolonging agents   - stool ordered for C diff- follow up   - Monitor for S&S of active bleeding and transfuse for Hgb < 7   - SSI   - Resume appropriate home meds- Sensipar, Amlodipine, Lipitor, Metoprolol   - Nephrology consulted- appreciate assistance   - Consider ID consult tomorrow if needed     PT/OT/IS     Anticipated Discharge: 2 days     DVT Prophylaxis:  []Lovenox  [x]Hep SQ  []SCDs  []Coumadin []DOAC  []On Heparin gtt     I have personally reviewed all pertinent labs, films and EKGs that have officially resulted. I reviewed available electronic documentation outlining the initial

## 2024-02-14 NOTE — PROGRESS NOTES
Pharmacy Note     Zosyn pharmacy to dose ordered for treatment of peritonitis. Per [facility] Policy, Zosyn will be changed to 3.375 gm IV extended infusion q12h.     Estimated Creatinine Clearance: Estimated Creatinine Clearance: 4 mL/min (A) (based on SCr of 10.1 mg/dL (H)).  Dialysis Status, VINITA, CKD: PD    BMI:  Body mass index is 23.3 kg/m².    Rationale for Adjustment:  [facility] B-Lactam extended infusion policy    Pharmacy will continue to monitor and adjust dose as necessary.      Please call with any questions.    Thank you,  BILLY SANCHEZ, [facility]

## 2024-02-14 NOTE — CONSULTS
CT abdomen pelvis shows mild cystitis and gastritis but no acute inflammation.  Patient had cloudy peritoneal fluid per nephrology report.  Stool C. difficile PCR now positive.  I have been consulted for further recommendations.     Patient continues to have abdominal discomfort, diarrhea.  Denies prior history of C. difficile.  Review of records reveals that she had episode of diarrhea in May when the C. difficile test was negative.  She did receive treatment with Flagyl around that time.  She denies runny nose, sore throat, earaches, cough, chest pain, shortness of breath      Past Medical History:   Diagnosis Date    Allergic rhinitis     Chronic kidney disease 2020    Peritoneal Dialysis    Diabetes (HCC) 2015    IDDM    H/O seasonal allergies     Hypertension     Left foot drop     Peritoneal dialysis catheter in place ([facility])        Past Surgical History:   Procedure Laterality Date    CARPAL TUNNEL RELEASE Left     CATARACT REMOVAL  2011    COLONOSCOPY N/A 9/26/2022    COLONOSCOPY with bx's and polypectomy performed by Lang Levy MD at [facility] ENDOSCOPY    COLONOSCOPY N/A 10/8/2022    COLONOSCOPY/ Polypectomies performed by Cal Tate MD at [facility] ENDOSCOPY    LUMBAR FUSION  2004-last sx    fused L2-S1, in [place]    TUBAL LIGATION      WISDOM TOOTH EXTRACTION         [email or web address]    Current Facility-Administered Medications   Medication Dose Route Frequency    piperacillin-tazobactam (ZOSYN) 3,375 mg in sodium chloride 0.9 % 50 mL IVPB (mini-bag)  3,375 mg IntraVENous Q12H    dianeal lo-harry 1.5% 2,000 mL with gentamicin 38 mg, vancomycin 1,500 mg solution   IntraPERitoneal Once    [START ON 2/15/2024] dianeal lo-harry 1.5% 2,000 mL with gentamicin 38 mg solution   IntraPERitoneal Daily    [START ON 2/15/2024] pantoprazole (PROTONIX) tablet 40 mg  40 mg Oral QAM AC    sodium chloride flush 0.9 % injection 5-40 mL  5-40 mL IntraVENous 2 times per day    sodium chloride flush 0.9 % injection 5-40 mL  5-40 mL  Never    Sexual activity: Not on file   Other Topics Concern    Not on file   Social History Narrative    Not on file     Social Determinants of Health     Financial Resource Strain: Low Risk  (3/7/2023)    Overall Financial Resource Strain (CARDIA)     Difficulty of Paying Living Expenses: Not very hard   Food Insecurity: Not on file (2023)   Transportation Needs: No Transportation Needs (2023)    Transportation Problems ([facility])     In the past 12 months, has lack of reliable transportation kept you from medical appointments, meetings, work or from getting things needed for daily living?: Not on file   Physical Activity: Inactive (2023)    Exercise Vital Sign     Days of Exercise per Week: 0 days     Minutes of Exercise per Session: 0 min   Stress: Not on file   Social Connections: Not on file   Intimate Partner Violence: Not on file   Housing Stability: Low Risk  (2023)    Housing Stability Vital Sign     Unable to Pay for Housing in the Last Year: No     Number of Places Lived in the Last Year: 1     Unstable Housing in the Last Year: No     Social History     Tobacco Use   Smoking Status Never   Smokeless Tobacco Never        Temp (24hrs), Av.1 °F (36.7 °C), Min:97.9 °F (36.6 °C), Max:98.4 °F (36.9 °C)    BP (!) 179/77   Pulse 87   Temp 98 °F (36.7 °C) (Oral)   Resp 18   Ht 1.651 m (5' 5\")   Wt 63.5 kg (140 lb)   LMP  (LMP Unknown)   SpO2 97%   BMI 23.30 kg/m²     ROS: 12 point ROS obtained in details. Pertinent positives as mentioned in HPI,   otherwise negative    Physical Exam:    General: Well developed, well nourished female laying on the bed/sitting on the  bed AAOx3 in no acute distress.    General:   awake alert and oriented   HEENT:  Normocephalic, atraumatic, PERRL, EOMI, no scleral icterus or pallor; no conjunctival hemmohage;  nasal and oral mucous are moist and without evidence of lesions. No thrush. Dentition good. Neck supple, no bruits.   Lymph Nodes:   no  Positive        Comment: This specimen is positive for toxigenic C difficile by DNA amplification.  Repeat testing is not recommended, samples received within 7 days of this positive result will be rejected. Assay is not approved to test for cure, since nucleic acids may persist after effective treatment and may prompt false positive results.  CALLED TO AND CORRECTLY REPEATED BY:  CLAUDIA BAUTISTA RN 4S ON [id number] AT 1245 TO KNE         Culture, Body Fluid [[id number]] Collected: 02/14/24 0840    Order Status: Sent Specimen: Body Fluid from Peritoneal Dialysis Fluid Updated: 02/14/24 0915    Culture, Fungus [[id number]] Collected: 02/14/24 0840    Order Status: Sent Specimen: Peritoneal Dialysis Fluid Updated: 02/14/24 0919    Urine Culture [[id number]] Collected: 02/13/24 2220    Order Status: Sent Specimen: Urine, clean catch Updated: 02/14/24 0224    Culture, Body Fluid [[id number]] Collected: 02/13/24 2130    Order Status: Canceled Specimen: Body Fluid from Peritoneal Dialysis Fluid     Gram Stain [[id number]] Collected: 02/13/24 2130    Order Status: Canceled Specimen: Peritoneal Dialysis Fluid                 RADIOLOGY:    All available imaging studies/reports in [facility] for this admission were reviewed      Disclaimer: Sections of this note are dictated utilizing voice recognition software, which may have resulted in some phonetic based errors in grammar and contents. Even though attempts were made to correct all the mistakes, some may have been missed, and remained in the body of the document. If questions arise, please contact our department.    Dr. Charley Ratliff, Infectious Disease Specialist  [phone]  February 14, 2024  2:20 PM

## 2024-02-14 NOTE — ED NOTES
Patient complaining of severe itchiness. Vancomycin stopped at this time. MD Hernandez made aware.

## 2024-02-14 NOTE — CONSULTS
Consult Note  Consult requested by: Dr. Luc Woodie FAM Na is a 84 y.o. female Black /  who is being seen on consult for ESRD management. .  Chief Complaint   Patient presents with    Abdominal Pain    Dysuria     Admission diagnosis: Peritonitis (HCC)   84-year-old female with past medical history of diabetes, ESRD on peritoneal dialysis admitted for abdominal pain, sepsis following for ESRD management  Impression & Plan:   IMPRESSION:   ESRD, on peritoneal dialysis  Abdominal pain with nausea vomiting, cloudy peritoneal fluid suggestive of peritonitis  Anemia of chronic disease  Diabetes  Congestive heart failure   PLAN:   She received vancomycin  and zosyn yesterday.  She also received dialysis last night tolerated well.  Plan to switch her intraperitoneal antibiotics.  Order vancomycin and gentamycine  for 6hr dwell today.  Antibiotic dwell will be given for six hour followed by her dialysis.  I will also start her on antifungal.  Will follow cell count and culture report.  At present no indication to removal of catheter.  Continue current PD prescription.    Adjust medication per ESRD status.   Discussed with pharmacist colleague, appreciate help.     HPI: 84-year-old female with past medical history of ESRD, diabetes on peritoneal dialysis, with abdominal pain and vomiting.  Her symptoms started 2 to 3 days ago.  Her last dialysis was on Sunday and was not able to do further dialysis because not feeling well.  Her workup in ER shows no fever no hypotension's no difficulty in oxygenation's.  Her labs shows no leukocytosis no elevated lactate mild elevated potassium.  Her CT abdomen pelvis shows mild cystitis and gastritis but no acute inflammation.    I was consulted last night for ESRD management.  Not able to send cell count from the emergency room.  Recommend to receive IV antibiotics and resume peritoneal dialysis.  I examined her this morning her PD fluid appears very

## 2024-02-14 NOTE — PROGRESS NOTES
[facility]   Pharmacy Pharmacokinetic Monitoring Service - Vancomycin    Indication: Intra-abdominal Infection  Target Concentration: Dosing based on anticipated concentration <15 mg/L due to renal impairment/insufficiency  Day of Therapy: 1  Additional Antimicrobials: Vancomycin, Gentamicin    Pertinent Laboratory Values:   Temp: 98 °F (36.7 °C), Weight - Scale: 63.5 kg (140 lb)  Recent Labs     02/13/24  1754 02/14/24  0410   CREATININE 10.90* 10.10*   BUN 66* 64*   WBC 7.9 5.7       Estimated Creatinine Clearance: 4 mL/min (A) (based on SCr of 10.1 mg/dL (H)).    Pertinent Cultures:  Culture Date Source Results   2/14 Body Fluid NGTD   MRSA Nasal Swab: N/A. Non-respiratory infection    Assessment:  Date/Time Current Dose  (IP) Concentration Timing of Concentration (h) AUC   2/14  Vanc 1500 mg   Gent 0.6 mg/kg - - -     Note: Serum concentrations collected for AUC dosing may appear elevated if collected in close proximity to the dose administered, this is not necessarily an indication of toxicity    Plan:  Vancomycin 1250 mg x 1 dose ordered on 2/13 -- level returned low. 1500 mg x dose ordered but was held due to giving via IP bag  Continue vancomycin 1500 mg with gentamicin 0.6 mg/kg x 1 today, then gentamicin 0.6 mg/kg only daily. Will redose with vanc/gent combo bag with appropriate per levels  Renal labs as indicated   Vancomycin/gentamicin concentration ordered for AM labs tomorrow  Pharmacy will continue to monitor patient and adjust therapy as indicated    Thank you for the consult,  Anna Cerna RPH  2/14/2024

## 2024-02-14 NOTE — PLAN OF CARE
Problem: Occupational Therapy - Adult  Goal: By Discharge: Performs self-care activities at highest level of function for planned discharge setting.  See evaluation for individualized goals.  Outcome: Progressing  Note: Occupational Therapy Goals:  Initiated 2/14/2024 to be met within 7-10 days.    1.  Patient will perform grooming at the EOB for 8 minutes with modified independence.  2.  Patient will perform upper body dressing with minimal assistance/contact guard assist.  3.  Patient will perform self-feeding with modified independence.   4.  Patient will perform bed mobility with moderate assistance.  5.  Patient will participate in upper extremity therapeutic exercise/activities with minimal assistance/contact guard assist for 8-10 minutes to increase strength/endurance for ADLs.    6.  Patient will utilize energy conservation techniques during functional activities with verbal and visual cues.    PLOF: Independent with ADL's  OCCUPATIONAL THERAPY EVALUATION    Patient: Tatyana Monzon (84 y.o. female)  Date: 2/14/2024  Primary Diagnosis: Abdominal pain [R10.9]  Generalized abdominal pain [R10.84]  ESRD (end stage renal disease) on dialysis ([facility]) [N18.6, Z99.2]  Acute gastritis without hemorrhage, unspecified gastritis type [K29.00]    Precautions: Contact Precautions, General Precautions, Fall Risk  ASSESSMENT :    Patient alert and cleared to participate by RN for OT evaluation with PT. Patient seen supine in bed on peritoneal dialysis. Pt able to roll in bed with maximum assistance and hold onto bed rail while replacing chux, felt nauseous.  Pt reports pain in her bottom and was cleaned in bed d/t BM with total assist. Pt able to bring arm to face to simulate grooming supine with modified independence. Pt repositioned and was total assist x2 for scooting to the HOB. Pt left in bed supine, RN notified, and call bell within reach      DEFICITS/IMPAIRMENTS:  Performance deficits / Impairments: Decreased  setting. Patient's social support, diagnosis, medical stability, and prior level of function should also be taken into consideration.     SUBJECTIVE:   Patient stated, “I am dirty and need to be cleaned.”    OBJECTIVE DATA SUMMARY:     Past Medical History:   Diagnosis Date    Allergic rhinitis     Chronic kidney disease 2020    Peritoneal Dialysis    Diabetes (HCC) 2015    IDDM    H/O seasonal allergies     Hypertension     Left foot drop     Peritoneal dialysis catheter in place ([facility])      Past Surgical History:   Procedure Laterality Date    CARPAL TUNNEL RELEASE Left     CATARACT REMOVAL  2011    COLONOSCOPY N/A 9/26/2022    COLONOSCOPY with bx's and polypectomy performed by Lang Levy MD at [facility] ENDOSCOPY    COLONOSCOPY N/A 10/8/2022    COLONOSCOPY/ Polypectomies performed by Cal Tate MD at [facility] ENDOSCOPY    LUMBAR FUSION  2004-last sx    fused L2-S1, in [place]    TUBAL LIGATION      WISDOM TOOTH EXTRACTION         Home Situation:   Social/Functional History  Lives With: Spouse  Type of Home: House  Home Layout: One level  Home Access: Stairs to enter with rails  Entrance Stairs - Number of Steps: 3  Entrance Stairs - Rails: Both  Bathroom Shower/Tub: Tub/Shower unit  Bathroom Toilet: Standard  Bathroom Equipment: Grab bars in shower  Home Equipment: Rollator  []  Right hand dominant   []  Left hand dominant    Cognitive/Behavioral Status:  Orientation  Overall Orientation Status: Within Normal Limits  Orientation Level: Oriented X4  Cognition  Overall Cognitive Status: WNL    Skin: Intact  Edema: Noted     Vision/Perceptual:    Vision  Vision: Within Functional Limits        Coordination: BUE  Coordination: Within functional limits    Tone & Sensation: BUE  Tone: Normal    Functional Mobility and Transfers for ADLs:  Bed Mobility:    Bed Mobility Training  Bed Mobility Training: Yes  Rolling: Maximum assistance  Scooting: Total assistance;Assist X2  Transfers:    Transfer Training  Transfer Training:  No    ADL Assessment:   Feeding: Setup;Supervision  Grooming: Moderate assistance  UE Bathing: Moderate assistance  LE Bathing: Maximum assistance  UE Dressing: Moderate assistance  LE Dressing: Maximum assistance  Toileting: Dependent/Total      Pain:  Pain level pre-treatment: 0/10   Pain level post-treatment: 0/10   Pain Intervention(s): Rest, Ice, Repositioning   Response to intervention: Nurse notified    Activity Tolerance:   Activity Tolerance: Patient limited by pain, Patient limited by fatigue  Please refer to the flowsheet for vital signs taken during this treatment.    After treatment:   [] Patient left in no apparent distress sitting up in chair  [x] Patient left in no apparent distress in bed  [x] Call bell left within reach  [] Nursing notified  [] Caregiver present  [x] Bed alarm activated    COMMUNICATION/EDUCATION:   Patient Education  Education Given To: Patient  Education Provided: Role of Therapy;Energy Conservation;Plan of Care;Fall Prevention Strategies  Education Method: Verbal;Teach Back  Barriers to Learning: None  Education Outcome: Verbalized understanding    Thank you for this referral.  Alpesh Hdz  Minutes: 24    Eval Complexity: Decision Making: Medium Complexity

## 2024-02-14 NOTE — PLAN OF CARE
Problem: Physical Therapy - Adult  Goal: By Discharge: Performs mobility at highest level of function for planned discharge setting.  See evaluation for individualized goals.  Description: Initiated  2/14/2024  to be met within 7-10 days.    1.  Patient will move from supine to sit and sit to supine , scoot up and down, and roll side to side in bed with minimal assistance/contact guard assist.    2.  Patient will transfer from bed to chair and chair to bed with minimal assistance/contact guard assist using the least restrictive device.  3.  Patient will perform sit to stand with minimal assistance/contact guard assist.  4.  Patient will ambulate with moderate assistance  for 25 feet with the least restrictive device.       PLOF: Pt lives with  in 1 level home, 3 steps to enter with b/l handrails.  Pt needs assistance for all mobility, has rollator and w/c.    Outcome: Progressing   PHYSICAL THERAPY EVALUATION    Patient: Tatyana Monzon (84 y.o. female)  Date: 2/14/2024  Primary Diagnosis: Abdominal pain [R10.9]  Generalized abdominal pain [R10.84]  ESRD (end stage renal disease) on dialysis (HCC) [N18.6, Z99.2]  Acute gastritis without hemorrhage, unspecified gastritis type [K29.00]       Precautions: Contact Precautions, General Precautions, Fall Risk,      ASSESSMENT :  Pt resting in bed upon entering room for PT evaluation, NAD and agreeable to treatment.  Co-tx with OT due to medical complexity.  Pt with peritoneal dialysis running during evaluation, cleared by RN for treatment.  Pt had a BM, MaxA for rolling while OT performed da care.  Pt became nauseous during bed mobility, noted she felt she couldn't do much more.  Pt was totalAx2 to scoot to HOB, pt positioned comfortably, all needs met and call button within reach.      DEFICITS/IMPAIRMENTS:    , Body Structures, Functions, Activity Limitations Requiring Skilled Therapeutic Intervention: Decreased functional mobility ;Decreased ADL  X2  Transfers:     Transfer Training  Transfer Training: No             Pain:  Pain level pre-treatment: 0/10   Pain level post-treatment: 4/10       Activity Tolerance:   Activity Tolerance: Patient limited by fatigue  Please refer to the flowsheet for vital signs taken during this treatment.    After treatment:   []         Patient left in no apparent distress sitting up in chair  [x]         Patient left in no apparent distress in bed  [x]         Call bell left within reach  [x]         Nursing notified  []         Caregiver present  []         Bed alarm activated  []         SCDs applied    COMMUNICATION/EDUCATION:   Patient Education  Education Given To: Patient  Education Provided: Role of Therapy;Plan of Care;Transfer Training  Education Method: Verbal;Teach Back;Demonstration  Barriers to Learning: None  Education Outcome: Verbalized understanding;Demonstrated understanding    Thank you for this referral.  Josie Pena, PT  Minutes: 24      Eval Complexity: Decision Making: Medium Complexity

## 2024-02-15 LAB
ANION GAP SERPL CALC-SCNC: 8 MMOL/L (ref 3–18)
BASOPHILS # BLD: 0.1 K/UL (ref 0–0.1)
BASOPHILS NFR BLD: 1 % (ref 0–2)
BUN SERPL-MCNC: 45 MG/DL (ref 7–18)
BUN/CREAT SERPL: 6 (ref 12–20)
CALCIUM SERPL-MCNC: 6.9 MG/DL (ref 8.5–10.1)
CHLORIDE SERPL-SCNC: 100 MMOL/L (ref 100–111)
CO2 SERPL-SCNC: 27 MMOL/L (ref 21–32)
CREAT SERPL-MCNC: 8.14 MG/DL (ref 0.6–1.3)
DIFFERENTIAL METHOD BLD: ABNORMAL
EOSINOPHIL # BLD: 0.1 K/UL (ref 0–0.4)
EOSINOPHIL NFR BLD: 1 % (ref 0–5)
ERYTHROCYTE [DISTWIDTH] IN BLOOD BY AUTOMATED COUNT: 14.7 % (ref 11.6–14.5)
GENTAMICIN SERPL-MCNC: 0.7 UG/ML (ref 0.5–10)
GLUCOSE BLD STRIP.AUTO-MCNC: 107 MG/DL (ref 70–110)
GLUCOSE BLD STRIP.AUTO-MCNC: 125 MG/DL (ref 70–110)
GLUCOSE BLD STRIP.AUTO-MCNC: 143 MG/DL (ref 70–110)
GLUCOSE BLD STRIP.AUTO-MCNC: 149 MG/DL (ref 70–110)
GLUCOSE SERPL-MCNC: 154 MG/DL (ref 74–99)
HCT VFR BLD AUTO: 36 % (ref 35–45)
HGB BLD-MCNC: 11.3 G/DL (ref 12–16)
IMM GRANULOCYTES # BLD AUTO: 0 K/UL (ref 0–0.04)
IMM GRANULOCYTES NFR BLD AUTO: 0 % (ref 0–0.5)
LYMPHOCYTES # BLD: 0.8 K/UL (ref 0.9–3.6)
LYMPHOCYTES NFR BLD: 13 % (ref 21–52)
MCH RBC QN AUTO: 27.6 PG (ref 24–34)
MCHC RBC AUTO-ENTMCNC: 31.4 G/DL (ref 31–37)
MCV RBC AUTO: 88 FL (ref 78–100)
MONOCYTES # BLD: 0.5 K/UL (ref 0.05–1.2)
MONOCYTES NFR BLD: 8 % (ref 3–10)
NEUTS SEG # BLD: 4.5 K/UL (ref 1.8–8)
NEUTS SEG NFR BLD: 76 % (ref 40–73)
NRBC # BLD: 0 K/UL (ref 0–0.01)
NRBC BLD-RTO: 0 PER 100 WBC
PLATELET # BLD AUTO: 238 K/UL (ref 135–420)
PMV BLD AUTO: 9.2 FL (ref 9.2–11.8)
POTASSIUM SERPL-SCNC: 3.8 MMOL/L (ref 3.5–5.5)
RBC # BLD AUTO: 4.09 M/UL (ref 4.2–5.3)
SODIUM SERPL-SCNC: 135 MMOL/L (ref 136–145)
VANCOMYCIN SERPL-MCNC: 18.6 UG/ML (ref 5–40)
WBC # BLD AUTO: 5.8 K/UL (ref 4.6–13.2)

## 2024-02-15 PROCEDURE — 80048 BASIC METABOLIC PNL TOTAL CA: CPT

## 2024-02-15 PROCEDURE — A4722 DIALYS SOL FLD VOL > 1999CC: HCPCS | Performed by: INTERNAL MEDICINE

## 2024-02-15 PROCEDURE — 6370000000 HC RX 637 (ALT 250 FOR IP): Performed by: INTERNAL MEDICINE

## 2024-02-15 PROCEDURE — 2580000003 HC RX 258: Performed by: INTERNAL MEDICINE

## 2024-02-15 PROCEDURE — 80170 ASSAY OF GENTAMICIN: CPT

## 2024-02-15 PROCEDURE — 2580000003 HC RX 258

## 2024-02-15 PROCEDURE — 6370000000 HC RX 637 (ALT 250 FOR IP)

## 2024-02-15 PROCEDURE — 36415 COLL VENOUS BLD VENIPUNCTURE: CPT

## 2024-02-15 PROCEDURE — 3E1M39Z IRRIGATION OF PERITONEAL CAVITY USING DIALYSATE, PERCUTANEOUS APPROACH: ICD-10-PCS | Performed by: INTERNAL MEDICINE

## 2024-02-15 PROCEDURE — 6360000002 HC RX W HCPCS: Performed by: INTERNAL MEDICINE

## 2024-02-15 PROCEDURE — 90945 DIALYSIS ONE EVALUATION: CPT

## 2024-02-15 PROCEDURE — 85025 COMPLETE CBC W/AUTO DIFF WBC: CPT

## 2024-02-15 PROCEDURE — 99232 SBSQ HOSP IP/OBS MODERATE 35: CPT | Performed by: INTERNAL MEDICINE

## 2024-02-15 PROCEDURE — 80202 ASSAY OF VANCOMYCIN: CPT

## 2024-02-15 PROCEDURE — 1100000003 HC PRIVATE W/ TELEMETRY

## 2024-02-15 PROCEDURE — 6360000002 HC RX W HCPCS

## 2024-02-15 PROCEDURE — 82962 GLUCOSE BLOOD TEST: CPT

## 2024-02-15 PROCEDURE — 94761 N-INVAS EAR/PLS OXIMETRY MLT: CPT

## 2024-02-15 RX ORDER — NEPHROCAP 1 MG
1 CAP ORAL DAILY
Status: DISCONTINUED | OUTPATIENT
Start: 2024-02-15 | End: 2024-02-21 | Stop reason: HOSPADM

## 2024-02-15 RX ADMIN — METRONIDAZOLE 500 MG: 500 INJECTION, SOLUTION INTRAVENOUS at 15:13

## 2024-02-15 RX ADMIN — METRONIDAZOLE 500 MG: 500 INJECTION, SOLUTION INTRAVENOUS at 23:11

## 2024-02-15 RX ADMIN — PANTOPRAZOLE SODIUM 40 MG: 40 TABLET, DELAYED RELEASE ORAL at 08:18

## 2024-02-15 RX ADMIN — Medication 250 MG: at 18:23

## 2024-02-15 RX ADMIN — SODIUM CHLORIDE, PRESERVATIVE FREE 10 ML: 5 INJECTION INTRAVENOUS at 20:52

## 2024-02-15 RX ADMIN — Medication 250 MG: at 08:20

## 2024-02-15 RX ADMIN — PROCHLORPERAZINE EDISYLATE 10 MG: 5 INJECTION INTRAMUSCULAR; INTRAVENOUS at 10:24

## 2024-02-15 RX ADMIN — ATORVASTATIN CALCIUM 80 MG: 40 TABLET ORAL at 20:52

## 2024-02-15 RX ADMIN — CINACALCET HYDROCHLORIDE 60 MG: 60 TABLET, FILM COATED ORAL at 13:25

## 2024-02-15 RX ADMIN — SODIUM CHLORIDE, SODIUM LACTATE, CALCIUM CHLORIDE, MAGNESIUM CHLORIDE AND DEXTROSE 10000 ML: 1.5; 538; 448; 18.3; 5.08 INJECTION, SOLUTION INTRAPERITONEAL at 21:39

## 2024-02-15 RX ADMIN — AMLODIPINE BESYLATE 5 MG: 5 TABLET ORAL at 08:18

## 2024-02-15 RX ADMIN — GENTAMICIN SULFATE: 40 INJECTION, SOLUTION INTRAMUSCULAR; INTRAVENOUS at 15:39

## 2024-02-15 RX ADMIN — HYDROXYZINE HYDROCHLORIDE 25 MG: 25 TABLET, FILM COATED ORAL at 13:32

## 2024-02-15 RX ADMIN — HEPARIN SODIUM 5000 UNITS: 5000 INJECTION INTRAVENOUS; SUBCUTANEOUS at 06:08

## 2024-02-15 RX ADMIN — Medication 250 MG: at 13:25

## 2024-02-15 RX ADMIN — HEPARIN SODIUM 5000 UNITS: 5000 INJECTION INTRAVENOUS; SUBCUTANEOUS at 15:13

## 2024-02-15 RX ADMIN — HEPARIN SODIUM 5000 UNITS: 5000 INJECTION INTRAVENOUS; SUBCUTANEOUS at 22:16

## 2024-02-15 RX ADMIN — Medication 1 CAPSULE: at 08:18

## 2024-02-15 RX ADMIN — METRONIDAZOLE 500 MG: 500 INJECTION, SOLUTION INTRAVENOUS at 08:01

## 2024-02-15 RX ADMIN — METOPROLOL SUCCINATE 25 MG: 25 TABLET, FILM COATED, EXTENDED RELEASE ORAL at 08:18

## 2024-02-15 RX ADMIN — Medication 1 MG: at 13:25

## 2024-02-15 ASSESSMENT — PAIN SCALES - GENERAL: PAINLEVEL_OUTOF10: 0

## 2024-02-15 NOTE — PROGRESS NOTES
Advance Care Planning   Healthcare Decision Maker:    Primary Decision Maker: Alpesh Quinn - Spouse - [phone]    Click here to complete Healthcare Decision Makers including selection of the Healthcare Decision Maker Relationship (ie \"Primary\").  Today we documented Decision Maker(s) consistent with Legal Next of Kin hierarchy.     Alpesh Quinn Spouse        Primary Phone: [phone] (H)Home Phone: [phone] Phone: [phone]      DelgadoRashida rodriguez   Child        Primary Phone: [phone] (M)Home Phone: [phone] Phone: [phone]               conducted an initial consultation and Spiritual Assessment for Tatyana Monzon, who is a 84 y.o.,female. Patient's Primary Language is: English.   According to the patient's EMR Alevism Affiliation is: Yazidi.     The reason the Patient came to the hospital is:   Patient Active Problem List    Diagnosis Date Noted    Abdominal pain 02/13/2024    Peritonitis ([facility]) 02/13/2024    Gastritis 02/13/2024    Hyperkalemia 02/13/2024    ESRD on peritoneal dialysis ([facility]) 02/13/2024    QT prolongation 02/13/2024    Anemia of chronic disease 02/13/2024    GERD (gastroesophageal reflux disease) 02/13/2024    Hyperlipidemia 02/13/2024    Upper GI bleed 10/07/2022    Periumbilical abdominal pain 10/07/2022    Chronic kidney disease     Peritoneal dialysis catheter in place ([facility])     Secondary hyperparathyroidism of renal origin ([facility]) 01/09/2021    CKD (chronic kidney disease) stage 5, GFR less than 15 ml/min ([facility]) 09/26/2019    Left foot drop 09/12/2018    SI (sacroiliac) joint dysfunction 09/12/2018    Sacroiliac joint dysfunction of right side 09/12/2018    Lumbar spondylosis 09/12/2018    Severe obesity (BMI 35.0-39.9) with comorbidity ([facility]) 04/19/2018    Constipation 02/02/2018    Nausea 02/02/2018    Dyspepsia 02/02/2018    Type 2 diabetes mellitus with nephropathy ([facility]) 12/21/2017    Diabetes ([facility])     Allergic rhinitis     Hypertension          The  provided the following Interventions:  Initiated a relationship of care and support.   Explored issues of clarisse, belief, spirituality and Adventism/ritual needs while hospitalized.  Listened empathically.  Provided information about Spiritual Care Services.  Chart reviewed.    The following outcomes where achieved:  Patient expressed gratitude for 's visit.    Assessment:  Patient does not have any Adventism/cultural needs that will affect patient's preferences in health care.  There are no spiritual or Adventism issues which require intervention at this time.     Plan:  Chaplains will continue to follow and will provide pastoral care on an as needed/requested basis.   recommends bedside caregivers page  on duty if patient shows signs of acute spiritual or emotional distress.    linda Ashton  Spiritual Care   [phone]

## 2024-02-15 NOTE — CARE COORDINATION
CM uploaded patient with clinicals to [facility] and [facility], and SNF referral sent to [facility].               Minda Ruiz RN  Case Management [phone]

## 2024-02-15 NOTE — PROGRESS NOTES
[facility]   Pharmacy Pharmacokinetic Monitoring Service - Vancomycin    Indication: Intra-abdominal Infection  Target Concentration: Dosing based on anticipated concentration <15 mg/L due to renal impairment/insufficiency  Day of Therapy: 2  Additional Antimicrobials: Vancomycin, Gentamicin    Pertinent Laboratory Values:   Temp: 97.8 °F (36.6 °C), Weight - Scale: 63.5 kg (140 lb)  Recent Labs     02/14/24  0410 02/15/24  0717   CREATININE 10.10* 8.14*   BUN 64* 45*   WBC 5.7 5.8       Estimated Creatinine Clearance: 5 mL/min (A) (based on SCr of 8.14 mg/dL (H)).    Pertinent Cultures:  Culture Date Source Results   2/14 Body Fluid NGTD   MRSA Nasal Swab: N/A. Non-respiratory infection    Assessment:  Date/Time Current Dose  (IP) Concentration Timing of Concentration (h) AUC   2/14  Vanc 1500 mg   Gent 0.6 mg/kg - - -   2/15 Vanco 0mg  Gent 0.6mg/kg 18.6  0.7       Note: Serum concentrations collected for AUC dosing may appear elevated if collected in close proximity to the dose administered, this is not necessarily an indication of toxicity    Plan:  Vancomycin and gentamicin in 1 PD bag daily  Vancomycin level 18.6, no vancomycin in PD today  Gentamicin level 0.7, 0.6mg/kg (38mg) in PD today (already ordered)  Renal labs as indicated   Vancomycin/gentamicin concentration ordered for AM labs tomorrow  Pharmacy will continue to monitor patient and adjust therapy as indicated    Thank you for the consult,  EDGAR RAYMOND RPH  2/15/2024

## 2024-02-15 NOTE — PROGRESS NOTES
Peritoneal Dialysis Disconnection         Metrics   I-Drain:  1406 mL   Total UF:  60 mL   Last Fill:  0   Last Manual Drain:  none   Net UF:            Avg Dwell Time:  1:30   Lost Dwell Time:  0:20   Alarms:  0643 check pt line, 2052 low drain volume   Effluent:  Clear yellow     Access   Type & Location: RLQ abdomen   Comments: Prior to disconnection, one-minute Alcavis scrub performed. Sterile mini cap applied and secured to abdomen. Dressing clean, dry and intact.  Dressingg date: 2/14/24                                         Safety:   Primary Nurse Rpt Pre:  PROMISE Garcia RN   Primary Nurse Rpt Post:  PROMISE Garcia RN     Comments:   @ 2603 arrived to patient bedside primary nurse is outside patient room.  This writer inquired if she had been told of any overnight issues with the cycler and primary nurse stated its just been alarming.  Enter patient's room and cycler was alarming check patient line.  Addressed alarm issue and cycler treatment resumed with drain 5 of 5.      @0846 arrived back to patient bedside and treatment was completed.  Patient disconnected w/o any complications.

## 2024-02-15 NOTE — PROGRESS NOTES
Peritoneal Dialysis Initiation            Orders   Therapy Time:  10 hrs   Cycles:  4   Fill Volume:   2000 ml   Last Fill Volume:  0 ml   Total Volume:  19860 ml   Solution: Dextrose Dianeal bags      Access   Type & Location: Mid lower abdomen   Comments: Prior to connection, one-minute Alcavis scrub performed.   Dsg change date:      2/14/24                              Labs   HBsAg (Antigen) / date:             Neg 11/11/23                                  HBsAb (Antibody) / date:  Immune 11/15/23   Source:      Safety:   Time Out Done:   (Time)  2035   Consent obtained/signed:  previously   Education:  yes   Primary Nurse Rpt Pre:  Ning   Primary Nurse Rpt Post:  Ning     Comments:   Pt orders, notes, labs, code status reviewed.     Start time: 2040  Estimated End Time: 2110    Remained present during initial drain followed by initiation of first fill. Prior to departure, bed in lowest position, call bell and personal belongings within reach.

## 2024-02-15 NOTE — PLAN OF CARE
INTERVENTION:  HEMODYNAMIC STABILIZATION  MAINTAIN BP WNL WHILE ON CAPD.    INTERVENTION:  FLUID MANAGEMENT  WILL ATTEMPT 2000 ML TOTAL FLUID EXCHANGE AS TOLERATED.    INTERVENTION:  METABOLIC/ELECTROLYTE MANAGEMENT  DIANEAL LO-ENRIQUE 1.5% DEXTROSE USED WITH CAPD EXCHANGE TODAY.    INTERVENTION:  HEMODIALYSIS ACCESS SITE MANAGEMENT  RIGHT LOWER QUAD ABDOMEN  ACCESSED USING ASEPTIC TECHNIQUE.    GOAL:  SIGNS AND SYMPTOMS OF LISTED POTENTIAL PROBLEMS WILL BE ABSENT OR MANAGEABLE.    OUTCOME:  PROGRESSING.    CAPD PLANNED FOR 6 HR DWELL TODAY.        Problem: Chronic Conditions and Co-morbidities  Goal: Patient's chronic conditions and co-morbidity symptoms are monitored and maintained or improved  Outcome: Progressing  Flowsheets (Taken 2/15/2024 1601)  Care Plan - Patient's Chronic Conditions and Co-Morbidity Symptoms are Monitored and Maintained or Improved:   Update acute care plan with appropriate goals if chronic or comorbid symptoms are exacerbated and prevent overall improvement and discharge   Collaborate with multidisciplinary team to address chronic and comorbid conditions and prevent exacerbation or deterioration   Monitor and assess patient's chronic conditions and comorbid symptoms for stability, deterioration, or improvement

## 2024-02-15 NOTE — CARE COORDINATION
CM spoke with patient, updated that patient is a possible discharge for Monday, and that PT and OT are recommending SNF.   Patient is agreeable to SNF [facility], this is the only SNF in the area that takes Peritoneal Dialysis patients.         Patient's daughter Rashida Delgado was in the hallway, updated that PT and OT are recommending SNF, and that [facility] is the only SNF that takes Peritoneal Dialysis patients.   CM let patient's daughter know that patient is agreeable to SNF.   Patient's daughter is also agreeable to SNF.       Auth will be needed for SNF.             Minda Ruiz, RN  Case Management [phone]

## 2024-02-15 NOTE — PROGRESS NOTES
Infectious Disease Consultation Note        Reason: Peritoneal dialysis catheter associated peritonitis, C. difficile colitis    Current abx Prior abx     Fluconazole since 2/14  Oral vancomycin, IV metronidazole, IP gentamicin, IP vancomycin since 2/14/2024 Pip-tazo/tazobactam, vancomycin 2/13/2024-2/14     Lines:       Assessment :   84-year-old female with past medical history of ESRD, type II diabetes on peritoneal dialysis, admitted to [facility] on 2/13/24  with abdominal pain and vomiting.     Clinical presentation consistent with peritoneal dialysis catheter associated peritonitis, evolving C. difficile colitis    Currently it is unclear if peritonitis is secondary to peritoneal dialysis catheter infection versus reactive inflammation due to evolving C. difficile colitis  Peritoneal fluid cell count consistent with bacterial peritonitis.  Peritoneal fluid cultures 2/14-pending    Clinically better.  Improved abdominal tenderness.  Improved diarrhea    Recommendations:    cont oral vancomycin for C. difficile colitis.  Continue IV metronidazole for c.diff associated peritonitis  Continue intraperitoneal gentamicin, vancomycin for now.  Continue p.o. fluconazole  Continue probiotics  Maintain enteric isolation  Follow-up peritoneal fluid cultures  Will make further antibiotic recommendations based on above test results, clinical course       Above plan was discussed in details with patient,RN, Dr. LISA Ratliff and dr Calle. Please call me if any further questions or concerns. Will continue to participate in the care of this patient.    HPI:    Feels better.  Improved abdominal discomfort, diarrhea she denies runny nose, sore throat, earaches, cough, chest pain, shortness of breath      Past Medical History:   Diagnosis Date    Allergic rhinitis     Chronic kidney disease 2020    Peritoneal Dialysis    Diabetes (HCC) 2015    IDDM    H/O seasonal allergies     Hypertension     Left foot drop     Peritoneal dialysis    Stress: Not on file   Social Connections: Not on file   Intimate Partner Violence: Not on file   Housing Stability: Low Risk  (2023)    Housing Stability Vital Sign     Unable to Pay for Housing in the Last Year: No     Number of Places Lived in the Last Year: 1     Unstable Housing in the Last Year: No     Social History     Tobacco Use   Smoking Status Never   Smokeless Tobacco Never        Temp (24hrs), Av °F (36.7 °C), Min:97.2 °F (36.2 °C), Max:98.7 °F (37.1 °C)    BP (!) 168/85   Pulse 83   Temp 97.2 °F (36.2 °C) (Oral)   Resp 17   Ht 1.651 m (5' 5\")   Wt 63.5 kg (140 lb)   LMP  (LMP Unknown)   SpO2 100%   BMI 23.30 kg/m²     ROS: 12 point ROS obtained in details. Pertinent positives as mentioned in HPI,   otherwise negative    Physical Exam:    General: Well developed, well nourished female laying on the bed/sitting on the  bed AAOx3 in no acute distress.    General:   awake alert and oriented   HEENT:  Normocephalic, atraumatic, PERRL, EOMI, no scleral icterus or pallor; no conjunctival hemmohage;  nasal and oral mucous are moist and without evidence of lesions. No thrush. Dentition good. Neck supple, no bruits.   Lymph Nodes:   no cervical, axillary or inguinal adenopathy   Lungs:   non-labored, bilaterally clear to auscultation- no crackles wheezes rales or rhonchi   Heart:  RRR, s1 and s2; no murmurs rubs or gallops, no edema, + pedal pulses   Abdomen:  soft, non-distended, active bowel sounds, no hepatomegaly, no splenomegaly. Appropriate surgical scars for stated surgeries. Non-tender   Genitourinary:  deferred   Extremities:   no clubbing, cyanosis; no joint effusions or swelling; Full ROM of all large joints to the upper and lower extremities; muscle mass appropriate for age   Neurologic:  No gross focal sensory abnormalities; 5/5 muscle strength to upper and lower extremities. Speech appropriate. Cranial nerves intact                        Skin:  No rash or ulcers noted   Wound:

## 2024-02-15 NOTE — PROGRESS NOTES
Arrived at patient bedside at 1452, Dianeal fluid removed from [employer] fridge and placed on cycler to be warmed prior to instilling.    Arrived back at patient bedside at 1530.    Pre Dianeal bag weight 1700g/ pre treatment drain had no fluid drain from catheter.    ACUTE PERITONEAL DIALYSIS DAILY NOTE     PERITONEAL DIALYSIS ORDERS: Nephrologist:  Gaudencio   [x]  CAPD   PD Orders:  Start Time:  1547  Total Therapy Time:   6 hrs  Number of Exchanges:   1  Exchange Fill Volume:   2000 mL  Total Volume:   2000 mL  Dwell Time:   300 min  Drain Time:  10-15 min      PD Dialysate:  [x]  PD Dextrose 1.5% (2.5 mEq/L low Calcium Solution) w/ gentamicin 38g  []  PD Dextrose 2.5% (2.5 mEq/L low Calcium Solution)  []  PD Dextrose 4.25% (2.5 mEq/L low Calcium Solution)         Patient tolerated infusion well.  Patient to be drained tonight when being placed on cycler for treatment.

## 2024-02-15 NOTE — PROGRESS NOTES
Progress Note    84-year-old female with past medical history of diabetes, ESRD on peritoneal dialysis admitted for abdominal pain, sepsis following for ESRD management  Subjective      Overnight event noted  C. difficile positive, denies for any diarrhea today  Her PD fluid is less clouded this morning  .  Fluid shows large WBC count      IMPRESSION:   ESRD, on peritoneal dialysis  Abdominal pain with nausea vomiting, cloudy peritoneal fluid suggestive of peritonitis  Anemia of chronic disease  Diabetes  Congestive heart failure  Positive for C. difficile infection   PLAN:   Continue current peritoneal dialysis prescription.  Continue gentamicin via PD bag, monitor vancomycin level and dose accordingly intraperitoneally.  Check PD fluid cell count tomorrow.   Follow up PD fluid culture.   Continue oral fluconazole.  Appreciate ID colleague input, will discuss case.   Adjust medication per ESRD status.   Discussed with pharmacist colleague, appreciate help.       Current Facility-Administered Medications   Medication Dose Route Frequency    Virt-Caps 1 mg  1 capsule Oral Daily    dianeal lo-harry 1.5% 2,000 mL with gentamicin 38 mg, vancomycin 1,500 mg solution   IntraPERitoneal Once    dianeal lo-harry 1.5% 2,000 mL with gentamicin 38 mg solution   IntraPERitoneal Daily    pantoprazole (PROTONIX) tablet 40 mg  40 mg Oral QAM AC    vancomycin (VANCOCIN) 50 mg/mL oral solution 250 mg  250 mg Oral 4 times per day    lactobacillus (CULTURELLE) capsule 1 capsule  1 capsule Oral Daily with breakfast    metroNIDAZOLE (FLAGYL) 500 mg in 0.9% NaCl 100 mL IVPB premix  500 mg IntraVENous Q8H    sodium chloride flush 0.9 % injection 5-40 mL  5-40 mL IntraVENous 2 times per day    sodium chloride flush 0.9 % injection 5-40 mL  5-40 mL IntraVENous PRN    heparin (porcine) injection 5,000 Units  5,000 Units SubCUTAneous 3 times per day    polyethylene glycol (GLYCOLAX) packet 17 g  17 g Oral Daily PRN    acetaminophen (TYLENOL)

## 2024-02-15 NOTE — PROGRESS NOTES
Comprehensive Nutrition Assessment    Type and Reason for Visit:  Initial, Positive Nutrition Screen    Nutrition Recommendations/Plan:   Continue Current Diet.   Start Oral Nutrition Supplement- Plan to add Nepro (each provides 420 kcal, 19 g protein) once daily,   Plan to add Renal Multivitamin.   Continue to monitor tolerance of PO, compliance of oral supplements, weight, labs, and plan of care during admission.         Malnutrition Assessment:  Malnutrition Status:  Insufficient data (02/15/24 1005)      Nutrition History and Allergies:   PMHx: ESRD on peritoneal dialysis, DMT2, hypertension. Nutrition hx obtained via chart review. Wt hx: 140 lb (02/13/24), 150 lb (11/19/23), weight loss of 10 lb (6.7%) x 3 months, 149 lb (08/10/23), weight loss of 9 lb (6%) x 6 months, 170 lb (01/23/23), weight loss of 30 lb (17.6%) x ~1 year. Pt with non significant weight loss. Will continue to follow weight trends. NKFA.     Nutrition Assessment:    Pt presents with complaints of abdominal pain and diarrhea. Pt admitted for peritonitis. Nephrology following, Pt on peritoneal dialysis in patient. Positive nutrition screen noted, MST weight loss of 24-33# and decreased appetite. Remote visit, attempted to contact pt via phone with no success. No PO documented in flowsheets. Will add oral supplements to increase calorie/protein intake opportunity. Will attempt to obtain nutrition hx, weight hx and conduct NFPE on follow-up. Will continue to monitor intake.    Nutrition Related Findings:    Last BM (including prior to admit): 02/14/24. Edema: None. Pertinent Meds: Norvasc, lipitor, heparin, humalog, culturelle, protonix. Pertinent Labs: Na 135 (L), BUN 45 (L), Cr 8.14 (H), GFR 4 (L), calcium 6.9 (L) no albumin to correct, POC Glucose 110-161 mg/dl x 24 hrs, A1c 5.5 (wnl) 2/13/24. Wound Type: None       Current Nutrition Intake & Therapies:    Average Meal Intake: Unable to assess  Average Supplements Intake: None Ordered  ADULT

## 2024-02-16 LAB
ANION GAP SERPL CALC-SCNC: 9 MMOL/L (ref 3–18)
BASOPHILS # BLD: 0 K/UL (ref 0–0.1)
BASOPHILS NFR BLD: 1 % (ref 0–2)
BUN SERPL-MCNC: 44 MG/DL (ref 7–18)
BUN/CREAT SERPL: 6 (ref 12–20)
CALCIUM SERPL-MCNC: 6.6 MG/DL (ref 8.5–10.1)
CHLORIDE SERPL-SCNC: 101 MMOL/L (ref 100–111)
CO2 SERPL-SCNC: 25 MMOL/L (ref 21–32)
CREAT SERPL-MCNC: 7.8 MG/DL (ref 0.6–1.3)
DIFFERENTIAL METHOD BLD: ABNORMAL
EOSINOPHIL # BLD: 0.1 K/UL (ref 0–0.4)
EOSINOPHIL NFR BLD: 3 % (ref 0–5)
ERYTHROCYTE [DISTWIDTH] IN BLOOD BY AUTOMATED COUNT: 14.8 % (ref 11.6–14.5)
GENTAMICIN SERPL-MCNC: 1.4 UG/ML (ref 0.5–10)
GLUCOSE BLD STRIP.AUTO-MCNC: 122 MG/DL (ref 70–110)
GLUCOSE BLD STRIP.AUTO-MCNC: 135 MG/DL (ref 70–110)
GLUCOSE BLD STRIP.AUTO-MCNC: 138 MG/DL (ref 70–110)
GLUCOSE SERPL-MCNC: 143 MG/DL (ref 74–99)
HCT VFR BLD AUTO: 33.3 % (ref 35–45)
HGB BLD-MCNC: 10.4 G/DL (ref 12–16)
IMM GRANULOCYTES # BLD AUTO: 0 K/UL (ref 0–0.04)
IMM GRANULOCYTES NFR BLD AUTO: 0 % (ref 0–0.5)
LYMPHOCYTES # BLD: 1.1 K/UL (ref 0.9–3.6)
LYMPHOCYTES NFR BLD: 22 % (ref 21–52)
MAGNESIUM SERPL-MCNC: 1.7 MG/DL (ref 1.6–2.6)
MCH RBC QN AUTO: 27.3 PG (ref 24–34)
MCHC RBC AUTO-ENTMCNC: 31.2 G/DL (ref 31–37)
MCV RBC AUTO: 87.4 FL (ref 78–100)
MONOCYTES # BLD: 0.6 K/UL (ref 0.05–1.2)
MONOCYTES NFR BLD: 11 % (ref 3–10)
NEUTS SEG # BLD: 3.1 K/UL (ref 1.8–8)
NEUTS SEG NFR BLD: 64 % (ref 40–73)
NRBC # BLD: 0 K/UL (ref 0–0.01)
NRBC BLD-RTO: 0 PER 100 WBC
PLATELET # BLD AUTO: 210 K/UL (ref 135–420)
PMV BLD AUTO: 9.3 FL (ref 9.2–11.8)
POTASSIUM SERPL-SCNC: 3.4 MMOL/L (ref 3.5–5.5)
RBC # BLD AUTO: 3.81 M/UL (ref 4.2–5.3)
SODIUM SERPL-SCNC: 135 MMOL/L (ref 136–145)
VANCOMYCIN SERPL-MCNC: 13.6 UG/ML (ref 5–40)
WBC # BLD AUTO: 4.9 K/UL (ref 4.6–13.2)

## 2024-02-16 PROCEDURE — 99232 SBSQ HOSP IP/OBS MODERATE 35: CPT | Performed by: FAMILY MEDICINE

## 2024-02-16 PROCEDURE — 6370000000 HC RX 637 (ALT 250 FOR IP): Performed by: INTERNAL MEDICINE

## 2024-02-16 PROCEDURE — 1100000003 HC PRIVATE W/ TELEMETRY

## 2024-02-16 PROCEDURE — 85025 COMPLETE CBC W/AUTO DIFF WBC: CPT

## 2024-02-16 PROCEDURE — 80202 ASSAY OF VANCOMYCIN: CPT

## 2024-02-16 PROCEDURE — 80048 BASIC METABOLIC PNL TOTAL CA: CPT

## 2024-02-16 PROCEDURE — 2580000003 HC RX 258: Performed by: INTERNAL MEDICINE

## 2024-02-16 PROCEDURE — 82962 GLUCOSE BLOOD TEST: CPT

## 2024-02-16 PROCEDURE — 97530 THERAPEUTIC ACTIVITIES: CPT

## 2024-02-16 PROCEDURE — 87205 SMEAR GRAM STAIN: CPT

## 2024-02-16 PROCEDURE — 6360000002 HC RX W HCPCS: Performed by: INTERNAL MEDICINE

## 2024-02-16 PROCEDURE — 90945 DIALYSIS ONE EVALUATION: CPT

## 2024-02-16 PROCEDURE — 6360000002 HC RX W HCPCS

## 2024-02-16 PROCEDURE — 36415 COLL VENOUS BLD VENIPUNCTURE: CPT

## 2024-02-16 PROCEDURE — A4722 DIALYS SOL FLD VOL > 1999CC: HCPCS | Performed by: INTERNAL MEDICINE

## 2024-02-16 PROCEDURE — 89051 BODY FLUID CELL COUNT: CPT

## 2024-02-16 PROCEDURE — 80170 ASSAY OF GENTAMICIN: CPT

## 2024-02-16 PROCEDURE — 2580000003 HC RX 258

## 2024-02-16 PROCEDURE — 83735 ASSAY OF MAGNESIUM: CPT

## 2024-02-16 PROCEDURE — 6370000000 HC RX 637 (ALT 250 FOR IP)

## 2024-02-16 PROCEDURE — 87070 CULTURE OTHR SPECIMN AEROBIC: CPT

## 2024-02-16 PROCEDURE — 97535 SELF CARE MNGMENT TRAINING: CPT

## 2024-02-16 PROCEDURE — 94761 N-INVAS EAR/PLS OXIMETRY MLT: CPT

## 2024-02-16 RX ORDER — SODIUM CHLORIDE, SODIUM LACTATE, CALCIUM CHLORIDE, MAGNESIUM CHLORIDE AND DEXTROSE 2.5; 538; 448; 18.3; 5.08 G/100ML; MG/100ML; MG/100ML; MG/100ML; MG/100ML
2000 INJECTION, SOLUTION INTRAPERITONEAL ONCE
Status: COMPLETED | OUTPATIENT
Start: 2024-02-16 | End: 2024-02-16

## 2024-02-16 RX ORDER — FLUCONAZOLE 200 MG/1
100 TABLET ORAL DAILY
Status: DISCONTINUED | OUTPATIENT
Start: 2024-02-17 | End: 2024-02-21 | Stop reason: HOSPADM

## 2024-02-16 RX ORDER — FLUCONAZOLE 200 MG/1
200 TABLET ORAL ONCE
Status: COMPLETED | OUTPATIENT
Start: 2024-02-16 | End: 2024-02-16

## 2024-02-16 RX ADMIN — HEPARIN SODIUM 5000 UNITS: 5000 INJECTION INTRAVENOUS; SUBCUTANEOUS at 06:55

## 2024-02-16 RX ADMIN — SODIUM CHLORIDE, SODIUM LACTATE, CALCIUM CHLORIDE, MAGNESIUM CHLORIDE AND DEXTROSE 2000 ML: 2.5; 538; 448; 18.3; 5.08 INJECTION, SOLUTION INTRAPERITONEAL at 13:15

## 2024-02-16 RX ADMIN — METRONIDAZOLE 500 MG: 500 INJECTION, SOLUTION INTRAVENOUS at 16:08

## 2024-02-16 RX ADMIN — METRONIDAZOLE 500 MG: 500 INJECTION, SOLUTION INTRAVENOUS at 06:56

## 2024-02-16 RX ADMIN — Medication 250 MG: at 01:30

## 2024-02-16 RX ADMIN — HYDROMORPHONE HYDROCHLORIDE 0.25 MG: 1 INJECTION, SOLUTION INTRAMUSCULAR; INTRAVENOUS; SUBCUTANEOUS at 00:10

## 2024-02-16 RX ADMIN — HYDROXYZINE HYDROCHLORIDE 25 MG: 25 TABLET, FILM COATED ORAL at 20:03

## 2024-02-16 RX ADMIN — SODIUM CHLORIDE, SODIUM LACTATE, CALCIUM CHLORIDE, MAGNESIUM CHLORIDE AND DEXTROSE 10000 ML: 1.5; 538; 448; 18.3; 5.08 INJECTION, SOLUTION INTRAPERITONEAL at 20:27

## 2024-02-16 RX ADMIN — Medication 250 MG: at 12:19

## 2024-02-16 RX ADMIN — Medication 1 MG: at 12:19

## 2024-02-16 RX ADMIN — Medication 250 MG: at 06:56

## 2024-02-16 RX ADMIN — HEPARIN SODIUM 5000 UNITS: 5000 INJECTION INTRAVENOUS; SUBCUTANEOUS at 22:11

## 2024-02-16 RX ADMIN — CINACALCET HYDROCHLORIDE 60 MG: 60 TABLET, FILM COATED ORAL at 09:53

## 2024-02-16 RX ADMIN — METOPROLOL SUCCINATE 25 MG: 25 TABLET, FILM COATED, EXTENDED RELEASE ORAL at 09:53

## 2024-02-16 RX ADMIN — Medication 1 CAPSULE: at 09:53

## 2024-02-16 RX ADMIN — AMLODIPINE BESYLATE 5 MG: 5 TABLET ORAL at 09:53

## 2024-02-16 RX ADMIN — SODIUM CHLORIDE, PRESERVATIVE FREE 10 ML: 5 INJECTION INTRAVENOUS at 21:15

## 2024-02-16 RX ADMIN — ATORVASTATIN CALCIUM 80 MG: 40 TABLET ORAL at 21:16

## 2024-02-16 RX ADMIN — FLUCONAZOLE 200 MG: 200 TABLET ORAL at 12:19

## 2024-02-16 RX ADMIN — METRONIDAZOLE 500 MG: 500 INJECTION, SOLUTION INTRAVENOUS at 23:15

## 2024-02-16 RX ADMIN — HYDROXYZINE HYDROCHLORIDE 25 MG: 25 TABLET, FILM COATED ORAL at 06:58

## 2024-02-16 RX ADMIN — HEPARIN SODIUM 5000 UNITS: 5000 INJECTION INTRAVENOUS; SUBCUTANEOUS at 16:08

## 2024-02-16 RX ADMIN — PANTOPRAZOLE SODIUM 40 MG: 40 TABLET, DELAYED RELEASE ORAL at 06:56

## 2024-02-16 ASSESSMENT — PAIN SCALES - GENERAL
PAINLEVEL_OUTOF10: 0
PAINLEVEL_OUTOF10: 10

## 2024-02-16 NOTE — PLAN OF CARE
Problem: Physical Therapy - Adult  Goal: By Discharge: Performs mobility at highest level of function for planned discharge setting.  See evaluation for individualized goals.  Description: Initiated  2/14/2024  to be met within 7-10 days.    1.  Patient will move from supine to sit and sit to supine , scoot up and down, and roll side to side in bed with minimal assistance/contact guard assist.    2.  Patient will transfer from bed to chair and chair to bed with minimal assistance/contact guard assist using the least restrictive device.  3.  Patient will perform sit to stand with minimal assistance/contact guard assist.  4.  Patient will ambulate with moderate assistance  for 25 feet with the least restrictive device.       PLOF: Pt lives with  in 1 level home, 3 steps to enter with b/l handrails.  Pt needs assistance for all mobility, has rollator and w/c.    Outcome: Progressing     PHYSICAL THERAPY TREATMENT    Patient: Tatyana Monzon (84 y.o. female)  Date: 2/16/2024  Diagnosis: Abdominal pain [R10.9]  Generalized abdominal pain [R10.84]  ESRD (end stage renal disease) on dialysis (HCC) [N18.6, Z99.2]  Acute gastritis without hemorrhage, unspecified gastritis type [K29.00] Peritonitis (HCC)      Precautions: Contact Precautions, General Precautions, Fall Risk      ASSESSMENT:  Pt received in bed and agreeable. Pt required min A to sit on EOB. Good sitting balance to sit up. Attempting to stand x 1 with RW and mod A x 2. Stood approx 1 min however unable to take 1 step fwd/back with mod A for steadying. Then requesting to return to sitting. Mod A to return to supine. Positioned for comfort with all needs met. Will continue to follow per POC.     Progression toward goals:   [x]      Improving appropriately and progressing toward goals  []      Improving slowly and progressing toward goals  []      Not making progress toward goals and plan of care will be adjusted     PLAN:  Patient continues to benefit  from skilled intervention to address the above impairments.  Continue treatment per established plan of care.    Further Equipment Recommendations for Discharge: XAVIER    AMPA: AM-PAC Inpatient Mobility Raw Score : 13      Current research shows that an AM-PAC score of 17 (13 without stairs) or less is not associated with a discharge to the patient's home setting. Based on an AM-PAC score and their current functional mobility deficits, it is recommended that the patient have 3-5 sessions per week of Physical Therapy at d/c to increase the patient's independence.     This AMPAC score should be considered in conjunction with interdisciplinary team recommendations to determine the most appropriate discharge setting. Patient's social support, diagnosis, medical stability, and prior level of function should also be taken into consideration.     SUBJECTIVE:   Patient stated, \"I will try.\"    OBJECTIVE DATA SUMMARY:   Critical Behavior:  Orientation  Overall Orientation Status: Within Normal Limits  Orientation Level: Oriented X4       Functional Mobility Training:  Bed Mobility:  Bed Mobility Training  Bed Mobility Training: Yes  Supine to Sit: Minimum assistance  Sit to Supine: Moderate assistance  Scooting: Assist X2;Maximum assistance  Transfers:  Transfer Training  Transfer Training: Yes  Sit to Stand: Moderate assistance  Stand to Sit: Minimum assistance  Balance:  Balance  Sitting: Intact  Standing: Impaired  Standing - Static: Fair  Standing - Dynamic: Poor    Pain:  Pain level pre-treatment: 0/10  Pain level post-treatment: 0/10       Activity Tolerance:   Activity Tolerance: Patient limited by fatigue  Please refer to the flowsheet for vital signs taken during this treatment.  After treatment:   [] Patient left in no apparent distress sitting up in chair  [x] Patient left in no apparent distress in bed  [x] Call bell left within reach  [x] Nursing notified  [] Caregiver present  [x] Bed alarm activated  [] SCDs

## 2024-02-16 NOTE — PROGRESS NOTES
Infectious Disease progress Note        Reason: Peritoneal dialysis catheter associated peritonitis, C. difficile colitis    Current abx Prior abx     Fluconazole since 2/14  Oral vancomycin, IV metronidazole, IP gentamicin, IP vancomycin since 2/14/2024 Pip-tazo/tazobactam, vancomycin 2/13/2024-2/14     Lines:       Assessment :   84-year-old female with past medical history of ESRD, type II diabetes on peritoneal dialysis, admitted to [facility] on 2/13/24  with abdominal pain and vomiting.     Clinical presentation consistent with peritoneal dialysis catheter associated peritonitis, evolving C. difficile colitis    Currently it is unclear if peritonitis is secondary to peritoneal dialysis catheter infection versus reactive inflammation due to evolving C. difficile colitis  Peritoneal fluid cell count consistent with bacterial peritonitis.  Peritoneal fluid cultures 2/14-no growth    Improved diarrhea.    However complains of abdominal pain/tenderness today  Rule out partially treated peritonitis    Recommendations:    cont oral vancomycin for C. difficile colitis.  Continue IV metronidazole for c.diff associated peritonitis  Continue intraperitoneal gentamicin, vancomycin for now.  Continue p.o. fluconazole  Obtain repeat peritoneal fluid cell count; follow-up peritoneal fluid cultures 2/14/2024  continue probiotics  Maintain enteric isolation  Will make further antibiotic recommendations based on above test results, clinical course       Above plan was discussed in details with patient,RN, , dr. Horner Please call me if any further questions or concerns. Will continue to participate in the care of this patient.    HPI:    Does not feel too good.  Complains of dizziness.  Lower abdominal discomfort.      Past Medical History:   Diagnosis Date    Allergic rhinitis     Chronic kidney disease 2020    Peritoneal Dialysis    Diabetes (HCC) 2015    IDDM    H/O seasonal allergies     Hypertension     Left foot drop      ulcers noted   Wound:       Back:  no spinal or paraspinal muscle tenderness or rigidity, no CVA tenderness     Psychiatric:  No suicidal or homicidal ideations, appropriate mood and affect         Labs: Results:   Chemistry Recent Labs     02/13/24  1754 02/14/24  0410 02/15/24  0717 02/16/24  0228   GLUCOSE 90 125* 154* 143*    137 135* 135*   K 5.6* 4.2 3.8 3.4*    104 100 101   CO2 24 23 27 25   BUN 66* 64* 45* 44*   CREATININE 10.90* 10.10* 8.14* 7.80*   GLOB 3.2  --   --   --    ALT 23  --   --   --    AST 45*  --   --   --         CBC w/Diff Recent Labs     02/14/24  0410 02/15/24  0717 02/16/24  0228   WBC 5.7 5.8 4.9   RBC 3.61* 4.09* 3.81*   HGB 10.1* 11.3* 10.4*   HCT 32.3* 36.0 33.3*    238 210        Microbiology Results       Procedure Component Value Units Date/Time    Clostridium difficile toxin/antigen [[id number]]  (Abnormal) Collected: 02/14/24 0900    Order Status: Completed Specimen: Stool from Miscellaneous sample Updated: 02/14/24 1247     Reflex       See Reflex order for C. difficile (DNA)           C Diff Toxin Interpretation       Indeterminate for Toxigenic C. difficile, DNA confirmation to follow.          C. difficile toxin Molecular [[id number]]  (Abnormal) Collected: 02/14/24 0900    Order Status: Completed Specimen: Miscellaneous sample Updated: 02/14/24 1247     C. difficile toxin Molecular Positive        Comment: This specimen is positive for toxigenic C difficile by DNA amplification.  Repeat testing is not recommended, samples received within 7 days of this positive result will be rejected. Assay is not approved to test for cure, since nucleic acids may persist after effective treatment and may prompt false positive results.  CALLED TO AND CORRECTLY REPEATED BY:  CLAUDIA BAUTISTA RN 4S ON [id number] AT 1245 TO ANDREW         Culture, Body Fluid [[id number]] Collected: 02/14/24 0840    Order Status: Completed Specimen: Body Fluid from Peritoneal Dialysis Fluid Updated:  02/15/24 1509     Special Requests NO SPECIAL REQUESTS        Gram stain NO WBC'S SEEN         NO ORGANISMS SEEN        Culture       NO GROWTH THUS FAR Culture performed on Unspun Fluid          Culture, Fungus [[id number]] Collected: 02/14/24 0840    Order Status: Sent Specimen: Peritoneal Dialysis Fluid Updated: 02/14/24 2015    Urine Culture [[id number]] Collected: 02/13/24 2220    Order Status: Completed Specimen: Urine, clean catch Updated: 02/14/24 2116     Special Requests NO SPECIAL REQUESTS        Culture No growth (<1,000 CFU/ML)       Culture, Body Fluid [[id number]] Collected: 02/13/24 2130    Order Status: Canceled Specimen: Body Fluid from Peritoneal Dialysis Fluid     Gram Stain [[id number]] Collected: 02/13/24 2130    Order Status: Canceled Specimen: Peritoneal Dialysis Fluid                 RADIOLOGY:    All available imaging studies/reports in [facility] for this admission were reviewed     High complexity decision making was performed during the evaluation of this patient at high risk for decompensation with multiple organ involvement     Above mentioned total time spent on reviewing the case/medical record/data/notes/EMR/patient examination/documentation/coordinating care with nurse/consultants, exclusive of procedures with complex decision making performed and > 50% time spent in face to face evaluation.      Disclaimer: Sections of this note are dictated utilizing voice recognition software, which may have resulted in some phonetic based errors in grammar and contents. Even though attempts were made to correct all the mistakes, some may have been missed, and remained in the body of the document. If questions arise, please contact our department.    Dr. Charley Ratliff, Infectious Disease Specialist  [phone]  February 16, 2024  8:08 AM

## 2024-02-16 NOTE — PROGRESS NOTES
Instilled dianeal lo-harry 2.5% 2000ml into PD catheter to dwell. Patient tolerated well.  Will drain after dwell to collect samples for cell count, gram stain and culture.

## 2024-02-16 NOTE — PROGRESS NOTES
Progress Note    84-year-old female with past medical history of diabetes, ESRD on peritoneal dialysis admitted for abdominal pain, sepsis following for ESRD management  Subjective      Overnight event noted  C. difficile positive  Still with abdominal pain      IMPRESSION:   ESRD, on peritoneal dialysis  Abdominal pain with nausea vomiting, cloudy peritoneal fluid suggestive of peritonitis  Anemia of chronic disease  Diabetes  Congestive heart failure  Positive for C. difficile infection   PLAN:   Continue current peritoneal dialysis prescription.  Continue gentamicin via PD bag, monitor vancomycin level and dose accordingly intraperitoneally.  Follow up PD fluid culture.   Continue oral fluconazole.  Appreciate ID colleague input.  Adjust medication per ESRD status.  Rpt cell count with diff and cx on pd fluid       Current Facility-Administered Medications   Medication Dose Route Frequency    dianeal lo-harry 1.5% 2,000 mL with gentamicin 35.6 mg, vancomycin 1,000 mg solution   IntraPERitoneal Once    fluconazole (DIFLUCAN) tablet 200 mg  200 mg Oral Once    [START ON 2/17/2024] fluconazole (DIFLUCAN) tablet 100 mg  100 mg Oral Daily    dianeal lo-harry 2.5% 2,000 mL  2,000 mL IntraPERitoneal Once    Virt-Caps 1 mg  1 capsule Oral Daily    dianeal lo-harry 1.5% 2,000 mL with gentamicin 38 mg solution   IntraPERitoneal Daily    pantoprazole (PROTONIX) tablet 40 mg  40 mg Oral QAM AC    vancomycin (VANCOCIN) 50 mg/mL oral solution 250 mg  250 mg Oral 4 times per day    lactobacillus (CULTURELLE) capsule 1 capsule  1 capsule Oral Daily with breakfast    metroNIDAZOLE (FLAGYL) 500 mg in 0.9% NaCl 100 mL IVPB premix  500 mg IntraVENous Q8H    sodium chloride flush 0.9 % injection 5-40 mL  5-40 mL IntraVENous 2 times per day    sodium chloride flush 0.9 % injection 5-40 mL  5-40 mL IntraVENous PRN    heparin (porcine) injection 5,000 Units  5,000 Units SubCUTAneous 3 times per day    polyethylene glycol (GLYCOLAX) packet  Enzymes No results for input(s): \"CPK\", \"LIZETH\" in the last 72 hours.    Invalid input(s): \"CKRMB\", \"CKND1\", \"TROIP\"   Liver Enzymes No results for input(s): \"TP\", \"ALB\" in the last 72 hours.    Invalid input(s): \"TBIL\", \"AP\", \"SGOT\", \"GPT\", \"DBIL\"   Thyroid Studies No results found for: \"T4\", \"T3RU\", \"TSH\"      EKG: normal sinus rhythm.    Physical Assessment:   Visit Vitals  BP (!) 156/72   Pulse 77   Temp 97.4 °F (36.3 °C) (Axillary)   Resp 18   Ht 1.651 m (5' 5\")   Wt 63.5 kg (140 lb)   SpO2 96%   BMI 23.30 kg/m²     Weight change:     Intake/Output Summary (Last 24 hours) at 2/16/2024 1218  Last data filed at 2/15/2024 1539  Gross per 24 hour   Intake 2000 ml   Output 0 ml   Net 2000 ml       Physical Exam:   General: comfortable, no acute distress   HEENT sclera anicteric, supple neck, no thyromegaly  CVS: S1S2 heard,  no rub  RS: + air entry b/l,   Abd: Soft,   Neuro: non focal, awake, alert , CN II-XII are grossly intact  Extrm: no cyanosis, clubbing   Skin: no visible  Rash  Musculoskeletal: No gross joints or bone deformities     Procedures/imaging: see electronic medical records for all procedures, Xrays and details which were not copied into this note but were reviewed prior to creation of Plan        SHIRA HUI MD  February 16, 2024  [facility]  Office [phone]

## 2024-02-16 NOTE — DIALYSIS
[facility] orders reviewed and cycler set as follows:     Dianeal solution 1.5% dextrose for  exchanges over 10 hours.  Dose: 2000,  fill volume. Last fill:  0    [facility]PD set up and initiated via aseptic technique. Tenckhoff   catheter sterile dressing change. Gentamicin cream 0.1%   applied to exit site. Exit site clear. Catheter integrity intact.    Patient tolerated it, estimated end time 0740     report given to Nelson MORALES RN

## 2024-02-16 NOTE — PROGRESS NOTES
Peritoneal Dialysis Disconnection         Metrics   I-Drain: 1448ml   Total UF: -441ml   Last Fill:    Last Manual Drain: none   Net UF:            Avg Dwell Time: 1:25   Lost Dwell Time: 0:47   Alarms:    Effluent: Clear yellow     Access   Type & Location:    Comments: Prior to disconnection, one-minute Alcavis scrub performed. Sterile mini cap applied and secured to abdomen. Dressing clean, dry and intact.                                       Safety:   Primary Nurse Rpt Pre: S. Jose   Primary Nurse Rpt Post:      Comments:   .Patient disconnected w/o any complications.

## 2024-02-16 NOTE — CARE COORDINATION
Jennifer with SNF [facility] messaged CM in [facility] to see if patient is with [facility].   TAMY let Jennifer know that Dr nIder Ratliff is following patient here, and should be [facility].   TAMY let Jennifer know that patient stayed at their SNF in November 2023.   SNF referral is still pending.   Will need auth for SNF.             Minda Ruiz, RN  Case Management [phone]

## 2024-02-16 NOTE — PROGRESS NOTES
[facility]  Progress Note    Patient: Tatyana Monzon Age: 84 y.o. : 1940 MR#: [id number] SSN: [id number]      Subjective/24-hour events:     Complains of some increased abdominal tenderness today.  Denies nausea/vomiting, remains afebrile.    Assessment:   Peritonitis  ESRD on PD  Hypertension  Hyperlipidemia  DM2  Prolonged QTc  Advanced age  Hyperkalemia, resolved    Plan:   Continue microbial therapy per ID recommendations, follow cell counts and cultures.  PT/OT as tolerated.  Other care primarily supportive.  Anticipated disposition is SNF once cleared by ID and nephrology.    Case discussed with:  [x]Patient  []Family  [x] Nursing  [x]Case Management  DVT Prophylaxis:  []Lovenox  []Hep SQ  [x]SCDs  []Coumadin   []On Heparin gtt []PO anticoagulant    Objective:   VS: BP (!) 145/61   Pulse 80   Temp 97.4 °F (36.3 °C) (Oral)   Resp 18   Ht 1.651 m (5' 5\")   Wt 63.5 kg (140 lb)   LMP  (LMP Unknown)   SpO2 95%   BMI 23.30 kg/m²      Tmax/24hrs: Temp (24hrs), Av.5 °F (36.4 °C), Min:97.2 °F (36.2 °C), Max:97.8 °F (36.6 °C)    Intake/Output Summary (Last 24 hours) at 2024 0947  Last data filed at 2/15/2024 1539  Gross per 24 hour   Intake 2000 ml   Output 0 ml   Net 2000 ml       Gen:  In NAD.  Nontoxic-appearing.  Lungs: Clear, no wheezes  Effort nonlabored.  CV: RRR.  Extremities: Warm, no pitting edema or ischemia.  Neuro:  Awake and alert, moves extremities spontaneously.    Current Facility-Administered Medications   Medication Dose Route Frequency    dianeal lo-harry 1.5% 2,000 mL with gentamicin 35.6 mg, vancomycin 1,000 mg solution   IntraPERitoneal Once    Virt-Caps 1 mg  1 capsule Oral Daily    dianeal lo-harry 1.5% 2,000 mL with gentamicin 38 mg solution   IntraPERitoneal Daily    pantoprazole (PROTONIX) tablet 40 mg  40 mg Oral QAM AC    vancomycin (VANCOCIN) 50 mg/mL oral solution 250 mg  250 mg Oral 4 times per day    lactobacillus  02/16/24  2:28 AM   Result Value Ref Range    Sodium 135 (L) 136 - 145 mmol/L    Potassium 3.4 (L) 3.5 - 5.5 mmol/L    Chloride 101 100 - 111 mmol/L    CO2 25 21 - 32 mmol/L    Anion Gap 9 3.0 - 18 mmol/L    Glucose 143 (H) 74 - 99 mg/dL    BUN 44 (H) 7.0 - 18 MG/DL    Creatinine 7.80 (H) 0.6 - 1.3 MG/DL    Bun/Cre Ratio 6 (L) 12 - 20      Est, Glom Filt Rate 5 (L) >60 ml/min/1.73m2    Calcium 6.6 (L) 8.5 - 10.1 MG/DL   CBC with Auto Differential    Collection Time: 02/16/24  2:28 AM   Result Value Ref Range    WBC 4.9 4.6 - 13.2 K/uL    RBC 3.81 (L) 4.20 - 5.30 M/uL    Hemoglobin 10.4 (L) 12.0 - 16.0 g/dL    Hematocrit 33.3 (L) 35.0 - 45.0 %    MCV 87.4 78.0 - 100.0 FL    MCH 27.3 24.0 - 34.0 PG    MCHC 31.2 31.0 - 37.0 g/dL    RDW 14.8 (H) 11.6 - 14.5 %    Platelets 210 135 - 420 K/uL    MPV 9.3 9.2 - 11.8 FL    Nucleated RBCs 0.0 0  WBC    nRBC 0.00 0.00 - 0.01 K/uL    Neutrophils % 64 40 - 73 %    Lymphocytes % 22 21 - 52 %    Monocytes % 11 (H) 3 - 10 %    Eosinophils % 3 0 - 5 %    Basophils % 1 0 - 2 %    Immature Granulocytes 0 0.0 - 0.5 %    Neutrophils Absolute 3.1 1.8 - 8.0 K/UL    Lymphocytes Absolute 1.1 0.9 - 3.6 K/UL    Monocytes Absolute 0.6 0.05 - 1.2 K/UL    Eosinophils Absolute 0.1 0.0 - 0.4 K/UL    Basophils Absolute 0.0 0.0 - 0.1 K/UL    Absolute Immature Granulocyte 0.0 0.00 - 0.04 K/UL    Differential Type AUTOMATED     Vancomycin Level, Random    Collection Time: 02/16/24  2:28 AM   Result Value Ref Range    Vancomycin Rm 13.6 5.0 - 40.0 UG/ML   Gentamicin Level, Random    Collection Time: 02/16/24  2:28 AM   Result Value Ref Range    Gentamicin Rm 1.4 0.5 - 10 ug/ml   Magnesium    Collection Time: 02/16/24  2:28 AM   Result Value Ref Range    Magnesium 1.7 1.6 - 2.6 mg/dL         Signed By: Julius Sood MD

## 2024-02-16 NOTE — PROGRESS NOTES
[facility]  Progress Note    Patient: Tatyana Monzon Age: 84 y.o. : 1940 MR#: [id number] SSN: [id number]  Date/Time: 2/15/2024    Subjective:     Pt has no complaints.     Assessment/Plan:   84 year old female w/ multiple medical conditions including ESRD on PD admitted after presenting w/ c/o diarrhea and abd pain.    -Bacterial peritonitis:  ID consulted, IV abx dc'd, on intraperitoneal vanc and gent  -C.diff: has been started on oral vanc. ID consulted  -Hyperkalemia: resolved  -ESRD on PD  -Qtc prolongation    HISTORY OF:  -DM2  -HLD  -HTN    PLAN:  -Abx per ID  -PD   -Avoid Qtc prolonging agents  -PT/OT        Additional Notes:      Case discussed with:  [x]Patient  []Family  []Nursing  []Case Management  DVT Prophylaxis:  []Lovenox  [x]Hep SQ  []SCDs  []Coumadin   []On Heparin gtt    Objective:   VS: /80   Pulse 82   Temp 97.6 °F (36.4 °C) (Oral)   Resp 17   Ht 1.651 m (5' 5\")   Wt 63.5 kg (140 lb)   LMP  (LMP Unknown)   SpO2 100%   BMI 23.30 kg/m²    Tmax/24hrs: Temp (24hrs), Av.9 °F (36.6 °C), Min:97.2 °F (36.2 °C), Max:98.7 °F (37.1 °C)    Input/Output:   Intake/Output Summary (Last 24 hours) at 2/15/2024 2058  Last data filed at 2/15/2024 1539  Gross per 24 hour   Intake 2000 ml   Output 60 ml   Net 1940 ml       General: alert, awake, in NAD  HEENT: NCAT, sclerae anicteric,  mmm, neck supple  COR:  no peripheral edema  PULM: w/o increased wob  ABD: soft, nt, nd  EXT: no edema  NEURO: follows commands, no gross focal abnormalities, speech normal, no tremors  PSYCH: non agitated      Labs:    Recent Results (from the past 24 hour(s))   POCT Glucose    Collection Time: 02/15/24  6:59 AM   Result Value Ref Range    POC Glucose 143 (H) 70 - 110 mg/dL   Basic Metabolic Panel w/ Reflex to MG    Collection Time: 02/15/24  7:17 AM   Result Value Ref Range    Sodium 135 (L) 136 - 145 mmol/L    Potassium 3.8 3.5 - 5.5 mmol/L    Chloride 100 100 -

## 2024-02-16 NOTE — PROGRESS NOTES
[facility]   Pharmacy Pharmacokinetic Monitoring Service - Vancomycin/Gentamicin    Indication: Intra-abdominal Infection  Target Concentration: Dosing based on vancomycin level </= 15 and gentamicin at 0.6mg/kg/day in PD fluid  Day of Therapy: 3  Additional Antimicrobials: Vancomycin, Gentamicin    Pertinent Laboratory Values:   Temp: 97.4 °F (36.3 °C), Weight - Scale: 63.5 kg (140 lb)  Recent Labs     02/15/24  0717 02/16/24  0228   CREATININE 8.14* 7.80*   BUN 45* 44*   WBC 5.8 4.9       Estimated Creatinine Clearance: 5 mL/min (A) (based on SCr of 7.8 mg/dL (H)).    Pertinent Cultures:  Culture Date Source Results   2/14 Body Fluid NGTD   MRSA Nasal Swab: N/A. Non-respiratory infection    Assessment:  Date/Time Current Dose  (IP) Concentration Timing of Concentration (h) AUC   2/14  Vanc 1500 mg   Gent 0.6 mg/kg - - -   2/15 Vanco 0mg  Gent 0.6mg/kg 18.6  0.7     2/16 Vanco 1000mg  Gent 0.6mg/kg 13.6  1.4       Note: Serum concentrations collected for AUC dosing may appear elevated if collected in close proximity to the dose administered, this is not necessarily an indication of toxicity    Plan:  Vancomycin and gentamicin in 1 PD bag daily  Vancomycin level 13.6 add 1000mg in PD today  Gentamicin level 1.4 add 0.6mg/kg  in PD today   Renal labs as indicated   Vancomycin/gentamicin concentration ordered for AM labs tomorrow  Pharmacy will continue to monitor patient and adjust therapy as indicated    Thank you for the consult,  EDGAR RAYMOND RPH  2/16/2024

## 2024-02-16 NOTE — CARE COORDINATION
Spoke with Jennifer at [facility], pt has been accepted and will start working on authorization.  Can take patient on Monday if medically ready.

## 2024-02-16 NOTE — PLAN OF CARE
Problem: Occupational Therapy - Adult  Goal: By Discharge: Performs self-care activities at highest level of function for planned discharge setting.  See evaluation for individualized goals.  Description: Note: Occupational Therapy Goals:  Initiated 2/14/2024 to be met within 7-10 days.    1.  Patient will perform grooming at the EOB for 8 minutes with modified independence   2.  Patient will perform upper body dressing with minimal assistance/contact guard assist.  3.  Patient will perform self-feeding with modified independence.   4.  Patient will perform bed mobility with moderate assistance.  5.  Patient will participate in upper extremity therapeutic exercise/activities with minimal assistance/contact guard assist for 8-10 minutes to increase strength/endurance for ADLs.    6.  Patient will utilize energy conservation techniques during functional activities with verbal and visual cues.    PLOF: Independent with ADL's  Outcome: Progressing   OCCUPATIONAL THERAPY TREATMENT    Patient: Tatyana Monzon (84 y.o. female)  Date: 2/16/2024  Diagnosis: Abdominal pain [R10.9]  Generalized abdominal pain [R10.84]  ESRD (end stage renal disease) on dialysis (HCC) [N18.6, Z99.2]  Acute gastritis without hemorrhage, unspecified gastritis type [K29.00] Peritonitis (HCC)      Precautions: Contact Precautions, General Precautions, Fall Risk,  ,  ,  ,  ,  ,  ,      Chart, occupational therapy assessment, plan of care, and goals were reviewed.  ASSESSMENT:  Pt presented supine in bed upon entry and agreeable to work with OT. She was assisted to EOB MIN A in prep for functional tasks. STS transfer MOD A  x 2 with RW. Pt stood ~ 1 min static standing with RW before requesting to return back to EOB to improve standing tolerance for ADL carryover. Once sitting, she demo G balance and performed oral care w/ MIN A. Pt returned to supine MOD A and positioned for comfort. She was left with HOB elevated, bed alarm active, and all needs    Activity Tolerance: Patient limited by fatigue  Please refer to the flowsheet for vital signs taken during this treatment.  After treatment:   []  Patient left in no apparent distress sitting up in chair  [x]  Patient left in no apparent distress in bed  [x]  Call bell left within reach  [x]  Nursing notified  []  Caregiver present  [x]  Bed alarm activated    COMMUNICATION/EDUCATION:   Patient Education  Education Given To: Patient  Education Provided: Role of Therapy;Energy Conservation;Plan of Care;Fall Prevention Strategies;Transfer Training;ADL Adaptive Strategies  Education Method: Demonstration;Verbal;Teach Back  Barriers to Learning: None  Education Outcome: Verbalized understanding;Continued education needed      Thank you for this referral.  [address]  Minutes: 16

## 2024-02-17 LAB
ANION GAP SERPL CALC-SCNC: 8 MMOL/L (ref 3–18)
APPEARANCE FLD: CLEAR
APTT PPP: 42.3 SEC (ref 23–36.4)
BUN SERPL-MCNC: 36 MG/DL (ref 7–18)
BUN/CREAT SERPL: 5 (ref 12–20)
CALCIUM SERPL-MCNC: 6.8 MG/DL (ref 8.5–10.1)
CHLORIDE SERPL-SCNC: 99 MMOL/L (ref 100–111)
CO2 SERPL-SCNC: 28 MMOL/L (ref 21–32)
COLOR FLD: ABNORMAL
CREAT SERPL-MCNC: 7.23 MG/DL (ref 0.6–1.3)
EOSINOPHIL NFR FLD MANUAL: 0 %
GENTAMICIN SERPL-MCNC: 1.1 UG/ML (ref 0.5–10)
GLUCOSE BLD STRIP.AUTO-MCNC: 106 MG/DL (ref 70–110)
GLUCOSE BLD STRIP.AUTO-MCNC: 112 MG/DL (ref 70–110)
GLUCOSE BLD STRIP.AUTO-MCNC: 127 MG/DL (ref 70–110)
GLUCOSE BLD STRIP.AUTO-MCNC: 154 MG/DL (ref 70–110)
GLUCOSE SERPL-MCNC: 125 MG/DL (ref 74–99)
LYMPHOCYTES NFR FLD: 32 %
MAGNESIUM SERPL-MCNC: 1.6 MG/DL (ref 1.6–2.6)
MONOCYTES NFR FLD: 42 %
NEUTROPHILS NFR FLD: 8 % (ref 0–25)
NEUTS BAND # FLD: 18 %
NUC CELL # FLD: 14 /CU MM
POTASSIUM SERPL-SCNC: 3.1 MMOL/L (ref 3.5–5.5)
RBC # FLD: <2000 /CU MM
SODIUM SERPL-SCNC: 135 MMOL/L (ref 136–145)
SPECIMEN SOURCE FLD: ABNORMAL
VANCOMYCIN SERPL-MCNC: 13.2 UG/ML (ref 5–40)

## 2024-02-17 PROCEDURE — 6370000000 HC RX 637 (ALT 250 FOR IP)

## 2024-02-17 PROCEDURE — 6360000002 HC RX W HCPCS

## 2024-02-17 PROCEDURE — 2580000003 HC RX 258

## 2024-02-17 PROCEDURE — 2500000003 HC RX 250 WO HCPCS: Performed by: INTERNAL MEDICINE

## 2024-02-17 PROCEDURE — 83735 ASSAY OF MAGNESIUM: CPT

## 2024-02-17 PROCEDURE — 80048 BASIC METABOLIC PNL TOTAL CA: CPT

## 2024-02-17 PROCEDURE — 6370000000 HC RX 637 (ALT 250 FOR IP): Performed by: INTERNAL MEDICINE

## 2024-02-17 PROCEDURE — 85730 THROMBOPLASTIN TIME PARTIAL: CPT

## 2024-02-17 PROCEDURE — 1100000003 HC PRIVATE W/ TELEMETRY

## 2024-02-17 PROCEDURE — 6370000000 HC RX 637 (ALT 250 FOR IP): Performed by: FAMILY MEDICINE

## 2024-02-17 PROCEDURE — 80170 ASSAY OF GENTAMICIN: CPT

## 2024-02-17 PROCEDURE — 2580000003 HC RX 258: Performed by: INTERNAL MEDICINE

## 2024-02-17 PROCEDURE — 82962 GLUCOSE BLOOD TEST: CPT

## 2024-02-17 PROCEDURE — 97530 THERAPEUTIC ACTIVITIES: CPT

## 2024-02-17 PROCEDURE — 6360000002 HC RX W HCPCS: Performed by: INTERNAL MEDICINE

## 2024-02-17 PROCEDURE — 94761 N-INVAS EAR/PLS OXIMETRY MLT: CPT

## 2024-02-17 PROCEDURE — 36415 COLL VENOUS BLD VENIPUNCTURE: CPT

## 2024-02-17 PROCEDURE — 99232 SBSQ HOSP IP/OBS MODERATE 35: CPT | Performed by: FAMILY MEDICINE

## 2024-02-17 PROCEDURE — 80202 ASSAY OF VANCOMYCIN: CPT

## 2024-02-17 RX ORDER — CALCITRIOL 0.25 UG/1
0.5 CAPSULE, LIQUID FILLED ORAL DAILY
Status: DISCONTINUED | OUTPATIENT
Start: 2024-02-17 | End: 2024-02-18

## 2024-02-17 RX ORDER — POTASSIUM CHLORIDE 7.45 MG/ML
10 INJECTION INTRAVENOUS
Status: COMPLETED | OUTPATIENT
Start: 2024-02-17 | End: 2024-02-17

## 2024-02-17 RX ORDER — MAGNESIUM SULFATE HEPTAHYDRATE 40 MG/ML
2000 INJECTION, SOLUTION INTRAVENOUS ONCE
Status: COMPLETED | OUTPATIENT
Start: 2024-02-17 | End: 2024-02-17

## 2024-02-17 RX ADMIN — CINACALCET HYDROCHLORIDE 60 MG: 60 TABLET, FILM COATED ORAL at 08:37

## 2024-02-17 RX ADMIN — SODIUM CHLORIDE, PRESERVATIVE FREE 10 ML: 5 INJECTION INTRAVENOUS at 21:28

## 2024-02-17 RX ADMIN — Medication 250 MG: at 05:32

## 2024-02-17 RX ADMIN — PANTOPRAZOLE SODIUM 40 MG: 40 TABLET, DELAYED RELEASE ORAL at 07:32

## 2024-02-17 RX ADMIN — Medication 1 CAPSULE: at 08:37

## 2024-02-17 RX ADMIN — Medication 250 MG: at 00:14

## 2024-02-17 RX ADMIN — ACETAMINOPHEN 325MG 650 MG: 325 TABLET ORAL at 01:13

## 2024-02-17 RX ADMIN — Medication 250 MG: at 11:34

## 2024-02-17 RX ADMIN — HYDROXYZINE HYDROCHLORIDE 25 MG: 25 TABLET, FILM COATED ORAL at 03:56

## 2024-02-17 RX ADMIN — METRONIDAZOLE 500 MG: 500 INJECTION, SOLUTION INTRAVENOUS at 17:17

## 2024-02-17 RX ADMIN — POTASSIUM CHLORIDE 10 MEQ: 7.46 INJECTION, SOLUTION INTRAVENOUS at 10:15

## 2024-02-17 RX ADMIN — CALCITRIOL CAPSULES 0.25 MCG 0.5 MCG: 0.25 CAPSULE ORAL at 17:17

## 2024-02-17 RX ADMIN — FLUCONAZOLE 100 MG: 200 TABLET ORAL at 08:37

## 2024-02-17 RX ADMIN — HEPARIN SODIUM 5000 UNITS: 5000 INJECTION INTRAVENOUS; SUBCUTANEOUS at 17:17

## 2024-02-17 RX ADMIN — METRONIDAZOLE 500 MG: 500 INJECTION, SOLUTION INTRAVENOUS at 08:00

## 2024-02-17 RX ADMIN — AMLODIPINE BESYLATE 5 MG: 5 TABLET ORAL at 08:37

## 2024-02-17 RX ADMIN — MAGNESIUM SULFATE HEPTAHYDRATE 2000 MG: 40 INJECTION, SOLUTION INTRAVENOUS at 10:14

## 2024-02-17 RX ADMIN — POTASSIUM CHLORIDE 10 MEQ: 7.46 INJECTION, SOLUTION INTRAVENOUS at 11:47

## 2024-02-17 RX ADMIN — POTASSIUM BICARBONATE 50 MEQ: 978 TABLET, EFFERVESCENT ORAL at 08:47

## 2024-02-17 RX ADMIN — Medication 1 MG: at 08:38

## 2024-02-17 RX ADMIN — ATORVASTATIN CALCIUM 80 MG: 40 TABLET ORAL at 21:27

## 2024-02-17 RX ADMIN — INSULIN LISPRO 2 UNITS: 100 INJECTION, SOLUTION INTRAVENOUS; SUBCUTANEOUS at 21:32

## 2024-02-17 RX ADMIN — SODIUM CHLORIDE, PRESERVATIVE FREE 10 ML: 5 INJECTION INTRAVENOUS at 08:38

## 2024-02-17 RX ADMIN — METOPROLOL SUCCINATE 25 MG: 25 TABLET, FILM COATED, EXTENDED RELEASE ORAL at 08:37

## 2024-02-17 RX ADMIN — HEPARIN SODIUM 5000 UNITS: 5000 INJECTION INTRAVENOUS; SUBCUTANEOUS at 05:32

## 2024-02-17 RX ADMIN — POTASSIUM CHLORIDE 10 MEQ: 7.46 INJECTION, SOLUTION INTRAVENOUS at 11:13

## 2024-02-17 RX ADMIN — POTASSIUM CHLORIDE 10 MEQ: 7.46 INJECTION, SOLUTION INTRAVENOUS at 12:30

## 2024-02-17 RX ADMIN — ACETAMINOPHEN 325MG 650 MG: 325 TABLET ORAL at 11:34

## 2024-02-17 RX ADMIN — Medication 250 MG: at 17:17

## 2024-02-17 RX ADMIN — HEPARIN SODIUM 5000 UNITS: 5000 INJECTION INTRAVENOUS; SUBCUTANEOUS at 21:28

## 2024-02-17 ASSESSMENT — PAIN DESCRIPTION - DESCRIPTORS
DESCRIPTORS: ACHING
DESCRIPTORS: ACHING;CRAMPING

## 2024-02-17 ASSESSMENT — PAIN SCALES - GENERAL
PAINLEVEL_OUTOF10: 8
PAINLEVEL_OUTOF10: 0
PAINLEVEL_OUTOF10: 2
PAINLEVEL_OUTOF10: 4

## 2024-02-17 ASSESSMENT — PAIN DESCRIPTION - ORIENTATION
ORIENTATION: LEFT
ORIENTATION: LEFT

## 2024-02-17 ASSESSMENT — PAIN DESCRIPTION - LOCATION
LOCATION: SHOULDER
LOCATION: HIP;LEG
LOCATION: SHOULDER

## 2024-02-17 ASSESSMENT — PAIN DESCRIPTION - PAIN TYPE: TYPE: CHRONIC PAIN

## 2024-02-17 ASSESSMENT — PAIN - FUNCTIONAL ASSESSMENT: PAIN_FUNCTIONAL_ASSESSMENT: PREVENTS OR INTERFERES SOME ACTIVE ACTIVITIES AND ADLS

## 2024-02-17 NOTE — DIALYSIS
Peritoneal Dialysis Disconnection             Metrics   I-Drain: 752 ml   Total UF: 373   Last Fill:     Last Manual Drain: 0   Net UF:             Avg Dwell Time: 1:11   Lost Dwell Time: 1:58   Alarms:     Effluent: Clear yellow          Access   Type & Location:     Comments: Prior to disconnection, one-minute Alcavis scrub performed. Sterile mini cap applied and secured to abdomen. Dressing clean, dry and intact.                                            Safety:   Primary Nurse Rpt Pre: Natalie PONCE RN   Primary Nurse Rpt Post:  Natalie PONCE RN      Comments:   .Patient disconnected w/o any complications.

## 2024-02-17 NOTE — DIALYSIS
1900: At patient's bedside,Manual drainage of CAPD fluid earlier dwelled done, Effluent fluid sample collected and sent to the lab for cell count and culture  1930: Dianeal Fluid for CCPD not available in the unit , went to pharmacy to collect same not ready ,pharmacist said it will be ready in 10 mins time  2000; fluid ready collected and pt initiated on CCPD  Peritoneal Dialysis Initiation                 Orders   Therapy Time:  10 hrs   Cycles:  4   Fill Volume:   2000 ml   Last Fill Volume:  0 ml   Total Volume:  23689 ml   Solution: 1.5%Dextrose Dianeal bags          Access   Type & Location: Mid lower abdomen   Comments: Prior to connection, one-minute Alcavis scrub performed. Dressing changed 0.1 % Garamycin cream applied at the exit site.  Dsg change date:      2/16/24                                   Labs   HBsAg (Antigen) / date:             Neg 11/11/23                                  HBsAb (Antibody) / date:  Immune 11/15/23   Source:            Safety:   Time Out Done:   (Time)  2025   Consent obtained/signed:  yes   Education:  yes   Primary Nurse Rpt Pre:  Sophie   Primary Nurse Rpt Post:  Sophie      Comments:   Pt orders, notes, labs, code status reviewed.      Start time: 2030  Estimated End Time: 2/17/24 @ 0630 am     Remained present during initial drain followed by initiation of first fill. Prior to departure, bed in lowest position, call bell and personal belongings within reach.

## 2024-02-17 NOTE — PLAN OF CARE
Problem: Physical Therapy - Adult  Goal: By Discharge: Performs mobility at highest level of function for planned discharge setting.  See evaluation for individualized goals.  Description: Initiated  2/14/2024  to be met within 7-10 days.    1.  Patient will move from supine to sit and sit to supine , scoot up and down, and roll side to side in bed with minimal assistance/contact guard assist.    2.  Patient will transfer from bed to chair and chair to bed with minimal assistance/contact guard assist using the least restrictive device.  3.  Patient will perform sit to stand with minimal assistance/contact guard assist.  4.  Patient will ambulate with moderate assistance  for 25 feet with the least restrictive device.       PLOF: Pt lives with  in 1 level home, 3 steps to enter with b/l handrails.  Pt needs assistance for all mobility, has rollator and w/c.    Outcome: Progressing   PHYSICAL THERAPY TREATMENT    Patient: Tatyana Monzon (84 y.o. female)  Date: 2/17/2024  Diagnosis: Abdominal pain [R10.9]  Generalized abdominal pain [R10.84]  ESRD (end stage renal disease) on dialysis (HCC) [N18.6, Z99.2]  Acute gastritis without hemorrhage, unspecified gastritis type [K29.00] Peritonitis (HCC)      Precautions: Contact Precautions, General Precautions, Fall Risk,    ASSESSMENT:  Pt cleared by nursing prior to tx session. Pt was sitting up in bed and NAD and was agreeable to tx. Pt required consistent verbal encouragement to transfer to EOB requiring Max A for rolling to right side and Mod A to perform supine to sit transfer. She demoned good sitting balance for ~8 minutes and performed ankle pumps, LAQ, and seated marching without loss of sitting balance or need for UE propping with consistent verbal cues for form and effectiveness. Pt denied wishing to stand despite verbal encouragement and reported \"I just can't do it today\". Pt transferred back to bed with mod A for transfer and Max A for scooting with  consistent verbal cues. Therapist encourage continuing ankle pumps and heels slides to increase movement throughout the day. Verbal confirmation noted. Pt left with all needs within reach and denied any additional needs at the end of tx session.    Progression toward goals:   []      Improving appropriately and progressing toward goals  [x]      Improving slowly and progressing toward goals  []      Not making progress toward goals and plan of care will be adjusted     PLAN:  Patient continues to benefit from skilled intervention to address the above impairments.  Continue treatment per established plan of care.    Further Equipment Recommendations for Discharge: reassess closer to D/C    AMPA: AM-PAC Inpatient Mobility Raw Score : 11      Current research shows that an AM-PAC score of 17 (13 without stairs) or less is not associated with a discharge to the patient's home setting. Based on an AM-PAC score and their current functional mobility deficits, it is recommended that the patient have 3-5 sessions per week of Physical Therapy at d/c to increase the patient's independence.     This AMPAC score should be considered in conjunction with interdisciplinary team recommendations to determine the most appropriate discharge setting. Patient's social support, diagnosis, medical stability, and prior level of function should also be taken into consideration.     SUBJECTIVE:   Patient stated, \"I can't do this with barely sleeping last night and all this other stuff going on.\"    OBJECTIVE DATA SUMMARY:   Critical Behavior:  Orientation  Overall Orientation Status: Within Normal Limits  Orientation Level: Oriented X4  Cognition  Overall Cognitive Status: WNL    Functional Mobility Training:  Bed Mobility:  Bed Mobility Training  Rolling: Maximum assistance  Supine to Sit: Moderate assistance  Sit to Supine: Moderate assistance  Scooting: Maximum assistance  Balance:  Balance  Sitting: Intact    Therapeutic Exercises:

## 2024-02-17 NOTE — PLAN OF CARE
Problem: Discharge Planning  Goal: Discharge to home or other facility with appropriate resources  2/17/2024 0752 by Niru Estrada RN  Outcome: Progressing  2/17/2024 0546 by Iggy Méndez RN  Outcome: Progressing     Problem: Pain  Goal: Verbalizes/displays adequate comfort level or baseline comfort level  2/17/2024 0752 by Niru Estrada RN  Outcome: Progressing  2/17/2024 0546 by Iggy Méndez RN  Outcome: Progressing  Flowsheets (Taken 2/16/2024 2016)  Verbalizes/displays adequate comfort level or baseline comfort level:   Assess pain using appropriate pain scale   Encourage patient to monitor pain and request assistance   Administer analgesics based on type and severity of pain and evaluate response   Implement non-pharmacological measures as appropriate and evaluate response     Problem: Skin/Tissue Integrity  Goal: Absence of new skin breakdown  Description: 1.  Monitor for areas of redness and/or skin breakdown  2.  Assess vascular access sites hourly  3.  Every 4-6 hours minimum:  Change oxygen saturation probe site  4.  Every 4-6 hours:  If on nasal continuous positive airway pressure, respiratory therapy assess nares and determine need for appliance change or resting period.  2/17/2024 0752 by Niru Estrada RN  Outcome: Progressing  2/17/2024 0546 by Iggy Méndez RN  Outcome: Progressing     Problem: Safety - Adult  Goal: Free from fall injury  2/17/2024 0752 by Niru Estrada RN  Outcome: Progressing  2/17/2024 0546 by Iggy Méndez RN  Outcome: Progressing     Problem: Chronic Conditions and Co-morbidities  Goal: Patient's chronic conditions and co-morbidity symptoms are monitored and maintained or improved  2/17/2024 0546 by Iggy Méndez RN  Outcome: Progressing     Problem: Nutrition Deficit:  Goal: Optimize nutritional status  2/17/2024 0546 by Iggy Méndez RN  Outcome: Progressing

## 2024-02-17 NOTE — PROGRESS NOTES
[facility]   Pharmacy Pharmacokinetic Monitoring Service - vancomycin and gentamicin    Indication: peritonitis  Goal vancomycin level: 10-20 mg/L  Goal gentamicin level: < 3 mg/L  Day of Therapy: 4  Additional Antimicrobials: metronidazole, fluconazole, PO vanc    Pertinent Laboratory Values:   Wt Readings from Last 1 Encounters:   02/13/24 63.5 kg (140 lb)     Temp Readings from Last 1 Encounters:   02/17/24 97.3 °F (36.3 °C) (Oral)     Estimated Creatinine Clearance: 5 mL/min (A) (based on SCr of 7.23 mg/dL (H)).    Recent Labs     02/15/24  0717 02/16/24  0228 02/17/24  0346   CREATININE 8.14* 7.80* 7.23*   BUN 45* 44* 36*   WBC 5.8 4.9  --      Pertinent Cultures:  Date Source Results   2/13 urine NGF   2/14 PD fluid NGTD   2/16 PD fluid pending     Vancomycin  Date Current Dose Level (mg/L)   2/13 1,250 mg IV -   2/14 1,500 mg PD 1.7   2/15 - 18.6   2/16 1,000 mg PD 13.6   2/17 1,000 mg PD 13.2     Gentamicin  Date Current Dose Level (mg/L)   2/14 38 mg PD -   2/15 38 mg PD 0.7   2/16 35.6 mg PD 1.4   2/17 38 mg PD 1.1     Plan:  CAPD with antibiotics for 6 hours followed by CCPD  Utilizing dianeal lo calcium 1.5%, 2,000 L with a 6 hours dwell  Vanc 1,000 mg and gent 38 mg in PD bag  Ordered vanc and gent levels for 2/18 with AM labs  Pharmacy will continue to monitor patient and adjust therapy as indicated    Thank you for the consult,  José Miguel Tran RPH  2/17/2024

## 2024-02-17 NOTE — PLAN OF CARE
85 Y/O female full code   Isolation: C-diff contact    Arrived 02/13/23  C/o abdominal pain w/ nausea, vomiting, diarrhea, cloudy peritoneal fluid, dysuria    DX: sepsis and Clostridium difficile, moderate gastritis, mild cystitis    History: diabetes, ESRD, anemia, CHF, HTN  Allergies: aspirin, cephalexin, sevelamer, ibuprofen, diverticulosis, fibroid uterus    Neuro alert and oriented c/o pain in abdomen and itching. C/o left shoulder pain and was given tylenol  Resp Room air  Cardiac off tele box, vitals WNL  GI  ESRD peritoneal dialysis over night ACHS checks   Diet adult regular low sodium 2 grams, low potassium with adult nutrition oral supplement at lunch time   bowel movement overnight mixed with urine she is generally oliguric, pure wick did not work, pads changed.  Skin rash on back, scars generalized, edema trace generalized, heels peeling, buttocks chanel left and gluteal fold red and beginning to tear the skin, stage one, pt has very frequent bowel movements and is incontinent, she does not know when she needs changed  Lines  PD catheter right lower abdomen,  20 gauge right forearm  Plan: accepted to [facility] probably discharge Monday    Problem: Discharge Planning  Goal: Discharge to home or other facility with appropriate resources  Outcome: Progressing     Problem: Pain  Goal: Verbalizes/displays adequate comfort level or baseline comfort level  Outcome: Progressing     Problem: Skin/Tissue Integrity  Goal: Absence of new skin breakdown  Outcome: Progressing     Problem: Safety - Adult  Goal: Free from fall injury  Outcome: Progressing     Problem: Chronic Conditions and Co-morbidities  Goal: Patient's chronic conditions and co-morbidity symptoms are monitored and maintained or improved  Outcome: Progressing     Problem: Nutrition Deficit:  Goal: Optimize nutritional status  Outcome: Progressing

## 2024-02-17 NOTE — PROGRESS NOTES
Progress Note    84-year-old female with past medical history of diabetes, ESRD on peritoneal dialysis admitted for abdominal pain, sepsis following for ESRD management  Subjective      Overnight event noted  C. difficile positive  Still with abdominal pain      IMPRESSION:   ESRD, on peritoneal dialysis  Abdominal pain with nausea vomiting, cloudy peritoneal fluid suggestive of peritonitis  Secondary hyperparathyroidism  Hypokalemia  Anemia of chronic disease  Diabetes  Congestive heart failure  Positive for C. difficile infection   PLAN:   Continue current peritoneal dialysis prescription.  Antibiotics continued per pharmacy and ID.  Repeat cell count with improvement on the PD fluid.  Continue oral fluconazole.  Replete potassium aggressively.  Will give her some IV magnesium.  Get PTH and start calcitriol.  Will also get phosphorus level in AM.       Current Facility-Administered Medications   Medication Dose Route Frequency    dianeal lo-harry 1.5% 2,000 mL with gentamicin 38 mg, vancomycin 1,000 mg solution   IntraPERitoneal Once    vancomycin and gentamicin intraperitoneal (placeholder)   Other RX Placeholder    fluconazole (DIFLUCAN) tablet 100 mg  100 mg Oral Daily    Virt-Caps 1 mg  1 capsule Oral Daily    pantoprazole (PROTONIX) tablet 40 mg  40 mg Oral QAM AC    vancomycin (VANCOCIN) 50 mg/mL oral solution 250 mg  250 mg Oral 4 times per day    lactobacillus (CULTURELLE) capsule 1 capsule  1 capsule Oral Daily with breakfast    metroNIDAZOLE (FLAGYL) 500 mg in 0.9% NaCl 100 mL IVPB premix  500 mg IntraVENous Q8H    sodium chloride flush 0.9 % injection 5-40 mL  5-40 mL IntraVENous 2 times per day    sodium chloride flush 0.9 % injection 5-40 mL  5-40 mL IntraVENous PRN    heparin (porcine) injection 5,000 Units  5,000 Units SubCUTAneous 3 times per day    polyethylene glycol (GLYCOLAX) packet 17 g  17 g Oral Daily PRN    acetaminophen (TYLENOL) tablet 650 mg  650 mg Oral Q6H PRN    Or    acetaminophen  Studies No results found for: \"T4\", \"T3RU\", \"TSH\"      EKG: normal sinus rhythm.    Physical Assessment:   Visit Vitals  /75   Pulse 89   Temp 97.3 °F (36.3 °C) (Oral)   Resp 20   Ht 1.651 m (5' 5\")   Wt 63.5 kg (140 lb)   SpO2 99%   BMI 23.30 kg/m²     Weight change:     Intake/Output Summary (Last 24 hours) at 2/17/2024 1255  Last data filed at 2/17/2024 0618  Gross per 24 hour   Intake 1060 ml   Output --   Net 1060 ml       Physical Exam:   General: comfortable, no acute distress   HEENT sclera anicteric, supple neck, no thyromegaly  CVS: S1S2 heard,  no rub  RS: + air entry b/l,   Abd: Soft,   Neuro: non focal, awake, alert , CN II-XII are grossly intact  Extrm: no cyanosis, clubbing   Skin: no visible  Rash  Musculoskeletal: No gross joints or bone deformities     Procedures/imaging: see electronic medical records for all procedures, Xrays and details which were not copied into this note but were reviewed prior to creation of Plan        SHIRA HUI MD  February 17, 2024  [facility]  Office [phone]

## 2024-02-17 NOTE — PROGRESS NOTES
[facility]  Progress Note    Patient: Tatyana Monzon Age: 84 y.o. : 1940 MR#: [id number] SSN: [id number]      Subjective/24-hour events:     Some abdominal discomfort still but no worsening symptoms.  Denies N/V, remains afebrile.    Assessment:   Peritonitis  ESRD on PD  Hypertension  Hyperlipidemia  Hypokalemia  DM2  Prolonged QTc  Advanced age  Hyperkalemia, resolved    Plan:   Antibiotics per ID, follow cultures.  Replace potassium today and monitor.  PD management per nephrology.  PT/OT, mobilize as tolerated.  Current recommendation is SNF placement at discharge and patient is agreeable.  Anticipate discharge once cleared by nephrology and ID.  Likely early next week if continues to improve/stable.    Case discussed with:  [x]Patient  []Family  [x] Nursing  [x]Case Management  DVT Prophylaxis:  []Lovenox  []Hep SQ  [x]SCDs  []Coumadin   []On Heparin gtt []PO anticoagulant    Objective:   VS: BP (!) 161/84   Pulse 93   Temp 98.1 °F (36.7 °C) (Axillary)   Resp 16   Ht 1.651 m (5' 5\")   Wt 63.5 kg (140 lb)   LMP  (LMP Unknown)   SpO2 97%   BMI 23.30 kg/m²      Tmax/24hrs: Temp (24hrs), Av.6 °F (36.4 °C), Min:97.4 °F (36.3 °C), Max:98.1 °F (36.7 °C)    Intake/Output Summary (Last 24 hours) at 2024 0820  Last data filed at 2024 0618  Gross per 24 hour   Intake 1060 ml   Output --   Net 1060 ml       Gen:  In NAD.  Nontoxic-appearing.  Lungs: Clear, no wheezes  Effort nonlabored.  CV: RRR.  Extremities: Warm, no pitting edema or ischemia.  Neuro:  Awake and alert, moves extremities spontaneously.    Current Facility-Administered Medications   Medication Dose Route Frequency    potassium bicarbonate (EFFER-K/K-LYTE) disintegrating tablet 50 mEq  50 mEq Oral Q4H    potassium bicarbonate (EFFER-K/K-LYTE) disintegrating tablet 25 mEq  25 mEq Oral BID    dianeal lo-harry 1.5% 2,000 mL with gentamicin 35.6 mg, vancomycin 1,000 mg solution    11:59 AM   Result Value Ref Range    POC Glucose 122 (H) 70 - 110 mg/dL   POCT Glucose    Collection Time: 02/16/24  6:56 PM   Result Value Ref Range    POC Glucose 138 (H) 70 - 110 mg/dL   Cell Count with Differential, Body Fluid    Collection Time: 02/16/24  7:30 PM   Result Value Ref Range    Specimen PROTOCOL      Color, Fluid STRAW     Appearance, Fluid CLEAR      RBC, Fluid <2,000 (A) NRRE /cu mm    Total Nucleated Cell Count 14 (A) NRRE /cu mm    Polys, Fluid 8 0 - 25 %    Bands, Fluid 18 %    Lymphocytes, Body Fluid 32 (A) NRRE %    Monocyte Count, Fluid 42 (A) NRRE %    Eos, Fluid 0 (A) NRRE %   Culture, Body Fluid    Collection Time: 02/16/24  7:30 PM    Specimen: Peritoneal Dialysis Fluid; Body Fluid   Result Value Ref Range    Special Requests NO SPECIAL REQUESTS      Gram stain RARE WBCS SEEN      Gram stain NO ORGANISMS SEEN      Culture PENDING    POCT Glucose    Collection Time: 02/16/24  9:27 PM   Result Value Ref Range    POC Glucose 135 (H) 70 - 110 mg/dL   Gentamicin Level, Random    Collection Time: 02/17/24  3:46 AM   Result Value Ref Range    Gentamicin Rm 1.1 0.5 - 10 ug/ml   Vancomycin Level, Random    Collection Time: 02/17/24  3:46 AM   Result Value Ref Range    Vancomycin Rm 13.2 5.0 - 40.0 UG/ML   APTT    Collection Time: 02/17/24  3:46 AM   Result Value Ref Range    APTT 42.3 (H) 23.0 - 36.4 SEC   Basic Metabolic Panel    Collection Time: 02/17/24  3:46 AM   Result Value Ref Range    Sodium 135 (L) 136 - 145 mmol/L    Potassium 3.1 (L) 3.5 - 5.5 mmol/L    Chloride 99 (L) 100 - 111 mmol/L    CO2 28 21 - 32 mmol/L    Anion Gap 8 3.0 - 18 mmol/L    Glucose 125 (H) 74 - 99 mg/dL    BUN 36 (H) 7.0 - 18 MG/DL    Creatinine 7.23 (H) 0.6 - 1.3 MG/DL    Bun/Cre Ratio 5 (L) 12 - 20      Est, Glom Filt Rate 5 (L) >60 ml/min/1.73m2    Calcium 6.8 (L) 8.5 - 10.1 MG/DL   Magnesium    Collection Time: 02/17/24  3:46 AM   Result Value Ref Range    Magnesium 1.6 1.6 - 2.6 mg/dL         Signed By:  Julius Sood MD

## 2024-02-18 LAB
ALBUMIN SERPL-MCNC: 1.8 G/DL (ref 3.4–5)
ANION GAP SERPL CALC-SCNC: 10 MMOL/L (ref 3–18)
BACTERIA SPEC CULT: NORMAL
BACTERIA SPEC CULT: NORMAL
BUN SERPL-MCNC: 40 MG/DL (ref 7–18)
BUN/CREAT SERPL: 5 (ref 12–20)
CA-I SERPL-SCNC: 0.81 MMOL/L (ref 1.15–1.33)
CALCIUM SERPL-MCNC: 6.3 MG/DL (ref 8.5–10.1)
CALCIUM SERPL-MCNC: 6.4 MG/DL (ref 8.5–10.1)
CHLORIDE SERPL-SCNC: 97 MMOL/L (ref 100–111)
CO2 SERPL-SCNC: 25 MMOL/L (ref 21–32)
CREAT SERPL-MCNC: 7.99 MG/DL (ref 0.6–1.3)
GENTAMICIN SERPL-MCNC: 0.8 UG/ML (ref 0.5–10)
GLUCOSE BLD STRIP.AUTO-MCNC: 109 MG/DL (ref 70–110)
GLUCOSE BLD STRIP.AUTO-MCNC: 113 MG/DL (ref 70–110)
GLUCOSE BLD STRIP.AUTO-MCNC: 121 MG/DL (ref 70–110)
GLUCOSE BLD STRIP.AUTO-MCNC: 126 MG/DL (ref 70–110)
GLUCOSE BLD STRIP.AUTO-MCNC: 131 MG/DL (ref 70–110)
GLUCOSE BLD STRIP.AUTO-MCNC: 131 MG/DL (ref 70–110)
GLUCOSE BLD STRIP.AUTO-MCNC: 162 MG/DL (ref 70–110)
GLUCOSE BLD STRIP.AUTO-MCNC: 37 MG/DL (ref 70–110)
GLUCOSE BLD STRIP.AUTO-MCNC: 82 MG/DL (ref 70–110)
GLUCOSE SERPL-MCNC: 132 MG/DL (ref 74–99)
GRAM STN SPEC: NORMAL
GRAM STN SPEC: NORMAL
MAGNESIUM SERPL-MCNC: 2.1 MG/DL (ref 1.6–2.6)
PHOSPHATE SERPL-MCNC: 4.4 MG/DL (ref 2.5–4.9)
POTASSIUM SERPL-SCNC: 3.5 MMOL/L (ref 3.5–5.5)
PTH-INTACT SERPL-MCNC: 382.5 PG/ML (ref 18.4–88)
SERVICE CMNT-IMP: NORMAL
SODIUM SERPL-SCNC: 132 MMOL/L (ref 136–145)
VANCOMYCIN SERPL-MCNC: 13.9 UG/ML (ref 5–40)

## 2024-02-18 PROCEDURE — 36415 COLL VENOUS BLD VENIPUNCTURE: CPT

## 2024-02-18 PROCEDURE — 82962 GLUCOSE BLOOD TEST: CPT

## 2024-02-18 PROCEDURE — A4722 DIALYS SOL FLD VOL > 1999CC: HCPCS | Performed by: INTERNAL MEDICINE

## 2024-02-18 PROCEDURE — 6370000000 HC RX 637 (ALT 250 FOR IP): Performed by: INTERNAL MEDICINE

## 2024-02-18 PROCEDURE — 80048 BASIC METABOLIC PNL TOTAL CA: CPT

## 2024-02-18 PROCEDURE — 94761 N-INVAS EAR/PLS OXIMETRY MLT: CPT

## 2024-02-18 PROCEDURE — 6360000002 HC RX W HCPCS

## 2024-02-18 PROCEDURE — 1100000003 HC PRIVATE W/ TELEMETRY

## 2024-02-18 PROCEDURE — 80202 ASSAY OF VANCOMYCIN: CPT

## 2024-02-18 PROCEDURE — 6360000002 HC RX W HCPCS: Performed by: INTERNAL MEDICINE

## 2024-02-18 PROCEDURE — 83735 ASSAY OF MAGNESIUM: CPT

## 2024-02-18 PROCEDURE — 84100 ASSAY OF PHOSPHORUS: CPT

## 2024-02-18 PROCEDURE — 6370000000 HC RX 637 (ALT 250 FOR IP)

## 2024-02-18 PROCEDURE — 97530 THERAPEUTIC ACTIVITIES: CPT

## 2024-02-18 PROCEDURE — 99232 SBSQ HOSP IP/OBS MODERATE 35: CPT | Performed by: FAMILY MEDICINE

## 2024-02-18 PROCEDURE — 97535 SELF CARE MNGMENT TRAINING: CPT

## 2024-02-18 PROCEDURE — 82330 ASSAY OF CALCIUM: CPT

## 2024-02-18 PROCEDURE — 80170 ASSAY OF GENTAMICIN: CPT

## 2024-02-18 PROCEDURE — 90945 DIALYSIS ONE EVALUATION: CPT

## 2024-02-18 PROCEDURE — 83970 ASSAY OF PARATHORMONE: CPT

## 2024-02-18 PROCEDURE — 82040 ASSAY OF SERUM ALBUMIN: CPT

## 2024-02-18 PROCEDURE — 2580000003 HC RX 258

## 2024-02-18 PROCEDURE — 2580000003 HC RX 258: Performed by: INTERNAL MEDICINE

## 2024-02-18 RX ORDER — INSULIN LISPRO 100 [IU]/ML
0-4 INJECTION, SOLUTION INTRAVENOUS; SUBCUTANEOUS NIGHTLY
Status: DISCONTINUED | OUTPATIENT
Start: 2024-02-18 | End: 2024-02-21 | Stop reason: HOSPADM

## 2024-02-18 RX ORDER — CALCITRIOL 0.25 UG/1
1 CAPSULE, LIQUID FILLED ORAL DAILY
Status: DISCONTINUED | OUTPATIENT
Start: 2024-02-18 | End: 2024-02-21 | Stop reason: HOSPADM

## 2024-02-18 RX ORDER — ALBUMIN (HUMAN) 12.5 G/50ML
25 SOLUTION INTRAVENOUS EVERY 6 HOURS PRN
Status: DISCONTINUED | OUTPATIENT
Start: 2024-02-18 | End: 2024-02-21 | Stop reason: HOSPADM

## 2024-02-18 RX ORDER — POTASSIUM CHLORIDE 7.45 MG/ML
10 INJECTION INTRAVENOUS
Status: COMPLETED | OUTPATIENT
Start: 2024-02-18 | End: 2024-02-18

## 2024-02-18 RX ORDER — INSULIN LISPRO 100 [IU]/ML
0-4 INJECTION, SOLUTION INTRAVENOUS; SUBCUTANEOUS
Status: DISCONTINUED | OUTPATIENT
Start: 2024-02-18 | End: 2024-02-21 | Stop reason: HOSPADM

## 2024-02-18 RX ORDER — CALCIUM CARBONATE 500 MG/1
500 TABLET, CHEWABLE ORAL 2 TIMES DAILY
Status: DISCONTINUED | OUTPATIENT
Start: 2024-02-18 | End: 2024-02-21 | Stop reason: HOSPADM

## 2024-02-18 RX ADMIN — PANTOPRAZOLE SODIUM 40 MG: 40 TABLET, DELAYED RELEASE ORAL at 06:16

## 2024-02-18 RX ADMIN — ACETAMINOPHEN 325MG 650 MG: 325 TABLET ORAL at 22:14

## 2024-02-18 RX ADMIN — METRONIDAZOLE 500 MG: 500 INJECTION, SOLUTION INTRAVENOUS at 06:30

## 2024-02-18 RX ADMIN — HEPARIN SODIUM 5000 UNITS: 5000 INJECTION INTRAVENOUS; SUBCUTANEOUS at 22:16

## 2024-02-18 RX ADMIN — Medication 250 MG: at 12:45

## 2024-02-18 RX ADMIN — DEXTROSE MONOHYDRATE 125 ML: 100 INJECTION, SOLUTION INTRAVENOUS at 02:30

## 2024-02-18 RX ADMIN — ACETAMINOPHEN 325MG 650 MG: 325 TABLET ORAL at 11:41

## 2024-02-18 RX ADMIN — POTASSIUM CHLORIDE 10 MEQ: 7.46 INJECTION, SOLUTION INTRAVENOUS at 14:04

## 2024-02-18 RX ADMIN — CALCITRIOL CAPSULES 0.25 MCG 1 MCG: 0.25 CAPSULE ORAL at 10:15

## 2024-02-18 RX ADMIN — Medication 250 MG: at 06:17

## 2024-02-18 RX ADMIN — METRONIDAZOLE 500 MG: 500 INJECTION, SOLUTION INTRAVENOUS at 15:14

## 2024-02-18 RX ADMIN — Medication 250 MG: at 18:44

## 2024-02-18 RX ADMIN — POTASSIUM CHLORIDE 10 MEQ: 7.46 INJECTION, SOLUTION INTRAVENOUS at 12:45

## 2024-02-18 RX ADMIN — CALCIUM CARBONATE 500 MG: 500 TABLET, CHEWABLE ORAL at 10:15

## 2024-02-18 RX ADMIN — Medication 1 MG: at 10:15

## 2024-02-18 RX ADMIN — ATORVASTATIN CALCIUM 80 MG: 40 TABLET ORAL at 22:16

## 2024-02-18 RX ADMIN — METRONIDAZOLE 500 MG: 500 INJECTION, SOLUTION INTRAVENOUS at 22:17

## 2024-02-18 RX ADMIN — Medication 1 CAPSULE: at 10:15

## 2024-02-18 RX ADMIN — CALCIUM CARBONATE 500 MG: 500 TABLET, CHEWABLE ORAL at 22:16

## 2024-02-18 RX ADMIN — HEPARIN SODIUM 5000 UNITS: 5000 INJECTION INTRAVENOUS; SUBCUTANEOUS at 06:16

## 2024-02-18 RX ADMIN — VANCOMYCIN HYDROCHLORIDE: 1 INJECTION, POWDER, LYOPHILIZED, FOR SOLUTION INTRAVENOUS at 17:20

## 2024-02-18 RX ADMIN — SODIUM CHLORIDE, SODIUM LACTATE, CALCIUM CHLORIDE, MAGNESIUM CHLORIDE AND DEXTROSE 10000 ML: 1.5; 538; 448; 18.3; 5.08 INJECTION, SOLUTION INTRAPERITONEAL at 23:35

## 2024-02-18 RX ADMIN — Medication 250 MG: at 00:33

## 2024-02-18 RX ADMIN — HEPARIN SODIUM 5000 UNITS: 5000 INJECTION INTRAVENOUS; SUBCUTANEOUS at 14:01

## 2024-02-18 RX ADMIN — DEXTROSE MONOHYDRATE 250 ML: 100 INJECTION, SOLUTION INTRAVENOUS at 00:40

## 2024-02-18 RX ADMIN — FLUCONAZOLE 100 MG: 200 TABLET ORAL at 10:15

## 2024-02-18 RX ADMIN — METRONIDAZOLE 500 MG: 500 INJECTION, SOLUTION INTRAVENOUS at 00:00

## 2024-02-18 RX ADMIN — HYDROXYZINE HYDROCHLORIDE 25 MG: 25 TABLET, FILM COATED ORAL at 23:32

## 2024-02-18 ASSESSMENT — PAIN DESCRIPTION - LOCATION
LOCATION: HIP
LOCATION: HIP

## 2024-02-18 ASSESSMENT — PAIN SCALES - GENERAL
PAINLEVEL_OUTOF10: 8
PAINLEVEL_OUTOF10: 0
PAINLEVEL_OUTOF10: 6
PAINLEVEL_OUTOF10: 8
PAINLEVEL_OUTOF10: 0

## 2024-02-18 ASSESSMENT — PAIN DESCRIPTION - ORIENTATION
ORIENTATION: LEFT
ORIENTATION: LEFT

## 2024-02-18 ASSESSMENT — PAIN DESCRIPTION - DESCRIPTORS
DESCRIPTORS: SORE;ACHING
DESCRIPTORS: ACHING

## 2024-02-18 NOTE — PROGRESS NOTES
0036-In to patient room to do midnight vitals, assessment and pass meds. After taking patient's vitals, noticed patient not responding with eyes open. Drool noted to face and left side of shirt wet. Concern for stroke vs hypoglycemic event. Blood sugar obtained and noted to be 37. Called for assistance from secondary nurses due to BS less than 40.     0040-MD Granados on unit. Rapid Response called. D10 started per MAR for BS less than 40    0042-MD Boggs and Denise arrived to assess patient.     0055-VVS rechecked and /63, patient beginning to get more alert, speech still garbled but clearing up some. Patient able to state name     0058-BS rechecked and glucose noted to be 162. Patient assessed by MD at bedside due to continued AMS although speech more clear. Patient A & O x 2 (self, location)    0100-D10 infusion ended. Patient VSS. Patient more alert, asking what occurred. This writer and MD notified patient that her BS dropped    0106-Rapid response ended. Will continue to monitor     0220-BS rechecked and noted to be 82. Patient able to drink and given grape juice. Patient tolerated it well, however speech is garbled and patient very sleepy. MD paged to make aware. Will cont to monitor    0225-MD returned called. Stated to give patient PRN D10 bolus 125ml and recheck BS. Will give bolus and recheck. Will cont to monitor    0329-BS rechecked. Noted to be 121. Will cont to monitor

## 2024-02-18 NOTE — DIALYSIS
PD manual exchange completed with gentamicin indwell. No   Out put noted prior to indwell. 2500 ml bag infused.

## 2024-02-18 NOTE — PLAN OF CARE
Problem: Occupational Therapy - Adult  Goal: By Discharge: Performs self-care activities at highest level of function for planned discharge setting.  See evaluation for individualized goals.  Description: Note: Occupational Therapy Goals:  Initiated 2/14/2024 to be met within 7-10 days.    1.  Patient will perform grooming at the EOB for 8 minutes with modified independence   2.  Patient will perform upper body dressing with minimal assistance/contact guard assist.  3.  Patient will perform self-feeding with modified independence.   4.  Patient will perform bed mobility with moderate assistance.  5.  Patient will participate in upper extremity therapeutic exercise/activities with minimal assistance/contact guard assist for 8-10 minutes to increase strength/endurance for ADLs.    6.  Patient will utilize energy conservation techniques during functional activities with verbal and visual cues.    PLOF: Independent with ADL's  Outcome: Progressing  OCCUPATIONAL THERAPY TREATMENT    Patient: Tatyana Monzon (84 y.o. female)  Date: 2/18/2024  Diagnosis: Abdominal pain [R10.9]  Generalized abdominal pain [R10.84]  ESRD (end stage renal disease) on dialysis (HCC) [N18.6, Z99.2]  Acute gastritis without hemorrhage, unspecified gastritis type [K29.00] Peritonitis (HCC)      Precautions: Contact Precautions, General Precautions, Fall Risk    Chart, occupational therapy assessment, plan of care, and goals were reviewed.  ASSESSMENT:    Pt cleared to participate in OT by RN. Pt in recliner, per nursing pt just recently assisted to recliner with 2x person Max A. Pt c/o pain in L hip and need to have a BM. Pt required max A x2 to std for bedpan placement and Total A for toileting hygiene after small BM. Pt benefited from VCs for BLE, BUE placement and for anterior weight shift during standing attempts. Max A x2 to scoot back in recliner for repositioning. Pt agreed to remain in recliner for lunch. Pt requested assist with

## 2024-02-18 NOTE — PROGRESS NOTES
[facility]  Rapid/Medical Response Team Note      Patient:    Tatyana Monzon 84 y.o. female, : 1940  Patient MRN: [id number]    Admission Date: 2024   Admission Diagnosis: Abdominal pain [R10.9]  Generalized abdominal pain [R10.84]  ESRD (end stage renal disease) on dialysis (HCC) [N18.6, Z99.2]  Acute gastritis without hemorrhage, unspecified gastritis type [K29.00]      RAPID RESPONSE  Rapid response called for: AMS, hypoglycemia    Called to bedside for hypoglycemia. Per nursing, she is not on lantus but does have a sliding scale insulin ordered. Her PM blood sugar was 154, so she was medicated per the scale with 2 units insulin. At time of RRT pt was confused, sweaty, and had POC BS of 37. She was given 125 ml D10. With D10 infusion, pt became more aware and awake. Was back to her baseline with blood sugar 162 at end of rapid. Vital stable.      OBJECTIVE    RRT/MRT start time:     12:42 AM          RRT/MRT end time:  Vitals:    24 0030   BP: 119/63   Pulse: 78   Resp: 18   Temp: 97.5 °F (36.4 °C)   SpO2: 98%        Physical Exam:  Gen: confused, slow speech  HEENT: normocephalic, atraumatic  CV: regular rate rhythm, no murmurs appreciated  Pulm: CTAB, no wheezes, no crackles  Abd: S/NT/ND, no guarding  MSK: mild non pitting edema to lower extremities bilaterally  Skin: warm, dry, intact, no rash  Neuro: CN II-XII grossly intact, no focal deficits appreciated   Psych: Oriented to person, place      ASSESSMENT/PLAN AND DISPOSITION  Tatyana Monzon is a 84 y.o. year old female admitted for Abdominal pain [R10.9]  Generalized abdominal pain [R10.84]  ESRD (end stage renal disease) on dialysis (HCC) [N18.6, Z99.2]  Acute gastritis without hemorrhage, unspecified gastritis type [K29.00]  Rapid Response Team/ Medical Response Team Called For: hypoglycemia, AMS    In setting of AMS. hypoglycemia completed the below:    Likely caused by insulin humalog administration. Symptoms corrected when  blood sugar corrected. Vitals stable.    Medications Administered During Rapid:  250 ml D10    EKG:none    Labs: none    Imaging: n/a       Medical Problem(s)    Plan:  -insulin regimen changed to low-dose sliding scale  -repeat POC glucose in 30 minutes    Disposition: staying on 4S  Patient condition: stable    Attending Dr. Granados notified of rapid response and is in agreement with plan.     Primary team resuming care.     [facility] Senior resident Dr. Walker present during Rapid Response.      Gray Boggs MD, PGY-1  [facility]  February 18, 2024 12:53 AM

## 2024-02-18 NOTE — PROGRESS NOTES
[facility]  Progress Note    Patient: Tatyana Monzon Age: 84 y.o. : 1940 MR#: [id number] SSN: [id number]      Subjective/24-hour events:     Some mild confusion noted this AM but o/w stable clinically.  No worsening abdominal pain or F/C/N/V.    Assessment:   Peritonitis  ESRD on PD  Hypertension  Hyperlipidemia  Hypokalemia  DM2  Prolonged QTc  Advanced age  Hyperkalemia, resolved    Plan:   Continue antibiotics per ID.  Await further recommendations pending follow-up tomorrow.  Potassium normal status post replacement yesterday.  Monitor.  Continue management of PD per nephrology.  PT/OT as tolerated.  Anticipate SNF disposition at discharge.  Other care primarily supportive.  Follow.    Case discussed with:  [x]Patient  []Family  [x] Nursing  []Case Management  DVT Prophylaxis:  []Lovenox  []Hep SQ  [x]SCDs  []Coumadin   []On Heparin gtt []PO anticoagulant    Objective:   VS: BP (!) 109/58   Pulse 68   Temp 97.2 °F (36.2 °C) (Axillary)   Resp 18   Ht 1.651 m (5' 5\")   Wt 63.5 kg (140 lb)   LMP  (LMP Unknown)   SpO2 98%   BMI 23.30 kg/m²      Tmax/24hrs: Temp (24hrs), Av.5 °F (36.4 °C), Min:97.2 °F (36.2 °C), Max:97.8 °F (36.6 °C)  No intake or output data in the 24 hours ending 24 0843      Gen:  In NAD.  Nontoxic-appearing.  Lungs: Clear, no wheezes  Effort nonlabored.  CV: RRR.  Extremities: Warm, no pitting edema or ischemia.  Neuro:  Awake and alert, moves extremities spontaneously.    Current Facility-Administered Medications   Medication Dose Route Frequency    insulin lispro (HUMALOG) injection vial 0-4 Units  0-4 Units SubCUTAneous TID WC    insulin lispro (HUMALOG) injection vial 0-4 Units  0-4 Units SubCUTAneous Nightly    albumin human 25% IV solution 25 g  25 g IntraVENous Q6H PRN    calcitRIOL (ROCALTROL) capsule 1 mcg  1 mcg Oral Daily    calcium carbonate (TUMS) chewable tablet 500 mg  500 mg Oral BID    dianeal lo-harry 1.5% 2,000

## 2024-02-18 NOTE — PROGRESS NOTES
[facility]   Pharmacy Pharmacokinetic Monitoring Service - vancomycin and gentamicin    Indication: peritonitis  Goal vancomycin level: 10-20 mg/L  Goal gentamicin level: < 3 mg/L  Day of Therapy: 6  Additional Antimicrobials: metronidazole, fluconazole, PO vanc    Pertinent Laboratory Values:   Wt Readings from Last 1 Encounters:   02/13/24 63.5 kg (140 lb)     Temp Readings from Last 1 Encounters:   02/18/24 97.3 °F (36.3 °C) (Oral)     Estimated Creatinine Clearance: 5 mL/min (A) (based on SCr of 7.99 mg/dL (H)).    Recent Labs     02/16/24  0228 02/17/24  0346 02/18/24  0446   CREATININE 7.80* 7.23* 7.99*   BUN 44* 36* 40*   WBC 4.9  --   --      Pertinent Cultures:  Date Source Results   2/13 urine NGF   2/14 PD fluid NGF   2/16 PD fluid NGTD     Vancomycin  Date Current Dose Level (mg/L)   2/13 1,250 mg IV -   2/14 1,500 mg PD 1.7   2/15 - 18.6   2/16 1,000 mg PD 13.6   2/17 1,000 mg PD 13.2   2/18 1,000 mg PD 13.9     Gentamicin  Date Current Dose Level (mg/L)   2/14 38 mg PD -   2/15 38 mg PD 0.7   2/16 35.6 mg PD 1.4   2/17 38 mg PD 1.1   2/18 38 mg PD 0.8     Plan:  CAPD with antibiotics for 6 hours followed by CCPD  Utilizing dianeal lo calcium, dextrose 1.5%, 2 L with a 6 hours dwell  Vanc 1,000 mg and gent 38 mg in PD bag  Ordered vanc and gent levels for 2/19 with AM labs  Pharmacy will continue to monitor patient and adjust therapy as indicated    Thank you for the consult,  José Miguel Tran [facility]  2/18/2024

## 2024-02-18 NOTE — PROGRESS NOTES
Came into pt room and found tums on bedside table  Pt explained that home doctor told her to take tums after eating and stated she took one tablet after she ate her dinner  Educated pt that she should not take medication without nurse

## 2024-02-18 NOTE — PROGRESS NOTES
Progress Note    84-year-old female with past medical history of diabetes, ESRD on peritoneal dialysis admitted for abdominal pain, sepsis following for ESRD management  Subjective      Overnight event noted  C. difficile positive  C/o hip pain when in chair      IMPRESSION:   ESRD, on peritoneal dialysis  Abdominal pain with nausea vomiting, cloudy peritoneal fluid suggestive of peritonitis  Secondary hyperparathyroidism  Hypocalcemia. ?sensipar related  Anemia of chronic disease  Diabetes  Congestive heart failure  Positive for C. difficile infection   PLAN:   Continue current peritoneal dialysis prescription.  Antibiotics continued per pharmacy and ID.  Repeat cell count with improvement on the PD fluid.  Continue oral fluconazole.  Hold sensipar and increase calcitriol. Add tums. Ionized calcium is low in am labs.       Current Facility-Administered Medications   Medication Dose Route Frequency    insulin lispro (HUMALOG) injection vial 0-4 Units  0-4 Units SubCUTAneous TID WC    insulin lispro (HUMALOG) injection vial 0-4 Units  0-4 Units SubCUTAneous Nightly    albumin human 25% IV solution 25 g  25 g IntraVENous Q6H PRN    calcitRIOL (ROCALTROL) capsule 1 mcg  1 mcg Oral Daily    calcium carbonate (TUMS) chewable tablet 500 mg  500 mg Oral BID    dianeal lo-harry 1.5% 2,000 mL with gentamicin 38 mg, vancomycin 1,000 mg solution   IntraPERitoneal Once    vancomycin and gentamicin intraperitoneal (placeholder)   Other RX Placeholder    fluconazole (DIFLUCAN) tablet 100 mg  100 mg Oral Daily    Virt-Caps 1 mg  1 capsule Oral Daily    pantoprazole (PROTONIX) tablet 40 mg  40 mg Oral QAM AC    vancomycin (VANCOCIN) 50 mg/mL oral solution 250 mg  250 mg Oral 4 times per day    lactobacillus (CULTURELLE) capsule 1 capsule  1 capsule Oral Daily with breakfast    metroNIDAZOLE (FLAGYL) 500 mg in 0.9% NaCl 100 mL IVPB premix  500 mg IntraVENous Q8H    sodium chloride flush 0.9 % injection 5-40 mL  5-40 mL IntraVENous

## 2024-02-19 LAB
ANION GAP SERPL CALC-SCNC: 9 MMOL/L (ref 3–18)
BUN SERPL-MCNC: 42 MG/DL (ref 7–18)
BUN/CREAT SERPL: 5 (ref 12–20)
CALCIUM SERPL-MCNC: 6.5 MG/DL (ref 8.5–10.1)
CHLORIDE SERPL-SCNC: 98 MMOL/L (ref 100–111)
CO2 SERPL-SCNC: 25 MMOL/L (ref 21–32)
CREAT SERPL-MCNC: 8.13 MG/DL (ref 0.6–1.3)
GENTAMICIN SERPL-MCNC: 1.6 UG/ML (ref 0.5–10)
GLUCOSE BLD STRIP.AUTO-MCNC: 120 MG/DL (ref 70–110)
GLUCOSE BLD STRIP.AUTO-MCNC: 125 MG/DL (ref 70–110)
GLUCOSE BLD STRIP.AUTO-MCNC: 126 MG/DL (ref 70–110)
GLUCOSE BLD STRIP.AUTO-MCNC: 96 MG/DL (ref 70–110)
GLUCOSE SERPL-MCNC: 110 MG/DL (ref 74–99)
MAGNESIUM SERPL-MCNC: 1.9 MG/DL (ref 1.6–2.6)
POTASSIUM SERPL-SCNC: 3.7 MMOL/L (ref 3.5–5.5)
SODIUM SERPL-SCNC: 132 MMOL/L (ref 136–145)
VANCOMYCIN SERPL-MCNC: 18.8 UG/ML (ref 5–40)

## 2024-02-19 PROCEDURE — 97530 THERAPEUTIC ACTIVITIES: CPT

## 2024-02-19 PROCEDURE — 97535 SELF CARE MNGMENT TRAINING: CPT

## 2024-02-19 PROCEDURE — 6360000002 HC RX W HCPCS: Performed by: INTERNAL MEDICINE

## 2024-02-19 PROCEDURE — 6360000002 HC RX W HCPCS

## 2024-02-19 PROCEDURE — 82962 GLUCOSE BLOOD TEST: CPT

## 2024-02-19 PROCEDURE — 80202 ASSAY OF VANCOMYCIN: CPT

## 2024-02-19 PROCEDURE — A4722 DIALYS SOL FLD VOL > 1999CC: HCPCS | Performed by: INTERNAL MEDICINE

## 2024-02-19 PROCEDURE — 6370000000 HC RX 637 (ALT 250 FOR IP): Performed by: INTERNAL MEDICINE

## 2024-02-19 PROCEDURE — 90945 DIALYSIS ONE EVALUATION: CPT

## 2024-02-19 PROCEDURE — 2580000003 HC RX 258: Performed by: INTERNAL MEDICINE

## 2024-02-19 PROCEDURE — 80170 ASSAY OF GENTAMICIN: CPT

## 2024-02-19 PROCEDURE — 1100000003 HC PRIVATE W/ TELEMETRY

## 2024-02-19 PROCEDURE — 99232 SBSQ HOSP IP/OBS MODERATE 35: CPT | Performed by: FAMILY MEDICINE

## 2024-02-19 PROCEDURE — 80048 BASIC METABOLIC PNL TOTAL CA: CPT

## 2024-02-19 PROCEDURE — 36415 COLL VENOUS BLD VENIPUNCTURE: CPT

## 2024-02-19 PROCEDURE — 94761 N-INVAS EAR/PLS OXIMETRY MLT: CPT

## 2024-02-19 PROCEDURE — 2580000003 HC RX 258

## 2024-02-19 PROCEDURE — 83735 ASSAY OF MAGNESIUM: CPT

## 2024-02-19 PROCEDURE — 6370000000 HC RX 637 (ALT 250 FOR IP)

## 2024-02-19 RX ORDER — METRONIDAZOLE 500 MG/1
500 TABLET ORAL EVERY 8 HOURS SCHEDULED
Status: DISCONTINUED | OUTPATIENT
Start: 2024-02-20 | End: 2024-02-21 | Stop reason: HOSPADM

## 2024-02-19 RX ADMIN — HEPARIN SODIUM 5000 UNITS: 5000 INJECTION INTRAVENOUS; SUBCUTANEOUS at 13:40

## 2024-02-19 RX ADMIN — SODIUM CHLORIDE, PRESERVATIVE FREE 10 ML: 5 INJECTION INTRAVENOUS at 00:32

## 2024-02-19 RX ADMIN — CALCIUM CARBONATE 500 MG: 500 TABLET, CHEWABLE ORAL at 08:38

## 2024-02-19 RX ADMIN — METRONIDAZOLE 500 MG: 500 INJECTION, SOLUTION INTRAVENOUS at 08:37

## 2024-02-19 RX ADMIN — HEPARIN SODIUM 5000 UNITS: 5000 INJECTION INTRAVENOUS; SUBCUTANEOUS at 06:32

## 2024-02-19 RX ADMIN — FLUCONAZOLE 100 MG: 200 TABLET ORAL at 08:38

## 2024-02-19 RX ADMIN — HEPARIN SODIUM 5000 UNITS: 5000 INJECTION INTRAVENOUS; SUBCUTANEOUS at 21:09

## 2024-02-19 RX ADMIN — SODIUM CHLORIDE, PRESERVATIVE FREE 10 ML: 5 INJECTION INTRAVENOUS at 21:14

## 2024-02-19 RX ADMIN — PANTOPRAZOLE SODIUM 40 MG: 40 TABLET, DELAYED RELEASE ORAL at 08:38

## 2024-02-19 RX ADMIN — Medication 250 MG: at 00:25

## 2024-02-19 RX ADMIN — CALCIUM CARBONATE 500 MG: 500 TABLET, CHEWABLE ORAL at 21:08

## 2024-02-19 RX ADMIN — Medication 1 CAPSULE: at 08:38

## 2024-02-19 RX ADMIN — SODIUM CHLORIDE, PRESERVATIVE FREE 10 ML: 5 INJECTION INTRAVENOUS at 08:45

## 2024-02-19 RX ADMIN — SODIUM CHLORIDE, SODIUM LACTATE, CALCIUM CHLORIDE, MAGNESIUM CHLORIDE AND DEXTROSE 10000 ML: 1.5; 538; 448; 18.3; 5.08 INJECTION, SOLUTION INTRAPERITONEAL at 22:37

## 2024-02-19 RX ADMIN — Medication 250 MG: at 06:32

## 2024-02-19 RX ADMIN — CALCITRIOL CAPSULES 0.25 MCG 1 MCG: 0.25 CAPSULE ORAL at 08:39

## 2024-02-19 RX ADMIN — METRONIDAZOLE 500 MG: 500 INJECTION, SOLUTION INTRAVENOUS at 13:45

## 2024-02-19 RX ADMIN — Medication 1 MG: at 08:38

## 2024-02-19 RX ADMIN — ACETAMINOPHEN 325MG 650 MG: 325 TABLET ORAL at 23:28

## 2024-02-19 RX ADMIN — Medication 250 MG: at 18:03

## 2024-02-19 RX ADMIN — ATORVASTATIN CALCIUM 80 MG: 40 TABLET ORAL at 21:08

## 2024-02-19 RX ADMIN — Medication 250 MG: at 12:32

## 2024-02-19 ASSESSMENT — PAIN SCALES - GENERAL
PAINLEVEL_OUTOF10: 0
PAINLEVEL_OUTOF10: 8
PAINLEVEL_OUTOF10: 0

## 2024-02-19 ASSESSMENT — PAIN DESCRIPTION - LOCATION: LOCATION: LEG

## 2024-02-19 ASSESSMENT — PAIN DESCRIPTION - ORIENTATION: ORIENTATION: LEFT

## 2024-02-19 ASSESSMENT — PAIN DESCRIPTION - DESCRIPTORS: DESCRIPTORS: ACHING

## 2024-02-19 NOTE — WOUND CARE
#: [id number]  [facility] #: [id number]        HPI:   Active Ambulatory Problems     Diagnosis Date Noted    Chronic kidney disease     Left foot drop 09/12/2018    Constipation 02/02/2018    Diabetes ([facility])     Severe obesity (BMI 35.0-39.9) with comorbidity ([facility]) 04/19/2018    Type 2 diabetes mellitus with nephropathy ([facility]) 12/21/2017    CKD (chronic kidney disease) stage 5, GFR less than 15 ml/min ([facility]) 09/26/2019    Allergic rhinitis     SI (sacroiliac) joint dysfunction 09/12/2018    Nausea 02/02/2018    Sacroiliac joint dysfunction of right side 09/12/2018    Lumbar spondylosis 09/12/2018    Peritoneal dialysis catheter in place ([facility])     Hypertension     Dyspepsia 02/02/2018    Secondary hyperparathyroidism of renal origin ([facility]) 01/09/2021    Upper GI bleed 10/07/2022    Periumbilical abdominal pain 10/07/2022     Resolved Ambulatory Problems     Diagnosis Date Noted    Acute ischemic stroke ([facility]) 11/13/2023     Past Medical History:   Diagnosis Date    H/O seasonal allergies        ICD-10-CM    1. Acute gastritis without hemorrhage, unspecified gastritis type  K29.00       2. Generalized abdominal pain  R10.84       3. ESRD (end stage renal disease) on dialysis ([facility])  N18.6     Z99.2             Patient high risk for skin break down due to   LOS: 6 days  Noe Score: 15/23   BMI: Body mass index is 23.3 kg/m².   MEWS: MEWS Score: 1/ Level of Consciousness: Alert (0)    Vitals:    02/19/24 0354 02/19/24 0656 02/19/24 0730 02/19/24 1147   BP: 132/72  108/65 (!) 148/79   Pulse: 91 (!) 107 85 92   Resp: 17  16 17   Temp: 98.7 °F (37.1 °C)  97.9 °F (36.6 °C) 97.5 °F (36.4 °C)   TempSrc: Oral  Oral Oral   SpO2: 96%  98% 99%   Weight:       Height:           Assessment:   Patient found reclined.  Patient is Awake and alert, Oriented x person, place, time and situation, and Pleasant and conversant.  Preventive measures in place: limited layers. Patient able to raise legs off the bed, turns without assist.    Wound 02/14/24
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
## 2024-02-19 NOTE — CARE COORDINATION
Danny with SNF [facility] called TAMY, asked that chart be open for SNF.   Danny said patient's daughter is bringing Peritoneal Dialysis supplies to SNF.   Auth still pending at this time for SNF.   TAMY let Danny know that PT and OT are scheduled to see patient today.       TAMY opened the chart for SNF [facility].             Minda Ruiz RN  Case Management [phone]

## 2024-02-19 NOTE — PROGRESS NOTES
[facility]  Progress Note    Patient: Tatyana Monzon Age: 84 y.o. : 1940 MR#: [id number] SSN: [id number]      Subjective/24-hour events:     Stable clinically.  No worsening abdominal pain, denies N/V.  Remains afebrile overnight.    Assessment:   Peritonitis  ESRD on PD  Hypertension  Hyperlipidemia  Hypokalemia  DM2  Prolonged QTc  Advanced age  Hyperkalemia, resolved    Plan:   ABX with PD per ID recs - to be arranged at discharge.  D/W aLyne Horner and Gaudencio   Medically stable for discharge.  SNF being recommended - hopeful that facility will be able to accomodate PD/antibiotic needs.  PT/OT as much as able in interim.  Other care primarily supportive - follow.    Case discussed with:  [x]Patient  []Family  [x] Nursing  [x]Case Management  DVT Prophylaxis:  []Lovenox  []Hep SQ  [x]SCDs  []Coumadin   []On Heparin gtt []PO anticoagulant    Objective:   VS: BP (!) 148/79   Pulse 92   Temp 97.5 °F (36.4 °C) (Oral)   Resp 17   Ht 1.651 m (5' 5\")   Wt 63.5 kg (140 lb)   LMP  (LMP Unknown)   SpO2 99%   BMI 23.30 kg/m²      Tmax/24hrs: Temp (24hrs), Av.8 °F (36.6 °C), Min:97.4 °F (36.3 °C), Max:98.7 °F (37.1 °C)  No intake or output data in the 24 hours ending 24 1535      Gen:  In NAD.  Nontoxic-appearing.  Lungs: Clear, no wheezes  Effort nonlabored.  CV: RRR.  Extremities: Warm, no pitting edema or ischemia.  Neuro:  Awake and alert, moves extremities spontaneously.    Current Facility-Administered Medications   Medication Dose Route Frequency    dianeal lo-harry 1.5% 2,000 mL with gentamicin 38 mg, vancomycin 1,000 mg solution   IntraPERitoneal Once    insulin lispro (HUMALOG) injection vial 0-4 Units  0-4 Units SubCUTAneous TID WC    insulin lispro (HUMALOG) injection vial 0-4 Units  0-4 Units SubCUTAneous Nightly    albumin human 25% IV solution 25 g  25 g IntraVENous Q6H PRN    calcitRIOL (ROCALTROL) capsule 1 mcg  1 mcg Oral Daily    calcium  Glucose 109 70 - 110 mg/dL   POCT Glucose    Collection Time: 02/18/24  9:01 PM   Result Value Ref Range    POC Glucose 131 (H) 70 - 110 mg/dL   Basic Metabolic Panel    Collection Time: 02/19/24  2:24 AM   Result Value Ref Range    Sodium 132 (L) 136 - 145 mmol/L    Potassium 3.7 3.5 - 5.5 mmol/L    Chloride 98 (L) 100 - 111 mmol/L    CO2 25 21 - 32 mmol/L    Anion Gap 9 3.0 - 18 mmol/L    Glucose 110 (H) 74 - 99 mg/dL    BUN 42 (H) 7.0 - 18 MG/DL    Creatinine 8.13 (H) 0.6 - 1.3 MG/DL    Bun/Cre Ratio 5 (L) 12 - 20      Est, Glom Filt Rate 4 (L) >60 ml/min/1.73m2    Calcium 6.5 (L) 8.5 - 10.1 MG/DL   Magnesium    Collection Time: 02/19/24  2:24 AM   Result Value Ref Range    Magnesium 1.9 1.6 - 2.6 mg/dL   Vancomycin Level, Random    Collection Time: 02/19/24  2:24 AM   Result Value Ref Range    Vancomycin Rm 18.8 5.0 - 40.0 UG/ML   Gentamicin Level, Random    Collection Time: 02/19/24  2:24 AM   Result Value Ref Range    Gentamicin Rm 1.6 0.5 - 10 ug/ml   POCT Glucose    Collection Time: 02/19/24  6:44 AM   Result Value Ref Range    POC Glucose 120 (H) 70 - 110 mg/dL   POCT Glucose    Collection Time: 02/19/24 11:32 AM   Result Value Ref Range    POC Glucose 96 70 - 110 mg/dL         Signed By: Julius Sood MD

## 2024-02-19 NOTE — PLAN OF CARE
Problem: Occupational Therapy - Adult  Goal: By Discharge: Performs self-care activities at highest level of function for planned discharge setting.  See evaluation for individualized goals.  Description: Note: Occupational Therapy Goals:  Initiated 2/14/2024 to be met within 7-10 days.    1.  Patient will perform grooming at the EOB for 8 minutes with modified independence   2.  Patient will perform upper body dressing with minimal assistance/contact guard assist.  3.  Patient will perform self-feeding with modified independence.   4.  Patient will perform bed mobility with moderate assistance.  5.  Patient will participate in upper extremity therapeutic exercise/activities with minimal assistance/contact guard assist for 8-10 minutes to increase strength/endurance for ADLs.    6.  Patient will utilize energy conservation techniques during functional activities with verbal and visual cues.    PLOF: Independent with ADL's  Outcome: Progressing   OCCUPATIONAL THERAPY TREATMENT    Patient: Tatyana Monzon (84 y.o. female)  Date: 2/19/2024  Diagnosis: Abdominal pain [R10.9]  Generalized abdominal pain [R10.84]  ESRD (end stage renal disease) on dialysis (HCC) [N18.6, Z99.2]  Acute gastritis without hemorrhage, unspecified gastritis type [K29.00] Peritonitis (HCC)      Precautions: Contact Precautions, General Precautions, Fall Risk,  ,  ,  ,  ,  ,  ,      Chart, occupational therapy assessment, plan of care, and goals were reviewed.  ASSESSMENT:  Pt presented supine in bed upon entry and required some encouragement for participation 2/2 fatigue. Pt assisted to EOB MOD A in prep for functional tasks. STS transfer MOD A with RW. Pt stood ~ 30 seconds before requiring seated rest break and max cueing for cervical and trunk extension to promote upright posture. She was able to take ~ 2 steps laterally towards HOB MOD A with RW for optimal bed positioning. She returned to EOB and engaged in simple grooming tasks MIN A with  Dynamic: Fair (occasional)  Standing: Impaired  Standing - Static: Fair (-)  Standing - Dynamic: Poor    ADL Intervention:     Grooming: Minimal assistance           LE Dressing: Maximum assistance    Pain:  Pain level pre-treatment: 0/10   Pain level post-treatment: 0/10      Activity Tolerance:    Activity Tolerance: Patient limited by fatigue;Patient limited by endurance  Please refer to the flowsheet for vital signs taken during this treatment.  After treatment:   []  Patient left in no apparent distress sitting up in chair  [x]  Patient left in no apparent distress in bed  [x]  Call bell left within reach  [x]  Nursing notified  []  Caregiver present  [x]  Bed alarm activated    COMMUNICATION/EDUCATION:   Patient Education  Education Given To: Patient  Education Provided: Role of Therapy;Energy Conservation;Plan of Care;Fall Prevention Strategies;Transfer Training;ADL Adaptive Strategies  Education Method: Teach Back;Verbal;Demonstration  Barriers to Learning: None  Education Outcome: Verbalized understanding;Continued education needed      Thank you for this referral.  [address]  Minutes: 25

## 2024-02-19 NOTE — CARE COORDINATION
CM uploaded clinicals to [facility] for [facility].       Dr Horner let CM know that patient will most likely need IV Antibiotics with Peritoneal Dialysis.   CM to check with [facility] to see if IV Antibiotics can be given with Peritoneal Dialysis.   Dr. Horner said if the SNF cannot give IV Abx with PD, that patient may need to go home, and go to Dialysis Ctr for Peritioneal Dialysis.   CM let Dr. Horner know that patient does not have Medicaid, for transportation benefits.   Dr. Horner said he will follow up with ID to see if any IV Antibiotics can be changed or adjusted.       CM messaged Jennifer with SNF [facility] to see if they can give IV Antibiotics with Peritoneal Dialysis at the facility.       Auth for [facility] is still pending at this time.             Minda Ruiz, RN  Case Management [phone]

## 2024-02-19 NOTE — DIALYSIS
Peritoneal Dialysis Initiation                 Orders   Therapy Time:  10 Hrs   Cycles:  5   Fill Volume:  2000 ml   Last Fill Volume: 0 ml   Total Volume: 06996 ml   Solution:           Access   Type & Location: Mid lower abdomen   Comments: Prior to connection, one-minute Alcavis scrub performed. Dressing changed 0.1 % Garamycin cream applied at the exit site.  Dsg change date:                                         Labs   HBsAg (Antigen) / date:  11/15/23 negativ                                       HBsAb (Antibody) / date:  11/15/23 Immune   Source:            Safety:   Time Out Done:   (Time)     Consent obtained/signed:  yes   Education:  yes   Primary Nurse Rpt Pre:  Tanvir ROSADO RN   Primary Nurse Rpt Post:  Tanvir ROSADO RN      Comments:   Pt orders, notes, labs, code status reviewed.      Start time:   Estimated End Time: [id number]     Remained present during initial drain followed by initiation of first fill. Prior to departure, bed in lowest position, call bell and personal belongings within reach.

## 2024-02-19 NOTE — PROGRESS NOTES
[facility]   Pharmacy Pharmacokinetic Monitoring Service - vancomycin and gentamicin    Indication: peritonitis  Goal vancomycin level: 10-20 mg/L  Goal gentamicin level: < 3 mg/L  Day of Therapy: 7  Additional Antimicrobials: metronidazole, fluconazole, PO vanc    Pertinent Laboratory Values:   Wt Readings from Last 1 Encounters:   02/13/24 63.5 kg (140 lb)     Temp Readings from Last 1 Encounters:   02/19/24 97.9 °F (36.6 °C) (Oral)     Estimated Creatinine Clearance: 5 mL/min (A) (based on SCr of 8.13 mg/dL (H)).    Recent Labs     02/18/24  0446 02/19/24  0224   CREATININE 7.99* 8.13*   BUN 40* 42*     Pertinent Cultures:  Date Source Results   2/13 urine NGF   2/14 PD fluid NGF   2/16 PD fluid NGTD     Vancomycin  Date Current Dose Level (mg/L)   2/13 1,250 mg IV -   2/14 1,500 mg PD 1.7   2/15 - 18.6   2/16 1,000 mg PD 13.6   2/17 1,000 mg PD 13.2   2/18 1,000 mg PD 13.9   2/19 1000mg PD 18.8     Gentamicin  Date Current Dose Level (mg/L)   2/14 38 mg PD -   2/15 38 mg PD 0.7   2/16 35.6 mg PD 1.4   2/17 38 mg PD 1.1   2/18 38 mg PD 0.8   2/19 38mg PD 1.6     Plan:  CAPD with antibiotics for 6 hours followed by CCPD  Utilizing dianeal lo calcium, dextrose 1.5%, 2 L with a 6 hours dwell  Vanc 1,000 mg and gent 38 mg in PD bag  Ordered vanc and gent levels for 2/20 with AM labs  Pharmacy will continue to monitor patient and adjust therapy as indicated    Thank you for the consult,  EDGAR RAYMNOD RPH  2/19/2024

## 2024-02-19 NOTE — PROGRESS NOTES
conducted a follow-up consultation and Spiritual Assessment for Tatyana Monzon, who is a 84 y.o.,female. Patient's Primary Language is: English.   According to the patient's EMR Episcopalian Affiliation is: Mandaeism.     The reason the Patient came to the hospital is:   Patient Active Problem List    Diagnosis Date Noted    Abdominal pain 02/13/2024    Peritonitis ([facility]) 02/13/2024    Gastritis 02/13/2024    Hyperkalemia 02/13/2024    ESRD on peritoneal dialysis ([facility]) 02/13/2024    QT prolongation 02/13/2024    Anemia of chronic disease 02/13/2024    GERD (gastroesophageal reflux disease) 02/13/2024    Hyperlipidemia 02/13/2024    Upper GI bleed 10/07/2022    Periumbilical abdominal pain 10/07/2022    Chronic kidney disease     Peritoneal dialysis catheter in place ([facility])     Secondary hyperparathyroidism of renal origin ([facility]) 01/09/2021    CKD (chronic kidney disease) stage 5, GFR less than 15 ml/min ([facility]) 09/26/2019    Left foot drop 09/12/2018    SI (sacroiliac) joint dysfunction 09/12/2018    Sacroiliac joint dysfunction of right side 09/12/2018    Lumbar spondylosis 09/12/2018    Severe obesity (BMI 35.0-39.9) with comorbidity ([facility]) 04/19/2018    Constipation 02/02/2018    Nausea 02/02/2018    Dyspepsia 02/02/2018    Type 2 diabetes mellitus with nephropathy ([facility]) 12/21/2017    Diabetes ([facility])     Allergic rhinitis     Hypertension         The  provided the following Interventions:  Follow-up a relationship of care and support.   Explored issues of clarisse, belief, spirituality and Bahai/ritual needs while hospitalized.  Listened empathically.  Provided chaplaincy education.  Provided information about Spiritual Care Services.  Offered prayer and assurance of continued prayers on patient's behalf.   Chart reviewed.    The following outcomes where achieved:   confirmed Patient's Episcopalian Affiliation.  Patient expressed gratitude for 's visit.    Assessment:  There are no spiritual or  Taoist issues which require intervention at this time.     Plan:  Chaplains will continue to follow and will provide pastoral care on an as needed/requested basis.   recommends bedside caregivers page  on duty if patient shows signs of acute spiritual or emotional distress.     Ron Allan   Spiritual Care   [phone]

## 2024-02-19 NOTE — PROGRESS NOTES
Progress Note    84-year-old female with past medical history of diabetes, ESRD on peritoneal dialysis admitted for abdominal pain, sepsis following for ESRD management  Subjective      Overnight event noted  C. difficile positive  Cheerful today and feels very positive      IMPRESSION:   ESRD, on peritoneal dialysis  Abdominal pain with nausea vomiting, cloudy peritoneal fluid suggestive of peritonitis  Secondary hyperparathyroidism  Hypocalcemia. ?sensipar related  Anemia of chronic disease  Diabetes  Congestive heart failure  Positive for C. difficile infection   PLAN:   Continue current peritoneal dialysis prescription.  Antibiotics continued per pharmacy and ID.  Repeat cell count with improvement on the PD fluid.  Continue oral fluconazole.  Follow calcium  Holding senispar for now  C/w calcitriol  She is going to SNF. So need to find out whether SNF can do abx.She doesn't have any transportation benefits to come back and forth the dialysis unit every day. If push comes to shove then we need settle with ip vancomycin only. Will update once CM has more info for me.  Spoke with IDTAMY       Current Facility-Administered Medications   Medication Dose Route Frequency    dianeal lo-harry 1.5% 2,000 mL with gentamicin 38 mg, vancomycin 1,000 mg solution   IntraPERitoneal Once    insulin lispro (HUMALOG) injection vial 0-4 Units  0-4 Units SubCUTAneous TID WC    insulin lispro (HUMALOG) injection vial 0-4 Units  0-4 Units SubCUTAneous Nightly    albumin human 25% IV solution 25 g  25 g IntraVENous Q6H PRN    calcitRIOL (ROCALTROL) capsule 1 mcg  1 mcg Oral Daily    calcium carbonate (TUMS) chewable tablet 500 mg  500 mg Oral BID    vancomycin and gentamicin intraperitoneal (placeholder)   Other RX Placeholder    fluconazole (DIFLUCAN) tablet 100 mg  100 mg Oral Daily    Virt-Caps 1 mg  1 capsule Oral Daily    pantoprazole (PROTONIX) tablet 40 mg  40 mg Oral QAM AC    vancomycin (VANCOCIN) 50 mg/mL oral solution 250  mg  250 mg Oral 4 times per day    lactobacillus (CULTURELLE) capsule 1 capsule  1 capsule Oral Daily with breakfast    metroNIDAZOLE (FLAGYL) 500 mg in 0.9% NaCl 100 mL IVPB premix  500 mg IntraVENous Q8H    sodium chloride flush 0.9 % injection 5-40 mL  5-40 mL IntraVENous 2 times per day    sodium chloride flush 0.9 % injection 5-40 mL  5-40 mL IntraVENous PRN    heparin (porcine) injection 5,000 Units  5,000 Units SubCUTAneous 3 times per day    polyethylene glycol (GLYCOLAX) packet 17 g  17 g Oral Daily PRN    acetaminophen (TYLENOL) tablet 650 mg  650 mg Oral Q6H PRN    Or    acetaminophen (TYLENOL) suppository 650 mg  650 mg Rectal Q6H PRN    glucose chewable tablet 16 g  4 tablet Oral PRN    dextrose bolus 10% 125 mL  125 mL IntraVENous PRN    Or    dextrose bolus 10% 250 mL  250 mL IntraVENous PRN    glucagon injection 1 mg  1 mg SubCUTAneous PRN    dextrose 10 % infusion   IntraVENous Continuous PRN    prochlorperazine (COMPAZINE) injection 10 mg  10 mg IntraVENous Q6H PRN    gentamicin (GARAMYCIN) 0.1 % cream   Topical Daily    dianeal lo-harry 1.5% 10,000 mL  10,000 mL IntraPERitoneal Continuous    amLODIPine (NORVASC) tablet 5 mg  5 mg Oral Daily    atorvastatin (LIPITOR) tablet 80 mg  80 mg Oral Nightly    [Held by provider] cinacalcet (SENSIPAR) tablet 60 mg  60 mg Oral Daily    metoprolol succinate (TOPROL XL) extended release tablet 25 mg  25 mg Oral Daily    hydrOXYzine HCl (ATARAX) tablet 25 mg  25 mg Oral Q8H PRN       Review of Systems:      Complete 10-point review of systems were obtained and discussed in length  with the patient. Complete review of systems was negative/unremarkable  except as mentioned in HPI section.  Data Review:    Labs: Results:       Chemistry Recent Labs     02/17/24  0346 02/18/24  0446 02/19/24  0224   * 132* 132*   K 3.1* 3.5 3.7   CL 99* 97* 98*   CO2 28 25 25   BUN 36* 40* 42*        CBC w/Diff No results for input(s): \"WBC\", \"RBC\", \"HGB\", \"HCT\", \"PLT\" in the

## 2024-02-19 NOTE — PROGRESS NOTES
Peritoneal Dialysis Disconnection         Metrics   I-Drain: 1367ml   Total UF: 1233ml   Last Fill:    Last Manual Drain: 0   Net UF:            Avg Dwell Time: 1:19   Lost Dwell Time: 1:14   Alarms: none   Effluent: Clear yellow     Access   Type & Location: Mid lower abdomen    Comments: Prior to disconnection, one-minute Alcavis scrub performed. Sterile mini cap applied and secured to abdomen. Dressing clean, dry and intact.                                           Safety:   Primary Nurse Rpt Pre: MAYKEL Nichole   Primary Nurse Rpt Post: CHRISTEL Ross RN     Comments:   Patient disconnected without any complications.

## 2024-02-19 NOTE — PROGRESS NOTES
Infectious Disease progress Note        Reason: Peritoneal dialysis catheter associated peritonitis, C. difficile colitis    Current abx Prior abx     Fluconazole since 2/14  Oral vancomycin, IV metronidazole, IP gentamicin, IP vancomycin since 2/14/2024 Pip-tazo/tazobactam, vancomycin 2/13/2024-2/14     Lines:       Assessment :   84-year-old female with past medical history of ESRD, type II diabetes on peritoneal dialysis, admitted to [facility] on 2/13/24  with abdominal pain and vomiting.     Clinical presentation consistent with peritoneal dialysis catheter associated peritonitis, evolving C. difficile colitis    Currently it is unclear if peritonitis is secondary to peritoneal dialysis catheter infection versus reactive inflammation due to evolving C. difficile colitis  Peritoneal fluid cell count consistent with bacterial peritonitis.  Peritoneal fluid cultures 2/14-no growth    Improved diarrhea.    Improved bdominal pain/tenderness today  Improved repeat peritoneal fluid cell count 2/16 suggestive of improving peritonitis    Recommendations:    cont oral vancomycin for C. difficile colitis till 3/14/2024.  D/c IV metronidazole. Start po metronidazole till 2/28/2024 for c.diff associated peritonitis  Continue intraperitoneal gentamicin, vancomycin  till 2/28/2024  Continue p.o. fluconazole till 2/28/2024  continue probiotics  Maintain enteric isolation  Discharge planning per primary team      Discussed with Dr. Horner.  Plans to discharge patient to nursing facility.  Will attempt to arrange for intraperitoneal antibiotics at nursing home .     Above plan was discussed in details with patient,RN, , dr. Horner Please call me if any further questions or concerns. Will continue to participate in the care of this patient.    HPI:    Feels better. Improved abdominal discomfort      Past Medical History:   Diagnosis Date    Allergic rhinitis     Chronic kidney disease 2020    Peritoneal Dialysis    Diabetes (HCC)

## 2024-02-20 LAB
BACTERIA SPEC CULT: NORMAL
GENTAMICIN SERPL-MCNC: 1.3 UG/ML (ref 0.5–10)
GLUCOSE BLD STRIP.AUTO-MCNC: 126 MG/DL (ref 70–110)
GLUCOSE BLD STRIP.AUTO-MCNC: 139 MG/DL (ref 70–110)
GLUCOSE BLD STRIP.AUTO-MCNC: 144 MG/DL (ref 70–110)
GLUCOSE BLD STRIP.AUTO-MCNC: 146 MG/DL (ref 70–110)
GRAM STN SPEC: NORMAL
GRAM STN SPEC: NORMAL
SERVICE CMNT-IMP: NORMAL
VANCOMYCIN SERPL-MCNC: 18.6 UG/ML (ref 5–40)

## 2024-02-20 PROCEDURE — 90945 DIALYSIS ONE EVALUATION: CPT

## 2024-02-20 PROCEDURE — 80202 ASSAY OF VANCOMYCIN: CPT

## 2024-02-20 PROCEDURE — 2580000003 HC RX 258: Performed by: INTERNAL MEDICINE

## 2024-02-20 PROCEDURE — 2580000003 HC RX 258

## 2024-02-20 PROCEDURE — 80170 ASSAY OF GENTAMICIN: CPT

## 2024-02-20 PROCEDURE — 6370000000 HC RX 637 (ALT 250 FOR IP): Performed by: INTERNAL MEDICINE

## 2024-02-20 PROCEDURE — 6360000002 HC RX W HCPCS

## 2024-02-20 PROCEDURE — 6360000002 HC RX W HCPCS: Performed by: INTERNAL MEDICINE

## 2024-02-20 PROCEDURE — A4722 DIALYS SOL FLD VOL > 1999CC: HCPCS | Performed by: INTERNAL MEDICINE

## 2024-02-20 PROCEDURE — 1100000003 HC PRIVATE W/ TELEMETRY

## 2024-02-20 PROCEDURE — 6370000000 HC RX 637 (ALT 250 FOR IP): Performed by: FAMILY MEDICINE

## 2024-02-20 PROCEDURE — 82962 GLUCOSE BLOOD TEST: CPT

## 2024-02-20 PROCEDURE — 6370000000 HC RX 637 (ALT 250 FOR IP)

## 2024-02-20 PROCEDURE — 36415 COLL VENOUS BLD VENIPUNCTURE: CPT

## 2024-02-20 PROCEDURE — 94761 N-INVAS EAR/PLS OXIMETRY MLT: CPT

## 2024-02-20 RX ORDER — HYDROCODONE BITARTRATE AND ACETAMINOPHEN 5; 325 MG/1; MG/1
1 TABLET ORAL EVERY 6 HOURS PRN
Status: DISCONTINUED | OUTPATIENT
Start: 2024-02-20 | End: 2024-02-21 | Stop reason: HOSPADM

## 2024-02-20 RX ADMIN — CALCIUM CARBONATE 500 MG: 500 TABLET, CHEWABLE ORAL at 21:04

## 2024-02-20 RX ADMIN — ATORVASTATIN CALCIUM 80 MG: 40 TABLET ORAL at 21:04

## 2024-02-20 RX ADMIN — METRONIDAZOLE 500 MG: 500 TABLET ORAL at 05:50

## 2024-02-20 RX ADMIN — Medication 1 MG: at 08:17

## 2024-02-20 RX ADMIN — CALCITRIOL CAPSULES 0.25 MCG 1 MCG: 0.25 CAPSULE ORAL at 08:17

## 2024-02-20 RX ADMIN — Medication 250 MG: at 17:24

## 2024-02-20 RX ADMIN — FLUCONAZOLE 100 MG: 200 TABLET ORAL at 09:12

## 2024-02-20 RX ADMIN — HEPARIN SODIUM 5000 UNITS: 5000 INJECTION INTRAVENOUS; SUBCUTANEOUS at 22:22

## 2024-02-20 RX ADMIN — Medication 1 CAPSULE: at 08:16

## 2024-02-20 RX ADMIN — SODIUM CHLORIDE, PRESERVATIVE FREE 10 ML: 5 INJECTION INTRAVENOUS at 09:13

## 2024-02-20 RX ADMIN — AMLODIPINE BESYLATE 5 MG: 5 TABLET ORAL at 09:12

## 2024-02-20 RX ADMIN — SODIUM CHLORIDE, PRESERVATIVE FREE 10 ML: 5 INJECTION INTRAVENOUS at 22:21

## 2024-02-20 RX ADMIN — ACETAMINOPHEN 325MG 650 MG: 325 TABLET ORAL at 10:17

## 2024-02-20 RX ADMIN — HEPARIN SODIUM 5000 UNITS: 5000 INJECTION INTRAVENOUS; SUBCUTANEOUS at 05:50

## 2024-02-20 RX ADMIN — METRONIDAZOLE 500 MG: 500 TABLET ORAL at 22:22

## 2024-02-20 RX ADMIN — ACETAMINOPHEN 325MG 650 MG: 325 TABLET ORAL at 18:28

## 2024-02-20 RX ADMIN — Medication 250 MG: at 12:05

## 2024-02-20 RX ADMIN — METOPROLOL SUCCINATE 25 MG: 25 TABLET, FILM COATED, EXTENDED RELEASE ORAL at 09:12

## 2024-02-20 RX ADMIN — Medication 250 MG: at 00:41

## 2024-02-20 RX ADMIN — Medication 250 MG: at 05:50

## 2024-02-20 RX ADMIN — HEPARIN SODIUM 5000 UNITS: 5000 INJECTION INTRAVENOUS; SUBCUTANEOUS at 14:00

## 2024-02-20 RX ADMIN — GENTAMICIN SULFATE: 40 INJECTION, SOLUTION INTRAMUSCULAR; INTRAVENOUS at 14:30

## 2024-02-20 RX ADMIN — HYDROCODONE BITARTRATE AND ACETAMINOPHEN 1 TABLET: 5; 325 TABLET ORAL at 13:56

## 2024-02-20 RX ADMIN — METRONIDAZOLE 500 MG: 500 TABLET ORAL at 13:56

## 2024-02-20 RX ADMIN — PANTOPRAZOLE SODIUM 40 MG: 40 TABLET, DELAYED RELEASE ORAL at 08:17

## 2024-02-20 RX ADMIN — SODIUM CHLORIDE, SODIUM LACTATE, CALCIUM CHLORIDE, MAGNESIUM CHLORIDE AND DEXTROSE 10000 ML: 1.5; 538; 448; 18.3; 5.08 INJECTION, SOLUTION INTRAPERITONEAL at 23:23

## 2024-02-20 RX ADMIN — CALCIUM CARBONATE 500 MG: 500 TABLET, CHEWABLE ORAL at 08:16

## 2024-02-20 ASSESSMENT — PAIN DESCRIPTION - LOCATION
LOCATION: HIP
LOCATION: HIP
LOCATION: BACK;HIP
LOCATION: HIP
LOCATION: BACK;HIP
LOCATION: HIP

## 2024-02-20 ASSESSMENT — PAIN DESCRIPTION - PAIN TYPE
TYPE: CHRONIC PAIN

## 2024-02-20 ASSESSMENT — PAIN SCALES - GENERAL
PAINLEVEL_OUTOF10: 0
PAINLEVEL_OUTOF10: 6
PAINLEVEL_OUTOF10: 0
PAINLEVEL_OUTOF10: 0
PAINLEVEL_OUTOF10: 8
PAINLEVEL_OUTOF10: 7
PAINLEVEL_OUTOF10: 6
PAINLEVEL_OUTOF10: 0
PAINLEVEL_OUTOF10: 8
PAINLEVEL_OUTOF10: 7

## 2024-02-20 ASSESSMENT — PAIN DESCRIPTION - ORIENTATION
ORIENTATION: LEFT
ORIENTATION: LOWER;RIGHT
ORIENTATION: LOWER;RIGHT

## 2024-02-20 ASSESSMENT — PAIN DESCRIPTION - DESCRIPTORS
DESCRIPTORS: ACHING;DISCOMFORT

## 2024-02-20 NOTE — PROGRESS NOTES
PD manual exchange completed with gentamicin indwell. No   output noted prior to indwell. 2000 ml bag infused.

## 2024-02-20 NOTE — CARE COORDINATION
Danny with [facility] [place] called CM to see if Medical Transport is scheduled yet.   CM let Danny know that CM was waiting to hear back from Jennifer to see if teaching was completed by Kerri to  SNF for IP Abx.   Danny said she would call DON, and call CM back.           Minda Ruiz, RN  Case Management [phone]

## 2024-02-20 NOTE — PROGRESS NOTES
Peritoneal Dialysis Disconnection             Metrics   I-Drain: 28ml   Total UF: 632ml   Last Fill:     Last Manual Drain: 0   Net UF:             Avg Dwell Time: 1:30   Lost Dwell Time: 0:20   Alarms: none   Effluent: Clear yellow          Access   Type & Location: Mid lower abdomen    Comments: Prior to disconnection, one-minute Alcavis scrub performed. Sterile mini cap applied and secured to abdomen. Dressing clean, dry and intact.                                                 Safety:   Primary Nurse Rpt Pre: CHRISTEL Ross RN   Primary Nurse Rpt Post: CHRISTEL Ross RN      Comments:   Patient disconnected without any complications.

## 2024-02-20 NOTE — PROGRESS NOTES
Infectious Disease progress Note        Reason: Peritoneal dialysis catheter associated peritonitis, C. difficile colitis    Current abx Prior abx     Fluconazole since 2/14  Oral vancomycin, IV metronidazole, IP gentamicin, IP vancomycin since 2/14/2024 Pip-tazo/tazobactam, vancomycin 2/13/2024-2/14     Lines:       Assessment :   84-year-old female with past medical history of ESRD, type II diabetes on peritoneal dialysis, admitted to [facility] on 2/13/24  with abdominal pain and vomiting.     Clinical presentation consistent with peritoneal dialysis catheter associated peritonitis, evolving C. difficile colitis    Currently it is unclear if peritonitis is secondary to peritoneal dialysis catheter infection versus reactive inflammation due to evolving C. difficile colitis  Peritoneal fluid cell count consistent with bacterial peritonitis.  Peritoneal fluid cultures 2/14-no growth    Improved diarrhea.    Improved abdominal pain/tenderness today  Improved repeat peritoneal fluid cell count 2/16 suggestive of improving peritonitis    Recommendations:    cont oral vancomycin for C. difficile colitis till 3/14/2024.  D/c IV metronidazole. recommend po metronidazole till 2/28/2024 for c.diff associated peritonitis  Continue intraperitoneal gentamicin, vancomycin  till 2/28/2024  Continue p.o. fluconazole till 2/28/2024  continue probiotics  Maintain enteric isolation  Discharge planning per primary team      Discussed with Dr. Horner.  Plans to discharge patient to nursing facility.  Will  arrange for intraperitoneal antibiotics at nursing home .     Above plan was discussed in details with patient,RN, , dr. Horner Please call me if any further questions or concerns. Will continue to participate in the care of this patient.    HPI:    Feels better. Improved abdominal discomfort. Resolved diarrhea      Past Medical History:   Diagnosis Date    Allergic rhinitis     Chronic kidney disease 2020    Peritoneal Dialysis     on file (2023)   Transportation Needs: No Transportation Needs (2023)    Transportation Problems ([facility])     In the past 12 months, has lack of reliable transportation kept you from medical appointments, meetings, work or from getting things needed for daily living?: Not on file   Physical Activity: Inactive (2023)    Exercise Vital Sign     Days of Exercise per Week: 0 days     Minutes of Exercise per Session: 0 min   Stress: Not on file   Social Connections: Not on file   Intimate Partner Violence: Not on file   Housing Stability: Low Risk  (2023)    Housing Stability Vital Sign     Unable to Pay for Housing in the Last Year: No     Number of Places Lived in the Last Year: 1     Unstable Housing in the Last Year: No     Social History     Tobacco Use   Smoking Status Never   Smokeless Tobacco Never        Temp (24hrs), Av.1 °F (36.7 °C), Min:97.5 °F (36.4 °C), Max:98.5 °F (36.9 °C)    /69   Pulse (!) 101   Temp 98.5 °F (36.9 °C) (Oral)   Resp 18   Ht 1.651 m (5' 5\")   Wt 63.5 kg (140 lb)   LMP  (LMP Unknown)   SpO2 95%   BMI 23.30 kg/m²     ROS: 12 point ROS obtained in details. Pertinent positives as mentioned in HPI,   otherwise negative    Physical Exam:    HEENT sclera anicteric, supple neck, no thyromegaly  CVS: S1S2 heard,  no rub  RS: + air entry b/l,   Abd: Soft, non tender  Neuro: non focal, awake, alert , CN II-XII are grossly intact  Extrm: no cyanosis, clubbing   Skin: no visible  Rash  Musculoskeletal: No gross joints or bone deformities        Labs: Results:   Chemistry Recent Labs     24  0446 24  0224   GLUCOSE 132* 110*   * 132*   K 3.5 3.7   CL 97* 98*   CO2 25 25   BUN 40* 42*   CREATININE 7.99* 8.13*        CBC w/Diff No results for input(s): \"WBC\", \"RBC\", \"HGB\", \"HCT\", \"PLT\" in the last 72 hours.    Invalid input(s): \"GRANS\", \"LYMPH\", \"EOS\"     Microbiology Results       Procedure Component Value Units Date/Time    Culture, Body  SPECIAL REQUESTS        Culture No growth (<1,000 CFU/ML)       Culture, Body Fluid [[id number]] Collected: 02/13/24 2130    Order Status: Canceled Specimen: Body Fluid from Peritoneal Dialysis Fluid     Gram Stain [[id number]] Collected: 02/13/24 2130    Order Status: Canceled Specimen: Peritoneal Dialysis Fluid                 RADIOLOGY:    All available imaging studies/reports in [facility] for this admission were reviewed        Disclaimer: Sections of this note are dictated utilizing voice recognition software, which may have resulted in some phonetic based errors in grammar and contents. Even though attempts were made to correct all the mistakes, some may have been missed, and remained in the body of the document. If questions arise, please contact our department.    Dr. Charley Ratliff, Infectious Disease Specialist  [phone]  February 20, 2024  7:51 AM

## 2024-02-20 NOTE — PROGRESS NOTES
Progress Note    84-year-old female with past medical history of diabetes, ESRD on peritoneal dialysis admitted for abdominal pain, sepsis following for ESRD management  Subjective      Overnight event noted  C. difficile positive        IMPRESSION:   ESRD, on peritoneal dialysis  Abdominal pain with nausea vomiting, cloudy peritoneal fluid suggestive of peritonitis  Secondary hyperparathyroidism  Hypocalcemia. ?sensipar related  Anemia of chronic disease  Diabetes  Congestive heart failure  Positive for C. difficile infection   PLAN:   Continue current peritoneal dialysis prescription.  Antibiotics continued per pharmacy and ID.  Plan is for IP vancomycin on 2/23 with 1 g and again on 2/28 with another gram  Fortaz 1 g IP daily till 2/28  CCPD QHS  Repeat cell count with improvement on the PD fluid.  Continue oral fluconazole.  Follow calcium  Holding senispar for now  C/w calcitriol  She is going to [facility].   Spoke with ID, CM, primary       Current Facility-Administered Medications   Medication Dose Route Frequency    dianeal lo-harry 1.5% 2,000 mL with gentamicin 38 mg solution   IntraPERitoneal Once    HYDROcodone-acetaminophen (NORCO) 5-325 MG per tablet 1 tablet  1 tablet Oral Q6H PRN    metroNIDAZOLE (FLAGYL) tablet 500 mg  500 mg Oral 3 times per day    insulin lispro (HUMALOG) injection vial 0-4 Units  0-4 Units SubCUTAneous TID WC    insulin lispro (HUMALOG) injection vial 0-4 Units  0-4 Units SubCUTAneous Nightly    albumin human 25% IV solution 25 g  25 g IntraVENous Q6H PRN    calcitRIOL (ROCALTROL) capsule 1 mcg  1 mcg Oral Daily    calcium carbonate (TUMS) chewable tablet 500 mg  500 mg Oral BID    vancomycin and gentamicin intraperitoneal (placeholder)   Other RX Placeholder    fluconazole (DIFLUCAN) tablet 100 mg  100 mg Oral Daily    Virt-Caps 1 mg  1 capsule Oral Daily    pantoprazole (PROTONIX) tablet 40 mg  40 mg Oral QAM AC    vancomycin (VANCOCIN) 50 mg/mL oral solution 250 mg  250 mg Oral 4

## 2024-02-20 NOTE — PROGRESS NOTES
[facility]   Pharmacy Pharmacokinetic Monitoring Service - vancomycin and gentamicin    Indication: peritonitis  Goal vancomycin level: 10-20 mg/L  Goal gentamicin level: < 3 mg/L  Day of Therapy: 7  Additional Antimicrobials: metronidazole, fluconazole, PO vanc    Pertinent Laboratory Values:   Wt Readings from Last 1 Encounters:   02/13/24 63.5 kg (140 lb)     Temp Readings from Last 1 Encounters:   02/20/24 98.3 °F (36.8 °C) (Oral)     Estimated Creatinine Clearance: 5 mL/min (A) (based on SCr of 8.13 mg/dL (H)).    Recent Labs     02/18/24  0446 02/19/24  0224   CREATININE 7.99* 8.13*   BUN 40* 42*     Pertinent Cultures:  Date Source Results   2/13 urine NGF   2/14 PD fluid NGF   2/16 PD fluid NGTD     Vancomycin  Date Current Dose Level (mg/L)   2/13 1,250 mg IV -   2/14 1,500 mg PD 1.7   2/15 - 18.6   2/16 1,000 mg PD 13.6   2/17 1,000 mg PD 13.2   2/18 1,000 mg PD 13.9   2/19 1000mg PD 18.8   2/20 - 18.6     Gentamicin  Date Current Dose Level (mg/L)   2/14 38 mg PD -   2/15 38 mg PD 0.7   2/16 35.6 mg PD 1.4   2/17 38 mg PD 1.1   2/18 38 mg PD 0.8   2/19 38mg PD 1.6   2/20 38mg PD 1.3     Plan:  CAPD with antibiotics for 6 hours followed by CCPD  Utilizing dianeal lo calcium, dextrose 1.5%, 2 L with a 6 hours dwell  Gentamicin 38 mg in PD bag today  Ordered vanc and gent levels with AM labs  Pharmacy will continue to monitor patient and adjust therapy as indicated    Thank you for the consult,  EDGAR RAYMOND RPH  2/20/2024

## 2024-02-20 NOTE — CARE COORDINATION
Jennifer with [facility] messaged CM in [facility]Port requesting Abx orders.       CM let Jennifer know that Abx were in Dr Charley Ratliff's note uploaded yesterday.   CM copied and pasted Abx in message in [facility]Port also for Jennifer.             Minda Ruiz, RN  Case Management [phone]

## 2024-02-20 NOTE — DIALYSIS
Peritoneal Dialysis Initiation                 Orders   Therapy Time:  10 Hrs   Cycles:  5   Fill Volume:  2000 ml   Last Fill Volume: 0 ml   Total Volume: 57573 ml   Solution: Dianeal Lo-Diogo 1.5%          Access   Type & Location: Mid lower abdomen   Comments: Prior to connection, one-minute Alcavis scrub performed. Dressing changed 0.1 % Garamycin cream applied at the exit site.  Dsg change date:                                         Labs   HBsAg (Antigen) / date:    11/15/23 neg                                      HBsAb (Antibody) / date:  11/15/23 Imm   Source:            Safety:   Time Out Done:   (Time)  2210   Consent obtained/signed:  yes   Education:  yes   Primary Nurse Rpt Pre:     Primary Nurse Rpt Post:        Comments:   Pt orders, notes, labs, code status reviewed.      Start time:   Estimated End Time: 0930     Remained present during initial drain followed by initiation of first fill. Prior to departure, bed in lowest position, call bell and personal belongings within reach.

## 2024-02-20 NOTE — PROGRESS NOTES
Comprehensive Nutrition Assessment    Type and Reason for Visit:  Reassess, Positive Nutrition Screen    Nutrition Recommendations/Plan:   Continue Current Diet.   Modify supplement: add Magic Cup BID (290 kcal, 9 gm protein each), continue Nepro once daily (420 kcal, 19 gm protein each)  Continue daily renal MVI   Continue to monitor tolerance of PO, compliance of oral supplements, weight, labs, and plan of care during admission.         Malnutrition Assessment:  Malnutrition Status:  Insufficient data (02/15/24 1005)      Nutrition Assessment:    Pt presents with complaints of abdominal pain and diarrhea. Pt admitted for peritonitis. Nephrology following, Pt on peritoneal dialysis in patient. Pt little lethargic and some difficulty hearing at time of visit. Has fair meal intake per chart documentation and CNA report. Tolerating po diet. Pt reported consuming nepro supplements, but per CNA, pt had not been consuming them much. Discussed with pt about adding magic cup; she agreed with plan. S/p CCPD treatment today.    Nutrition Related Findings:    BM .   +edema.   Recent POC B- 144 mg/dL x 24 hours.  Pertinent meds: lipitor, calcitriol, tums, lactobacillus, flagyl, protonix, antibiotic, virt-caps, PD dialysate dianeal lo-harry 1.5% 10,000 mL (150 gm dextrose, 230 average kcal provided). Wound Type: None       Current Nutrition Intake & Therapies:    Average Meal Intake: 26-50%  Average Supplements Intake: 26-50%, 51-75%  ADULT DIET; Regular; Low Sodium (2 gm); Low Potassium (Less than 3000 mg/day)  ADULT ORAL NUTRITION SUPPLEMENT; Lunch; Renal Oral Supplement    Anthropometric Measures:  Height: 165.1 cm (5' 5\")  Ideal Body Weight (IBW): 125 lbs (57 kg)    Admission Body Weight: 63.5 kg (140 lb)  Current Body Weight: 63.5 kg (140 lb), 112 % IBW. Weight Source: Stated  Current BMI (kg/m2): 23.3  Usual Body Weight:  (Unable to obtain)  Weight Adjustment For: No Adjustment  BMI Categories: Normal Weight (BMI  22.0 to 24.9) age over 65    Estimated Daily Nutrient Needs:  Energy Requirements Based On: Kcal/kg (wt x25-35;  pt on PD)  Weight Used for Energy Requirements: Current  Energy (kcal/day): 8579-6510  Weight Used for Protein Requirements: Current  Protein (g/day): 64-76 (wt x1-1.2)  Method Used for Fluid Requirements: Standard Renal  Fluid (ml/day): 750-1500    Nutrition Diagnosis:   Increased nutrient needs related to increase demand for energy/nutrients, renal dysfunction as evidenced by dialysis    Nutrition Interventions:   Food and/or Nutrient Delivery: Continue Current Diet, Mineral Supplement, Vitamin Supplement, Modify Oral Nutrition Supplement  Nutrition Education/Counseling: No recommendation at this time  Coordination of Nutrition Care: Continue to monitor while inpatient  Plan of Care discussed with: pt    Goals:  Previous Goal Met: Progressing toward Goal(s) (slowly)  Goals: Meet at least 75% of estimated needs, by next RD assessment       Nutrition Monitoring and Evaluation:   Behavioral-Environmental Outcomes: None Identified  Food/Nutrient Intake Outcomes: Diet Advancement/Tolerance, Food and Nutrient Intake, Supplement Intake, Vitamin/Mineral Intake  Physical Signs/Symptoms Outcomes: Biochemical Data, GI Status, Fluid Status or Edema, Meal Time Behavior, Weight, Skin    Discharge Planning:    Continue current diet, Continue Oral Nutrition Supplement     Aliyah Little RD  Contact: [phone]

## 2024-02-20 NOTE — CARE COORDINATION
Jennifer with [facility] message CM back in [facility] asking if CM meant Abx in with the PD Solution.   TAMY let Jennifer know that this is correct.       Dr Horner Perfect Served CM to clarify with CM that patient does not need IV Abx, but needs Abx mixed with PD fluid and then instilled into patient's abdomen, Intraperitoneal or IP Abx.       CM let Dr Horner know that waiting to hear back from the SNF to see if they can they can do the Intraperitoneal or IP Abx at the SNF.       Dr Horner let CM know that if SNF cannot do the Intraperitoneal or IP Abx, patient will need to come to the PD clinic.          Minda Ruiz, RN  Case Management [phone]

## 2024-02-20 NOTE — CARE COORDINATION
Jennifer with [facility] messaged CM back, said they can do IP Abx at the [facility], but will need training from [facility].   Jennifer said she called [facility] Clinic manager yesterday, and again today, and left a message.   Jennifer said auth has been approved for SNF.   Jennifer is asking for assistance with reaching [facility], Jennifer can be reached at [phone].   Jennifer would like to take patient today for SNF.       TAMY Perfect Served Dr Horner updated with above information, and asking for assistance for [facility] to reach out to Jennifer at [facility], phone number given.       Dr Horner let TAMY know that he has reached out to [facility] to contact Jennifer with [facility].       TAMY updated Jennifer with [facility] in [facility].             Minda Ruiz, RN  Case Management [phone]

## 2024-02-21 VITALS
SYSTOLIC BLOOD PRESSURE: 146 MMHG | WEIGHT: 140 LBS | OXYGEN SATURATION: 98 % | RESPIRATION RATE: 17 BRPM | DIASTOLIC BLOOD PRESSURE: 70 MMHG | BODY MASS INDEX: 23.32 KG/M2 | HEIGHT: 65 IN | HEART RATE: 74 BPM | TEMPERATURE: 97.3 F

## 2024-02-21 LAB
GENTAMICIN SERPL-MCNC: 2 UG/ML (ref 0.5–10)
GLUCOSE BLD STRIP.AUTO-MCNC: 142 MG/DL (ref 70–110)
GLUCOSE BLD STRIP.AUTO-MCNC: 154 MG/DL (ref 70–110)
GLUCOSE BLD STRIP.AUTO-MCNC: 210 MG/DL (ref 70–110)
VANCOMYCIN SERPL-MCNC: 16 UG/ML (ref 5–40)

## 2024-02-21 PROCEDURE — 99239 HOSP IP/OBS DSCHRG MGMT >30: CPT | Performed by: FAMILY MEDICINE

## 2024-02-21 PROCEDURE — 97110 THERAPEUTIC EXERCISES: CPT

## 2024-02-21 PROCEDURE — 97164 PT RE-EVAL EST PLAN CARE: CPT

## 2024-02-21 PROCEDURE — 6370000000 HC RX 637 (ALT 250 FOR IP): Performed by: INTERNAL MEDICINE

## 2024-02-21 PROCEDURE — 6370000000 HC RX 637 (ALT 250 FOR IP)

## 2024-02-21 PROCEDURE — 36415 COLL VENOUS BLD VENIPUNCTURE: CPT

## 2024-02-21 PROCEDURE — 97535 SELF CARE MNGMENT TRAINING: CPT

## 2024-02-21 PROCEDURE — 82962 GLUCOSE BLOOD TEST: CPT

## 2024-02-21 PROCEDURE — 80170 ASSAY OF GENTAMICIN: CPT

## 2024-02-21 PROCEDURE — 6360000002 HC RX W HCPCS

## 2024-02-21 PROCEDURE — 80202 ASSAY OF VANCOMYCIN: CPT

## 2024-02-21 PROCEDURE — 6360000002 HC RX W HCPCS: Performed by: INTERNAL MEDICINE

## 2024-02-21 PROCEDURE — 2580000003 HC RX 258: Performed by: INTERNAL MEDICINE

## 2024-02-21 PROCEDURE — 97168 OT RE-EVAL EST PLAN CARE: CPT

## 2024-02-21 RX ORDER — METRONIDAZOLE 500 MG/1
500 TABLET ORAL EVERY 8 HOURS SCHEDULED
Qty: 108 TABLET | Refills: 0
Start: 2024-02-21 | End: 2024-03-28

## 2024-02-21 RX ORDER — CALCITRIOL 0.5 UG/1
1 CAPSULE, LIQUID FILLED ORAL DAILY
Qty: 30 CAPSULE | Refills: 3
Start: 2024-02-22

## 2024-02-21 RX ORDER — LACTOBACILLUS RHAMNOSUS GG 10B CELL
1 CAPSULE ORAL
Qty: 30 CAPSULE | Refills: 0
Start: 2024-02-22

## 2024-02-21 RX ORDER — FLUCONAZOLE 100 MG/1
100 TABLET ORAL DAILY
Qty: 6 TABLET | Refills: 0
Start: 2024-02-22 | End: 2024-02-28

## 2024-02-21 RX ADMIN — FLUCONAZOLE 100 MG: 200 TABLET ORAL at 08:50

## 2024-02-21 RX ADMIN — CALCIUM CARBONATE 500 MG: 500 TABLET, CHEWABLE ORAL at 08:51

## 2024-02-21 RX ADMIN — METOPROLOL SUCCINATE 25 MG: 25 TABLET, FILM COATED, EXTENDED RELEASE ORAL at 08:50

## 2024-02-21 RX ADMIN — HEPARIN SODIUM 5000 UNITS: 5000 INJECTION INTRAVENOUS; SUBCUTANEOUS at 06:03

## 2024-02-21 RX ADMIN — Medication 250 MG: at 12:06

## 2024-02-21 RX ADMIN — PANTOPRAZOLE SODIUM 40 MG: 40 TABLET, DELAYED RELEASE ORAL at 08:50

## 2024-02-21 RX ADMIN — Medication 1 MG: at 08:51

## 2024-02-21 RX ADMIN — ACETAMINOPHEN 325MG 650 MG: 325 TABLET ORAL at 04:26

## 2024-02-21 RX ADMIN — METRONIDAZOLE 500 MG: 500 TABLET ORAL at 06:03

## 2024-02-21 RX ADMIN — Medication 250 MG: at 00:33

## 2024-02-21 RX ADMIN — INSULIN LISPRO 1 UNITS: 100 INJECTION, SOLUTION INTRAVENOUS; SUBCUTANEOUS at 08:55

## 2024-02-21 RX ADMIN — PROCHLORPERAZINE EDISYLATE 10 MG: 5 INJECTION INTRAMUSCULAR; INTRAVENOUS at 09:22

## 2024-02-21 RX ADMIN — AMLODIPINE BESYLATE 5 MG: 5 TABLET ORAL at 12:06

## 2024-02-21 RX ADMIN — Medication 250 MG: at 06:03

## 2024-02-21 RX ADMIN — CALCITRIOL CAPSULES 0.25 MCG 1 MCG: 0.25 CAPSULE ORAL at 08:50

## 2024-02-21 RX ADMIN — Medication 1 CAPSULE: at 08:50

## 2024-02-21 ASSESSMENT — PAIN DESCRIPTION - LOCATION: LOCATION: LEG

## 2024-02-21 ASSESSMENT — PAIN DESCRIPTION - ORIENTATION: ORIENTATION: LEFT

## 2024-02-21 ASSESSMENT — PAIN DESCRIPTION - DESCRIPTORS: DESCRIPTORS: ACHING

## 2024-02-21 ASSESSMENT — PAIN SCALES - GENERAL: PAINLEVEL_OUTOF10: 8

## 2024-02-21 NOTE — PROGRESS NOTES
[facility]   Pharmacy Pharmacokinetic Monitoring Service - vancomycin and gentamicin    Indication: peritonitis  Goal vancomycin level: 10-20 mg/L  Goal gentamicin level: < 3 mg/L  Day of Therapy: 8  Additional Antimicrobials: metronidazole, fluconazole, PO vanc    Pertinent Laboratory Values:   Wt Readings from Last 1 Encounters:   02/13/24 63.5 kg (140 lb)     Temp Readings from Last 1 Encounters:   02/21/24 97.4 °F (36.3 °C) (Oral)     Estimated Creatinine Clearance: 5 mL/min (A) (based on SCr of 8.13 mg/dL (H)).    Recent Labs     02/19/24 0224   CREATININE 8.13*   BUN 42*     Pertinent Cultures:  Date Source Results   2/13 urine NGF   2/14 PD fluid NGF   2/16 PD fluid NGTD     Vancomycin  Date Current Dose Level (mg/L)   2/13 1,250 mg IV -   2/14 1,500 mg PD 1.7   2/15 - 18.6   2/16 1,000 mg PD 13.6   2/17 1,000 mg PD 13.2   2/18 1,000 mg PD 13.9   2/19 1000mg PD 18.8   2/20 - 18.6   2/21 750mg PD 16      Gentamicin  Date Current Dose Level (mg/L)   2/14 38 mg PD -   2/15 38 mg PD 0.7   2/16 35.6 mg PD 1.4   2/17 38 mg PD 1.1   2/18 38 mg PD 0.8   2/19 38mg PD 1.6   2/20 38mg PD 1.3   2/21 38mg PD 2     Plan:  CAPD with antibiotics for 6 hours followed by CCPD  Utilizing dianeal lo calcium, dextrose 1.5%, 2 L with a 6 hours dwell  Gentamicin 38 mg in PD bag today, Vancomycin 750mg in PD bag today  Ordered vanc and gent levels with AM labs  Pharmacy will continue to monitor patient and adjust therapy as indicated    Thank you for the consult,  EDGAR RAYMOND RPH  2/21/2024

## 2024-02-21 NOTE — PLAN OF CARE
Problem: Physical Therapy - Adult  Goal: By Discharge: Performs mobility at highest level of function for planned discharge setting.  See evaluation for individualized goals.  Description: Initiated  2/21/2024  to be met within 7-10 days.    1.  Patient will move from supine to sit and sit to supine , scoot up and down, and roll side to side in bed with minimal assistance/contact guard assist.    2.  Patient will transfer from bed to chair and chair to bed with minimal assistance/contact guard assist using the least restrictive device.  3.  Patient will perform sit to stand with minimal assistance/contact guard assist.  4.  Patient will ambulate with moderate assistance  for 25 feet with the least restrictive device.       PLOF: Pt lives with  in 1 level home, 3 steps to enter with b/l handrails.  Pt needs assistance for all mobility, has rollator and w/c.    Outcome: Progressing     PHYSICAL THERAPY RE-EVALUATION    Patient: Tatyana Monzon (84 y.o. female)  Date: 2/21/2024  Primary Diagnosis: Abdominal pain [R10.9]  Generalized abdominal pain [R10.84]  ESRD (end stage renal disease) on dialysis (HCC) [N18.6, Z99.2]  Acute gastritis without hemorrhage, unspecified gastritis type [K29.00]       Precautions: Contact Precautions, General Precautions, Fall Risk,  ,  ,  ,  ,  ,  ,      ASSESSMENT :  Pt received in bed in NAD with PD in place though completes, cleared by nursing to participate in PT re-eval alongside OT. Pt completes TE with active assist and no increase in pain. Pt requires additional time to complete tasks and frequent breaks due to fatigue. Noted decrease ROM and strength throughout BLE for knee extension, hip flexion, and ankle DF/PF. Unable to sit EOB due to PD in place. Pt presents with continue deficits in strength, mobility, ROM, balance, and would benefit from skilled acute care PT during admission.     DEFICITS/IMPAIRMENTS:    , Body Structures, Functions, Activity Limitations Requiring  stability, and prior level of function should also be taken into consideration.     SUBJECTIVE:   Patient stated “I'm just tired.”    OBJECTIVE DATA SUMMARY:   Hospital course since last seen and reason for re-evaluation: weekly reasssessment  Past Medical History:   Diagnosis Date    Allergic rhinitis     Chronic kidney disease 2020    Peritoneal Dialysis    Diabetes (HCC) 2015    IDDM    H/O seasonal allergies     Hypertension     Left foot drop     Peritoneal dialysis catheter in place ([facility])      Past Surgical History:   Procedure Laterality Date    CARPAL TUNNEL RELEASE Left     CATARACT REMOVAL  2011    COLONOSCOPY N/A 9/26/2022    COLONOSCOPY with bx's and polypectomy performed by Lang Levy MD at [facility] ENDOSCOPY    COLONOSCOPY N/A 10/8/2022    COLONOSCOPY/ Polypectomies performed by Cal Tate MD at [facility] ENDOSCOPY    LUMBAR FUSION  2004-last sx    fused L2-S1, in [place]    TUBAL LIGATION      WISDOM TOOTH EXTRACTION         Home Situation:  Social/Functional History  Lives With: Spouse  Type of Home: House  Home Layout: One level  Home Access: Stairs to enter with rails  Entrance Stairs - Number of Steps: 3  Entrance Stairs - Rails: Both  Bathroom Shower/Tub: Tub/Shower unit  Bathroom Toilet: Standard  Bathroom Equipment: Grab bars in shower  Bathroom Accessibility: Accessible  Home Equipment: Rollator, Wheelchair-manual (Peritoneal dialysis 6 days a week, supplied by [employer])  Has the patient had two or more falls in the past year or any fall with injury in the past year?: Unknown  Receives Help From: Family  ADL Assistance: Needs assistance  Toileting: Needs assistance  Homemaking Assistance: Needs assistance  Ambulation Assistance: Needs assistance  Transfer Assistance: Needs assistance  Occupation: Retired  Critical Behavior:  Orientation  Overall Orientation Status: Impaired  Orientation Level: Oriented to person;Oriented to place  Cognition  Overall Cognitive Status: Exceptions  Following Commands:  normal S1, S2 heard

## 2024-02-21 NOTE — PROGRESS NOTES
Discharge patient in stable condition, discharge papers given to the medical transporter. IV line removed aseptically.

## 2024-02-21 NOTE — CARE COORDINATION
CM uploaded updated Discharge Summary to [facility] for [facility] for Jennifer.               Minda Ruiz, RN  Case Management [phone]

## 2024-02-21 NOTE — PROGRESS NOTES
This RN called thru phone to the receiving facility ([facility]) to call report for this patient. Ms Piedad Hernandez LPN received the call and she was given a chance to ask some questions about the patient condition and status and were answered. RN gave the ETA of the patient to their facility and Ms Herbert concurred.

## 2024-02-21 NOTE — CARE COORDINATION
Per Jennifer at [facility], [facility] and [facility] are able to [facility] today.      Dr. Sood notified.   Patients daughter notified that transportation is setup for 1:30pm to [facility].

## 2024-02-21 NOTE — PROGRESS NOTES
[facility]   Pharmacy Pharmacokinetic Monitoring Service - vancomycin and gentamicin    Indication: peritonitis  Goal vancomycin level: 10-20 mg/L  Goal gentamicin level: < 3 mg/L  Day of Therapy: 8  Additional Antimicrobials: metronidazole, fluconazole, PO vanc    Pertinent Laboratory Values:   Wt Readings from Last 1 Encounters:   02/13/24 63.5 kg (140 lb)     Temp Readings from Last 1 Encounters:   02/21/24 97.4 °F (36.3 °C) (Oral)     Estimated Creatinine Clearance: 5 mL/min (A) (based on SCr of 8.13 mg/dL (H)).    Recent Labs     02/19/24 0224   CREATININE 8.13*   BUN 42*     Pertinent Cultures:  Date Source Results   2/13 urine NGF   2/14 PD fluid NGF   2/16 PD fluid NGTD     Vancomycin  Date Current Dose Level (mg/L)   2/13 1,250 mg IV -   2/14 1,500 mg PD 1.7   2/15 - 18.6   2/16 1,000 mg PD 13.6   2/17 1,000 mg PD 13.2   2/18 1,000 mg PD 13.9   2/19 1000mg PD 18.8   2/20 - 18.6   2/21 - 16      Gentamicin  Date Current Dose Level (mg/L)   2/14 38 mg PD -   2/15 38 mg PD 0.7   2/16 35.6 mg PD 1.4   2/17 38 mg PD 1.1   2/18 38 mg PD 0.8   2/19 38mg PD 1.6   2/20 38mg PD 1.3   2/21 38mg PD 2     Plan:  CAPD with antibiotics for 6 hours followed by CCPD  Utilizing dianeal lo calcium, dextrose 1.5%, 2 L with a 6 hours dwell  Gentamicin 38 mg in PD bag today  Ordered vanc and gent levels with AM labs  Pharmacy will continue to monitor patient and adjust therapy as indicated    Thank you for the consult,  EDGAR RAYMOND RPH  2/21/2024

## 2024-02-21 NOTE — DISCHARGE SUMMARY
Discharge Summary    Patient: Tatyana Monzon MRN: [id number]  CSN: [id number]    YOB: 1940  Age: 84 y.o.  Sex: female    DOA: 2/13/2024 LOS:  LOS: 8 days   Discharge Date: 2/21/2024     Admission Diagnoses: Abdominal pain [R10.9]  Generalized abdominal pain [R10.84]  ESRD (end stage renal disease) on dialysis (HCC) [N18.6, Z99.2]  Acute gastritis without hemorrhage, unspecified gastritis type [K29.00]    Discharge Diagnoses:    C. difficile colitis  C. difficile associated peritonitis  ESRD on PD  Hypertension  Hyperlipidemia  Hypokalemia  DM2  Prolonged QTc  Advanced age  Hyperkalemia, resolved    Discharge Condition: Stable    PHYSICAL EXAM  Visit Vitals  /70   Pulse 86   Temp 97.4 °F (36.3 °C) (Oral)   Resp 17   Ht 1.651 m (5' 5\")   Wt 63.5 kg (140 lb)   SpO2 99%   BMI 23.30 kg/m²       General: In NAD.  Nontoxic-appearing.  HEENT: NCAT.  Sclerae anicteric.  Lungs:  Clear, no wheezes.  No accessory muscle use.  Heart:  RRR.  Abdomen: Soft, no guarding or rebound.  Extremities: Warm, no edema or ischemia.  Psych:   Mood normal.  Neurologic:  Awake and alert, moves extremities spontaneously.  Motor grossly nonfocal.    Hospital Course:   See admission H&P for full details of HPI.  Patient admitted to the hospital after presenting to the ED for evaluation of abdominal pain and diarrhea.  Cultures were obtained and empiric antibiotic therapy was initiated due to concern for peritonitis.  Stool studies were positive for C. difficile.  ID evaluation was obtained.  Antibiotic therapy has been monitored and managed by infectious diseases.  Patient has demonstrated continued clinical improvement with antimicrobial therapy.  Peritonitis is suspected to be associated with C. difficile infection.    Recommendation for antibiotic therapy going forward is as follows:    1.  Oral vancomycin for C. difficile colitis through 3/14/2024   2.  Oral metronidazole through 2/28/2024 for C. difficile associated

## 2024-02-21 NOTE — PROGRESS NOTES
Discharge order noted for today. Patient has been accepted to [facility]. Confirmed with Jennifer  that bed is available today.  Met with patient and daughter and are agreeable to the transition plan today. Insurance authorization has been obtained. Transport to facility has been arranged through [place] [phone] at 1330. Patient's discharge summary has been forwarded to skilled nursing facility via careport. Bedside RN, alberto, has been updated to the transition plan. Discharge information has been updated on the AVS.  Please call report to [phone].

## 2024-02-21 NOTE — PLAN OF CARE
Problem: Discharge Planning  Goal: Discharge to home or other facility with appropriate resources  Outcome: Completed     Problem: Pain  Goal: Verbalizes/displays adequate comfort level or baseline comfort level  2/21/2024 1349 by Len Ross, RN  Outcome: Completed  2/21/2024 0655 by Nolan Ramey RN  Outcome: Progressing     Problem: Skin/Tissue Integrity  Goal: Absence of new skin breakdown  Description: 1.  Monitor for areas of redness and/or skin breakdown  2.  Assess vascular access sites hourly  3.  Every 4-6 hours minimum:  Change oxygen saturation probe site  4.  Every 4-6 hours:  If on nasal continuous positive airway pressure, respiratory therapy assess nares and determine need for appliance change or resting period.  2/21/2024 1349 by Len Ross, RN  Outcome: Completed  2/21/2024 0655 by Nolan Ramey RN  Outcome: Progressing     Problem: Safety - Adult  Goal: Free from fall injury  2/21/2024 1349 by Len Ross, RN  Outcome: Completed  2/21/2024 0655 by Nolan Ramey RN  Outcome: Progressing     Problem: Chronic Conditions and Co-morbidities  Goal: Patient's chronic conditions and co-morbidity symptoms are monitored and maintained or improved  2/21/2024 1349 by Len Ross, RN  Outcome: Completed  2/21/2024 0655 by Nolan Ramey RN  Outcome: Progressing     Problem: Nutrition Deficit:  Goal: Optimize nutritional status  Outcome: Completed

## 2024-02-21 NOTE — DIALYSIS
CAPD dwell Fluid drained, Clear nazia effluent drained prior to connecting to the cycler    Peritoneal Dialysis Initiation                   Orders   Therapy Time:  10 Hrs   Cycles:  5   Fill Volume:  2000 ml   Last Fill Volume: 0 ml   Total Volume: 80955 ml   Solution: Dianeal Lo-Diogo 1.5%            Access   Type & Location: Mid lower abdomen   Comments: Prior to connection, one-minute Alcavis scrub performed. Dressing changed 0.1 % Garamycin cream applied at the exit site.  Dsg change date:    2/20/24                                       Labs   HBsAg (Antigen) / date:    11/15/23 neg                                      HBsAb (Antibody) / date:  11/15/23 Imm   Source:              Safety:   Time Out Done:   (Time)  4665   Consent obtained/signed:  yes   Education:  yes   Primary Nurse Rpt Pre:  John MOORE   Primary Nurse Rpt Post:  John MOORE      Comments:   Pt orders, notes, labs, code status reviewed.      Start time: 2340  Estimated End Time: 0940     Remained present during initial drain followed by initiation of first fill. Prior to departure, bed in lowest position, call bell and personal belongings within reach.

## 2024-02-21 NOTE — DISCHARGE INSTRUCTIONS
Discussed with the patient and all questioned fully answered. She will call me if any problems arise.    DISCHARGE SUMMARY from Nurse    PATIENT INSTRUCTIONS:    What to do at Home:  Recommended activity: activity as tolerated,     If you experience any of the following symptoms like severe abdominal pain, diarrhea or loose stool, please follow up with Emergency Department or Primary MD.    *  Please give a list of your current medications to your Primary Care Provider.    *  Please update this list whenever your medications are discontinued, doses are      changed, or new medications (including over-the-counter products) are added.    *  Please carry medication information at all times in case of emergency situations.    These are general instructions for a healthy lifestyle:    No smoking/ No tobacco products/ Avoid exposure to second hand smoke  Surgeon General's Warning:  Quitting smoking now greatly reduces serious risk to your health.    Obesity, smoking, and sedentary lifestyle greatly increases your risk for illness    A healthy diet, regular physical exercise & weight monitoring are important for maintaining a healthy lifestyle    You may be retaining fluid if you have a history of heart failure or if you experience any of the following symptoms:  Weight gain of 3 pounds or more overnight or 5 pounds in a week, increased swelling in our hands or feet or shortness of breath while lying flat in bed.  Please call your doctor as soon as you notice any of these symptoms; do not wait until your next office visit.        The discharge information has been reviewed with the patient.  The patient verbalized understanding.  Discharge medications reviewed with the patient and appropriate educational materials and side effects teaching were provided.  ___________________________________________________________________________________________________________________________________

## 2024-02-21 NOTE — PROGRESS NOTES
Progress Note    84-year-old female with past medical history of diabetes, ESRD on peritoneal dialysis admitted for abdominal pain, sepsis following for ESRD management  Subjective      Overnight event noted  C. difficile positive        IMPRESSION:   ESRD, on peritoneal dialysis  Abdominal pain with nausea vomiting, cloudy peritoneal fluid suggestive of peritonitis  Secondary hyperparathyroidism  Hypocalcemia. ?sensipar related  Anemia of chronic disease  Diabetes  Congestive heart failure  Positive for C. difficile infection   PLAN:   Continue current peritoneal dialysis prescription.  Antibiotics continued per pharmacy and ID.  Plan is for IP vancomycin on 2/23 with 1 g and again on 2/28 with another gram  Fortaz 1 g IP daily till 2/28  CCPD QHS  Repeat cell count with improvement on the PD fluid.  Continue oral fluconazole.  Follow calcium  Holding senispar for now  C/w calcitriol  She is going to [facility].   D/c sensipar on discharge and discharge on calcitriol 1 mcg po daily along with other routine meds.       Current Facility-Administered Medications   Medication Dose Route Frequency    dianeal lo-harry 1.5% 2,000 mL with gentamicin 38 mg solution   IntraPERitoneal Once    HYDROcodone-acetaminophen (NORCO) 5-325 MG per tablet 1 tablet  1 tablet Oral Q6H PRN    metroNIDAZOLE (FLAGYL) tablet 500 mg  500 mg Oral 3 times per day    insulin lispro (HUMALOG) injection vial 0-4 Units  0-4 Units SubCUTAneous TID WC    insulin lispro (HUMALOG) injection vial 0-4 Units  0-4 Units SubCUTAneous Nightly    albumin human 25% IV solution 25 g  25 g IntraVENous Q6H PRN    calcitRIOL (ROCALTROL) capsule 1 mcg  1 mcg Oral Daily    calcium carbonate (TUMS) chewable tablet 500 mg  500 mg Oral BID    vancomycin and gentamicin intraperitoneal (placeholder)   Other RX Placeholder    fluconazole (DIFLUCAN) tablet 100 mg  100 mg Oral Daily    Virt-Caps 1 mg  1 capsule Oral Daily    pantoprazole (PROTONIX) tablet 40 mg  40 mg Oral QAM  \"INR\", \"APTT\" in the last 72 hours.    Invalid input(s): \"PTP\"      Iron/Ferritin No results for input(s): \"IRON\" in the last 72 hours.    Invalid input(s): \"TIBCCALC\"   BNP Invalid input(s): \"BNPP\"   Cardiac Enzymes No results for input(s): \"CPK\", \"LIZETH\" in the last 72 hours.    Invalid input(s): \"CKRMB\", \"CKND1\", \"TROIP\"   Liver Enzymes No results for input(s): \"TP\", \"ALB\" in the last 72 hours.    Invalid input(s): \"TBIL\", \"AP\", \"SGOT\", \"GPT\", \"DBIL\"   Thyroid Studies No results found for: \"T4\", \"T3RU\", \"TSH\"      EKG: normal sinus rhythm.    Physical Assessment:   Visit Vitals  /70   Pulse 86   Temp 97.4 °F (36.3 °C) (Oral)   Resp 17   Ht 1.651 m (5' 5\")   Wt 63.5 kg (140 lb)   SpO2 99%   BMI 23.30 kg/m²     Weight change:   No intake or output data in the 24 hours ending 02/21/24 1057    Physical Exam:     HEENT sclera anicteric, supple neck, no thyromegaly  CVS: S1S2 heard,  no rub  RS: + air entry b/l,   Abd: Soft,   Neuro: non focal, awake, alert , CN II-XII are grossly intact  Extrm: no cyanosis, clubbing   Skin: no visible  Rash  Musculoskeletal: No gross joints or bone deformities     Procedures/imaging: see electronic medical records for all procedures, Xrays and details which were not copied into this note but were reviewed prior to creation of Plan        SHIRA HUI MD  February 21, 2024  [facility]  Office [phone]

## 2024-02-21 NOTE — PLAN OF CARE
Problem: Occupational Therapy - Adult  Goal: By Discharge: Performs self-care activities at highest level of function for planned discharge setting.  See evaluation for individualized goals.  Description: Note: Occupational Therapy Goals:  Initiated 2/14/2024 to be met within 7-10 days, re-evaluation 2/21/2024, pt is making slow but steady progress towards goals and is motivated to participate in skilled OT services in order to reach maximal functional potential towards goals     1.  Patient will perform grooming at the EOB for 8 minutes with modified independence   2.  Patient will perform upper body dressing with minimal assistance/contact guard assist.  3.  Patient will perform self-feeding with modified independence.   4.  Patient will perform bed mobility with moderate assistance.  5.  Patient will participate in upper extremity therapeutic exercise/activities with minimal assistance/contact guard assist for 8-10 minutes to increase strength/endurance for ADLs.    6.  Patient will utilize energy conservation techniques during functional activities with verbal and visual cues.    PLOF: Independent with ADL's  Outcome: Progressing   OCCUPATIONAL THERAPY RE-EVALUATION    Patient: Tatyana Monzon (84 y.o. female)  Date: 2/21/2024  Primary Diagnosis: Abdominal pain [R10.9]  Generalized abdominal pain [R10.84]  ESRD (end stage renal disease) on dialysis (HCC) [N18.6, Z99.2]  Acute gastritis without hemorrhage, unspecified gastritis type [K29.00]       Precautions: Contact Precautions, General Precautions, Fall Risk,  ,  ,  ,  ,  ,  ,      ASSESSMENT :  Pt laying semi-reclined, reports dizziness, head of bed elevated and dizziness resolved. Pt currently on PD, nursing report pt can participate at bed level. Grooming tasks: SBA w/ set up assist as needed for washing her face and oral hygiene. Call bell within reach & pt verbalized understanding and provided return demonstration to utilize for assist e.g. functional  transfers in order to prevent falls.       DEFICITS/IMPAIRMENTS:  Performance deficits / Impairments: Decreased functional mobility ;Decreased endurance;Decreased ADL status;Decreased ROM;Decreased strength;Decreased safe awareness;Decreased balance;Decreased posture    Patient will benefit from skilled intervention to address the above impairments.  Patient's rehabilitation potential/Prognosis: Fair.  Factors which may influence rehabilitation potential include:   []             None noted  []             Mental ability/status  [x]             Medical condition  []             Home/family situation and support systems  []             Safety awareness  []             Pain tolerance/management  []             Other:      PLAN :  Recommendations and Planned Interventions:   [x]               Self Care Training                  [x]      Therapeutic Activities  [x]               Functional Mobility Training   []      Cognitive Retraining  [x]               Therapeutic Exercises           [x]      Endurance Activities  [x]               Balance Training                    []      Neuromuscular Re-Education  []               Visual/Perceptual Training     [x]      Home Safety Training  [x]               Patient Education                   [x]      Family Training/Education  []               Other (comment):    Frequency/Duration: Patient will be followed by occupational therapy to address goals, 1-2 times per day/3-5 days per week to address goals.    Further Equipment Recommendations for Discharge: hospital bed    AMPAC: AM-PAC Inpatient Daily Activity Raw Score: 13    Current research shows that an AM-PAC score of 17 or less is not associated with a discharge to the patient's home setting.  Based on an AM-PAC score and their current ADL deficits; it is recommended that the patient have 3-5 sessions per week of Occupational Therapy at d/c to increase the patient's independence.      This AMPA score should be considered  activated    COMMUNICATION/EDUCATION:   Patient Education  Education Given To: Patient  Education Provided: Role of Therapy;Energy Conservation;Plan of Care;ADL Adaptive Strategies  Education Method: Teach Back;Verbal;Demonstration  Barriers to Learning: None  Education Outcome: Verbalized understanding    Thank you for this referral.  Carrie You OT  Minutes: 17

## 2024-02-21 NOTE — CARE COORDINATION
Transition of Care Plan to SNF/Rehab    SNF/Rehab Transition:  Patient has been accepted to [facility] [place] and meets criteria for admission.   Patient will transported by Lenore and expected to leave at 1330    Communication to Patient/Family:  Met with patient and daughter  and they are agreeable to the transition plan.    Communication to SNF/Rehab:  Bedside RN, Len, has been notified to update the transition plan to the facility and call report (phone number [phone].  Discharge information has been updated on the AVS.       Nursing Please include all hard scripts for controlled substances, med rec and dc summary, and AVS in packet.     Reviewed and confirmed with facility, [facility] [place], can manage the patient care needs for the following:     Uche with (X) only those applicable:    Medication:  []  Medications will be available at the facility  [x]  Antibiotics via Peritoneal dialysis  []  Controlled Substance - hard copy to be sent with patient   []  Weekly Labs   Documents:  [] Hard RX  [] MAR  [] Kardex  [] AVS  []Transfer Summary  [x]Discharge Summary    Equipment:  []  CPAP/BiPAP  []  Wound Vacuum  []  El or Urinary Device  []  PICC/Central Line  []  Nebulizer  []  Ventilator   Treatment:  [x]Isolation (C-diff)  []Surgical Drain Management  []Tracheostomy Care  []Dressing Changes  [x]Peritoneal Dialysis  []PEG Care  []Oxygen  []Daily Weights for Heart Failure   Dietary:  []Any diet limitations  []Tube Feedings   []Total Parenteral Management (TPN)   Eligible for Medicaid Long Term Services and Supports  Yes:  [] Eligible for medical assistance or will become eligible within 180 days and UAI completed.   [] Provider/Patient and/or support system has requested screening.  [] UAI copy provided to patient or responsible party  [] UAI unavailable at discharge will send once processed to SNF provider.  [] UAI unavailable at discharged mailed to patient  No:   []

## 2024-02-22 LAB
BACTERIA SPEC CULT: NORMAL
SERVICE CMNT-IMP: NORMAL

## 2024-02-22 NOTE — PROGRESS NOTES
Infectious Disease progress Note        Reason: Peritoneal dialysis catheter associated peritonitis, C. difficile colitis    Current abx Prior abx     Fluconazole since 2/14  Oral vancomycin, IV metronidazole, IP gentamicin, IP vancomycin since 2/14/2024 Pip-tazo/tazobactam, vancomycin 2/13/2024-2/14     Lines:       Assessment :   84-year-old female with past medical history of ESRD, type II diabetes on peritoneal dialysis, admitted to [facility] on 2/13/24  with abdominal pain and vomiting.     Clinical presentation consistent with peritoneal dialysis catheter associated peritonitis, evolving C. difficile colitis    Currently it is unclear if peritonitis is secondary to peritoneal dialysis catheter infection versus reactive inflammation due to evolving C. difficile colitis  Peritoneal fluid cell count consistent with bacterial peritonitis.  Peritoneal fluid cultures 2/14-no growth    Improved diarrhea.    Improved abdominal pain/tenderness today  Improved repeat peritoneal fluid cell count 2/16 suggestive of improving peritonitis    Recommendations:    cont oral vancomycin for C. difficile colitis till 3/14/2024. recommend po metronidazole till 2/28/2024 for c.diff associated peritonitis  Continue intraperitoneal gentamicin, vancomycin  till 2/28/2024  Continue p.o. fluconazole till 2/28/2024  continue probiotics  Maintain enteric isolation  Discharge planning per primary team      Discussed with Dr. Horner.  Plans to discharge patient to nursing facility.  Will  arrange for intraperitoneal antibiotics at nursing home .     Above plan was discussed in details with patient,RN, , dr. Horner Please call me if any further questions or concerns. Will continue to participate in the care of this patient.    HPI:    Feels better. Improved abdominal discomfort. Resolved diarrhea      Past Medical History:   Diagnosis Date    Allergic rhinitis     Chronic kidney disease 2020    Peritoneal Dialysis    Diabetes (HCC) 2015     IDDM    H/O seasonal allergies     Hypertension     Left foot drop     Peritoneal dialysis catheter in place (HCC)        Past Surgical History:   Procedure Laterality Date    CARPAL TUNNEL RELEASE Left     CATARACT REMOVAL  2011    COLONOSCOPY N/A 9/26/2022    COLONOSCOPY with bx's and polypectomy performed by Lang Levy MD at [facility] ENDOSCOPY    COLONOSCOPY N/A 10/8/2022    COLONOSCOPY/ Polypectomies performed by Cal Tate MD at [facility] ENDOSCOPY    LUMBAR FUSION  2004-last sx    fused L2-S1, in [place]    TUBAL LIGATION      WISDOM TOOTH EXTRACTION         @[email or web address]@    No current facility-administered medications for this encounter.     Current Outpatient Medications   Medication Sig    [START ON 2/22/2024] calcitRIOL (ROCALTROL) 0.5 MCG capsule Take 2 capsules by mouth daily    [START ON 2/22/2024] fluconazole (DIFLUCAN) 100 MG tablet Take 1 tablet by mouth daily for 6 days    [START ON 2/22/2024] lactobacillus (CULTURELLE) capsule Take 1 capsule by mouth daily (with breakfast)    vancomycin (VANCOCIN) 50 mg/mL oral solution Take 5 mLs by mouth every 6 hours for 22 days    metroNIDAZOLE (FLAGYL) 500 MG tablet Take 1 tablet by mouth every 8 hours    hydrOXYzine HCl (ATARAX) 25 MG tablet Take 1 tablet by mouth every 8 hours as needed for Itching    polyethylene glycol (MIRALAX) 17 GM/SCOOP powder Take 17 g by mouth    B Complex-C-Folic Acid (NEPHRO-PROSPER) 0.8 MG TABS Take 1 tablet by mouth    gentamicin (GARAMYCIN) 0.1 % cream Apply topically    atorvastatin (LIPITOR) 80 MG tablet Take 1 tablet by mouth nightly    Calcium Carbonate Antacid (TUMS ULTRA PO) Tums Ultra    pantoprazole (PROTONIX) 40 MG tablet     metoprolol succinate (TOPROL XL) 25 MG extended release tablet Take 1 tablet by mouth daily    fluticasone (FLONASE) 50 MCG/ACT nasal spray 2 sprays by Each Nostril route daily    blood glucose test strips (EXACTECH TEST) strip     Insulin Pen Needle (PEN NEEDLES) 32G X 4 MM MISC     amLODIPine (NORVASC) 5  Specialist  [phone]  February 21, 2024  8:43 PM

## 2024-02-26 LAB
BACTERIA SPEC CULT: NORMAL
SERVICE CMNT-IMP: NORMAL

## 2024-03-04 LAB
BACTERIA SPEC CULT: NORMAL
SERVICE CMNT-IMP: NORMAL

## 2024-03-04 NOTE — PROGRESS NOTES
catheter  -- Peritoneal dialysis catheter is  associated with both  peritonitis and C.   difficile colitis  -- Other - I will add my own diagnosis  -- Disagree - Not applicable / Not valid  -- Disagree - Clinically unable to determine / Unknown  -- Refer to Clinical Documentation Reviewer    PROVIDER RESPONSE TEXT:    This patient has Bacterial peritonitis is associated with C. difficile   colitis.    Query created by: Nati Bond on 2/23/2024 7:08 PM      Electronically signed by:  Julius Sood MD 3/3/2024 7:07 PM

## 2024-03-11 LAB
BACTERIA SPEC CULT: NORMAL
SERVICE CMNT-IMP: NORMAL

## 2024-03-17 ENCOUNTER — HOME HEALTH ADMISSION (OUTPATIENT)
Age: 84
End: 2024-03-17

## 2024-03-18 ENCOUNTER — HOME CARE VISIT (OUTPATIENT)
Age: 84
End: 2024-03-18

## 2024-03-18 LAB
BACTERIA SPEC CULT: NORMAL
SERVICE CMNT-IMP: NORMAL

## 2024-03-28 ENCOUNTER — TELEPHONE (OUTPATIENT)
Age: 84
End: 2024-03-28

## 2024-03-28 NOTE — TELEPHONE ENCOUNTER
Care Transitions Initial Follow Up Call    Outreach made within 2 business days of discharge: Yes    Patient: Tatyana Monzon Patient : 1940   MRN: [id number]  Reason for Admission: 3/18/2024 - 2024- Pain of toe of right foot      Discharge Date: 2024       Spoke with: N/A- 2024 at 1800  Unable to reach the patient due to voice mailbox is full. I will try calling the patient at another time, thank you.    Spoke with: N/A 2024 at 1313   Unable to reach the patient due to voice mailbox is full. I will try calling the patient at another time, thank you.    Discharge department/facility: [facility] Interactive Patient Contact:  Was patient able to fill all prescriptions: No: N/A  Was patient instructed to bring all medications to the follow-up visit: No: N/A  Is patient taking all medications as directed in the discharge summary? No: N/A  Does patient understand their discharge instructions: No: N/A  Does patient have questions or concerns that need addressed prior to 7-14 day follow up office visit: No: N/A    Scheduled appointment with PCP within 7-14 days    Follow Up  No future appointments.    Luz Strong MA

## 2024-10-26 NOTE — ED NOTES
Bedside report received, assumed care of pt. Pt is sitting on stretcher awake and alert. NAD noted. VSS on the monitor. Pt awaiting disposition and is aware of such.
Bedside shift change report given to Wil Alcantara  (oncoming nurse) by Christy Castillo (offgoing nurse). Report included the following information SBAR, ED Summary, Procedure Summary and MAR.
I have reviewed discharge instructions with the patient. The patient verbalized understanding. Patient armband removed and shredded  Pt left ED in a WC, alert and in NAD.
Pharmacy called for patient medication. They state to give them 5 minutes.
Pt assisted to bedside commode at this time.
Patient requests all Lab, Cardiology, and Radiology Results on their Discharge Instructions

## (undated) DEVICE — Device

## (undated) DEVICE — APPLICATOR BNDG 1MM ADH PREMIERPRO EXOFIN

## (undated) DEVICE — SYR 50ML SLIP TIP NSAF LF STRL --

## (undated) DEVICE — SUTURE MCRYL SZ 4-0 L18IN ABSRB UD L19MM PS-2 3/8 CIR PRIM Y496G

## (undated) DEVICE — 3M™ TEGADERM™ TRANSPARENT FILM DRESSING FRAME STYLE, 1629, 8 IN X 12 IN (20 CM X 30 CM), 10/CT 8CT/CASE: Brand: 3M™ TEGADERM™

## (undated) DEVICE — (D)PREP SKN CHLRAPRP APPL 26ML -- CONVERT TO ITEM 371833

## (undated) DEVICE — STRIP,CLOSURE,WOUND,MEDI-STRIP,1/2X4: Brand: MEDLINE

## (undated) DEVICE — SET EPI 18GA L3.5IN TUOHY NDL W/ 20GA CLS TIP NYL CATH

## (undated) DEVICE — SOLUTION IV 1000ML 0.9% SOD CHL

## (undated) DEVICE — DRAPE XR C ARM 41X74IN LF --

## (undated) DEVICE — GAUZE,SPONGE,4"X4",16PLY,STRL,LF,10/TRAY: Brand: MEDLINE

## (undated) DEVICE — CANNULA ORIG TL CLR W FOAM CUSHIONS AND 14FT SUPL TB 3 CHN

## (undated) DEVICE — CONNECTOR ELECSURG ARCONNECT AR PRB SGL USE DISP

## (undated) DEVICE — Device: Brand: OLYMPUS

## (undated) DEVICE — THREE-QUARTER SHEET: Brand: CONVERTORS

## (undated) DEVICE — FLUFF AND POLYMER UNDERPAD,EXTRA HEAVY: Brand: WINGS

## (undated) DEVICE — MEDI-VAC NON-CONDUCTIVE SUCTION TUBING: Brand: CARDINAL HEALTH

## (undated) DEVICE — 3M™ STERI-STRIP™ COMPOUND BENZOIN TINCTURE 40 BAGS/CARTON 4 CARTONS/CASE C1544: Brand: 3M™ STERI-STRIP™

## (undated) DEVICE — SYR 20ML LL STRL LF --

## (undated) DEVICE — SYRINGE MED 25GA 3ML L5/8IN SUBQ PLAS W/ DETACH NDL SFTY

## (undated) DEVICE — THE ULTRASET PRODUCTS ARE SINGLE USE DEVICES THAT ARE INTENDED FOR THE DRAINAGE AND INFUSION OF PERITONEAL DIALYSIS SOLUTION.: Brand: ULTRASET CAPD DISPOSABLE DISCONNECT Y-SET

## (undated) DEVICE — MEDIA CONTRAST 10ML 200MG/ML 41%

## (undated) DEVICE — SNARE POLYP M W27MMXL240CM OVL STIFF DISP CAPTIVATOR

## (undated) DEVICE — GLOVE SURG SZ 7 L11.33IN FNGR THK9.8MIL STRW LTX POLYMER

## (undated) DEVICE — KIT CLN UP BON SECOURS MARYV

## (undated) DEVICE — (D)BNDG ADHESIVE FABRIC 3/4X3 -- DISC BY MFR USE ITEM 357960

## (undated) DEVICE — O.R TOWEL, X-RAY DETECTABLE: Brand: DEROYAL

## (undated) DEVICE — YANKAUER,SMOOTH HANDLE,HIGH CAPACITY: Brand: MEDLINE INDUSTRIES, INC.

## (undated) DEVICE — FORCEPS BX L240CM JAW DIA2.8MM L CAP W/ NDL MIC MESH TOOTH

## (undated) DEVICE — GOWN ISOL IMPERV UNIV, DISP, OPEN BACK, BLUE --

## (undated) DEVICE — LINER SUCT CANSTR 3000CC PLAS SFT PRE ASSEMB W/OUT TBNG W/

## (undated) DEVICE — SYR 10ML LUER LOK 1/5ML GRAD --

## (undated) DEVICE — FCPS RAD JAW 4LC 240CM W/NDL -- BX/20 RADIAL JAW 4

## (undated) DEVICE — SUTURE SZ 0 27IN 5/8 CIR UR-6  TAPER PT VIOLET ABSRB VICRYL J603H

## (undated) DEVICE — NEEDLE EPI 18GA L3.5IN CLR HUB TUOHY W/ WNG PERIFIX

## (undated) DEVICE — SUTURE PROL SZ 0 L30IN NONABSORBABLE BLU SH L26MM 1/2 CIR 8834H

## (undated) DEVICE — CANNULA NSL AD TBNG L14FT STD PVC O2 CRV CONN NONFLARED NSL

## (undated) DEVICE — 3M™ TEGADERM™ TRANSPARENT FILM DRESSING FRAME STYLE, 1627, 4 IN X 10 IN (10 CM X 25 CM), 20/CT 4CT/CASE: Brand: 3M™ TEGADERM™

## (undated) DEVICE — AVANOS* SHORT BEVEL NEEDLE: Brand: AVANOS

## (undated) DEVICE — SYR LR LCK 1ML GRAD NSAF 30ML --

## (undated) DEVICE — Device: Brand: MEDEX

## (undated) DEVICE — THIS ADAPTER IS A DOUBLE SEALING FEMALE LUER LOCK ADAPTER WITH A 2-PIECE, COMBINATION COMPRESSION FIT/BARBED CATHETER CONNECTOR. THE ADAPTER IS USED TO CONNECT THE PD CATHETER TO A SOLUTION TRANSFER SET WITH LOCKING CONNECTOR.: Brand: LOCKING TITANIUM ADAPTER FOR PERITONEAL DIALYSIS CATHETER

## (undated) DEVICE — BASIN EMSIS 16OZ GRAPHITE PLAS KID SHP MOLD GRAD FOR ORAL

## (undated) DEVICE — COVADERM: Brand: DEROYAL

## (undated) DEVICE — ENDOSCOPY PUMP TUBING/ CAP SET: Brand: ERBE

## (undated) DEVICE — (D)NDL SPNE 22GX15CM -- DISC BY MFR W/NO SUB

## (undated) DEVICE — CATHETER SUCT TR FL TIP 14FR W/ O CTRL

## (undated) DEVICE — TRAY MYEL SFTY +

## (undated) DEVICE — PACK PROCEDURE SURG MAJ W/ BASIN LF

## (undated) DEVICE — THIS DEVICE IS A PLASTIC DISCONNECT CAP FOR PERITONEAL DIALYSIS AND CONTAINS POVIDONE-IODINE INTENDED TO PROTECT THE FEMALE LUER CONNECTOR OF THE BAXTER TRANSFER SET.: Brand: MINICAP WITH POVIDONE-IODINE SOLUTION

## (undated) DEVICE — SUTURE MCRYL SZ 2-0 L36IN ABSRB UD L36MM CT-1 1/2 CIR Y945H

## (undated) DEVICE — FLEX ADVANTAGE 3000CC: Brand: FLEX ADVANTAGE

## (undated) DEVICE — SOLUTION IRRIG 1000ML H2O STRL BLT

## (undated) DEVICE — STERILE POLYISOPRENE POWDER-FREE SURGICAL GLOVES: Brand: PROTEXIS

## (undated) DEVICE — 3M™ BAIR PAWS FLEX™ WARMING GOWN, STANDARD, 20 PER CASE 81003: Brand: BAIR PAWS™

## (undated) DEVICE — GOWN,REINFORCED,POLY,AURORA,XLARGE,STRL: Brand: MEDLINE

## (undated) DEVICE — PROBE ARC STRAIGHT FIRE 23 MM OD X 220 CM 10/BX

## (undated) DEVICE — SOLUTION LACTATED RINGERS INJECTION USP

## (undated) DEVICE — BITE BLOCK ENDOSCP UNIV AD 6 TO 9.4 MM

## (undated) DEVICE — GLOVE SURG SZ 7.5 L11.73IN FNGR THK9.8MIL STRW LTX POLYMER